# Patient Record
Sex: MALE | Employment: OTHER | URBAN - METROPOLITAN AREA
[De-identification: names, ages, dates, MRNs, and addresses within clinical notes are randomized per-mention and may not be internally consistent; named-entity substitution may affect disease eponyms.]

---

## 2021-04-06 ENCOUNTER — IMMUNIZATION (OUTPATIENT)
Dept: PRIMARY CARE CLINIC | Age: 61
End: 2021-04-06
Payer: COMMERCIAL

## 2021-04-06 PROCEDURE — 0011A COVID-19, MODERNA VACCINE 100MCG/0.5ML DOSE: CPT | Performed by: FAMILY MEDICINE

## 2021-04-06 PROCEDURE — 91301 COVID-19, MODERNA VACCINE 100MCG/0.5ML DOSE: CPT | Performed by: FAMILY MEDICINE

## 2021-05-04 ENCOUNTER — IMMUNIZATION (OUTPATIENT)
Dept: PRIMARY CARE CLINIC | Age: 61
End: 2021-05-04
Payer: COMMERCIAL

## 2021-05-04 PROCEDURE — 91301 COVID-19, MODERNA VACCINE 100MCG/0.5ML DOSE: CPT | Performed by: FAMILY MEDICINE

## 2021-05-04 PROCEDURE — 0012A COVID-19, MODERNA VACCINE 100MCG/0.5ML DOSE: CPT | Performed by: FAMILY MEDICINE

## 2022-08-05 ENCOUNTER — HOSPITAL ENCOUNTER (INPATIENT)
Age: 62
LOS: 33 days | Discharge: NURSING, REHAB OR LONG-TERM CARE | DRG: 853 | End: 2022-09-07
Attending: EMERGENCY MEDICINE | Admitting: INTERNAL MEDICINE
Payer: COMMERCIAL

## 2022-08-05 ENCOUNTER — APPOINTMENT (OUTPATIENT)
Dept: CT IMAGING | Age: 62
DRG: 853 | End: 2022-08-05
Payer: COMMERCIAL

## 2022-08-05 DIAGNOSIS — R79.89 ELEVATED TSH: ICD-10-CM

## 2022-08-05 DIAGNOSIS — K56.609 SMALL BOWEL OBSTRUCTION (HCC): Primary | ICD-10-CM

## 2022-08-05 DIAGNOSIS — R07.9 CHEST PAIN, UNSPECIFIED TYPE: ICD-10-CM

## 2022-08-05 DIAGNOSIS — K56.609 INTESTINAL OBSTRUCTION, UNSPECIFIED CAUSE, UNSPECIFIED WHETHER PARTIAL OR COMPLETE (HCC): ICD-10-CM

## 2022-08-05 DIAGNOSIS — K44.9 HIATAL HERNIA: ICD-10-CM

## 2022-08-05 PROBLEM — K56.600 PARTIAL BOWEL OBSTRUCTION (HCC): Status: ACTIVE | Noted: 2022-08-05

## 2022-08-05 LAB
A/G RATIO: 0.7 (ref 1.1–2.2)
ALBUMIN SERPL-MCNC: 3.3 G/DL (ref 3.4–5)
ALP BLD-CCNC: 98 U/L (ref 40–129)
ALT SERPL-CCNC: 27 U/L (ref 10–40)
ANION GAP SERPL CALCULATED.3IONS-SCNC: 13 MMOL/L (ref 3–16)
AST SERPL-CCNC: 27 U/L (ref 15–37)
BASE EXCESS ARTERIAL: -0.6 MMOL/L (ref -3–3)
BASOPHILS ABSOLUTE: 0 K/UL (ref 0–0.2)
BASOPHILS RELATIVE PERCENT: 0.1 %
BILIRUB SERPL-MCNC: 0.6 MG/DL (ref 0–1)
BUN BLDV-MCNC: 18 MG/DL (ref 7–20)
CALCIUM SERPL-MCNC: 8.7 MG/DL (ref 8.3–10.6)
CARBOXYHEMOGLOBIN ARTERIAL: 0.8 % (ref 0–1.5)
CHLORIDE BLD-SCNC: 97 MMOL/L (ref 99–110)
CO2: 24 MMOL/L (ref 21–32)
CREAT SERPL-MCNC: 1.1 MG/DL (ref 0.8–1.3)
EKG ATRIAL RATE: 112 BPM
EKG DIAGNOSIS: NORMAL
EKG P AXIS: 36 DEGREES
EKG P-R INTERVAL: 150 MS
EKG Q-T INTERVAL: 372 MS
EKG QRS DURATION: 112 MS
EKG QTC CALCULATION (BAZETT): 507 MS
EKG R AXIS: 46 DEGREES
EKG T AXIS: 2 DEGREES
EKG VENTRICULAR RATE: 112 BPM
EOSINOPHILS ABSOLUTE: 0.1 K/UL (ref 0–0.6)
EOSINOPHILS RELATIVE PERCENT: 0.5 %
GFR AFRICAN AMERICAN: >60
GFR NON-AFRICAN AMERICAN: >60
GLUCOSE BLD-MCNC: 112 MG/DL (ref 70–99)
HCO3 ARTERIAL: 22.8 MMOL/L (ref 21–29)
HCT VFR BLD CALC: 42.4 % (ref 40.5–52.5)
HEMOGLOBIN, ART, EXTENDED: 15.9 G/DL (ref 13.5–17.5)
HEMOGLOBIN: 14.2 G/DL (ref 13.5–17.5)
LACTIC ACID: 1.5 MMOL/L (ref 0.4–2)
LYMPHOCYTES ABSOLUTE: 0.9 K/UL (ref 1–5.1)
LYMPHOCYTES RELATIVE PERCENT: 8.1 %
MCH RBC QN AUTO: 32.2 PG (ref 26–34)
MCHC RBC AUTO-ENTMCNC: 33.5 G/DL (ref 31–36)
MCV RBC AUTO: 96.2 FL (ref 80–100)
METHEMOGLOBIN ARTERIAL: 0.3 %
MONOCYTES ABSOLUTE: 1 K/UL (ref 0–1.3)
MONOCYTES RELATIVE PERCENT: 9.5 %
NEUTROPHILS ABSOLUTE: 8.6 K/UL (ref 1.7–7.7)
NEUTROPHILS RELATIVE PERCENT: 81.8 %
O2 SAT, ARTERIAL: 94 %
O2 THERAPY: ABNORMAL
PCO2 ARTERIAL: 33.8 MMHG (ref 35–45)
PDW BLD-RTO: 15.9 % (ref 12.4–15.4)
PH ARTERIAL: 7.44 (ref 7.35–7.45)
PLATELET # BLD: 435 K/UL (ref 135–450)
PMV BLD AUTO: 7 FL (ref 5–10.5)
PO2 ARTERIAL: 68.3 MMHG (ref 75–108)
POTASSIUM REFLEX MAGNESIUM: 3.8 MMOL/L (ref 3.5–5.1)
RBC # BLD: 4.41 M/UL (ref 4.2–5.9)
SARS-COV-2, NAAT: NOT DETECTED
SODIUM BLD-SCNC: 134 MMOL/L (ref 136–145)
T4 FREE: 1.1 NG/DL (ref 0.9–1.8)
TCO2 ARTERIAL: 23.9 MMOL/L
TOTAL PROTEIN: 7.8 G/DL (ref 6.4–8.2)
TROPONIN: <0.01 NG/ML
TSH REFLEX: 12.18 UIU/ML (ref 0.27–4.2)
WBC # BLD: 10.5 K/UL (ref 4–11)

## 2022-08-05 PROCEDURE — 2580000003 HC RX 258: Performed by: NURSE PRACTITIONER

## 2022-08-05 PROCEDURE — 84484 ASSAY OF TROPONIN QUANT: CPT

## 2022-08-05 PROCEDURE — 74174 CTA ABD&PLVS W/CONTRAST: CPT

## 2022-08-05 PROCEDURE — 83605 ASSAY OF LACTIC ACID: CPT

## 2022-08-05 PROCEDURE — 6360000002 HC RX W HCPCS: Performed by: NURSE PRACTITIONER

## 2022-08-05 PROCEDURE — 84443 ASSAY THYROID STIM HORMONE: CPT

## 2022-08-05 PROCEDURE — 80053 COMPREHEN METABOLIC PANEL: CPT

## 2022-08-05 PROCEDURE — 96375 TX/PRO/DX INJ NEW DRUG ADDON: CPT

## 2022-08-05 PROCEDURE — 85025 COMPLETE CBC W/AUTO DIFF WBC: CPT

## 2022-08-05 PROCEDURE — 87635 SARS-COV-2 COVID-19 AMP PRB: CPT

## 2022-08-05 PROCEDURE — 36415 COLL VENOUS BLD VENIPUNCTURE: CPT

## 2022-08-05 PROCEDURE — 1200000000 HC SEMI PRIVATE

## 2022-08-05 PROCEDURE — 93010 ELECTROCARDIOGRAM REPORT: CPT | Performed by: INTERNAL MEDICINE

## 2022-08-05 PROCEDURE — 99285 EMERGENCY DEPT VISIT HI MDM: CPT

## 2022-08-05 PROCEDURE — 6370000000 HC RX 637 (ALT 250 FOR IP): Performed by: NURSE PRACTITIONER

## 2022-08-05 PROCEDURE — 84439 ASSAY OF FREE THYROXINE: CPT

## 2022-08-05 PROCEDURE — 36600 WITHDRAWAL OF ARTERIAL BLOOD: CPT

## 2022-08-05 PROCEDURE — 82803 BLOOD GASES ANY COMBINATION: CPT

## 2022-08-05 PROCEDURE — 93005 ELECTROCARDIOGRAM TRACING: CPT | Performed by: NURSE PRACTITIONER

## 2022-08-05 PROCEDURE — 6360000004 HC RX CONTRAST MEDICATION: Performed by: NURSE PRACTITIONER

## 2022-08-05 PROCEDURE — 96361 HYDRATE IV INFUSION ADD-ON: CPT

## 2022-08-05 PROCEDURE — 96374 THER/PROPH/DIAG INJ IV PUSH: CPT

## 2022-08-05 RX ORDER — FOLIC ACID 1 MG/1
1 TABLET ORAL DAILY
COMMUNITY
Start: 2022-03-21

## 2022-08-05 RX ORDER — GABAPENTIN 800 MG/1
800 TABLET ORAL 3 TIMES DAILY
Status: ON HOLD | COMMUNITY
End: 2022-09-07 | Stop reason: HOSPADM

## 2022-08-05 RX ORDER — 0.9 % SODIUM CHLORIDE 0.9 %
1000 INTRAVENOUS SOLUTION INTRAVENOUS ONCE
Status: COMPLETED | OUTPATIENT
Start: 2022-08-05 | End: 2022-08-05

## 2022-08-05 RX ORDER — OXYMETAZOLINE HYDROCHLORIDE 0.05 G/100ML
2 SPRAY NASAL ONCE
Status: COMPLETED | OUTPATIENT
Start: 2022-08-05 | End: 2022-08-05

## 2022-08-05 RX ORDER — ONDANSETRON 2 MG/ML
4 INJECTION INTRAMUSCULAR; INTRAVENOUS EVERY 6 HOURS PRN
Status: DISCONTINUED | OUTPATIENT
Start: 2022-08-05 | End: 2022-09-07 | Stop reason: HOSPADM

## 2022-08-05 RX ORDER — SPIRONOLACTONE 50 MG/1
50 TABLET, FILM COATED ORAL DAILY
Status: ON HOLD | COMMUNITY
Start: 2022-05-06 | End: 2022-09-07 | Stop reason: HOSPADM

## 2022-08-05 RX ORDER — ACETAMINOPHEN 500 MG
1000 TABLET ORAL EVERY 8 HOURS PRN
COMMUNITY
Start: 2022-08-03

## 2022-08-05 RX ORDER — LISINOPRIL 10 MG/1
10 TABLET ORAL DAILY
Status: ON HOLD | COMMUNITY
Start: 2022-06-13 | End: 2022-09-07 | Stop reason: HOSPADM

## 2022-08-05 RX ORDER — LEVOTHYROXINE SODIUM 0.2 MG/1
200 TABLET ORAL EVERY MORNING
COMMUNITY
Start: 2022-06-01

## 2022-08-05 RX ORDER — ATORVASTATIN CALCIUM 40 MG/1
1 TABLET, FILM COATED ORAL DAILY
Status: ON HOLD | COMMUNITY
Start: 2022-06-13 | End: 2022-08-07

## 2022-08-05 RX ORDER — LORATADINE 10 MG/1
10 TABLET ORAL DAILY
COMMUNITY

## 2022-08-05 RX ORDER — SODIUM CHLORIDE 9 MG/ML
INJECTION, SOLUTION INTRAVENOUS PRN
Status: DISCONTINUED | OUTPATIENT
Start: 2022-08-05 | End: 2022-08-07 | Stop reason: SDUPTHER

## 2022-08-05 RX ORDER — ASPIRIN 81 MG/1
324 TABLET, CHEWABLE ORAL ONCE
Status: DISCONTINUED | OUTPATIENT
Start: 2022-08-05 | End: 2022-08-05

## 2022-08-05 RX ORDER — RIVAROXABAN 20 MG/1
1 TABLET, FILM COATED ORAL DAILY
Status: ON HOLD | COMMUNITY
Start: 2022-06-03 | End: 2022-08-06

## 2022-08-05 RX ORDER — SPIRONOLACTONE 25 MG/1
50 TABLET ORAL DAILY
Status: DISCONTINUED | OUTPATIENT
Start: 2022-08-06 | End: 2022-08-28

## 2022-08-05 RX ORDER — ENOXAPARIN SODIUM 100 MG/ML
40 INJECTION SUBCUTANEOUS 2 TIMES DAILY
Status: DISCONTINUED | OUTPATIENT
Start: 2022-08-06 | End: 2022-08-23

## 2022-08-05 RX ORDER — CELECOXIB 200 MG/1
200 CAPSULE ORAL 2 TIMES DAILY
Status: ON HOLD | COMMUNITY
End: 2022-09-07 | Stop reason: HOSPADM

## 2022-08-05 RX ORDER — METOPROLOL SUCCINATE 25 MG/1
25 TABLET, EXTENDED RELEASE ORAL DAILY
Status: ON HOLD | COMMUNITY
End: 2022-08-05

## 2022-08-05 RX ORDER — SODIUM CHLORIDE, SODIUM LACTATE, POTASSIUM CHLORIDE, CALCIUM CHLORIDE 600; 310; 30; 20 MG/100ML; MG/100ML; MG/100ML; MG/100ML
INJECTION, SOLUTION INTRAVENOUS CONTINUOUS
Status: DISCONTINUED | OUTPATIENT
Start: 2022-08-05 | End: 2022-08-11

## 2022-08-05 RX ORDER — ONDANSETRON 4 MG/1
4 TABLET, ORALLY DISINTEGRATING ORAL EVERY 8 HOURS PRN
Status: DISCONTINUED | OUTPATIENT
Start: 2022-08-05 | End: 2022-09-07 | Stop reason: HOSPADM

## 2022-08-05 RX ORDER — LEVOTHYROXINE SODIUM 0.1 MG/1
200 TABLET ORAL DAILY
Status: DISCONTINUED | OUTPATIENT
Start: 2022-08-06 | End: 2022-09-07 | Stop reason: HOSPADM

## 2022-08-05 RX ORDER — FENTANYL CITRATE 50 UG/ML
50 INJECTION, SOLUTION INTRAMUSCULAR; INTRAVENOUS ONCE
Status: COMPLETED | OUTPATIENT
Start: 2022-08-05 | End: 2022-08-05

## 2022-08-05 RX ORDER — GABAPENTIN 400 MG/1
800 CAPSULE ORAL 3 TIMES DAILY
Status: DISCONTINUED | OUTPATIENT
Start: 2022-08-05 | End: 2022-08-18

## 2022-08-05 RX ORDER — ASPIRIN 81 MG/1
81 TABLET, COATED ORAL DAILY
COMMUNITY
Start: 2022-06-13

## 2022-08-05 RX ORDER — SODIUM CHLORIDE 0.9 % (FLUSH) 0.9 %
5-40 SYRINGE (ML) INJECTION EVERY 12 HOURS SCHEDULED
Status: DISCONTINUED | OUTPATIENT
Start: 2022-08-05 | End: 2022-08-07 | Stop reason: SDUPTHER

## 2022-08-05 RX ORDER — LIDOCAINE HYDROCHLORIDE 20 MG/ML
JELLY TOPICAL ONCE
Status: COMPLETED | OUTPATIENT
Start: 2022-08-05 | End: 2022-08-06

## 2022-08-05 RX ORDER — DIAZEPAM 5 MG/1
5 TABLET ORAL 4 TIMES DAILY PRN
Status: ON HOLD | COMMUNITY
Start: 2022-08-03 | End: 2022-09-07 | Stop reason: HOSPADM

## 2022-08-05 RX ORDER — LISINOPRIL AND HYDROCHLOROTHIAZIDE 12.5; 1 MG/1; MG/1
2 TABLET ORAL DAILY
Status: ON HOLD | COMMUNITY
End: 2022-08-05

## 2022-08-05 RX ORDER — ENOXAPARIN SODIUM 100 MG/ML
40 INJECTION SUBCUTANEOUS 2 TIMES DAILY
Status: DISCONTINUED | OUTPATIENT
Start: 2022-08-05 | End: 2022-08-05 | Stop reason: SDUPTHER

## 2022-08-05 RX ORDER — ENOXAPARIN SODIUM 100 MG/ML
40 INJECTION SUBCUTANEOUS 2 TIMES DAILY
Status: DISCONTINUED | OUTPATIENT
Start: 2022-08-06 | End: 2022-08-05

## 2022-08-05 RX ORDER — LISINOPRIL 10 MG/1
10 TABLET ORAL DAILY
Status: DISCONTINUED | OUTPATIENT
Start: 2022-08-06 | End: 2022-08-08

## 2022-08-05 RX ORDER — DIAZEPAM 5 MG/1
5 TABLET ORAL 4 TIMES DAILY PRN
Status: DISCONTINUED | OUTPATIENT
Start: 2022-08-05 | End: 2022-09-07 | Stop reason: HOSPADM

## 2022-08-05 RX ORDER — LIDOCAINE HYDROCHLORIDE 20 MG/ML
JELLY TOPICAL ONCE
Status: DISCONTINUED | OUTPATIENT
Start: 2022-08-05 | End: 2022-08-05 | Stop reason: RX

## 2022-08-05 RX ORDER — OXYCODONE HYDROCHLORIDE 5 MG/1
10 TABLET ORAL EVERY 6 HOURS PRN
Status: ON HOLD | COMMUNITY
Start: 2022-08-03 | End: 2022-09-07 | Stop reason: HOSPADM

## 2022-08-05 RX ORDER — PANTOPRAZOLE SODIUM 40 MG/1
40 TABLET, DELAYED RELEASE ORAL DAILY
Status: ON HOLD | COMMUNITY
Start: 2022-06-13 | End: 2022-09-07 | Stop reason: HOSPADM

## 2022-08-05 RX ORDER — SODIUM CHLORIDE 0.9 % (FLUSH) 0.9 %
5-40 SYRINGE (ML) INJECTION PRN
Status: DISCONTINUED | OUTPATIENT
Start: 2022-08-05 | End: 2022-08-07 | Stop reason: SDUPTHER

## 2022-08-05 RX ADMIN — FENTANYL CITRATE 50 MCG: 50 INJECTION, SOLUTION INTRAMUSCULAR; INTRAVENOUS at 16:24

## 2022-08-05 RX ADMIN — SODIUM CHLORIDE 1000 ML: 9 INJECTION, SOLUTION INTRAVENOUS at 15:03

## 2022-08-05 RX ADMIN — GABAPENTIN 800 MG: 400 CAPSULE ORAL at 23:53

## 2022-08-05 RX ADMIN — HYDROMORPHONE HYDROCHLORIDE 1 MG: 1 INJECTION, SOLUTION INTRAMUSCULAR; INTRAVENOUS; SUBCUTANEOUS at 19:35

## 2022-08-05 RX ADMIN — OXYMETAZOLINE HCL 2 SPRAY: 0.05 SPRAY NASAL at 23:54

## 2022-08-05 RX ADMIN — METOPROLOL TARTRATE 25 MG: 25 TABLET, FILM COATED ORAL at 23:54

## 2022-08-05 RX ADMIN — Medication 10 ML: at 23:00

## 2022-08-05 RX ADMIN — IOPAMIDOL 75 ML: 755 INJECTION, SOLUTION INTRAVENOUS at 15:47

## 2022-08-05 ASSESSMENT — PAIN SCALES - GENERAL
PAINLEVEL_OUTOF10: 6
PAINLEVEL_OUTOF10: 8
PAINLEVEL_OUTOF10: 5
PAINLEVEL_OUTOF10: 5

## 2022-08-05 ASSESSMENT — PAIN DESCRIPTION - LOCATION
LOCATION: CHEST;ABDOMEN
LOCATION: CHEST
LOCATION: CHEST

## 2022-08-05 ASSESSMENT — LIFESTYLE VARIABLES
HOW OFTEN DO YOU HAVE A DRINK CONTAINING ALCOHOL: MONTHLY OR LESS
HOW MANY STANDARD DRINKS CONTAINING ALCOHOL DO YOU HAVE ON A TYPICAL DAY: 1 OR 2

## 2022-08-05 ASSESSMENT — PAIN DESCRIPTION - FREQUENCY: FREQUENCY: CONTINUOUS

## 2022-08-05 ASSESSMENT — ENCOUNTER SYMPTOMS
COUGH: 0
ANAL BLEEDING: 0
EYE PAIN: 0
SORE THROAT: 0

## 2022-08-05 ASSESSMENT — PAIN DESCRIPTION - ORIENTATION: ORIENTATION: MID

## 2022-08-05 ASSESSMENT — PAIN DESCRIPTION - DESCRIPTORS: DESCRIPTORS: PRESSURE

## 2022-08-05 ASSESSMENT — HEART SCORE: ECG: 0

## 2022-08-05 ASSESSMENT — PAIN DESCRIPTION - PAIN TYPE: TYPE: ACUTE PAIN

## 2022-08-05 ASSESSMENT — PAIN - FUNCTIONAL ASSESSMENT: PAIN_FUNCTIONAL_ASSESSMENT: 0-10

## 2022-08-05 NOTE — H&P
Hospital Medicine History & Physical      PCP: Mamta Monge MD    Date of Admission: 8/5/2022    Date of Service: Pt seen/examined on 8/5/2022 and Admitted to Inpatient with expected LOS greater than two midnights due to medical therapy. Chief Complaint:  abdominal pain      History Of Present Illness:      63 y.o. male with PMHx of Arthritis, A-fib and HTN presented to [address] [place] [address] with 5-7 day hx of abdominal pain followed by acute chest pain with vomiting and diarrhea. Pt reports increasing \"bloating\" to abdomen followed by increasing pain. No fever, chills or body aches. Unable to hold liquids. No bloody stool or hematemesis. No urinary complaints  No active chest pain or shortness of breath. CT chest/abd/pelv: reveal small bowel obstruction with transition point mid small bowel. Distention noted to gastric, duodenal and small bowel. Large hiatal hernia containing much of the stomach which is distended. NG tube ordered for bowel decompression, IV fluids and npo    Past Medical History:          Diagnosis Date    Arthritis     Atrial fibrillation ([facility] [address]     Hypertension        Past Surgical History:      History reviewed. No pertinent surgical history. Medications Prior to Admission:      Prior to Admission medications    Medication Sig Start Date End Date Taking? Authorizing Provider   oxyCODONE (ROXICODONE) 5 MG immediate release tablet Take 10 mg by mouth every 6 hours as needed. 8/3/22  Yes Historical Provider, MD   pantoprazole (PROTONIX) 40 MG tablet Take 40 mg by mouth in the morning. 6/13/22  Yes Historical Provider, MD   folic acid (FOLVITE) 1 MG tablet Take 1 mg by mouth in the morning. 3/21/22  Yes Historical Provider, MD   acetaminophen (TYLENOL) 500 MG tablet Take 1,000 mg by mouth every 8 hours as needed 8/3/22  Yes Historical Provider, MD   diazePAM (VALIUM) 5 MG tablet Take 5 mg by mouth 4 times daily as needed.  8/3/22  Yes Historical Provider, MD levothyroxine (SYNTHROID) 200 MCG tablet Take 200 mcg by mouth every morning 6/1/22  Yes Historical Provider, MD   lisinopril (PRINIVIL;ZESTRIL) 10 MG tablet Take 10 mg by mouth in the morning. 6/13/22  Yes Historical Provider, MD   metoprolol tartrate (LOPRESSOR) 25 MG tablet Take 25 mg by mouth in the morning and 25 mg before bedtime. 7/1/22  Yes Historical Provider, MD   spironolactone (ALDACTONE) 50 MG tablet Take 50 mg by mouth in the morning. 5/6/22  Yes Historical Provider, MD   methotrexate (RHEUMATREX) 2.5 MG chemo tablet Take 15 mg by mouth once a week    Historical Provider, MD   celecoxib (CELEBREX) 200 MG capsule Take 200 mg by mouth in the morning and 200 mg before bedtime. Historical Provider, MD   ASPIRIN LOW DOSE 81 MG EC tablet Take 81 mg by mouth daily 6/13/22   Historical Provider, MD   atorvastatin (LIPITOR) 40 MG tablet Take 1 tablet by mouth in the morning. Patient not taking: Reported on 8/5/2022 6/13/22   Historical Provider, MD   gabapentin (NEURONTIN) 800 MG tablet Take 800 mg by mouth in the morning and 800 mg at noon and 800 mg before bedtime. Historical Provider, MD   loratadine (CLARITIN) 10 MG tablet Take 10 mg by mouth in the morning. Historical Provider, MD   XARELTO 20 MG TABS tablet Take 1 tablet by mouth daily 6/3/22   Historical Provider, MD       Allergies:  Codeine    Social History:      The patient currently lives with wife    TOBACCO:   reports that he has never smoked. He has never used smokeless tobacco.  ETOH:   reports that he does not currently use alcohol. Family History:      Reviewed in detail positive as follows:    History reviewed. No pertinent family history. REVIEW OF SYSTEMS:   Pertinent positives as noted in the HPI. All other systems reviewed and negative.     PHYSICAL EXAM PERFORMED:    BP (!) 155/109   Pulse (!) 120   Temp 98.3 °F (36.8 °C) (Oral)   Resp 27   Ht 5' 7\" (1.702 m)   Wt (!) 340 lb 2.7 oz (154.3 kg)   SpO2 91%   BMI 53.28 kg/m²     General appearance:  Well developed, well nourished,  male lying on hospital bed uncomfortable in appearance but in no apparent distress, appears stated age and cooperative. HEENT:  Normal cephalic, atraumatic without obvious deformity. Pupils equal, round, and reactive to light. Conjunctivae/corneas clear. Neck: Supple, with full range of motion. No jugular venous distention. Trachea midline. Respiratory:  Normal respiratory effort. Clear to auscultation, bilaterally without accessory muscle use. Cardiovascular:  tachycardic rate with regular rhythm without murmurs, no lower extremity edema. Abdomen: distended abdomen tympanic and diffusely tender, without rebound or guarding. Normal bowel sounds. Musculoskeletal:  Moves all extremities equally. Full range of motion without deformity. Skin: Skin warm, dry and intact. No rashes or lesions. Neurologic:  Neurovascularly intact without any focal sensory/motor deficits. Cranial nerves: II-XII intact, grossly non-focal.  Psychiatric:  Alert and oriented, thought content appropriate, normal insight  Capillary Refill: Brisk,< 3 seconds   Peripheral Pulses: +2 palpable, equal bilaterally       Labs:     Recent Labs     08/05/22  1427   WBC 10.5   HGB 14.2   HCT 42.4        Recent Labs     08/05/22  1427   *   K 3.8   CL 97*   CO2 24   BUN 18   CREATININE 1.1   CALCIUM 8.7     Recent Labs     08/05/22  1427   AST 27   ALT 27   BILITOT 0.6   ALKPHOS 98     No results for input(s): INR in the last 72 hours. Recent Labs     08/05/22  1427   TROPONINI <0.01       Urinalysis:    No results found for: Saralyn Grajeda, BACTERIA, RBCUA, BLOODU, SPECGRAV, GLUCOSEU    Radiology:     CTA CHEST ABDOMEN PELVIS W CONTRAST   Preliminary Result   1. No acute aortic pathology. No significant atherosclerotic disease,   aneurysm or dissection. No brachiocephalic or visceral stenosis.    2. Small bilateral pleural effusions with bilateral lower lobe atelectasis. No acute pulmonary infiltrate. 3. Gastric, duodenal and small bowel distension with gradual transition in   the mid small bowel most consistent with a low-grade or partial obstruction. Large hiatal hernia containing much of the stomach which is distended. 4. Colonic wall thickening in the mid descending colon, likely accentuated by   incomplete distension. The possibility of an underlying colitis is raised. Diverticulosis with no acute features. ASSESSMENT:    Active Hospital Problems    Diagnosis Date Noted    Partial bowel obstruction ([facility] [address] [K56.600] 08/05/2022     Priority: Medium    Large hiatal hernia [K44.9] 08/05/2022     Priority: Medium         PLAN:    Low grade or Partial Bowel obstruction  - CT: reveal small bowel obstruction with transition point mid small bowel. Distention noted to gastric, duodenal and small bowel. Large hiatal hernia containing much of the stomach which is distended. - npo  - surgery consulted in ED - will manage  - IV fluids  - NG tube for bowel decompression    Hiatal hernia  - CT chest/abd/pelv: Large hiatal hernia containing much of the stomach which is distended  - likely source of chest pain  - NG tube to decompress the stomach    Atrial Fibrillation   - currently rate controlled  - continue Metoprolol when able, will give lovenox instead of eliquis      HTN  - monitor blood pressure  - restart home meds when able    DVT Prophylaxis: Lovenox  Diet: Diet NPO  Code Status: Full Code    Dispo - Inpatient       [address], APRN - CNP    Thank you Dimas Mathews MD for the opportunity to be involved in this patient's care. If you have any questions or concerns please feel free to contact me at [phone].

## 2022-08-05 NOTE — ED PROVIDER NOTES
[facility] Emergency Department    CHIEF COMPLAINT  Abdominal Pain, Chest Pain (Sub-sternal, pressure, onset 1300 hours today), and Shortness of Breath (EMS reports pt 88% on RA)      SHARED SERVICE VISIT  I have seen and evaluated this patient with my supervising physician, Dr. Magdaleno Major. HISTORY OF PRESENT ILLNESS  Koki Stewart is a 63 y.o. nontoxic, well-appearing, but stressed male with a medical history including, but not limited to, atrial fibrillation hypertension who presents to the ED complaining of acute onset of substernal \"pressure\" chest pain with radiation to his back. Accompanying symptoms include \"aching\" 4/10 bifrontal headache, \"sharp\" 4/10 diffuse abdominal pain, nausea, vomiting x2-today, diarrhea x1 today, lightheadedness, and shortness of breath. Denies ripping or tearing sensation, dizziness, presyncope, diaphoresis, fever, chills, cough, change in ability to smell/taste, hemoptysis, leg/calf pain or swelling, body aches, or urinary symptoms/retention, other concerns. No other complaints, modifying factors or associated symptoms. Of note the patient was recently visiting [place] when he developed severe abdominal pain. He was seen and admitted to the hospital from Sunday to Wednesday. He was reportedly provided pain medication in order to drive home and was told to follow-up with GI.      HEART SCORE:    History: Highly Suspicious  ECG: Normal  Patient Age: > 39 and < 65 years  *Risk factors for Atherosclerotic disease: Hypertension; Obesity  Risk Factors: 1 or 2 risk factors  Troponin: < 1X normal limit  Heart Score Total: 4      Heart score: 4. This falls under the following category: Score of 4-6, which indicates low/moderate risk for major adverse cardiac event and supports observation with repeated troponins and/or non-invasive testing          Nursing notes reviewed.    Past Medical History:   Diagnosis Date    Arthritis     Atrial fibrillation ([facility] [address] Hypertension      History reviewed. No pertinent surgical history. History reviewed. No pertinent family history. Social History     Socioeconomic History    Marital status: Unknown     Spouse name: Not on file    Number of children: Not on file    Years of education: Not on file    Highest education level: Not on file   Occupational History    Not on file   Tobacco Use    Smoking status: Never    Smokeless tobacco: Never   Substance and Sexual Activity    Alcohol use: Not Currently    Drug use: Never    Sexual activity: Not Currently   Other Topics Concern    Not on file   Social History Narrative    Not on file     Social Determinants of Health     Financial Resource Strain: Not on file   Food Insecurity: Not on file   Transportation Needs: Not on file   Physical Activity: Not on file   Stress: Not on file   Social Connections: Not on file   Intimate Partner Violence: Not on file   Housing Stability: Not on file     Current Facility-Administered Medications   Medication Dose Route Frequency Provider Last Rate Last Admin    0.9 % sodium chloride bolus  1,000 mL IntraVENous Once JARON Thompson - CNP         Current Outpatient Medications   Medication Sig Dispense Refill    oxyCODONE (ROXICODONE) 5 MG immediate release tablet Take 10 mg by mouth every 6 hours as needed. pantoprazole (PROTONIX) 40 MG tablet Take 40 mg by mouth in the morning. folic acid (FOLVITE) 1 MG tablet Take 1 mg by mouth in the morning. methotrexate (RHEUMATREX) 2.5 MG chemo tablet Take 15 mg by mouth once a week      lisinopril-hydroCHLOROthiazide (PRINZIDE;ZESTORETIC) 10-12.5 MG per tablet Take 2 tablets by mouth in the morning. celecoxib (CELEBREX) 200 MG capsule Take 200 mg by mouth in the morning and 200 mg before bedtime. metoprolol succinate (TOPROL XL) 25 MG extended release tablet Take 25 mg by mouth in the morning.       ASPIRIN LOW DOSE 81 MG EC tablet TAKE 1 TABLET BY MOUTH DAILY       Allergies Allergen Reactions    Codeine Nausea Only and Nausea And Vomiting     Stomach upset          REVIEW OF SYSTEMS  Review of Systems   Constitutional:  Negative for chills, diaphoresis, fatigue and fever. HENT:  Negative for congestion and sore throat. Eyes:  Negative for pain and visual disturbance. Respiratory:  Positive for shortness of breath. Negative for cough. Cardiovascular:  Positive for chest pain. Negative for leg swelling. Gastrointestinal:  Positive for abdominal distention, abdominal pain, diarrhea, nausea and vomiting. Negative for anal bleeding. Genitourinary:  Negative for difficulty urinating, dysuria, frequency and urgency. Musculoskeletal:  Positive for back pain. Negative for neck pain. Skin:  Negative for rash and wound. Neurological:  Positive for light-headedness and headaches. Negative for dizziness. Hematological: Negative. Psychiatric/Behavioral: Negative. PHYSICAL EXAM  BP (!) 131/93   Pulse (!) 114   Temp 98.3 °F (36.8 °C) (Oral)   Resp 20   Ht 5' 7\" (1.702 m)   Wt (!) 340 lb 2.7 oz (154.3 kg)   SpO2 93%   BMI 53.28 kg/m²   GENERAL APPEARANCE: Awake and alert. Cooperative. +  distress. Non-toxic in appearance. HEAD: Normocephalic. Atraumatic. EYES: PERRL. EOM's grossly intact. ENT: Mucous membranes are moist.   NECK: Supple. HEART: Regular rhythm with tachycardic rate at 109 bpm. No murmurs, rubs, or gallops. LUNGS:  Respirations unlabored. CTAB. Moderate air exchange. Speaking comfortably in full sentences. ABDOMEN: Soft. Non-distended. Non-tender. No guarding or rebound. There is no midline pulsatile abdominal mass. + Bowel sounds x4 quadrants. No masses. No organomegaly. EXTREMITIES: Trace peripheral edema. Moves all extremities equally. All extremities neurovascularly intact. Radial pulse equal bilaterally. SKIN: Warm and dry. No acute rashes. NEUROLOGICAL: Alert and oriented. CN's 2-12 intact. No gross facial drooping. Strength 5/5, sensation intact. PSYCHIATRIC: Normal mood and affect. RADIOLOGY  No results found. ED COURSE  Patient was placed on cardiac monitoring,  p.o. was given. Nitro was given in route without relief. Patient received fentanyl and Dilaudid for pain, with good relief. Triage vitals stable but hypertensive at 131/93 mmHg and tachycardic at 114 bpm.  Cardiac workup was initiated to include CBC, chemistry panel, cardiac labs, COVID-19, TSH, lactic acid, CXR, and EKG. work-up pending. Patient was given a liter bolus of normal saline, initially fentanyl for pain, and eventually Dilaudid for pain. Patient did not require antiemetics as he states he is not experiencing any nausea presently. CRITICAL CARE TIME  0 Minutes of critical care time spent not including separately billable procedures. MDM  Patient presents to the emergency department with chest pain. Alternate diagnoses are less likely based on history and physical. Alternate diagnoses are less likely based on history and physical. Considered myocardial infarction, aortic dissection, pulmonary embolism, tension pneumothorax, endocarditis, GERD, and pneumonia but less likely based on history and physical. Considered MI but no ischemic changes on EKG and no elevation in troponin. Considered aortic dissection but less likely as no radiation of pain into back with ripping/tearing sensation, there are equal radial pulses bilaterally, and there is no midline pulsatile abdominal mass. Considered pulmonary embolism but less likely as no cough or hemoptysis, and no DVT symptoms. D-dimer was not obtained. Considered tension pneumothorax but less likely as no unilaterally diminished breath sounds or tracheal deviation. Considered endocarditis but less likely as no fever, murmur, or IV drug use. Considered GERD but less likely as no postprandial epigastric abdominal/throat burning.  Considered pneumonia but less likely as no fever, chills, sweats, leukocytosis, cough, and no radiographic evidence of consolidation or infiltrates on imaging-CXR.          Labs Ordered and reviewed:  I have reviewed and interpreted all of the currently available lab results from this visit:  Results for orders placed or performed during the hospital encounter of 08/05/22   COVID-19, Rapid    Specimen: Nasopharyngeal Swab   Result Value Ref Range    SARS-CoV-2, NAAT Not Detected Not Detected   CBC with Auto Differential   Result Value Ref Range    WBC 10.5 4.0 - 11.0 K/uL    RBC 4.41 4.20 - 5.90 M/uL    Hemoglobin 14.2 13.5 - 17.5 g/dL    Hematocrit 42.4 40.5 - 52.5 %    MCV 96.2 80.0 - 100.0 fL    MCH 32.2 26.0 - 34.0 pg    MCHC 33.5 31.0 - 36.0 g/dL    RDW 15.9 (H) 12.4 - 15.4 %    Platelets 251 134 - 511 K/uL    MPV 7.0 5.0 - 10.5 fL    Neutrophils % 81.8 %    Lymphocytes % 8.1 %    Monocytes % 9.5 %    Eosinophils % 0.5 %    Basophils % 0.1 %    Neutrophils Absolute 8.6 (H) 1.7 - 7.7 K/uL    Lymphocytes Absolute 0.9 (L) 1.0 - 5.1 K/uL    Monocytes Absolute 1.0 0.0 - 1.3 K/uL    Eosinophils Absolute 0.1 0.0 - 0.6 K/uL    Basophils Absolute 0.0 0.0 - 0.2 K/uL   Comprehensive Metabolic Panel w/ Reflex to MG   Result Value Ref Range    Sodium 134 (L) 136 - 145 mmol/L    Potassium reflex Magnesium 3.8 3.5 - 5.1 mmol/L    Chloride 97 (L) 99 - 110 mmol/L    CO2 24 21 - 32 mmol/L    Anion Gap 13 3 - 16    Glucose 112 (H) 70 - 99 mg/dL    BUN 18 7 - 20 mg/dL    Creatinine 1.1 0.8 - 1.3 mg/dL    GFR Non-African American >60 >60    GFR African American >60 >60    Calcium 8.7 8.3 - 10.6 mg/dL    Total Protein 7.8 6.4 - 8.2 g/dL    Albumin 3.3 (L) 3.4 - 5.0 g/dL    Albumin/Globulin Ratio 0.7 (L) 1.1 - 2.2    Total Bilirubin 0.6 0.0 - 1.0 mg/dL    Alkaline Phosphatase 98 40 - 129 U/L    ALT 27 10 - 40 U/L    AST 27 15 - 37 U/L   Troponin   Result Value Ref Range    Troponin <0.01 <0.01 ng/mL   Blood Gas, Arterial   Result Value Ref Range    pH, Arterial 7.438 7.350 - 7.450    pCO2, Arterial 33.8 (L) 35.0 - 45.0 mmHg    pO2, Arterial 68.3 (L) 75.0 - 108.0 mmHg    HCO3, Arterial 22.8 21.0 - 29.0 mmol/L    Base Excess, Arterial -0.6 -3.0 - 3.0 mmol/L    Hemoglobin, Art, Extended 15.9 13.5 - 17.5 g/dL    O2 Sat, Arterial 94.0 >92 %    Carboxyhgb, Arterial 0.8 0.0 - 1.5 %    Methemoglobin, Arterial 0.3 <1.5 %    TCO2, Arterial 23.9 Not Established mmol/L    O2 Therapy Unknown    TSH with Reflex   Result Value Ref Range    TSH 12.18 (H) 0.27 - 4.20 uIU/mL   Lactic Acid   Result Value Ref Range    Lactic Acid 1.5 0.4 - 2.0 mmol/L   T4, Free   Result Value Ref Range    T4 Free 1.1 0.9 - 1.8 ng/dL   Comprehensive Metabolic Panel w/ Reflex to MG   Result Value Ref Range    Sodium 134 (L) 136 - 145 mmol/L    Potassium reflex Magnesium 3.2 (L) 3.5 - 5.1 mmol/L    Chloride 98 (L) 99 - 110 mmol/L    CO2 20 (L) 21 - 32 mmol/L    Anion Gap 16 3 - 16    Glucose 110 (H) 70 - 99 mg/dL    BUN 21 (H) 7 - 20 mg/dL    Creatinine 0.8 0.8 - 1.3 mg/dL    GFR Non-African American >60 >60    GFR African American >60 >60    Calcium 8.1 (L) 8.3 - 10.6 mg/dL    Total Protein 6.3 (L) 6.4 - 8.2 g/dL    Albumin 2.8 (L) 3.4 - 5.0 g/dL    Albumin/Globulin Ratio 0.8 (L) 1.1 - 2.2    Total Bilirubin 0.7 0.0 - 1.0 mg/dL    Alkaline Phosphatase 72 40 - 129 U/L    ALT 23 10 - 40 U/L    AST 22 15 - 37 U/L   Lactic Acid   Result Value Ref Range    Lactic Acid 4.3 (HH) 0.4 - 2.0 mmol/L   CBC with Auto Differential   Result Value Ref Range    WBC 11.0 4.0 - 11.0 K/uL    RBC 3.64 (L) 4.20 - 5.90 M/uL    Hemoglobin 11.5 (L) 13.5 - 17.5 g/dL    Hematocrit 34.8 (L) 40.5 - 52.5 %    MCV 95.4 80.0 - 100.0 fL    MCH 31.6 26.0 - 34.0 pg    MCHC 33.1 31.0 - 36.0 g/dL    RDW 16.1 (H) 12.4 - 15.4 %    Platelets 706 974 - 437 K/uL    MPV 7.1 5.0 - 10.5 fL    Neutrophils % 80.0 %    Lymphocytes % 8.0 %    Monocytes % 8.0 %    Eosinophils % 0.0 %    Basophils % 1.0 %    Neutrophils Absolute 9.1 (H) 1.7 - 7.7 K/uL    Lymphocytes Absolute 0.9 (L) 1.0 - 5.1 K/uL    Monocytes Absolute 0.9 0.0 - 1.3 K/uL    Eosinophils Absolute 0.0 0.0 - 0.6 K/uL    Basophils Absolute 0.1 0.0 - 0.2 K/uL    Bands Relative 3 0 - 7 %    Anisocytosis Occasional (A)     Microcytes Occasional (A)    Protime-INR   Result Value Ref Range    Protime 16.4 (H) 11.7 - 14.5 sec    INR 1.33 (H) 0.87 - 1.14   Magnesium   Result Value Ref Range    Magnesium 1.60 (L) 1.80 - 2.40 mg/dL   Lactic Acid   Result Value Ref Range    Lactic Acid 1.1 0.4 - 2.0 mmol/L   EKG 12 Lead   Result Value Ref Range    Ventricular Rate 112 BPM    Atrial Rate 112 BPM    P-R Interval 150 ms    QRS Duration 112 ms    Q-T Interval 372 ms    QTc Calculation (Bazett) 507 ms    P Axis 36 degrees    R Axis 46 degrees    T Axis 2 degrees    Diagnosis       Sinus tachycardiaNon-specific intra-ventricular conduction delayAbnormal ECGNo previous ECGs availableConfirmed by Jesi MOREIRA MD ([id number]) on 8/5/2022 3:47:26 PM             Imaging ordered and reviewed:  XR CHEST PORTABLE    Result Date: 8/6/2022  Nonvisualization of the tip of the nasogastric tube. A KUB may be helpful for further evaluation. Cardiomegaly with pulmonary vascular congestion Bibasilar atelectasis or infiltrate. CTA CHEST ABDOMEN PELVIS W CONTRAST    Result Date: 8/6/2022  1. No acute aortic pathology. No significant atherosclerotic disease, aneurysm or dissection. No brachiocephalic or visceral stenosis. 2. Small bilateral pleural effusions with bilateral lower lobe atelectasis. No acute pulmonary infiltrate. 3. Gastric, duodenal and small bowel distension with gradual transition in the mid small bowel most consistent with a low-grade or partial obstruction. Large hiatal hernia containing much of the stomach which is distended. 4. Colonic wall thickening in the mid descending colon, likely accentuated by incomplete distension. The possibility of an underlying colitis is raised. Diverticulosis with no acute features.       Work-up reveals:  ABG: Normal pH is 7.4, CO2 reduced at 33.8, and PO2 reduced at 68.3, otherwise unremarkable  COVID-19 not detected  T4, free: 1.1  TSH with reflex: +12.2  Troponin: <0.01  Lactic acid [email or web address], metabolic panel shows hyponatremia 134, [email or web address], hyperglycemic at 112 mg/dL, albumin reduced at 2.3, albumin/globulin ratio reduced at 0.7, otherwise unremarkable  CBC: Negative for leukocytosis or anemia with neutrophils absolute elevated 8.6, and lymphocytes absolute reduced at 0.9 but otherwise unremarkable  EKG: As interpreted by EMD, see RN note for details    Given the patient is experiencing chest pain with radiation into his back as well as shortness of breath we did obtain CTA chest abdomen pelvis. CTA chest abdomen pelvis: As noted above, identifying \"1. No acute aortic pathology. No significant atherosclerotic disease, aneurysm or dissection. No brachiocephalic or visceral stenosis. 2. Small bilateral pleural effusions with bilateral lower lobe atelectasis. No acute pulmonary infiltrate. 3. Gastric, duodenal and small bowel distension with gradual transition in the mid small bowel most consistent with a low-grade or partial obstruction. Large hiatal hernia containing much of the stomach which is distended. 4. Colonic wall thickening in the mid descending colon, likely accentuated by incomplete distension. The possibility of an underlying colitis is raised. Diverticulosis with no acute features. \"          Heart score is elevated @  4 with an EKG showing no ischemic changes per EMD read. Therefore, given that the patient does have an imaging study concerning for small bowel obstruction he will be admitted to the hospital for further evaluation and treatment. CTA chest abdomen pelvis concerning for SBO. I consulted with general surgeon. Consulted Dr. [email or web address] and call back at 1756 hrs. Discussed patient's HPI, ED work-up, results, and treatment.  Dr. Orinda Cabot recommends admission to medicine and his service will follow. A discussion was had with Mr. Megan Schwartz regarding SBO, elevated TSH, and intention to admit . Risk management discussed and shared decision making had with patient and/or surrogate. All questions were answered. Patient will be admitted to the hospital for further evaluation treatment. Patient is agreeable with this plan. Clinical Impression:  Small bowel obstruction  Elevated TSH  Chest pain    Disposition:  Admitted      Patient will be admitted to hospital for further evaluation and treatment. Hospitalist: Dr. Karyna Santos      Discussed patients HPI, ED work-up, results, treatment, and response with my attending physician Dr. Paolo Sands and the Hospitalist -Dr. Karyna Santos who agrees to admit the patient to the hospital.      Tere [facility] [employer]    This chart was created using Dragon dictation. Every effort was made by myself to ensure accuracy, however due to limitations of this technology errors may be present.               JARON Rubin - [facility]  08/06/22 [address] [facility] HospitalJ[facility]ON - CNP  08/06/22 1544

## 2022-08-05 NOTE — ED NOTES
EMS reports pt complains of chest pain. 88% on room air, 12-Lead Sinus Tach. Hx of Afib.    Given 324mg ASA, 0.4 Nitro tab, place on 4L of O2 via NC     Flor Nolasco RN  08/05/22 [id number]

## 2022-08-05 NOTE — ED TRIAGE NOTES
Pt states abdominal pain for past 3 days. Today sudden onset of chest pain, substernal, 5/10, pressure around 1300 hours. Vomit and Diarrhea. Lightheadedness.

## 2022-08-06 ENCOUNTER — APPOINTMENT (OUTPATIENT)
Dept: GENERAL RADIOLOGY | Age: 62
DRG: 853 | End: 2022-08-06
Payer: COMMERCIAL

## 2022-08-06 LAB
A/G RATIO: 0.8 (ref 1.1–2.2)
ALBUMIN SERPL-MCNC: 2.8 G/DL (ref 3.4–5)
ALP BLD-CCNC: 72 U/L (ref 40–129)
ALT SERPL-CCNC: 23 U/L (ref 10–40)
ANION GAP SERPL CALCULATED.3IONS-SCNC: 16 MMOL/L (ref 3–16)
ANISOCYTOSIS: ABNORMAL
AST SERPL-CCNC: 22 U/L (ref 15–37)
BANDED NEUTROPHILS RELATIVE PERCENT: 3 % (ref 0–7)
BASOPHILS ABSOLUTE: 0.1 K/UL (ref 0–0.2)
BASOPHILS RELATIVE PERCENT: 1 %
BILIRUB SERPL-MCNC: 0.7 MG/DL (ref 0–1)
BUN BLDV-MCNC: 21 MG/DL (ref 7–20)
CALCIUM SERPL-MCNC: 8.1 MG/DL (ref 8.3–10.6)
CHLORIDE BLD-SCNC: 98 MMOL/L (ref 99–110)
CO2: 20 MMOL/L (ref 21–32)
CREAT SERPL-MCNC: 0.8 MG/DL (ref 0.8–1.3)
EOSINOPHILS ABSOLUTE: 0 K/UL (ref 0–0.6)
EOSINOPHILS RELATIVE PERCENT: 0 %
GFR AFRICAN AMERICAN: >60
GFR NON-AFRICAN AMERICAN: >60
GLUCOSE BLD-MCNC: 110 MG/DL (ref 70–99)
HCT VFR BLD CALC: 34.8 % (ref 40.5–52.5)
HEMOGLOBIN: 11.5 G/DL (ref 13.5–17.5)
INR BLD: 1.33 (ref 0.87–1.14)
LACTIC ACID: 1.1 MMOL/L (ref 0.4–2)
LACTIC ACID: 4.3 MMOL/L (ref 0.4–2)
LYMPHOCYTES ABSOLUTE: 0.9 K/UL (ref 1–5.1)
LYMPHOCYTES RELATIVE PERCENT: 8 %
MAGNESIUM: 1.6 MG/DL (ref 1.8–2.4)
MCH RBC QN AUTO: 31.6 PG (ref 26–34)
MCHC RBC AUTO-ENTMCNC: 33.1 G/DL (ref 31–36)
MCV RBC AUTO: 95.4 FL (ref 80–100)
MICROCYTES: ABNORMAL
MONOCYTES ABSOLUTE: 0.9 K/UL (ref 0–1.3)
MONOCYTES RELATIVE PERCENT: 8 %
NEUTROPHILS ABSOLUTE: 9.1 K/UL (ref 1.7–7.7)
NEUTROPHILS RELATIVE PERCENT: 80 %
PDW BLD-RTO: 16.1 % (ref 12.4–15.4)
PLATELET # BLD: 372 K/UL (ref 135–450)
PMV BLD AUTO: 7.1 FL (ref 5–10.5)
POTASSIUM REFLEX MAGNESIUM: 3.2 MMOL/L (ref 3.5–5.1)
PROTHROMBIN TIME: 16.4 SEC (ref 11.7–14.5)
RBC # BLD: 3.64 M/UL (ref 4.2–5.9)
SODIUM BLD-SCNC: 134 MMOL/L (ref 136–145)
TOTAL PROTEIN: 6.3 G/DL (ref 6.4–8.2)
WBC # BLD: 11 K/UL (ref 4–11)

## 2022-08-06 PROCEDURE — 94761 N-INVAS EAR/PLS OXIMETRY MLT: CPT

## 2022-08-06 PROCEDURE — 85025 COMPLETE CBC W/AUTO DIFF WBC: CPT

## 2022-08-06 PROCEDURE — 83605 ASSAY OF LACTIC ACID: CPT

## 2022-08-06 PROCEDURE — 6360000002 HC RX W HCPCS: Performed by: NURSE PRACTITIONER

## 2022-08-06 PROCEDURE — 2580000003 HC RX 258: Performed by: NURSE PRACTITIONER

## 2022-08-06 PROCEDURE — 83735 ASSAY OF MAGNESIUM: CPT

## 2022-08-06 PROCEDURE — 6370000000 HC RX 637 (ALT 250 FOR IP): Performed by: NURSE PRACTITIONER

## 2022-08-06 PROCEDURE — 1200000000 HC SEMI PRIVATE

## 2022-08-06 PROCEDURE — 80053 COMPREHEN METABOLIC PANEL: CPT

## 2022-08-06 PROCEDURE — 85610 PROTHROMBIN TIME: CPT

## 2022-08-06 PROCEDURE — 99222 1ST HOSP IP/OBS MODERATE 55: CPT | Performed by: SURGERY

## 2022-08-06 PROCEDURE — 71045 X-RAY EXAM CHEST 1 VIEW: CPT

## 2022-08-06 PROCEDURE — 94660 CPAP INITIATION&MGMT: CPT

## 2022-08-06 PROCEDURE — 36415 COLL VENOUS BLD VENIPUNCTURE: CPT

## 2022-08-06 PROCEDURE — 2700000000 HC OXYGEN THERAPY PER DAY

## 2022-08-06 RX ORDER — POTASSIUM CHLORIDE 7.45 MG/ML
10 INJECTION INTRAVENOUS
Status: COMPLETED | OUTPATIENT
Start: 2022-08-06 | End: 2022-08-06

## 2022-08-06 RX ADMIN — GABAPENTIN 800 MG: 400 CAPSULE ORAL at 21:00

## 2022-08-06 RX ADMIN — ENOXAPARIN SODIUM 40 MG: 100 INJECTION SUBCUTANEOUS at 21:00

## 2022-08-06 RX ADMIN — HYDROMORPHONE HYDROCHLORIDE 0.5 MG: 1 INJECTION, SOLUTION INTRAMUSCULAR; INTRAVENOUS; SUBCUTANEOUS at 00:46

## 2022-08-06 RX ADMIN — METOPROLOL TARTRATE 25 MG: 25 TABLET, FILM COATED ORAL at 09:52

## 2022-08-06 RX ADMIN — HYDROMORPHONE HYDROCHLORIDE 0.5 MG: 1 INJECTION, SOLUTION INTRAMUSCULAR; INTRAVENOUS; SUBCUTANEOUS at 21:01

## 2022-08-06 RX ADMIN — SODIUM CHLORIDE, POTASSIUM CHLORIDE, SODIUM LACTATE AND CALCIUM CHLORIDE: 600; 310; 30; 20 INJECTION, SOLUTION INTRAVENOUS at 17:17

## 2022-08-06 RX ADMIN — GABAPENTIN 800 MG: 400 CAPSULE ORAL at 09:52

## 2022-08-06 RX ADMIN — LEVOTHYROXINE SODIUM 200 MCG: 0.1 TABLET ORAL at 06:37

## 2022-08-06 RX ADMIN — Medication 10 MEQ: at 09:55

## 2022-08-06 RX ADMIN — ENOXAPARIN SODIUM 40 MG: 100 INJECTION SUBCUTANEOUS at 09:53

## 2022-08-06 RX ADMIN — HYDROMORPHONE HYDROCHLORIDE 0.5 MG: 1 INJECTION, SOLUTION INTRAMUSCULAR; INTRAVENOUS; SUBCUTANEOUS at 06:52

## 2022-08-06 RX ADMIN — GABAPENTIN 800 MG: 400 CAPSULE ORAL at 14:01

## 2022-08-06 RX ADMIN — Medication 10 MEQ: at 11:02

## 2022-08-06 RX ADMIN — LIDOCAINE HYDROCHLORIDE: 20 JELLY TOPICAL at 00:56

## 2022-08-06 RX ADMIN — METOPROLOL TARTRATE 25 MG: 25 TABLET, FILM COATED ORAL at 21:00

## 2022-08-06 RX ADMIN — LISINOPRIL 10 MG: 10 TABLET ORAL at 09:52

## 2022-08-06 RX ADMIN — SODIUM CHLORIDE, POTASSIUM CHLORIDE, SODIUM LACTATE AND CALCIUM CHLORIDE: 600; 310; 30; 20 INJECTION, SOLUTION INTRAVENOUS at 00:55

## 2022-08-06 RX ADMIN — HYDROMORPHONE HYDROCHLORIDE 0.5 MG: 1 INJECTION, SOLUTION INTRAMUSCULAR; INTRAVENOUS; SUBCUTANEOUS at 09:52

## 2022-08-06 RX ADMIN — SPIRONOLACTONE 50 MG: 25 TABLET ORAL at 09:52

## 2022-08-06 RX ADMIN — HYDROMORPHONE HYDROCHLORIDE 0.5 MG: 1 INJECTION, SOLUTION INTRAMUSCULAR; INTRAVENOUS; SUBCUTANEOUS at 14:14

## 2022-08-06 RX ADMIN — Medication 10 MEQ: at 12:00

## 2022-08-06 RX ADMIN — HYDROMORPHONE HYDROCHLORIDE 0.5 MG: 1 INJECTION, SOLUTION INTRAMUSCULAR; INTRAVENOUS; SUBCUTANEOUS at 17:14

## 2022-08-06 ASSESSMENT — PAIN DESCRIPTION - LOCATION
LOCATION: ABDOMEN

## 2022-08-06 ASSESSMENT — PAIN DESCRIPTION - FREQUENCY
FREQUENCY: CONTINUOUS

## 2022-08-06 ASSESSMENT — PAIN SCALES - GENERAL
PAINLEVEL_OUTOF10: 0
PAINLEVEL_OUTOF10: 7
PAINLEVEL_OUTOF10: 0
PAINLEVEL_OUTOF10: 5
PAINLEVEL_OUTOF10: 9
PAINLEVEL_OUTOF10: 8
PAINLEVEL_OUTOF10: 8
PAINLEVEL_OUTOF10: 0
PAINLEVEL_OUTOF10: 7

## 2022-08-06 ASSESSMENT — PAIN DESCRIPTION - DESCRIPTORS
DESCRIPTORS: ACHING

## 2022-08-06 ASSESSMENT — PAIN DESCRIPTION - ONSET
ONSET: ON-GOING
ONSET: GRADUAL

## 2022-08-06 ASSESSMENT — ENCOUNTER SYMPTOMS
DIARRHEA: 1
ABDOMINAL DISTENTION: 1
ABDOMINAL PAIN: 1
BACK PAIN: 1
NAUSEA: 1
SHORTNESS OF BREATH: 1
VOMITING: 1

## 2022-08-06 ASSESSMENT — PAIN DESCRIPTION - PAIN TYPE
TYPE: ACUTE PAIN

## 2022-08-06 ASSESSMENT — PAIN DESCRIPTION - ORIENTATION
ORIENTATION: MID

## 2022-08-06 ASSESSMENT — PAIN SCALES - WONG BAKER: WONGBAKER_NUMERICALRESPONSE: 0

## 2022-08-06 NOTE — PROGRESS NOTES
Called to bedside by nurse who was unable to pass NG tube. With patient in appropriate position using a 18 fr NG tube I was able to advance the tube approximately 15 cm to right nares before there was a significant amount of resistance, this was attempted several time with same results. I attempted to advance the tube into the left nares with almost immediate resistance and pain to patient. There were no 16 fr NG tubes available so a 14 fr tube was used with successful placement using the left nares. After advancing into stomach there was resistance and the tube coiled up in the patients mouth. I retracted the tube until there was no tube in mouth and advanced to 50 cm. Suction connected and will decompress as much as able with the 14 fr tube.     Xray ordered for placement    [address], APRN - CNP

## 2022-08-06 NOTE — PLAN OF CARE
Problem: Discharge Planning  Goal: Discharge to home or other facility with appropriate resources  Outcome: Progressing  Flowsheets  Taken 8/6/2022 0349  Discharge to home or other facility with appropriate resources: Identify barriers to discharge with patient and caregiver  Taken 8/5/2022 2206  Discharge to home or other facility with appropriate resources: Identify barriers to discharge with patient and caregiver     Problem: Pain  Goal: Verbalizes/displays adequate comfort level or baseline comfort level  Outcome: Progressing  Flowsheets (Taken 8/6/2022 0349)  Verbalizes/displays adequate comfort level or baseline comfort level:   Encourage patient to monitor pain and request assistance   Assess pain using appropriate pain scale   Implement non-pharmacological measures as appropriate and evaluate response   Administer analgesics based on type and severity of pain and evaluate response     Problem: ABCDS Injury Assessment  Goal: Absence of physical injury  Outcome: Progressing  Flowsheets (Taken 8/6/2022 0349)  Absence of Physical Injury: Implement safety measures based on patient assessment

## 2022-08-06 NOTE — PROGRESS NOTES
Medication Reconciliation    List of medications for Ghislaine Littlejohn is currently taking is in progress. Source of Information:   Epic records    Notes Regarding Home Medications:   Utilized  health discharge notes from 8/5 to complete medication list.  Xarelto added to list HOWEVER this was not on discharge note from . Pt filled for 90 days 6/3/22. Unable to determine when or if this was stopped. Lisinopril/HCTZ removed. Per fill history and note, pt has been receiving lisinopril 10mg QD  Metoprolol succinate removed-per history and note pt has been receiving metoprolol tartrate 25mg BID.     Denies other otc/herbal use    Cheryl Rivera, Pharmacy Intern  8/5/2022 9:20 PM

## 2022-08-06 NOTE — PROGRESS NOTES
Hospitalist Progress Note      PCP: Shaggy Vang MD    Date of Admission: 8/5/2022    Chief Complaint:   Chief Complaint   Patient presents with    Abdominal Pain    Chest Pain     Sub-sternal, pressure, onset 1300 hours today    Shortness of Breath     EMS reports pt 88% on RA     Hospital Course:     63 y.o. male with PMHx of Arthritis, A-fib and HTN presented to [facility] with 5-7 day hx of abdominal pain followed by acute chest pain with vomiting and diarrhea. Pt reports increasing \"bloating\" to abdomen followed by increasing pain. No fever, chills or body aches. Unable to hold liquids. No bloody stool or hematemesis. No urinary complaints  No active chest pain or shortness of breath. CT chest/abd/pelv: reveal small bowel obstruction with transition point mid small bowel. Distention noted to gastric, duodenal and small bowel. Large hiatal hernia containing much of the stomach which is distended. NG tube ordered for bowel decompression, IV fluids and npo    Subjective:     Patient endorses feeling better today after NG decompression. Repeat KUB in AM per surgery. He states last BM was about 8 days ago.       Medications:  Reviewed    Infusion Medications    sodium chloride      lactated ringers 125 mL/hr at 08/06/22 0641     Scheduled Medications    sodium chloride flush  5-40 mL IntraVENous 2 times per day    gabapentin  800 mg Oral TID    levothyroxine  200 mcg Oral Daily    lisinopril  10 mg Oral Daily    metoprolol tartrate  25 mg Oral BID    spironolactone  50 mg Oral Daily    enoxaparin  40 mg SubCUTAneous BID     PRN Meds: sodium chloride flush, sodium chloride, ondansetron **OR** ondansetron, diazePAM, HYDROmorphone **OR** HYDROmorphone      Intake/Output Summary (Last 24 hours) at 8/6/2022 0830  Last data filed at 8/6/2022 0641  Gross per 24 hour   Intake 601.13 ml   Output --   Net 601.13 ml       Physical Exam Performed:    /75   Pulse (!) 123   Temp 100.1 °F (37.8 °C) (Oral)   Resp 20   Ht 5' 7\" (1.702 m)   Wt (!) 340 lb 2.7 oz (154.3 kg)   SpO2 90%   BMI 53.28 kg/m²     General appearance: No apparent distress, appears stated age and cooperative. HEENT: Pupils equal, round, and reactive to light. Conjunctivae/corneas clear. Neck: Supple, with full range of motion. No jugular venous distention. Trachea midline. Respiratory:  Normal respiratory effort. Clear to auscultation, bilaterally without Rales/Wheezes/Rhonchi. Cardiovascular: Regular rate and rhythm with normal S1/S2 without murmurs, rubs or gallops. Abdomen: Soft, tender to palpation, distended with hypoactive bowel sounds. Musculoskeletal: No clubbing, cyanosis or edema bilaterally. Full range of motion without deformity. Skin: Skin color, texture, turgor normal.  No rashes or lesions. Neurologic:  Neurovascularly intact without any focal sensory/motor deficits. Cranial nerves: II-XII intact, grossly non-focal.  Psychiatric: Alert and oriented, thought content appropriate, normal insight  Capillary Refill: Brisk,< 3 seconds   Peripheral Pulses: +2 palpable, equal bilaterally       Labs:   Recent Labs     08/05/22 1427 08/06/22 0445   WBC 10.5 11.0   HGB 14.2 11.5*   HCT 42.4 34.8*    372     Recent Labs     08/05/22 1427 08/06/22 0445   * 134*   K 3.8 3.2*   CL 97* 98*   CO2 24 20*   BUN 18 21*   CREATININE 1.1 0.8   CALCIUM 8.7 8.1*     Recent Labs     08/05/22 1427 08/06/22  0445   AST 27 22   ALT 27 23   BILITOT 0.6 0.7   ALKPHOS 98 72     Recent Labs     08/06/22  0445   INR 1.33*     Recent Labs     08/05/22 1427   TROPONINI <0.01       Urinalysis:    No results found for: Layvonne Lease, BACTERIA, RBCUA, BLOODU, SPECGRAV, GLUCOSEU    Radiology:  XR CHEST PORTABLE   Final Result   Nonvisualization of the tip of the nasogastric tube. A KUB may be helpful   for further evaluation. Cardiomegaly with pulmonary vascular congestion      Bibasilar atelectasis or infiltrate. CTA CHEST ABDOMEN PELVIS W CONTRAST   Preliminary Result   1. No acute aortic pathology. No significant atherosclerotic disease,   aneurysm or dissection. No brachiocephalic or visceral stenosis. 2. Small bilateral pleural effusions with bilateral lower lobe atelectasis. No acute pulmonary infiltrate. 3. Gastric, duodenal and small bowel distension with gradual transition in   the mid small bowel most consistent with a low-grade or partial obstruction. Large hiatal hernia containing much of the stomach which is distended. 4. Colonic wall thickening in the mid descending colon, likely accentuated by   incomplete distension. The possibility of an underlying colitis is raised. Diverticulosis with no acute features. Assessment/Plan:    Active Hospital Problems    Diagnosis     Partial bowel obstruction ([facility] [address] [K56.600]      Priority: Medium    Large hiatal hernia [K44.9]      Priority: Medium     Low grade or Partial Bowel obstruction  - CT: reveal small bowel obstruction with transition point mid small bowel. Distention noted to gastric, duodenal and small bowel. Large hiatal hernia containing much of the stomach which is distended. - NPO for now  - surgery consulted in ED - will manage  - IV fluids  - NG tube for bowel decompression     Hiatal hernia  - CT chest/abd/pelv: Large hiatal hernia containing much of the stomach which is distended  - likely source of chest pain  - NG tube to decompress the stomach     Atrial Fibrillation  - currently rate controlled  - continue Metoprolol when able, will give lovenox instead of eliquis       HTN  - monitor blood pressure  - restart home meds when able    Hypothyroidism, clinically euthyroid.   - Continue home levothyroxine  - Follow up with PCP for med adjustments      DVT Prophylaxis: lovenox  Diet: Diet NPO  Code Status: Full Code    PT/OT Eval Status: not yet ordered    Dispo - pending clinical improvement, likely here several robert Malhotra.  Prem Lara NP

## 2022-08-06 NOTE — PLAN OF CARE
Problem: Discharge Planning  Goal: Discharge to home or other facility with appropriate resources  8/6/2022 0750 by Bryn Cm RN  Outcome: Progressing  Flowsheets (Taken 8/6/2022 0349 by Lorelei Khan RN)  Discharge to home or other facility with appropriate resources: Identify barriers to discharge with patient and caregiver     Problem: Pain  Goal: Verbalizes/displays adequate comfort level or baseline comfort level  8/6/2022 0750 by Bryn Cm RN  Outcome: Progressing  Flowsheets (Taken 8/6/2022 0349 by Lorelei Khan RN)  Verbalizes/displays adequate comfort level or baseline comfort level:   Encourage patient to monitor pain and request assistance   Assess pain using appropriate pain scale   Implement non-pharmacological measures as appropriate and evaluate response   Administer analgesics based on type and severity of pain and evaluate response     Problem: ABCDS Injury Assessment  Goal: Absence of physical injury  8/6/2022 0750 by Bryn Cm RN  Outcome: Progressing  Flowsheets (Taken 8/6/2022 0349 by Lorelei Khan RN)  Absence of Physical Injury: Implement safety measures based on patient assessment

## 2022-08-06 NOTE — PROGRESS NOTES
Pt with no output from NG on this shift. Pt in and out of sleep throughout this shift. PRN pain medication being given for abdominal pain. Wife is at bedside. Pt tolerating ice chips and small sips of water with no nausea. Call light within reach.    Electronically signed by Sagrario Yoo RN on 8/6/2022 at 5:40 PM

## 2022-08-06 NOTE — ED NOTES
Pt transferred to room 3107 by  tech in bed, on monitor, with belongings. Wife followed. Pt was alert and stable.       Sonal Samson RN  08/05/22 8302

## 2022-08-06 NOTE — CONSULTS
PATIENT NAME: Claudia Acevedo OF BIRTH: 1960    ADMISSION DATE: 8/5/2022  1:56 PM      TODAY'S DATE: 8/6/2022    CHIEF COMPLAINT:  abdomina pain      HISTORY OF PRESENT ILLNESS:  The patient is a 63 y.o. male  who presents with recurring pain, nausea and vomiting. Admitted last week in [place] where evaluation only revealed a paraesophageal hernia. UGI ruled out volvulus and he elected to return home with follow up arranged for new week to discuss hernia repair. Present last night with recurring pain. CT done with mild dilation of proximal small bowel and stomach (contrast from UGI earlier this week in now throughout the colon). NG placed without much output. Symptoms stable overnight. Continue NG. Repeat KUB in AM. No immediate surgical plans. Past Medical History:        Diagnosis Date    Arthritis     Atrial fibrillation ([facility] [address]     Hypertension        Past Surgical History:    History reviewed. No pertinent surgical history. Medications Prior to Admission:   Medications Prior to Admission: oxyCODONE (ROXICODONE) 5 MG immediate release tablet, Take 10 mg by mouth every 6 hours as needed. pantoprazole (PROTONIX) 40 MG tablet, Take 40 mg by mouth in the morning. folic acid (FOLVITE) 1 MG tablet, Take 1 mg by mouth in the morning. acetaminophen (TYLENOL) 500 MG tablet, Take 1,000 mg by mouth every 8 hours as needed  diazePAM (VALIUM) 5 MG tablet, Take 5 mg by mouth 4 times daily as needed. levothyroxine (SYNTHROID) 200 MCG tablet, Take 200 mcg by mouth every morning  lisinopril (PRINIVIL;ZESTRIL) 10 MG tablet, Take 10 mg by mouth in the morning. metoprolol tartrate (LOPRESSOR) 25 MG tablet, Take 25 mg by mouth in the morning and 25 mg before bedtime. spironolactone (ALDACTONE) 50 MG tablet, Take 50 mg by mouth in the morning.   methotrexate (RHEUMATREX) 2.5 MG chemo tablet, Take 15 mg by mouth once a week  celecoxib (CELEBREX) 200 MG capsule, Take 200 mg by mouth in the morning and 200 mg before bedtime. ASPIRIN LOW DOSE 81 MG EC tablet, Take 81 mg by mouth daily  atorvastatin (LIPITOR) 40 MG tablet, Take 1 tablet by mouth in the morning. (Patient not taking: Reported on 8/5/2022)  gabapentin (NEURONTIN) 800 MG tablet, Take 800 mg by mouth in the morning and 800 mg at noon and 800 mg before bedtime. loratadine (CLARITIN) 10 MG tablet, Take 10 mg by mouth in the morning. XARELTO 20 MG TABS tablet, Take 1 tablet by mouth daily  [DISCONTINUED] lisinopril-hydroCHLOROthiazide (PRINZIDE;ZESTORETIC) 10-12.5 MG per tablet, Take 2 tablets by mouth in the morning. [DISCONTINUED] metoprolol succinate (TOPROL XL) 25 MG extended release tablet, Take 25 mg by mouth in the morning. Allergies:  Codeine    Social History:   TOBACCO:   reports that he has never smoked. He has never used smokeless tobacco.  ETOH:   reports that he does not currently use alcohol. DRUGS:   reports no history of drug use. Family History:   History reviewed. No pertinent family history. REVIEW OF SYSTEMS:    CONSTITUTIONAL:  negative  HEENT:  negative  CARDIOVASCULAR:  negative  GASTROINTESTINAL:  positive for nausea, vomiting, diarrhea, and abdominal pain  GENITOURINARY:  negative  HEMATOLOGIC/LYMPHATIC:  negative  ENDOCRINE:  negative  All other systems negative    PHYSICAL EXAM:    VITALS:  /75   Pulse (!) 123   Temp 100.1 °F (37.8 °C) (Oral)   Resp 20   Ht 5' 7\" (1.702 m)   Wt (!) 340 lb 2.7 oz (154.3 kg)   SpO2 90%   BMI 53.28 kg/m²   INTAKE/OUTPUT:   I/O last 3 completed shifts: In: 601.1 [I.V.:601.1]  Out: -   No intake/output data recorded.   CONSTITUTIONAL:  awake, alert, no apparent distress and moderately obese  ENT:  normocepalic, without obvious abnormality  NECK:  supple, symmetrical, trachea midline   LUNGS:  clear to auscultation, no crackles or wheezing  CARDIOVASCULAR:  regular rate and rhythm and no murmur noted  ABDOMEN:  obese, distended, non tender, no guarding ,no masses or hernias  MUSCULOSKELETAL:  0+ pitting edema lower extremities  NEUROLOGIC:  Mental Status Exam:  Level of Alertness:   awake  Orientation:   person, place, time      ASSESSMENT AND PLAN:    Partial SBO   NG in place, monitor   Repeat x ray in AM   Serial exams    Paraesophageal hernia   No sign of volvulus   To be assessed at  later this week    Electronically signed by Sherwin Bhandari MD on 8/6/2022 at 10:21 AM      Sherwin Bhandari MD

## 2022-08-07 ENCOUNTER — APPOINTMENT (OUTPATIENT)
Dept: GENERAL RADIOLOGY | Age: 62
DRG: 853 | End: 2022-08-07
Payer: COMMERCIAL

## 2022-08-07 PROBLEM — T17.908A ASPIRATION INTO AIRWAY: Status: ACTIVE | Noted: 2022-08-07

## 2022-08-07 PROBLEM — K56.609 SMALL BOWEL OBSTRUCTION (HCC): Status: ACTIVE | Noted: 2022-08-07

## 2022-08-07 PROBLEM — J96.01 ACUTE RESPIRATORY FAILURE WITH HYPOXIA (HCC): Status: ACTIVE | Noted: 2022-08-07

## 2022-08-07 LAB
BASE EXCESS VENOUS: -3.7 MMOL/L
BASE EXCESS VENOUS: -4.2 MMOL/L
CARBOXYHEMOGLOBIN: 1.2 %
CARBOXYHEMOGLOBIN: 1.2 %
GLUCOSE BLD-MCNC: 99 MG/DL (ref 70–99)
HCO3 VENOUS: 23 MMOL/L (ref 23–29)
HCO3 VENOUS: 24 MMOL/L (ref 23–29)
HCT VFR BLD CALC: 38.2 % (ref 40.5–52.5)
HEMOGLOBIN: 12.4 G/DL (ref 13.5–17.5)
LACTIC ACID: 1.4 MMOL/L (ref 0.4–2)
MCH RBC QN AUTO: 31.3 PG (ref 26–34)
MCHC RBC AUTO-ENTMCNC: 32.5 G/DL (ref 31–36)
MCV RBC AUTO: 96.5 FL (ref 80–100)
METHEMOGLOBIN VENOUS: 0.3 %
METHEMOGLOBIN VENOUS: 0.5 %
O2 SAT, VEN: 68 %
O2 SAT, VEN: 73 %
O2 THERAPY: ABNORMAL
O2 THERAPY: ABNORMAL
PCO2, VEN: 50.4 MMHG (ref 40–50)
PCO2, VEN: 52.6 MMHG (ref 40–50)
PDW BLD-RTO: 16.7 % (ref 12.4–15.4)
PERFORMED ON: NORMAL
PH VENOUS: 7.26 (ref 7.35–7.45)
PH VENOUS: 7.27 (ref 7.35–7.45)
PLATELET # BLD: 448 K/UL (ref 135–450)
PMV BLD AUTO: 6.7 FL (ref 5–10.5)
PO2, VEN: 40 MMHG
PO2, VEN: 43 MMHG
PRO-BNP: 1799 PG/ML (ref 0–124)
PROCALCITONIN: 6 NG/ML (ref 0–0.15)
RBC # BLD: 3.95 M/UL (ref 4.2–5.9)
TCO2 CALC VENOUS: 25 MMOL/L
TCO2 CALC VENOUS: 25 MMOL/L
TROPONIN: 0.22 NG/ML
WBC # BLD: 17.9 K/UL (ref 4–11)

## 2022-08-07 PROCEDURE — 99291 CRITICAL CARE FIRST HOUR: CPT | Performed by: INTERNAL MEDICINE

## 2022-08-07 PROCEDURE — 6360000002 HC RX W HCPCS: Performed by: INTERNAL MEDICINE

## 2022-08-07 PROCEDURE — 6360000002 HC RX W HCPCS: Performed by: NURSE PRACTITIONER

## 2022-08-07 PROCEDURE — 2000000000 HC ICU R&B

## 2022-08-07 PROCEDURE — 2580000003 HC RX 258: Performed by: HOSPITALIST

## 2022-08-07 PROCEDURE — 71045 X-RAY EXAM CHEST 1 VIEW: CPT

## 2022-08-07 PROCEDURE — 6370000000 HC RX 637 (ALT 250 FOR IP): Performed by: INTERNAL MEDICINE

## 2022-08-07 PROCEDURE — 2700000000 HC OXYGEN THERAPY PER DAY

## 2022-08-07 PROCEDURE — 2580000003 HC RX 258: Performed by: INTERNAL MEDICINE

## 2022-08-07 PROCEDURE — 84484 ASSAY OF TROPONIN QUANT: CPT

## 2022-08-07 PROCEDURE — 36415 COLL VENOUS BLD VENIPUNCTURE: CPT

## 2022-08-07 PROCEDURE — 94761 N-INVAS EAR/PLS OXIMETRY MLT: CPT

## 2022-08-07 PROCEDURE — 36569 INSJ PICC 5 YR+ W/O IMAGING: CPT

## 2022-08-07 PROCEDURE — 6370000000 HC RX 637 (ALT 250 FOR IP): Performed by: NURSE PRACTITIONER

## 2022-08-07 PROCEDURE — 6360000002 HC RX W HCPCS: Performed by: HOSPITALIST

## 2022-08-07 PROCEDURE — 93005 ELECTROCARDIOGRAM TRACING: CPT | Performed by: FAMILY MEDICINE

## 2022-08-07 PROCEDURE — 82803 BLOOD GASES ANY COMBINATION: CPT

## 2022-08-07 PROCEDURE — 76937 US GUIDE VASCULAR ACCESS: CPT

## 2022-08-07 PROCEDURE — 87040 BLOOD CULTURE FOR BACTERIA: CPT

## 2022-08-07 PROCEDURE — 74018 RADEX ABDOMEN 1 VIEW: CPT

## 2022-08-07 PROCEDURE — 83880 ASSAY OF NATRIURETIC PEPTIDE: CPT

## 2022-08-07 PROCEDURE — 84145 PROCALCITONIN (PCT): CPT

## 2022-08-07 PROCEDURE — 85027 COMPLETE CBC AUTOMATED: CPT

## 2022-08-07 PROCEDURE — 93005 ELECTROCARDIOGRAM TRACING: CPT | Performed by: HOSPITALIST

## 2022-08-07 PROCEDURE — 99232 SBSQ HOSP IP/OBS MODERATE 35: CPT | Performed by: SURGERY

## 2022-08-07 PROCEDURE — 83605 ASSAY OF LACTIC ACID: CPT

## 2022-08-07 PROCEDURE — 36600 WITHDRAWAL OF ARTERIAL BLOOD: CPT

## 2022-08-07 PROCEDURE — 2580000003 HC RX 258: Performed by: NURSE PRACTITIONER

## 2022-08-07 PROCEDURE — 94640 AIRWAY INHALATION TREATMENT: CPT

## 2022-08-07 PROCEDURE — C9113 INJ PANTOPRAZOLE SODIUM, VIA: HCPCS | Performed by: HOSPITALIST

## 2022-08-07 PROCEDURE — 87077 CULTURE AEROBIC IDENTIFY: CPT

## 2022-08-07 RX ORDER — SODIUM CHLORIDE 0.9 % (FLUSH) 0.9 %
5-40 SYRINGE (ML) INJECTION PRN
Status: DISCONTINUED | OUTPATIENT
Start: 2022-08-07 | End: 2022-08-28 | Stop reason: SDUPTHER

## 2022-08-07 RX ORDER — LIDOCAINE HYDROCHLORIDE 10 MG/ML
5 INJECTION, SOLUTION EPIDURAL; INFILTRATION; INTRACAUDAL; PERINEURAL ONCE
Status: DISCONTINUED | OUTPATIENT
Start: 2022-08-07 | End: 2022-08-08

## 2022-08-07 RX ORDER — SODIUM CHLORIDE 0.9 % (FLUSH) 0.9 %
5-40 SYRINGE (ML) INJECTION EVERY 12 HOURS SCHEDULED
Status: DISCONTINUED | OUTPATIENT
Start: 2022-08-07 | End: 2022-08-07 | Stop reason: SDUPTHER

## 2022-08-07 RX ORDER — SODIUM CHLORIDE 9 MG/ML
INJECTION, SOLUTION INTRAVENOUS PRN
Status: DISCONTINUED | OUTPATIENT
Start: 2022-08-07 | End: 2022-08-07 | Stop reason: SDUPTHER

## 2022-08-07 RX ORDER — FUROSEMIDE 10 MG/ML
40 INJECTION INTRAMUSCULAR; INTRAVENOUS ONCE
Status: COMPLETED | OUTPATIENT
Start: 2022-08-07 | End: 2022-08-07

## 2022-08-07 RX ORDER — SODIUM CHLORIDE 0.9 % (FLUSH) 0.9 %
5-40 SYRINGE (ML) INJECTION PRN
Status: DISCONTINUED | OUTPATIENT
Start: 2022-08-07 | End: 2022-08-07 | Stop reason: SDUPTHER

## 2022-08-07 RX ORDER — IPRATROPIUM BROMIDE AND ALBUTEROL SULFATE 2.5; .5 MG/3ML; MG/3ML
1 SOLUTION RESPIRATORY (INHALATION)
Status: DISCONTINUED | OUTPATIENT
Start: 2022-08-07 | End: 2022-08-27

## 2022-08-07 RX ORDER — SODIUM CHLORIDE 9 MG/ML
25 INJECTION, SOLUTION INTRAVENOUS PRN
Status: DISCONTINUED | OUTPATIENT
Start: 2022-08-07 | End: 2022-08-28 | Stop reason: SDUPTHER

## 2022-08-07 RX ORDER — SODIUM CHLORIDE 0.9 % (FLUSH) 0.9 %
5-40 SYRINGE (ML) INJECTION EVERY 12 HOURS SCHEDULED
Status: DISCONTINUED | OUTPATIENT
Start: 2022-08-07 | End: 2022-08-28 | Stop reason: SDUPTHER

## 2022-08-07 RX ADMIN — SPIRONOLACTONE 50 MG: 25 TABLET ORAL at 09:00

## 2022-08-07 RX ADMIN — HYDROMORPHONE HYDROCHLORIDE 0.5 MG: 1 INJECTION, SOLUTION INTRAMUSCULAR; INTRAVENOUS; SUBCUTANEOUS at 20:20

## 2022-08-07 RX ADMIN — HYDROMORPHONE HYDROCHLORIDE 0.25 MG: 1 INJECTION, SOLUTION INTRAMUSCULAR; INTRAVENOUS; SUBCUTANEOUS at 17:16

## 2022-08-07 RX ADMIN — SODIUM CHLORIDE, PRESERVATIVE FREE 10 ML: 5 INJECTION INTRAVENOUS at 20:24

## 2022-08-07 RX ADMIN — HYDROMORPHONE HYDROCHLORIDE 0.5 MG: 1 INJECTION, SOLUTION INTRAMUSCULAR; INTRAVENOUS; SUBCUTANEOUS at 03:15

## 2022-08-07 RX ADMIN — PIPERACILLIN AND TAZOBACTAM 4500 MG: 4; .5 INJECTION, POWDER, LYOPHILIZED, FOR SOLUTION INTRAVENOUS at 16:47

## 2022-08-07 RX ADMIN — ENOXAPARIN SODIUM 40 MG: 100 INJECTION SUBCUTANEOUS at 20:23

## 2022-08-07 RX ADMIN — Medication 40 MG: at 15:24

## 2022-08-07 RX ADMIN — HYDROMORPHONE HYDROCHLORIDE 0.5 MG: 1 INJECTION, SOLUTION INTRAMUSCULAR; INTRAVENOUS; SUBCUTANEOUS at 06:17

## 2022-08-07 RX ADMIN — LISINOPRIL 10 MG: 10 TABLET ORAL at 18:30

## 2022-08-07 RX ADMIN — GABAPENTIN 800 MG: 400 CAPSULE ORAL at 20:23

## 2022-08-07 RX ADMIN — METOPROLOL TARTRATE 25 MG: 25 TABLET, FILM COATED ORAL at 14:32

## 2022-08-07 RX ADMIN — IPRATROPIUM BROMIDE AND ALBUTEROL SULFATE 1 AMPULE: 2.5; .5 SOLUTION RESPIRATORY (INHALATION) at 08:55

## 2022-08-07 RX ADMIN — IPRATROPIUM BROMIDE AND ALBUTEROL SULFATE 1 AMPULE: 2.5; .5 SOLUTION RESPIRATORY (INHALATION) at 11:55

## 2022-08-07 RX ADMIN — HYDROMORPHONE HYDROCHLORIDE 0.5 MG: 1 INJECTION, SOLUTION INTRAMUSCULAR; INTRAVENOUS; SUBCUTANEOUS at 00:12

## 2022-08-07 RX ADMIN — PIPERACILLIN AND TAZOBACTAM 4500 MG: 4; .5 INJECTION, POWDER, FOR SOLUTION INTRAVENOUS at 09:44

## 2022-08-07 RX ADMIN — GABAPENTIN 800 MG: 400 CAPSULE ORAL at 14:32

## 2022-08-07 RX ADMIN — FUROSEMIDE 40 MG: 10 INJECTION, SOLUTION INTRAMUSCULAR; INTRAVENOUS at 09:09

## 2022-08-07 RX ADMIN — IPRATROPIUM BROMIDE AND ALBUTEROL SULFATE 1 AMPULE: 2.5; .5 SOLUTION RESPIRATORY (INHALATION) at 16:18

## 2022-08-07 RX ADMIN — ENOXAPARIN SODIUM 40 MG: 100 INJECTION SUBCUTANEOUS at 11:05

## 2022-08-07 RX ADMIN — LEVOTHYROXINE SODIUM 200 MCG: 0.1 TABLET ORAL at 06:41

## 2022-08-07 RX ADMIN — SODIUM CHLORIDE, PRESERVATIVE FREE 10 ML: 5 INJECTION INTRAVENOUS at 10:00

## 2022-08-07 RX ADMIN — IPRATROPIUM BROMIDE AND ALBUTEROL SULFATE 1 AMPULE: 2.5; .5 SOLUTION RESPIRATORY (INHALATION) at 19:40

## 2022-08-07 ASSESSMENT — PAIN DESCRIPTION - DESCRIPTORS
DESCRIPTORS: SHARP
DESCRIPTORS: ACHING
DESCRIPTORS: SHARP
DESCRIPTORS: SHARP

## 2022-08-07 ASSESSMENT — PAIN - FUNCTIONAL ASSESSMENT
PAIN_FUNCTIONAL_ASSESSMENT: PREVENTS OR INTERFERES SOME ACTIVE ACTIVITIES AND ADLS

## 2022-08-07 ASSESSMENT — ENCOUNTER SYMPTOMS
NAUSEA: 0
VOMITING: 0

## 2022-08-07 ASSESSMENT — PAIN DESCRIPTION - ORIENTATION
ORIENTATION: RIGHT
ORIENTATION: MID
ORIENTATION: RIGHT
ORIENTATION: RIGHT

## 2022-08-07 ASSESSMENT — PAIN DESCRIPTION - LOCATION
LOCATION: ABDOMEN

## 2022-08-07 ASSESSMENT — PAIN DESCRIPTION - PAIN TYPE
TYPE: ACUTE PAIN

## 2022-08-07 ASSESSMENT — PAIN SCALES - GENERAL
PAINLEVEL_OUTOF10: 6
PAINLEVEL_OUTOF10: 7
PAINLEVEL_OUTOF10: 6
PAINLEVEL_OUTOF10: 9
PAINLEVEL_OUTOF10: 0
PAINLEVEL_OUTOF10: 5

## 2022-08-07 ASSESSMENT — PAIN DESCRIPTION - ONSET
ONSET: ON-GOING

## 2022-08-07 ASSESSMENT — PAIN DESCRIPTION - FREQUENCY
FREQUENCY: CONTINUOUS

## 2022-08-07 NOTE — PROGRESS NOTES
Arrived to place PICC line in patient with, deng KNIGHT at bedside, pre procedure and allergies reviewed, no issues accessing Basilic  vein, pt tolerated well, blood return and flushed well, tip verified with 3cg technology. Pt left in stable condition and bed braked and in lowest position. Pt call light within reach. Handoff to RN.

## 2022-08-07 NOTE — PROGRESS NOTES
Pharmacy Medication Reconciliation Note     List of medications patient is currently taking is complete. Source of information:   1. Conversation with patient and wife  2. EMR    Notes regarding home medications:   1. Confirmed patient is no longer taking Xarelto  2.  Wife reports patient is no longer on atorvastatin, reports his cholesterol has always been good      Katia Jeronimo PharmD  8/7/2022 12:02 PM

## 2022-08-07 NOTE — CONSULTS
PATIENT IS SEEN AT THE REQUEST OF DR. Denise Rangel for ICU transfer    CONSULTING PHYSICIAN: Brooke    HISTORY OF PRESENT ILLNESS:  This is a 63 y.o. male who presented was a RAPID RESPONSE THIS AM due to work of breathing, hypoxia, chest pain. Arrived to the ICU on NRB. Symptoms started rather acutely. NO history of lung disease. Admitted for bowel obstruction. NG was placed but does not appear to be in the proper location on imaging. Has had no NG output. Of note, has been wearing CPAP machine due to underlying EARNESTINE    Established Pulmonologist:  None    PAST MEDICAL HISTORY:  Past Medical History:   Diagnosis Date    Arthritis     Atrial fibrillation ([facility] [address]     Hypertension  Rheumatoid Arthritis   Afib   EARNESTINE         PAST SURGICAL HISTORY:  Knee surgery bilaterally, angiogram    FAMILY HISTORY:  Heart disease, DM    SOCIAL HISTORY:   reports that he has never smoked. He has never used smokeless tobacco.    Scheduled Meds:   sodium chloride flush  5-40 mL IntraVENous 2 times per day    piperacillin-tazobactam  4,500 mg IntraVENous Once    And    piperacillin-tazobactam  4,500 mg IntraVENous Q8H    gabapentin  800 mg Oral TID    levothyroxine  200 mcg Oral Daily    lisinopril  10 mg Oral Daily    metoprolol tartrate  25 mg Oral BID    spironolactone  50 mg Oral Daily    enoxaparin  40 mg SubCUTAneous BID       Continuous Infusions:   sodium chloride      lactated ringers 125 mL/hr at 08/06/22 1717       PRN Meds:  sodium chloride flush, sodium chloride, ondansetron **OR** ondansetron, diazePAM, HYDROmorphone **OR** HYDROmorphone    ALLERGIES:  Patient is allergic to codeine.     REVIEW OF SYSTEMS:  Constitutional: Negative for fever or chills  HENT: Negative for sore throat  Eyes: Negative for redness   Respiratory: acute onsets of chest pain, sob and difficulty in breathing   Cardiovascular: chest pain   Gastrointestinal: belly pain   Genitourinary: Negative for hematuria, negative for dysuria  Musculoskeletal: Negative for arthralgias   Skin: Negative for rash  Neurological: Negative for syncope  Hematological: Negative for adenopathy  Extremities:  Negative for swelling    PHYSICAL EXAM:  Blood pressure (!) 141/87, pulse (!) 116, temperature (!) 101.1 °F (38.4 °C), resp. rate (!) 36, height 5' 7\" (1.702 m), weight (!) 351 lb 3.1 oz (159.3 kg), SpO2 (!) 88 %.'  Gen: Moderately distressed, diaphoretic   Eyes: PERRL. No sclera icterus. No conjunctival injection. ENT: No discharge. Pharynx with NRB mask and NG  Neck: Trachea midline. No obvious mass. Resp: Slight upper airway wheezing and rhonchi   CV: Tachy, irregular. No murmur or rub. GI: Large abdomen, soft, tympanic to percussion  Skin: Warm and dry. No nodule on exposed extremities. Lymph: No cervical LAD. No supraclavicular LAD. M/S: No cyanosis. No joint deformity. Neuro: Awake. Alert. Moves all four extremities. EXT:   + edema, no clubbing    LABS:  CBC:   Recent Labs     08/05/22  1427 08/06/22 0445   WBC 10.5 11.0   HGB 14.2 11.5*   HCT 42.4 34.8*   MCV 96.2 95.4    372     BMP:   Recent Labs     08/05/22  1427 08/06/22 0445   * 134*   K 3.8 3.2*   CL 97* 98*   CO2 24 20*   BUN 18 21*   CREATININE 1.1 0.8     LIVER PROFILE:   Recent Labs     08/05/22  1427 08/06/22  0445   AST 27 22   ALT 27 23   BILITOT 0.6 0.7   ALKPHOS 98 72     PT/INR:   Recent Labs     08/06/22 0445   PROTIME 16.4*   INR 1.33*     APTT: No results for input(s): APTT in the last 72 hours. UA:No results for input(s): NITRITE, COLORU, PHUR, LABCAST, WBCUA, RBCUA, MUCUS, TRICHOMONAS, YEAST, BACTERIA, CLARITYU, SPECGRAV, LEUKOCYTESUR, UROBILINOGEN, BILIRUBINUR, BLOODU, GLUCOSEU, AMORPHOUS in the last 72 hours.     Invalid input(s): [facility]  Recent Labs     08/05/22  1502   PHART 7.438   LFI7QJA 33.8*   PO2ART 68.3*       Cultures:       PFTs:       ECHO:     ABG:    Chest X-ray:  Chest imaging was reviewed by me and showed cardiomegaly with bilateral infiltrates with vascular congestion. Unable to visual NG tube    Chest CT:  Chest imaging was reviewed by me and showed atelectasis with small effusions    I reviewed all the above labs and studies pertaining to this visit. ASSESSMENT/PLAN:   Acute Hypoxic Respiratory Failure with saturations less than 90% on room air, possible aspiration vs volume overload. NG tube not visualized on CXR  Titrate oxygen for saturations greater than or equal to 90%  Duonebs q 4 hours  Avoid home CPAP unit with underlying SBO. It is contraindicated in my opinion and will result in aerophagia and further distention of abdomen  Zosyn to continue  Lasix 40 mg IV once, IV fluids to 50 ml. Has not had a lot of output from NG therefore does  not need a tremendous amount of IV fluids. Check BNP   Try to avoid NIV mask due to SBO. If he declines he would need to be intubated  Check tropoinin   VBG ordered. Unable to obtain ABG  Partial SBO  NG is not visualized on imaging. Needs to be replaced. D/W RN. Imaging does not show it to be traversing the thoracic region. Likely coiled in throat  Large hiatal hernia will cause issues with proper NG placement   BMI 55  Caloric restriction     DVT prophylaxis  Lovenox bid    Discussed with wife   D/W Dr. Shekhar Mcnulty care time of 32 minutes. This does not include procedural time. Please see procedural notes for details.     DO MARTI Dotson [facility] Pulmonary

## 2022-08-07 NOTE — SIGNIFICANT EVENT
Noted events of this morning and spoke with patient's nurse, Laila Jason. He is currently being transferred to the ICU. I added on blood cultures x2 and IV zosyn for empiric coverage. Discussed case with my attending, Dr. Naima Almaguer, who will be assuming care of this patient. Seamus Solano.  Vanessa Tillman - NP

## 2022-08-07 NOTE — H&P
Hospitalist Progress Note  8/7/2022 12:00 PM    PCP: Myah Huddleston MD    [phone]     Date of Admission: 8/5/2022                                                                                                                     HOSPITAL COURSE    Patient demographics:  The patient  Carla Cunningham is a 63 y.o. male     Significant past medical history:   Patient Active Problem List   Diagnosis    Partial bowel obstruction (HCC)    Hiatal hernia    Acute respiratory failure with hypoxia (HCC)    Small bowel obstruction (HCC)    Aspiration into airway         Presenting symptoms:  abdominal pain    Diagnostic workup:      CONSULTS DURING ADMISSION :   IP CONSULT TO GENERAL SURGERY  IP CONSULT TO HOSPITALIST  IP CONSULT TO GENERAL SURGERY      Patient was diagnosed with:  Low grade or Partial Bowel obstruction  Hiatal hernia  Atrial Fibrillation      Treatment while inpatient:  Pt presented to [facility] with 5-7 day hx of abdominal pain followed by acute chest pain with vomiting and diarrhea.                                                                                         ----------------------------------------------------------      SUBJECTIVE COMPLAINTS- follow up for abdominal pain    Diet: Diet NPO      OBJECTIVE:   Patient Active Problem List   Diagnosis    Partial bowel obstruction (HCC)    Hiatal hernia    Acute respiratory failure with hypoxia (HCC)    Small bowel obstruction (HCC)    Aspiration into airway       Allergies  Codeine    Medications    Scheduled Meds:   piperacillin-tazobactam  4,500 mg IntraVENous Q8H    ipratropium-albuterol  1 ampule Inhalation Q4H WA    lidocaine 1 % injection  5 mL IntraDERmal Once    sodium chloride flush  5-40 mL IntraVENous 2 times per day    gabapentin  800 mg Oral TID    levothyroxine  200 mcg Oral Daily    lisinopril  10 mg Oral Daily    metoprolol tartrate  25 mg Oral BID    spironolactone  50 mg Oral Daily    enoxaparin  40 mg SubCUTAneous BID Continuous Infusions:   sodium chloride      lactated ringers 50 mL/hr at 08/07/22 0909     PRN Meds:  sodium chloride flush, sodium chloride, ondansetron **OR** ondansetron, diazePAM, HYDROmorphone **OR** HYDROmorphone    Vitals   Vitals /wt Patient Vitals for the past 8 hrs:   BP Temp Temp src Pulse Resp SpO2 Weight   08/07/22 1157 -- -- -- (!) 110 30 95 % --   08/07/22 1100 (!) 123/94 -- -- (!) 112 (!) 37 94 % --   08/07/22 1000 103/72 -- -- (!) 114 30 93 % --   08/07/22 0900 (!) 148/79 (!) 100.8 °F (38.2 °C) Axillary (!) 120 26 92 % --   08/07/22 0856 -- -- -- (!) 120 30 91 % --   08/07/22 0801 (!) 141/87 -- -- -- -- -- --   08/07/22 0759 -- -- -- -- -- (!) 88 % --   08/07/22 0755 99/69 -- -- (!) 116 -- -- --   08/07/22 0744 86/60 (!) 101.1 °F (38.4 °C) -- (!) 118 (!) 36 -- --   08/07/22 0738 (!) 78/44 (!) 101.1 °F (38.4 °C) Axillary 97 19 (!) 86 % --   08/07/22 0655 -- -- -- -- -- -- (!) 351 lb 3.1 oz (159.3 kg)        72HR INTAKE/OUTPUT:    Intake/Output Summary (Last 24 hours) at 8/7/2022 1200  Last data filed at 8/7/2022 1100  Gross per 24 hour   Intake 240 ml   Output 125 ml   Net 115 ml       Exam:    Gen:   Alert and oriented ×3    Eyes: PERRL. No sclera icterus. No conjunctival injection. ENT: No discharge. Pharynx clear. External appearance of ears and nose normal.  Neck: Trachea midline. No obvious mass. Resp: No accessory muscle use. No crackles. No wheezes. No rhonchi. CV: Regular rate. Regular rhythm. No murmur or rub. No edema. GI: Non-tender. Non-distended. No hernia. Skin: Warm, dry, normal texture and turgor. Lymph: No cervical LAD. No supraclavicular LAD. M/S: / Ext. No cyanosis. No clubbing. No joint deformity. Neuro: CN 2-12 are intact,  no neurologic deficits noted. PT/INR:   Recent Labs     08/06/22  0445   PROTIME 16.4*   INR 1.33*     APTT: No results for input(s): APTT in the last 72 hours.     CBC:   Recent Labs     08/05/22  1427 08/06/22  0445 08/07/22  0830 Prosper Virk MD    This note was transcribed using [id number] [employer]. Please disregard any translational errors.

## 2022-08-07 NOTE — SIGNIFICANT EVENT
Rapid response called around 8 AM for hypoxia, shortness of breath and tachycardia. He was requiring nonrebreather with hypoxia on 11 L. Uncertain how long this was going on as he did not have formal vital sign checks overnight. Patient complained of chest pressure but this has been ongoing for several days. NG tube attempted to be repositioned overnight. ECG without acute ST segment or T wave changes. Chest x-ray unable to visualize NG tube placement. ABG attempted but unable to be obtained. VBG and lactic acid and troponin ordered. Consider serial ECGs. Transferred to ICU and attempt to replace NG tube.

## 2022-08-07 NOTE — PROGRESS NOTES
Progress Note  Date:2022       Room:[place] [address]  Patient Name:Miles Vanegas     YOB: 1960     Age:62 y.o. Subjective    Subjective:  Diet:  NPO. No nausea or vomiting. Activity level: Impaired due to weakness. Pain:  He reports no pain. Review of Systems   Gastrointestinal:  Negative for nausea and vomiting. Objective         Vitals Last 24 Hours:  TEMPERATURE:  Temp  Av.7 °F (38.2 °C)  Min: 99.9 °F (37.7 °C)  Max: 101.1 °F (38.4 °C)  RESPIRATIONS RANGE: Resp  Av.3  Min: 16  Max: 37  PULSE OXIMETRY RANGE: SpO2  Av.3 %  Min: 86 %  Max: 95 %  PULSE RANGE: Pulse  Av.4  Min: 97  Max: 120  BLOOD PRESSURE RANGE: Systolic (14RHM), EHY:624 , Min:78 , XRZ:060   ; Diastolic (67VYH), SHM:19, Min:44, Max:94    I/O (24Hr): Intake/Output Summary (Last 24 hours) at 2022 1434  Last data filed at 2022 1100  Gross per 24 hour   Intake 240 ml   Output 125 ml   Net 115 ml     Objective  Labs/Imaging/Diagnostics    Labs:  CBC:  Recent Labs     22  0445 22  0830   WBC 10.5 11.0 17.9*   RBC 4.41 3.64* 3.95*   HGB 14.2 11.5* 12.4*   HCT 42.4 34.8* 38.2*   MCV 96.2 95.4 96.5   RDW 15.9* 16.1* 16.7*    372 448     CHEMISTRIES:  Recent Labs     22  14222  0445   * 134*   K 3.8 3.2*   CL 97* 98*   CO2 24 20*   BUN 18 21*   CREATININE 1.1 0.8   GLUCOSE 112* 110*   MG  --  1.60*     PT/INR:  Recent Labs     22  0445   PROTIME 16.4*   INR 1.33*     APTT:No results for input(s): APTT in the last 72 hours.   LIVER PROFILE:  Recent Labs     22  1427 22  0445   AST 27 22   ALT 27 23   BILITOT 0.6 0.7   ALKPHOS 98 72       Imaging Last 24 Hours:  XR CHEST PORTABLE    Result Date: 2022  EXAMINATION: ONE XRAY VIEW OF THE CHEST 2022 7:59 am COMPARISON: 2022 HISTORY: ORDERING SYSTEM PROVIDED HISTORY: shortness of breath TECHNOLOGIST PROVIDED HISTORY: Reason for exam:->shortness of breath Reason for Exam: SOB FINDINGS: Heterogeneous bibasilar pulmonary opacity is not markedly changed as compared to prior. Cardiomegaly, similar to prior. No pneumothorax. Elevation of the left hemidiaphragm again seen. No substantial interval change in bibasilar airspace disease as compared to prior. Cardiomegaly. XR CHEST PORTABLE    Result Date: 8/6/2022  EXAMINATION: ONE XRAY VIEW OF THE CHEST 8/6/2022 2:23 am COMPARISON: None. HISTORY: ORDERING SYSTEM PROVIDED HISTORY: Confirmation of course of NG/OG/NE tube and location of tip of tube TECHNOLOGIST PROVIDED HISTORY: Reason for exam:->Confirmation of course of NG/OG/NE tube and location of tip of tube Reason for Exam: NG placement FINDINGS: There is cardiomegaly with pulmonary vascular congestion. There is bibasilar increased density which may represent atelectasis or infiltrate. There is a nasogastric tube in the esophagus, however the tip is not visualized     Nonvisualization of the tip of the nasogastric tube. A KUB may be helpful for further evaluation. Cardiomegaly with pulmonary vascular congestion Bibasilar atelectasis or infiltrate. CTA CHEST ABDOMEN PELVIS W CONTRAST    Result Date: 8/6/2022  EXAMINATION: CTA OF THE CHEST, ABDOMEN AND PELVIS WITH CONTRAST 8/5/2022 3:38 pm TECHNIQUE: CTA of the chest, abdomen and pelvis was performed after the administration of intravenous contrast.  Multiplanar reformatted images are provided for review. MIP images are provided for review. Automated exposure control, iterative reconstruction, and/or weight based adjustment of the mA/kV was utilized to reduce the radiation dose to as low as reasonably achievable. COMPARISON: None HISTORY: ORDERING SYSTEM PROVIDED HISTORY:  R/O dissection TECHNOLOGIST PROVIDED HISTORY: Reason for Exam:  R/O dissection FINDINGS: CTA CHEST: Thoracic aorta: Thoracic aorta is normal in caliber. No significant atherosclerotic disease or evidence of dissection.   The proximal brachiocephalic vessels are unremarkable. Mediastinum: The main pulmonary artery is normal in caliber. The heart is not enlarged with no pericardial effusion. Large hiatal hernia with moderate distension of the stomach with fluid. Small mediastinal nodes are not pathologic by size criteria. Postoperative clips suggesting previous thyroidectomy. Lungs/Pleura: Small bilateral pleural effusions, left greater than right. Dependent bilateral lower lobe atelectasis. There are additional linear bands of atelectasis in the lingula and right upper lobe adjacent to the major fissure posteriorly. .  The remainder of the lungs are clear with no infiltrate or pneumothorax. The central airways are patent. Soft Tissues/Bones: No acute bone or soft tissue abnormality. CTA ABDOMEN: Abdominal aorta/Branches: The abdominal aorta is normal in caliber with no significant atherosclerotic disease or evidence of dissection. There is a common origin of the celiac artery and SMA with no proximal stenosis. The LUIS is patent. Single renal artery supplying each kidney with no proximal stenosis. Organs: Mild hepatomegaly with no focal hepatic abnormality. The spleen, pancreas and adrenal glands are unremarkable. No renal mass or significant hydronephrosis. No acute biliary findings. GI/Bowel: There is distention of the distal stomach, duodenal sweep and multiple proximal small bowel loops with fluid. There is gradual transition in the mid small bowel in the mid pelvis with nondilated distal small bowel loops. See series 4, image 204. No significant perienteric inflammatory changes. The appendix is unremarkable. There is mild colonic wall thickening within the descending colon which is likely accentuated by incomplete distention but concerning for underlying colitis. Scattered diverticula with no acute features. Peritoneum/Retroperitoneum: Multiple small retroperitoneal nodes are noted not pathologic by size criteria.   No upper abdominal ascites or free intraperitoneal air. Bones/Soft Tissues: Fat containing umbilical hernia. Degenerative disc disease at L4-5 and L5-S1. No acute osseous or soft tissue abnormality. CTA PELVIS: Aorta/Iliacs: Mild atherosclerotic plaque in the iliac vessels with no flow limiting stenosis, dissection or aneurysm. The proximal femoral circulation is unremarkable as visualized. Other: No pelvic mass or free pelvic fluid. The prostate is borderline in size. Mild distention of the urinary bladder. Bones/Soft Tissues: No acute osseous or soft tissue abnormality. 1. No acute aortic pathology. No significant atherosclerotic disease, aneurysm or dissection. No brachiocephalic or visceral stenosis. 2. Small bilateral pleural effusions with bilateral lower lobe atelectasis. No acute pulmonary infiltrate. 3. Gastric, duodenal and small bowel distension with gradual transition in the mid small bowel most consistent with a low-grade or partial obstruction. Large hiatal hernia containing much of the stomach which is distended. 4. Colonic wall thickening in the mid descending colon, likely accentuated by incomplete distension. The possibility of an underlying colitis is raised. Diverticulosis with no acute features.      Assessment//Plan           Hospital Problems             Last Modified POA    * (Principal) Partial bowel obstruction ([address] 8/5/2022 Yes    Hiatal hernia 8/7/2022 Yes    Acute respiratory failure with hypoxia ([address] 8/7/2022 Yes    Small bowel obstruction ([address] 8/7/2022 Yes    Aspiration into airway 8/7/2022 Yes     Assessment & Plan    Partial SBO   In ICU for increased work of breathing   NG was not draining, now removed   Post placement X ray appeared to show tube in stomach but no output noted   He reports his abdominal symptoms have resolved    Ideally we would do either a small bowel follow through or repeat CT with PO contrast to better assess his SBO   Will monitor pulmonary status   If he is intubated will can replace NG and give PO contrast via the tube   If he improves he should be able to start PO    Will monitor    Electronically signed by Gely Lobato MD on 8/7/2022 at 2:37 PM    Electronically signed by Gely Lobato MD on 8/7/22 at 2:34 PM EDT

## 2022-08-07 NOTE — PROGRESS NOTES
This RN called by JEREMÍAS Darnell [id number] w/ reports of abnormal vitals including BP of 78/44 and elevated temp of 101.1F. This RN entered patient room to manually recheck BP. Patient found in bed, notably uncomfortable and diaphoretic. Vitals obtained and are as follows:     Temp 101.1      RR 36   BP 86/60 (manual)   O2 83% on RA. MEWS 6    Patient placed on supplemental oxygen, ultimately requiring a NRB at 15L to maintain O2 > 90%. Patient states he is not typically home dependent on oxygen but uses a CPAP at night at home. Rapid response called at 0754 and code team to the bedside. MD Tanya Cruz provided orders for STAT EKG, chest xray, KUB, and labs. EKG and chest xray completed at the bedside. MD requested labs and KUB be completed after patient transfers to ICU. Patient transported to ICU by this RN and respiratory therapy. Patient remains on 15L via NRB. Patient connected to ICU monitoring system and transferred over to ICU bed. Lab at the bedside. Xray requesting patient go to xray suite to have KUB completed r/t body habitus. This RN agreeable. Transport placed by [facility]. Bedside report given by this RN to ICU ANTONIA Marin. All questions answered. NG tube remains in place to ANTONIA Morrow aware that NG is likely not in the correct place r/t difficult placement and was disconnected from [facility] while patient was under the care of this RN until placement can be verified. Update provided to patient's wife in ICU. All questions answered. Update also provided to NP Randall Cook, patient's attending provider. Patient denies further needs from this RN at this time. All care of patient assumed by ICU ANTONIA Marin.

## 2022-08-08 ENCOUNTER — APPOINTMENT (OUTPATIENT)
Dept: GENERAL RADIOLOGY | Age: 62
DRG: 853 | End: 2022-08-08
Payer: COMMERCIAL

## 2022-08-08 PROBLEM — J96.01 ACUTE RESPIRATORY FAILURE WITH HYPOXIA (HCC): Status: ACTIVE | Noted: 2022-08-08

## 2022-08-08 PROBLEM — J96.02 ACUTE RESPIRATORY FAILURE WITH HYPOXIA AND HYPERCAPNIA (HCC): Status: ACTIVE | Noted: 2022-08-07

## 2022-08-08 LAB
AMORPHOUS: ABNORMAL /HPF
ANION GAP SERPL CALCULATED.3IONS-SCNC: 13 MMOL/L (ref 3–16)
BACTERIA: ABNORMAL /HPF
BASE EXCESS ARTERIAL: -4.2 MMOL/L (ref -3–3)
BASE EXCESS ARTERIAL: -6.5 MMOL/L (ref -3–3)
BILIRUBIN URINE: ABNORMAL
BLOOD, URINE: ABNORMAL
BUN BLDV-MCNC: 48 MG/DL (ref 7–20)
CALCIUM SERPL-MCNC: 8.4 MG/DL (ref 8.3–10.6)
CARBOXYHEMOGLOBIN ARTERIAL: 0.6 % (ref 0–1.5)
CARBOXYHEMOGLOBIN ARTERIAL: 0.6 % (ref 0–1.5)
CHLORIDE BLD-SCNC: 101 MMOL/L (ref 99–110)
CLARITY: ABNORMAL
CO2: 24 MMOL/L (ref 21–32)
COARSE CASTS, UA: ABNORMAL /LPF (ref 0–2)
COLOR: ABNORMAL
COMMENT UA: ABNORMAL
CORTISOL TOTAL: 21.9 UG/DL
CORTISOL TOTAL: 44.5 UG/DL
CREAT SERPL-MCNC: 2.7 MG/DL (ref 0.8–1.3)
CRYSTALS, UA: ABNORMAL /HPF
EKG ATRIAL RATE: 114 BPM
EKG ATRIAL RATE: 115 BPM
EKG ATRIAL RATE: 121 BPM
EKG DIAGNOSIS: NORMAL
EKG P AXIS: 21 DEGREES
EKG P AXIS: 34 DEGREES
EKG P AXIS: 46 DEGREES
EKG P-R INTERVAL: 150 MS
EKG P-R INTERVAL: 152 MS
EKG P-R INTERVAL: 160 MS
EKG Q-T INTERVAL: 356 MS
EKG Q-T INTERVAL: 368 MS
EKG Q-T INTERVAL: 416 MS
EKG QRS DURATION: 110 MS
EKG QRS DURATION: 110 MS
EKG QRS DURATION: 98 MS
EKG QTC CALCULATION (BAZETT): 492 MS
EKG QTC CALCULATION (BAZETT): 507 MS
EKG QTC CALCULATION (BAZETT): 590 MS
EKG R AXIS: 24 DEGREES
EKG R AXIS: 25 DEGREES
EKG R AXIS: 30 DEGREES
EKG T AXIS: 10 DEGREES
EKG T AXIS: 14 DEGREES
EKG T AXIS: 20 DEGREES
EKG VENTRICULAR RATE: 114 BPM
EKG VENTRICULAR RATE: 115 BPM
EKG VENTRICULAR RATE: 121 BPM
FINE CASTS, UA: ABNORMAL /LPF (ref 0–2)
GFR AFRICAN AMERICAN: 29
GFR NON-AFRICAN AMERICAN: 24
GLUCOSE BLD-MCNC: 124 MG/DL (ref 70–99)
GLUCOSE BLD-MCNC: 131 MG/DL (ref 70–99)
GLUCOSE BLD-MCNC: 131 MG/DL (ref 70–99)
GLUCOSE BLD-MCNC: 138 MG/DL (ref 70–99)
GLUCOSE BLD-MCNC: 138 MG/DL (ref 70–99)
GLUCOSE URINE: NEGATIVE MG/DL
HCO3 ARTERIAL: 21.5 MMOL/L (ref 21–29)
HCO3 ARTERIAL: 24.5 MMOL/L (ref 21–29)
HCT VFR BLD CALC: 35.4 % (ref 40.5–52.5)
HEMOGLOBIN, ART, EXTENDED: 11.7 G/DL (ref 13.5–17.5)
HEMOGLOBIN, ART, EXTENDED: 18.5 G/DL (ref 13.5–17.5)
HEMOGLOBIN: 11.6 G/DL (ref 13.5–17.5)
HYALINE CASTS: ABNORMAL /LPF (ref 0–2)
KETONES, URINE: NEGATIVE MG/DL
LEUKOCYTE ESTERASE, URINE: ABNORMAL
MAGNESIUM: 2.3 MG/DL (ref 1.8–2.4)
MCH RBC QN AUTO: 31.6 PG (ref 26–34)
MCHC RBC AUTO-ENTMCNC: 32.7 G/DL (ref 31–36)
MCV RBC AUTO: 96.7 FL (ref 80–100)
METHEMOGLOBIN ARTERIAL: 0.4 %
METHEMOGLOBIN ARTERIAL: 0.4 %
MICROSCOPIC EXAMINATION: YES
NITRITE, URINE: NEGATIVE
O2 SAT, ARTERIAL: 93.5 %
O2 SAT, ARTERIAL: 96.5 %
O2 THERAPY: ABNORMAL
O2 THERAPY: ABNORMAL
PCO2 ARTERIAL: 50.2 MMHG (ref 35–45)
PCO2 ARTERIAL: 62 MMHG (ref 35–45)
PDW BLD-RTO: 16.9 % (ref 12.4–15.4)
PERFORMED ON: ABNORMAL
PH ARTERIAL: 7.21 (ref 7.35–7.45)
PH ARTERIAL: 7.24 (ref 7.35–7.45)
PH UA: 5.5 (ref 5–8)
PHOSPHORUS: 6.4 MG/DL (ref 2.5–4.9)
PLATELET # BLD: 418 K/UL (ref 135–450)
PMV BLD AUTO: 6.8 FL (ref 5–10.5)
PO2 ARTERIAL: 75 MMHG (ref 75–108)
PO2 ARTERIAL: 85.8 MMHG (ref 75–108)
POTASSIUM SERPL-SCNC: 4 MMOL/L (ref 3.5–5.1)
PRO-BNP: 2277 PG/ML (ref 0–124)
PROTEIN UA: 100 MG/DL
RBC # BLD: 3.66 M/UL (ref 4.2–5.9)
RBC UA: ABNORMAL /HPF (ref 0–4)
REASON FOR REJECTION: NORMAL
REJECTED TEST: NORMAL
SODIUM BLD-SCNC: 138 MMOL/L (ref 136–145)
SPECIFIC GRAVITY UA: >=1.03 (ref 1–1.03)
TCO2 ARTERIAL: 23.1 MMOL/L
TCO2 ARTERIAL: 26.4 MMOL/L
URINE TYPE: ABNORMAL
UROBILINOGEN, URINE: 1 E.U./DL
WBC # BLD: 15.5 K/UL (ref 4–11)
WBC UA: ABNORMAL /HPF (ref 0–5)

## 2022-08-08 PROCEDURE — 71045 X-RAY EXAM CHEST 1 VIEW: CPT

## 2022-08-08 PROCEDURE — 6370000000 HC RX 637 (ALT 250 FOR IP): Performed by: NURSE PRACTITIONER

## 2022-08-08 PROCEDURE — 6370000000 HC RX 637 (ALT 250 FOR IP): Performed by: INTERNAL MEDICINE

## 2022-08-08 PROCEDURE — 31500 INSERT EMERGENCY AIRWAY: CPT

## 2022-08-08 PROCEDURE — 99232 SBSQ HOSP IP/OBS MODERATE 35: CPT | Performed by: SURGERY

## 2022-08-08 PROCEDURE — 2580000003 HC RX 258: Performed by: INTERNAL MEDICINE

## 2022-08-08 PROCEDURE — 0BH18EZ INSERTION OF ENDOTRACHEAL AIRWAY INTO TRACHEA, VIA NATURAL OR ARTIFICIAL OPENING ENDOSCOPIC: ICD-10-PCS | Performed by: HOSPITALIST

## 2022-08-08 PROCEDURE — 6360000002 HC RX W HCPCS: Performed by: NURSE PRACTITIONER

## 2022-08-08 PROCEDURE — 2500000003 HC RX 250 WO HCPCS

## 2022-08-08 PROCEDURE — 2700000000 HC OXYGEN THERAPY PER DAY

## 2022-08-08 PROCEDURE — 82803 BLOOD GASES ANY COMBINATION: CPT

## 2022-08-08 PROCEDURE — 80048 BASIC METABOLIC PNL TOTAL CA: CPT

## 2022-08-08 PROCEDURE — 2580000003 HC RX 258: Performed by: NURSE PRACTITIONER

## 2022-08-08 PROCEDURE — 94002 VENT MGMT INPAT INIT DAY: CPT

## 2022-08-08 PROCEDURE — 94640 AIRWAY INHALATION TREATMENT: CPT

## 2022-08-08 PROCEDURE — 81001 URINALYSIS AUTO W/SCOPE: CPT

## 2022-08-08 PROCEDURE — APPNB15 APP NON BILLABLE TIME 0-15 MINS: Performed by: NURSE PRACTITIONER

## 2022-08-08 PROCEDURE — 94761 N-INVAS EAR/PLS OXIMETRY MLT: CPT

## 2022-08-08 PROCEDURE — 2000000000 HC ICU R&B

## 2022-08-08 PROCEDURE — 93010 ELECTROCARDIOGRAM REPORT: CPT | Performed by: INTERNAL MEDICINE

## 2022-08-08 PROCEDURE — 83880 ASSAY OF NATRIURETIC PEPTIDE: CPT

## 2022-08-08 PROCEDURE — 5A1955Z RESPIRATORY VENTILATION, GREATER THAN 96 CONSECUTIVE HOURS: ICD-10-PCS | Performed by: HOSPITALIST

## 2022-08-08 PROCEDURE — 83735 ASSAY OF MAGNESIUM: CPT

## 2022-08-08 PROCEDURE — 85027 COMPLETE CBC AUTOMATED: CPT

## 2022-08-08 PROCEDURE — 99291 CRITICAL CARE FIRST HOUR: CPT | Performed by: INTERNAL MEDICINE

## 2022-08-08 PROCEDURE — 84100 ASSAY OF PHOSPHORUS: CPT

## 2022-08-08 PROCEDURE — 36592 COLLECT BLOOD FROM PICC: CPT

## 2022-08-08 PROCEDURE — 36415 COLL VENOUS BLD VENIPUNCTURE: CPT

## 2022-08-08 PROCEDURE — 31500 INSERT EMERGENCY AIRWAY: CPT | Performed by: INTERNAL MEDICINE

## 2022-08-08 PROCEDURE — 82533 TOTAL CORTISOL: CPT

## 2022-08-08 PROCEDURE — 2500000003 HC RX 250 WO HCPCS: Performed by: NURSE PRACTITIONER

## 2022-08-08 PROCEDURE — 6360000002 HC RX W HCPCS: Performed by: INTERNAL MEDICINE

## 2022-08-08 PROCEDURE — 82024 ASSAY OF ACTH: CPT

## 2022-08-08 RX ORDER — FENTANYL CITRATE-0.9 % NACL/PF 10 MCG/ML
25-200 PLASTIC BAG, INJECTION (ML) INTRAVENOUS CONTINUOUS
Status: DISCONTINUED | OUTPATIENT
Start: 2022-08-08 | End: 2022-08-22

## 2022-08-08 RX ORDER — FENTANYL CITRATE-0.9 % NACL/PF 10 MCG/ML
50 PLASTIC BAG, INJECTION (ML) INTRAVENOUS EVERY 30 MIN PRN
Status: DISCONTINUED | OUTPATIENT
Start: 2022-08-08 | End: 2022-08-22

## 2022-08-08 RX ORDER — ACETAMINOPHEN 650 MG/1
650 SUPPOSITORY RECTAL EVERY 6 HOURS PRN
Status: DISCONTINUED | OUTPATIENT
Start: 2022-08-08 | End: 2022-09-07 | Stop reason: HOSPADM

## 2022-08-08 RX ORDER — PROPOFOL 10 MG/ML
INJECTION, EMULSION INTRAVENOUS
Status: DISPENSED
Start: 2022-08-08 | End: 2022-08-08

## 2022-08-08 RX ORDER — PROPOFOL 10 MG/ML
5-50 INJECTION, EMULSION INTRAVENOUS CONTINUOUS
Status: DISCONTINUED | OUTPATIENT
Start: 2022-08-08 | End: 2022-08-23

## 2022-08-08 RX ORDER — CHLORHEXIDINE GLUCONATE 0.12 MG/ML
15 RINSE ORAL 2 TIMES DAILY
Status: DISCONTINUED | OUTPATIENT
Start: 2022-08-08 | End: 2022-08-28

## 2022-08-08 RX ORDER — DEXAMETHASONE SODIUM PHOSPHATE 4 MG/ML
4 INJECTION, SOLUTION INTRA-ARTICULAR; INTRALESIONAL; INTRAMUSCULAR; INTRAVENOUS; SOFT TISSUE ONCE
Status: COMPLETED | OUTPATIENT
Start: 2022-08-08 | End: 2022-08-08

## 2022-08-08 RX ADMIN — Medication 5 MCG/MIN: at 09:28

## 2022-08-08 RX ADMIN — IPRATROPIUM BROMIDE AND ALBUTEROL SULFATE 1 AMPULE: 2.5; .5 SOLUTION RESPIRATORY (INHALATION) at 20:00

## 2022-08-08 RX ADMIN — SODIUM CHLORIDE, POTASSIUM CHLORIDE, SODIUM LACTATE AND CALCIUM CHLORIDE: 600; 310; 30; 20 INJECTION, SOLUTION INTRAVENOUS at 12:55

## 2022-08-08 RX ADMIN — PIPERACILLIN AND TAZOBACTAM 4500 MG: 4; .5 INJECTION, POWDER, LYOPHILIZED, FOR SOLUTION INTRAVENOUS at 00:15

## 2022-08-08 RX ADMIN — Medication 16 MG: at 10:04

## 2022-08-08 RX ADMIN — PROPOFOL 25 MCG/KG/MIN: 10 INJECTION, EMULSION INTRAVENOUS at 22:35

## 2022-08-08 RX ADMIN — CHLORHEXIDINE GLUCONATE 0.12% ORAL RINSE 15 ML: 1.2 LIQUID ORAL at 19:39

## 2022-08-08 RX ADMIN — SODIUM CHLORIDE, PRESERVATIVE FREE 10 ML: 5 INJECTION INTRAVENOUS at 19:39

## 2022-08-08 RX ADMIN — PROPOFOL 30 MCG/KG/MIN: 10 INJECTION, EMULSION INTRAVENOUS at 11:57

## 2022-08-08 RX ADMIN — IPRATROPIUM BROMIDE AND ALBUTEROL SULFATE 1 AMPULE: 2.5; .5 SOLUTION RESPIRATORY (INHALATION) at 07:52

## 2022-08-08 RX ADMIN — SODIUM CHLORIDE, POTASSIUM CHLORIDE, SODIUM LACTATE AND CALCIUM CHLORIDE: 600; 310; 30; 20 INJECTION, SOLUTION INTRAVENOUS at 04:34

## 2022-08-08 RX ADMIN — SODIUM CHLORIDE, PRESERVATIVE FREE 10 ML: 5 INJECTION INTRAVENOUS at 11:38

## 2022-08-08 RX ADMIN — IPRATROPIUM BROMIDE AND ALBUTEROL SULFATE 1 AMPULE: 2.5; .5 SOLUTION RESPIRATORY (INHALATION) at 11:50

## 2022-08-08 RX ADMIN — PROPOFOL 20 MCG/KG/MIN: 10 INJECTION, EMULSION INTRAVENOUS at 15:58

## 2022-08-08 RX ADMIN — CHLORHEXIDINE GLUCONATE 0.12% ORAL RINSE 15 ML: 1.2 LIQUID ORAL at 11:37

## 2022-08-08 RX ADMIN — Medication 40 MCG/MIN: at 15:56

## 2022-08-08 RX ADMIN — HYDROMORPHONE HYDROCHLORIDE 0.5 MG: 1 INJECTION, SOLUTION INTRAMUSCULAR; INTRAVENOUS; SUBCUTANEOUS at 01:57

## 2022-08-08 RX ADMIN — PIPERACILLIN AND TAZOBACTAM 4500 MG: 4; .5 INJECTION, POWDER, LYOPHILIZED, FOR SOLUTION INTRAVENOUS at 15:58

## 2022-08-08 RX ADMIN — ENOXAPARIN SODIUM 40 MG: 100 INJECTION SUBCUTANEOUS at 10:15

## 2022-08-08 RX ADMIN — Medication 25 MCG/HR: at 09:40

## 2022-08-08 RX ADMIN — PIPERACILLIN AND TAZOBACTAM 4500 MG: 4; .5 INJECTION, POWDER, LYOPHILIZED, FOR SOLUTION INTRAVENOUS at 08:29

## 2022-08-08 RX ADMIN — FAMOTIDINE 20 MG: 10 INJECTION, SOLUTION INTRAVENOUS at 10:56

## 2022-08-08 RX ADMIN — DEXAMETHASONE SODIUM PHOSPHATE 4 MG: 4 INJECTION, SOLUTION INTRAMUSCULAR; INTRAVENOUS at 18:04

## 2022-08-08 RX ADMIN — SODIUM CHLORIDE, POTASSIUM CHLORIDE, SODIUM LACTATE AND CALCIUM CHLORIDE: 600; 310; 30; 20 INJECTION, SOLUTION INTRAVENOUS at 19:28

## 2022-08-08 RX ADMIN — PROPOFOL 30 MCG/KG/MIN: 10 INJECTION, EMULSION INTRAVENOUS at 09:29

## 2022-08-08 RX ADMIN — ENOXAPARIN SODIUM 40 MG: 100 INJECTION SUBCUTANEOUS at 19:39

## 2022-08-08 RX ADMIN — IPRATROPIUM BROMIDE AND ALBUTEROL SULFATE 1 AMPULE: 2.5; .5 SOLUTION RESPIRATORY (INHALATION) at 15:53

## 2022-08-08 ASSESSMENT — PULMONARY FUNCTION TESTS
PIF_VALUE: 30
PIF_VALUE: 29
PIF_VALUE: 24
PIF_VALUE: 27
PIF_VALUE: 22
PIF_VALUE: 28
PIF_VALUE: 25
PIF_VALUE: 31
PIF_VALUE: 23
PIF_VALUE: 39
PIF_VALUE: 25
PIF_VALUE: 25
PIF_VALUE: 29
PIF_VALUE: 21
PIF_VALUE: 23
PIF_VALUE: 21
PIF_VALUE: 28
PIF_VALUE: 24
PIF_VALUE: 23
PIF_VALUE: 25
PIF_VALUE: 21
PIF_VALUE: 25
PIF_VALUE: 19
PIF_VALUE: 25
PIF_VALUE: 27
PIF_VALUE: 24
PIF_VALUE: 24
PIF_VALUE: 25
PIF_VALUE: 28
PIF_VALUE: 28
PIF_VALUE: 23
PIF_VALUE: 28
PIF_VALUE: 29
PIF_VALUE: 31
PIF_VALUE: 19
PIF_VALUE: 20
PIF_VALUE: 24
PIF_VALUE: 38
PIF_VALUE: 29
PIF_VALUE: 24
PIF_VALUE: 28
PIF_VALUE: 26
PIF_VALUE: 27
PIF_VALUE: 32
PIF_VALUE: 24
PIF_VALUE: 24
PIF_VALUE: 28
PIF_VALUE: 26
PIF_VALUE: 24
PIF_VALUE: 19
PIF_VALUE: 26
PIF_VALUE: 28
PIF_VALUE: 26
PIF_VALUE: 27
PIF_VALUE: 29
PIF_VALUE: 21

## 2022-08-08 ASSESSMENT — PAIN SCALES - GENERAL
PAINLEVEL_OUTOF10: 0
PAINLEVEL_OUTOF10: 5
PAINLEVEL_OUTOF10: 0
PAINLEVEL_OUTOF10: 0
PAINLEVEL_OUTOF10: 7
PAINLEVEL_OUTOF10: 0
PAINLEVEL_OUTOF10: 0

## 2022-08-08 NOTE — CONSULTS
[address]                                  CONSULTATION    PATIENT NAME: Temo Pena                     :        1960  MED REC NO:   [id number]                          ROOM:       2111  ACCOUNT NO:   [de-identified]                           ADMIT DATE: 2022  PROVIDER:     Chaparrita Gonzales MD    RENAL CONSULTATION    CONSULT DATE:  2022    REASON FOR ADMISSION:    Patient with abdominal pain, nausea, vomiting,  found to have some bowel obstruction. REASON FOR CONSULTATION:    The patient with acute kidney injury with  creatinine jumped from 0.8 to a current creatinine of 2.7, most likely    ATN due to episode of severe hypotension where his blood pressure dropped 78/44  Also was on Lisinopril 40 mg . HISTORY OF PRESENTING COMPLAINT:  A 30-year-old  gentleman seen  in ICU where he was intubated, sedated. His wife is present. Gentleman  who was a retired . No smoking or alcohol. Three children, in  good health. No family history of kidney disease with known history of  rheumatoid arthritis for the last many years. Many treatments with  methotrexate to Celebrex in the past and now on Inflectra IV infusion  under the care of Dr. Janae Robertson at [facility]. History of atrial fibrillation,  previous normal coronary angiogram in , came with the above  complaints, found some bowel obstruction on CT, transition point, mid  small bowel. He was known to have a large paraesophageal hernia with  herniation in the stomach and colon in it. He was transferred to ICU on 2022 because of respiratory distress  and hypercapnia and was eventually intubated. He was known to have  obstructive sleep apnea and is on CPAP. REVIEW OF SYSTEMS:  Not much available except above discussed with the  wife who was present.   Having abdominal pain, some nausea, vomiting,  poor intake for the last week or so. No fever. No chills. CARDIAC:   No chest pain. PULMONARY:  No shortness of breath. GENITOURINARY:  No  dysuria or hematuria. VASCULAR:  No claudication. Review of the rest  of the systems is negative. PAST MEDICAL HISTORY:  Longstanding rheumatoid arthritis under the care  of  Rheumatology, different treatments, history of atrial  fibrillation, history of obstructive sleep apnea, history of knee  surgery. PERSONAL AND SOCIAL HISTORY:  Retired . No smoking. No  alcohol. No other drug abuse. FAMILY HISTORY:  Three children in good health. No family history of  chronic kidney disease. MEDICATIONS:  Included spironolactone, enoxaparin, lisinopril,  gabapentin, Synthroid. Currently, he is now on Zosyn. ALLERGIES:  CODEINE. PHYSICAL EXAMINATION:  GENERAL:  On examination, obese gentleman with a BMI of 54, sedated,  intubated. VITAL SIGNS:  Blood pressure 100/70, he is on Levophed. He is also  getting IV fluids. HEENT:  Head:  Normocephalic, atraumatic. Conjunctivae:  No icterus. CARDIAC:  Normal muffled heart sounds. NECK:  Difficult to see, very short neck. CHEST:  Reduced breath sounds. ABDOMEN:  Distended but soft, nontender. Bowel sounds absent. EXTREMITIES:  Bilateral lower extremities, he has no edema. NEUROLOGICAL:  Hard to assess. LABORATORY DATA:  Sodium 138, potassium 4, chloride 101, CO2 24, BUN 48,  creatinine 2.7, glucose 138. White cell 15.5, hemoglobin 11.6, platelet  count 296. Differential, last check was normal.  Last LFT is normal  except low albumin. ASSESSMENT AND PLAN:    Acute kidney injury   creatinine on admission 1   next day on 08/06/2022 was 0.8  suddenly jumped up to 2.7. Due to episode of severe hypotension with blood pressure drop 78/44. also got CT with IV contrast on 08/05/2022 but most likely  this is ATN related due to hypotension and hypercapnic  respiratory failure. White cell count is up could be septic. On Levophed    IV fluids. He just got a bolus as ordered by Dr. Diomedes Smith, intensivist and currently on Ringer's lactate 150 mL an hour. CT angio s normal renal arteries and normal kidneys    check BNP  Urine sodium   repeat urinalysis   Contine with fluids Cautiously       Sepsis/ possible septic shock. emperature was 101.1. High white cell count. '  immunocompromised due to rheumatoid arthritis and treatment. Being treated with antibiotics. History of sleep apnea, on CPAP. Acute respiratory failure with high pCO2 in the range of 50s with pH  of 7.24    now intubated. History of abnormal stress test in 06/2022 with stress-induced  ischemia and some scarring. Ejection fraction  was  64%.     had a followup angiogram which was reported as normal as per wife. I do not have that result     History of rheumatoid arthritis longstanding,  was on methotrexate,  has taken Celebrex and now on Inflectra infusions,  so he is immunocompromised. Jose Carlos Bee is now helping him with his rheumatoid tremendously. he has been on steroids in the past.     Rule out adrenal insufficiency. Check cortisol level. Give  dose of dexamethasone as a stress dose because of hypotension and possible adrenal insufficiency. I have added cortisol level in the morning lab. History of thyroidectomy in the past. .    Small bowel obstruction.     Large hiatus hernia with colon and stomach in the chest.  Further management as per medical team.     Sim Bridges MD    D: 08/08/2022 16:30:18       T: 08/08/2022 18:00:03     [email or web address]  Job#: [id number]     Doc#: [id number]    CC:

## 2022-08-08 NOTE — CONSULTS
Thanks  Full consult to follow  Dr Adriana Sutton  asked me to see this pt due to Oliguric renal Failure

## 2022-08-08 NOTE — PROCEDURES
ICU PROCEDURE - ENDOTRACHEAL INTUBATION    Ge Lugo     MRN#: [id number]  22      Eve [email or web address]     : 1960      INDICATION: as above    TIME OUT: taken    Permission obtained, risks/benefits reviewed:    ANESTHESIA:   []Ketamine  []Ativan  [] Morphine  []Propofol  []Other medications:      ESTIMATED BLOOD LOSS:  None. COMPLICATIONS:  []N/A  [] Other:    LARYNGOSCOPIC AIRWAY GRADE (CORMACK-LEHANE):[]1  []2a  []2b []3  []4        INTUBATION EQUIPMENT USED:  [x] Direct laryngoscope only    OUTCOME: Successful placement of #  1  Taperguard Evac endotracheal via   [x]Oral route    INSERTION DEPTH:  25    cm from   [x]lip           CONFIRMATION OF TUBE POSITION:   [x]Capnography - Strong & repeatable exhaled CO2 detection   [x]Multiple point auscultation   [x]SpO2 response   [x]STAT X-ray   []Bronchoscopic assessment    UNUSUAL FINDINGS:    PROCEDURE:     Using direct laryngoscopy, the vocal cords were visualized and the endotracheal tube was placed through the cords under direct vision. Good breath sounds were auscultated bilaterally without sounds over abdomen. Appropriate strong & repeatable exhaled CO2 detection was confirmed.        Electronically signed by Aylin Salas MD on 2022 at 10:12 AM

## 2022-08-08 NOTE — PLAN OF CARE
Problem: Discharge Planning  Goal: Discharge to home or other facility with appropriate resources  Outcome: Progressing  Flowsheets  Taken 8/8/2022 0000  Discharge to home or other facility with appropriate resources: Identify barriers to discharge with patient and caregiver  Taken 8/7/2022 1938  Discharge to home or other facility with appropriate resources: Identify barriers to discharge with patient and caregiver     Problem: Pain  Goal: Verbalizes/displays adequate comfort level or baseline comfort level  Outcome: Progressing  Flowsheets  Taken 8/8/2022 0000  Verbalizes/displays adequate comfort level or baseline comfort level:   Encourage patient to monitor pain and request assistance   Assess pain using appropriate pain scale   Administer analgesics based on type and severity of pain and evaluate response  Taken 8/7/2022 1938  Verbalizes/displays adequate comfort level or baseline comfort level:   Encourage patient to monitor pain and request assistance   Assess pain using appropriate pain scale     Problem: ABCDS Injury Assessment  Goal: Absence of physical injury  Outcome: Progressing  Flowsheets (Taken 8/8/2022 0449)  Absence of Physical Injury: Implement safety measures based on patient assessment     Problem: Safety - Adult  Goal: Free from fall injury  Outcome: Progressing  Flowsheets (Taken 8/8/2022 0449)  Free From Fall Injury: Instruct family/caregiver on patient safety     Problem: Skin/Tissue Integrity  Goal: Absence of new skin breakdown  Description: 1. Monitor for areas of redness and/or skin breakdown  2. Assess vascular access sites hourly  3. Every 4-6 hours minimum:  Change oxygen saturation probe site  4. Every 4-6 hours:  If on nasal continuous positive airway pressure, respiratory therapy assess nares and determine need for appliance change or resting period.   Outcome: Progressing

## 2022-08-08 NOTE — CONSULTS
GASTROENTEROLOGY INPATIENT CONSULTATION        IDENTIFYING DATA/REASON FOR CONSULTATION   PATIENT:  Son Galeas  MRN:  [id number]  ADMIT DATE: 8/5/2022  TIME OF EVALUATION: 8/8/2022 2:29 PM  HOSPITAL STAY:   LOS: 3 days     REASON FOR CONSULTATION: EGD with NGT placement    HISTORY OF PRESENT ILLNESS   Son Galeas is a 63 y.o. male with a PMH of Rheumatoid Arthritis, HTN, Afib, hypothyroidism, obesity, and EARNESTINE on CPAP who presented on 8/5/2022 with abdominal pain, chest pain, nausea, vomiting and diarrhea. He was recently admitted at a hospital in [place] with similar symptoms found to have a large paraesophageal hernia without volvulus or outlet obstruction. He was discharged with plans to follow up with surgeon at [facility]. Repeat CT this admission showed distention of the stomach and duodenum with gradual transition in the mid small bowel c/w low-grade or partial obstruction. Also noted was large hiatal hernia containing much of the stomach and colonic wall thickening possibly due to incomplete distention vs underlying colitis. NGT was place which has not been producing much output. Yesterday a rapid response was called due to respiratory distress and hypoxia. He was transferred to ICU and is being treated for aspiration pneumonia. This morning he was intubated. Aslo placed on levophed. Attempts were made to replace NGT with OGT but appeared to be unsuccessful. Gen Surgery has been following and plans for repeat CT with oral contrast via OGT/NGT in near future. We are consulted for EGD with NGT placement. PAST MEDICAL, SURGICAL, FAMILY, and SOCIAL HISTORY     Past Medical History:   Diagnosis Date    Arthritis     Atrial fibrillation ([address]     Hypertension      History reviewed. No pertinent surgical history. History reviewed. No pertinent family history.   Social History     Socioeconomic History    Marital status: Unknown     Spouse name: None    Number of children: None    Years of education: None    Highest education level: None   Tobacco Use    Smoking status: Never    Smokeless tobacco: Never   Substance and Sexual Activity    Alcohol use: Not Currently    Drug use: Never    Sexual activity: Not Currently       MEDICATIONS   SCHEDULED:  propofol, ,   chlorhexidine, 15 mL, BID  famotidine (PEPCID) injection, 20 mg, Daily  piperacillin-tazobactam, 4,500 mg, Q8H  ipratropium-albuterol, 1 ampule, Q4H WA  sodium chloride flush, 5-40 mL, 2 times per day  [Held by provider] gabapentin, 800 mg, TID  [Held by provider] levothyroxine, 200 mcg, Daily  [Held by provider] lisinopril, 10 mg, Daily  [Held by provider] metoprolol tartrate, 25 mg, BID  [Held by provider] spironolactone, 50 mg, Daily  enoxaparin, 40 mg, BID      FLUIDS/DRIPS:     fentaNYL 75 mcg/hr (08/08/22 1259)    propofol 30 mcg/kg/min (08/08/22 1259)    norepinephrine 40 mcg/min (08/08/22 1259)    sodium chloride      lactated ringers 150 mL/hr at 08/08/22 1259     PRNs: fentaNYL, 50 mcg, Q30 Min PRN  sodium chloride flush, 5-40 mL, PRN  sodium chloride, 25 mL, PRN  ondansetron, 4 mg, Q8H PRN   Or  ondansetron, 4 mg, Q6H PRN  diazePAM, 5 mg, 4x Daily PRN  HYDROmorphone, 0.25 mg, Q3H PRN   Or  HYDROmorphone, 0.5 mg, Q3H PRN      ALLERGIES:  He   Allergies   Allergen Reactions    Codeine Nausea Only and Nausea And Vomiting     Stomach upset          REVIEW OF SYSTEMS   Pertinent ROS noted in HPI    PHYSICAL EXAM     Vitals:    08/08/22 1315 08/08/22 1330 08/08/22 1345 08/08/22 1400   BP: (!) 88/59 90/61 92/61 95/60   Pulse: 99 97 96 97   Resp: 21 19 18 18   Temp:       TempSrc:       SpO2: (!) 87% 92% 94% 97%   Weight:       Height:           I/O last 3 completed shifts: In: 7346 [P.O.:240;  I.V.:4171.5; IV Piggyback:297.6]  Out: 1002 [Urine:1002]      Physical Exam:  General appearance: intubated, sedated  Eyes: Anicteric  Head: Normocephalic, without obvious abnormality, OGT in place  Lungs: clear to auscultation bilaterally  Heart: containing much of the stomach which is distended. 4. Colonic wall thickening in the mid descending colon, likely accentuated by   incomplete distension. The possibility of an underlying colitis is raised. Diverticulosis with no acute features. ASSESSMENT AND RECOMMENDATIONS   63 y.o. male with a PMH of Rheumatoid Arthritis, HTN, Afib, hypothyroidism, obesity, and EARNESTINE on CPAP who presented on 8/5/2022 with abdominal pain, chest pain, nausea, vomiting and diarrhea. showed distention of the stomach and duodenum with gradual transition in the mid small bowel c/w low-grade or partial obstruction. Also noted was large hiatal hernia containing much of the stomach and colonic wall thickening possibly due to incomplete distention vs underlying colitis. IMPRESSION:  Large Hiatal hernia  Small bowel obstruction  Aspiration pneumonia  Hypoxic respiratory failure, intubated      RECOMMENDATIONS:    Discuss case with Dr. Dakota De La Torre. Consider EGD tomorrow. Ideally would want pt weaned off pressors prior. If you have any questions or need any further information, please feel free to contact our consult team.  Thank you for allowing us to participate in the care of [facility]. The note was completed using Dragon voice recognition transcription. Every effort was made to ensure accuracy; however, inadvertent transcription errors may be present despite my best efforts to edit errors.       Page Rivka MCCORD

## 2022-08-08 NOTE — PROGRESS NOTES
Patient's OGT was coiled in mouth again, no longer in stomach. RN removed and attempted to place NGT but was unable to successfully place. Critical care was updated, RN called surgical team to update. Patient's VSS.

## 2022-08-08 NOTE — PROGRESS NOTES
Patient arrived to room 2111 on NRB. Alert and oriented. Spo2 sats in the 90's. Patient tachypneic with increased work of breathing and use of accessory muscles. Wife at the bedside. Patient placed on ICU monitors. B/P stable at this time.

## 2022-08-08 NOTE — PROGRESS NOTES
Lab unable to process first ACTH and cortisol labs. RN redrew ACTH and cortisol at 18:20, decadron was given at 18:04.

## 2022-08-08 NOTE — PROGRESS NOTES
Pulmonary Critical Care Progress Note     Patient's name:  Aneta Hadley Record Number: [id number]  Patient's account/billing number: [de-identified]  Patient's YOB: 1960  Age: 63 y.o. Date of Admission: 8/5/2022  1:56 PM  Date of Consult: 8/8/2022      Primary Care Physician: Nayeli Camacho MD      Code Status: Full Code    Chief complaint: acute respiratory failure with hypoxia and hypercapnia     Assessment and Plan     Acute respiratory failure with hypoxia and hypercapnia worsening requiring intubation and mechanical ventilation. Aspiration pneumonia  Partial small bowel obstruction. Acute kidney injury, dehydration and contrast nephropathy  Large hiatal hernia  Obstructive sleep apnea obesity hypoventilation syndrome  Sedation related hypotension      Plan: Will intubated and placed on mechanical ventilation. NG tube was placed, pending x-ray confirmation. Continue intermittent wall suction. Further imaging for small bowel obstruction per general surgery. Pressors to keep MAP more than 65. Avoid IV contrast  IV hydration. Zosyn for aspiration pneumonia. GI and DVT prophylaxis. Overnight:  Worsening respiratory status with increased work of breathing unable to speak in full sentences. REVIEW OF SYSTEMS:  Review of Systems -   Acute stress        Physical Exam:    Vitals: /72   Pulse (!) 108   Temp 99.3 °F (37.4 °C) (Axillary)   Resp (!) 0   Ht 5' 7\" (1.702 m)   Wt (!) 348 lb 8.8 oz (158.1 kg)   SpO2 96%   BMI 54.59 kg/m²     Last Body weight:   Wt Readings from Last 3 Encounters:   08/08/22 (!) 348 lb 8.8 oz (158.1 kg)       Body Mass Index : Body mass index is 54.59 kg/m². Intake and Output summary:   Intake/Output Summary (Last 24 hours) at 8/8/2022 1002  Last data filed at 8/8/2022 0755  Gross per 24 hour   Intake 4469.03 ml   Output 1012 ml   Net 3457.03 ml       Physical Examination:     Gen: Moderate acute distress  Eyes: PERRL. Anicteric sclera. No conjunctival injection. ENT: No discharge. Posterior oropharynx clear. External appearance of ears and nose normal.  Neck: Trachea midline. No mass   Resp: Diminished bilaterally with upper airway wheezing  CV: Regular rate. Regular rhythm. No murmur or rub. No edema. GI: Soft distended no bowel sounds. Skin: Warm, dry, w/o erythema. Lymph: No cervical or supraclavicular LAD. M/S: No cyanosis. No clubbing. Neuro: Lethargic, no focal deficits, awake and oriented        Laboratory findings:-    CBC:   Recent Labs     08/08/22 0438   WBC 15.5*   HGB 11.6*        BMP:    Recent Labs     08/05/22  1427 08/06/22  0445 08/08/22  0438   * 134* 138   K 3.8 3.2* 4.0   CL 97* 98* 101   CO2 24 20* 24   BUN 18 21* 48*   CREATININE 1.1 0.8 2.7*   GLUCOSE 112* 110* 138*     S. Calcium:  Recent Labs     08/08/22  0438   CALCIUM 8.4     S. Magnesium:  Recent Labs     08/08/22  0438   MG 2.30     S. Phosphorus:  Recent Labs     08/08/22  0438   PHOS 6.4*     S. Glucose:  Recent Labs     08/07/22  0752   POCGLU 99           Radiology Review:  Pertinent images / reports were reviewed as a part of this visit.     Reviewed     Critical Care time 35 min                Raven Stanford MD, M.D.            8/8/2022, 10:02 AM

## 2022-08-08 NOTE — PROGRESS NOTES
Physician Progress Note      Ashley Duncan  CSN #:                  [id number]  :                       1960  ADMIT DATE:       2022 1:56 PM  DISCH DATE:  RESPONDING  PROVIDER #:        Lissett Davis MD          QUERY TEXT:    Patient admitted with PSBO, noted to have Acute respiratory failure. If   possible, please document in progress notes and discharge summary if you are   evaluating and/or treating any of the following: The medical record reflects the following:  Risk Factors: PSBO  EARNESTINE  NG  Clinical Indicators:  22 CXR \"There is cardiomegaly with pulmonary   vascular congestion. ? There is bibasilar  increased density which may represent atelectasis or infiltrate\"  per   Pulmonology Consult \"NG was placed but does not appear to be in the proper   location on imaging. Trula Blew Trula Blew Acute Hypoxic Respiratory Failure with saturations less   than 90% on room air, possible aspiration vs volume overload\" 22   procalcitonin 6.00  Treatment: Pulmonology consult , IV Zosyn  and ICU admit  Options provided:  -- Pneumonia  -- Other - I will add my own diagnosis  -- Disagree - Not applicable / Not valid  -- Disagree - Clinically unable to determine / Unknown  -- Refer to Clinical Documentation Reviewer    PROVIDER RESPONSE TEXT:    This patient likely has Aspiration Pneumonia .     Query created by: Hector Shaffer on 2022 7:54 AM      Electronically signed by:  Lissett Davis MD 2022 4:50 PM

## 2022-08-08 NOTE — PROGRESS NOTES
General and Vascular Surgery                                                           Daily Progress Note                                                             Yonas Hernandez PA-C     Pt Name: Aneta Hadley Record Number: [id number]  Date of Birth 1960   Today's Date: 8/8/2022      ASSESSMENT/PLAN   Partial SBO              In ICU for increased work of breathing. Intubated this AM              OB placed after patient was intubated              Post placement X ray appeared to show tube in stomach               He reports his abdominal symptoms have resolved   We will obtain a CT scan with oral contrast, place down OG tube. Leukocytosis improved: WBC count: 17.9 --> 15.5    EDUCATION  Patient educated about their illness/diagnosis, stated above, and all questions answered. We discussed the importance of nutrition, medications they are taking, and healthy lifestyle. SUBJECTIVE  Miles is unchanged from yesterday. Pain is well controlled. Intubated. OG tube in place. OBJECTIVE  VITALS:  height is 5' 7\" (1.702 m) and weight is 348 lb 8.8 oz (158.1 kg) (abnormal). His axillary temperature is 99.7 °F (37.6 °C). His blood pressure is 100/65 and his pulse is 98. His respiration is 18 and oxygen saturation is 94%. VITALS:  /65   Pulse 98   Temp 99.7 °F (37.6 °C) (Axillary)   Resp 18   Ht 5' 7\" (1.702 m)   Wt (!) 348 lb 8.8 oz (158.1 kg)   SpO2 94%   BMI 54.59 kg/m²   GENERAL: Intubated  ABDOMEN: tenderness present- minimal,  without rebound and guarding and distention present-mild  I/O last 3 completed shifts: In: 9168 [P.O.:240; I.V.:4171.5; IV Piggyback:297.6]  Out: 1002 [PYRRB:9991]  I/O this shift:  In: 1066.2 [I.V.:1009.3;  IV Piggyback:56.8]  Out: 68 [Urine:68]    LABS  Recent Labs     08/06/22  0445 08/07/22  0830 08/08/22  0438   WBC 11.0   < > 15.5*   HGB 11.5*   < > 11.6*   HCT 34.8*   < > 35.4*      < > 418 *  --  138   K 3.2*  --  4.0   CL 98*  --  101   CO2 20*  --  24   BUN 21*  --  48*   CREATININE 0.8  --  2.7*   MG 1.60*  --  2.30   PHOS  --   --  6.4*   CALCIUM 8.1*  --  8.4   INR 1.33*  --   --    AST 22  --   --    ALT 23  --   --    BILITOT 0.7  --   --     < > = values in this interval not displayed.    CBC:   Lab Results   Component Value Date/Time    WBC 15.5 08/08/2022 04:38 AM    RBC 3.66 08/08/2022 04:38 AM    HGB 11.6 08/08/2022 04:38 AM    HCT 35.4 08/08/2022 04:38 AM    MCV 96.7 08/08/2022 04:38 AM    MCH 31.6 08/08/2022 04:38 AM    MCHC 32.7 08/08/2022 04:38 AM    RDW 16.9 08/08/2022 04:38 AM     08/08/2022 04:38 AM    MPV 6.8 08/08/2022 04:38 AM     CMP:    Lab Results   Component Value Date/Time     08/08/2022 04:38 AM    K 4.0 08/08/2022 04:38 AM    K 3.2 08/06/2022 04:45 AM     08/08/2022 04:38 AM    CO2 24 08/08/2022 04:38 AM    BUN 48 08/08/2022 04:38 AM    CREATININE 2.7 08/08/2022 04:38 AM    GFRAA 29 08/08/2022 04:38 AM    AGRATIO 0.8 08/06/2022 04:45 AM    LABGLOM 24 08/08/2022 04:38 AM    GLUCOSE 138 08/08/2022 04:38 AM    PROT 6.3 08/06/2022 04:45 AM    LABALBU 2.8 08/06/2022 04:45 AM    CALCIUM 8.4 08/08/2022 04:38 AM    BILITOT 0.7 08/06/2022 04:45 AM    ALKPHOS 72 08/06/2022 04:45 AM    AST 22 08/06/2022 04:45 AM    ALT 23 08/06/2022 04:45 AM         LUCY Donato PA-C  Electronically signed 8/8/2022 at 12:14 PM    Attending      As per note above by Johana Burgos  Patient was personally seen and examined by me today  Chart, labs and imaging reviewed    A/P  Partial SBO   No abdominal complaints prior to intubation this AM   NG/OG unable to be placed into the distal stomach   Will ask GI to assist in order to decompress the GI tract but also to administer PO contrast at some point   Will monitor in AM     Electronically signed by Meir Gonsalves MD on 8/8/2022 at 4:12 PM

## 2022-08-08 NOTE — PROGRESS NOTES
Late entry due to patient care. MD Anjum Worthy at bedside to intubate patient. 9:27- 5 mL propofol IV push  9:29- 50 rocuronium IV push. Pt intubated, positive color change, bilateral breath sounds auscultated. 9:30- 2 mg versed IV push. Iv propofol drip started. 9:33 levophed started for hypotension. 9:36- propofol paused. 9:40- fentanyl drip started. Stat chest xray verified ET tube placement.

## 2022-08-08 NOTE — PROGRESS NOTES
OGT was coiled in patient's mouth. RN unwound tube until straight. RN verified placement with air bolus as well as gastric contents. New tube external marking 45.

## 2022-08-08 NOTE — PROGRESS NOTES
Hospitalist Progress Note  8/8/2022 8:35 AM    PCP: Alicia Villarreal MD    [phone]     Date of Admission: 8/5/2022                                                                                                                     HOSPITAL COURSE    Patient demographics:  The patient  Koki Stewart is a 63 y.o. male     Significant past medical history:   Patient Active Problem List   Diagnosis    Partial bowel obstruction (HCC)    Hiatal hernia    Acute respiratory failure with hypoxia (HCC)    Small bowel obstruction (HCC)    Aspiration into airway         Presenting symptoms:  abdominal pain    Diagnostic workup:      CONSULTS DURING ADMISSION :   IP CONSULT TO GENERAL SURGERY  IP CONSULT TO HOSPITALIST  IP CONSULT TO GENERAL SURGERY      Patient was diagnosed with:  Low grade or Partial Bowel obstruction  Hiatal hernia  Atrial Fibrillation      Treatment while inpatient:  Pt presented to [facility] with 5-7 day hx of abdominal pain followed by acute chest pain with vomiting and diarrhea.                                                                                         ----------------------------------------------------------      SUBJECTIVE COMPLAINTS- follow up for abdominal pain    Diet: Diet NPO      OBJECTIVE:   Patient Active Problem List   Diagnosis    Partial bowel obstruction (HCC)    Hiatal hernia    Acute respiratory failure with hypoxia (HCC)    Small bowel obstruction (HCC)    Aspiration into airway       Allergies  Codeine    Medications    Scheduled Meds:   piperacillin-tazobactam  4,500 mg IntraVENous Q8H    ipratropium-albuterol  1 ampule Inhalation Q4H WA    sodium chloride flush  5-40 mL IntraVENous 2 times per day    pantoprazole (PROTONIX) 40 mg injection  40 mg IntraVENous Daily    gabapentin  800 mg Oral TID    levothyroxine  200 mcg Oral Daily    lisinopril  10 mg Oral Daily    metoprolol tartrate  25 mg Oral BID    spironolactone  50 mg Oral Daily    enoxaparin  40 mg SubCUTAneous BID     Continuous Infusions:   sodium chloride      lactated ringers 50 mL/hr at 08/08/22 0617     PRN Meds:  sodium chloride flush, sodium chloride, ondansetron **OR** ondansetron, diazePAM, HYDROmorphone **OR** HYDROmorphone    Vitals   Vitals /wt Patient Vitals for the past 8 hrs:   BP Temp Temp src Pulse Resp SpO2 Weight   08/08/22 0755 125/72 99.3 °F (37.4 °C) Axillary (!) 107 28 91 % --   08/08/22 0752 -- -- -- (!) 112 28 92 % --   08/08/22 0746 -- -- -- -- 27 -- --   08/08/22 0700 105/84 -- -- (!) 110 (!) 34 96 % --   08/08/22 0600 114/70 -- -- (!) 111 30 (!) 88 % --   08/08/22 0558 -- -- -- (!) 109 29 (!) 89 % --   08/08/22 0500 109/68 -- -- (!) 108 26 (!) 87 % --   08/08/22 0433 -- -- -- -- -- -- (!) 348 lb 8.8 oz (158.1 kg)   08/08/22 0400 108/65 100.3 °F (37.9 °C) Axillary (!) 107 27 (!) 87 % --   08/08/22 0300 119/66 -- -- (!) 108 27 (!) 89 % --   08/08/22 0227 -- -- -- -- 27 -- --   08/08/22 0200 (!) 140/82 -- -- (!) 108 29 96 % --   08/08/22 0100 122/73 -- -- (!) 110 27 96 % --   08/08/22 0059 -- -- -- (!) 111 (!) 34 92 % --        72HR INTAKE/OUTPUT:    Intake/Output Summary (Last 24 hours) at 8/8/2022 0835  Last data filed at 8/8/2022 0755  Gross per 24 hour   Intake 4469.03 ml   Output 1012 ml   Net 3457.03 ml       Exam:    Gen:  intubated and sedated  Eyes: PERRL. No sclera icterus. No conjunctival injection. ENT: No discharge. Pharynx clear. External appearance of ears and nose normal.  Neck: Trachea midline. No obvious mass. Resp: No accessory muscle use. No crackles. No wheezes. No rhonchi. CV: Regular rate. Regular rhythm. No murmur or rub. No edema. GI: Non-tender. Non-distended. No hernia. Skin: Warm, dry, normal texture and turgor. Lymph: No cervical LAD. No supraclavicular LAD. M/S: / Ext. No cyanosis. No clubbing. No joint deformity. Neuro: CN 2-12 are intact,  no neurologic deficits noted.     PT/INR:   Recent Labs     08/06/22  0445   PROTIME 16.4*   INR 1.33*     APTT: No results for input(s): APTT in the last 72 hours. CBC:   Recent Labs     08/05/22  1427 08/06/22  0445 08/07/22  0830 08/08/22  0438   WBC 10.5 11.0 17.9* 15.5*   HGB 14.2 11.5* 12.4* 11.6*   HCT 42.4 34.8* 38.2* 35.4*   MCV 96.2 95.4 96.5 96.7    372 448 418       BMP:   Recent Labs     08/05/22  1427 08/06/22  0445 08/08/22  0438   * 134* 138   K 3.8 3.2* 4.0   CL 97* 98* 101   CO2 24 20* 24   BUN 18 21* 48*   CREATININE 1.1 0.8 2.7*       LIVER PROFILE:   Recent Labs     08/05/22 1427 08/06/22  0445   ALKPHOS 98 72   AST 27 22   ALT 27 23   BILITOT 0.6 0.7     No results for input(s): AMYLASE in the last 72 hours. No results for input(s): LIPASE in the last 72 hours. UA:No results for input(s): NITRITE, LABCAST, WBCUA, RBCUA, MUCUS in the last 72 hours. TROPONIN:   Recent Labs     08/05/22 1427 08/07/22 0830   TROPONINI <0.01 0.22*       No results found for: URRFLXCULT    Recent Labs     08/05/22  1427   TSHREFLEX 12.18*       No components found for: [id number]  POC GLUCOSE:    Recent Labs     08/07/22  0752   POCGLU 99     No results for input(s): LABA1C in the last 72 hours. No results found for: LABA1C      ASSESSMENT AND PLAN      Acute respiratory failure  Intubated 8/8  Full vent support  Aspiration pna    Partial Bowel obstruction  - CT: reveal small bowel obstruction   Repeat ct abdomen with contrast.  Large hiatal hernia containing much of the stomach which is distended.   -OG in place  General surgery is following    Hypotention  On pressors  Hold metoprolol and lisinopril    Hiatal hernia  - CT chest/abd/pelv: Large hiatal hernia containing much of the stomach which is distended       Atrial Fibrillation  - currently rate controlled  - hold metoprolol  will give lovenox instead of eliquis       HTN  - monitor blood pressure  - restart home meds when able        Code Status: Full Code        Dispo - cc        The patient and / or the family were informed of the results of any tests, a time was given to answer questions, a plan was proposed and they agreed with plan. Rafiq Rucker MD    This note was transcribed using [id number] [employer]. Please disregard any translational errors.

## 2022-08-08 NOTE — CONSULTS
Comprehensive Nutrition Assessment    Type and Reason for Visit:  Initial, Consult (New vent)    Nutrition Recommendations/Plan:   Continue NPO  Monitor propofol rates (currently at 28.4ml/hr providing 750 kcals daily)  Monitor for NG to suction for SBO. Once resolved, RD to recommend nutrition support while intubated     Malnutrition Assessment:  Malnutrition Status: At risk for malnutrition (Comment) (08/08/22 1010)    Context:  Acute Illness       Nutrition Assessment:    RD consult for mechanical vent. Pt admitted with chest pains. Found to have partial small bowel obstruction. Unable to place NG tube previously. Rapid response called yesterday for hypoxia and chest pain. Pt was intubated this morning. Sedated on fentanyl and propofol. Propofol at 28.4ml/hr provides 750 kcals daily. Possible NG insertion for suction. Will monitor for nutrition when appropriate. Nutrition Related Findings:    Glucose 138. Phos 6.4. Distended, round abdomen. +BM 8/6. No edema. Wound Type: None       Current Nutrition Intake & Therapies:    Average Meal Intake: NPO  Average Supplements Intake: NPO  Diet NPO  Additional Calorie Sources:  Propofol at 28.4 ml/hr provides 750 kcals daily. Anthropometric Measures:  Height: 5' 7\" (170.2 cm)  Ideal Body Weight (IBW): 148 lbs (67 kg)    Admission Body Weight: 340 lb (154.2 kg)  Current Body Weight: 348 lb (157.9 kg), 235.1 % IBW.  Weight Source: Bed Scale  Current BMI (kg/m2): 54.5  Weight Adjustment For: No Adjustment  BMI Categories: Obese Class 3 (BMI 40.0 or greater)    Estimated Daily Nutrient Needs:  Energy Requirements Based On: Kcal/kg  Weight Used for Energy Requirements: Ideal  Energy (kcal/day): 8112-5571 (22-25kcal/67kg)  Weight Used for Protein Requirements: Ideal  Protein (g/day): 134-168 (2-2.5kcal/67kg)  Method Used for Fluid Requirements: Other (Comment)  Fluid (ml/day): per provider    Nutrition Diagnosis:   Inadequate oral intake related to impaired respiratory

## 2022-08-09 PROBLEM — K56.609 SMALL BOWEL OBSTRUCTION (HCC): Status: ACTIVE | Noted: 2022-08-09

## 2022-08-09 LAB
ANION GAP SERPL CALCULATED.3IONS-SCNC: 16 MMOL/L (ref 3–16)
BASE EXCESS ARTERIAL: -2.8 MMOL/L (ref -3–3)
BASOPHILS ABSOLUTE: 0 K/UL (ref 0–0.2)
BASOPHILS RELATIVE PERCENT: 0.1 %
BUN BLDV-MCNC: 47 MG/DL (ref 7–20)
CALCIUM SERPL-MCNC: 7.5 MG/DL (ref 8.3–10.6)
CARBOXYHEMOGLOBIN ARTERIAL: 0.8 % (ref 0–1.5)
CHLORIDE BLD-SCNC: 101 MMOL/L (ref 99–110)
CO2: 18 MMOL/L (ref 21–32)
CREAT SERPL-MCNC: 2.6 MG/DL (ref 0.8–1.3)
EOSINOPHILS ABSOLUTE: 0 K/UL (ref 0–0.6)
EOSINOPHILS RELATIVE PERCENT: 0 %
GFR AFRICAN AMERICAN: 31
GFR NON-AFRICAN AMERICAN: 25
GLUCOSE BLD-MCNC: 100 MG/DL (ref 70–99)
GLUCOSE BLD-MCNC: 105 MG/DL (ref 70–99)
GLUCOSE BLD-MCNC: 112 MG/DL (ref 70–99)
GLUCOSE BLD-MCNC: 128 MG/DL (ref 70–99)
GLUCOSE BLD-MCNC: 131 MG/DL (ref 70–99)
GLUCOSE BLD-MCNC: 133 MG/DL (ref 70–99)
GLUCOSE BLD-MCNC: 138 MG/DL (ref 70–99)
HCO3 ARTERIAL: 22.7 MMOL/L (ref 21–29)
HCT VFR BLD CALC: 26.8 % (ref 40.5–52.5)
HEMOGLOBIN, ART, EXTENDED: 10.5 G/DL (ref 13.5–17.5)
HEMOGLOBIN: 8.9 G/DL (ref 13.5–17.5)
LYMPHOCYTES ABSOLUTE: 0.4 K/UL (ref 1–5.1)
LYMPHOCYTES RELATIVE PERCENT: 4 %
MAGNESIUM: 2 MG/DL (ref 1.8–2.4)
MCH RBC QN AUTO: 32 PG (ref 26–34)
MCHC RBC AUTO-ENTMCNC: 33.2 G/DL (ref 31–36)
MCV RBC AUTO: 96.6 FL (ref 80–100)
METHEMOGLOBIN ARTERIAL: 0.6 %
MONOCYTES ABSOLUTE: 1 K/UL (ref 0–1.3)
MONOCYTES RELATIVE PERCENT: 9.6 %
NEUTROPHILS ABSOLUTE: 8.7 K/UL (ref 1.7–7.7)
NEUTROPHILS RELATIVE PERCENT: 86.3 %
O2 SAT, ARTERIAL: 97.2 %
O2 THERAPY: ABNORMAL
PCO2 ARTERIAL: 41.7 MMHG (ref 35–45)
PDW BLD-RTO: 16.9 % (ref 12.4–15.4)
PERFORMED ON: ABNORMAL
PH ARTERIAL: 7.34 (ref 7.35–7.45)
PHOSPHORUS: 3.1 MG/DL (ref 2.5–4.9)
PLATELET # BLD: 338 K/UL (ref 135–450)
PMV BLD AUTO: 7 FL (ref 5–10.5)
PO2 ARTERIAL: 85.8 MMHG (ref 75–108)
POTASSIUM SERPL-SCNC: 3.4 MMOL/L (ref 3.5–5.1)
PRO-BNP: 797 PG/ML (ref 0–124)
RBC # BLD: 2.78 M/UL (ref 4.2–5.9)
SODIUM BLD-SCNC: 135 MMOL/L (ref 136–145)
SODIUM URINE: <20 MMOL/L
TCO2 ARTERIAL: 24 MMOL/L
WBC # BLD: 10.1 K/UL (ref 4–11)

## 2022-08-09 PROCEDURE — 85025 COMPLETE CBC W/AUTO DIFF WBC: CPT

## 2022-08-09 PROCEDURE — 80048 BASIC METABOLIC PNL TOTAL CA: CPT

## 2022-08-09 PROCEDURE — 2580000003 HC RX 258: Performed by: INTERNAL MEDICINE

## 2022-08-09 PROCEDURE — 6360000002 HC RX W HCPCS: Performed by: NURSE PRACTITIONER

## 2022-08-09 PROCEDURE — 2580000003 HC RX 258: Performed by: NURSE PRACTITIONER

## 2022-08-09 PROCEDURE — 88305 TISSUE EXAM BY PATHOLOGIST: CPT

## 2022-08-09 PROCEDURE — 6370000000 HC RX 637 (ALT 250 FOR IP): Performed by: NURSE PRACTITIONER

## 2022-08-09 PROCEDURE — 82803 BLOOD GASES ANY COMBINATION: CPT

## 2022-08-09 PROCEDURE — 94761 N-INVAS EAR/PLS OXIMETRY MLT: CPT

## 2022-08-09 PROCEDURE — 36600 WITHDRAWAL OF ARTERIAL BLOOD: CPT

## 2022-08-09 PROCEDURE — APPNB15 APP NON BILLABLE TIME 0-15 MINS: Performed by: NURSE PRACTITIONER

## 2022-08-09 PROCEDURE — 6370000000 HC RX 637 (ALT 250 FOR IP): Performed by: INTERNAL MEDICINE

## 2022-08-09 PROCEDURE — 0DB98ZX EXCISION OF DUODENUM, VIA NATURAL OR ARTIFICIAL OPENING ENDOSCOPIC, DIAGNOSTIC: ICD-10-PCS | Performed by: INTERNAL MEDICINE

## 2022-08-09 PROCEDURE — 83735 ASSAY OF MAGNESIUM: CPT

## 2022-08-09 PROCEDURE — 3609017100 HC EGD: Performed by: INTERNAL MEDICINE

## 2022-08-09 PROCEDURE — 94003 VENT MGMT INPAT SUBQ DAY: CPT

## 2022-08-09 PROCEDURE — 2700000000 HC OXYGEN THERAPY PER DAY

## 2022-08-09 PROCEDURE — 83880 ASSAY OF NATRIURETIC PEPTIDE: CPT

## 2022-08-09 PROCEDURE — 84300 ASSAY OF URINE SODIUM: CPT

## 2022-08-09 PROCEDURE — APPNB45 APP NON BILLABLE 31-45 MINUTES: Performed by: PHYSICIAN ASSISTANT

## 2022-08-09 PROCEDURE — 2500000003 HC RX 250 WO HCPCS: Performed by: INTERNAL MEDICINE

## 2022-08-09 PROCEDURE — 94640 AIRWAY INHALATION TREATMENT: CPT

## 2022-08-09 PROCEDURE — 84100 ASSAY OF PHOSPHORUS: CPT

## 2022-08-09 PROCEDURE — APPSS45 APP SPLIT SHARED TIME 31-45 MINUTES: Performed by: PHYSICIAN ASSISTANT

## 2022-08-09 PROCEDURE — 2709999900 HC NON-CHARGEABLE SUPPLY: Performed by: INTERNAL MEDICINE

## 2022-08-09 PROCEDURE — 3609012400 HC EGD TRANSORAL BIOPSY SINGLE/MULTIPLE: Performed by: INTERNAL MEDICINE

## 2022-08-09 PROCEDURE — 3609013300 HC EGD TUBE PLACEMENT: Performed by: INTERNAL MEDICINE

## 2022-08-09 PROCEDURE — 36592 COLLECT BLOOD FROM PICC: CPT

## 2022-08-09 PROCEDURE — 2000000000 HC ICU R&B

## 2022-08-09 PROCEDURE — 2500000003 HC RX 250 WO HCPCS: Performed by: NURSE PRACTITIONER

## 2022-08-09 PROCEDURE — 99291 CRITICAL CARE FIRST HOUR: CPT | Performed by: INTERNAL MEDICINE

## 2022-08-09 RX ORDER — CALCIUM GLUCONATE 20 MG/ML
1000 INJECTION, SOLUTION INTRAVENOUS ONCE
Status: COMPLETED | OUTPATIENT
Start: 2022-08-09 | End: 2022-08-09

## 2022-08-09 RX ORDER — POTASSIUM CHLORIDE 29.8 MG/ML
20 INJECTION INTRAVENOUS
Status: COMPLETED | OUTPATIENT
Start: 2022-08-09 | End: 2022-08-09

## 2022-08-09 RX ADMIN — CHLORHEXIDINE GLUCONATE 0.12% ORAL RINSE 15 ML: 1.2 LIQUID ORAL at 20:35

## 2022-08-09 RX ADMIN — Medication 100 MCG/HR: at 16:00

## 2022-08-09 RX ADMIN — SODIUM CHLORIDE, PRESERVATIVE FREE 10 ML: 5 INJECTION INTRAVENOUS at 20:35

## 2022-08-09 RX ADMIN — IPRATROPIUM BROMIDE AND ALBUTEROL SULFATE 1 AMPULE: 2.5; .5 SOLUTION RESPIRATORY (INHALATION) at 16:31

## 2022-08-09 RX ADMIN — PIPERACILLIN AND TAZOBACTAM 4500 MG: 4; .5 INJECTION, POWDER, LYOPHILIZED, FOR SOLUTION INTRAVENOUS at 15:59

## 2022-08-09 RX ADMIN — SODIUM CHLORIDE, POTASSIUM CHLORIDE, SODIUM LACTATE AND CALCIUM CHLORIDE: 600; 310; 30; 20 INJECTION, SOLUTION INTRAVENOUS at 02:01

## 2022-08-09 RX ADMIN — IPRATROPIUM BROMIDE AND ALBUTEROL SULFATE 1 AMPULE: 2.5; .5 SOLUTION RESPIRATORY (INHALATION) at 12:26

## 2022-08-09 RX ADMIN — PROPOFOL 20 MCG/KG/MIN: 10 INJECTION, EMULSION INTRAVENOUS at 12:14

## 2022-08-09 RX ADMIN — Medication 2 MCG/MIN: at 16:41

## 2022-08-09 RX ADMIN — CHLORHEXIDINE GLUCONATE 0.12% ORAL RINSE 15 ML: 1.2 LIQUID ORAL at 09:38

## 2022-08-09 RX ADMIN — SODIUM CHLORIDE, POTASSIUM CHLORIDE, SODIUM LACTATE AND CALCIUM CHLORIDE: 600; 310; 30; 20 INJECTION, SOLUTION INTRAVENOUS at 18:58

## 2022-08-09 RX ADMIN — SODIUM CHLORIDE, PRESERVATIVE FREE 10 ML: 5 INJECTION INTRAVENOUS at 09:38

## 2022-08-09 RX ADMIN — Medication 15 MCG/MIN: at 11:55

## 2022-08-09 RX ADMIN — CALCIUM GLUCONATE 1000 MG: 20 INJECTION, SOLUTION INTRAVENOUS at 12:05

## 2022-08-09 RX ADMIN — IPRATROPIUM BROMIDE AND ALBUTEROL SULFATE 1 AMPULE: 2.5; .5 SOLUTION RESPIRATORY (INHALATION) at 09:00

## 2022-08-09 RX ADMIN — PROPOFOL 25 MCG/KG/MIN: 10 INJECTION, EMULSION INTRAVENOUS at 18:05

## 2022-08-09 RX ADMIN — PROPOFOL 20 MCG/KG/MIN: 10 INJECTION, EMULSION INTRAVENOUS at 06:07

## 2022-08-09 RX ADMIN — PROPOFOL 40 MCG/KG/MIN: 10 INJECTION, EMULSION INTRAVENOUS at 21:58

## 2022-08-09 RX ADMIN — PIPERACILLIN AND TAZOBACTAM 4500 MG: 4; .5 INJECTION, POWDER, LYOPHILIZED, FOR SOLUTION INTRAVENOUS at 00:08

## 2022-08-09 RX ADMIN — POTASSIUM CHLORIDE 20 MEQ: 29.8 INJECTION, SOLUTION INTRAVENOUS at 09:36

## 2022-08-09 RX ADMIN — POTASSIUM CHLORIDE 20 MEQ: 29.8 INJECTION, SOLUTION INTRAVENOUS at 10:39

## 2022-08-09 RX ADMIN — Medication 50 MCG/HR: at 01:24

## 2022-08-09 RX ADMIN — ENOXAPARIN SODIUM 40 MG: 100 INJECTION SUBCUTANEOUS at 20:34

## 2022-08-09 RX ADMIN — ENOXAPARIN SODIUM 40 MG: 100 INJECTION SUBCUTANEOUS at 08:15

## 2022-08-09 RX ADMIN — PIPERACILLIN AND TAZOBACTAM 4500 MG: 4; .5 INJECTION, POWDER, LYOPHILIZED, FOR SOLUTION INTRAVENOUS at 08:11

## 2022-08-09 RX ADMIN — PROPOFOL 30 MCG/KG/MIN: 10 INJECTION, EMULSION INTRAVENOUS at 01:21

## 2022-08-09 RX ADMIN — IPRATROPIUM BROMIDE AND ALBUTEROL SULFATE 1 AMPULE: 2.5; .5 SOLUTION RESPIRATORY (INHALATION) at 20:42

## 2022-08-09 RX ADMIN — FAMOTIDINE 20 MG: 10 INJECTION, SOLUTION INTRAVENOUS at 09:38

## 2022-08-09 RX ADMIN — Medication 200 MCG/HR: at 23:58

## 2022-08-09 ASSESSMENT — PULMONARY FUNCTION TESTS
PIF_VALUE: 28
PIF_VALUE: 25
PIF_VALUE: 22
PIF_VALUE: 27
PIF_VALUE: 27
PIF_VALUE: 23
PIF_VALUE: 26
PIF_VALUE: 26
PIF_VALUE: 28
PIF_VALUE: 25
PIF_VALUE: 26
PIF_VALUE: 31
PIF_VALUE: 29
PIF_VALUE: 27
PIF_VALUE: 21
PIF_VALUE: 26
PIF_VALUE: 26
PIF_VALUE: 30
PIF_VALUE: 21
PIF_VALUE: 28
PIF_VALUE: 27
PIF_VALUE: 26
PIF_VALUE: 27
PIF_VALUE: 30
PIF_VALUE: 24
PIF_VALUE: 26
PIF_VALUE: 28
PIF_VALUE: 27
PIF_VALUE: 30
PIF_VALUE: 24
PIF_VALUE: 26
PIF_VALUE: 27
PIF_VALUE: 21
PIF_VALUE: 32
PIF_VALUE: 29
PIF_VALUE: 29
PIF_VALUE: 22
PIF_VALUE: 29
PIF_VALUE: 27
PIF_VALUE: 23
PIF_VALUE: 26
PIF_VALUE: 24
PIF_VALUE: 24
PIF_VALUE: 28
PIF_VALUE: 26
PIF_VALUE: 27
PIF_VALUE: 26
PIF_VALUE: 27
PIF_VALUE: 22
PIF_VALUE: 28
PIF_VALUE: 31
PIF_VALUE: 28
PIF_VALUE: 23
PIF_VALUE: 29
PIF_VALUE: 21
PIF_VALUE: 28
PIF_VALUE: 29
PIF_VALUE: 26
PIF_VALUE: 30
PIF_VALUE: 23
PIF_VALUE: 31
PIF_VALUE: 28
PIF_VALUE: 29
PIF_VALUE: 23
PIF_VALUE: 28
PIF_VALUE: 26
PIF_VALUE: 24
PIF_VALUE: 28
PIF_VALUE: 24
PIF_VALUE: 31
PIF_VALUE: 28
PIF_VALUE: 28
PIF_VALUE: 26
PIF_VALUE: 25
PIF_VALUE: 29
PIF_VALUE: 26
PIF_VALUE: 21
PIF_VALUE: 29
PIF_VALUE: 26
PIF_VALUE: 29
PIF_VALUE: 26
PIF_VALUE: 28
PIF_VALUE: 29

## 2022-08-09 ASSESSMENT — PAIN SCALES - GENERAL
PAINLEVEL_OUTOF10: 0

## 2022-08-09 NOTE — PROGRESS NOTES
Admit Date: 8/5/2022      REASON FOR ADMISSION:   Patient with abdominal pain, nausea, vomiting,  found to have some bowel obstruction. REASON for  follow up  Acute kidney injury with creatinine jumped from 0.8 to a current creatinine of 2.7,  most likely ATN due to episode of severe hypotension where his blood pressure dropped 78/44. was also on lisinopril. Lisinopril is being stopped now. INTERVAL HISTORY  Intubated/sedated  BP 93/64  On levo  Urine 950   Creat 2.7--.2.6  BNP 2277-->797  Urine SG  >1.030. Brother here   Cortisol normal        PLAN   OFF levo but Urine out put dropping  Resume Levo for   Increase Ringers lactate 200 ml  Still on 70 % FIO2  PRO BNP lower   Urine Na pending   Discussed with nurse       Acute kidney injury   creatinine on admission 1   next day on 08/06/2022 was 0.8  suddenly jumped up to 2.7. Due to episode of severe hypotension with blood pressure drop 78/44. also got CT with IV contrast on 08/05/2022 but most likely  this is ATN related due to hypotension and hypercapnic  respiratory failure. White cell count is up could be septic. On Levophed    IV fluids. He just got a bolus as ordered by Dr. Trinh Salmon, intensivist and currently on Ringer's lactate 150 mL an hour. CT angio s normal renal arteries and normal kidneys    check BNP  Urine sodium   repeat urinalysis   Contine with fluids Cautiously       Sepsis/ possible septic shock. emperature was 101.1. High white cell count. '  immunocompromised due to rheumatoid arthritis and treatment. Being treated with antibiotics. History of sleep apnea, on CPAP. Acute respiratory failure with high pCO2 in the range of 50s with pH  of 7.24    now intubated. History of abnormal stress test in 06/2022 with stress-induced  ischemia and some scarring. Ejection fraction  was  64%.     had a followup angiogram which was reported as normal as per wife.   I do not have that result     History of rheumatoid arthritis longstanding,  was on methotrexate,  has taken Celebrex and now on Inflectra infusions,  so he is immunocompromised. Roberto Carlos Burgos is now helping him with his rheumatoid tremendously. he has been on steroids in the past.     Rule out adrenal insufficiency. Check cortisol level. Give  dose of dexamethasone as a stress dose because of hypotension and possible adrenal insufficiency. I have added cortisol level in the morning lab. History of thyroidectomy in the past. .    Small bowel obstruction. Large hiatus hernia with colon and stomach in the chest.  Further management as per medical team.     HISTORY OF PRESENTING COMPLAINT:  A 45-year-old  gentleman seen  in ICU where he was intubated, sedated. His wife is present. Gentleman  who was a retired . No smoking or alcohol. Three children, in  good health. No family history of kidney disease with known history of  rheumatoid arthritis for the last many years. Many treatments with  methotrexate to Celebrex in the past and now on Inflectra IV infusion  under the care of Dr. Vianney Hodge at [address] [place] [address]. History of atrial fibrillation,  previous normal coronary angiogram in 2022, came with the above  complaints, found some bowel obstruction on CT, transition point, mid  small bowel. He was known to have a large paraesophageal hernia with  herniation in the stomach and colon in it. He was transferred to ICU on 08/07/2022 because of respiratory distress  and hypercapnia and was eventually intubated. He was known to have  obstructive sleep apnea and is on CPAP. REVIEW OF SYSTEMS:    Not much available except above discussed with the  wife who was present. Having abdominal pain, some nausea, vomiting,  poor intake for the last week or so. No fever. No chills. CARDIAC:  No chest pain. PULMONARY:  No shortness of breath. GENITOURINARY:  No  dysuria or hematuria. VASCULAR:  No claudication.   Review of the rest  of the systems is negative. Objective:     Patient Vitals for the past 8 hrs:   BP Temp Temp src Pulse Resp SpO2 Weight   08/09/22 1015 98/61 -- -- 79 18 96 % --   08/09/22 1000 94/61 -- -- 79 18 95 % --   08/09/22 0945 96/63 -- -- 80 12 96 % --   08/09/22 0930 97/61 -- -- 81 19 95 % --   08/09/22 0915 98/62 -- -- 81 15 96 % --   08/09/22 0900 (!) 95/59 -- -- 79 17 97 % --   08/09/22 0845 103/63 -- -- 80 16 97 % --   08/09/22 0830 (!) 121/90 -- -- 79 15 95 % --   08/09/22 0815 104/66 -- -- 85 14 96 % --   08/09/22 0800 109/67 -- -- 79 15 97 % --   08/09/22 0745 104/69 97.5 °F (36.4 °C) Axillary 79 14 98 % --   08/09/22 0730 101/65 -- -- 76 15 97 % --   08/09/22 0715 97/63 -- -- 76 16 97 % --   08/09/22 0700 97/64 -- -- 76 18 97 % --   08/09/22 0630 97/63 -- -- 76 15 97 % --   08/09/22 0615 98/60 -- -- 77 17 97 % --   08/09/22 0600 97/60 -- -- 78 16 97 % --   08/09/22 0545 (!) 94/58 -- -- 78 17 97 % --   08/09/22 0530 (!) 94/56 -- -- 78 15 97 % --   08/09/22 0515 (!) 91/56 -- -- 80 16 98 % --   08/09/22 0500 (!) 97/55 -- -- 81 15 97 % --   08/09/22 0445 98/63 -- -- 78 15 97 % --   08/09/22 0430 103/62 -- -- 81 15 97 % --   08/09/22 0415 104/62 98.1 °F (36.7 °C) Axillary 85 15 97 % (!) 354 lb 6.4 oz (160.8 kg)   08/09/22 0400 105/72 -- -- 83 15 96 % --   08/09/22 0348 -- -- -- 82 15 97 % --   08/09/22 0345 106/74 -- -- 77 15 97 % --   08/09/22 0340 -- -- -- 79 18 97 % --   08/09/22 0330 100/68 -- -- 80 17 97 % --       I/O last 3 completed shifts: In: 6583.1 [I.V.:6115.7; IV Piggyback:467.4]  Out: 1406 [QBTCW:0832]        General appearance:  GENERAL:  On examination, obese gentleman with a BMI of 54, sedated,  intubated. HEENT:  Head:  Normocephalic, atraumatic. Conjunctivae:  No icterus. CARDIAC:  Normal muffled heart sounds. NECK:  Difficult to see, very short neck. CHEST:  Reduced breath sounds. ABDOMEN:  Distended but soft, nontender. Bowel sounds absent.   EXTREMITIES:  Bilateral lower extremities, he has no edema.  NEUROLOGICAL:  Hard to assess. Sahra Volodymyr bird  Lab Results   Component Value Date    CREATININE 2.6 (H) 08/09/2022    BUN 47 (H) 08/09/2022     (L) 08/09/2022    K 3.4 (L) 08/09/2022     08/09/2022    CO2 18 (L) 08/09/2022     Lab Results   Component Value Date    WBC 10.1 08/09/2022    HGB 8.9 (L) 08/09/2022    HCT 26.8 (L) 08/09/2022    MCV 96.6 08/09/2022     08/09/2022            AGLUCOSE)Magnesium:    Lab Results   Component Value Date/Time    MG 2.00 08/09/2022 04:16 AM     Phosphorus:    Lab Results   Component Value Date/Time    PHOS 3.1 08/09/2022 04:16 AM       Uric Acid:  No components found for: URIC    Principal Problem:    Partial bowel obstruction (HCC)  Active Problems:    Hiatal hernia    Acute respiratory failure with hypoxia and hypercapnia (HCC)    SBO (small bowel obstruction) (HCC)    Aspiration into airway    Acute respiratory failure with hypoxia (HCC)    Small bowel obstruction (HCC)  Resolved Problems:    * No resolved hospital problems.  *

## 2022-08-09 NOTE — PLAN OF CARE
Problem: Discharge Planning  Goal: Discharge to home or other facility with appropriate resources  Outcome: Progressing  Flowsheets (Taken 8/8/2022 1930)  Discharge to home or other facility with appropriate resources: Identify barriers to discharge with patient and caregiver     Problem: Pain  Goal: Verbalizes/displays adequate comfort level or baseline comfort level  Outcome: Progressing  Flowsheets (Taken 8/8/2022 1930)  Verbalizes/displays adequate comfort level or baseline comfort level:   Assess pain using appropriate pain scale   Administer analgesics based on type and severity of pain and evaluate response     Problem: ABCDS Injury Assessment  Goal: Absence of physical injury  Outcome: Progressing  Flowsheets (Taken 8/9/2022 0453)  Absence of Physical Injury: Implement safety measures based on patient assessment     Problem: Safety - Adult  Goal: Free from fall injury  Outcome: Progressing  Flowsheets (Taken 8/9/2022 0453)  Free From Fall Injury: Instruct family/caregiver on patient safety     Problem: Skin/Tissue Integrity  Goal: Absence of new skin breakdown  Description: 1. Monitor for areas of redness and/or skin breakdown  2. Assess vascular access sites hourly  3. Every 4-6 hours minimum:  Change oxygen saturation probe site  4. Every 4-6 hours:  If on nasal continuous positive airway pressure, respiratory therapy assess nares and determine need for appliance change or resting period. Outcome: Progressing     Problem: Safety - Medical Restraint  Goal: Remains free of injury from restraints (Restraint for Interference with Medical Device)  Description: INTERVENTIONS:  1. Determine that other, less restrictive measures have been tried or would not be effective before applying the restraint  2. Evaluate the patient's condition at the time of restraint application  3. Inform patient/family regarding the reason for restraint  4.  Q2H: Monitor safety, psychosocial status, comfort, nutrition and hydration  Outcome: Progressing  Flowsheets (Taken 8/8/2022 2000)  Remains free of injury from restraints (restraint for interference with medical device): Determine that other, less restrictive measures have been tried or would not be effective before applying the restraint     Problem: Nutrition Deficit:  Goal: Optimize nutritional status  Outcome: Progressing

## 2022-08-09 NOTE — OP NOTE
Endoscopy Note    Patient: Evy Graves  : 1960  Acct#:     Procedure: Esophagogastroduodenoscopy with placement of NG tube  EGD with biopsy    Date:  2022     Surgeon:  Stuart Lemus MD, MD    Referring Physician:  Dr. Trinh Dasilva    Indications: This is a 63y.o. year old male who presents today with paraesophageal hernia with inability to place NG tube for small bowel obstruction. Asked for EGD for NG placement. Anesthesia:  Patient sedated in ICU already on a propofol drip    Description of Procedure:  Informed consent was obtained from the patient after explanation of indications, benefits and possible risks and complications of the procedure. The patient was then taken to the endoscopy suite, placed in the left lateral decubitus position and the above IV sedation was administrered. The Olympus videoendoscope was placed in the patient's mouth and under direct visualization passed into the esophagus and advanced without difficulty to the 2nd portion of the duodenum. Views were good, patient toleration was good. Retroflexion was performed in the stomach. Findings:  1. The esophagus appeared normal without evidence of Oviedo's esophagus or reflux esophagitis. 2.  There was a large hiatal hernia. No Christiano's erosions. 3.  Normal stomach. 4.  1cm soft subepithelial nodule with smooth overlying surface. Pinhole biopsies. Subepithelial fat was identified so this is likely a lipoma. 5. Otherwise normal EGD. I could not get a [place] sump through either nare. As a result, I placed it into the oropharynx and advanced it into the esophagus and used the EGD scope to pull it down into the stomach and pulled the scope out leaving the tube in place. Air was flushed through the tube while listening over the stomach confirming placement after EGD scope removal.    The scope was then withdrawn back into the stomach, it was decompressed, and the scope was completely withdrawn.     The patient tolerated the procedure well and was taken to the post anesthesia care unit in good condition. Estimated blood loss: minimal  Specimens taken: none    Impression:    Large hiatal hernia  1cm probable lipoma 2nd portion of the duodenum. Otherwise normal EGD. Could not get [place] sump through either nare so OG tube placed endoscopically. Recommendations:   1.  OG to intermittent wall suction  Will sign off. Please call with questions.     Prema Ftizgerald MD,   [address]

## 2022-08-09 NOTE — PROGRESS NOTES
General and Vascular Surgery                                                           Daily Progress Note                                                             Christi Stevenson PA-C     Pt Name: Aneta Hadley Record Number: [id number]  Date of Birth 1960   Today's Date: 8/9/2022      ASSESSMENT/PLAN   Partial SBO              In ICU for increased work of breathing. Intubated this AM              GI to place NGT once patient is off pressors              CHARLENE: nephrology following   Leukocytosis improved: WBC count: 17.9 --> 15.5-->10.1  SUBJECTIVE  Miles is unchanged from yesterday. Pain is well controlled. Intubated. OBJECTIVE  VITALS:  height is 5' 7\" (1.702 m) and weight is 354 lb 6.4 oz (160.8 kg) (abnormal). His axillary temperature is 97.5 °F (36.4 °C). His blood pressure is 98/62 and his pulse is 81. His respiration is 15 and oxygen saturation is 96%. VITALS:  BP 98/62   Pulse 81   Temp 97.5 °F (36.4 °C) (Axillary)   Resp 15   Ht 5' 7\" (1.702 m)   Wt (!) 354 lb 6.4 oz (160.8 kg)   SpO2 96%   BMI 55.51 kg/m²   GENERAL: Intubated  ABDOMEN: soft and nontender. Distention improved  I/O last 3 completed shifts:   In: 6583.1 [I.V.:6115.7; IV Piggyback:467.4]  Out: 1406 [ZHPCQ:5143]  I/O this shift:  In: 347.1 [I.V.:346.4; IV Piggyback:0.7]  Out: 100 [Urine:100]    LABS  Recent Labs     08/08/22  1621 08/09/22  0416   WBC  --  10.1   HGB  --  8.9*   HCT  --  26.8*   PLT  --  338   NA  --  135*   K  --  3.4*   CL  --  101   CO2  --  18*   BUN  --  47*   CREATININE  --  2.6*   MG  --  2.00   PHOS  --  3.1   CALCIUM  --  7.5*   NITRU Negative  --    COLORU SIERRA*  --    BACTERIA 3+*  --      CBC:   Lab Results   Component Value Date/Time    WBC 10.1 08/09/2022 04:16 AM    RBC 2.78 08/09/2022 04:16 AM    HGB 8.9 08/09/2022 04:16 AM    HCT 26.8 08/09/2022 04:16 AM    MCV 96.6 08/09/2022 04:16 AM    MCH 32.0 08/09/2022 04:16 AM MCHC 33.2 08/09/2022 04:16 AM    RDW 16.9 08/09/2022 04:16 AM     08/09/2022 04:16 AM    MPV 7.0 08/09/2022 04:16 AM     CMP:    Lab Results   Component Value Date/Time     08/09/2022 04:16 AM    K 3.4 08/09/2022 04:16 AM    K 3.2 08/06/2022 04:45 AM     08/09/2022 04:16 AM    CO2 18 08/09/2022 04:16 AM    BUN 47 08/09/2022 04:16 AM    CREATININE 2.6 08/09/2022 04:16 AM    GFRAA 31 08/09/2022 04:16 AM    AGRATIO 0.8 08/06/2022 04:45 AM    LABGLOM 25 08/09/2022 04:16 AM    GLUCOSE 133 08/09/2022 04:16 AM    PROT 6.3 08/06/2022 04:45 AM    LABALBU 2.8 08/06/2022 04:45 AM    CALCIUM 7.5 08/09/2022 04:16 AM    BILITOT 0.7 08/06/2022 04:45 AM    ALKPHOS 72 08/06/2022 04:45 AM    AST 22 08/06/2022 04:45 AM    ALT 23 08/06/2022 04:45 AM         Pdero Payan PA-C  Electronically signed 8/9/2022 at 9:44 AM    As above  Stable overall  For EGD to assist with NG placement  Will decompress overnight with plan for CT in Am    Electronically signed by Manuela Berry MD on 8/9/2022 at 12:23 PM

## 2022-08-09 NOTE — PROGRESS NOTES
Pulmonary Critical Care Progress Note     Patient's name:  Aneta Hadley Record Number: [id number]  Patient's account/billing number: [de-identified]  Patient's YOB: 1960  Age: 63 y.o. Date of Admission: 8/5/2022  1:56 PM  Date of Consult: 8/9/2022      Primary Care Physician: Farida Barrios MD      Code Status: Full Code    Chief complaint: acute respiratory failure with hypoxia and hypercapnia     Assessment and Plan     Acute respiratory failure with hypoxia and hypercapnia worsening requiring intubation and mechanical ventilation. Aspiration pneumonia  Partial small bowel obstruction. Acute kidney injury, dehydration and contrast nephropathy  Large hiatal hernia  Obstructive sleep apnea obesity hypoventilation syndrome  Sedation related hypotension      Plan:  Vent support, wean Fio2 as tolerated, keep sat > 90%  Wean off Levophed, keep MAP > 65  EGD planned per GI  Avoid IV contrast  IV hydration. Zosyn for aspiration pneumonia. GI and DVT prophylaxis. Overnight:  On the vent   No acute events overnight    REVIEW OF SYSTEMS:  Review of Systems -   Unable to obtain intubated on MV        Physical Exam:    Vitals: BP 98/61   Pulse 79   Temp 97.5 °F (36.4 °C) (Axillary)   Resp 18   Ht 5' 7\" (1.702 m)   Wt (!) 354 lb 6.4 oz (160.8 kg)   SpO2 96%   BMI 55.51 kg/m²     Last Body weight:   Wt Readings from Last 3 Encounters:   08/09/22 (!) 354 lb 6.4 oz (160.8 kg)       Body Mass Index : Body mass index is 55.51 kg/m². Intake and Output summary:   Intake/Output Summary (Last 24 hours) at 8/9/2022 1029  Last data filed at 8/9/2022 0954  Gross per 24 hour   Intake 5195.5 ml   Output 1089 ml   Net 4106.5 ml         Physical Examination:     Gen: intubated on MV  Eyes: PERRL. Anicteric sclera. No conjunctival injection. ENT: No discharge. Posterior oropharynx clear. External appearance of ears and nose normal.  Neck: Trachea midline.  No mass   Resp: Diminished bilaterally with upper airway wheezing  CV: Regular rate. Regular rhythm. No murmur or rub. No edema. GI: Soft distended no bowel sounds. Skin: Warm, dry, w/o erythema. Lymph: No cervical or supraclavicular LAD. M/S: No cyanosis. No clubbing. Neuro: sedated on MV        Laboratory findings:-    CBC:   Recent Labs     08/09/22  0416   WBC 10.1   HGB 8.9*          BMP:    Recent Labs     08/08/22  0438 08/09/22  0416    135*   K 4.0 3.4*    101   CO2 24 18*   BUN 48* 47*   CREATININE 2.7* 2.6*   GLUCOSE 138* 133*       S. Calcium:  Recent Labs     08/09/22  0416   CALCIUM 7.5*       S. Magnesium:  Recent Labs     08/09/22  0416   MG 2.00       S. Phosphorus:  Recent Labs     08/09/22  0416   PHOS 3.1       S. Glucose:  Recent Labs     08/09/22  0007 08/09/22  0415 08/09/22  0906   POCGLU 131* 138* 112*             Radiology Review:  Pertinent images / reports were reviewed as a part of this visit.     Reviewed     Critical Care time 35 min                Venu Treadwell MD, [facility].            8/9/2022, 10:29 AM

## 2022-08-09 NOTE — PROGRESS NOTES
Hospitalist Progress Note  8/9/2022 8:31 AM    PCP: Lauar Monroy MD    [phone]     Date of Admission: 8/5/2022                                                                                                                     HOSPITAL COURSE    Patient demographics:  The patient  Dori Johnston is a 63 y.o. male     Significant past medical history:   Patient Active Problem List   Diagnosis    Partial bowel obstruction ([address]    Hiatal hernia    Acute respiratory failure with hypoxia and hypercapnia (HCC)    Small bowel obstruction (HCC)    Aspiration into airway    Acute respiratory failure with hypoxia ([address]         Presenting symptoms:  abdominal pain    Diagnostic workup:      CONSULTS DURING ADMISSION :   IP CONSULT TO GENERAL SURGERY  IP CONSULT TO HOSPITALIST  IP CONSULT TO GENERAL SURGERY  IP CONSULT TO DIETITIAN      Patient was diagnosed with:  Low grade or Partial Bowel obstruction  Hiatal hernia  Atrial Fibrillation      Treatment while inpatient:  Pt presented to [facility] with 5-7 day hx of abdominal pain followed by acute chest pain with vomiting and diarrhea.                                                                                         ----------------------------------------------------------      SUBJECTIVE COMPLAINTS- follow up for abdominal pain    Diet: Diet NPO      OBJECTIVE:   Patient Active Problem List   Diagnosis    Partial bowel obstruction ([address]    Hiatal hernia    Acute respiratory failure with hypoxia and hypercapnia (HCC)    Small bowel obstruction (HCC)    Aspiration into airway    Acute respiratory failure with hypoxia (HCC)       Allergies  Codeine    Medications    Scheduled Meds:   calcium gluconate-NaCl  1,000 mg IntraVENous Once    potassium chloride  20 mEq IntraVENous Q1H    chlorhexidine  15 mL Mouth/Throat BID    famotidine (PEPCID) injection  20 mg IntraVENous Daily    piperacillin-tazobactam  4,500 mg IntraVENous Q8H    ipratropium-albuterol  1 ampule Inhalation Q4H WA    sodium chloride flush  5-40 mL IntraVENous 2 times per day    [Held by provider] gabapentin  800 mg Oral TID    [Held by provider] levothyroxine  200 mcg Oral Daily    [Held by provider] metoprolol tartrate  25 mg Oral BID    [Held by provider] spironolactone  50 mg Oral Daily    enoxaparin  40 mg SubCUTAneous BID     Continuous Infusions:   fentaNYL 75 mcg/hr (08/09/22 0813)    propofol 20 mcg/kg/min (08/09/22 0813)    norepinephrine Stopped (08/09/22 0804)    sodium chloride      lactated ringers 150 mL/hr at 08/09/22 0813     PRN Meds:  fentaNYL **AND** fentaNYL, acetaminophen, sodium chloride flush, sodium chloride, ondansetron **OR** ondansetron, diazePAM, HYDROmorphone **OR** HYDROmorphone    Vitals   Vitals /wt Patient Vitals for the past 8 hrs:   BP Temp Temp src Pulse Resp SpO2 Weight   08/09/22 0815 104/66 -- -- 85 14 96 % --   08/09/22 0800 109/67 -- -- 79 15 97 % --   08/09/22 0745 104/69 97.5 °F (36.4 °C) Axillary 79 14 98 % --   08/09/22 0730 101/65 -- -- 76 15 97 % --   08/09/22 0715 97/63 -- -- 76 16 97 % --   08/09/22 0700 97/64 -- -- 76 18 97 % --   08/09/22 0630 97/63 -- -- 76 15 97 % --   08/09/22 0615 98/60 -- -- 77 17 97 % --   08/09/22 0600 97/60 -- -- 78 16 97 % --   08/09/22 0545 (!) 94/58 -- -- 78 17 97 % --   08/09/22 0530 (!) 94/56 -- -- 78 15 97 % --   08/09/22 0515 (!) 91/56 -- -- 80 16 98 % --   08/09/22 0500 (!) 97/55 -- -- 81 15 97 % --   08/09/22 0445 98/63 -- -- 78 15 97 % --   08/09/22 0430 103/62 -- -- 81 15 97 % --   08/09/22 0415 104/62 98.1 °F (36.7 °C) Axillary 85 15 97 % (!) 354 lb 6.4 oz (160.8 kg)   08/09/22 0400 105/72 -- -- 83 15 96 % --   08/09/22 0348 -- -- -- 82 15 97 % --   08/09/22 0345 106/74 -- -- 77 15 97 % --   08/09/22 0340 -- -- -- 79 18 97 % --   08/09/22 0330 100/68 -- -- 80 17 97 % --   08/09/22 0315 106/70 -- -- 79 17 97 % --   08/09/22 0300 106/71 -- -- 81 19 97 % --   08/09/22 0245 110/73 -- -- 81 15 97 % --   08/09/22 0230 108/72 -- -- 82 17 97 % --   08/09/22 0215 106/68 -- -- 79 19 97 % --   08/09/22 0200 107/68 -- -- 81 16 97 % --   08/09/22 0145 107/68 -- -- 80 18 97 % --   08/09/22 0130 102/67 -- -- 84 16 96 % --   08/09/22 0115 106/68 -- -- 85 16 96 % --   08/09/22 0100 100/67 -- -- 81 17 96 % --   08/09/22 0045 101/68 -- -- 84 17 96 % --        72HR INTAKE/OUTPUT:    Intake/Output Summary (Last 24 hours) at 8/9/2022 0831  Last data filed at 8/9/2022 0813  Gross per 24 hour   Intake 5998.09 ml   Output 1044 ml   Net 4954.09 ml       Exam:    Gen:  intubated and sedated  Eyes: PERRL. No sclera icterus. No conjunctival injection. ENT: No discharge. Pharynx clear. External appearance of ears and nose normal.  Neck: Trachea midline. No obvious mass. Resp: No accessory muscle use. No crackles. No wheezes. No rhonchi. CV: Regular rate. Regular rhythm. No murmur or rub. No edema. GI: Non-tender. Non-distended. No hernia. Skin: Warm, dry, normal texture and turgor. Lymph: No cervical LAD. No supraclavicular LAD. M/S: / Ext. No cyanosis. No clubbing. No joint deformity. Neuro: CN 2-12 are intact,  no neurologic deficits noted. PT/INR:   No results for input(s): PROTIME, INR in the last 72 hours. APTT: No results for input(s): APTT in the last 72 hours. CBC:   Recent Labs     08/07/22  0830 08/08/22  0438 08/09/22  0416   WBC 17.9* 15.5* 10.1   HGB 12.4* 11.6* 8.9*   HCT 38.2* 35.4* 26.8*   MCV 96.5 96.7 96.6    418 338       BMP:   Recent Labs     08/08/22  0438 08/09/22  0416    135*   K 4.0 3.4*    101   CO2 24 18*   PHOS 6.4* 3.1   BUN 48* 47*   CREATININE 2.7* 2.6*       LIVER PROFILE:   No results for input(s): ALKPHOS, AST, ALT, ALB, BILIDIR, BILITOT, ALKPHOS in the last 72 hours. No results for input(s): AMYLASE in the last 72 hours. No results for input(s): LIPASE in the last 72 hours.     UA:  Recent Labs     08/08/22  1621   WBCUA 10-20*   RBCUA *       TROPONIN:   Recent Labs     08/07/22  0830   TROPONINI 0.22*       No results found for: URRFLXCULT    No results for input(s): TSHREFLEX in the last 72 hours. No components found for: [id number]  POC GLUCOSE:    Recent Labs     08/08/22  1154 08/08/22  1604 08/08/22  1930 08/09/22  0007 08/09/22  0415   POCGLU 138* 131* 131* 131* 138*     No results for input(s): LABA1C in the last 72 hours. No results found for: LABA1C      ASSESSMENT AND PLAN      Acute respiratory failure  Intubated 8/8  Full vent support  Aspiration pna  Ct abx    Partial Bowel obstruction  Repeat ct abdomen with contrast.in am  OG tube in place  Large hiatal hernia containing much of the stomach which is distended. -OG in place  General surgery is following    Hypotention  On pressors  Hold metoprolol and lisinopril  Keep sbp more than 100    Hiatal hernia  - CT chest/abd/pelv: Large hiatal hernia containing much of the stomach which is distended       Atrial Fibrillation  - currently rate controlled  - hold metoprolol  will give lovenox instead of eliquis       HTN  - monitor blood pressure  - restart home meds when able        Code Status: Full Code        Dispo - cc        The patient and / or the family were informed of the results of any tests, a time was given to answer questions, a plan was proposed and they agreed with plan. Merline Portugal, MD    This note was transcribed using [id number]. Please disregard any translational errors.

## 2022-08-09 NOTE — ED PROVIDER NOTES
I independently examined and evaluated eWsley Freire. In brief, marin is a 65yoM who presents to the ED for evaluation of chest pain radiating towards the back. Focused exam revealed non diaphoretic male, soft, non distended abdomen. No focal tenderness. CT shows SBO. Surgery consulted. Hospitalist consulted for admission for further evaluation and treatment. All diagnostic, treatment, and disposition decisions were made by myself in conjunction with the advanced practice provider. I personally saw the patient and performed a substantive portion of the visit including aspects of the medical decision making. Comment: Please note this report has been produced using speech recognition software and may contain errors related to that system including errors in grammar, punctuation, and spelling, as well as words and phrases that may be inappropriate. If there are any questions or concerns please feel free to contact the dictating provider for clarification. For all further details of the patient's emergency department visit, please see the advanced practice provider's documentation.         Job Winter MD  08/09/22 8818

## 2022-08-09 NOTE — H&P
Pre-operative History and Physical    Patient: Rukhsana Nixon  : 1960  Acct#:     HISTORY OF PRESENT ILLNESS:    The patient is a 63 y.o. male who presents with paraesophageal hernia with inability to place NG tube for small bowel obstruction. Asked for EGD for NG placement. Past Medical History:        Diagnosis Date    Arthritis     Atrial fibrillation ([facility]     Hypertension       Past Surgical History:    History reviewed. No pertinent surgical history. Medications Prior to Admission:   No current facility-administered medications on file prior to encounter. Current Outpatient Medications on File Prior to Encounter   Medication Sig Dispense Refill    oxyCODONE (ROXICODONE) 5 MG immediate release tablet Take 10 mg by mouth every 6 hours as needed. pantoprazole (PROTONIX) 40 MG tablet Take 40 mg by mouth in the morning. folic acid (FOLVITE) 1 MG tablet Take 1 mg by mouth in the morning. acetaminophen (TYLENOL) 500 MG tablet Take 1,000 mg by mouth every 8 hours as needed      diazePAM (VALIUM) 5 MG tablet Take 5 mg by mouth 4 times daily as needed. levothyroxine (SYNTHROID) 200 MCG tablet Take 200 mcg by mouth every morning      lisinopril (PRINIVIL;ZESTRIL) 10 MG tablet Take 10 mg by mouth in the morning. metoprolol tartrate (LOPRESSOR) 25 MG tablet Take 25 mg by mouth in the morning and 25 mg before bedtime. spironolactone (ALDACTONE) 50 MG tablet Take 50 mg by mouth in the morning. methotrexate (RHEUMATREX) 2.5 MG chemo tablet Take 15 mg by mouth once a week      celecoxib (CELEBREX) 200 MG capsule Take 200 mg by mouth in the morning and 200 mg before bedtime. ASPIRIN LOW DOSE 81 MG EC tablet Take 81 mg by mouth daily      gabapentin (NEURONTIN) 800 MG tablet Take 800 mg by mouth in the morning and 800 mg at noon and 800 mg before bedtime. loratadine (CLARITIN) 10 MG tablet Take 10 mg by mouth in the morning.           Allergies:  Codeine    Social History:   Social History     Socioeconomic History    Marital status: Unknown     Spouse name: Not on file    Number of children: Not on file    Years of education: Not on file    Highest education level: Not on file   Occupational History    Not on file   Tobacco Use    Smoking status: Never    Smokeless tobacco: Never   Substance and Sexual Activity    Alcohol use: Not Currently    Drug use: Never    Sexual activity: Not Currently   Other Topics Concern    Not on file   Social History Narrative    Not on file     Social Determinants of Health     Financial Resource Strain: Not on file   Food Insecurity: Not on file   Transportation Needs: Not on file   Physical Activity: Not on file   Stress: Not on file   Social Connections: Not on file   Intimate Partner Violence: Not on file   Housing Stability: Not on file      Family History:   History reviewed. No pertinent family history. PHYSICAL EXAM:      /66   Pulse 82   Temp 97.8 °F (36.6 °C) (Axillary)   Resp 12   Ht 5' 7\" (1.702 m)   Wt (!) 354 lb 6.4 oz (160.8 kg)   SpO2 97%   BMI 55.51 kg/m²  I        Heart:  RRR    Lungs:  CTA b    Abdomen:  S/NT/ND/+BS      ASSESSMENT AND PLAN:  ASA: per anesthesia  Mallampati: per anesthesia  1. Patient is a 63 y.o. male here for EGD with NG placement. 2.  Procedure options, risks and benefits reviewed with the patient. The patient expresses understanding.     Rojelio Ibarra

## 2022-08-10 LAB
ADRENOCORTICOTROPIC HORMONE: 23 PG/ML (ref 7–69)
ANION GAP SERPL CALCULATED.3IONS-SCNC: 11 MMOL/L (ref 3–16)
BASE EXCESS ARTERIAL: -4.3 MMOL/L (ref -3–3)
BASOPHILS ABSOLUTE: 0.1 K/UL (ref 0–0.2)
BASOPHILS RELATIVE PERCENT: 0.7 %
BLOOD CULTURE, ROUTINE: ABNORMAL
BUN BLDV-MCNC: 57 MG/DL (ref 7–20)
CALCIUM SERPL-MCNC: 7.9 MG/DL (ref 8.3–10.6)
CARBOXYHEMOGLOBIN ARTERIAL: 0.8 % (ref 0–1.5)
CHLORIDE BLD-SCNC: 102 MMOL/L (ref 99–110)
CO2: 23 MMOL/L (ref 21–32)
CREAT SERPL-MCNC: 2.9 MG/DL (ref 0.8–1.3)
EOSINOPHILS ABSOLUTE: 0.1 K/UL (ref 0–0.6)
EOSINOPHILS RELATIVE PERCENT: 0.6 %
GFR AFRICAN AMERICAN: 27
GFR NON-AFRICAN AMERICAN: 22
GLUCOSE BLD-MCNC: 116 MG/DL (ref 70–99)
GLUCOSE BLD-MCNC: 117 MG/DL (ref 70–99)
GLUCOSE BLD-MCNC: 127 MG/DL (ref 70–99)
GLUCOSE BLD-MCNC: 131 MG/DL (ref 70–99)
GLUCOSE BLD-MCNC: 132 MG/DL (ref 70–99)
GLUCOSE BLD-MCNC: 133 MG/DL (ref 70–99)
HCO3 ARTERIAL: 23.9 MMOL/L (ref 21–29)
HCT VFR BLD CALC: 30.7 % (ref 40.5–52.5)
HEMOGLOBIN, ART, EXTENDED: 10.5 G/DL (ref 13.5–17.5)
HEMOGLOBIN: 10 G/DL (ref 13.5–17.5)
LYMPHOCYTES ABSOLUTE: 0.8 K/UL (ref 1–5.1)
LYMPHOCYTES RELATIVE PERCENT: 6.8 %
MAGNESIUM: 2.5 MG/DL (ref 1.8–2.4)
MCH RBC QN AUTO: 31.5 PG (ref 26–34)
MCHC RBC AUTO-ENTMCNC: 32.6 G/DL (ref 31–36)
MCV RBC AUTO: 96.5 FL (ref 80–100)
METHEMOGLOBIN ARTERIAL: 0.7 %
MONOCYTES ABSOLUTE: 1.3 K/UL (ref 0–1.3)
MONOCYTES RELATIVE PERCENT: 11.4 %
NEUTROPHILS ABSOLUTE: 9.5 K/UL (ref 1.7–7.7)
NEUTROPHILS RELATIVE PERCENT: 80.5 %
O2 SAT, ARTERIAL: 97.5 %
O2 THERAPY: ABNORMAL
ORGANISM: ABNORMAL
PCO2 ARTERIAL: 58.7 MMHG (ref 35–45)
PDW BLD-RTO: 17.1 % (ref 12.4–15.4)
PERFORMED ON: ABNORMAL
PH ARTERIAL: 7.22 (ref 7.35–7.45)
PHOSPHORUS: 4.9 MG/DL (ref 2.5–4.9)
PLATELET # BLD: 431 K/UL (ref 135–450)
PMV BLD AUTO: 6.9 FL (ref 5–10.5)
PO2 ARTERIAL: 101 MMHG (ref 75–108)
POTASSIUM SERPL-SCNC: 4.3 MMOL/L (ref 3.5–5.1)
RBC # BLD: 3.18 M/UL (ref 4.2–5.9)
SODIUM BLD-SCNC: 136 MMOL/L (ref 136–145)
TCO2 ARTERIAL: 25.7 MMOL/L
TRIGL SERPL-MCNC: 217 MG/DL (ref 0–150)
WBC # BLD: 11.8 K/UL (ref 4–11)

## 2022-08-10 PROCEDURE — 84100 ASSAY OF PHOSPHORUS: CPT

## 2022-08-10 PROCEDURE — 6370000000 HC RX 637 (ALT 250 FOR IP): Performed by: INTERNAL MEDICINE

## 2022-08-10 PROCEDURE — 2500000003 HC RX 250 WO HCPCS: Performed by: NURSE PRACTITIONER

## 2022-08-10 PROCEDURE — 36592 COLLECT BLOOD FROM PICC: CPT

## 2022-08-10 PROCEDURE — 2580000003 HC RX 258: Performed by: INTERNAL MEDICINE

## 2022-08-10 PROCEDURE — 2580000003 HC RX 258: Performed by: NURSE PRACTITIONER

## 2022-08-10 PROCEDURE — APPNB45 APP NON BILLABLE 31-45 MINUTES: Performed by: PHYSICIAN ASSISTANT

## 2022-08-10 PROCEDURE — 6360000002 HC RX W HCPCS: Performed by: STUDENT IN AN ORGANIZED HEALTH CARE EDUCATION/TRAINING PROGRAM

## 2022-08-10 PROCEDURE — 6360000002 HC RX W HCPCS: Performed by: NURSE PRACTITIONER

## 2022-08-10 PROCEDURE — 84478 ASSAY OF TRIGLYCERIDES: CPT

## 2022-08-10 PROCEDURE — 2700000000 HC OXYGEN THERAPY PER DAY

## 2022-08-10 PROCEDURE — 6370000000 HC RX 637 (ALT 250 FOR IP): Performed by: NURSE PRACTITIONER

## 2022-08-10 PROCEDURE — APPNB15 APP NON BILLABLE TIME 0-15 MINS: Performed by: NURSE PRACTITIONER

## 2022-08-10 PROCEDURE — 2500000003 HC RX 250 WO HCPCS: Performed by: INTERNAL MEDICINE

## 2022-08-10 PROCEDURE — 83735 ASSAY OF MAGNESIUM: CPT

## 2022-08-10 PROCEDURE — 2000000000 HC ICU R&B

## 2022-08-10 PROCEDURE — 94640 AIRWAY INHALATION TREATMENT: CPT

## 2022-08-10 PROCEDURE — 36600 WITHDRAWAL OF ARTERIAL BLOOD: CPT

## 2022-08-10 PROCEDURE — 99291 CRITICAL CARE FIRST HOUR: CPT | Performed by: INTERNAL MEDICINE

## 2022-08-10 PROCEDURE — 94003 VENT MGMT INPAT SUBQ DAY: CPT

## 2022-08-10 PROCEDURE — 85025 COMPLETE CBC W/AUTO DIFF WBC: CPT

## 2022-08-10 PROCEDURE — 80048 BASIC METABOLIC PNL TOTAL CA: CPT

## 2022-08-10 PROCEDURE — APPSS15 APP SPLIT SHARED TIME 0-15 MINUTES: Performed by: NURSE PRACTITIONER

## 2022-08-10 PROCEDURE — 94761 N-INVAS EAR/PLS OXIMETRY MLT: CPT

## 2022-08-10 PROCEDURE — 82803 BLOOD GASES ANY COMBINATION: CPT

## 2022-08-10 RX ORDER — MIDAZOLAM HYDROCHLORIDE 1 MG/ML
4 INJECTION INTRAMUSCULAR; INTRAVENOUS ONCE
Status: COMPLETED | OUTPATIENT
Start: 2022-08-10 | End: 2022-08-10

## 2022-08-10 RX ORDER — CALCIUM GLUCONATE 20 MG/ML
1000 INJECTION, SOLUTION INTRAVENOUS ONCE
Status: COMPLETED | OUTPATIENT
Start: 2022-08-10 | End: 2022-08-10

## 2022-08-10 RX ADMIN — SODIUM CHLORIDE, POTASSIUM CHLORIDE, SODIUM LACTATE AND CALCIUM CHLORIDE: 600; 310; 30; 20 INJECTION, SOLUTION INTRAVENOUS at 00:02

## 2022-08-10 RX ADMIN — IPRATROPIUM BROMIDE AND ALBUTEROL SULFATE 1 AMPULE: 2.5; .5 SOLUTION RESPIRATORY (INHALATION) at 19:50

## 2022-08-10 RX ADMIN — Medication 150 MCG/HR: at 21:42

## 2022-08-10 RX ADMIN — CHLORHEXIDINE GLUCONATE 0.12% ORAL RINSE 15 ML: 1.2 LIQUID ORAL at 08:24

## 2022-08-10 RX ADMIN — PROPOFOL 45 MCG/KG/MIN: 10 INJECTION, EMULSION INTRAVENOUS at 08:50

## 2022-08-10 RX ADMIN — SODIUM CHLORIDE, PRESERVATIVE FREE 10 ML: 5 INJECTION INTRAVENOUS at 20:14

## 2022-08-10 RX ADMIN — PROPOFOL 30 MCG/KG/MIN: 10 INJECTION, EMULSION INTRAVENOUS at 19:26

## 2022-08-10 RX ADMIN — CHLORHEXIDINE GLUCONATE 0.12% ORAL RINSE 15 ML: 1.2 LIQUID ORAL at 20:14

## 2022-08-10 RX ADMIN — PROPOFOL 40 MCG/KG/MIN: 10 INJECTION, EMULSION INTRAVENOUS at 10:52

## 2022-08-10 RX ADMIN — IPRATROPIUM BROMIDE AND ALBUTEROL SULFATE 1 AMPULE: 2.5; .5 SOLUTION RESPIRATORY (INHALATION) at 15:38

## 2022-08-10 RX ADMIN — PROPOFOL 50 MCG/KG/MIN: 10 INJECTION, EMULSION INTRAVENOUS at 06:32

## 2022-08-10 RX ADMIN — PROPOFOL 50 MCG/KG/MIN: 10 INJECTION, EMULSION INTRAVENOUS at 00:00

## 2022-08-10 RX ADMIN — SODIUM CHLORIDE, POTASSIUM CHLORIDE, SODIUM LACTATE AND CALCIUM CHLORIDE: 600; 310; 30; 20 INJECTION, SOLUTION INTRAVENOUS at 21:42

## 2022-08-10 RX ADMIN — IPRATROPIUM BROMIDE AND ALBUTEROL SULFATE 1 AMPULE: 2.5; .5 SOLUTION RESPIRATORY (INHALATION) at 11:29

## 2022-08-10 RX ADMIN — SODIUM CHLORIDE, POTASSIUM CHLORIDE, SODIUM LACTATE AND CALCIUM CHLORIDE: 600; 310; 30; 20 INJECTION, SOLUTION INTRAVENOUS at 15:22

## 2022-08-10 RX ADMIN — Medication 150 MCG/HR: at 15:19

## 2022-08-10 RX ADMIN — SODIUM CHLORIDE, PRESERVATIVE FREE 10 ML: 5 INJECTION INTRAVENOUS at 08:25

## 2022-08-10 RX ADMIN — PIPERACILLIN AND TAZOBACTAM 4500 MG: 4; .5 INJECTION, POWDER, LYOPHILIZED, FOR SOLUTION INTRAVENOUS at 00:13

## 2022-08-10 RX ADMIN — FAMOTIDINE 20 MG: 10 INJECTION, SOLUTION INTRAVENOUS at 08:24

## 2022-08-10 RX ADMIN — Medication 8 MCG/MIN: at 09:19

## 2022-08-10 RX ADMIN — IPRATROPIUM BROMIDE AND ALBUTEROL SULFATE 1 AMPULE: 2.5; .5 SOLUTION RESPIRATORY (INHALATION) at 07:34

## 2022-08-10 RX ADMIN — SODIUM CHLORIDE, POTASSIUM CHLORIDE, SODIUM LACTATE AND CALCIUM CHLORIDE: 600; 310; 30; 20 INJECTION, SOLUTION INTRAVENOUS at 04:50

## 2022-08-10 RX ADMIN — Medication 200 MCG/HR: at 08:57

## 2022-08-10 RX ADMIN — PROPOFOL 25 MCG/KG/MIN: 10 INJECTION, EMULSION INTRAVENOUS at 14:06

## 2022-08-10 RX ADMIN — CALCIUM GLUCONATE 1000 MG: 20 INJECTION, SOLUTION INTRAVENOUS at 10:24

## 2022-08-10 RX ADMIN — Medication 6 MCG/MIN: at 01:34

## 2022-08-10 RX ADMIN — PROPOFOL 50 MCG/KG/MIN: 10 INJECTION, EMULSION INTRAVENOUS at 04:36

## 2022-08-10 RX ADMIN — PIPERACILLIN AND TAZOBACTAM 4500 MG: 4; .5 INJECTION, POWDER, LYOPHILIZED, FOR SOLUTION INTRAVENOUS at 08:23

## 2022-08-10 RX ADMIN — PIPERACILLIN AND TAZOBACTAM 4500 MG: 4; .5 INJECTION, POWDER, LYOPHILIZED, FOR SOLUTION INTRAVENOUS at 16:45

## 2022-08-10 RX ADMIN — ENOXAPARIN SODIUM 40 MG: 100 INJECTION SUBCUTANEOUS at 21:12

## 2022-08-10 RX ADMIN — MIDAZOLAM 4 MG: 1 INJECTION INTRAMUSCULAR; INTRAVENOUS at 00:37

## 2022-08-10 RX ADMIN — PROPOFOL 50 MCG/KG/MIN: 10 INJECTION, EMULSION INTRAVENOUS at 02:14

## 2022-08-10 RX ADMIN — ENOXAPARIN SODIUM 40 MG: 100 INJECTION SUBCUTANEOUS at 08:24

## 2022-08-10 RX ADMIN — SODIUM CHLORIDE, POTASSIUM CHLORIDE, SODIUM LACTATE AND CALCIUM CHLORIDE: 600; 310; 30; 20 INJECTION, SOLUTION INTRAVENOUS at 10:23

## 2022-08-10 RX ADMIN — Medication 200 MCG/HR: at 04:21

## 2022-08-10 ASSESSMENT — PULMONARY FUNCTION TESTS
PIF_VALUE: 27
PIF_VALUE: 26
PIF_VALUE: 27
PIF_VALUE: 28
PIF_VALUE: 27
PIF_VALUE: 30
PIF_VALUE: 27
PIF_VALUE: 29
PIF_VALUE: 27
PIF_VALUE: 28
PIF_VALUE: 26
PIF_VALUE: 27
PIF_VALUE: 27
PIF_VALUE: 26
PIF_VALUE: 26
PIF_VALUE: 27
PIF_VALUE: 29
PIF_VALUE: 27
PIF_VALUE: 26
PIF_VALUE: 26
PIF_VALUE: 29
PIF_VALUE: 29
PIF_VALUE: 27
PIF_VALUE: 26
PIF_VALUE: 27
PIF_VALUE: 28
PIF_VALUE: 28
PIF_VALUE: 26
PIF_VALUE: 27
PIF_VALUE: 26
PIF_VALUE: 27
PIF_VALUE: 26
PIF_VALUE: 27
PIF_VALUE: 28
PIF_VALUE: 26
PIF_VALUE: 30
PIF_VALUE: 27
PIF_VALUE: 29
PIF_VALUE: 27
PIF_VALUE: 27
PIF_VALUE: 29
PIF_VALUE: 27
PIF_VALUE: 26
PIF_VALUE: 27
PIF_VALUE: 29
PIF_VALUE: 27
PIF_VALUE: 26
PIF_VALUE: 27
PIF_VALUE: 26
PIF_VALUE: 26
PIF_VALUE: 27
PIF_VALUE: 26
PIF_VALUE: 27
PIF_VALUE: 27
PIF_VALUE: 28
PIF_VALUE: 26
PIF_VALUE: 27
PIF_VALUE: 29
PIF_VALUE: 27
PIF_VALUE: 27
PIF_VALUE: 26
PIF_VALUE: 27

## 2022-08-10 ASSESSMENT — PAIN SCALES - GENERAL
PAINLEVEL_OUTOF10: 0
PAINLEVEL_OUTOF10: 2
PAINLEVEL_OUTOF10: 2

## 2022-08-10 NOTE — PROGRESS NOTES
Pulmonary Critical Care Progress Note     Patient's name:  Aneta Hadley Record Number: [id number]  Patient's account/billing number: [de-identified]  Patient's YOB: 1960  Age: 63 y.o. Date of Admission: 8/5/2022  1:56 PM  Date of Consult: 8/10/2022      Primary Care Physician: Serina Taylor MD      Code Status: Full Code    Chief complaint: acute respiratory failure with hypoxia and hypercapnia     Assessment and Plan     Acute respiratory failure with hypoxia and hypercapnia worsening requiring intubation and mechanical ventilation. Aspiration pneumonia  Partial small bowel obstruction. S/p EGD with OG placement  Acute kidney injury, dehydration and contrast nephropathy  Large hiatal hernia  Obstructive sleep apnea obesity hypoventilation syndrome  Sedation related hypotension      Plan:  Vent support, setting adjusted. wean Fio2 as tolerated, keep sat > 90%  Levophed, keep MAP > 65  IV hydration. Zosyn for aspiration pneumonia. GI and DVT prophylaxis. Overnight:  On the vent   No acute events overnight  Loose BM overnight    REVIEW OF SYSTEMS:  Review of Systems -   Unable to obtain intubated on MV        Physical Exam:    Vitals: /70   Pulse 73   Temp 99.3 °F (37.4 °C) (Axillary)   Resp 20   Ht 5' 7\" (1.702 m)   Wt (!) 369 lb 1.6 oz (167.4 kg)   SpO2 97%   BMI 57.81 kg/m²     Last Body weight:   Wt Readings from Last 3 Encounters:   08/10/22 (!) 369 lb 1.6 oz (167.4 kg)       Body Mass Index : Body mass index is 57.81 kg/m². Intake and Output summary:   Intake/Output Summary (Last 24 hours) at 8/10/2022 1202  Last data filed at 8/10/2022 1021  Gross per 24 hour   Intake 5707.55 ml   Output 1240 ml   Net 4467.55 ml       Physical Examination:     Gen: intubated on MV  Eyes: PERRL. Anicteric sclera. No conjunctival injection. ENT: No discharge. Posterior oropharynx clear. External appearance of ears and nose normal.  Neck: Trachea midline.  No mass Resp: Diminished bilaterally with upper airway wheezing  CV: Regular rate. Regular rhythm. No murmur or rub. No edema. GI: Soft distended sluggish bowel sounds. Skin: Warm, dry, w/o erythema. Lymph: No cervical or supraclavicular LAD. M/S: No cyanosis. No clubbing. Neuro: sedated on MV        Laboratory findings:-    CBC:   Recent Labs     08/10/22  0440   WBC 11.8*   HGB 10.0*        BMP:    Recent Labs     08/08/22  0438 08/09/22  0416 08/10/22  0440    135* 136   K 4.0 3.4* 4.3    101 102   CO2 24 18* 23   BUN 48* 47* 57*   CREATININE 2.7* 2.6* 2.9*   GLUCOSE 138* 133* 131*     S. Calcium:  Recent Labs     08/10/22  0440   CALCIUM 7.9*     S. Magnesium:  Recent Labs     08/10/22  0440   MG 2.50*     S. Phosphorus:  Recent Labs     08/10/22  0440   PHOS 4.9     S. Glucose:  Recent Labs     08/10/22  0439 08/10/22  0745 08/10/22  1146   POCGLU 127* 116* 132*           Radiology Review:  Pertinent images / reports were reviewed as a part of this visit.     Reviewed     Critical Care time 35 min                Aylin Salas MD, M.JACOBY.            8/10/2022, 12:02 PM

## 2022-08-10 NOTE — PROGRESS NOTES
General and Vascular Surgery                                                           Daily Progress Note                                                              Pt Name: Aneta Hadley Record Number: [id number]  Date of Birth 1960   Today's Date: 8/10/2022      ASSESSMENT/PLAN   Partial SBO  -loose BM overnight. Unchanged abdominal exam-distended. -remains hypotensive on pressors  -oral gastric tube replaced via EGD 8/10. Unable to pass through either nares. -Repeat CT abd pelvis with IV, and oral contrast via OGT  when stable enough to transport        SUBJECTIVE/EVENTS last 24H  Miles remains in ICU, intubated, sedated, on pressors. OBJECTIVE  VITALS:  height is 5' 7\" (1.702 m) and weight is 369 lb 1.6 oz (167.4 kg) (abnormal). His axillary temperature is 99.3 °F (37.4 °C). His blood pressure is 85/56 (abnormal) and his pulse is 74. His respiration is 16 and oxygen saturation is 96%. VITALS:  BP (!) 85/56   Pulse 74   Temp 99.3 °F (37.4 °C) (Axillary)   Resp 16   Ht 5' 7\" (1.702 m)   Wt (!) 369 lb 1.6 oz (167.4 kg)   SpO2 96%   BMI 57.81 kg/m²   GENERAL: Intubated  ABDOMEN: stable distention  I/O last 3 completed shifts: In: 8491 [I.V.:7877.6; IV Piggyback:613.4]  Out: 1866 [Urine:1766; Emesis/NG output:100]  I/O this shift:  In: 10 [I.V.:10]  Out: -     LABS  Recent Labs     08/08/22  1621 08/09/22  0416 08/10/22  0440   WBC  --    < > 11.8*   HGB  --    < > 10.0*   HCT  --    < > 30.7*   PLT  --    < > 431   NA  --    < > 136   K  --    < > 4.3   CL  --    < > 102   CO2  --    < > 23   BUN  --    < > 57*   CREATININE  --    < > 2.9*   MG  --    < > 2.50*   PHOS  --    < > 4.9   CALCIUM  --    < > 7.9*   NITRU Negative  --   --    COLORU SIERRA*  --   --    BACTERIA 3+*  --   --     < > = values in this interval not displayed.      CBC:   Lab Results   Component Value Date/Time    WBC 11.8 08/10/2022 04:40 AM    RBC 3.18 08/10/2022 04:40 AM    HGB 10.0 08/10/2022 04:40 AM    HCT 30.7 08/10/2022 04:40 AM    MCV 96.5 08/10/2022 04:40 AM    MCH 31.5 08/10/2022 04:40 AM    MCHC 32.6 08/10/2022 04:40 AM    RDW 17.1 08/10/2022 04:40 AM     08/10/2022 04:40 AM    MPV 6.9 08/10/2022 04:40 AM     CMP:    Lab Results   Component Value Date/Time     08/10/2022 04:40 AM    K 4.3 08/10/2022 04:40 AM    K 3.2 08/06/2022 04:45 AM     08/10/2022 04:40 AM    CO2 23 08/10/2022 04:40 AM    BUN 57 08/10/2022 04:40 AM    CREATININE 2.9 08/10/2022 04:40 AM    GFRAA 27 08/10/2022 04:40 AM    AGRATIO 0.8 08/06/2022 04:45 AM    LABGLOM 22 08/10/2022 04:40 AM    GLUCOSE 131 08/10/2022 04:40 AM    PROT 6.3 08/06/2022 04:45 AM    LABALBU 2.8 08/06/2022 04:45 AM    CALCIUM 7.9 08/10/2022 04:40 AM    BILITOT 0.7 08/06/2022 04:45 AM    ALKPHOS 72 08/06/2022 04:45 AM    AST 22 08/06/2022 04:45 AM    ALT 23 08/06/2022 04:45 AM         JARON Cerda - CNP  Electronically signed 8/10/2022 at 8:40 AM    As above  Will check CT with PO contrast in AM if stable    Electronically signed by Clarence Orozco MD on 8/10/2022 at 4:26 PM

## 2022-08-10 NOTE — PROGRESS NOTES
Pulmonary Critical Care Progress Note     Patient's name:  Aneta Hadley Record Number: [id number]  Patient's account/billing number: [de-identified]  Patient's YOB: 1960  Age: 63 y.o. Date of Admission: 8/5/2022  1:56 PM  Date of Consult: 8/10/2022      Primary Care Physician: Katt Schofield MD      Code Status: Full Code    Chief complaint: acute respiratory failure with hypoxia and hypercapnia     Assessment and Plan     Acute respiratory failure with hypoxia and hypercapnia worsening requiring intubation and mechanical ventilation. Aspiration pneumonia  Partial small bowel obstruction. Acute kidney injury worsening   Obstructive sleep apnea obesity hypoventilation syndrome  Sedation related hypotension      Plan:  Vent support, wean Fio2 as tolerated, keep sat > 90%  Wean off Levophed, keep MAP > 65  IV hydration. Zosyn for aspiration pneumonia. GI and DVT prophylaxis. Overnight:  On the vent   No acute events overnight    REVIEW OF SYSTEMS:  Review of Systems -   Unable to obtain intubated on MV        Physical Exam:    Vitals: BP (!) 85/56   Pulse 74   Temp 99.3 °F (37.4 °C) (Axillary)   Resp 16   Ht 5' 7\" (1.702 m)   Wt (!) 369 lb 1.6 oz (167.4 kg)   SpO2 96%   BMI 57.81 kg/m²     Last Body weight:   Wt Readings from Last 3 Encounters:   08/10/22 (!) 369 lb 1.6 oz (167.4 kg)       Body Mass Index : Body mass index is 57.81 kg/m². Intake and Output summary:   Intake/Output Summary (Last 24 hours) at 8/10/2022 0903  Last data filed at 8/10/2022 0843  Gross per 24 hour   Intake 5707.55 ml   Output 1135 ml   Net 4572.55 ml         Physical Examination:     Gen: intubated on MV  Eyes: PERRL. Anicteric sclera. No conjunctival injection. ENT: No discharge. Posterior oropharynx clear. External appearance of ears and nose normal.  Neck: Trachea midline. No mass   Resp: Diminished bilaterally with upper airway wheezing  CV: Regular rate. Regular rhythm.  No murmur or rub. No edema. GI: Soft distended no bowel sounds. Skin: Warm, dry, w/o erythema. Lymph: No cervical or supraclavicular LAD. M/S: No cyanosis. No clubbing. Neuro: sedated on MV        Laboratory findings:-    CBC:   Recent Labs     08/10/22  0440   WBC 11.8*   HGB 10.0*          BMP:    Recent Labs     08/08/22  0438 08/09/22  0416 08/10/22  0440    135* 136   K 4.0 3.4* 4.3    101 102   CO2 24 18* 23   BUN 48* 47* 57*   CREATININE 2.7* 2.6* 2.9*   GLUCOSE 138* 133* 131*       S. Calcium:  Recent Labs     08/10/22  0440   CALCIUM 7.9*       S. Magnesium:  Recent Labs     08/10/22  0440   MG 2.50*       S. Phosphorus:  Recent Labs     08/10/22  0440   PHOS 4.9       S. Glucose:  Recent Labs     08/10/22  0013 08/10/22  0439 08/10/22  0745   POCGLU 133* 127* 116*             Radiology Review:  Pertinent images / reports were reviewed as a part of this visit.     Reviewed     Critical Care time 35 min                Kin Cosby MD, M.D.            8/10/2022, 9:03 AM

## 2022-08-10 NOTE — PROGRESS NOTES
Physician Progress Note      PATIENTVenora Aase  CSN #:                  [id number]  :                       1960  ADMIT DATE:       2022 1:56 PM  [address] [place] DATE:  RESPONDING  PROVIDER #:        Mariella Mckeon MD          QUERY TEXT:    Pt admitted with Aspiration Pneumonia and acute respiratory failure . Noted   documentation of Sepsis and septic shock on 22  by ordered Nephrology   consultant. If possible, please document in progress notes and discharge   summary:    The medical record reflects the following:  Risk Factors: Aspiration Pneumonia  immunocompromised due to rheumatoid   arthritis and treatment  Clinical Indicators: on admit Temperature 101.1, HR > 90, RR > 22 , lactic   acid on  =  4.3, procalcitonin 6.00 22, WBC 11----17. 9(on 22) per   Nephrology consult 22 \"ATN  Sepsis/?possible septic shock. emperature was 101.1.? High white cell count. ?'immunocompromised due to   rheumatoid arthritis and treatment. ? Being treated with antibiotics\"  Treatment: In ED 1L NS Bolus , IV Zosyn, ICU admit and IV Levophed  Options provided:  -- Sepsis and septic shock  confirmed present on admission  -- Sepsis and septic shock  confirmed not present on admission  -- Sepsis and septic shock  ruled out  -- Other - I will add my own diagnosis  -- Disagree - Not applicable / Not valid  -- Disagree - Clinically unable to determine / Unknown  -- Refer to Clinical Documentation Reviewer    PROVIDER RESPONSE TEXT:    The diagnosis of Sepsis and septic shock was confirmed as present on   admission.     Query created by: Terence Godoy on 8/10/2022 9:13 AM      Electronically signed by:  Mariella Mckeon MD 8/10/2022 10:12 AM

## 2022-08-10 NOTE — CARE COORDINATION
08/10/22 1440   Service Assessment   Patient Orientation Sedated   History Provided By Spouse   Primary Caregiver Self   Patient's Healthcare Decision Maker is: Legal Next of Kin   PCP Verified by CM Yes   Prior Functional Level Independent in ADLs/IADLs   Can patient return to prior living arrangement Unknown at present   Ability to make needs known: Unable   Family able to assist with home care needs: Yes   Social/Functional History   Lives With Spouse   Type of [address]   Home Access Ramped entrance   [facility]   Transfer Assistance Independent   Active  Yes   Occupation Retired   Services At/After Discharge   [address] Provided? No   Mode of Transport at Discharge Other (see comment)  (family to transport)   Confirm Follow Up Transport Family   Condition of Participation: Discharge Planning   The Plan for Transition of Care is related to the following treatment goals: TBD; patient currently sedated/intubated. Will continue to follow for therapy needs/recommendations. Patient currently sedated/intubated. Spoke with patient's wife, Lyndsey Haque, via phone. She confirmed that patient's goal would be to return home, but she understands that rehabilitation may be recommended. I will continue to follow for updates on therapy/DME recs for patient once he would be weaned off of the ventilator.     Electronically signed by Gifty Whittaker RN on 8/10/2022 at 2:45 PM

## 2022-08-10 NOTE — PLAN OF CARE
Problem: Discharge Planning  Goal: Discharge to home or other facility with appropriate resources  Outcome: Progressing  Flowsheets (Taken 8/9/2022 1924)  Discharge to home or other facility with appropriate resources: Identify barriers to discharge with patient and caregiver     Problem: Pain  Goal: Verbalizes/displays adequate comfort level or baseline comfort level  Outcome: Progressing  Flowsheets (Taken 8/9/2022 1924)  Verbalizes/displays adequate comfort level or baseline comfort level:   Assess pain using appropriate pain scale   Administer analgesics based on type and severity of pain and evaluate response     Problem: ABCDS Injury Assessment  Goal: Absence of physical injury  Outcome: Progressing  Flowsheets (Taken 8/10/2022 0622)  Absence of Physical Injury: Implement safety measures based on patient assessment     Problem: Safety - Adult  Goal: Free from fall injury  Outcome: Progressing  Flowsheets (Taken 8/10/2022 0622)  Free From Fall Injury: Instruct family/caregiver on patient safety     Problem: Skin/Tissue Integrity  Goal: Absence of new skin breakdown  Description: 1. Monitor for areas of redness and/or skin breakdown  2. Assess vascular access sites hourly  3. Every 4-6 hours minimum:  Change oxygen saturation probe site  4. Every 4-6 hours:  If on nasal continuous positive airway pressure, respiratory therapy assess nares and determine need for appliance change or resting period. Outcome: Progressing     Problem: Safety - Medical Restraint  Goal: Remains free of injury from restraints (Restraint for Interference with Medical Device)  Description: INTERVENTIONS:  1. Determine that other, less restrictive measures have been tried or would not be effective before applying the restraint  2. Evaluate the patient's condition at the time of restraint application  3. Inform patient/family regarding the reason for restraint  4.  Q2H: Monitor safety, psychosocial status, comfort, nutrition and hydration  Outcome: Progressing     Problem: Nutrition Deficit:  Goal: Optimize nutritional status  Outcome: Progressing     Problem: Neurosensory - Adult  Goal: Achieves stable or improved neurological status  Outcome: Progressing  Goal: Absence of seizures  Outcome: Progressing  Goal: Remains free of injury related to seizures activity  Outcome: Progressing  Goal: Achieves maximal functionality and self care  Outcome: Progressing     Problem: Respiratory - Adult  Goal: Achieves optimal ventilation and oxygenation  Outcome: Progressing  Flowsheets (Taken 8/9/2022 1924)  Achieves optimal ventilation and oxygenation:   Assess for changes in respiratory status   Position to facilitate oxygenation and minimize respiratory effort   Oxygen supplementation based on oxygen saturation or arterial blood gases     Problem: Cardiovascular - Adult  Goal: Maintains optimal cardiac output and hemodynamic stability  Outcome: Progressing  Flowsheets (Taken 8/9/2022 1924)  Maintains optimal cardiac output and hemodynamic stability:   Monitor blood pressure and heart rate   Monitor urine output and notify Licensed Independent Practitioner for values outside of normal range   Assess for signs of decreased cardiac output  Goal: Absence of cardiac dysrhythmias or at baseline  Outcome: Progressing  Flowsheets (Taken 8/9/2022 1924)  Absence of cardiac dysrhythmias or at baseline:   Monitor cardiac rate and rhythm   Assess for signs of decreased cardiac output     Problem: Skin/Tissue Integrity - Adult  Goal: Skin integrity remains intact  Outcome: Progressing  Flowsheets  Taken 8/10/2022 0622  Skin Integrity Remains Intact: Monitor for areas of redness and/or skin breakdown  Taken 8/9/2022 1924  Skin Integrity Remains Intact: Monitor for areas of redness and/or skin breakdown  Goal: Incisions, wounds, or drain sites healing without S/S of infection  Outcome: Progressing  Goal: Oral mucous membranes remain intact  Outcome: Progressing Problem: Musculoskeletal - Adult  Goal: Return mobility to safest level of function  Outcome: Progressing  Goal: Maintain proper alignment of affected body part  Outcome: Progressing  Goal: Return ADL status to a safe level of function  Outcome: Progressing     Problem: Gastrointestinal - Adult  Goal: Minimal or absence of nausea and vomiting  Outcome: Progressing  Goal: Maintains or returns to baseline bowel function  Outcome: Progressing  Goal: Maintains adequate nutritional intake  Outcome: Progressing  Goal: Establish and maintain optimal ostomy function  Outcome: Progressing     Problem: Genitourinary - Adult  Goal: Absence of urinary retention  Outcome: Progressing  Flowsheets (Taken 8/9/2022 1924)  Absence of urinary retention: Monitor intake/output and perform bladder scan as needed  Goal: Urinary catheter remains patent  Outcome: Progressing  Flowsheets (Taken 8/9/2022 1924)  Urinary catheter remains patent: Assess patency of urinary catheter     Problem: Anxiety  Goal: Will report anxiety at manageable levels  Description: INTERVENTIONS:  1. Administer medication as ordered  2. Teach and rehearse alternative coping skills  3. Provide emotional support with 1:1 interaction with staff  Outcome: Progressing     Problem: Coping  Goal: Pt/Family able to verbalize concerns and demonstrate effective coping strategies  Description: INTERVENTIONS:  1. Assist patient/family to identify coping skills, available support systems and cultural and spiritual values  2. Provide emotional support, including active listening and acknowledgement of concerns of patient and caregivers  3. Reduce environmental stimuli, as able  4. Instruct patient/family in relaxation techniques, as appropriate  5.  Assess for spiritual pain/suffering and initiate Spiritual Care, Psychosocial Clinical Specialist consults as needed  Outcome: Progressing     Problem: Change in Body Image  Goal: Pt/Family communicate acceptance of loss or change in body image and feel psychological comfort and peace  Description: INTERVENTIONS:  1. Assess patient/family anxiety and grief process related to change in body image, loss of functional status, loss of sense of self, and forgiveness  2. Provide emotional and spiritual support  3. Provide information about the patient's health status with consideration of family and cultural values  4. Communicate willingness to discuss loss and facilitate grief process with patient/family as appropriate  5. Emphasize sustaining relationships within family system and community, or aurora/spiritual traditions  6. Initiate Spiritual Care, Psychosocial Clinical Specialist consult as needed  Outcome: Progressing     Problem: Decision Making  Goal: Pt/Family able to effectively weigh alternatives and participate in decision making related to treatment and care  Description: INTERVENTIONS:  1. Determine when there are differences between patient's view, family's view, and healthcare provider's view of condition  2. Facilitate patient and family articulation of goals for care  3. Help patient and family identify pros/cons of alternative solutions  4. Provide information as requested by patient/family  5. Respect patient/family right to receive or not to receive information  6. Serve as a liaison between patient and family and health care team  7. Initiate Consults from Ethics, Palliative Care or initiate [address] [place] [address] as is appropriate  Outcome: Progressing     Problem: Behavior  Goal: Pt/Family maintain appropriate behavior and adhere to behavioral management agreement, if implemented  Description: INTERVENTIONS:  1. Assess patient/family's coping skills and  non-compliant behavior (including use of illegal substances)  2. Notify security of behavior or suspected illegal substances which indicate the need for search of the family and/or belongings  3.  Encourage verbalization of thoughts and concerns in a socially appropriate manner  4. Utilize positive, consistent limit setting strategies supporting safety of patient, staff and others  5. Encourage participation in the decision making process about the behavioral management agreement  6. If a visitor's behavior poses a threat to safety call refer to organization policy.   7. Initiate consult with , Psychosocial CNS, Spiritual Care as appropriate  Outcome: Progressing

## 2022-08-10 NOTE — PROGRESS NOTES
Hospitalist Progress Note  8/10/2022 9:00 AM    PCP: Shaggy Vang MD    [phone]     Date of Admission: 8/5/2022                                                                                                                     HOSPITAL COURSE    Patient demographics:  The patient  Zander Bergeron is a 63 y.o. male     Significant past medical history:   Patient Active Problem List   Diagnosis    Partial bowel obstruction (HCC)    Hiatal hernia    Acute respiratory failure with hypoxia and hypercapnia (HCC)    SBO (small bowel obstruction) (HCC)    Aspiration into airway    Acute respiratory failure with hypoxia ([address]    Small bowel obstruction ([address]         Presenting symptoms:  abdominal pain    Diagnostic workup:      CONSULTS DURING ADMISSION :   IP CONSULT TO GENERAL SURGERY  IP CONSULT TO HOSPITALIST  IP CONSULT TO GENERAL SURGERY  IP CONSULT TO DIETITIAN      Patient was diagnosed with:  Low grade or Partial Bowel obstruction  Pneumonia and septic shock. Hiatal hernia  Atrial Fibrillation      Treatment while inpatient:  63years old male with medical history significant for morbid obesity, paraesophageal hernia. Patient presented to the emergency room with abdominal pain nausea vomiting and diarrhea. Patient was diagnosed with small bowel obstruction. Patient was also diagnosed with pneumonia and septic shock. Patient developed acute respiratory failure and acute renal failure possibly General patient was started on the ventilator and supported with pressors.                                                                                            ----------------------------------------------------------      SUBJECTIVE COMPLAINTS- follow up for abdominal pain    Diet: Diet NPO      OBJECTIVE:   Patient Active Problem List   Diagnosis    Partial bowel obstruction (HCC)    Hiatal hernia    Acute respiratory failure with hypoxia and hypercapnia (HCC)    SBO (small bowel obstruction) ([address] Aspiration into airway    Acute respiratory failure with hypoxia (HCC)    Small bowel obstruction (HCC)       Allergies  Codeine    Medications    Scheduled Meds:   chlorhexidine  15 mL Mouth/Throat BID    famotidine (PEPCID) injection  20 mg IntraVENous Daily    piperacillin-tazobactam  4,500 mg IntraVENous Q8H    ipratropium-albuterol  1 ampule Inhalation Q4H WA    sodium chloride flush  5-40 mL IntraVENous 2 times per day    [Held by provider] gabapentin  800 mg Oral TID    [Held by provider] levothyroxine  200 mcg Oral Daily    [Held by provider] metoprolol tartrate  25 mg Oral BID    [Held by provider] spironolactone  50 mg Oral Daily    enoxaparin  40 mg SubCUTAneous BID     Continuous Infusions:   norepinephrine 6 mcg/min (08/10/22 0635)    fentaNYL 200 mcg/hr (08/10/22 0857)    propofol 50 mcg/kg/min (08/10/22 0635)    sodium chloride      lactated ringers 200 mL/hr at 08/10/22 0635     PRN Meds:  fentaNYL **AND** fentaNYL, acetaminophen, sodium chloride flush, sodium chloride, ondansetron **OR** ondansetron, diazePAM, HYDROmorphone **OR** HYDROmorphone    Vitals   Vitals /wt Patient Vitals for the past 8 hrs:   BP Temp Temp src Pulse Resp SpO2 Weight   08/10/22 0830 (!) 85/56 99.3 °F (37.4 °C) Axillary 74 16 96 % --   08/10/22 0815 (!) 89/54 -- -- 76 16 96 % --   08/10/22 0800 (!) 90/58 -- -- 77 15 95 % --   08/10/22 0745 (!) 94/59 -- -- 78 16 94 % --   08/10/22 0734 -- -- -- 79 17 94 % --   08/10/22 0730 (!) 94/59 -- -- 78 16 96 % --   08/10/22 0715 97/61 -- -- 81 16 96 % --   08/10/22 0700 98/60 -- -- 82 (!) 7 96 % --   08/10/22 0645 97/60 -- -- 80 13 96 % --   08/10/22 0630 97/62 -- -- 80 18 96 % --   08/10/22 0615 103/60 -- -- 81 (!) 8 94 % --   08/10/22 0600 (!) 100/57 -- -- 82 15 96 % --   08/10/22 0545 (!) 101/53 -- -- 82 (!) 9 97 % --   08/10/22 0530 (!) 100/56 -- -- 82 12 96 % --   08/10/22 0515 (!) 101/58 -- -- 82 (!) 8 95 % --   08/10/22 0500 (!) 101/58 -- -- 81 13 96 % --   08/10/22 0445 101/61 -- -- 81 16 95 % --   08/10/22 0436 -- -- -- -- -- -- (!) 369 lb 1.6 oz (167.4 kg)   08/10/22 0432 -- -- -- 80 19 94 % --   08/10/22 0430 103/65 -- -- 79 16 95 % --   08/10/22 0416 -- -- -- 78 16 96 % --   08/10/22 0415 102/63 98.9 °F (37.2 °C) Axillary 78 11 96 % --   08/10/22 0400 (!) 96/58 -- -- 79 15 96 % --   08/10/22 0345 (!) 101/59 -- -- 79 14 96 % --   08/10/22 0330 99/63 -- -- 80 16 96 % --   08/10/22 0315 100/65 -- -- 79 17 97 % --   08/10/22 0300 104/63 -- -- 83 15 95 % --   08/10/22 0245 96/61 -- -- 82 14 96 % --   08/10/22 0215 112/74 -- -- 84 12 96 % --   08/10/22 0200 107/67 -- -- 84 13 95 % --   08/10/22 0145 98/65 -- -- 84 10 95 % --   08/10/22 0130 107/62 -- -- 85 12 95 % --   08/10/22 0115 (!) 110/57 -- -- 88 16 95 % --        72HR INTAKE/OUTPUT:    Intake/Output Summary (Last 24 hours) at 8/10/2022 0900  Last data filed at 8/10/2022 7648  Gross per 24 hour   Intake 5707.55 ml   Output 1135 ml   Net 4572.55 ml       Exam:    Gen:  intubated and sedated  Eyes: PERRL. No sclera icterus. No conjunctival injection. ENT: No discharge. Pharynx clear. External appearance of ears and nose normal.  Neck: Trachea midline. No obvious mass. Resp: No accessory muscle use. No crackles. No wheezes. No rhonchi. CV: Regular rate. Regular rhythm. No murmur or rub. No edema. GI: Non-tender. Non-distended. No hernia. Skin: Warm, dry, normal texture and turgor. Lymph: No cervical LAD. No supraclavicular LAD. M/S: / Ext. No cyanosis. No clubbing. No joint deformity. Neuro: CN 2-12 are intact,  no neurologic deficits noted. PT/INR:   No results for input(s): PROTIME, INR in the last 72 hours. APTT: No results for input(s): APTT in the last 72 hours.     CBC:   Recent Labs     08/08/22  0438 08/09/22 0416 08/10/22  0440   WBC 15.5* 10.1 11.8*   HGB 11.6* 8.9* 10.0*   HCT 35.4* 26.8* 30.7*   MCV 96.7 96.6 96.5    338 431       BMP:   Recent Labs     08/08/22 0438 08/09/22 0416 08/10/22  0440    135* 136   K 4.0 3.4* 4.3    101 102   CO2 24 18* 23   PHOS 6.4* 3.1 4.9   BUN 48* 47* 57*   CREATININE 2.7* 2.6* 2.9*       LIVER PROFILE:   No results for input(s): ALKPHOS, AST, ALT, ALB, BILIDIR, BILITOT, ALKPHOS in the last 72 hours. No results for input(s): AMYLASE in the last 72 hours. No results for input(s): LIPASE in the last 72 hours. UA:  Recent Labs     08/08/22  1621   WBCUA 10-20*   RBCUA *       TROPONIN:   No results for input(s): CKTOTAL, TROPONINI in the last 72 hours. No results found for: URRFLXCULT    No results for input(s): TSHREFLEX in the last 72 hours. No components found for: [id number]  POC GLUCOSE:    Recent Labs     08/09/22  1548 08/09/22  1927 08/10/22  0013 08/10/22  0439 08/10/22  0745   POCGLU 105* 128* 133* 127* 116*     No results for input(s): LABA1C in the last 72 hours. No results found for: LABA1C      ASSESSMENT AND PLAN      Acute respiratory failure  Intubated 8/8  Full vent support  Aspiration pna  Ct abx    Partial small Bowel obstruction  Repeat ct abdomen with contrast.in am  OG tube in place  Og placed per GI 8/9  General surgery is following    Hypotention  Patient remains on pressors  Hold metoprolol and lisinopril  Keep sbp more than 100    Hiatal hernia  - CT chest/abd/pelv: Large hiatal hernia containing much of the stomach which is distended       Atrial Fibrillation  - currently rate controlled  - hold metoprolol  will give lovenox instead of eliquis       HTN  - monitor blood pressure  - restart home meds when able        Code Status: Full Code        Dispo - cc        The patient and / or the family were informed of the results of any tests, a time was given to answer questions, a plan was proposed and they agreed with plan. Darryle Pickle, MD    This note was transcribed using [id number]. Please disregard any translational errors.

## 2022-08-10 NOTE — PROGRESS NOTES
Pt currently maxed on propofol and fentanyl for sedation while on ventilator. Pt asynchronous with ventilator and having tidal volumes varying from in the 50s to over 1000. Dr. Ila Ortiz made aware of pt vent asynchrony and new order received for a one time push of versed for sedation.

## 2022-08-10 NOTE — PROGRESS NOTES
Admit Date: 8/5/2022      REASON FOR ADMISSION:   Patient with abdominal pain, nausea, vomiting,  found to have some bowel obstruction. REASON for  follow up  Acute kidney injury with creatinine jumped from 0.8 to a current creatinine of 2.7,  most likely ATN due to episode of severe hypotension where his blood pressure dropped 78/44. was also on lisinopril. Lisinopril is being stopped now. INTERVAL HISTORY  Intubated/sedated  BP 77/62  On levo  Urine 1020   Creat 2.7--.>2.5  BNP 2277-->797-->  Urine Na < 20   Cortisol normal  Pts brother here   On 60% FIO2      PLAN   Levo for    Reduce Ringers lactate 150 ml/hour and will slowly titrate down    on 70 % FIO2  PRO BNP lower   Discussed with nurse     Acute kidney injury   creatinine on admission 1   next day on 08/06/2022 was 0.8  suddenly jumped up to 2.7. Due to episode of severe hypotension with blood pressure drop 78/44. also got CT with IV contrast on 08/05/2022 but most likely  this is ATN related due to hypotension and hypercapnic  respiratory failure. White cell count is up could be septic. On Levophed    IV fluids. He just got a bolus as ordered by Dr. Trinh Salmon, intensivist and currently on Ringer's lactate 150 mL an hour. CT angio s normal renal arteries and normal kidneys      Sepsis/ possible septic shock. emperature was 101.1   High white cell count. '  immunocompromised due to rheumatoid arthritis and treatment. History of sleep apnea   on CPAP. Acute respiratory failure with high pCO2 in the range of 50s with pH  of 7.24    now intubated. History of abnormal stress test in 06/2022 with stress-induced  ischemia and some scarring. Ejection fraction  was  64%.     had a followup angiogram which was reported as normal as per wife. I do not have that result     History of rheumatoid arthritis   longstanding,was on methotrexate,  has taken Celebrex and now on Inflectra infusions,  so he is immunocompromised. Basia Razo is now helping him with his rheumatoid tremendously. he has been on steroids in the past.     Rule out adrenal insufficiency. cortisol level. normal      History of thyroidectomy in the past. .    Small bowel obstruction. Large hiatus hernia with colon and stomach in the chest.  Further management as per medical team.     HISTORY OF PRESENTING COMPLAINT:  A 59-year-old  gentleman seen  in ICU where he was intubated, sedated. His wife is present. Gentleman  who was a retired . No smoking or alcohol. Three children, in  good health. No family history of kidney disease with known history of  rheumatoid arthritis for the last many years. Many treatments with  methotrexate to Celebrex in the past and now on Inflectra IV infusion  under the care of Dr. Peng Francis at [facility]. History of atrial fibrillation,  previous normal coronary angiogram in 2022, came with the above  complaints, found some bowel obstruction on CT, transition point, mid  small bowel. He was known to have a large paraesophageal hernia with  herniation in the stomach and colon in it. He was transferred to ICU on 08/07/2022 because of respiratory distress  and hypercapnia and was eventually intubated. He was known to have  obstructive sleep apnea and is on CPAP. REVIEW OF SYSTEMS:    Not much available except above discussed with the  wife who was present. Having abdominal pain, some nausea, vomiting,  poor intake for the last week or so. No fever. No chills. CARDIAC:  No chest pain. PULMONARY:  No shortness of breath. GENITOURINARY:  No  dysuria or hematuria. VASCULAR:  No claudication. Review of the rest  of the systems is negative.              Objective:     Patient Vitals for the past 8 hrs:   BP Temp Temp src Pulse Resp SpO2 Weight   08/10/22 1015 (!) 77/62 -- -- 68 20 98 % --   08/10/22 1000 105/69 -- -- 67 20 98 % --   08/10/22 0830 (!) 85/56 99.3 °F (37.4 °C) Axillary 74 16 96 % --   08/10/22 0815 (!) 89/54 -- -- 76 16 96 % --   08/10/22 0800 (!) 90/58 -- -- 77 15 95 % --   08/10/22 0745 (!) 94/59 -- -- 78 16 94 % --   08/10/22 0734 -- -- -- 79 17 94 % --   08/10/22 0730 (!) 94/59 -- -- 78 16 96 % --   08/10/22 0715 97/61 -- -- 81 16 96 % --   08/10/22 0700 98/60 -- -- 82 (!) 7 96 % --   08/10/22 0645 97/60 -- -- 80 13 96 % --   08/10/22 0630 97/62 -- -- 80 18 96 % --   08/10/22 0615 103/60 -- -- 81 (!) 8 94 % --   08/10/22 0600 (!) 100/57 -- -- 82 15 96 % --   08/10/22 0545 (!) 101/53 -- -- 82 (!) 9 97 % --   08/10/22 0530 (!) 100/56 -- -- 82 12 96 % --   08/10/22 0515 (!) 101/58 -- -- 82 (!) 8 95 % --   08/10/22 0500 (!) 101/58 -- -- 81 13 96 % --   08/10/22 0445 101/61 -- -- 81 16 95 % --   08/10/22 0436 -- -- -- -- -- -- (!) 369 lb 1.6 oz (167.4 kg)   08/10/22 0432 -- -- -- 80 19 94 % --   08/10/22 0430 103/65 -- -- 79 16 95 % --   08/10/22 0416 -- -- -- 78 16 96 % --   08/10/22 0415 102/63 98.9 °F (37.2 °C) Axillary 78 11 96 % --   08/10/22 0400 (!) 96/58 -- -- 79 15 96 % --   08/10/22 0345 (!) 101/59 -- -- 79 14 96 % --         I/O last 3 completed shifts: In: 7651 [I.V.:7877.6; IV Piggyback:613.4]  Out: 1866 [Urine:1766; Emesis/NG output:100]        General appearance:  GENERAL:  On examination, obese gentleman with a BMI of 54, sedated,intubated. HEENT:  Head:  Normocephalic, atraumatic. Conjunctivae:  No icterus. CARDIAC:  Normal muffled heart sounds. NECK:  Difficult to see, very short neck. CHEST:  Reduced breath sounds. ABDOMEN:  Distended but soft, nontender. Bowel sounds absent. EXTREMITIES:  Bilateral lower extremities, 1+ edema. NEUROLOGICAL:  Hard to assess. Lawernce Roughen l  Lab Results   Component Value Date    CREATININE 2.9 (H) 08/10/2022    BUN 57 (H) 08/10/2022     08/10/2022    K 4.3 08/10/2022     08/10/2022    CO2 23 08/10/2022     Lab Results   Component Value Date    WBC 11.8 (H) 08/10/2022    HGB 10.0 (L) 08/10/2022    HCT 30.7 (L) 08/10/2022    MCV 96.5 08/10/2022  08/10/2022            AGLUCOSE)Magnesium:    Lab Results   Component Value Date/Time    MG 2.50 08/10/2022 04:40 AM     Phosphorus:    Lab Results   Component Value Date/Time    PHOS 4.9 08/10/2022 04:40 AM       Uric Acid:  No components found for: URIC    Principal Problem:    Partial bowel obstruction (HCC)  Active Problems:    Hiatal hernia    Acute respiratory failure with hypoxia and hypercapnia (HCC)    SBO (small bowel obstruction) (HCC)    Aspiration into airway    Acute respiratory failure with hypoxia (HCC)    Small bowel obstruction (HCC)  Resolved Problems:    * No resolved hospital problems.  *

## 2022-08-11 ENCOUNTER — APPOINTMENT (OUTPATIENT)
Dept: GENERAL RADIOLOGY | Age: 62
DRG: 853 | End: 2022-08-11
Payer: COMMERCIAL

## 2022-08-11 ENCOUNTER — APPOINTMENT (OUTPATIENT)
Dept: CT IMAGING | Age: 62
DRG: 853 | End: 2022-08-11
Payer: COMMERCIAL

## 2022-08-11 LAB
ANION GAP SERPL CALCULATED.3IONS-SCNC: 10 MMOL/L (ref 3–16)
BASE EXCESS ARTERIAL: 2 MMOL/L (ref -3–3)
BASOPHILS ABSOLUTE: 0 K/UL (ref 0–0.2)
BASOPHILS RELATIVE PERCENT: 0.7 %
BUN BLDV-MCNC: 51 MG/DL (ref 7–20)
CALCIUM SERPL-MCNC: 7.7 MG/DL (ref 8.3–10.6)
CARBOXYHEMOGLOBIN ARTERIAL: 1 % (ref 0–1.5)
CHLORIDE BLD-SCNC: 106 MMOL/L (ref 99–110)
CO2: 24 MMOL/L (ref 21–32)
CREAT SERPL-MCNC: 2 MG/DL (ref 0.8–1.3)
CULTURE, BLOOD 2: NORMAL
EOSINOPHILS ABSOLUTE: 0.2 K/UL (ref 0–0.6)
EOSINOPHILS RELATIVE PERCENT: 3.7 %
GFR AFRICAN AMERICAN: 41
GFR NON-AFRICAN AMERICAN: 34
GLUCOSE BLD-MCNC: 103 MG/DL (ref 70–99)
GLUCOSE BLD-MCNC: 85 MG/DL (ref 70–99)
GLUCOSE BLD-MCNC: 88 MG/DL (ref 70–99)
GLUCOSE BLD-MCNC: 91 MG/DL (ref 70–99)
GLUCOSE BLD-MCNC: 93 MG/DL (ref 70–99)
GLUCOSE BLD-MCNC: 94 MG/DL (ref 70–99)
GLUCOSE BLD-MCNC: 99 MG/DL (ref 70–99)
GLUCOSE BLD-MCNC: 99 MG/DL (ref 70–99)
HCO3 ARTERIAL: 27 MMOL/L (ref 21–29)
HCT VFR BLD CALC: 26.9 % (ref 40.5–52.5)
HEMOGLOBIN, ART, EXTENDED: 11 G/DL (ref 13.5–17.5)
HEMOGLOBIN: 9.1 G/DL (ref 13.5–17.5)
LV EF: 63 %
LVEF MODALITY: NORMAL
LYMPHOCYTES ABSOLUTE: 0.6 K/UL (ref 1–5.1)
LYMPHOCYTES RELATIVE PERCENT: 9.7 %
MAGNESIUM: 2.2 MG/DL (ref 1.8–2.4)
MCH RBC QN AUTO: 31.8 PG (ref 26–34)
MCHC RBC AUTO-ENTMCNC: 33.7 G/DL (ref 31–36)
MCV RBC AUTO: 94.5 FL (ref 80–100)
METHEMOGLOBIN ARTERIAL: 0.9 %
MONOCYTES ABSOLUTE: 0.6 K/UL (ref 0–1.3)
MONOCYTES RELATIVE PERCENT: 9.3 %
NEUTROPHILS ABSOLUTE: 5.1 K/UL (ref 1.7–7.7)
NEUTROPHILS RELATIVE PERCENT: 76.6 %
O2 SAT, ARTERIAL: 95.2 %
O2 THERAPY: ABNORMAL
PCO2 ARTERIAL: 43.2 MMHG (ref 35–45)
PDW BLD-RTO: 16.7 % (ref 12.4–15.4)
PERFORMED ON: ABNORMAL
PERFORMED ON: NORMAL
PH ARTERIAL: 7.4 (ref 7.35–7.45)
PHOSPHORUS: 2 MG/DL (ref 2.5–4.9)
PLATELET # BLD: 382 K/UL (ref 135–450)
PMV BLD AUTO: 7 FL (ref 5–10.5)
PO2 ARTERIAL: 72.5 MMHG (ref 75–108)
POTASSIUM SERPL-SCNC: 3.8 MMOL/L (ref 3.5–5.1)
PRO-BNP: 1207 PG/ML (ref 0–124)
PROCALCITONIN: 2.58 NG/ML (ref 0–0.15)
RBC # BLD: 2.85 M/UL (ref 4.2–5.9)
SODIUM BLD-SCNC: 140 MMOL/L (ref 136–145)
TCO2 ARTERIAL: 28.3 MMOL/L
WBC # BLD: 6.6 K/UL (ref 4–11)

## 2022-08-11 PROCEDURE — 6370000000 HC RX 637 (ALT 250 FOR IP): Performed by: NURSE PRACTITIONER

## 2022-08-11 PROCEDURE — 36600 WITHDRAWAL OF ARTERIAL BLOOD: CPT

## 2022-08-11 PROCEDURE — APPSS15 APP SPLIT SHARED TIME 0-15 MINUTES: Performed by: NURSE PRACTITIONER

## 2022-08-11 PROCEDURE — 94003 VENT MGMT INPAT SUBQ DAY: CPT

## 2022-08-11 PROCEDURE — 6370000000 HC RX 637 (ALT 250 FOR IP): Performed by: INTERNAL MEDICINE

## 2022-08-11 PROCEDURE — 80048 BASIC METABOLIC PNL TOTAL CA: CPT

## 2022-08-11 PROCEDURE — 94640 AIRWAY INHALATION TREATMENT: CPT

## 2022-08-11 PROCEDURE — 87205 SMEAR GRAM STAIN: CPT

## 2022-08-11 PROCEDURE — 82803 BLOOD GASES ANY COMBINATION: CPT

## 2022-08-11 PROCEDURE — 87070 CULTURE OTHR SPECIMN AEROBIC: CPT

## 2022-08-11 PROCEDURE — 74176 CT ABD & PELVIS W/O CONTRAST: CPT

## 2022-08-11 PROCEDURE — 94761 N-INVAS EAR/PLS OXIMETRY MLT: CPT

## 2022-08-11 PROCEDURE — 2580000003 HC RX 258: Performed by: NURSE PRACTITIONER

## 2022-08-11 PROCEDURE — 85025 COMPLETE CBC W/AUTO DIFF WBC: CPT

## 2022-08-11 PROCEDURE — 2580000003 HC RX 258: Performed by: INTERNAL MEDICINE

## 2022-08-11 PROCEDURE — 6360000004 HC RX CONTRAST MEDICATION: Performed by: HOSPITALIST

## 2022-08-11 PROCEDURE — 6360000004 HC RX CONTRAST MEDICATION: Performed by: SURGERY

## 2022-08-11 PROCEDURE — 83880 ASSAY OF NATRIURETIC PEPTIDE: CPT

## 2022-08-11 PROCEDURE — 6360000002 HC RX W HCPCS: Performed by: NURSE PRACTITIONER

## 2022-08-11 PROCEDURE — 2000000000 HC ICU R&B

## 2022-08-11 PROCEDURE — 84100 ASSAY OF PHOSPHORUS: CPT

## 2022-08-11 PROCEDURE — APPNB15 APP NON BILLABLE TIME 0-15 MINS: Performed by: NURSE PRACTITIONER

## 2022-08-11 PROCEDURE — 2500000003 HC RX 250 WO HCPCS: Performed by: NURSE PRACTITIONER

## 2022-08-11 PROCEDURE — 71045 X-RAY EXAM CHEST 1 VIEW: CPT

## 2022-08-11 PROCEDURE — 36415 COLL VENOUS BLD VENIPUNCTURE: CPT

## 2022-08-11 PROCEDURE — 99291 CRITICAL CARE FIRST HOUR: CPT | Performed by: INTERNAL MEDICINE

## 2022-08-11 PROCEDURE — C8929 TTE W OR WO FOL WCON,DOPPLER: HCPCS

## 2022-08-11 PROCEDURE — 83735 ASSAY OF MAGNESIUM: CPT

## 2022-08-11 PROCEDURE — 84145 PROCALCITONIN (PCT): CPT

## 2022-08-11 PROCEDURE — 2700000000 HC OXYGEN THERAPY PER DAY

## 2022-08-11 RX ORDER — CALCIUM GLUCONATE 20 MG/ML
1000 INJECTION, SOLUTION INTRAVENOUS ONCE
Status: COMPLETED | OUTPATIENT
Start: 2022-08-11 | End: 2022-08-11

## 2022-08-11 RX ADMIN — SODIUM CHLORIDE, PRESERVATIVE FREE 10 ML: 5 INJECTION INTRAVENOUS at 20:41

## 2022-08-11 RX ADMIN — POTASSIUM PHOSPHATE, MONOBASIC AND POTASSIUM PHOSPHATE, DIBASIC 20 MMOL: 224; 236 INJECTION, SOLUTION, CONCENTRATE INTRAVENOUS at 09:47

## 2022-08-11 RX ADMIN — CHLORHEXIDINE GLUCONATE 0.12% ORAL RINSE 15 ML: 1.2 LIQUID ORAL at 20:40

## 2022-08-11 RX ADMIN — FAMOTIDINE 20 MG: 10 INJECTION, SOLUTION INTRAVENOUS at 07:59

## 2022-08-11 RX ADMIN — PIPERACILLIN AND TAZOBACTAM 4500 MG: 4; .5 INJECTION, POWDER, LYOPHILIZED, FOR SOLUTION INTRAVENOUS at 23:30

## 2022-08-11 RX ADMIN — IPRATROPIUM BROMIDE AND ALBUTEROL SULFATE 1 AMPULE: 2.5; .5 SOLUTION RESPIRATORY (INHALATION) at 20:19

## 2022-08-11 RX ADMIN — SODIUM CHLORIDE, POTASSIUM CHLORIDE, SODIUM LACTATE AND CALCIUM CHLORIDE: 600; 310; 30; 20 INJECTION, SOLUTION INTRAVENOUS at 10:50

## 2022-08-11 RX ADMIN — PROPOFOL 20 MCG/KG/MIN: 10 INJECTION, EMULSION INTRAVENOUS at 13:46

## 2022-08-11 RX ADMIN — PROPOFOL 25 MCG/KG/MIN: 10 INJECTION, EMULSION INTRAVENOUS at 04:07

## 2022-08-11 RX ADMIN — SODIUM CHLORIDE, POTASSIUM CHLORIDE, SODIUM LACTATE AND CALCIUM CHLORIDE: 600; 310; 30; 20 INJECTION, SOLUTION INTRAVENOUS at 04:08

## 2022-08-11 RX ADMIN — PIPERACILLIN AND TAZOBACTAM 4500 MG: 4; .5 INJECTION, POWDER, LYOPHILIZED, FOR SOLUTION INTRAVENOUS at 00:18

## 2022-08-11 RX ADMIN — SODIUM CHLORIDE, PRESERVATIVE FREE 10 ML: 5 INJECTION INTRAVENOUS at 08:06

## 2022-08-11 RX ADMIN — ENOXAPARIN SODIUM 40 MG: 100 INJECTION SUBCUTANEOUS at 07:58

## 2022-08-11 RX ADMIN — PIPERACILLIN AND TAZOBACTAM 4500 MG: 4; .5 INJECTION, POWDER, LYOPHILIZED, FOR SOLUTION INTRAVENOUS at 08:06

## 2022-08-11 RX ADMIN — IPRATROPIUM BROMIDE AND ALBUTEROL SULFATE 1 AMPULE: 2.5; .5 SOLUTION RESPIRATORY (INHALATION) at 07:45

## 2022-08-11 RX ADMIN — Medication 150 MCG/HR: at 17:26

## 2022-08-11 RX ADMIN — Medication 150 MCG/HR: at 04:38

## 2022-08-11 RX ADMIN — PROPOFOL 20 MCG/KG/MIN: 10 INJECTION, EMULSION INTRAVENOUS at 00:18

## 2022-08-11 RX ADMIN — IPRATROPIUM BROMIDE AND ALBUTEROL SULFATE 1 AMPULE: 2.5; .5 SOLUTION RESPIRATORY (INHALATION) at 15:45

## 2022-08-11 RX ADMIN — Medication 175 MCG/HR: at 23:32

## 2022-08-11 RX ADMIN — PERFLUTREN 1.65 MG: 6.52 INJECTION, SUSPENSION INTRAVENOUS at 16:07

## 2022-08-11 RX ADMIN — PROPOFOL 25 MCG/KG/MIN: 10 INJECTION, EMULSION INTRAVENOUS at 22:07

## 2022-08-11 RX ADMIN — CALCIUM GLUCONATE 1000 MG: 20 INJECTION, SOLUTION INTRAVENOUS at 08:45

## 2022-08-11 RX ADMIN — PROPOFOL 25 MCG/KG/MIN: 10 INJECTION, EMULSION INTRAVENOUS at 17:23

## 2022-08-11 RX ADMIN — CHLORHEXIDINE GLUCONATE 0.12% ORAL RINSE 15 ML: 1.2 LIQUID ORAL at 07:58

## 2022-08-11 RX ADMIN — Medication 150 MCG/HR: at 11:46

## 2022-08-11 RX ADMIN — PROPOFOL 30 MCG/KG/MIN: 10 INJECTION, EMULSION INTRAVENOUS at 09:00

## 2022-08-11 RX ADMIN — PIPERACILLIN AND TAZOBACTAM 4500 MG: 4; .5 INJECTION, POWDER, LYOPHILIZED, FOR SOLUTION INTRAVENOUS at 16:16

## 2022-08-11 RX ADMIN — ENOXAPARIN SODIUM 40 MG: 100 INJECTION SUBCUTANEOUS at 20:41

## 2022-08-11 RX ADMIN — IPRATROPIUM BROMIDE AND ALBUTEROL SULFATE 1 AMPULE: 2.5; .5 SOLUTION RESPIRATORY (INHALATION) at 11:38

## 2022-08-11 RX ADMIN — IOPAMIDOL 50 ML: 612 INJECTION, SOLUTION INTRAVENOUS at 08:50

## 2022-08-11 ASSESSMENT — PULMONARY FUNCTION TESTS
PIF_VALUE: 31
PIF_VALUE: 26
PIF_VALUE: 27
PIF_VALUE: 26
PIF_VALUE: 26
PIF_VALUE: 27
PIF_VALUE: 26
PIF_VALUE: 27
PIF_VALUE: 22
PIF_VALUE: 28
PIF_VALUE: 26
PIF_VALUE: 26
PIF_VALUE: 27
PIF_VALUE: 28
PIF_VALUE: 27
PIF_VALUE: 26
PIF_VALUE: 27
PIF_VALUE: 30
PIF_VALUE: 26
PIF_VALUE: 21
PIF_VALUE: 27
PIF_VALUE: 30
PIF_VALUE: 26
PIF_VALUE: 27
PIF_VALUE: 26
PIF_VALUE: 27
PIF_VALUE: 28
PIF_VALUE: 30
PIF_VALUE: 27
PIF_VALUE: 27
PIF_VALUE: 26
PIF_VALUE: 27
PIF_VALUE: 26
PIF_VALUE: 21
PIF_VALUE: 29
PIF_VALUE: 27
PIF_VALUE: 22
PIF_VALUE: 27
PIF_VALUE: 26
PIF_VALUE: 26
PIF_VALUE: 27
PIF_VALUE: 26
PIF_VALUE: 27
PIF_VALUE: 27
PIF_VALUE: 22
PIF_VALUE: 26

## 2022-08-11 ASSESSMENT — PAIN SCALES - GENERAL
PAINLEVEL_OUTOF10: 0

## 2022-08-11 ASSESSMENT — PAIN SCALES - WONG BAKER: WONGBAKER_NUMERICALRESPONSE: 0

## 2022-08-11 NOTE — PROGRESS NOTES
Admit Date: 8/5/2022      REASON FOR ADMISSION:   Patient with abdominal pain, nausea, vomiting,  found to have some bowel obstruction. REASON for  follow up  Acute kidney injury with creatinine jumped from 0.8 to a current creatinine of 2.7,  most likely ATN due to episode of severe hypotension where his blood pressure dropped 78/44. was also on lisinopril. Lisinopril is being stopped now. INTERVAL HISTORY  Intubated/sedated  On 60% FIO2   BP 77/62-->107/65  Off  levo  Urine 3725   Creat 2.7-->2.5-->2  ProBNP 2277-->797-->1207  Urine Na < 20   Cortisol normal  Pts wife here discussed       PLAN    Reduce Ringers lactate 100 ml/hour   If BP stays stable will stop and then diuretics    on 60 % FIO2  Discussed with nurse     Acute kidney injury   creatinine on admission 1   next day on 08/06/2022 was 0.8  suddenly jumped up to 2.7. Due to episode of severe hypotension with blood pressure drop 78/44. also got CT with IV contrast on 08/05/2022 but most likely  this is ATN related due to hypotension and hypercapnic  respiratory failure. White cell count is up could be septic. On Levophed    IV fluids. He just got a bolus as ordered by Dr. Beto Crabtree, intensivist and currently on Ringer's lactate 150 mL an hour. CT angio s normal renal arteries and normal kidneys      Sepsis/ possible septic shock. emperature was 101.1   High white cell count. '  immunocompromised due to rheumatoid arthritis and treatment. History of sleep apnea   on CPAP. Acute respiratory failure with high pCO2 in the range of 50s with pH  of 7.24    now intubated. History of abnormal stress test in 06/2022 with stress-induced  ischemia and some scarring. Ejection fraction  was  64%.     had a followup angiogram which was reported as normal as per wife.   I do not have that result     History of rheumatoid arthritis   longstanding,was on methotrexate,  has taken Celebrex and now on Inflectra infusions,  so he is immunocompromised. Mitzi Díaz is now helping him with his rheumatoid tremendously. he has been on steroids in the past.     Rule out adrenal insufficiency. cortisol level. normal      History of thyroidectomy in the past. .    Small bowel obstruction. Large hiatus hernia with colon and stomach in the chest.  Further management as per medical team.     HISTORY OF PRESENTING COMPLAINT:  A 74-year-old  gentleman seen  in ICU where he was intubated, sedated. His wife is present. Gentleman  who was a retired . No smoking or alcohol. Three children, in  good health. No family history of kidney disease with known history of  rheumatoid arthritis for the last many years. Many treatments with  methotrexate to Celebrex in the past and now on Inflectra IV infusion  under the care of Dr. Florin Berg at [facility]. History of atrial fibrillation,  previous normal coronary angiogram in 2022, came with the above  complaints, found some bowel obstruction on CT, transition point, mid  small bowel. He was known to have a large paraesophageal hernia with  herniation in the stomach and colon in it. He was transferred to ICU on 08/07/2022 because of respiratory distress  and hypercapnia and was eventually intubated. He was known to have  obstructive sleep apnea and is on CPAP. REVIEW OF SYSTEMS:    Not much available except above discussed with the  wife who was present. Having abdominal pain, some nausea, vomiting,  poor intake for the last week or so. No fever. No chills. CARDIAC:  No chest pain. PULMONARY:  No shortness of breath. GENITOURINARY:  No  dysuria or hematuria. VASCULAR:  No claudication. Review of the rest  of the systems is negative.              Objective:     Patient Vitals for the past 8 hrs:   BP Temp Temp src Pulse Resp SpO2 Weight   08/11/22 0746 -- -- -- -- -- 95 % --   08/11/22 0700 94/62 -- -- 69 20 95 % --   08/11/22 0630 99/62 -- -- 69 20 96 % --   08/11/22 0600 96/60 -- -- 71 20 96 % --   08/11/22 0530 (!) 87/59 -- -- 70 20 95 % --   08/11/22 0500 (!) 90/57 -- -- 69 20 96 % --   08/11/22 0430 (!) 88/54 98 °F (36.7 °C) Axillary 71 20 95 % (!) 368 lb 6.2 oz (167.1 kg)   08/11/22 0420 -- -- -- 71 20 95 % --   08/11/22 0345 89/60 -- -- 73 20 94 % --   08/11/22 0330 (!) 93/59 -- -- 75 20 95 % --   08/11/22 0315 94/60 -- -- 76 20 94 % --   08/11/22 0300 99/61 -- -- 76 19 96 % --   08/11/22 0245 103/62 -- -- 76 16 96 % --   08/11/22 0238 101/64 -- -- 76 22 96 % --   08/11/22 0215 137/79 -- -- 80 18 95 % --   08/11/22 0200 107/64 -- -- 77 20 98 % --   08/11/22 0145 115/69 -- -- 76 19 98 % --   08/11/22 0130 108/64 -- -- 74 19 98 % --   08/11/22 0115 117/71 -- -- 76 18 97 % --   08/11/22 0100 124/69 -- -- 82 19 96 % --   08/11/22 0045 108/84 -- -- 78 19 94 % --   08/11/22 0030 121/89 -- -- 79 19 94 % --         I/O last 3 completed shifts: In: 8601.4 [I.V.:8114.9; IV Piggyback:486.5]  Out: 3524 [Urine:4300; Emesis/NG output:150]        General appearance:  GENERAL:  On examination, obese gentleman with a BMI of 54, sedated,intubated. HEENT:  Head:  Normocephalic, atraumatic. Conjunctivae:  No icterus. CARDIAC:  Normal muffled heart sounds. NECK:  Difficult to see, very short neck. CHEST:  Reduced breath sounds. ABDOMEN:  Distended but soft, nontender. Bowel sounds absent. EXTREMITIES:  Bilateral lower extremities, 1+ edema. NEUROLOGICAL:  Hard to assess. Sandeep Edin l  Lab Results   Component Value Date    CREATININE 2.0 (H) 08/11/2022    BUN 51 (H) 08/11/2022     08/11/2022    K 3.8 08/11/2022     08/11/2022    CO2 24 08/11/2022     Lab Results   Component Value Date    WBC 6.6 08/11/2022    HGB 9.1 (L) 08/11/2022    HCT 26.9 (L) 08/11/2022    MCV 94.5 08/11/2022     08/11/2022            AGLUCOSE)Magnesium:    Lab Results   Component Value Date/Time    MG 2.20 08/11/2022 04:14 AM     Phosphorus:    Lab Results   Component Value Date/Time    PHOS 2.0 08/11/2022 04:14 AM Uric Acid:  No components found for: URIC    Principal Problem:    Partial bowel obstruction (HCC)  Active Problems:    Hiatal hernia    Acute respiratory failure with hypoxia and hypercapnia (HCC)    SBO (small bowel obstruction) (HCC)    Aspiration into airway    Acute respiratory failure with hypoxia (HCC)    Small bowel obstruction (HCC)  Resolved Problems:    * No resolved hospital problems.  *

## 2022-08-11 NOTE — PLAN OF CARE
Problem: Discharge Planning  Goal: Discharge to home or other facility with appropriate resources  Outcome: Progressing  Flowsheets (Taken 8/10/2022 2000)  Discharge to home or other facility with appropriate resources: Identify barriers to discharge with patient and caregiver     Problem: Pain  Goal: Verbalizes/displays adequate comfort level or baseline comfort level  Outcome: Progressing  Flowsheets  Taken 8/11/2022 0000  Verbalizes/displays adequate comfort level or baseline comfort level:   Assess pain using appropriate pain scale   Administer analgesics based on type and severity of pain and evaluate response  Taken 8/10/2022 2000  Verbalizes/displays adequate comfort level or baseline comfort level:   Assess pain using appropriate pain scale   Administer analgesics based on type and severity of pain and evaluate response     Problem: ABCDS Injury Assessment  Goal: Absence of physical injury  Outcome: Progressing  Flowsheets (Taken 8/11/2022 0449)  Absence of Physical Injury: Implement safety measures based on patient assessment     Problem: Safety - Adult  Goal: Free from fall injury  Outcome: Progressing  Flowsheets (Taken 8/11/2022 0449)  Free From Fall Injury: Instruct family/caregiver on patient safety     Problem: Skin/Tissue Integrity  Goal: Absence of new skin breakdown  Description: 1. Monitor for areas of redness and/or skin breakdown  2. Assess vascular access sites hourly  3. Every 4-6 hours minimum:  Change oxygen saturation probe site  4. Every 4-6 hours:  If on nasal continuous positive airway pressure, respiratory therapy assess nares and determine need for appliance change or resting period. Outcome: Progressing     Problem: Safety - Medical Restraint  Goal: Remains free of injury from restraints (Restraint for Interference with Medical Device)  Description: INTERVENTIONS:  1.  Determine that other, less restrictive measures have been tried or would not be effective before applying the restraint  2. Evaluate the patient's condition at the time of restraint application  3. Inform patient/family regarding the reason for restraint  4.  Q2H: Monitor safety, psychosocial status, comfort, nutrition and hydration  Outcome: Progressing  Flowsheets  Taken 8/11/2022 0000  Remains free of injury from restraints (restraint for interference with medical device): Determine that other, less restrictive measures have been tried or would not be effective before applying the restraint  Taken 8/10/2022 2000  Remains free of injury from restraints (restraint for interference with medical device): Determine that other, less restrictive measures have been tried or would not be effective before applying the restraint     Problem: Nutrition Deficit:  Goal: Optimize nutritional status  Outcome: Progressing     Problem: Neurosensory - Adult  Goal: Achieves stable or improved neurological status  Outcome: Progressing  Goal: Absence of seizures  Outcome: Progressing  Goal: Remains free of injury related to seizures activity  Outcome: Progressing  Goal: Achieves maximal functionality and self care  Outcome: Progressing     Problem: Respiratory - Adult  Goal: Achieves optimal ventilation and oxygenation  Outcome: Progressing  Flowsheets (Taken 8/10/2022 2000)  Achieves optimal ventilation and oxygenation: Assess for changes in respiratory status     Problem: Cardiovascular - Adult  Goal: Maintains optimal cardiac output and hemodynamic stability  Outcome: Progressing  Flowsheets (Taken 8/10/2022 2000)  Maintains optimal cardiac output and hemodynamic stability:   Monitor blood pressure and heart rate   Monitor urine output and notify Licensed Independent Practitioner for values outside of normal range  Goal: Absence of cardiac dysrhythmias or at baseline  Outcome: Progressing  Flowsheets (Taken 8/10/2022 2000)  Absence of cardiac dysrhythmias or at baseline: Monitor cardiac rate and rhythm     Problem: Skin/Tissue Integrity - Adult  Goal: Skin integrity remains intact  Outcome: Progressing  Flowsheets  Taken 8/11/2022 0449  Skin Integrity Remains Intact: Monitor for areas of redness and/or skin breakdown  Taken 8/10/2022 2000  Skin Integrity Remains Intact: Monitor for areas of redness and/or skin breakdown  Goal: Incisions, wounds, or drain sites healing without S/S of infection  Outcome: Progressing  Goal: Oral mucous membranes remain intact  Outcome: Progressing     Problem: Musculoskeletal - Adult  Goal: Return mobility to safest level of function  Outcome: Progressing  Flowsheets (Taken 8/10/2022 2000)  Return Mobility to Safest Level of Function: Assess patient stability and activity tolerance for standing, transferring and ambulating with or without assistive devices  Goal: Maintain proper alignment of affected body part  Outcome: Progressing  Flowsheets (Taken 8/10/2022 2000)  Maintain proper alignment of affected body part: Support and protect limb and body alignment per provider's orders  Goal: Return ADL status to a safe level of function  Outcome: Progressing  Flowsheets (Taken 8/10/2022 2000)  Return ADL Status to a Safe Level of Function: Administer medication as ordered     Problem: Gastrointestinal - Adult  Goal: Minimal or absence of nausea and vomiting  Outcome: Progressing  Flowsheets (Taken 8/10/2022 2000)  Minimal or absence of nausea and vomiting:   Administer IV fluids as ordered to ensure adequate hydration   Maintain NPO status until nausea and vomiting are resolved   Nasogastric tube to low intermittent suction as ordered  Goal: Maintains or returns to baseline bowel function  Outcome: Progressing  Goal: Maintains adequate nutritional intake  Outcome: Progressing  Goal: Establish and maintain optimal ostomy function  Outcome: Progressing     Problem: Genitourinary - Adult  Goal: Absence of urinary retention  Outcome: Progressing  Goal: Urinary catheter remains patent  Outcome: Progressing     Problem: Anxiety  Goal: Will report anxiety at manageable levels  Description: INTERVENTIONS:  1. Administer medication as ordered  2. Teach and rehearse alternative coping skills  3. Provide emotional support with 1:1 interaction with staff  Outcome: Progressing     Problem: Coping  Goal: Pt/Family able to verbalize concerns and demonstrate effective coping strategies  Description: INTERVENTIONS:  1. Assist patient/family to identify coping skills, available support systems and cultural and spiritual values  2. Provide emotional support, including active listening and acknowledgement of concerns of patient and caregivers  3. Reduce environmental stimuli, as able  4. Instruct patient/family in relaxation techniques, as appropriate  5. Assess for spiritual pain/suffering and initiate Spiritual Care, Psychosocial Clinical Specialist consults as needed  Outcome: Progressing     Problem: Change in Body Image  Goal: Pt/Family communicate acceptance of loss or change in body image and feel psychological comfort and peace  Description: INTERVENTIONS:  1. Assess patient/family anxiety and grief process related to change in body image, loss of functional status, loss of sense of self, and forgiveness  2. Provide emotional and spiritual support  3. Provide information about the patient's health status with consideration of family and cultural values  4. Communicate willingness to discuss loss and facilitate grief process with patient/family as appropriate  5. Emphasize sustaining relationships within family system and community, or aurora/spiritual traditions  6. Initiate Spiritual Care, Psychosocial Clinical Specialist consult as needed  Outcome: Progressing     Problem: Decision Making  Goal: Pt/Family able to effectively weigh alternatives and participate in decision making related to treatment and care  Description: INTERVENTIONS:  1.  Determine when there are differences between patient's view, family's view, and healthcare provider's view of condition  2. Facilitate patient and family articulation of goals for care  3. Help patient and family identify pros/cons of alternative solutions  4. Provide information as requested by patient/family  5. Respect patient/family right to receive or not to receive information  6. Serve as a liaison between patient and family and health care team  7. Initiate Consults from Ethics, Palliative Care or initiate [address] as is appropriate  Outcome: Progressing     Problem: Behavior  Goal: Pt/Family maintain appropriate behavior and adhere to behavioral management agreement, if implemented  Description: INTERVENTIONS:  1. Assess patient/family's coping skills and  non-compliant behavior (including use of illegal substances)  2. Notify security of behavior or suspected illegal substances which indicate the need for search of the family and/or belongings  3. Encourage verbalization of thoughts and concerns in a socially appropriate manner  4. Utilize positive, consistent limit setting strategies supporting safety of patient, staff and others  5. Encourage participation in the decision making process about the behavioral management agreement  6. If a visitor's behavior poses a threat to safety call refer to organization policy.   7. Initiate consult with , Psychosocial CNS, Spiritual Care as appropriate  Outcome: Progressing

## 2022-08-11 NOTE — PLAN OF CARE
Problem: Safety - Adult  Goal: Free from fall injury  8/11/2022 1333 by Liya Newby RN  Outcome: Progressing  8/11/2022 0449 by Keyla Mascorro RN  Outcome: Progressing  Flowsheets (Taken 8/11/2022 0449)  Free From Fall Injury: Instruct family/caregiver on patient safety    Problem: Safety - Medical Restraint  Goal: Remains free of injury from restraints (Restraint for Interference with Medical Device)  Description: INTERVENTIONS:  1. Determine that other, less restrictive measures have been tried or would not be effective before applying the restraint  2. Evaluate the patient's condition at the time of restraint application  3. Inform patient/family regarding the reason for restraint  4.  Q2H: Monitor safety, psychosocial status, comfort, nutrition and hydration  8/11/2022 1333 by Liya Newby RN  Outcome: Progressing  8/11/2022 0449 by Keyla Mascorro RN  Outcome: Progressing  Flowsheets  Taken 8/11/2022 0000  Remains free of injury from restraints (restraint for interference with medical device): Determine that other, less restrictive measures have been tried or would not be effective before applying the restraint  Taken 8/10/2022 2000  Remains free of injury from restraints (restraint for interference with medical device): Determine that other, less restrictive measures have been tried or would not be effective before applying the restraint

## 2022-08-11 NOTE — PROGRESS NOTES
Comprehensive Nutrition Assessment    Type and Reason for Visit:  Reassess    Nutrition Recommendations/Plan:   Monitor for start of nutrition. If TF initiated, consult RD for \"Tube Feedings order and management\". Start trophic TF, Vital HP @ 10 mL/hr. GOAL TF BELOW. Malnutrition Assessment:  Malnutrition Status: At risk for malnutrition (Comment) (08/11/22 0998)    Context:  Acute Illness     Findings of the 6 clinical characteristics of malnutrition:  Energy Intake:  50% or less of estimated energy requirements for 5 or more days  Weight Loss:  Unable to assess     Body Fat Loss:  Unable to assess     Muscle Mass Loss:  Unable to assess    Fluid Accumulation:  No significant fluid accumulation     Strength:  Not Performed    Nutrition Assessment:    Follow-up. Pt remains intubated, OG in place, currently clamped. Pt with partial SBO, had minimal output from OG. Abdomen remains distended. Pt continues on fent and propofool @ 28.5 mL/hr providing 752 kcal daily. Currently off pressors. Recommend starting trophic feeds with slow advancement given no nutrition >4 days. Will provide TF recommendations. Nutrition Related Findings:    non pitting BLE and BUE edema; BM 8/9; Phos 2.0; +15 L fluid Wound Type: None       Current Nutrition Intake & Therapies:    Average Meal Intake: NPO  Average Supplements Intake: NPO  Diet NPO  Current Tube Feeding (TF) Recommendations:  Goal TF & Flush Orders Provides: Vital HP with goal rate of 20 ml/hr +4 proteinex daily (adjusted for propofol dose). Flush per provider. TF regimen provides: 400 mL TV, 816 kcal, 139 gm pro, 334  mL free water (includes 4 proteinex daily). Additional Calorie Sources:  Propofool @ 28.5 mL/hr providing 752 kcal daily    Anthropometric Measures:  Height: 5' 7\" (170.2 cm)  Ideal Body Weight (IBW): 148 lbs (67 kg)    Admission Body Weight: 340 lb (154.2 kg)  Current Body Weight: 368 lb (166.9 kg), 248.6 % IBW.  Weight Source: Bed Scale  Current BMI (kg/m2): 57.6        Weight Adjustment For: No Adjustment                 BMI Categories: Obese Class 3 (BMI 40.0 or greater)    Estimated Daily Nutrient Needs:  Energy Requirements Based On: Kcal/kg  Weight Used for Energy Requirements: Ideal  Energy (kcal/day): 8492-1258 (22-25kcal/67kg)  Weight Used for Protein Requirements: Ideal  Protein (g/day): 134-168 (2-2.5kcal/67kg)  Method Used for Fluid Requirements: Other (Comment)  Fluid (ml/day): per provider    Nutrition Diagnosis:   Inadequate oral intake related to impaired respiratory function as evidenced by NPO or clear liquid status due to medical condition, intubation    Nutrition Interventions:   Food and/or Nutrient Delivery: Continue NPO  Nutrition Education/Counseling: Education not indicated  Coordination of Nutrition Care: Continue to monitor while inpatient       Goals:     Goals: Initiate nutrition support, by next RD assessment       Nutrition Monitoring and Evaluation:   Behavioral-Environmental Outcomes: None Identified  Food/Nutrient Intake Outcomes: Diet Advancement/Tolerance, Enteral Nutrition Intake/Tolerance, IVF Intake  Physical Signs/Symptoms Outcomes: Biochemical Data, Chewing or Swallowing, GI Status, Nausea or Vomiting, Fluid Status or Edema, Hemodynamic Status, Weight    Discharge Planning:     Too soon to determine     Tomas Root RD, LD  Contact: [phone]

## 2022-08-11 NOTE — PROGRESS NOTES
Patient to and from CT scan on transport ventilator. No complications. RN x 2 at bedside.     Electronically signed by Xochitl Petty on 8/11/2022 at 10:24 AM

## 2022-08-11 NOTE — PROGRESS NOTES
Dr. Umu Samaniego at the bedside adjusting ventilator settings. Verbal order to decrease propofol from 35mcg/kg/min to 15 mcg/kg/min.

## 2022-08-11 NOTE — PROGRESS NOTES
Pulmonary Critical Care Progress Note     Patient's name:  Aneta Hadley Record Number: [id number]  Patient's account/billing number: [de-identified]  Patient's YOB: 1960  Age: 63 y.o. Date of Admission: 8/5/2022  1:56 PM  Date of Consult: 8/11/2022      Primary Care Physician: Toby Gonsalez MD      Code Status: Full Code    Chief complaint: acute respiratory failure with hypoxia and hypercapnia     Assessment and Plan     Acute respiratory failure with hypoxia and hypercapnia worsening requiring intubation and mechanical ventilation. Aspiration pneumonia bibasilar multifocal   Partial small bowel obstruction. S/p EGD with OG placement  Acute kidney injury, dehydration and contrast nephropathy  Large hiatal hernia  Obstructive sleep apnea obesity hypoventilation syndrome  Sedation related hypotension      Plan:  Vent support, setting adjusted. wean Fio2 as tolerated, keep sat > 90%  Levophed, keep MAP > 65  D/C iv fluid, positive fluid balance, improving kidney function, likely will need some diuresis before extubation  Zosyn for aspiration pneumonia. Check sputum culture and pct  GI and DVT prophylaxis. Check 2 D echo    Overnight:  On the vent   No acute events overnight  CT abdomen reviewed. REVIEW OF SYSTEMS:  Review of Systems -   Unable to obtain intubated on MV        Physical Exam:    Vitals: /88   Pulse 85   Temp 98 °F (36.7 °C) (Axillary)   Resp 16   Ht 5' 7\" (1.702 m)   Wt (!) 368 lb 6.2 oz (167.1 kg)   SpO2 93%   BMI 57.70 kg/m²     Last Body weight:   Wt Readings from Last 3 Encounters:   08/11/22 (!) 368 lb 6.2 oz (167.1 kg)       Body Mass Index : Body mass index is 57.7 kg/m². Intake and Output summary:   Intake/Output Summary (Last 24 hours) at 8/11/2022 1242  Last data filed at 8/11/2022 1200  Gross per 24 hour   Intake 7492.98 ml   Output 4040 ml   Net 3452.98 ml       Physical Examination:     Gen: intubated on MV  Eyes: PERRL.  Anicteric sclera. No conjunctival injection. ENT: No discharge. Posterior oropharynx clear. External appearance of ears and nose normal.  Neck: Trachea midline. No mass   Resp: Diminished bilaterally with upper airway wheezing  CV: Regular rate. Regular rhythm. No murmur or rub. No edema. GI: Soft distended sluggish bowel sounds. Skin: Warm, dry, w/o erythema. Lymph: No cervical or supraclavicular LAD. M/S: No cyanosis. No clubbing. Neuro: sedated on MV        Laboratory findings:-    CBC:   Recent Labs     08/11/22  0414   WBC 6.6   HGB 9.1*        BMP:    Recent Labs     08/09/22  0416 08/10/22  0440 08/11/22  0414   * 136 140   K 3.4* 4.3 3.8    102 106   CO2 18* 23 24   BUN 47* 57* 51*   CREATININE 2.6* 2.9* 2.0*   GLUCOSE 133* 131* 99     S. Calcium:  Recent Labs     08/11/22  0414   CALCIUM 7.7*     S. Magnesium:  Recent Labs     08/11/22  0414   MG 2.20     S. Phosphorus:  Recent Labs     08/11/22  0414   PHOS 2.0*     S. Glucose:  Recent Labs     08/11/22  0414 08/11/22  0750 08/11/22  1155   POCGLU 93 94 88           Radiology Review:  Pertinent images / reports were reviewed as a part of this visit.     Reviewed     Critical Care time 35 min                Anshul Norwood MD, M.JACOBY.            8/11/2022, 12:42 PM

## 2022-08-11 NOTE — PROGRESS NOTES
Hospitalist Progress Note  8/11/2022 9:25 AM    PCP: Zenobia Amos MD    [id number]     Date of Admission: 8/5/2022                                                                                                                     HOSPITAL COURSE    Patient demographics:  The patient  Álvaro Argueta is a 63 y.o. male     Significant past medical history:   Patient Active Problem List   Diagnosis    Partial bowel obstruction (HCC)    Hiatal hernia    Acute respiratory failure with hypoxia and hypercapnia (HCC)    SBO (small bowel obstruction) (HCC)    Aspiration into airway    Acute respiratory failure with hypoxia ([address]    Small bowel obstruction ([address]         Presenting symptoms:  abdominal pain    Diagnostic workup:      CONSULTS DURING ADMISSION :   IP CONSULT TO GENERAL SURGERY  IP CONSULT TO HOSPITALIST  IP CONSULT TO GENERAL SURGERY  IP CONSULT TO DIETITIAN      Patient was diagnosed with:  Low grade or Partial Bowel obstruction  Pneumonia and septic shock. Hiatal hernia  Atrial Fibrillation      Treatment while inpatient:  63years old male with medical history significant for morbid obesity, paraesophageal hernia. Patient presented to the emergency room with abdominal pain nausea vomiting and diarrhea. Patient was diagnosed with small bowel obstruction. Patient was also diagnosed with pneumonia and septic shock. Patient developed acute respiratory failure and acute renal failure possibly General patient was started on the ventilator and supported with pressors.                                                                                            ----------------------------------------------------------      SUBJECTIVE COMPLAINTS- follow up for abdominal pain    Diet: Diet NPO      OBJECTIVE:   Patient Active Problem List   Diagnosis    Partial bowel obstruction (HCC)    Hiatal hernia    Acute respiratory failure with hypoxia and hypercapnia (HCC)    SBO (small bowel obstruction) ([address] Aspiration into airway    Acute respiratory failure with hypoxia (HCC)    Small bowel obstruction (HCC)       Allergies  Codeine    Medications    Scheduled Meds:   calcium gluconate-NaCl  1,000 mg IntraVENous Once    potassium phosphate IVPB  20 mmol IntraVENous Once    chlorhexidine  15 mL Mouth/Throat BID    famotidine (PEPCID) injection  20 mg IntraVENous Daily    piperacillin-tazobactam  4,500 mg IntraVENous Q8H    ipratropium-albuterol  1 ampule Inhalation Q4H WA    sodium chloride flush  5-40 mL IntraVENous 2 times per day    [Held by provider] gabapentin  800 mg Oral TID    [Held by provider] levothyroxine  200 mcg Oral Daily    [Held by provider] metoprolol tartrate  25 mg Oral BID    [Held by provider] spironolactone  50 mg Oral Daily    enoxaparin  40 mg SubCUTAneous BID     Continuous Infusions:   norepinephrine Stopped (08/11/22 0220)    fentaNYL 150 mcg/hr (08/11/22 0605)    propofol 30 mcg/kg/min (08/11/22 0900)    sodium chloride      lactated ringers 150 mL/hr at 08/11/22 0605     PRN Meds:  fentaNYL **AND** fentaNYL, acetaminophen, sodium chloride flush, sodium chloride, ondansetron **OR** ondansetron, diazePAM, HYDROmorphone **OR** HYDROmorphone    Vitals   Vitals /wt Patient Vitals for the past 8 hrs:   BP Temp Temp src Pulse Resp SpO2 Weight   08/11/22 0900 109/64 -- -- 75 24 96 % --   08/11/22 0800 113/68 97.8 °F (36.6 °C) Axillary 73 18 95 % --   08/11/22 0746 -- -- -- -- -- 95 % --   08/11/22 0700 94/62 -- -- 69 20 95 % --   08/11/22 0630 99/62 -- -- 69 20 96 % --   08/11/22 0600 96/60 -- -- 71 20 96 % --   08/11/22 0530 (!) 87/59 -- -- 70 20 95 % --   08/11/22 0500 (!) 90/57 -- -- 69 20 96 % --   08/11/22 0430 (!) 88/54 98 °F (36.7 °C) Axillary 71 20 95 % (!) 368 lb 6.2 oz (167.1 kg)   08/11/22 0420 -- -- -- 71 20 95 % --   08/11/22 0345 89/60 -- -- 73 20 94 % --   08/11/22 0330 (!) 93/59 -- -- 75 20 95 % --   08/11/22 0315 94/60 -- -- 76 20 94 % --   08/11/22 0300 99/61 -- -- 76 19 96 % -- 08/11/22 0245 103/62 -- -- 76 16 96 % --   08/11/22 0238 101/64 -- -- 76 22 96 % --   08/11/22 0215 137/79 -- -- 80 18 95 % --   08/11/22 0200 107/64 -- -- 77 20 98 % --   08/11/22 0145 115/69 -- -- 76 19 98 % --   08/11/22 0130 108/64 -- -- 74 19 98 % --        72HR INTAKE/OUTPUT:    Intake/Output Summary (Last 24 hours) at 8/11/2022 0925  Last data filed at 8/11/2022 0900  Gross per 24 hour   Intake 5428.13 ml   Output 4075 ml   Net 1353.13 ml       Exam:    Gen:  intubated and sedated  Eyes: PERRL. No sclera icterus. No conjunctival injection. ENT: No discharge. Pharynx clear. External appearance of ears and nose normal.  Neck: Trachea midline. No obvious mass. Resp: No accessory muscle use. No crackles. No wheezes. No rhonchi. CV: Regular rate. Regular rhythm. No murmur or rub. No edema. GI: Non-tender. Non-distended. No hernia. Skin: Warm, dry, normal texture and turgor. Lymph: No cervical LAD. No supraclavicular LAD. M/S: / Ext. No cyanosis. No clubbing. No joint deformity. Neuro: CN 2-12 are intact,  no neurologic deficits noted. PT/INR:   No results for input(s): PROTIME, INR in the last 72 hours. APTT: No results for input(s): APTT in the last 72 hours. CBC:   Recent Labs     08/09/22  0416 08/10/22  0440 08/11/22  0414   WBC 10.1 11.8* 6.6   HGB 8.9* 10.0* 9.1*   HCT 26.8* 30.7* 26.9*   MCV 96.6 96.5 94.5    431 382       BMP:   Recent Labs     08/09/22 0416 08/10/22  0440 08/11/22  0414   * 136 140   K 3.4* 4.3 3.8    102 106   CO2 18* 23 24   PHOS 3.1 4.9 2.0*   BUN 47* 57* 51*   CREATININE 2.6* 2.9* 2.0*       LIVER PROFILE:   No results for input(s): ALKPHOS, AST, ALT, ALB, BILIDIR, BILITOT, ALKPHOS in the last 72 hours. No results for input(s): AMYLASE in the last 72 hours. No results for input(s): LIPASE in the last 72 hours.     UA:  Recent Labs     08/08/22  1621   WBCUA 10-20*   RBCUA *       TROPONIN:   No results for input(s): CKTOTAL, TROPONINI in the last 72 hours. No results found for: URRFLXCULT    No results for input(s): TSHREFLEX in the last 72 hours. No components found for: [id number]  POC GLUCOSE:    Recent Labs     08/10/22  1146 08/10/22  2001 08/10/22  2355 08/11/22  0414 08/11/22  0750   POCGLU 132* 117* 103* 93 94     No results for input(s): LABA1C in the last 72 hours. No results found for: LABA1C    Echocardiogram shows normal ejection fraction  Grade 1 diastolic dysfunction      ASSESSMENT AND PLAN      Acute respiratory failure  Intubated 8/8  Full vent support  Aspiration pna  Ct abx  Discontinue IV fluids  Patient has grade 1 diastolic dysfunction    Partial small Bowel obstruction  CT scan of the abdomen with contrast shows no obstruction  Og placed per GI 8/9  General surgery is following      CHARLENE  Renal function started improving  Likely due to hypotension  Blood pressure is improving    Hypotention  Patient remains on pressors  Hold metoprolol and lisinopril  Keep sbp more than 100    Hiatal hernia  - CT chest/abd/pelv: Large hiatal hernia containing much of the stomach which is distended       Atrial Fibrillation  - currently rate controlled  - hold metoprolol  will give lovenox instead of eliquis       HTN  - monitor blood pressure  - restart home meds when able        Code Status: Full Code        Dispo - cc        The patient and / or the family were informed of the results of any tests, a time was given to answer questions, a plan was proposed and they agreed with plan. Shelli Salgado MD    This note was transcribed using [id number] [email or web address]. Please disregard any translational errors.

## 2022-08-11 NOTE — PROGRESS NOTES
General and Vascular Surgery                                                           Daily Progress Note                                                              Pt Name: Aneta Hadley Record Number: [id number]  Date of Birth 1960   Today's Date: 8/11/2022      ASSESSMENT/PLAN   Partial SBO  -Last BM loose 8/9 early AM. Unchanged abdominal exam-distended. -off pressors  -oral gastric tube replaced via EGD 8/10. Unable to pass through either nares. Continue to wall suction.  -Repeat CT abd pelvis with oral contrast via OGT today        SUBJECTIVE/EVENTS last 24H  Miles remains in ICU, intubated, sedated. No further Bms. Minimal OG tube output. OBJECTIVE  VITALS:  height is 5' 7\" (1.702 m) and weight is 368 lb 6.2 oz (167.1 kg) (abnormal). His axillary temperature is 98 °F (36.7 °C). His blood pressure is 94/62 and his pulse is 69. His respiration is 20 and oxygen saturation is 95%. VITALS:  BP 94/62   Pulse 69   Temp 98 °F (36.7 °C) (Axillary)   Resp 20   Ht 5' 7\" (1.702 m)   Wt (!) 368 lb 6.2 oz (167.1 kg)   SpO2 95%   BMI 57.70 kg/m²   GENERAL: Intubated  ABDOMEN: stable distention  I/O last 3 completed shifts: In: 8601.4 [I.V.:8114.9; IV Piggyback:486.5]  Out: 6640 [Urine:4300; Emesis/NG output:150]  No intake/output data recorded. LABS  Recent Labs     08/08/22  1621 08/09/22  0416 08/11/22  0414   WBC  --    < > 6.6   HGB  --    < > 9.1*   HCT  --    < > 26.9*   PLT  --    < > 382   NA  --    < > 140   K  --    < > 3.8   CL  --    < > 106   CO2  --    < > 24   BUN  --    < > 51*   CREATININE  --    < > 2.0*   MG  --    < > 2.20   PHOS  --    < > 2.0*   CALCIUM  --    < > 7.7*   NITRU Negative  --   --    COLORU SIERRA*  --   --    BACTERIA 3+*  --   --     < > = values in this interval not displayed.      CBC:   Lab Results   Component Value Date/Time    WBC 6.6 08/11/2022 04:14 AM    RBC 2.85 08/11/2022 04:14 AM HGB 9.1 08/11/2022 04:14 AM    HCT 26.9 08/11/2022 04:14 AM    MCV 94.5 08/11/2022 04:14 AM    MCH 31.8 08/11/2022 04:14 AM    MCHC 33.7 08/11/2022 04:14 AM    RDW 16.7 08/11/2022 04:14 AM     08/11/2022 04:14 AM    MPV 7.0 08/11/2022 04:14 AM     CMP:    Lab Results   Component Value Date/Time     08/11/2022 04:14 AM    K 3.8 08/11/2022 04:14 AM    K 3.2 08/06/2022 04:45 AM     08/11/2022 04:14 AM    CO2 24 08/11/2022 04:14 AM    BUN 51 08/11/2022 04:14 AM    CREATININE 2.0 08/11/2022 04:14 AM    GFRAA 41 08/11/2022 04:14 AM    AGRATIO 0.8 08/06/2022 04:45 AM    LABGLOM 34 08/11/2022 04:14 AM    GLUCOSE 99 08/11/2022 04:14 AM    PROT 6.3 08/06/2022 04:45 AM    LABALBU 2.8 08/06/2022 04:45 AM    CALCIUM 7.7 08/11/2022 04:14 AM    BILITOT 0.7 08/06/2022 04:45 AM    ALKPHOS 72 08/06/2022 04:45 AM    AST 22 08/06/2022 04:45 AM    ALT 23 08/06/2022 04:45 AM         JARON Morgan - CNP  Electronically signed 8/11/2022 at 7:54 AM    As above  CT today without evidence of obstruction  Continue NG for now  Consider enteral feedings via tube if prolonged intubation is anticipated    Electronically signed by Gely Lobato MD on 8/11/2022 at 6:06 PM

## 2022-08-12 LAB
ANION GAP SERPL CALCULATED.3IONS-SCNC: 9 MMOL/L (ref 3–16)
ANISOCYTOSIS: ABNORMAL
BANDED NEUTROPHILS RELATIVE PERCENT: 4 % (ref 0–7)
BASOPHILS ABSOLUTE: 0 K/UL (ref 0–0.2)
BASOPHILS RELATIVE PERCENT: 0 %
BUN BLDV-MCNC: 34 MG/DL (ref 7–20)
CALCIUM SERPL-MCNC: 7.3 MG/DL (ref 8.3–10.6)
CHLORIDE BLD-SCNC: 109 MMOL/L (ref 99–110)
CO2: 25 MMOL/L (ref 21–32)
CREAT SERPL-MCNC: 1.4 MG/DL (ref 0.8–1.3)
EOSINOPHILS ABSOLUTE: 0.3 K/UL (ref 0–0.6)
EOSINOPHILS RELATIVE PERCENT: 5 %
GFR AFRICAN AMERICAN: >60
GFR NON-AFRICAN AMERICAN: 52
GLUCOSE BLD-MCNC: 101 MG/DL (ref 70–99)
GLUCOSE BLD-MCNC: 105 MG/DL (ref 70–99)
GLUCOSE BLD-MCNC: 84 MG/DL (ref 70–99)
GLUCOSE BLD-MCNC: 92 MG/DL (ref 70–99)
GLUCOSE BLD-MCNC: 96 MG/DL (ref 70–99)
GLUCOSE BLD-MCNC: 96 MG/DL (ref 70–99)
HCT VFR BLD CALC: 27 % (ref 40.5–52.5)
HEMATOLOGY PATH CONSULT: NO
HEMOGLOBIN: 9.4 G/DL (ref 13.5–17.5)
LYMPHOCYTES ABSOLUTE: 0.6 K/UL (ref 1–5.1)
LYMPHOCYTES RELATIVE PERCENT: 9 %
MAGNESIUM: 2.2 MG/DL (ref 1.8–2.4)
MCH RBC QN AUTO: 33.5 PG (ref 26–34)
MCHC RBC AUTO-ENTMCNC: 35 G/DL (ref 31–36)
MCV RBC AUTO: 95.5 FL (ref 80–100)
MONOCYTES ABSOLUTE: 0.3 K/UL (ref 0–1.3)
MONOCYTES RELATIVE PERCENT: 4 %
MYELOCYTE PERCENT: 2 %
NEUTROPHILS ABSOLUTE: 5.7 K/UL (ref 1.7–7.7)
NEUTROPHILS RELATIVE PERCENT: 75 %
PDW BLD-RTO: 17.1 % (ref 12.4–15.4)
PERFORMED ON: ABNORMAL
PERFORMED ON: ABNORMAL
PERFORMED ON: NORMAL
PHOSPHORUS: 2.5 MG/DL (ref 2.5–4.9)
PLATELET # BLD: 381 K/UL (ref 135–450)
PLATELET SLIDE REVIEW: ADEQUATE
PMV BLD AUTO: 7.3 FL (ref 5–10.5)
POLYCHROMASIA: ABNORMAL
POTASSIUM SERPL-SCNC: 3.5 MMOL/L (ref 3.5–5.1)
PRO-BNP: 1438 PG/ML (ref 0–124)
PROMYELOCYTES PERCENT: 1 %
RBC # BLD: 2.82 M/UL (ref 4.2–5.9)
SCHISTOCYTES: ABNORMAL
SLIDE REVIEW: ABNORMAL
SMUDGE CELLS: PRESENT
SODIUM BLD-SCNC: 143 MMOL/L (ref 136–145)
WBC # BLD: 6.9 K/UL (ref 4–11)

## 2022-08-12 PROCEDURE — 84100 ASSAY OF PHOSPHORUS: CPT

## 2022-08-12 PROCEDURE — 94761 N-INVAS EAR/PLS OXIMETRY MLT: CPT

## 2022-08-12 PROCEDURE — 2000000000 HC ICU R&B

## 2022-08-12 PROCEDURE — 2500000003 HC RX 250 WO HCPCS: Performed by: NURSE PRACTITIONER

## 2022-08-12 PROCEDURE — APPSS15 APP SPLIT SHARED TIME 0-15 MINUTES: Performed by: NURSE PRACTITIONER

## 2022-08-12 PROCEDURE — 6360000002 HC RX W HCPCS: Performed by: NURSE PRACTITIONER

## 2022-08-12 PROCEDURE — 83735 ASSAY OF MAGNESIUM: CPT

## 2022-08-12 PROCEDURE — 85025 COMPLETE CBC W/AUTO DIFF WBC: CPT

## 2022-08-12 PROCEDURE — 83880 ASSAY OF NATRIURETIC PEPTIDE: CPT

## 2022-08-12 PROCEDURE — 6360000002 HC RX W HCPCS: Performed by: INTERNAL MEDICINE

## 2022-08-12 PROCEDURE — 94003 VENT MGMT INPAT SUBQ DAY: CPT

## 2022-08-12 PROCEDURE — APPNB15 APP NON BILLABLE TIME 0-15 MINS: Performed by: NURSE PRACTITIONER

## 2022-08-12 PROCEDURE — 94640 AIRWAY INHALATION TREATMENT: CPT

## 2022-08-12 PROCEDURE — 2580000003 HC RX 258: Performed by: NURSE PRACTITIONER

## 2022-08-12 PROCEDURE — 2500000003 HC RX 250 WO HCPCS: Performed by: HOSPITALIST

## 2022-08-12 PROCEDURE — 80048 BASIC METABOLIC PNL TOTAL CA: CPT

## 2022-08-12 PROCEDURE — 6370000000 HC RX 637 (ALT 250 FOR IP): Performed by: NURSE PRACTITIONER

## 2022-08-12 PROCEDURE — 2700000000 HC OXYGEN THERAPY PER DAY

## 2022-08-12 PROCEDURE — 2580000003 HC RX 258: Performed by: HOSPITALIST

## 2022-08-12 PROCEDURE — 6370000000 HC RX 637 (ALT 250 FOR IP): Performed by: INTERNAL MEDICINE

## 2022-08-12 PROCEDURE — 99291 CRITICAL CARE FIRST HOUR: CPT | Performed by: INTERNAL MEDICINE

## 2022-08-12 PROCEDURE — 2580000003 HC RX 258: Performed by: INTERNAL MEDICINE

## 2022-08-12 RX ORDER — MIDAZOLAM HYDROCHLORIDE 1 MG/ML
2 INJECTION INTRAMUSCULAR; INTRAVENOUS EVERY 4 HOURS PRN
Status: DISCONTINUED | OUTPATIENT
Start: 2022-08-12 | End: 2022-08-19 | Stop reason: DRUGHIGH

## 2022-08-12 RX ORDER — FUROSEMIDE 10 MG/ML
40 INJECTION INTRAMUSCULAR; INTRAVENOUS ONCE
Status: COMPLETED | OUTPATIENT
Start: 2022-08-12 | End: 2022-08-12

## 2022-08-12 RX ADMIN — ENOXAPARIN SODIUM 40 MG: 100 INJECTION SUBCUTANEOUS at 20:08

## 2022-08-12 RX ADMIN — PROPOFOL 25 MCG/KG/MIN: 10 INJECTION, EMULSION INTRAVENOUS at 04:22

## 2022-08-12 RX ADMIN — SODIUM CHLORIDE, PRESERVATIVE FREE 10 ML: 5 INJECTION INTRAVENOUS at 20:09

## 2022-08-12 RX ADMIN — FAMOTIDINE 20 MG: 10 INJECTION, SOLUTION INTRAVENOUS at 20:09

## 2022-08-12 RX ADMIN — IPRATROPIUM BROMIDE AND ALBUTEROL SULFATE 1 AMPULE: 2.5; .5 SOLUTION RESPIRATORY (INHALATION) at 16:10

## 2022-08-12 RX ADMIN — PIPERACILLIN AND TAZOBACTAM 4500 MG: 4; .5 INJECTION, POWDER, LYOPHILIZED, FOR SOLUTION INTRAVENOUS at 08:50

## 2022-08-12 RX ADMIN — SODIUM CHLORIDE, PRESERVATIVE FREE 10 ML: 5 INJECTION INTRAVENOUS at 09:05

## 2022-08-12 RX ADMIN — PROPOFOL 25 MCG/KG/MIN: 10 INJECTION, EMULSION INTRAVENOUS at 01:20

## 2022-08-12 RX ADMIN — IPRATROPIUM BROMIDE AND ALBUTEROL SULFATE 1 AMPULE: 2.5; .5 SOLUTION RESPIRATORY (INHALATION) at 07:53

## 2022-08-12 RX ADMIN — CHLORHEXIDINE GLUCONATE 0.12% ORAL RINSE 15 ML: 1.2 LIQUID ORAL at 09:05

## 2022-08-12 RX ADMIN — CHLORHEXIDINE GLUCONATE 0.12% ORAL RINSE 15 ML: 1.2 LIQUID ORAL at 20:32

## 2022-08-12 RX ADMIN — PROPOFOL 30 MCG/KG/MIN: 10 INJECTION, EMULSION INTRAVENOUS at 23:25

## 2022-08-12 RX ADMIN — IPRATROPIUM BROMIDE AND ALBUTEROL SULFATE 1 AMPULE: 2.5; .5 SOLUTION RESPIRATORY (INHALATION) at 20:19

## 2022-08-12 RX ADMIN — PIPERACILLIN AND TAZOBACTAM 4500 MG: 4; .5 INJECTION, POWDER, LYOPHILIZED, FOR SOLUTION INTRAVENOUS at 23:59

## 2022-08-12 RX ADMIN — MIDAZOLAM 2 MG: 1 INJECTION INTRAMUSCULAR; INTRAVENOUS at 08:36

## 2022-08-12 RX ADMIN — FUROSEMIDE 40 MG: 10 INJECTION, SOLUTION INTRAMUSCULAR; INTRAVENOUS at 08:46

## 2022-08-12 RX ADMIN — Medication 200 MCG/HR: at 15:51

## 2022-08-12 RX ADMIN — FAMOTIDINE 20 MG: 10 INJECTION, SOLUTION INTRAVENOUS at 08:46

## 2022-08-12 RX ADMIN — Medication 175 MCG/HR: at 05:08

## 2022-08-12 RX ADMIN — Medication 200 MCG/HR: at 10:27

## 2022-08-12 RX ADMIN — PROPOFOL 25 MCG/KG/MIN: 10 INJECTION, EMULSION INTRAVENOUS at 06:02

## 2022-08-12 RX ADMIN — PROPOFOL 30 MCG/KG/MIN: 10 INJECTION, EMULSION INTRAVENOUS at 16:07

## 2022-08-12 RX ADMIN — IPRATROPIUM BROMIDE AND ALBUTEROL SULFATE 1 AMPULE: 2.5; .5 SOLUTION RESPIRATORY (INHALATION) at 11:54

## 2022-08-12 RX ADMIN — Medication 200 MCG/HR: at 21:41

## 2022-08-12 RX ADMIN — PROPOFOL 35 MCG/KG/MIN: 10 INJECTION, EMULSION INTRAVENOUS at 10:08

## 2022-08-12 RX ADMIN — PROPOFOL 30 MCG/KG/MIN: 10 INJECTION, EMULSION INTRAVENOUS at 20:26

## 2022-08-12 RX ADMIN — PROPOFOL 35 MCG/KG/MIN: 10 INJECTION, EMULSION INTRAVENOUS at 12:54

## 2022-08-12 RX ADMIN — ENOXAPARIN SODIUM 40 MG: 100 INJECTION SUBCUTANEOUS at 08:46

## 2022-08-12 RX ADMIN — PIPERACILLIN AND TAZOBACTAM 4500 MG: 4; .5 INJECTION, POWDER, LYOPHILIZED, FOR SOLUTION INTRAVENOUS at 15:49

## 2022-08-12 ASSESSMENT — PULMONARY FUNCTION TESTS
PIF_VALUE: 27
PIF_VALUE: 26
PIF_VALUE: 29
PIF_VALUE: 26
PIF_VALUE: 27
PIF_VALUE: 27
PIF_VALUE: 26
PIF_VALUE: 27
PIF_VALUE: 26
PIF_VALUE: 26
PIF_VALUE: 27
PIF_VALUE: 26
PIF_VALUE: 26
PIF_VALUE: 25
PIF_VALUE: 26
PIF_VALUE: 27
PIF_VALUE: 26
PIF_VALUE: 26
PIF_VALUE: 27
PIF_VALUE: 27
PIF_VALUE: 28
PIF_VALUE: 27
PIF_VALUE: 26
PIF_VALUE: 27
PIF_VALUE: 26
PIF_VALUE: 27
PIF_VALUE: 26
PIF_VALUE: 27
PIF_VALUE: 26
PIF_VALUE: 27

## 2022-08-12 ASSESSMENT — PAIN SCALES - GENERAL
PAINLEVEL_OUTOF10: 0
PAINLEVEL_OUTOF10: 0

## 2022-08-12 NOTE — PROGRESS NOTES
Pulmonary Critical Care Progress Note     Patient's name:  Aneta Hadley Record Number: [id number]  Patient's account/billing number: [de-identified]  Patient's YOB: 1960  Age: 63 y.o. Date of Admission: 8/5/2022  1:56 PM  Date of Consult: 8/12/2022      Primary Care Physician: Lamar Burns MD      Code Status: Full Code    Chief complaint: acute respiratory failure with hypoxia and hypercapnia     Assessment and Plan     Acute respiratory failure with hypoxia and hypercapnia worsening requiring intubation and mechanical ventilation. Aspiration pneumonia bibasilar multifocal   Partial small bowel obstruction. S/p EGD with OG placement  Acute kidney injury, dehydration and contrast nephropathy  Large hiatal hernia  Obstructive sleep apnea obesity hypoventilation syndrome  Sedation related hypotension      Plan:  Vent support, setting adjusted. wean Fio2 as tolerated, keep sat > 90%  Levophed, keep MAP > 65  Diuresis as needed   Start tube feeding   Zosyn for aspiration pneumonia. GI and DVT prophylaxis. Discussed with family     Overnight:  On the vent   No acute events overnight  Afebrile       REVIEW OF SYSTEMS:  Review of Systems -   Unable to obtain intubated on MV        Physical Exam:    Vitals: BP (!) 158/96   Pulse 83   Temp 98.2 °F (36.8 °C) (Axillary)   Resp 17   Ht 5' 7\" (1.702 m)   Wt (!) 371 lb 14.4 oz (168.7 kg)   SpO2 96%   BMI 58.25 kg/m²     Last Body weight:   Wt Readings from Last 3 Encounters:   08/12/22 (!) 371 lb 14.4 oz (168.7 kg)       Body Mass Index : Body mass index is 58.25 kg/m². Intake and Output summary:   Intake/Output Summary (Last 24 hours) at 8/12/2022 0829  Last data filed at 8/12/2022 0739  Gross per 24 hour   Intake 3554 ml   Output 4725 ml   Net -1171 ml         Physical Examination:     Gen: intubated on MV  Eyes: PERRL. Anicteric sclera. No conjunctival injection. ENT: No discharge. Posterior oropharynx clear.  External

## 2022-08-12 NOTE — PLAN OF CARE
Problem: Discharge Planning  Goal: Discharge to home or other facility with appropriate resources  Outcome: Progressing     Problem: Pain  Goal: Verbalizes/displays adequate comfort level or baseline comfort level  Outcome: Progressing     Problem: ABCDS Injury Assessment  Goal: Absence of physical injury  Outcome: Progressing     Problem: Safety - Adult  Goal: Free from fall injury  Outcome: Progressing     Problem: Skin/Tissue Integrity  Goal: Absence of new skin breakdown  Description: 1. Monitor for areas of redness and/or skin breakdown  2. Assess vascular access sites hourly  3. Every 4-6 hours minimum:  Change oxygen saturation probe site  4. Every 4-6 hours:  If on nasal continuous positive airway pressure, respiratory therapy assess nares and determine need for appliance change or resting period. Outcome: Progressing     Problem: Safety - Medical Restraint  Goal: Remains free of injury from restraints (Restraint for Interference with Medical Device)  Description: INTERVENTIONS:  1. Determine that other, less restrictive measures have been tried or would not be effective before applying the restraint  2. Evaluate the patient's condition at the time of restraint application  3. Inform patient/family regarding the reason for restraint  4.  Q2H: Monitor safety, psychosocial status, comfort, nutrition and hydration  Outcome: Progressing  Flowsheets (Taken 8/12/2022 1171 by Jodi Cano RN)  Remains free of injury from restraints (restraint for interference with medical device): Determine that other, less restrictive measures have been tried or would not be effective before applying the restraint     Problem: Nutrition Deficit:  Goal: Optimize nutritional status  Outcome: Progressing     Problem: Neurosensory - Adult  Goal: Achieves stable or improved neurological status  Outcome: Progressing  Goal: Absence of seizures  Outcome: Progressing  Goal: Remains free of injury related to seizures activity  Outcome: Progressing  Goal: Achieves maximal functionality and self care  Outcome: Progressing     Problem: Respiratory - Adult  Goal: Achieves optimal ventilation and oxygenation  Outcome: Progressing  Flowsheets (Taken 8/12/2022 0431 by Doyle Harris RN)  Achieves optimal ventilation and oxygenation:   Assess for changes in respiratory status   Assess for changes in mentation and behavior   Position to facilitate oxygenation and minimize respiratory effort   Oxygen supplementation based on oxygen saturation or arterial blood gases     Problem: Cardiovascular - Adult  Goal: Maintains optimal cardiac output and hemodynamic stability  Outcome: Progressing  Flowsheets (Taken 8/12/2022 0900 by Tamia Ramirez RN)  Maintains optimal cardiac output and hemodynamic stability: Monitor blood pressure and heart rate  Goal: Absence of cardiac dysrhythmias or at baseline  Outcome: Progressing  Flowsheets (Taken 8/12/2022 0900 by Tamia Ramirez RN)  Absence of cardiac dysrhythmias or at baseline: Monitor cardiac rate and rhythm     Problem: Skin/Tissue Integrity - Adult  Goal: Skin integrity remains intact  Outcome: Progressing  Goal: Incisions, wounds, or drain sites healing without S/S of infection  Outcome: Progressing  Goal: Oral mucous membranes remain intact  Outcome: Progressing     Problem: Musculoskeletal - Adult  Goal: Return mobility to safest level of function  Outcome: Progressing  Goal: Maintain proper alignment of affected body part  Outcome: Progressing  Goal: Return ADL status to a safe level of function  Outcome: Progressing     Problem: Gastrointestinal - Adult  Goal: Minimal or absence of nausea and vomiting  Outcome: Progressing  Goal: Maintains or returns to baseline bowel function  Outcome: Progressing  Goal: Maintains adequate nutritional intake  Outcome: Progressing  Goal: Establish and maintain optimal ostomy function  Outcome: Progressing     Problem: Genitourinary - Adult  Goal: Absence of urinary retention  Outcome: Progressing  Goal: Urinary catheter remains patent  Outcome: Progressing     Problem: Anxiety  Goal: Will report anxiety at manageable levels  Description: INTERVENTIONS:  1. Administer medication as ordered  2. Teach and rehearse alternative coping skills  3. Provide emotional support with 1:1 interaction with staff  Outcome: Progressing     Problem: Coping  Goal: Pt/Family able to verbalize concerns and demonstrate effective coping strategies  Description: INTERVENTIONS:  1. Assist patient/family to identify coping skills, available support systems and cultural and spiritual values  2. Provide emotional support, including active listening and acknowledgement of concerns of patient and caregivers  3. Reduce environmental stimuli, as able  4. Instruct patient/family in relaxation techniques, as appropriate  5. Assess for spiritual pain/suffering and initiate Spiritual Care, Psychosocial Clinical Specialist consults as needed  Outcome: Progressing     Problem: Change in Body Image  Goal: Pt/Family communicate acceptance of loss or change in body image and feel psychological comfort and peace  Description: INTERVENTIONS:  1. Assess patient/family anxiety and grief process related to change in body image, loss of functional status, loss of sense of self, and forgiveness  2. Provide emotional and spiritual support  3. Provide information about the patient's health status with consideration of family and cultural values  4. Communicate willingness to discuss loss and facilitate grief process with patient/family as appropriate  5. Emphasize sustaining relationships within family system and community, or aurora/spiritual traditions  6.  Initiate Spiritual Care, Psychosocial Clinical Specialist consult as needed  Outcome: Progressing     Problem: Decision Making  Goal: Pt/Family able to effectively weigh alternatives and participate in decision making related to treatment and care  Description: INTERVENTIONS:  1. Determine when there are differences between patient's view, family's view, and healthcare provider's view of condition  2. Facilitate patient and family articulation of goals for care  3. Help patient and family identify pros/cons of alternative solutions  4. Provide information as requested by patient/family  5. Respect patient/family right to receive or not to receive information  6. Serve as a liaison between patient and family and health care team  7. Initiate Consults from Ethics, Palliative Care or initiate [facility] as is appropriate  Outcome: Progressing     Problem: Behavior  Goal: Pt/Family maintain appropriate behavior and adhere to behavioral management agreement, if implemented  Description: INTERVENTIONS:  1. Assess patient/family's coping skills and  non-compliant behavior (including use of illegal substances)  2. Notify security of behavior or suspected illegal substances which indicate the need for search of the family and/or belongings  3. Encourage verbalization of thoughts and concerns in a socially appropriate manner  4. Utilize positive, consistent limit setting strategies supporting safety of patient, staff and others  5. Encourage participation in the decision making process about the behavioral management agreement  6. If a visitor's behavior poses a threat to safety call refer to organization policy.   7. Initiate consult with , Psychosocial CNS, Spiritual Care as appropriate  Outcome: Progressing

## 2022-08-12 NOTE — PROGRESS NOTES
Raulister changed due to pressing on the side of his nose.     Electronically signed by Hailey Garrison on 8/12/2022 at 11:56 AM

## 2022-08-12 NOTE — PROGRESS NOTES
[id number]          Age                 63 year(s)   Accession Number   [id number]         Room Number         2111   Corporate ID       [id number]            Sonographer         Chesetr Salazar,                                                            [address] [facility]   Ordering Physician Morena Valentin, [address] [place] [address].                     MD                 Physician           Terry Barnes  Procedure Type of Study   TTE procedure:ECHOCARDIOGRAM COMPLETE 2D W DOPPLER W COLOR, ECHO 2D  W/DOPPLER/COLOR/CONTRAST. Procedure Date Date: 08/11/2022 Start: 02:51 PM Study Location: [facility] Technical Quality: Poor visualization due to poor acoustical window. Indications:Dyspnea/SOB. Additional Indications:Hx of Afib HTN . Patient Status: Routine Contrast Medium: Definity. Amount - 4 ml Height: 67 inches Weight: 368.01 pounds BSA: 2.62 m2 BMI: 57.64 kg/m2 Rhythm: Within normal limits HR: 85 bpm BP: 143/81 mmHg  Conclusions   Summary  Technically difficult examination. Ejection fraction is visually estimated to be 60-65%. Grade II diastolic dysfunction  Right ventricle is poorly visualized, normal systolic function, appears  dilated   Signature   ------------------------------------------------------------------  Electronically signed by Terry Barnes (Interpreting  physician) on 08/11/2022 at 05:08 PM  ------------------------------------------------------------------   Findings   Left Ventricle  Unable to make proper measurements due to poor acoustical window even with  Definity administered. Ejection fraction is visually estimated to be 60-65%. Grade II diastolic dysfunction with elevated LV filling pressures. No evidence of LV thrombus   Mitral Valve  The mitral valve was not well visualized. Trivial mitral regurgitation. No evidence of mitral stenosis. Left Atrium  Left atrium is mildly dilated   Aortic Valve  The aortic valve leaflets are not well visualized.   Trivial aortic regurgitation. No evidence of aortic valve stenosis. Aorta  The aortic root is not well visualized  The ascending aorta is normal in size. LVOT not well visualized. Right Ventricle  Poorly visualized, does appear dilated  Right ventricular systolic function is normal by tissue doppler   Tricuspid Valve  The tricuspid valve was not well visualized. Trivial tricuspid regurgitation. No evidence of tricuspid stenosis. Right Atrium  The right atrium is upper limits of normal size   Pulmonic Valve  The pulmonic valve is not well visualized. No evidence of pulmonic valve regurgitation. No evidence of pulmonic valve stenosis. Pericardial Effusion  There is a trivial pericardial effusion noted. Anterior Fat pad noted. Pleural Effusion  No pleural effusion. Miscellaneous  IVC not well visualized. Unable to estimate pulmonary artery pressure secondary to incomplete TR jet  envelope.   M-Mode/2D Measurements (cm)    LA Area: 22.9 cm2   LA volume/Index: 76.6 ml /29 ml/m2  Doppler Measurements   AV Peak Velocity: 142 cm/s    MV Peak E-Wave: 105 cm/s  AV Peak Gradient: 8.07 mmHg   MV Peak A-Wave: 66 cm/s  AV Mean Gradient: 5 mmHg      MV E/A Ratio: 1.59  LVOT Peak Velocity: 121 cm/s  MV P1/2t: 63 msec                                MV Mean Gradient: 3 mmHg                                MV Max P mmHg                                MV Vmax:125 cm/s  E' Septal Velocity: 6.64 cm/s MV VTI:35.1 cm/s  E' Lateral Velocity: 8.7 cm/s  PV Peak Velocity: 88.9 cm/s   MV Deceleration Time: 215 msec  PV Peak Gradient: 3.16 mmHg   MV Area (PHT): 3.49 cm2   Aortic Valve   Peak Velocity: 142 cm/s  Mean Velocity: 106 cm/s  Peak Gradient: 8.07 mmHg Mean Gradient: 5 mmHg  AV VTI: 24.1 cm      CT ABDOMEN PELVIS WO CONTRAST Additional Contrast? Oral    Result Date: 2022  EXAMINATION: CT OF THE ABDOMEN AND PELVIS WITHOUT CONTRAST 2022 9:59 am TECHNIQUE: CT of the abdomen and pelvis was performed without the administration of intravenous contrast. Multiplanar reformatted images are provided for review. Automated exposure control, iterative reconstruction, and/or weight based adjustment of the mA/kV was utilized to reduce the radiation dose to as low as reasonably achievable. COMPARISON: 08/05/2022 CT HISTORY: ORDERING SYSTEM PROVIDED HISTORY: SBO TECHNOLOGIST PROVIDED HISTORY: Reason for exam:->SBO Additional Contrast?->Oral FINDINGS: Lower Chest:  Increasing volume of small pleural effusions in the lower chest since the prior CT. There is significant atelectasis of the right greater than left lower lobe. Partial visualization of cardiomegaly Organs: The liver, gallbladder, biliary ducts, pancreas and spleen are normal. Kidneys and adrenal glands are normal. GI/Bowel: Large hiatal hernia is persistent with an enteric tube extending through the herniated portion of the stomach into the body of the stomach in the abdomen. No mucosal abnormalities. The duodenum and small bowel are normal with no pattern of bowel obstruction. The colon is normal. Pelvis: A Fermin catheter is present within the bladder. Normal prostate. Peritoneum/Retroperitoneum: The aorta tapers normally. No lymph node enlargement. Bones/Soft Tissues: No significant skeletal abnormalities. 1. No evidence of small-bowel obstruction. 2. Large hiatal hernia. Enteric tube is appropriately positioned beyond the herniated portion of the stomach in the abdomen. 3. Increasing pleural effusions in the lower chest with diffuse airspace changes in the visualized lungs. XR CHEST PORTABLE    Result Date: 8/11/2022  EXAMINATION: ONE XRAY VIEW OF THE CHEST 8/11/2022 10:53 am COMPARISON: Chest x-ray dated 8 August 2022 HISTORY: ORDERING SYSTEM PROVIDED HISTORY: intubated TECHNOLOGIST PROVIDED HISTORY: Reason for exam:->intubated Reason for Exam: intubated FINDINGS: There is an endotracheal tube with the tip approximately 2 cm above the mady.   There is an

## 2022-08-12 NOTE — CARE COORDINATION
Discharge Planning:    Per chart review, patient remains intubated/sedated. Will continue to follow for updates to plan of care as they relate to discharge planning. Electronically signed by Terra Motley RN on 8/12/2022 at 12:13 PM    Spoke with patient's wife, Maged Cochran, at patient's bedside. Provided her with a list of in-network SNFs and the CMS SNF list to cross-reference. Will follow up with Maged Cochran on Monday to discuss referral preferences.     Electronically signed by Terra Motley RN on 8/12/2022 at 1:30 PM

## 2022-08-12 NOTE — PLAN OF CARE
Problem: Discharge Planning  Goal: Discharge to home or other facility with appropriate resources  8/11/2022 2200 by Brent Evans RN  Outcome: Progressing  Flowsheets (Taken 8/11/2022 2030)  Discharge to home or other facility with appropriate resources:   Identify barriers to discharge with patient and caregiver   Arrange for needed discharge resources and transportation as appropriate   Identify discharge learning needs (meds, wound care, etc)  8/11/2022 1333 by Rosetta Araiza RN  Outcome: Progressing  Flowsheets (Taken 8/11/2022 0800)  Discharge to home or other facility with appropriate resources: Identify barriers to discharge with patient and caregiver     Problem: Pain  Goal: Verbalizes/displays adequate comfort level or baseline comfort level  8/11/2022 2200 by Brent Evans RN  Outcome: Progressing  Flowsheets (Taken 8/11/2022 1930)  Verbalizes/displays adequate comfort level or baseline comfort level:   Encourage patient to monitor pain and request assistance   Assess pain using appropriate pain scale   Administer analgesics based on type and severity of pain and evaluate response   Implement non-pharmacological measures as appropriate and evaluate response  8/11/2022 1333 by Rosetta Araiza RN  Outcome: Progressing  Flowsheets (Taken 8/11/2022 0800)  Verbalizes/displays adequate comfort level or baseline comfort level:   Assess pain using appropriate pain scale   Administer analgesics based on type and severity of pain and evaluate response   Implement non-pharmacological measures as appropriate and evaluate response     Problem: ABCDS Injury Assessment  Goal: Absence of physical injury  8/11/2022 2200 by Brent Evans RN  Outcome: Progressing  Flowsheets (Taken 8/11/2022 2155)  Absence of Physical Injury: Implement safety measures based on patient assessment  8/11/2022 1333 by Rosetta Araiza RN  Outcome: Progressing     Problem: Safety - Adult  Goal: Free from fall injury  8/11/2022 2200 by Brent Evans RN  Outcome: Progressing  Flowsheets (Taken 8/11/2022 2155)  Free From Fall Injury: Instruct family/caregiver on patient safety  8/11/2022 1333 by Yamile Bermudez RN  Outcome: Progressing     Problem: Skin/Tissue Integrity  Goal: Absence of new skin breakdown  Description: 1. Monitor for areas of redness and/or skin breakdown  2. Assess vascular access sites hourly  3. Every 4-6 hours minimum:  Change oxygen saturation probe site  4. Every 4-6 hours:  If on nasal continuous positive airway pressure, respiratory therapy assess nares and determine need for appliance change or resting period. 8/11/2022 2200 by Rosales Flores RN  Outcome: Progressing  8/11/2022 1333 by Yamile Bermudez RN  Outcome: Progressing     Problem: Safety - Medical Restraint  Goal: Remains free of injury from restraints (Restraint for Interference with Medical Device)  Description: INTERVENTIONS:  1. Determine that other, less restrictive measures have been tried or would not be effective before applying the restraint  2. Evaluate the patient's condition at the time of restraint application  3. Inform patient/family regarding the reason for restraint  4.  Q2H: Monitor safety, psychosocial status, comfort, nutrition and hydration  8/11/2022 2200 by Rosales Flores RN  Outcome: Progressing  8/11/2022 1333 by Yamile Bermudez RN  Outcome: Progressing     Problem: Nutrition Deficit:  Goal: Optimize nutritional status  8/11/2022 2200 by Rosales Flores RN  Outcome: Progressing  8/11/2022 1333 by Yamile Bermudez RN  Outcome: Progressing  8/11/2022 0954 by Cecelia Mehta RD, LD  Outcome: Not Progressing     Problem: Neurosensory - Adult  Goal: Achieves stable or improved neurological status  8/11/2022 2200 by Rosales Flores RN  Outcome: Progressing  Flowsheets (Taken 8/11/2022 2030)  Achieves stable or improved neurological status:   Assess for and report changes in neurological status   Initiate measures to prevent increased intracranial pressure   Maintain blood pressure and fluid volume within ordered parameters to optimize cerebral perfusion and minimize risk of hemorrhage   Monitor temperature, glucose, and sodium. Initiate appropriate interventions as ordered  8/11/2022 1333 by Hebert Green RN  Outcome: Progressing  Goal: Absence of seizures  8/11/2022 2200 by Radha Mac RN  Outcome: Progressing  Flowsheets (Taken 8/11/2022 2030)  Absence of seizures:   Monitor for seizure activity.   If seizure occurs, document type and location of movements and any associated apnea   If seizure occurs, turn head to side and suction secretions as needed   Support airway/breathing, administer oxygen as needed  8/11/2022 1333 by Hebert Green RN  Outcome: Progressing  Goal: Remains free of injury related to seizures activity  8/11/2022 2200 by Radha Mac RN  Outcome: Progressing  Flowsheets (Taken 8/11/2022 2030)  Remains free of injury related to seizure activity:   Maintain airway, patient safety  and administer oxygen as ordered   Monitor patient for seizure activity, document and report duration and description of seizure to Licensed Independent Practitioner   If seizure occurs, turn patient to side and suction secretions as needed  8/11/2022 1333 by Hebert Green RN  Outcome: Progressing  Goal: Achieves maximal functionality and self care  8/11/2022 2200 by Radha Mac RN  Outcome: Progressing  Flowsheets (Taken 8/11/2022 2030)  Achieves maximal functionality and self care:   Monitor swallowing and airway patency with patient fatigue and changes in neurological status   Encourage and assist patient to increase activity and self care with guidance from physical therapy/occupational therapy   Encourage visually impaired, hearing impaired and aphasic patients to use assistive/communication devices  8/11/2022 1333 by Hebert Green RN  Outcome: Progressing     Problem: Respiratory - Adult  Goal: Achieves optimal ventilation and oxygenation  8/11/2022 2200 by Radha Mac 8/11/2022 0800)  Absence of cardiac dysrhythmias or at baseline: Monitor cardiac rate and rhythm     Problem: Skin/Tissue Integrity - Adult  Goal: Skin integrity remains intact  8/11/2022 2200 by Sincere Taylor RN  Outcome: Progressing  Flowsheets  Taken 8/11/2022 2155  Skin Integrity Remains Intact: Monitor for areas of redness and/or skin breakdown  Taken 8/11/2022 2030  Skin Integrity Remains Intact: Monitor for areas of redness and/or skin breakdown  8/11/2022 1333 by Florentnio Chamberlain RN  Outcome: Progressing  Goal: Incisions, wounds, or drain sites healing without S/S of infection  8/11/2022 2200 by Sincere Taylor RN  Outcome: Progressing  Flowsheets  Taken 8/11/2022 2155  Incisions, Wounds, or Drain Sites Healing Without Sign and Symptoms of Infection: ADMISSION and DAILY: Assess and document risk factors for pressure ulcer development  Taken 8/11/2022 2030  Incisions, Wounds, or Drain Sites Healing Without Sign and Symptoms of Infection: ADMISSION and DAILY: Assess and document risk factors for pressure ulcer development  8/11/2022 1333 by Florentino Chamberlain RN  Outcome: Progressing  Goal: Oral mucous membranes remain intact  8/11/2022 2200 by Sincere Taylor RN  Outcome: Progressing  Flowsheets  Taken 8/11/2022 2155  Oral Mucous Membranes Remain Intact:   Assess oral mucosa and hygiene practices   Implement preventative oral hygiene regimen   Implement oral medicated treatments as ordered  Taken 8/11/2022 2030  Oral Mucous Membranes Remain Intact:   Assess oral mucosa and hygiene practices   Implement preventative oral hygiene regimen   Implement oral medicated treatments as ordered  8/11/2022 1333 by Florentino Chamberlain RN  Outcome: Progressing     Problem: Musculoskeletal - Adult  Goal: Return mobility to safest level of function  8/11/2022 2200 by Sincere Taylor RN  Outcome: Progressing  Flowsheets (Taken 8/11/2022 2030)  Return Mobility to Safest Level of Function:   Assess patient stability and activity tolerance for standing, transferring and ambulating with or without assistive devices   Assist with transfers and ambulation using safe patient handling equipment as needed   Apply continuous passive motion per provider or physical therapy orders to increase flexion toward goal  8/11/2022 1333 by Aida Zheng RN  Outcome: Progressing  Flowsheets (Taken 8/11/2022 0800)  Return Mobility to Safest Level of Function: Assess patient stability and activity tolerance for standing, transferring and ambulating with or without assistive devices  Goal: Maintain proper alignment of affected body part  8/11/2022 2200 by Leigh Ann Buckner RN  Outcome: Progressing  Flowsheets (Taken 8/11/2022 2030)  Maintain proper alignment of affected body part:   Support and protect limb and body alignment per provider's orders   Instruct and reinforce with patient and family use of appropriate assistive device and precautions (e.g. spinal or hip dislocation precautions)  8/11/2022 1333 by Aida Zheng RN  Outcome: Progressing  Flowsheets (Taken 8/11/2022 0800)  Maintain proper alignment of affected body part: Support and protect limb and body alignment per provider's orders  Goal: Return ADL status to a safe level of function  8/11/2022 2200 by Leigh Ann Buckner RN  Outcome: Progressing  Flowsheets (Taken 8/11/2022 2030)  Return ADL Status to a Safe Level of Function:   Administer medication as ordered   Assess activities of daily living deficits and provide assistive devices as needed  8/11/2022 1333 by Aida Zheng RN  Outcome: Progressing  Flowsheets (Taken 8/11/2022 0800)  Return ADL Status to a Safe Level of Function: Administer medication as ordered     Problem: Gastrointestinal - Adult  Goal: Minimal or absence of nausea and vomiting  8/11/2022 2200 by Leigh Ann Buckner RN  Outcome: Progressing  Flowsheets (Taken 8/11/2022 2030)  Minimal or absence of nausea and vomiting:   Administer IV fluids as ordered to ensure adequate hydration   Maintain NPO status until nausea and vomiting are resolved   Nasogastric tube to low intermittent suction as ordered   Administer ordered antiemetic medications as needed   Provide nonpharmacologic comfort measures as appropriate  8/11/2022 1333 by Ashwini Gomez RN  Outcome: Progressing  Flowsheets (Taken 8/11/2022 0800)  Minimal or absence of nausea and vomiting:   Administer IV fluids as ordered to ensure adequate hydration   Maintain NPO status until nausea and vomiting are resolved   Nasogastric tube to low intermittent suction as ordered   Provide nonpharmacologic comfort measures as appropriate  Goal: Maintains or returns to baseline bowel function  8/11/2022 2200 by Migel Smtih RN  Outcome: Progressing  Flowsheets (Taken 8/11/2022 2030)  Maintains or returns to baseline bowel function:   Assess bowel function   Administer IV fluids as ordered to ensure adequate hydration   Administer ordered medications as needed  8/11/2022 1333 by Ashwini Gomez RN  Outcome: Progressing  Flowsheets (Taken 8/11/2022 0800)  Maintains or returns to baseline bowel function:   Assess bowel function   Administer IV fluids as ordered to ensure adequate hydration   Administer ordered medications as needed  Goal: Maintains adequate nutritional intake  8/11/2022 2200 by Migel Smith RN  Outcome: Progressing  Flowsheets (Taken 8/11/2022 2030)  Maintains adequate nutritional intake:   Identify factors contributing to decreased intake, treat as appropriate   Monitor intake and output, weight and lab values   Obtain nutritional consult as needed  8/11/2022 1333 by Ashwini Gomez RN  Outcome: Progressing  Flowsheets (Taken 8/11/2022 0800)  Maintains adequate nutritional intake:   Identify factors contributing to decreased intake, treat as appropriate   Obtain nutritional consult as needed  Goal: Establish and maintain optimal ostomy function  8/11/2022 2200 by Migel Smith RN  Outcome: Progressing  Flowsheets (Taken 8/11/2022 2030)  Establish and maintain optimal ostomy function:   Administer IV fluids and TPN as ordered   Nutrition consult   Gastric suctioning as ordered   Infuse IV Fluids/TPN as ordered  8/11/2022 1333 by Yamile Bermudez RN  Outcome: Progressing     Problem: Genitourinary - Adult  Goal: Absence of urinary retention  8/11/2022 2200 by Rosales Flores RN  Outcome: Progressing  Flowsheets (Taken 8/11/2022 2030)  Absence of urinary retention:   Assess patients ability to void and empty bladder   Monitor intake/output and perform bladder scan as needed   Discuss with Licensed Independent Practitioner  medications to alleviate retention as needed   Discuss catheterization for long term situations as appropriate  8/11/2022 1333 by Yamile Bermudez RN  Outcome: Progressing  Flowsheets (Taken 8/11/2022 0800)  Absence of urinary retention: Monitor intake/output and perform bladder scan as needed  Goal: Urinary catheter remains patent  8/11/2022 2200 by Rosales Flores RN  Outcome: Progressing  Flowsheets (Taken 8/11/2022 2030)  Urinary catheter remains patent: Assess patency of urinary catheter  8/11/2022 1333 by Yamile Bermudez RN  Outcome: Progressing  Flowsheets (Taken 8/11/2022 0800)  Urinary catheter remains patent: Assess patency of urinary catheter     Problem: Anxiety  Goal: Will report anxiety at manageable levels  Description: INTERVENTIONS:  1. Administer medication as ordered  2. Teach and rehearse alternative coping skills  3. Provide emotional support with 1:1 interaction with staff  8/11/2022 2200 by Rosales Flores RN  Outcome: Progressing  Flowsheets (Taken 8/11/2022 2030)  Will report anxiety at manageable levels:   Administer medication as ordered   Provide emotional support with 1:1 interaction with staff  8/11/2022 1333 by Yamile Bermudez RN  Outcome: Progressing     Problem: Coping  Goal: Pt/Family able to verbalize concerns and demonstrate effective coping strategies  Description: INTERVENTIONS:  1.  Assist patient/family to identify coping skills, available support systems and cultural and spiritual values  2. Provide emotional support, including active listening and acknowledgement of concerns of patient and caregivers  3. Reduce environmental stimuli, as able  4. Instruct patient/family in relaxation techniques, as appropriate  5. Assess for spiritual pain/suffering and initiate Spiritual Care, Psychosocial Clinical Specialist consults as needed  8/11/2022 2200 by Chasidy Ibarra RN  Outcome: Progressing  Flowsheets (Taken 8/11/2022 2030)  Patient/family able to verbalize anxieties, fears, and concerns, and demonstrate effective coping:   Assist patient/family to identify coping skills, available support systems and cultural and spiritual values   Provide emotional support, including active listening and acknowledgement of concerns of patient and caregivers   Reduce environmental stimuli, as able  8/11/2022 1333 by Jag Jung RN  Outcome: Progressing     Problem: Change in Body Image  Goal: Pt/Family communicate acceptance of loss or change in body image and feel psychological comfort and peace  Description: INTERVENTIONS:  1. Assess patient/family anxiety and grief process related to change in body image, loss of functional status, loss of sense of self, and forgiveness  2. Provide emotional and spiritual support  3. Provide information about the patient's health status with consideration of family and cultural values  4. Communicate willingness to discuss loss and facilitate grief process with patient/family as appropriate  5. Emphasize sustaining relationships within family system and community, or [place]/spiritual traditions  6.  Initiate Spiritual Care, Psychosocial Clinical Specialist consult as needed  8/11/2022 2200 by Chasidy Ibarra RN  Outcome: Progressing  Flowsheets (Taken 8/11/2022 2030)  Patient/family communicate acceptance of loss or change in body image and feel psychological comfort and peace:   Assess patient/family anxiety and grief process related to change in body image, loss of functional status, loss of sense of self, and forgiveness   Provide emotional and spiritual support   Provide information about the patients health status with consideration of family and cultural values   Communicate willingness to discuss loss and facilitate grief process with patient/family as appropriate  8/11/2022 1333 by Randall Hurd RN  Outcome: Progressing     Problem: Decision Making  Goal: Pt/Family able to effectively weigh alternatives and participate in decision making related to treatment and care  Description: INTERVENTIONS:  1. Determine when there are differences between patient's view, family's view, and healthcare provider's view of condition  2. Facilitate patient and family articulation of goals for care  3. Help patient and family identify pros/cons of alternative solutions  4. Provide information as requested by patient/family  5. Respect patient/family right to receive or not to receive information  6. Serve as a liaison between patient and family and health care team  7.  Initiate Consults from Ethics, Palliative Care or initiate [facility] as is appropriate  8/11/2022 2200 by Sakshi Hand RN  Outcome: Progressing  Flowsheets (Taken 8/11/2022 2030)  Patient/family able to effectively weigh alternatives and participate in decision making related to treatment and care:   Determine when there are differences between patient's view, family's view, and healthcare provider's view of condition   Facilitate patient and family articulation of goals for care   Help patient and family identify pros/cons of alternative solutions   Provide information as requested by patient/family   Respect patient/family right to receive or not to receive information  8/11/2022 1333 by Randall Hurd RN  Outcome: Progressing     Problem: Behavior  Goal: Pt/Family maintain appropriate behavior and adhere to behavioral management agreement, if implemented  Description: INTERVENTIONS:  1. Assess patient/family's coping skills and  non-compliant behavior (including use of illegal substances)  2. Notify security of behavior or suspected illegal substances which indicate the need for search of the family and/or belongings  3. Encourage verbalization of thoughts and concerns in a socially appropriate manner  4. Utilize positive, consistent limit setting strategies supporting safety of patient, staff and others  5. Encourage participation in the decision making process about the behavioral management agreement  6. If a visitor's behavior poses a threat to safety call refer to organization policy.   7. Initiate consult with , Psychosocial CNS, Spiritual Care as appropriate  8/11/2022 2200 by Praful Markham RN  Outcome: Progressing  Flowsheets (Taken 8/11/2022 2030)  Patient/family maintains appropriate behavior and adheres to behavioral management agreement, if implemented:   Assess patient/familys coping skills and  non-compliant behavior (including use of illegal substances)   Notify security of behavior or suspected illegal substances which indicate the need for search of the patient and/or belongings   Encourage verbalization of thoughts and concerns in a socially appropriate manner   Utilize positive, consistent limit setting strategies supporting safety of patient, staff and others   Encourage participation in the decision making process about the behavioral management agreement  8/11/2022 1333 by Kennedy Cheema RN  Outcome: Progressing     Problem: Nutrition Deficit:  Goal: Optimize nutritional status  8/11/2022 2200 by Praful Markham RN  Outcome: Progressing  8/11/2022 1333 by Kennedy Cheema RN  Outcome: Progressing  8/11/2022 0954 by Amanda Medina, MARIELA, LD  Outcome: Not Progressing

## 2022-08-12 NOTE — PROGRESS NOTES
Hospitalist Progress Note  8/12/2022 7:45 AM    PCP: Cam Tijerina MD    [phone]     Date of Admission: 8/5/2022                                                                                                                     HOSPITAL COURSE    Patient demographics:  The patient  Bridgett Walker is a 63 y.o. male     Significant past medical history:   Patient Active Problem List   Diagnosis    Partial bowel obstruction (HCC)    Hiatal hernia    Acute respiratory failure with hypoxia and hypercapnia (HCC)    SBO (small bowel obstruction) (HCC)    Aspiration into airway    Acute respiratory failure with hypoxia ([address]    Small bowel obstruction ([address]         Presenting symptoms:  abdominal pain    Diagnostic workup:      CONSULTS DURING ADMISSION :   IP CONSULT TO GENERAL SURGERY  IP CONSULT TO HOSPITALIST  IP CONSULT TO GENERAL SURGERY  IP CONSULT TO DIETITIAN      Patient was diagnosed with:  Low grade or Partial Bowel obstruction  Pneumonia and septic shock. Hiatal hernia  Atrial Fibrillation      Treatment while inpatient:  63years old male with medical history significant for morbid obesity, paraesophageal hernia. Patient presented to the emergency room with abdominal pain nausea vomiting and diarrhea. Patient was diagnosed with small bowel obstruction. Patient was also diagnosed with pneumonia and septic shock. Patient developed acute respiratory failure and acute renal failure possibly General patient was started on the ventilator and supported with pressors.                                                                                            ----------------------------------------------------------      SUBJECTIVE COMPLAINTS- follow up for abdominal pain    Diet: Diet NPO      OBJECTIVE:   Patient Active Problem List   Diagnosis    Partial bowel obstruction (HCC)    Hiatal hernia    Acute respiratory failure with hypoxia and hypercapnia (HCC)    SBO (small bowel obstruction) ([address] Aspiration into airway    Acute respiratory failure with hypoxia (HCC)    Small bowel obstruction (HCC)       Allergies  Codeine    Medications    Scheduled Meds:   famotidine (PEPCID) injection  20 mg IntraVENous BID    chlorhexidine  15 mL Mouth/Throat BID    piperacillin-tazobactam  4,500 mg IntraVENous Q8H    ipratropium-albuterol  1 ampule Inhalation Q4H WA    sodium chloride flush  5-40 mL IntraVENous 2 times per day    [Held by provider] gabapentin  800 mg Oral TID    [Held by provider] levothyroxine  200 mcg Oral Daily    [Held by provider] metoprolol tartrate  25 mg Oral BID    [Held by provider] spironolactone  50 mg Oral Daily    enoxaparin  40 mg SubCUTAneous BID     Continuous Infusions:   norepinephrine Stopped (08/11/22 0220)    fentaNYL 200 mcg/hr (08/12/22 0624)    propofol 25 mcg/kg/min (08/12/22 0624)    sodium chloride       PRN Meds:  fentaNYL **AND** fentaNYL, acetaminophen, sodium chloride flush, sodium chloride, ondansetron **OR** ondansetron, diazePAM, HYDROmorphone **OR** HYDROmorphone    Vitals   Vitals /wt Patient Vitals for the past 8 hrs:   BP Temp Temp src Pulse Resp SpO2 Height Weight   08/12/22 0700 (!) 158/96 -- -- 83 19 95 % -- --   08/12/22 0630 (!) 159/83 -- -- 84 16 93 % -- --   08/12/22 0600 (!) 149/88 -- -- 83 16 95 % -- --   08/12/22 0530 132/81 -- -- 77 14 96 % -- --   08/12/22 0500 (!) 140/73 -- -- 79 17 95 % -- --   08/12/22 0431 -- -- -- 79 16 97 % -- --   08/12/22 0430 133/71 -- -- 81 14 96 % -- --   08/12/22 0400 106/64 98.2 °F (36.8 °C) Axillary 75 14 94 % 5' 7\" (1.702 m) (!) 371 lb 14.4 oz (168.7 kg)   08/12/22 0330 104/66 -- -- 73 11 95 % -- --   08/12/22 0300 109/67 -- -- 73 11 95 % -- --   08/12/22 0230 122/77 -- -- 77 16 91 % -- --   08/12/22 0200 114/75 -- -- 74 13 95 % -- --   08/12/22 0130 107/70 -- -- 74 14 95 % -- --   08/12/22 0100 105/69 -- -- 74 13 96 % -- --   08/12/22 0030 105/65 -- -- 74 13 93 % -- --   08/12/22 0000 105/65 -- -- 74 14 93 % -- -- 08/11/22 2358 -- -- -- 74 10 96 % -- --        72HR INTAKE/OUTPUT:    Intake/Output Summary (Last 24 hours) at 8/12/2022 0745  Last data filed at 8/12/2022 0739  Gross per 24 hour   Intake 3554 ml   Output 4940 ml   Net -1386 ml       Exam:    Gen:  intubated and sedated  Eyes: PERRL. No sclera icterus. No conjunctival injection. ENT: No discharge. Pharynx clear. External appearance of ears and nose normal.  Neck: Trachea midline. No obvious mass. Resp: No accessory muscle use. No crackles. No wheezes. No rhonchi. CV: Regular rate. Regular rhythm. No murmur or rub. No edema. GI: Non-tender. Non-distended. No hernia. Skin: Warm, dry, normal texture and turgor. Lymph: No cervical LAD. No supraclavicular LAD. M/S: / Ext. No cyanosis. No clubbing. No joint deformity. Neuro: CN 2-12 are intact,  no neurologic deficits noted. PT/INR:   No results for input(s): PROTIME, INR in the last 72 hours. APTT: No results for input(s): APTT in the last 72 hours. CBC:   Recent Labs     08/10/22  0440 08/11/22  0414 08/12/22  0403   WBC 11.8* 6.6 6.9   HGB 10.0* 9.1* 9.4*   HCT 30.7* 26.9* 27.0*   MCV 96.5 94.5 95.5    382 381       BMP:   Recent Labs     08/10/22  0440 08/11/22  0414 08/12/22  0403    140 143   K 4.3 3.8 3.5    106 109   CO2 23 24 25   PHOS 4.9 2.0* 2.5   BUN 57* 51* 34*   CREATININE 2.9* 2.0* 1.4*       LIVER PROFILE:   No results for input(s): ALKPHOS, AST, ALT, ALB, BILIDIR, BILITOT, ALKPHOS in the last 72 hours. No results for input(s): AMYLASE in the last 72 hours. No results for input(s): LIPASE in the last 72 hours. UA:  No results for input(s): NITRITE, LABCAST, WBCUA, RBCUA, MUCUS in the last 72 hours. TROPONIN:   No results for input(s): Evymerced [place] in the last 72 hours. No results found for: URRFLXCULT    No results for input(s): TSHREFLEX in the last 72 hours.       No components found for: OHL2228  POC GLUCOSE:    Recent Labs 08/11/22  1155 08/11/22  1558 08/11/22  1939 08/11/22  2330 08/12/22  0402   POCGLU 88 85 99 91 105*     No results for input(s): LABA1C in the last 72 hours. No results found for: LABA1C    Echocardiogram shows normal ejection fraction  Grade 1 diastolic dysfunction      ASSESSMENT AND PLAN      Acute respiratory failure  Intubated 8/8  Full vent support  Aspiration pna  Ct abx  Discontinue IV fluids  Patient has grade 1 diastolic dysfunction  FiO2 at 60%    Partial small Bowel obstruction  CT scan of the abdomen with contrast shows no obstruction  Og placed per GI 8/9  General surgery is following      CHARLENE  Creatinine is improving  Nephrology is following      Hypotention  Patient remains on pressors  Hold metoprolol and lisinopril  Keep MAP more than 65    Hiatal hernia  - CT chest/abd/pelv: Large hiatal hernia containing much of the stomach which is distended       Atrial Fibrillation  - currently rate controlled  - hold metoprolol  will give lovenox instead of eliquis       HTN  - monitor blood pressure  - restart home meds when able        Code Status: Full Code        Dispo - cc        The patient and / or the family were informed of the results of any tests, a time was given to answer questions, a plan was proposed and they agreed with plan. Oz Neil MD    This note was transcribed using [id number] Please disregard any translational errors.

## 2022-08-12 NOTE — PROGRESS NOTES
General and Vascular Surgery                                                           Daily Progress Note                                                              Pt Name: Aneta Hadley Record Number: [id number]  Date of Birth 1960   Today's Date: 8/12/2022      ASSESSMENT/PLAN   Partial SBO  -Last BM loose 8/9 early AM. Unchanged abdominal exam-stable distention. -off pressors  -oral gastric tube replaced via EGD 8/10. Unable to pass through either nare. OK to start enteral feedings via tube if prolonged intubation is anticipated  -Repeat CT abd pelvis 8/11 no evidence of obstruction. SUBJECTIVE/EVENTS last 24H  Miles remains in ICU, intubated, sedated. No further Bms. Minimal OG tube output. OBJECTIVE  VITALS:  height is 5' 7\" (1.702 m) and weight is 371 lb 14.4 oz (168.7 kg) (abnormal). His axillary temperature is 98.2 °F (36.8 °C). His blood pressure is 158/96 (abnormal) and his pulse is 83. His respiration is 17 and oxygen saturation is 96%. VITALS:  BP (!) 158/96   Pulse 83   Temp 98.2 °F (36.8 °C) (Axillary)   Resp 17   Ht 5' 7\" (1.702 m)   Wt (!) 371 lb 14.4 oz (168.7 kg)   SpO2 96%   BMI 58.25 kg/m²   GENERAL: Intubated  ABDOMEN: stable distention  I/O last 3 completed shifts: In: 5952.2 [P.O.:950; I.V.:4240.9;  IV Piggyback:761.3]  Out: 6650 [Urine:6300; Emesis/NG output:350]  I/O this shift:  In: -   Out: 315 [Urine:315]    LABS  Recent Labs     08/12/22  0403   WBC 6.9   HGB 9.4*   HCT 27.0*         K 3.5      CO2 25   BUN 34*   CREATININE 1.4*   MG 2.20   PHOS 2.5   CALCIUM 7.3*     CBC:   Lab Results   Component Value Date/Time    WBC 6.9 08/12/2022 04:03 AM    RBC 2.82 08/12/2022 04:03 AM    HGB 9.4 08/12/2022 04:03 AM    HCT 27.0 08/12/2022 04:03 AM    MCV 95.5 08/12/2022 04:03 AM    MCH 33.5 08/12/2022 04:03 AM    MCHC 35.0 08/12/2022 04:03 AM    RDW 17.1 08/12/2022 04:03 AM  08/12/2022 04:03 AM    MPV 7.3 08/12/2022 04:03 AM     CMP:    Lab Results   Component Value Date/Time     08/12/2022 04:03 AM    K 3.5 08/12/2022 04:03 AM    K 3.2 08/06/2022 04:45 AM     08/12/2022 04:03 AM    CO2 25 08/12/2022 04:03 AM    BUN 34 08/12/2022 04:03 AM    CREATININE 1.4 08/12/2022 04:03 AM    GFRAA >60 08/12/2022 04:03 AM    AGRATIO 0.8 08/06/2022 04:45 AM    LABGLOM 52 08/12/2022 04:03 AM    GLUCOSE 96 08/12/2022 04:03 AM    PROT 6.3 08/06/2022 04:45 AM    LABALBU 2.8 08/06/2022 04:45 AM    CALCIUM 7.3 08/12/2022 04:03 AM    BILITOT 0.7 08/06/2022 04:45 AM    ALKPHOS 72 08/06/2022 04:45 AM    AST 22 08/06/2022 04:45 AM    ALT 23 08/06/2022 04:45 AM         JARON Lainez CNP  Electronically signed 8/12/2022 at 8:10 AM

## 2022-08-13 ENCOUNTER — APPOINTMENT (OUTPATIENT)
Dept: GENERAL RADIOLOGY | Age: 62
DRG: 853 | End: 2022-08-13
Payer: COMMERCIAL

## 2022-08-13 LAB
ANION GAP SERPL CALCULATED.3IONS-SCNC: 11 MMOL/L (ref 3–16)
ANION GAP SERPL CALCULATED.3IONS-SCNC: 7 MMOL/L (ref 3–16)
ANION GAP SERPL CALCULATED.3IONS-SCNC: 9 MMOL/L (ref 3–16)
ANISOCYTOSIS: ABNORMAL
BANDED NEUTROPHILS RELATIVE PERCENT: 4 % (ref 0–7)
BASOPHILS ABSOLUTE: 0 K/UL (ref 0–0.2)
BASOPHILS RELATIVE PERCENT: 0 %
BUN BLDV-MCNC: 30 MG/DL (ref 7–20)
BUN BLDV-MCNC: 31 MG/DL (ref 7–20)
BUN BLDV-MCNC: 37 MG/DL (ref 7–20)
CALCIUM SERPL-MCNC: 7.6 MG/DL (ref 8.3–10.6)
CALCIUM SERPL-MCNC: 7.6 MG/DL (ref 8.3–10.6)
CALCIUM SERPL-MCNC: 8.2 MG/DL (ref 8.3–10.6)
CHLORIDE BLD-SCNC: 108 MMOL/L (ref 99–110)
CHLORIDE BLD-SCNC: 110 MMOL/L (ref 99–110)
CHLORIDE BLD-SCNC: 112 MMOL/L (ref 99–110)
CO2: 28 MMOL/L (ref 21–32)
CO2: 29 MMOL/L (ref 21–32)
CO2: 30 MMOL/L (ref 21–32)
CREAT SERPL-MCNC: 1.2 MG/DL (ref 0.8–1.3)
CREAT SERPL-MCNC: 1.3 MG/DL (ref 0.8–1.3)
CREAT SERPL-MCNC: 1.4 MG/DL (ref 0.8–1.3)
CULTURE, RESPIRATORY: NORMAL
EOSINOPHILS ABSOLUTE: 0.5 K/UL (ref 0–0.6)
EOSINOPHILS RELATIVE PERCENT: 7 %
GFR AFRICAN AMERICAN: >60
GFR NON-AFRICAN AMERICAN: 52
GFR NON-AFRICAN AMERICAN: 56
GFR NON-AFRICAN AMERICAN: >60
GLUCOSE BLD-MCNC: 100 MG/DL (ref 70–99)
GLUCOSE BLD-MCNC: 100 MG/DL (ref 70–99)
GLUCOSE BLD-MCNC: 101 MG/DL (ref 70–99)
GLUCOSE BLD-MCNC: 104 MG/DL (ref 70–99)
GLUCOSE BLD-MCNC: 107 MG/DL (ref 70–99)
GLUCOSE BLD-MCNC: 109 MG/DL (ref 70–99)
GLUCOSE BLD-MCNC: 111 MG/DL (ref 70–99)
GLUCOSE BLD-MCNC: 93 MG/DL (ref 70–99)
GLUCOSE BLD-MCNC: 96 MG/DL (ref 70–99)
GRAM STAIN RESULT: NORMAL
HCT VFR BLD CALC: 29.5 % (ref 40.5–52.5)
HEMOGLOBIN: 9.7 G/DL (ref 13.5–17.5)
LYMPHOCYTES ABSOLUTE: 0.3 K/UL (ref 1–5.1)
LYMPHOCYTES RELATIVE PERCENT: 4 %
MAGNESIUM: 2.2 MG/DL (ref 1.8–2.4)
MCH RBC QN AUTO: 31.7 PG (ref 26–34)
MCHC RBC AUTO-ENTMCNC: 32.9 G/DL (ref 31–36)
MCV RBC AUTO: 96.5 FL (ref 80–100)
METAMYELOCYTES RELATIVE PERCENT: 2 %
MONOCYTES ABSOLUTE: 0.8 K/UL (ref 0–1.3)
MONOCYTES RELATIVE PERCENT: 12 %
NEUTROPHILS ABSOLUTE: 5.2 K/UL (ref 1.7–7.7)
NEUTROPHILS RELATIVE PERCENT: 71 %
PDW BLD-RTO: 17.1 % (ref 12.4–15.4)
PERFORMED ON: ABNORMAL
PERFORMED ON: NORMAL
PERFORMED ON: NORMAL
PHOSPHORUS: 3.4 MG/DL (ref 2.5–4.9)
PLATELET # BLD: 456 K/UL (ref 135–450)
PLATELET SLIDE REVIEW: ABNORMAL
PMV BLD AUTO: 6.9 FL (ref 5–10.5)
POTASSIUM SERPL-SCNC: 3.3 MMOL/L (ref 3.5–5.1)
POTASSIUM SERPL-SCNC: 3.5 MMOL/L (ref 3.5–5.1)
POTASSIUM SERPL-SCNC: 4.1 MMOL/L (ref 3.5–5.1)
PRO-BNP: 994 PG/ML (ref 0–124)
RBC # BLD: 3.06 M/UL (ref 4.2–5.9)
SLIDE REVIEW: ABNORMAL
SODIUM BLD-SCNC: 147 MMOL/L (ref 136–145)
SODIUM BLD-SCNC: 148 MMOL/L (ref 136–145)
SODIUM BLD-SCNC: 149 MMOL/L (ref 136–145)
WBC # BLD: 6.8 K/UL (ref 4–11)

## 2022-08-13 PROCEDURE — 94761 N-INVAS EAR/PLS OXIMETRY MLT: CPT

## 2022-08-13 PROCEDURE — 6370000000 HC RX 637 (ALT 250 FOR IP): Performed by: NURSE PRACTITIONER

## 2022-08-13 PROCEDURE — 2580000003 HC RX 258: Performed by: INTERNAL MEDICINE

## 2022-08-13 PROCEDURE — 2580000003 HC RX 258: Performed by: NURSE PRACTITIONER

## 2022-08-13 PROCEDURE — 94003 VENT MGMT INPAT SUBQ DAY: CPT

## 2022-08-13 PROCEDURE — 6360000002 HC RX W HCPCS: Performed by: NURSE PRACTITIONER

## 2022-08-13 PROCEDURE — 36592 COLLECT BLOOD FROM PICC: CPT

## 2022-08-13 PROCEDURE — APPSS45 APP SPLIT SHARED TIME 31-45 MINUTES: Performed by: PHYSICIAN ASSISTANT

## 2022-08-13 PROCEDURE — APPNB15 APP NON BILLABLE TIME 0-15 MINS: Performed by: NURSE PRACTITIONER

## 2022-08-13 PROCEDURE — 99291 CRITICAL CARE FIRST HOUR: CPT | Performed by: INTERNAL MEDICINE

## 2022-08-13 PROCEDURE — 2500000003 HC RX 250 WO HCPCS: Performed by: INTERNAL MEDICINE

## 2022-08-13 PROCEDURE — 2580000003 HC RX 258: Performed by: HOSPITALIST

## 2022-08-13 PROCEDURE — 99232 SBSQ HOSP IP/OBS MODERATE 35: CPT | Performed by: SURGERY

## 2022-08-13 PROCEDURE — 6360000002 HC RX W HCPCS: Performed by: HOSPITALIST

## 2022-08-13 PROCEDURE — 6360000002 HC RX W HCPCS: Performed by: INTERNAL MEDICINE

## 2022-08-13 PROCEDURE — 2500000003 HC RX 250 WO HCPCS: Performed by: NURSE PRACTITIONER

## 2022-08-13 PROCEDURE — 85025 COMPLETE CBC W/AUTO DIFF WBC: CPT

## 2022-08-13 PROCEDURE — 2500000003 HC RX 250 WO HCPCS: Performed by: HOSPITALIST

## 2022-08-13 PROCEDURE — 71045 X-RAY EXAM CHEST 1 VIEW: CPT

## 2022-08-13 PROCEDURE — 2700000000 HC OXYGEN THERAPY PER DAY

## 2022-08-13 PROCEDURE — 93005 ELECTROCARDIOGRAM TRACING: CPT | Performed by: HOSPITALIST

## 2022-08-13 PROCEDURE — 2000000000 HC ICU R&B

## 2022-08-13 PROCEDURE — 84100 ASSAY OF PHOSPHORUS: CPT

## 2022-08-13 PROCEDURE — 83735 ASSAY OF MAGNESIUM: CPT

## 2022-08-13 PROCEDURE — 6370000000 HC RX 637 (ALT 250 FOR IP): Performed by: HOSPITALIST

## 2022-08-13 PROCEDURE — 6370000000 HC RX 637 (ALT 250 FOR IP): Performed by: INTERNAL MEDICINE

## 2022-08-13 PROCEDURE — 83880 ASSAY OF NATRIURETIC PEPTIDE: CPT

## 2022-08-13 PROCEDURE — 94640 AIRWAY INHALATION TREATMENT: CPT

## 2022-08-13 PROCEDURE — 80048 BASIC METABOLIC PNL TOTAL CA: CPT

## 2022-08-13 PROCEDURE — APPNB45 APP NON BILLABLE 31-45 MINUTES: Performed by: PHYSICIAN ASSISTANT

## 2022-08-13 RX ORDER — POTASSIUM CHLORIDE 29.8 MG/ML
20 INJECTION INTRAVENOUS ONCE
Status: DISCONTINUED | OUTPATIENT
Start: 2022-08-13 | End: 2022-08-14

## 2022-08-13 RX ORDER — METOPROLOL TARTRATE 5 MG/5ML
5 INJECTION INTRAVENOUS ONCE
Status: COMPLETED | OUTPATIENT
Start: 2022-08-13 | End: 2022-08-13

## 2022-08-13 RX ORDER — FUROSEMIDE 10 MG/ML
40 INJECTION INTRAMUSCULAR; INTRAVENOUS ONCE
Status: COMPLETED | OUTPATIENT
Start: 2022-08-13 | End: 2022-08-13

## 2022-08-13 RX ORDER — ACETAMINOPHEN 325 MG/1
650 TABLET ORAL EVERY 4 HOURS PRN
Status: DISCONTINUED | OUTPATIENT
Start: 2022-08-13 | End: 2022-09-07 | Stop reason: HOSPADM

## 2022-08-13 RX ORDER — POTASSIUM CHLORIDE 29.8 MG/ML
20 INJECTION INTRAVENOUS PRN
Status: DISCONTINUED | OUTPATIENT
Start: 2022-08-13 | End: 2022-09-07 | Stop reason: HOSPADM

## 2022-08-13 RX ORDER — CALCIUM GLUCONATE 20 MG/ML
1000 INJECTION, SOLUTION INTRAVENOUS ONCE
Status: COMPLETED | OUTPATIENT
Start: 2022-08-13 | End: 2022-08-13

## 2022-08-13 RX ADMIN — ENOXAPARIN SODIUM 40 MG: 100 INJECTION SUBCUTANEOUS at 08:09

## 2022-08-13 RX ADMIN — POTASSIUM CHLORIDE 20 MEQ: 29.8 INJECTION, SOLUTION INTRAVENOUS at 23:47

## 2022-08-13 RX ADMIN — ACETAMINOPHEN 650 MG: 325 TABLET, FILM COATED ORAL at 15:38

## 2022-08-13 RX ADMIN — IPRATROPIUM BROMIDE AND ALBUTEROL SULFATE 1 AMPULE: 2.5; .5 SOLUTION RESPIRATORY (INHALATION) at 16:09

## 2022-08-13 RX ADMIN — FAMOTIDINE 20 MG: 10 INJECTION, SOLUTION INTRAVENOUS at 21:05

## 2022-08-13 RX ADMIN — SODIUM CHLORIDE, PRESERVATIVE FREE 10 ML: 5 INJECTION INTRAVENOUS at 21:08

## 2022-08-13 RX ADMIN — PROPOFOL 20 MCG/KG/MIN: 10 INJECTION, EMULSION INTRAVENOUS at 23:58

## 2022-08-13 RX ADMIN — PROPOFOL 30 MCG/KG/MIN: 10 INJECTION, EMULSION INTRAVENOUS at 06:31

## 2022-08-13 RX ADMIN — FUROSEMIDE 40 MG: 10 INJECTION, SOLUTION INTRAMUSCULAR; INTRAVENOUS at 08:09

## 2022-08-13 RX ADMIN — METOPROLOL TARTRATE 25 MG: 25 TABLET, FILM COATED ORAL at 21:05

## 2022-08-13 RX ADMIN — PROPOFOL 30 MCG/KG/MIN: 10 INJECTION, EMULSION INTRAVENOUS at 02:59

## 2022-08-13 RX ADMIN — PIPERACILLIN AND TAZOBACTAM 4500 MG: 4; .5 INJECTION, POWDER, LYOPHILIZED, FOR SOLUTION INTRAVENOUS at 16:00

## 2022-08-13 RX ADMIN — IPRATROPIUM BROMIDE AND ALBUTEROL SULFATE 1 AMPULE: 2.5; .5 SOLUTION RESPIRATORY (INHALATION) at 19:32

## 2022-08-13 RX ADMIN — Medication 200 MCG/HR: at 08:17

## 2022-08-13 RX ADMIN — MIDAZOLAM 2 MG: 1 INJECTION INTRAMUSCULAR; INTRAVENOUS at 11:39

## 2022-08-13 RX ADMIN — IPRATROPIUM BROMIDE AND ALBUTEROL SULFATE 1 AMPULE: 2.5; .5 SOLUTION RESPIRATORY (INHALATION) at 07:43

## 2022-08-13 RX ADMIN — POTASSIUM CHLORIDE 20 MEQ: 29.8 INJECTION, SOLUTION INTRAVENOUS at 22:43

## 2022-08-13 RX ADMIN — Medication 200 MCG/HR: at 13:00

## 2022-08-13 RX ADMIN — PROPOFOL 30 MCG/KG/MIN: 10 INJECTION, EMULSION INTRAVENOUS at 14:15

## 2022-08-13 RX ADMIN — POTASSIUM CHLORIDE 20 MEQ: 29.8 INJECTION, SOLUTION INTRAVENOUS at 15:37

## 2022-08-13 RX ADMIN — METOPROLOL TARTRATE 25 MG: 25 TABLET, FILM COATED ORAL at 11:22

## 2022-08-13 RX ADMIN — Medication 200 MCG/HR: at 02:58

## 2022-08-13 RX ADMIN — ENOXAPARIN SODIUM 40 MG: 100 INJECTION SUBCUTANEOUS at 21:06

## 2022-08-13 RX ADMIN — PHENYLEPHRINE HYDROCHLORIDE 75 MCG/MIN: 10 INJECTION INTRAVENOUS at 12:05

## 2022-08-13 RX ADMIN — METOPROLOL TARTRATE 5 MG: 5 INJECTION INTRAVENOUS at 12:34

## 2022-08-13 RX ADMIN — IPRATROPIUM BROMIDE AND ALBUTEROL SULFATE 1 AMPULE: 2.5; .5 SOLUTION RESPIRATORY (INHALATION) at 11:44

## 2022-08-13 RX ADMIN — CALCIUM GLUCONATE 1000 MG: 20 INJECTION, SOLUTION INTRAVENOUS at 09:17

## 2022-08-13 RX ADMIN — CHLORHEXIDINE GLUCONATE 0.12% ORAL RINSE 15 ML: 1.2 LIQUID ORAL at 19:33

## 2022-08-13 RX ADMIN — PIPERACILLIN AND TAZOBACTAM 4500 MG: 4; .5 INJECTION, POWDER, LYOPHILIZED, FOR SOLUTION INTRAVENOUS at 09:18

## 2022-08-13 RX ADMIN — PROPOFOL 30 MCG/KG/MIN: 10 INJECTION, EMULSION INTRAVENOUS at 11:20

## 2022-08-13 RX ADMIN — FAMOTIDINE 20 MG: 10 INJECTION, SOLUTION INTRAVENOUS at 08:08

## 2022-08-13 RX ADMIN — CHLORHEXIDINE GLUCONATE 0.12% ORAL RINSE 15 ML: 1.2 LIQUID ORAL at 08:07

## 2022-08-13 RX ADMIN — PROPOFOL 25 MCG/KG/MIN: 10 INJECTION, EMULSION INTRAVENOUS at 18:02

## 2022-08-13 RX ADMIN — Medication 200 MCG/HR: at 23:53

## 2022-08-13 RX ADMIN — Medication 200 MCG/HR: at 17:58

## 2022-08-13 RX ADMIN — PIPERACILLIN AND TAZOBACTAM 4500 MG: 4; .5 INJECTION, POWDER, LYOPHILIZED, FOR SOLUTION INTRAVENOUS at 23:59

## 2022-08-13 ASSESSMENT — PULMONARY FUNCTION TESTS
PIF_VALUE: 33
PIF_VALUE: 27
PIF_VALUE: 33
PIF_VALUE: 26
PIF_VALUE: 33
PIF_VALUE: 25
PIF_VALUE: 33
PIF_VALUE: 25
PIF_VALUE: 26
PIF_VALUE: 33
PIF_VALUE: 27
PIF_VALUE: 27
PIF_VALUE: 33
PIF_VALUE: 27
PIF_VALUE: 33
PIF_VALUE: 26
PIF_VALUE: 33
PIF_VALUE: 26
PIF_VALUE: 33
PIF_VALUE: 27
PIF_VALUE: 26
PIF_VALUE: 33
PIF_VALUE: 27
PIF_VALUE: 33
PIF_VALUE: 26
PIF_VALUE: 33
PIF_VALUE: 24
PIF_VALUE: 33
PIF_VALUE: 25
PIF_VALUE: 33
PIF_VALUE: 24
PIF_VALUE: 33
PIF_VALUE: 29
PIF_VALUE: 33
PIF_VALUE: 33

## 2022-08-13 ASSESSMENT — PAIN SCALES - GENERAL
PAINLEVEL_OUTOF10: 0

## 2022-08-13 NOTE — PROGRESS NOTES
[facility]                                               Progress note    Summary:   Boni Edmonds is being seen by nephrology for CHARLENE and hypernatremia. Admitted with paraesophageal hernia and SBO. Had EGD 8/9 with biopsy. Still NPO     Interval History  Seen and examined at bedside. On the vent  Off pressors. Got lasix yesterday     /73  94% sat on 60% fio2 and P 10   + 12 L for admit   UO 4 L   Negative 1.8 L past day     CXR did not look like pulmonary edema. Na 149 K 4.1  BUN 31  Cr 1.4  Ca 7.6  Hgb 9.7    Plan:   - has a 5.4 L free water deficit to be replaced over the next 48-72 hrs. - Increase TF water to 250 cc q 4 hrs today   - check BMP q 8 hrs  - positive fluid balance for admission but not much edema on exam. Will redose IV lasix 40 mg       Zainab Nix MD  [facility]  Office: [phone]    Assessment:   Acute kidney injury  Urine Na < 20    creatinine on admission 1   next day on 08/06/2022 was 0.8  suddenly jumped up to 2.7. Due to episode of severe hypotension with blood pressure drop 78/44. also got CT with IV contrast on 08/05/2022 but most likely  this is ATN related due to hypotension and hypercapnic  respiratory failure. CT angio s normal renal arteries and normal kidneys      Sepsis/ possible septic shock. emperature was 101.1   High white cell count. '  immunocompromised due to rheumatoid arthritis and treatment. History of sleep apnea   on CPAP. Acute respiratory failure with high pCO2 in the range of 50s with pH  of 7.24    now intubated. History of abnormal stress test in 06/2022 with stress-induced  ischemia and some scarring. Ejection fraction  was  64%.     had a followup angiogram which was reported as normal as per wife. I do not have that result     History of rheumatoid arthritis   longstanding,was on methotrexate,  has taken Celebrex and now on Inflectra infusions,  so he is immunocompromised.   Jose Day is now helping him with his rheumatoid tremendously. he has been on steroids in the past.     Rule out adrenal insufficiency. cortisol level. normal      History of thyroidectomy in the past. .    Small bowel obstruction. Large hiatus hernia with colon and stomach in the chest.  Further management as per medical team.       ROS:   Unable to assess      PMH:   Past medical history, surgical history, social history, family history are reviewed and updated as appropriate. Reviewed current medication list.   Allergies reviewed and updated as needed. PE:   Vitals:    08/13/22 0600   BP: 121/73   Pulse: 78   Resp: 12   Temp:    SpO2: 94%       General appearance:  in NAD, intubated and sedated  HEENT: EOM intact, no icterus. Trachea is midline. Neck : No masses, appears symmetrical  Respiratory: Respiratory effort appears normal, bilateral equal chest rise, no wheeze, no crackles ETT to vent   Cardiovascular: Ausculation shows RRR trace edema  Abdomen: mildly distended but soft   Musculoskeletal:  Joints with no swelling or deformity. Skin:no rashes, ulcers, induration, no jaundice.    Neuro: fsedated      Lab Results   Component Value Date    CREATININE 1.4 (H) 08/13/2022    BUN 31 (H) 08/13/2022     (H) 08/13/2022    K 4.1 08/13/2022     (H) 08/13/2022    CO2 30 08/13/2022      Lab Results   Component Value Date    WBC 6.8 08/13/2022    HGB 9.7 (L) 08/13/2022    HCT 29.5 (L) 08/13/2022    MCV 96.5 08/13/2022     (H) 08/13/2022     Lab Results   Component Value Date    CALCIUM 7.6 (L) 08/13/2022    PHOS 3.4 08/13/2022

## 2022-08-13 NOTE — PROGRESS NOTES
Pulmonary Critical Care Progress Note     Patient's name:  Aneta Hadley Record Number: [id number]  Patient's account/billing number: [de-identified]  Patient's YOB: 1960  Age: 63 y.o. Date of Admission: 8/5/2022  1:56 PM  Date of Consult: 8/13/2022      Primary Care Physician: Narendra Ruff MD      Code Status: Full Code    Chief complaint: acute respiratory failure with hypoxia and hypercapnia     Assessment and Plan     Acute respiratory failure with hypoxia and hypercapnia worsening requiring intubation and mechanical ventilation. Aspiration pneumonia bibasilar multifocal   Partial small bowel obstruction. S/p EGD with OG placement  Acute on chronic diastolic CHF  SVT with h/o Afib in the past s/p Cardioversion   Acute kidney injury, dehydration and contrast nephropathy  Large hiatal hernia  Obstructive sleep apnea obesity hypoventilation syndrome  Sedation related hypotension      Plan:  Vent support, setting adjusted. wean Fio2 as tolerated, keep sat > 90%  Neosyn, keep MAP > 65  Check CXR  Bp Po and as needed IV  Diuresis as needed   Advance tube feeding   Zosyn for aspiration pneumonia. GI and DVT prophylaxis. Discussed with family     Overnight:  Developed SVT likely respiratory triggered  Small BM overnight  Tolerating TF      REVIEW OF SYSTEMS:  Review of Systems -   Unable to obtain intubated on MV        Physical Exam:    Vitals: BP (!) 75/52   Pulse (!) 169   Temp 99.3 °F (37.4 °C) (Axillary)   Resp 18   Ht 5' 7\" (1.702 m)   Wt (!) 368 lb 6.2 oz (167.1 kg)   SpO2 93%   BMI 57.70 kg/m²     Last Body weight:   Wt Readings from Last 3 Encounters:   08/13/22 (!) 368 lb 6.2 oz (167.1 kg)       Body Mass Index : Body mass index is 57.7 kg/m².       Intake and Output summary:   Intake/Output Summary (Last 24 hours) at 8/13/2022 1227  Last data filed at 8/13/2022 1100  Gross per 24 hour   Intake 2389.91 ml   Output 4200 ml   Net -1810.09 ml         Physical Examination:     Gen: intubated on MV  Eyes: PERRL. Anicteric sclera. No conjunctival injection. ENT: No discharge. Posterior oropharynx clear. External appearance of ears and nose normal.  Neck: Trachea midline. No mass   Resp: Diminished bilaterally with upper airway wheezing  CV: Regular rate. Regular rhythm. No murmur or rub. No edema. GI: Soft distended sluggish bowel sounds. Skin: Warm, dry, w/o erythema. Lymph: No cervical or supraclavicular LAD. M/S: No cyanosis. No clubbing. Neuro: sedated on MV        Laboratory findings:-    CBC:   Recent Labs     08/13/22  0510   WBC 6.8   HGB 9.7*   *       BMP:    Recent Labs     08/11/22  0414 08/12/22  0403 08/13/22  0510    143 149*   K 3.8 3.5 4.1    109 112*   CO2 24 25 30   BUN 51* 34* 31*   CREATININE 2.0* 1.4* 1.4*   GLUCOSE 99 96 107*       S. Calcium:  Recent Labs     08/13/22  0510   CALCIUM 7.6*       S. Magnesium:  Recent Labs     08/13/22  0510   MG 2.20       S. Phosphorus:  Recent Labs     08/13/22  0510   PHOS 3.4       S. Glucose:  Recent Labs     08/13/22  0428 08/13/22  0808 08/13/22  1200   POCGLU 96 93 109*             Radiology Review:  Pertinent images / reports were reviewed as a part of this visit.     Reviewed     Critical Care time 35 min                Aylin Salas MD, M.JACOBY.            8/13/2022, 12:27 PM

## 2022-08-13 NOTE — PROGRESS NOTES
Late entry due to patient care. 11:30- Patient's HR increased to 160, BP decreased from 170/100 to 90/40. 25 mg PO lopressor given, propofol increased. RN updated critical care and hospitalist.   BP continues to drop, verbal orders for lona gtt received. 11:55- Cardiology consulted per MD Elaine Peguero. 12:15- 12 lead EKG indicates SVT rhythm. BP improved with lona to 93/61. 5 mg metoprolol IV ordered. 12:34- 5 mg IV metoprolol given, patient converted to NSR rate of 72. /78.   12:46- chest xray performed. RN to wean lona as tolerated.

## 2022-08-13 NOTE — PLAN OF CARE
Problem: Discharge Planning  Goal: Discharge to home or other facility with appropriate resources  8/13/2022 0541 by Bernard Kovacs RN  Outcome: Progressing  Flowsheets  Taken 8/13/2022 0000 by Bernard Kovacs RN  Discharge to home or other facility with appropriate resources: Identify barriers to discharge with patient and caregiver    Problem: Pain  Goal: Verbalizes/displays adequate comfort level or baseline comfort level  8/13/2022 0541 by Bernard Kovacs RN  Outcome: Progressing    Problem: ABCDS Injury Assessment  Goal: Absence of physical injury  8/13/2022 0541 by Bernard Kovacs RN  Outcome: Progressing     Problem: Safety - Adult  Goal: Free from fall injury  8/13/2022 0541 by Bernard Kovacs RN  Outcome: Progressing  Note: Falling star program remains in place. Call light and personal belongings within reach. Frequent visual monitoring continues. Toileting program inplace. Patient assisted in turning/repositioning at least once every 2 hours, and on a prn basis. Problem: Skin/Tissue Integrity  Goal: Absence of new skin breakdown  Description: 1. Monitor for areas of redness and/or skin breakdown  2. Assess vascular access sites hourly  3. Every 4-6 hours minimum:  Change oxygen saturation probe site  4. Every 4-6 hours:  If on nasal continuous positive airway pressure, respiratory therapy assess nares and determine need for appliance change or resting period. 8/13/2022 0541 by Bernard Kovacs RN  Outcome: Progressing  Note: Monitoring patient skin integrity for skin breakdown, turning and repositioning q2h per protocol. Patient demonstrates turning and repositioning self. Problem: Safety - Medical Restraint  Goal: Remains free of injury from restraints (Restraint for Interference with Medical Device)  Description: INTERVENTIONS:  1.  Determine that other, less restrictive measures have been tried or would not be effective before applying the restraint  2. Evaluate the patient's condition at the time of restraint application  3. Inform patient/family regarding the reason for restraint  4. Q2H: Monitor safety, psychosocial status, comfort, nutrition and hydration  8/13/2022 0541 by Pankaj Mccain RN  Outcome: Progressing  Note: Upper extremity soft wrist restraints intact. No S/S of restraint related injury . ROM performed Q2HR.         Problem: Neurosensory - Adult  Goal: Achieves stable or improved neurological status  8/13/2022 0541 by Pankaj Mccain RN  Outcome: Progressing  Flowsheets  Taken 8/13/2022 0000 by Pankaj Mccain RN  Achieves stable or improved neurological status: Assess for and report changes in neurological status    Problem: Neurosensory - Adult  Goal: Achieves maximal functionality and self care  8/13/2022 0541 by Pankaj Mccain RN  Outcome: Progressing    Problem: Respiratory - Adult  Goal: Achieves optimal ventilation and oxygenation  8/13/2022 0541 by Pankaj Mccain RN  Outcome: Progressing    Problem: Skin/Tissue Integrity - Adult  Goal: Skin integrity remains intact  Recent Flowsheet Documentation  Taken 8/13/2022 0536 by Pankaj Mccain RN  Skin Integrity Remains Intact: Every 4-6 hours minimum: Change oxygen saturation probe site  Skin Integrity Remains Intact: Monitor for areas of redness and/or skin breakdown

## 2022-08-13 NOTE — PROGRESS NOTES
General and Vascular Surgery                                                           Daily Progress Note                                                              Pt Name: Aneta Hadley Record Number: [id number]  Date of Birth 1960   Today's Date: 8/13/2022      ASSESSMENT/PLAN   Partial SBO  -Last BM loose 8/9 early AM. Unchanged abdominal exam-stable distention. -off pressors  -oral gastric tube replaced via EGD 8/10. Unable to pass through either nare. TF running at 10 cc  -Repeat CT abd pelvis 8/11 no evidence of obstruction. If he is to be extubated soon would just remove OG at that time and start PO    SUBJECTIVE/EVENTS last 24H  Miles remains in ICU, intubated, sedated. No further Bms. Minimal OG tube output. OBJECTIVE  VITALS:  height is 5' 7\" (1.702 m) and weight is 368 lb 6.2 oz (167.1 kg) (abnormal). His axillary temperature is 99.3 °F (37.4 °C). His blood pressure is 160/92 (abnormal) and his pulse is 88. His respiration is 13 and oxygen saturation is 93%. VITALS:  BP (!) 160/92   Pulse 88   Temp 99.3 °F (37.4 °C) (Axillary)   Resp 13   Ht 5' 7\" (1.702 m)   Wt (!) 368 lb 6.2 oz (167.1 kg)   SpO2 93%   BMI 57.70 kg/m²   GENERAL: Intubated  ABDOMEN: stable distention  I/O last 3 completed shifts:   In: 2870.1 [I.V.:1775.8; NG/GT:642; IV Piggyback:452.3]  Out: 6783 [Urine:5710]  I/O this shift:  In: 244 [NG/GT:244]  Out: 2055 [Urine:2055]    LABS  Recent Labs     08/13/22  0510   WBC 6.8   HGB 9.7*   HCT 29.5*   *   *   K 4.1   *   CO2 30   BUN 31*   CREATININE 1.4*   MG 2.20   PHOS 3.4   CALCIUM 7.6*     CBC:   Lab Results   Component Value Date/Time    WBC 6.8 08/13/2022 05:10 AM    RBC 3.06 08/13/2022 05:10 AM    HGB 9.7 08/13/2022 05:10 AM    HCT 29.5 08/13/2022 05:10 AM    MCV 96.5 08/13/2022 05:10 AM    MCH 31.7 08/13/2022 05:10 AM    MCHC 32.9 08/13/2022 05:10 AM    RDW 17.1 08/13/2022 05:10 AM     08/13/2022 05:10 AM    MPV 6.9 08/13/2022 05:10 AM     CMP:    Lab Results   Component Value Date/Time     08/13/2022 05:10 AM    K 4.1 08/13/2022 05:10 AM    K 3.2 08/06/2022 04:45 AM     08/13/2022 05:10 AM    CO2 30 08/13/2022 05:10 AM    BUN 31 08/13/2022 05:10 AM    CREATININE 1.4 08/13/2022 05:10 AM    GFRAA >60 08/13/2022 05:10 AM    AGRATIO 0.8 08/06/2022 04:45 AM    LABGLOM 52 08/13/2022 05:10 AM    GLUCOSE 107 08/13/2022 05:10 AM    PROT 6.3 08/06/2022 04:45 AM    LABALBU 2.8 08/06/2022 04:45 AM    CALCIUM 7.6 08/13/2022 05:10 AM    BILITOT 0.7 08/06/2022 04:45 AM    ALKPHOS 72 08/06/2022 04:45 AM    AST 22 08/06/2022 04:45 AM    ALT 23 08/06/2022 04:45 AM         Mela Guillen PA-C  Electronically signed 8/13/2022 at 10:48 AM    Agree with above note. The patient was personally seen and examined. Maria Esther Carmichael is doing about the same. Still intubated and sedated. FiO2 up to 100%. Tolerating trophic TF. No BM recorded. Ventilated and sedated  Equal chest rise  RRR  Abd soft, NT, mildly distended  No cyanosis or clubbing    WBC 6.8  Cr 1.4    A/P: 64 yo male with pSBO vs ileus, acute respiratory failure    Continue trophic tube feeds.   Will ramp up once GI function returns  When he is ready for extubation, OG will be removed    Vent management per critical care    Garret Wilkinson MD

## 2022-08-13 NOTE — PROGRESS NOTES
Hospitalist Progress Note  8/13/2022 9:31 AM    PCP: Luis Laura MD    [phone]     Date of Admission: 8/5/2022                                                                                                                     HOSPITAL COURSE    Patient demographics:  The patient  Rukhsana Nixon is a 63 y.o. male     Significant past medical history:   Patient Active Problem List   Diagnosis    Partial bowel obstruction (HCC)    Hiatal hernia    Acute respiratory failure with hypoxia and hypercapnia (HCC)    SBO (small bowel obstruction) (HCC)    Aspiration into airway    Acute respiratory failure with hypoxia ([address]    Small bowel obstruction ([address]         Presenting symptoms:  abdominal pain    Diagnostic workup:      CONSULTS DURING ADMISSION :   IP CONSULT TO GENERAL SURGERY  IP CONSULT TO HOSPITALIST  IP CONSULT TO GENERAL SURGERY  IP CONSULT TO DIETITIAN      Patient was diagnosed with:  Low grade or Partial Bowel obstruction  Pneumonia and septic shock. Hiatal hernia  Atrial Fibrillation      Treatment while inpatient:  63years old male with medical history significant for morbid obesity, paraesophageal hernia. Patient presented to the emergency room with abdominal pain nausea vomiting and diarrhea. Patient was diagnosed with small bowel obstruction. Patient was also diagnosed with pneumonia and septic shock. Patient developed acute respiratory failure and acute renal failure possibly General patient was started on the ventilator and supported with pressors.                                                                                            ----------------------------------------------------------      SUBJECTIVE COMPLAINTS- follow up for abdominal pain    Diet: Diet NPO  ADULT TUBE FEEDING; Orogastric; Peptide Based High Protein; Continuous; 20; Yes; 10; Q 4 hours; 35; 250; Q 4 hours      OBJECTIVE:   Patient Active Problem List   Diagnosis    Partial bowel obstruction (HCC) Hiatal hernia    Acute respiratory failure with hypoxia and hypercapnia (HCC)    SBO (small bowel obstruction) (HCC)    Aspiration into airway    Acute respiratory failure with hypoxia (HCC)    Small bowel obstruction (HCC)       Allergies  Codeine    Medications    Scheduled Meds:   calcium gluconate-NaCl  1,000 mg IntraVENous Once    famotidine (PEPCID) injection  20 mg IntraVENous BID    chlorhexidine  15 mL Mouth/Throat BID    piperacillin-tazobactam  4,500 mg IntraVENous Q8H    ipratropium-albuterol  1 ampule Inhalation Q4H WA    sodium chloride flush  5-40 mL IntraVENous 2 times per day    [Held by provider] gabapentin  800 mg Oral TID    [Held by provider] levothyroxine  200 mcg Oral Daily    [Held by provider] metoprolol tartrate  25 mg Oral BID    [Held by provider] spironolactone  50 mg Oral Daily    enoxaparin  40 mg SubCUTAneous BID     Continuous Infusions:   norepinephrine Stopped (08/11/22 0220)    fentaNYL 200 mcg/hr (08/13/22 0817)    propofol 20 mcg/kg/min (08/13/22 6564)    sodium chloride       PRN Meds:  midazolam, fentaNYL **AND** fentaNYL, acetaminophen, sodium chloride flush, sodium chloride, ondansetron **OR** ondansetron, diazePAM    Vitals   Vitals /wt Patient Vitals for the past 8 hrs:   BP Temp Temp src Pulse Resp SpO2 Weight   08/13/22 0900 (!) 153/82 -- -- 88 12 93 % --   08/13/22 0830 (!) 146/86 -- -- 88 10 96 % --   08/13/22 0800 (!) 141/74 -- -- 82 14 95 % --   08/13/22 0745 -- -- -- 83 20 96 % --   08/13/22 0744 -- -- -- 81 12 96 % --   08/13/22 0730 128/88 99.3 °F (37.4 °C) Axillary 82 15 95 % --   08/13/22 0700 131/77 -- -- 82 15 96 % --   08/13/22 0600 121/73 -- -- 78 12 94 % --   08/13/22 0545 -- -- -- 77 11 95 % (!) 368 lb 6.2 oz (167.1 kg)   08/13/22 0500 114/68 -- -- 77 12 95 % --   08/13/22 0400 119/71 99.4 °F (37.4 °C) Axillary 82 15 94 % --   08/13/22 0300 133/79 -- -- 80 18 94 % --   08/13/22 0200 112/67 -- -- 81 14 94 % --        72HR INTAKE/OUTPUT:    Intake/Output Summary (Last 24 hours) at 8/13/2022 0931  Last data filed at 8/13/2022 0730  Gross per 24 hour   Intake 2220.91 ml   Output 3370 ml   Net -1149.09 ml       Exam:    Gen:  intubated and sedated  Eyes: PERRL. No sclera icterus. No conjunctival injection. ENT: No discharge. Pharynx clear. External appearance of ears and nose normal.  Neck: Trachea midline. No obvious mass. Resp: No accessory muscle use. No crackles. No wheezes. No rhonchi. CV: Regular rate. Regular rhythm. No murmur or rub. No edema. GI: Non-tender. Non-distended. No hernia. Skin: Warm, dry, normal texture and turgor. Lymph: No cervical LAD. No supraclavicular LAD. M/S: / Ext. No cyanosis. No clubbing. No joint deformity. Neuro: CN 2-12 are intact,  no neurologic deficits noted. PT/INR:   No results for input(s): PROTIME, INR in the last 72 hours. APTT: No results for input(s): APTT in the last 72 hours. CBC:   Recent Labs     08/11/22 0414 08/12/22  0403 08/13/22  0510   WBC 6.6 6.9 6.8   HGB 9.1* 9.4* 9.7*   HCT 26.9* 27.0* 29.5*   MCV 94.5 95.5 96.5    381 456*       BMP:   Recent Labs     08/11/22 0414 08/12/22  0403 08/13/22  0510    143 149*   K 3.8 3.5 4.1    109 112*   CO2 24 25 30   PHOS 2.0* 2.5 3.4   BUN 51* 34* 31*   CREATININE 2.0* 1.4* 1.4*       LIVER PROFILE:   No results for input(s): ALKPHOS, AST, ALT, ALB, BILIDIR, BILITOT, ALKPHOS in the last 72 hours. No results for input(s): AMYLASE in the last 72 hours. No results for input(s): LIPASE in the last 72 hours. UA:  No results for input(s): NITRITE, LABCAST, WBCUA, RBCUA, MUCUS in the last 72 hours. TROPONIN:   No results for input(s): Mariely Kaska in the last 72 hours. No results found for: URRFLXCULT    No results for input(s): TSHREFLEX in the last 72 hours.       No components found for: JDK1119  POC GLUCOSE:    Recent Labs     08/12/22  1612 08/12/22  1955 08/13/22  0026 08/13/22  0428 08/13/22  8359   POCGLU 96 92 100* 96 93     No results for input(s): LABA1C in the last 72 hours. No results found for: LABA1C    Echocardiogram shows normal ejection fraction  Grade 1 diastolic dysfunction      ASSESSMENT AND PLAN      Acute respiratory failure  Intubated 8/8  Full vent support  Aspiration pna  Ct abx  Discontinue IV fluids  Patient has grade 1 diastolic dysfunction  FiO2 at 60%    Partial small Bowel obstruction  CT scan of the abdomen with contrast shows no obstruction  Og placed per GI 8/9  General surgery is following      CHARLENE  Creatinine is improving  Nephrology is following      Hypotention  Wean off pressors    Hiatal hernia  - CT chest/abd/pelv: Large hiatal hernia containing much of the stomach which is distended       Atrial Fibrillation  Patient developed the rapid ventricular rate  IV Lopressor  Consult to cardiology     HTN  - monitor blood pressure  - restart home meds when able        Code Status: Full Code        Dispo - cc        The patient and / or the family were informed of the results of any tests, a time was given to answer questions, a plan was proposed and they agreed with plan. Oz Neil MD    This note was transcribed using [id number] [employer]. Please disregard any translational errors.

## 2022-08-14 ENCOUNTER — APPOINTMENT (OUTPATIENT)
Dept: GENERAL RADIOLOGY | Age: 62
DRG: 853 | End: 2022-08-14
Payer: COMMERCIAL

## 2022-08-14 PROBLEM — Z86.79 HISTORY OF ATRIAL FIBRILLATION: Status: ACTIVE | Noted: 2022-08-14

## 2022-08-14 PROBLEM — I50.30 DIASTOLIC HEART FAILURE (HCC): Status: ACTIVE | Noted: 2022-08-14

## 2022-08-14 PROBLEM — I47.1 SUPRAVENTRICULAR TACHYCARDIA (HCC): Status: ACTIVE | Noted: 2022-08-14

## 2022-08-14 PROBLEM — I47.10 SUPRAVENTRICULAR TACHYCARDIA: Status: ACTIVE | Noted: 2022-08-14

## 2022-08-14 LAB
ANION GAP SERPL CALCULATED.3IONS-SCNC: 8 MMOL/L (ref 3–16)
ANION GAP SERPL CALCULATED.3IONS-SCNC: 8 MMOL/L (ref 3–16)
ANION GAP SERPL CALCULATED.3IONS-SCNC: 9 MMOL/L (ref 3–16)
ANISOCYTOSIS: ABNORMAL
ATYPICAL LYMPHOCYTE RELATIVE PERCENT: 1 % (ref 0–6)
BANDED NEUTROPHILS RELATIVE PERCENT: 11 % (ref 0–7)
BASE EXCESS ARTERIAL: 7.4 MMOL/L (ref -3–3)
BASOPHILS ABSOLUTE: 0 K/UL (ref 0–0.2)
BASOPHILS RELATIVE PERCENT: 0 %
BUN BLDV-MCNC: 32 MG/DL (ref 7–20)
BUN BLDV-MCNC: 33 MG/DL (ref 7–20)
BUN BLDV-MCNC: 35 MG/DL (ref 7–20)
CALCIUM SERPL-MCNC: 7.5 MG/DL (ref 8.3–10.6)
CALCIUM SERPL-MCNC: 7.6 MG/DL (ref 8.3–10.6)
CALCIUM SERPL-MCNC: 7.8 MG/DL (ref 8.3–10.6)
CARBOXYHEMOGLOBIN ARTERIAL: 0.5 % (ref 0–1.5)
CHLORIDE BLD-SCNC: 108 MMOL/L (ref 99–110)
CHLORIDE BLD-SCNC: 109 MMOL/L (ref 99–110)
CHLORIDE BLD-SCNC: 111 MMOL/L (ref 99–110)
CO2: 29 MMOL/L (ref 21–32)
CO2: 30 MMOL/L (ref 21–32)
CO2: 31 MMOL/L (ref 21–32)
CREAT SERPL-MCNC: 1.2 MG/DL (ref 0.8–1.3)
CREAT SERPL-MCNC: 1.2 MG/DL (ref 0.8–1.3)
CREAT SERPL-MCNC: 1.3 MG/DL (ref 0.8–1.3)
EKG ATRIAL RATE: 166 BPM
EKG DIAGNOSIS: NORMAL
EKG Q-T INTERVAL: 306 MS
EKG QRS DURATION: 98 MS
EKG QTC CALCULATION (BAZETT): 497 MS
EKG R AXIS: 68 DEGREES
EKG T AXIS: -39 DEGREES
EKG VENTRICULAR RATE: 159 BPM
EOSINOPHILS ABSOLUTE: 0.1 K/UL (ref 0–0.6)
EOSINOPHILS RELATIVE PERCENT: 2 %
GFR AFRICAN AMERICAN: >60
GFR NON-AFRICAN AMERICAN: 56
GFR NON-AFRICAN AMERICAN: >60
GFR NON-AFRICAN AMERICAN: >60
GLUCOSE BLD-MCNC: 101 MG/DL (ref 70–99)
GLUCOSE BLD-MCNC: 108 MG/DL (ref 70–99)
GLUCOSE BLD-MCNC: 109 MG/DL (ref 70–99)
GLUCOSE BLD-MCNC: 94 MG/DL (ref 70–99)
GLUCOSE BLD-MCNC: 95 MG/DL (ref 70–99)
GLUCOSE BLD-MCNC: 97 MG/DL (ref 70–99)
GLUCOSE BLD-MCNC: 97 MG/DL (ref 70–99)
GLUCOSE BLD-MCNC: 99 MG/DL (ref 70–99)
GLUCOSE BLD-MCNC: 99 MG/DL (ref 70–99)
HCO3 ARTERIAL: 30.8 MMOL/L (ref 21–29)
HCT VFR BLD CALC: 29.7 % (ref 40.5–52.5)
HEMOGLOBIN, ART, EXTENDED: 9.6 G/DL (ref 13.5–17.5)
HEMOGLOBIN: 9.8 G/DL (ref 13.5–17.5)
LYMPHOCYTES ABSOLUTE: 0.9 K/UL (ref 1–5.1)
LYMPHOCYTES RELATIVE PERCENT: 13 %
MAGNESIUM: 2 MG/DL (ref 1.8–2.4)
MCH RBC QN AUTO: 31.4 PG (ref 26–34)
MCHC RBC AUTO-ENTMCNC: 33.2 G/DL (ref 31–36)
MCV RBC AUTO: 94.8 FL (ref 80–100)
METHEMOGLOBIN ARTERIAL: 0.5 %
MONOCYTES ABSOLUTE: 0.3 K/UL (ref 0–1.3)
MONOCYTES RELATIVE PERCENT: 4 %
NEUTROPHILS ABSOLUTE: 5.3 K/UL (ref 1.7–7.7)
NEUTROPHILS RELATIVE PERCENT: 69 %
NUCLEATED RED BLOOD CELLS: 1 /100 WBC
O2 SAT, ARTERIAL: 94.7 %
O2 THERAPY: ABNORMAL
PCO2 ARTERIAL: 37.8 MMHG (ref 35–45)
PDW BLD-RTO: 16.9 % (ref 12.4–15.4)
PERFORMED ON: ABNORMAL
PERFORMED ON: NORMAL
PH ARTERIAL: 7.52 (ref 7.35–7.45)
PHOSPHORUS: 2.4 MG/DL (ref 2.5–4.9)
PLATELET # BLD: 467 K/UL (ref 135–450)
PLATELET SLIDE REVIEW: ABNORMAL
PMV BLD AUTO: 7 FL (ref 5–10.5)
PO2 ARTERIAL: 68 MMHG (ref 75–108)
POTASSIUM SERPL-SCNC: 3.4 MMOL/L (ref 3.5–5.1)
POTASSIUM SERPL-SCNC: 3.8 MMOL/L (ref 3.5–5.1)
POTASSIUM SERPL-SCNC: 4.9 MMOL/L (ref 3.5–5.1)
PROCALCITONIN: 0.84 NG/ML (ref 0–0.15)
RBC # BLD: 3.13 M/UL (ref 4.2–5.9)
SLIDE REVIEW: ABNORMAL
SODIUM BLD-SCNC: 147 MMOL/L (ref 136–145)
SODIUM BLD-SCNC: 148 MMOL/L (ref 136–145)
SODIUM BLD-SCNC: 148 MMOL/L (ref 136–145)
TCO2 ARTERIAL: 31.9 MMOL/L
TRIGL SERPL-MCNC: 175 MG/DL (ref 0–150)
VACUOLATED NEUTROPHILS: PRESENT
WBC # BLD: 6.6 K/UL (ref 4–11)

## 2022-08-14 PROCEDURE — 94640 AIRWAY INHALATION TREATMENT: CPT

## 2022-08-14 PROCEDURE — 84145 PROCALCITONIN (PCT): CPT

## 2022-08-14 PROCEDURE — 36592 COLLECT BLOOD FROM PICC: CPT

## 2022-08-14 PROCEDURE — 6370000000 HC RX 637 (ALT 250 FOR IP): Performed by: NURSE PRACTITIONER

## 2022-08-14 PROCEDURE — 94003 VENT MGMT INPAT SUBQ DAY: CPT

## 2022-08-14 PROCEDURE — 2500000003 HC RX 250 WO HCPCS: Performed by: NURSE PRACTITIONER

## 2022-08-14 PROCEDURE — 84478 ASSAY OF TRIGLYCERIDES: CPT

## 2022-08-14 PROCEDURE — 99232 SBSQ HOSP IP/OBS MODERATE 35: CPT | Performed by: SURGERY

## 2022-08-14 PROCEDURE — 71045 X-RAY EXAM CHEST 1 VIEW: CPT

## 2022-08-14 PROCEDURE — 94761 N-INVAS EAR/PLS OXIMETRY MLT: CPT

## 2022-08-14 PROCEDURE — 2580000003 HC RX 258: Performed by: HOSPITALIST

## 2022-08-14 PROCEDURE — 6360000002 HC RX W HCPCS: Performed by: HOSPITALIST

## 2022-08-14 PROCEDURE — 83735 ASSAY OF MAGNESIUM: CPT

## 2022-08-14 PROCEDURE — 2580000003 HC RX 258: Performed by: NURSE PRACTITIONER

## 2022-08-14 PROCEDURE — 36415 COLL VENOUS BLD VENIPUNCTURE: CPT

## 2022-08-14 PROCEDURE — 6360000002 HC RX W HCPCS: Performed by: NURSE PRACTITIONER

## 2022-08-14 PROCEDURE — 2500000003 HC RX 250 WO HCPCS: Performed by: HOSPITALIST

## 2022-08-14 PROCEDURE — 85025 COMPLETE CBC W/AUTO DIFF WBC: CPT

## 2022-08-14 PROCEDURE — 93010 ELECTROCARDIOGRAM REPORT: CPT | Performed by: INTERNAL MEDICINE

## 2022-08-14 PROCEDURE — 36600 WITHDRAWAL OF ARTERIAL BLOOD: CPT

## 2022-08-14 PROCEDURE — 2000000000 HC ICU R&B

## 2022-08-14 PROCEDURE — 84100 ASSAY OF PHOSPHORUS: CPT

## 2022-08-14 PROCEDURE — 2580000003 HC RX 258: Performed by: INTERNAL MEDICINE

## 2022-08-14 PROCEDURE — 2700000000 HC OXYGEN THERAPY PER DAY

## 2022-08-14 PROCEDURE — 99223 1ST HOSP IP/OBS HIGH 75: CPT | Performed by: INTERNAL MEDICINE

## 2022-08-14 PROCEDURE — 99291 CRITICAL CARE FIRST HOUR: CPT | Performed by: INTERNAL MEDICINE

## 2022-08-14 PROCEDURE — 6370000000 HC RX 637 (ALT 250 FOR IP): Performed by: INTERNAL MEDICINE

## 2022-08-14 PROCEDURE — 6370000000 HC RX 637 (ALT 250 FOR IP): Performed by: HOSPITALIST

## 2022-08-14 PROCEDURE — 82803 BLOOD GASES ANY COMBINATION: CPT

## 2022-08-14 PROCEDURE — APPNB15 APP NON BILLABLE TIME 0-15 MINS: Performed by: NURSE PRACTITIONER

## 2022-08-14 PROCEDURE — 80048 BASIC METABOLIC PNL TOTAL CA: CPT

## 2022-08-14 RX ORDER — AMIODARONE HYDROCHLORIDE 200 MG/1
200 TABLET ORAL 2 TIMES DAILY
Status: CANCELLED | OUTPATIENT
Start: 2022-08-14

## 2022-08-14 RX ORDER — FUROSEMIDE 10 MG/ML
40 INJECTION INTRAMUSCULAR; INTRAVENOUS 2 TIMES DAILY
Status: DISCONTINUED | OUTPATIENT
Start: 2022-08-14 | End: 2022-08-16

## 2022-08-14 RX ORDER — CALCIUM GLUCONATE 20 MG/ML
1000 INJECTION, SOLUTION INTRAVENOUS ONCE
Status: COMPLETED | OUTPATIENT
Start: 2022-08-14 | End: 2022-08-14

## 2022-08-14 RX ORDER — FUROSEMIDE 10 MG/ML
40 INJECTION INTRAMUSCULAR; INTRAVENOUS ONCE
Status: DISCONTINUED | OUTPATIENT
Start: 2022-08-14 | End: 2022-08-14

## 2022-08-14 RX ADMIN — Medication 200 MCG/HR: at 12:03

## 2022-08-14 RX ADMIN — METOPROLOL TARTRATE 25 MG: 25 TABLET, FILM COATED ORAL at 20:25

## 2022-08-14 RX ADMIN — POTASSIUM PHOSPHATE, MONOBASIC AND POTASSIUM PHOSPHATE, DIBASIC 20 MMOL: 224; 236 INJECTION, SOLUTION, CONCENTRATE INTRAVENOUS at 11:03

## 2022-08-14 RX ADMIN — POTASSIUM CHLORIDE 20 MEQ: 29.8 INJECTION, SOLUTION INTRAVENOUS at 20:24

## 2022-08-14 RX ADMIN — PROPOFOL 20 MCG/KG/MIN: 10 INJECTION, EMULSION INTRAVENOUS at 09:41

## 2022-08-14 RX ADMIN — CHLORHEXIDINE GLUCONATE 0.12% ORAL RINSE 15 ML: 1.2 LIQUID ORAL at 07:52

## 2022-08-14 RX ADMIN — CALCIUM GLUCONATE 1000 MG: 20 INJECTION, SOLUTION INTRAVENOUS at 09:12

## 2022-08-14 RX ADMIN — ENOXAPARIN SODIUM 40 MG: 100 INJECTION SUBCUTANEOUS at 09:13

## 2022-08-14 RX ADMIN — POTASSIUM CHLORIDE 20 MEQ: 29.8 INJECTION, SOLUTION INTRAVENOUS at 19:24

## 2022-08-14 RX ADMIN — PROPOFOL 20 MCG/KG/MIN: 10 INJECTION, EMULSION INTRAVENOUS at 04:16

## 2022-08-14 RX ADMIN — Medication 200 MCG/HR: at 17:05

## 2022-08-14 RX ADMIN — SODIUM CHLORIDE, PRESERVATIVE FREE 10 ML: 5 INJECTION INTRAVENOUS at 09:14

## 2022-08-14 RX ADMIN — IPRATROPIUM BROMIDE AND ALBUTEROL SULFATE 1 AMPULE: 2.5; .5 SOLUTION RESPIRATORY (INHALATION) at 19:24

## 2022-08-14 RX ADMIN — Medication 200 MCG/HR: at 22:04

## 2022-08-14 RX ADMIN — IPRATROPIUM BROMIDE AND ALBUTEROL SULFATE 1 AMPULE: 2.5; .5 SOLUTION RESPIRATORY (INHALATION) at 08:00

## 2022-08-14 RX ADMIN — CHLORHEXIDINE GLUCONATE 0.12% ORAL RINSE 15 ML: 1.2 LIQUID ORAL at 20:27

## 2022-08-14 RX ADMIN — FUROSEMIDE 40 MG: 10 INJECTION, SOLUTION INTRAMUSCULAR; INTRAVENOUS at 18:47

## 2022-08-14 RX ADMIN — SODIUM CHLORIDE, PRESERVATIVE FREE 10 ML: 5 INJECTION INTRAVENOUS at 20:26

## 2022-08-14 RX ADMIN — PROPOFOL 15 MCG/KG/MIN: 10 INJECTION, EMULSION INTRAVENOUS at 23:33

## 2022-08-14 RX ADMIN — FAMOTIDINE 20 MG: 10 INJECTION, SOLUTION INTRAVENOUS at 20:25

## 2022-08-14 RX ADMIN — FUROSEMIDE 40 MG: 10 INJECTION, SOLUTION INTRAMUSCULAR; INTRAVENOUS at 09:54

## 2022-08-14 RX ADMIN — Medication 200 MCG/HR: at 07:08

## 2022-08-14 RX ADMIN — Medication 200 MCG/HR: at 02:30

## 2022-08-14 RX ADMIN — ENOXAPARIN SODIUM 40 MG: 100 INJECTION SUBCUTANEOUS at 20:26

## 2022-08-14 RX ADMIN — ACETAMINOPHEN 650 MG: 325 TABLET, FILM COATED ORAL at 07:25

## 2022-08-14 RX ADMIN — PIPERACILLIN AND TAZOBACTAM 4500 MG: 4; .5 INJECTION, POWDER, LYOPHILIZED, FOR SOLUTION INTRAVENOUS at 16:31

## 2022-08-14 RX ADMIN — IPRATROPIUM BROMIDE AND ALBUTEROL SULFATE 1 AMPULE: 2.5; .5 SOLUTION RESPIRATORY (INHALATION) at 16:36

## 2022-08-14 RX ADMIN — PIPERACILLIN AND TAZOBACTAM 4500 MG: 4; .5 INJECTION, POWDER, LYOPHILIZED, FOR SOLUTION INTRAVENOUS at 07:34

## 2022-08-14 RX ADMIN — IPRATROPIUM BROMIDE AND ALBUTEROL SULFATE 1 AMPULE: 2.5; .5 SOLUTION RESPIRATORY (INHALATION) at 11:50

## 2022-08-14 RX ADMIN — PROPOFOL 20 MCG/KG/MIN: 10 INJECTION, EMULSION INTRAVENOUS at 15:53

## 2022-08-14 RX ADMIN — FAMOTIDINE 20 MG: 10 INJECTION, SOLUTION INTRAVENOUS at 08:46

## 2022-08-14 ASSESSMENT — PULMONARY FUNCTION TESTS
PIF_VALUE: 33

## 2022-08-14 ASSESSMENT — PAIN SCALES - GENERAL
PAINLEVEL_OUTOF10: 0

## 2022-08-14 NOTE — PROGRESS NOTES
[facility]                                               Progress note    Summary:   Luisa Pollard is being seen by nephrology for CHARLENE and hypernatremia. Admitted with paraesophageal hernia and SBO. Had EGD 8/9 with biopsy. Still NPO     Interval History  Seen and examined at bedside. Family member at bedside when seen   He is intubated and sedated     /73  95% on 60% fio2   UO 4.2 L   Negative 583 cc past day   + 11 L for admissoin.       Na 149 > 147   K 3.4  BUN 33  Cr 1.3    Plan:   - still has free water deficit so increase TF water to 300 cc q 4 hrs.   - lasix was increased to 40 mg IV BID. He is positive 11 L for admission, BNP up and has some edema. Zaire Thomas MD  [facility]  Office: [phone]    Assessment:   Acute kidney injury  Urine Na < 20    creatinine on admission 1   next day on 08/06/2022 was 0.8  suddenly jumped up to 2.7. Due to episode of severe hypotension with blood pressure drop 78/44. also got CT with IV contrast on 08/05/2022 but most likely  this is ATN related due to hypotension and hypercapnic  respiratory failure. CT angio s normal renal arteries and normal kidneys      Sepsis/ possible septic shock. emperature was 101.1   High white cell count. '  immunocompromised due to rheumatoid arthritis and treatment. History of sleep apnea   on CPAP. Acute respiratory failure with high pCO2 in the range of 50s with pH  of 7.24    now intubated. History of abnormal stress test in 06/2022 with stress-induced  ischemia and some scarring. Ejection fraction  was  64%.     had a followup angiogram which was reported as normal as per wife. I do not have that result     History of rheumatoid arthritis   longstanding,was on methotrexate,  has taken Celebrex and now on Inflectra infusions,  so he is immunocompromised. Mariah Naylor is now helping him with his rheumatoid tremendously.    he has been on steroids in the past. Rule out adrenal insufficiency. cortisol level. normal      History of thyroidectomy in the past. .    Small bowel obstruction. Large hiatus hernia with colon and stomach in the chest.  Further management as per medical team.       ROS:   Unable to assess      PMH:   Past medical history, surgical history, social history, family history are reviewed and updated as appropriate. Reviewed current medication list.   Allergies reviewed and updated as needed. PE:   Vitals:    08/14/22 1651   BP:    Pulse: 80   Resp: 19   Temp:    SpO2: 95%       General appearance:  in NAD, intubated and sedated  HEENT: EOM intact, no icterus. Trachea is midline. Neck : No masses, appears symmetrical  Respiratory: Respiratory effort appears normal, bilateral equal chest rise, no wheeze, no crackles ETT to vent   Cardiovascular: Ausculation shows RRR trace edema  Abdomen: mildly distended but soft   Musculoskeletal:  Joints with no swelling or deformity. Skin:no rashes, ulcers, induration, no jaundice.    Neuro: sedated      Lab Results   Component Value Date    CREATININE 1.3 08/14/2022    BUN 33 (H) 08/14/2022     (H) 08/14/2022    K 3.4 (L) 08/14/2022     08/14/2022    CO2 30 08/14/2022      Lab Results   Component Value Date    WBC 6.6 08/14/2022    HGB 9.8 (L) 08/14/2022    HCT 29.7 (L) 08/14/2022    MCV 94.8 08/14/2022     (H) 08/14/2022     Lab Results   Component Value Date    CALCIUM 7.8 (L) 08/14/2022    PHOS 2.4 (L) 08/14/2022

## 2022-08-14 NOTE — PROGRESS NOTES
General and Vascular Surgery                                                           Daily Progress Note                                                              Pt Name: Aneta Hadley Record Number: [id number]  Date of Birth 1960   Today's Date: 8/14/2022      ASSESSMENT/PLAN   Partial SBO  -Last BM loose 8/9 early AM. Minimally distended abdomen, unchanged. -off pressors  -oral gastric tube replaced via EGD 8/10. Unable to pass through either nare. TF running at 10 cc. No BM recorded  -Repeat CT abd pelvis 8/11 no evidence of obstruction. If he is to be extubated soon would just remove OG at that time and start PO    Vent management per critical care. PEEP of 10 and FiO2 at 100%    SUBJECTIVE/EVENTS last 24H  Miles remains in ICU, intubated, sedated. No further BMs. OBJECTIVE  VITALS:  height is 5' 7\" (1.702 m) and weight is 362 lb 9.6 oz (164.5 kg) (abnormal). His oral temperature is 101.4 °F (38.6 °C) (abnormal). His blood pressure is 124/65 and his pulse is 67. His respiration is 18 and oxygen saturation is 95%. VITALS:  /65   Pulse 67   Temp (!) 101.4 °F (38.6 °C) (Oral)   Resp 18   Ht 5' 7\" (1.702 m)   Wt (!) 362 lb 9.6 oz (164.5 kg)   SpO2 95%   BMI 56.79 kg/m²   GENERAL: Intubated and sedated  CARDIAC: RRR  PULMONARY: ventilated, equal chest rise  ABDOMEN: soft, minimally distended, NT  EXT: no cyanosis or clubbing  I/O last 3 completed shifts:   In: 5128.2 [I.V.:2050.6; NG/GT:2382; IV Piggyback:695.6]  Out: 6283 [Urine:5380]  I/O this shift:  In: 195.5 [I.V.:57.1; NG/GT:131; IV Piggyback:7.4]  Out: 250 [Urine:250]    LABS  Recent Labs     08/14/22  0420   WBC 6.6   HGB 9.8*   HCT 29.7*   *   *   K 3.8   *   CO2 29   BUN 35*   CREATININE 1.2   MG 2.00   PHOS 2.4*   CALCIUM 7.6*     CBC:   Lab Results   Component Value Date/Time    WBC 6.6 08/14/2022 04:20 AM    RBC 3.13 08/14/2022 04:20 AM    HGB 9.8 08/14/2022 04:20 AM    HCT 29.7 08/14/2022 04:20 AM    MCV 94.8 08/14/2022 04:20 AM    MCH 31.4 08/14/2022 04:20 AM    MCHC 33.2 08/14/2022 04:20 AM    RDW 16.9 08/14/2022 04:20 AM     08/14/2022 04:20 AM    MPV 7.0 08/14/2022 04:20 AM     CMP:    Lab Results   Component Value Date/Time     08/14/2022 04:20 AM    K 3.8 08/14/2022 04:20 AM    K 3.2 08/06/2022 04:45 AM     08/14/2022 04:20 AM    CO2 29 08/14/2022 04:20 AM    BUN 35 08/14/2022 04:20 AM    CREATININE 1.2 08/14/2022 04:20 AM    GFRAA >60 08/14/2022 04:20 AM    AGRATIO 0.8 08/06/2022 04:45 AM    LABGLOM >60 08/14/2022 04:20 AM    GLUCOSE 99 08/14/2022 04:20 AM    PROT 6.3 08/06/2022 04:45 AM    LABALBU 2.8 08/06/2022 04:45 AM    CALCIUM 7.6 08/14/2022 04:20 AM    BILITOT 0.7 08/06/2022 04:45 AM    ALKPHOS 72 08/06/2022 04:45 AM    AST 22 08/06/2022 04:45 AM    ALT 23 08/06/2022 04:45 AM         Althea Stubbs MD  Electronically signed 8/14/2022 at 9:53 AM

## 2022-08-14 NOTE — CONSULTS
[Held by provider] spironolactone  50 mg Oral Daily    enoxaparin  40 mg SubCUTAneous BID       Review of Systems -   Constitutional: Negative for weight gain/loss; malaise, fever  Respiratory: Negative for Asthma;  cough and hemoptysis  Cardiovascular: Negative for palpitations,dizziness   Gastrointestinal: Negative for abd.pain; constipation/diarrhea;    Genitourinary: Negative for stones; hematuria; frequency hesitancy  Integumentt: Negative for rash or pruritis  Hematologic/lymphatic: Negative for blood dyscrasia; leukemia/lymphoma  Musculoskeletal: Negative for Connective tissue disease  Neurological:  Negative for Seizure   Behavioral/Psych:Negative for Bipolar disorder, Schizophrenia; Dementia  Endocrine: negative for thyroid, parathyroid disease      Intake/Output Summary (Last 24 hours) at 8/14/2022 1228  Last data filed at 8/14/2022 1214  Gross per 24 hour   Intake 4485.9 ml   Output 3400 ml   Net 1085.9 ml       Physical Examination:    /67   Pulse 69   Temp 99.8 °F (37.7 °C) (Oral)   Resp 18   Ht 5' 7\" (1.702 m)   Wt (!) 362 lb 9.6 oz (164.5 kg)   SpO2 93%   BMI 56.79 kg/m²    HEENT:  Face: Atraumatic, Conjunctiva: Pink; non icteric,  Mucous Memb:  Moist, No thyromegaly or Lymphadenopathy  Respiratory:  Resp Assessment: normal, Resp Auscultation: clear   Cardiovascular: Auscultation: nl S1 & S2, Palpation:  Nl PMI;  No heaves or thrills, JVP:  normal  Abdomen: Soft, non-tender, Normal bowel sounds,  No organomegaly  Extremities: No Cyanosis or Clubbing; Edema none  Neurological: Oriented to time, place, and person, Non-anxious  Psychiatric: Normal mood and affect  Skin: Warm and dry,  No rash seen      Labs:   Recent Labs     08/13/22  0510 08/14/22  0420   WBC 6.8 6.6   HGB 9.7* 9.8*   HCT 29.5* 29.7*   * 467*     Recent Labs     08/13/22  2205 08/14/22  0420   * 148*   K 3.5 3.8   CO2 29 29   BUN 37* 35*   CREATININE 1.3 1.2   GLUCOSE 104* 99     No results for input(s): BNP in the last 72 hours. Lab Results   Component Value Date/Time    TRIG 175 08/14/2022 04:20 AM     No results for input(s): AST, ALT, LABALBU in the last 72 hours. EKG:   Supraventricular tachycardia    Chest X-Ray:  Relatively stable perihilar and central airspace opacities in the lungs    ECHO:  Technically difficult examination. Ejection fraction is visually estimated to be 60-65%.   Grade II diastolic dysfunction  Right ventricle is poorly visualized, normal systolic function, appears dilated        ASSESSMENT AND PLAN:      SVT  Yesterday had a brief run of either SVT or flutter which responded to IV metoprolol  Patient has a history of atrial fibrillation  Will place him on oral amiodarone to keep him in sinus rhythm    Diastolic heart failure  High possibility  Chest x-ray is difficult to discern  proBNP was elevated at 1 1400  Also has severe fluid overload status  Agree with IV Lasix    Atrial fibrillation  Likely due to obesity and sleep apnea  He is in sinus rhythm  I will start him on amiodarone orally  He will need anticoagulation for stroke prevention  [id number] score 2 (CHF, hypertension)  However given small bowel obstruction we will hold off on it        Praful Leon M.D  8/14/2022

## 2022-08-14 NOTE — PROGRESS NOTES
Hospitalist Progress Note  8/14/2022 10:16 AM    PCP: Sylvia Martin MD    [id number]     Date of Admission: 8/5/2022                                                                                                                     HOSPITAL COURSE    Patient demographics:  The patient  Yas Burt is a 63 y.o. male     Significant past medical history:   Patient Active Problem List   Diagnosis    Partial bowel obstruction (HCC)    Hiatal hernia    Acute respiratory failure with hypoxia and hypercapnia (HCC)    SBO (small bowel obstruction) (HCC)    Aspiration into airway    Acute respiratory failure with hypoxia ([address]    Small bowel obstruction ([address]         Presenting symptoms:  abdominal pain    Diagnostic workup:      CONSULTS DURING ADMISSION :   IP CONSULT TO GENERAL SURGERY  IP CONSULT TO HOSPITALIST  IP CONSULT TO GENERAL SURGERY  IP CONSULT TO DIETITIAN  IP CONSULT TO CARDIOLOGY      Patient was diagnosed with:  Low grade or Partial Bowel obstruction  Pneumonia and septic shock. Hiatal hernia  Atrial Fibrillation      Treatment while inpatient:  63years old male with medical history significant for morbid obesity, paraesophageal hernia. Patient presented to the emergency room with abdominal pain nausea vomiting and diarrhea. Patient was diagnosed with small bowel obstruction. Patient was also diagnosed with pneumonia and septic shock. Patient developed acute respiratory failure and acute renal failure possibly General patient was started on the ventilator and supported with pressors.                                                                                            ----------------------------------------------------------      SUBJECTIVE COMPLAINTS- follow up for abdominal pain    Diet: Diet NPO  ADULT TUBE FEEDING; Orogastric; Peptide Based High Protein; Continuous; 20; Yes; 10; Q 4 hours; 35; 250; Q 4 hours      OBJECTIVE:   Patient Active Problem List   Diagnosis    Partial bowel Axillary 71 18 96 % --   08/14/22 0330 134/73 -- -- 71 18 95 % --   08/14/22 0300 122/67 -- -- 66 18 94 % --   08/14/22 0230 117/70 -- -- 66 18 94 % --        72HR INTAKE/OUTPUT:    Intake/Output Summary (Last 24 hours) at 8/14/2022 1016  Last data filed at 8/14/2022 0955  Gross per 24 hour   Intake 3573.5 ml   Output 2850 ml   Net 723.5 ml       Exam:    Gen:  intubated and sedated  Eyes: PERRL. No sclera icterus. No conjunctival injection. ENT: No discharge. Pharynx clear. External appearance of ears and nose normal.  Neck: Trachea midline. No obvious mass. Resp: No accessory muscle use. No crackles. No wheezes. No rhonchi. CV: Regular rate. Regular rhythm. No murmur or rub. No edema. GI: Non-tender. Non-distended. No hernia. Skin: Warm, dry, normal texture and turgor. Lymph: No cervical LAD. No supraclavicular LAD. M/S: / Ext. No cyanosis. No clubbing. No joint deformity. Neuro: CN 2-12 are intact,  no neurologic deficits noted. PT/INR:   No results for input(s): PROTIME, INR in the last 72 hours. APTT: No results for input(s): APTT in the last 72 hours. CBC:   Recent Labs     08/12/22  0403 08/13/22  0510 08/14/22  0420   WBC 6.9 6.8 6.6   HGB 9.4* 9.7* 9.8*   HCT 27.0* 29.5* 29.7*   MCV 95.5 96.5 94.8    456* 467*       BMP:   Recent Labs     08/12/22  0403 08/13/22  0510 08/13/22  1420 08/13/22  2205 08/14/22  0420    149* 147* 148* 148*   K 3.5 4.1 3.3* 3.5 3.8    112* 108 110 111*   CO2 25 30 28 29 29   PHOS 2.5 3.4  --   --  2.4*   BUN 34* 31* 30* 37* 35*   CREATININE 1.4* 1.4* 1.2 1.3 1.2       LIVER PROFILE:   No results for input(s): ALKPHOS, AST, ALT, ALB, BILIDIR, BILITOT, ALKPHOS in the last 72 hours. No results for input(s): AMYLASE in the last 72 hours. No results for input(s): LIPASE in the last 72 hours. UA:  No results for input(s): NITRITE, LABCAST, WBCUA, RBCUA, MUCUS in the last 72 hours.       TROPONIN:   No results for input(s): CKTOTAL,

## 2022-08-14 NOTE — PROGRESS NOTES
Pulmonary Critical Care Progress Note     Patient's name:  Aneta Hadley Record Number: [id number]  Patient's account/billing number: [de-identified]  Patient's YOB: 1960  Age: 63 y.o. Date of Admission: 8/5/2022  1:56 PM  Date of Consult: 8/14/2022      Primary Care Physician: Marcial Cortes MD      Code Status: Full Code    Chief complaint: acute respiratory failure with hypoxia and hypercapnia     Assessment and Plan     Acute respiratory failure with hypoxia and hypercapnia worsening requiring intubation and mechanical ventilation. Aspiration pneumonia bibasilar multifocal   Pulmonary edema/Acute on chronic diastolic CHF  Partial small bowel obstruction. S/p EGD with OG placement  SVT with h/o Afib in the past s/p Cardioversion in the past  Acute kidney injury,   Large hiatal hernia  Obstructive sleep apnea obesity hypoventilation syndrome on home cpap compliant   Sedation related hypotension      Plan:  Vent support, setting adjusted. wean Fio2 as tolerated, keep sat > 90%  Neosyn, keep MAP > 65  Diuresis bid  tube feeding, added free water   Zosyn for aspiration pneumonia. GI and DVT prophylaxis. Overnight:  Developed SVT likely respiratory triggered  Tolerating TF with no residual  High Fio2 requirement  CXR with airspace disease and pulmonary edema       REVIEW OF SYSTEMS:  Review of Systems -   Unable to obtain intubated on MV        Physical Exam:    Vitals: /71   Pulse 62   Temp (!) 101.4 °F (38.6 °C) (Oral)   Resp 18   Ht 5' 7\" (1.702 m)   Wt (!) 362 lb 9.6 oz (164.5 kg)   SpO2 95%   BMI 56.79 kg/m²     Last Body weight:   Wt Readings from Last 3 Encounters:   08/14/22 (!) 362 lb 9.6 oz (164.5 kg)       Body Mass Index : Body mass index is 56.79 kg/m².       Intake and Output summary:   Intake/Output Summary (Last 24 hours) at 8/14/2022 1013  Last data filed at 8/14/2022 0955  Gross per 24 hour   Intake 3573.5 ml   Output 3030 ml   Net 543.5 ml Physical Examination:     Gen: intubated on MV  Eyes: PERRL. Anicteric sclera. No conjunctival injection. ENT: No discharge. Posterior oropharynx clear. External appearance of ears and nose normal.  Neck: Trachea midline. No mass   Resp: Diminished bilaterally with upper airway wheezing  CV: Regular rate. Regular rhythm. No murmur or rub. No edema. GI: Soft distended sluggish bowel sounds. Skin: Warm, dry, w/o erythema. Lymph: No cervical or supraclavicular LAD. M/S: No cyanosis. No clubbing. Neuro: sedated on MV        Laboratory findings:-    CBC:   Recent Labs     08/14/22  0420   WBC 6.6   HGB 9.8*   *       BMP:    Recent Labs     08/13/22  1420 08/13/22  2205 08/14/22  0420   * 148* 148*   K 3.3* 3.5 3.8    110 111*   CO2 28 29 29   BUN 30* 37* 35*   CREATININE 1.2 1.3 1.2   GLUCOSE 101* 104* 99       S. Calcium:  Recent Labs     08/14/22  0420   CALCIUM 7.6*       S. Magnesium:  Recent Labs     08/14/22  0420   MG 2.00       S. Phosphorus:  Recent Labs     08/14/22  0420   PHOS 2.4*       S. Glucose:  Recent Labs     08/13/22  2356 08/14/22  0351 08/14/22  0752   POCGLU 99 97 97             Radiology Review:  Pertinent images / reports were reviewed as a part of this visit.     Reviewed     Critical Care time 35 min                Eugenio Bowen MD, M.JACOBY.            8/14/2022, 10:13 AM

## 2022-08-14 NOTE — PLAN OF CARE
Problem: Pain  Goal: Verbalizes/displays adequate comfort level or baseline comfort level  Outcome: Progressing  Flowsheets (Taken 8/13/2022 1938)  Verbalizes/displays adequate comfort level or baseline comfort level: Assess pain using appropriate pain scale     Problem: ABCDS Injury Assessment  Goal: Absence of physical injury  Outcome: Progressing  Flowsheets (Taken 8/13/2022 0940 by Ana Fofana RN)  Absence of Physical Injury: Implement safety measures based on patient assessment     Problem: Safety - Adult  Goal: Free from fall injury  Outcome: Progressing  Flowsheets (Taken 8/13/2022 0940 by Ana Fofana RN)  Free From Fall Injury: Instruct family/caregiver on patient safety     Problem: Safety - Medical Restraint  Goal: Remains free of injury from restraints (Restraint for Interference with Medical Device)  Description: INTERVENTIONS:  1. Determine that other, less restrictive measures have been tried or would not be effective before applying the restraint  2. Evaluate the patient's condition at the time of restraint application  3. Inform patient/family regarding the reason for restraint  4.  Q2H: Monitor safety, psychosocial status, comfort, nutrition and hydration  Outcome: Progressing  Flowsheets (Taken 8/13/2022 2000)  Remains free of injury from restraints (restraint for interference with medical device):   Determine that other, less restrictive measures have been tried or would not be effective before applying the restraint   Every 2 hours: Monitor safety, psychosocial status, comfort, nutrition and hydration   Evaluate the patient's condition at the time of restraint application   Inform patient/family regarding the reason for restraint     Problem: Respiratory - Adult  Goal: Achieves optimal ventilation and oxygenation  Outcome: Progressing  Flowsheets (Taken 8/13/2022 1938)  Achieves optimal ventilation and oxygenation:   Assess for changes in respiratory status   Assess for changes in mentation and behavior   Position to facilitate oxygenation and minimize respiratory effort   Oxygen supplementation based on oxygen saturation or arterial blood gases   Assess the need for suctioning and aspirate as needed     Problem: Cardiovascular - Adult  Goal: Maintains optimal cardiac output and hemodynamic stability  Outcome: Progressing  Flowsheets (Taken 8/13/2022 1938)  Maintains optimal cardiac output and hemodynamic stability:   Monitor blood pressure and heart rate   Monitor urine output and notify Licensed Independent Practitioner for values outside of normal range   Assess for signs of decreased cardiac output   Administer fluid and/or volume expanders as ordered     Problem: Cardiovascular - Adult  Goal: Absence of cardiac dysrhythmias or at baseline  Outcome: Progressing  Flowsheets (Taken 8/13/2022 1938)  Absence of cardiac dysrhythmias or at baseline:   Monitor cardiac rate and rhythm   Assess for signs of decreased cardiac output   Administer antiarrhythmia medication and electrolyte replacement as ordered     Problem: Skin/Tissue Integrity - Adult  Goal: Skin integrity remains intact  Outcome: Progressing  Flowsheets (Taken 8/13/2022 1938)  Skin Integrity Remains Intact:   Monitor for areas of redness and/or skin breakdown   Assess vascular access sites hourly     Problem: Musculoskeletal - Adult  Goal: Maintain proper alignment of affected body part  Outcome: Progressing  Flowsheets (Taken 8/13/2022 1938)  Maintain proper alignment of affected body part: Support and protect limb and body alignment per provider's orders     Problem: Gastrointestinal - Adult  Goal: Maintains or returns to baseline bowel function  Outcome: Progressing  Flowsheets (Taken 8/13/2022 1938)  Maintains or returns to baseline bowel function:   Assess bowel function   Administer IV fluids as ordered to ensure adequate hydration     Problem: Gastrointestinal - Adult  Goal: Maintains adequate nutritional intake  Outcome: Progressing  Flowsheets (Taken 8/13/2022 1938)  Maintains adequate nutritional intake:   Identify factors contributing to decreased intake, treat as appropriate   Monitor intake and output, weight and lab values     Problem: Genitourinary - Adult  Goal: Urinary catheter remains patent  Outcome: Progressing  Flowsheets (Taken 8/13/2022 1938)  Urinary catheter remains patent: Assess patency of urinary catheter

## 2022-08-15 ENCOUNTER — APPOINTMENT (OUTPATIENT)
Dept: GENERAL RADIOLOGY | Age: 62
DRG: 853 | End: 2022-08-15
Payer: COMMERCIAL

## 2022-08-15 LAB
ANION GAP SERPL CALCULATED.3IONS-SCNC: 10 MMOL/L (ref 3–16)
ANION GAP SERPL CALCULATED.3IONS-SCNC: 8 MMOL/L (ref 3–16)
BACTERIA: ABNORMAL /HPF
BASE EXCESS ARTERIAL: 6 (ref -3–3)
BILIRUBIN URINE: NEGATIVE
BLOOD, URINE: ABNORMAL
BUN BLDV-MCNC: 39 MG/DL (ref 7–20)
BUN BLDV-MCNC: 43 MG/DL (ref 7–20)
CALCIUM IONIZED: 1.06 MMOL/L (ref 1.12–1.32)
CALCIUM SERPL-MCNC: 7.7 MG/DL (ref 8.3–10.6)
CALCIUM SERPL-MCNC: 7.8 MG/DL (ref 8.3–10.6)
CHLORIDE BLD-SCNC: 105 MMOL/L (ref 99–110)
CHLORIDE BLD-SCNC: 108 MMOL/L (ref 99–110)
CLARITY: ABNORMAL
CO2: 30 MMOL/L (ref 21–32)
CO2: 30 MMOL/L (ref 21–32)
COLOR: ABNORMAL
COMMENT UA: ABNORMAL
CREAT SERPL-MCNC: 1.2 MG/DL (ref 0.8–1.3)
CREAT SERPL-MCNC: 1.4 MG/DL (ref 0.8–1.3)
EKG ATRIAL RATE: 162 BPM
EKG DIAGNOSIS: NORMAL
EKG P AXIS: 21 DEGREES
EKG P-R INTERVAL: 142 MS
EKG Q-T INTERVAL: 302 MS
EKG QRS DURATION: 94 MS
EKG QTC CALCULATION (BAZETT): 495 MS
EKG R AXIS: 73 DEGREES
EKG T AXIS: -83 DEGREES
EKG VENTRICULAR RATE: 162 BPM
EPITHELIAL CELLS, UA: ABNORMAL /HPF (ref 0–5)
GFR AFRICAN AMERICAN: >60
GFR AFRICAN AMERICAN: >60
GFR NON-AFRICAN AMERICAN: 52
GFR NON-AFRICAN AMERICAN: >60
GLUCOSE BLD-MCNC: 102 MG/DL (ref 70–99)
GLUCOSE BLD-MCNC: 112 MG/DL (ref 70–99)
GLUCOSE BLD-MCNC: 114 MG/DL (ref 70–99)
GLUCOSE BLD-MCNC: 115 MG/DL (ref 70–99)
GLUCOSE BLD-MCNC: 116 MG/DL (ref 70–99)
GLUCOSE BLD-MCNC: 121 MG/DL (ref 70–99)
GLUCOSE BLD-MCNC: 97 MG/DL (ref 70–99)
GLUCOSE BLD-MCNC: 98 MG/DL (ref 70–99)
GLUCOSE URINE: NEGATIVE MG/DL
HCO3 ARTERIAL: 28.6 MMOL/L (ref 21–29)
HCT VFR BLD CALC: 29.5 % (ref 40.5–52.5)
HEMOGLOBIN: 10 G/DL (ref 13.5–17.5)
HEMOGLOBIN: 11.2 GM/DL (ref 13.5–17.5)
KETONES, URINE: NEGATIVE MG/DL
LACTATE: 0.99 MMOL/L (ref 0.4–2)
LEUKOCYTE ESTERASE, URINE: ABNORMAL
MAGNESIUM: 1.8 MG/DL (ref 1.8–2.4)
MCH RBC QN AUTO: 32.4 PG (ref 26–34)
MCHC RBC AUTO-ENTMCNC: 33.8 G/DL (ref 31–36)
MCV RBC AUTO: 95.9 FL (ref 80–100)
MICROSCOPIC EXAMINATION: YES
MUCUS: ABNORMAL /LPF
NITRITE, URINE: NEGATIVE
O2 SAT, ARTERIAL: 94 % (ref 93–100)
PCO2 ARTERIAL: 33.8 MM HG (ref 35–45)
PDW BLD-RTO: 16.9 % (ref 12.4–15.4)
PERFORMED ON: ABNORMAL
PERFORMED ON: NORMAL
PH ARTERIAL: 7.54 (ref 7.35–7.45)
PH UA: 5.5 (ref 5–8)
PHOSPHORUS: 3.6 MG/DL (ref 2.5–4.9)
PLATELET # BLD: 521 K/UL (ref 135–450)
PMV BLD AUTO: 7 FL (ref 5–10.5)
PO2 ARTERIAL: 63 MM HG (ref 75–108)
POC HEMATOCRIT: 33 % (ref 40.5–52.5)
POC POTASSIUM: 3.5 MMOL/L (ref 3.5–5.1)
POC SAMPLE TYPE: ABNORMAL
POC SODIUM: 148 MMOL/L (ref 136–145)
POTASSIUM SERPL-SCNC: 3.2 MMOL/L (ref 3.5–5.1)
POTASSIUM SERPL-SCNC: 4 MMOL/L (ref 3.5–5.1)
PROCALCITONIN: 0.67 NG/ML (ref 0–0.15)
PROTEIN UA: NEGATIVE MG/DL
RBC # BLD: 3.07 M/UL (ref 4.2–5.9)
RBC UA: ABNORMAL /HPF (ref 0–4)
RENAL EPITHELIAL, UA: ABNORMAL /HPF (ref 0–1)
REPORT: NORMAL
RESPIRATORY PANEL PCR: NORMAL
SARS-COV-2, NAAT: NOT DETECTED
SODIUM BLD-SCNC: 143 MMOL/L (ref 136–145)
SODIUM BLD-SCNC: 148 MMOL/L (ref 136–145)
SPECIFIC GRAVITY UA: 1.01 (ref 1–1.03)
TCO2 ARTERIAL: 30 MMOL/L
URINE REFLEX TO CULTURE: ABNORMAL
URINE TYPE: ABNORMAL
UROBILINOGEN, URINE: 0.2 E.U./DL
WBC # BLD: 8 K/UL (ref 4–11)
WBC UA: ABNORMAL /HPF (ref 0–5)

## 2022-08-15 PROCEDURE — 87641 MR-STAPH DNA AMP PROBE: CPT

## 2022-08-15 PROCEDURE — 87040 BLOOD CULTURE FOR BACTERIA: CPT

## 2022-08-15 PROCEDURE — 6360000002 HC RX W HCPCS: Performed by: STUDENT IN AN ORGANIZED HEALTH CARE EDUCATION/TRAINING PROGRAM

## 2022-08-15 PROCEDURE — 2700000000 HC OXYGEN THERAPY PER DAY

## 2022-08-15 PROCEDURE — 2500000003 HC RX 250 WO HCPCS: Performed by: HOSPITALIST

## 2022-08-15 PROCEDURE — 99291 CRITICAL CARE FIRST HOUR: CPT | Performed by: INTERNAL MEDICINE

## 2022-08-15 PROCEDURE — 84145 PROCALCITONIN (PCT): CPT

## 2022-08-15 PROCEDURE — 99222 1ST HOSP IP/OBS MODERATE 55: CPT | Performed by: NURSE PRACTITIONER

## 2022-08-15 PROCEDURE — 6360000002 HC RX W HCPCS: Performed by: NURSE PRACTITIONER

## 2022-08-15 PROCEDURE — 84100 ASSAY OF PHOSPHORUS: CPT

## 2022-08-15 PROCEDURE — 2580000003 HC RX 258: Performed by: NURSE PRACTITIONER

## 2022-08-15 PROCEDURE — 2580000003 HC RX 258: Performed by: HOSPITALIST

## 2022-08-15 PROCEDURE — 82803 BLOOD GASES ANY COMBINATION: CPT

## 2022-08-15 PROCEDURE — 89220 SPUTUM SPECIMEN COLLECTION: CPT

## 2022-08-15 PROCEDURE — 2500000003 HC RX 250 WO HCPCS: Performed by: NURSE PRACTITIONER

## 2022-08-15 PROCEDURE — 6370000000 HC RX 637 (ALT 250 FOR IP): Performed by: NURSE PRACTITIONER

## 2022-08-15 PROCEDURE — 87635 SARS-COV-2 COVID-19 AMP PRB: CPT

## 2022-08-15 PROCEDURE — APPNB15 APP NON BILLABLE TIME 0-15 MINS: Performed by: NURSE PRACTITIONER

## 2022-08-15 PROCEDURE — 0202U NFCT DS 22 TRGT SARS-COV-2: CPT

## 2022-08-15 PROCEDURE — 85014 HEMATOCRIT: CPT

## 2022-08-15 PROCEDURE — 36415 COLL VENOUS BLD VENIPUNCTURE: CPT

## 2022-08-15 PROCEDURE — 2580000003 HC RX 258: Performed by: INTERNAL MEDICINE

## 2022-08-15 PROCEDURE — 36592 COLLECT BLOOD FROM PICC: CPT

## 2022-08-15 PROCEDURE — 85027 COMPLETE CBC AUTOMATED: CPT

## 2022-08-15 PROCEDURE — 87070 CULTURE OTHR SPECIMN AEROBIC: CPT

## 2022-08-15 PROCEDURE — 83735 ASSAY OF MAGNESIUM: CPT

## 2022-08-15 PROCEDURE — 84132 ASSAY OF SERUM POTASSIUM: CPT

## 2022-08-15 PROCEDURE — 81001 URINALYSIS AUTO W/SCOPE: CPT

## 2022-08-15 PROCEDURE — 2500000003 HC RX 250 WO HCPCS: Performed by: STUDENT IN AN ORGANIZED HEALTH CARE EDUCATION/TRAINING PROGRAM

## 2022-08-15 PROCEDURE — 94640 AIRWAY INHALATION TREATMENT: CPT

## 2022-08-15 PROCEDURE — 2000000000 HC ICU R&B

## 2022-08-15 PROCEDURE — 71045 X-RAY EXAM CHEST 1 VIEW: CPT

## 2022-08-15 PROCEDURE — 82947 ASSAY GLUCOSE BLOOD QUANT: CPT

## 2022-08-15 PROCEDURE — 6370000000 HC RX 637 (ALT 250 FOR IP): Performed by: INTERNAL MEDICINE

## 2022-08-15 PROCEDURE — 80048 BASIC METABOLIC PNL TOTAL CA: CPT

## 2022-08-15 PROCEDURE — 94003 VENT MGMT INPAT SUBQ DAY: CPT

## 2022-08-15 PROCEDURE — 93005 ELECTROCARDIOGRAM TRACING: CPT | Performed by: STUDENT IN AN ORGANIZED HEALTH CARE EDUCATION/TRAINING PROGRAM

## 2022-08-15 PROCEDURE — 94760 N-INVAS EAR/PLS OXIMETRY 1: CPT

## 2022-08-15 PROCEDURE — 83605 ASSAY OF LACTIC ACID: CPT

## 2022-08-15 PROCEDURE — 82330 ASSAY OF CALCIUM: CPT

## 2022-08-15 PROCEDURE — 93010 ELECTROCARDIOGRAM REPORT: CPT | Performed by: INTERNAL MEDICINE

## 2022-08-15 PROCEDURE — 2580000003 HC RX 258: Performed by: STUDENT IN AN ORGANIZED HEALTH CARE EDUCATION/TRAINING PROGRAM

## 2022-08-15 PROCEDURE — 6370000000 HC RX 637 (ALT 250 FOR IP): Performed by: HOSPITALIST

## 2022-08-15 PROCEDURE — 84295 ASSAY OF SERUM SODIUM: CPT

## 2022-08-15 PROCEDURE — 87205 SMEAR GRAM STAIN: CPT

## 2022-08-15 RX ORDER — METOPROLOL TARTRATE 5 MG/5ML
5 INJECTION INTRAVENOUS ONCE
Status: COMPLETED | OUTPATIENT
Start: 2022-08-15 | End: 2022-08-15

## 2022-08-15 RX ORDER — CALCIUM GLUCONATE 20 MG/ML
1000 INJECTION, SOLUTION INTRAVENOUS ONCE
Status: COMPLETED | OUTPATIENT
Start: 2022-08-15 | End: 2022-08-15

## 2022-08-15 RX ORDER — 0.9 % SODIUM CHLORIDE 0.9 %
1000 INTRAVENOUS SOLUTION INTRAVENOUS ONCE
Status: COMPLETED | OUTPATIENT
Start: 2022-08-15 | End: 2022-08-15

## 2022-08-15 RX ORDER — MINERAL OIL AND WHITE PETROLATUM 150; 830 MG/G; MG/G
OINTMENT OPHTHALMIC PRN
Status: DISCONTINUED | OUTPATIENT
Start: 2022-08-15 | End: 2022-09-07 | Stop reason: HOSPADM

## 2022-08-15 RX ORDER — MAGNESIUM SULFATE IN WATER 40 MG/ML
2000 INJECTION, SOLUTION INTRAVENOUS ONCE
Status: COMPLETED | OUTPATIENT
Start: 2022-08-15 | End: 2022-08-15

## 2022-08-15 RX ADMIN — VANCOMYCIN HYDROCHLORIDE 1750 MG: 1 INJECTION, POWDER, LYOPHILIZED, FOR SOLUTION INTRAVENOUS at 14:31

## 2022-08-15 RX ADMIN — PROPOFOL 20 MCG/KG/MIN: 10 INJECTION, EMULSION INTRAVENOUS at 18:08

## 2022-08-15 RX ADMIN — ACETAMINOPHEN 650 MG: 325 TABLET, FILM COATED ORAL at 04:02

## 2022-08-15 RX ADMIN — CALCIUM GLUCONATE 1000 MG: 20 INJECTION, SOLUTION INTRAVENOUS at 05:54

## 2022-08-15 RX ADMIN — CHLORHEXIDINE GLUCONATE 0.12% ORAL RINSE 15 ML: 1.2 LIQUID ORAL at 07:48

## 2022-08-15 RX ADMIN — Medication 200 MCG/HR: at 18:31

## 2022-08-15 RX ADMIN — Medication 200 MCG/HR: at 02:44

## 2022-08-15 RX ADMIN — FAMOTIDINE 20 MG: 10 INJECTION, SOLUTION INTRAVENOUS at 20:31

## 2022-08-15 RX ADMIN — METOPROLOL TARTRATE 25 MG: 25 TABLET, FILM COATED ORAL at 13:56

## 2022-08-15 RX ADMIN — Medication 200 MCG/HR: at 08:36

## 2022-08-15 RX ADMIN — ENOXAPARIN SODIUM 40 MG: 100 INJECTION SUBCUTANEOUS at 07:57

## 2022-08-15 RX ADMIN — FAMOTIDINE 20 MG: 10 INJECTION, SOLUTION INTRAVENOUS at 07:56

## 2022-08-15 RX ADMIN — CHLORHEXIDINE GLUCONATE 0.12% ORAL RINSE 15 ML: 1.2 LIQUID ORAL at 20:24

## 2022-08-15 RX ADMIN — PROPOFOL 15 MCG/KG/MIN: 10 INJECTION, EMULSION INTRAVENOUS at 11:46

## 2022-08-15 RX ADMIN — IPRATROPIUM BROMIDE AND ALBUTEROL SULFATE 1 AMPULE: 2.5; .5 SOLUTION RESPIRATORY (INHALATION) at 16:09

## 2022-08-15 RX ADMIN — FUROSEMIDE 40 MG: 10 INJECTION, SOLUTION INTRAMUSCULAR; INTRAVENOUS at 18:03

## 2022-08-15 RX ADMIN — FUROSEMIDE 40 MG: 10 INJECTION, SOLUTION INTRAMUSCULAR; INTRAVENOUS at 07:57

## 2022-08-15 RX ADMIN — PROPOFOL 20 MCG/KG/MIN: 10 INJECTION, EMULSION INTRAVENOUS at 04:43

## 2022-08-15 RX ADMIN — AMIODARONE HYDROCHLORIDE 1 MG/MIN: 50 INJECTION, SOLUTION INTRAVENOUS at 06:42

## 2022-08-15 RX ADMIN — ACETAMINOPHEN 650 MG: 325 TABLET, FILM COATED ORAL at 08:16

## 2022-08-15 RX ADMIN — PIPERACILLIN AND TAZOBACTAM 4500 MG: 4; .5 INJECTION, POWDER, LYOPHILIZED, FOR SOLUTION INTRAVENOUS at 00:10

## 2022-08-15 RX ADMIN — IPRATROPIUM BROMIDE AND ALBUTEROL SULFATE 1 AMPULE: 2.5; .5 SOLUTION RESPIRATORY (INHALATION) at 11:56

## 2022-08-15 RX ADMIN — Medication 200 MCG/HR: at 13:58

## 2022-08-15 RX ADMIN — AMIODARONE HYDROCHLORIDE 150 MG: 1.5 INJECTION, SOLUTION INTRAVENOUS at 06:29

## 2022-08-15 RX ADMIN — MEROPENEM 1000 MG: 1 INJECTION, POWDER, FOR SOLUTION INTRAVENOUS at 13:49

## 2022-08-15 RX ADMIN — SODIUM CHLORIDE 1000 ML: 9 INJECTION, SOLUTION INTRAVENOUS at 05:39

## 2022-08-15 RX ADMIN — Medication 200 MCG/HR: at 23:48

## 2022-08-15 RX ADMIN — PIPERACILLIN AND TAZOBACTAM 4500 MG: 4; .5 INJECTION, POWDER, LYOPHILIZED, FOR SOLUTION INTRAVENOUS at 07:55

## 2022-08-15 RX ADMIN — SODIUM CHLORIDE, PRESERVATIVE FREE 10 ML: 5 INJECTION INTRAVENOUS at 20:43

## 2022-08-15 RX ADMIN — IPRATROPIUM BROMIDE AND ALBUTEROL SULFATE 1 AMPULE: 2.5; .5 SOLUTION RESPIRATORY (INHALATION) at 08:14

## 2022-08-15 RX ADMIN — IPRATROPIUM BROMIDE AND ALBUTEROL SULFATE 1 AMPULE: 2.5; .5 SOLUTION RESPIRATORY (INHALATION) at 20:12

## 2022-08-15 RX ADMIN — METOPROLOL TARTRATE 5 MG: 5 INJECTION INTRAVENOUS at 04:53

## 2022-08-15 RX ADMIN — ENOXAPARIN SODIUM 40 MG: 100 INJECTION SUBCUTANEOUS at 20:31

## 2022-08-15 RX ADMIN — SODIUM CHLORIDE, PRESERVATIVE FREE 10 ML: 5 INJECTION INTRAVENOUS at 07:57

## 2022-08-15 RX ADMIN — ACETAMINOPHEN 650 MG: 325 TABLET, FILM COATED ORAL at 13:56

## 2022-08-15 RX ADMIN — MEROPENEM 1000 MG: 1 INJECTION, POWDER, FOR SOLUTION INTRAVENOUS at 20:41

## 2022-08-15 RX ADMIN — MAGNESIUM SULFATE HEPTAHYDRATE 2000 MG: 40 INJECTION, SOLUTION INTRAVENOUS at 06:45

## 2022-08-15 ASSESSMENT — PULMONARY FUNCTION TESTS
PIF_VALUE: 33
PIF_VALUE: 32
PIF_VALUE: 34
PIF_VALUE: 33
PIF_VALUE: 32
PIF_VALUE: 33
PIF_VALUE: 32
PIF_VALUE: 33

## 2022-08-15 ASSESSMENT — PAIN SCALES - GENERAL
PAINLEVEL_OUTOF10: 0

## 2022-08-15 NOTE — CONSULTS
Cardiac Electrophysiology Consultation     Date: 8/15/2022  Admit Date:  8/5/2022  Admission Diagnosis: Small bowel obstruction ([facility] [K56.609]  Elevated TSH [R79.89]  Partial bowel obstruction ([facility] [K56.600]     Reason for Consultation: SVT  Consult Requesting Physician: Alexandre Bergeron MD       History of Present Illness  Wayne Hill is a 63y.o. year old male with past medical history significant for PAF diagnosed in Mat 2022 s/p successful DCCV 5/9/22, EARNESTINE on CPAP, HTN and hypothyroidism who initially presented to the hospital on 8/5 with SOB, abdominal pain and chest pain. He was emergently intubated shortly after admission. Had a SBO, had OG tube placed via EGD. Noted to go into SVT on 8/13 around 1130 and was given 5mg of IV metoprolol and did convert back to sinus rhythm. Again had recurrence on 8/15 around 0430, was again given 5mg of IV metoprolol and placed on IV amiodarone, did convert to SR around 0700. Doses of PO metoprolol have been held. Some issues with hypotension. Was febrile this morning and had issues with increased O2 requirements while in SVT per RN.       Past Medical History:   Diagnosis Date    Arthritis     Atrial fibrillation ([facility]     Hypertension         Past Surgical History:   Procedure Laterality Date    UPPER GASTROINTESTINAL ENDOSCOPY  8/9/2022    EGD BIOPSY performed by Stefan Live MD at [facility]  8/9/2022    EGD ESOPHAGOGASTRODUODENOSCOPY TUBE INSERTION performed by Stefan Live MD at [address] [place] [address]       Current Outpatient Medications   Medication Instructions    acetaminophen (TYLENOL) 1,000 mg, Oral, EVERY 8 HOURS PRN    Aspirin Low Dose 81 mg, Oral, DAILY    celecoxib (CELEBREX) 200 mg, Oral, 2 TIMES DAILY    diazePAM (VALIUM) 5 mg, Oral, 4 TIMES DAILY PRN    folic acid (FOLVITE) 1 mg, Oral, DAILY    gabapentin (NEURONTIN) 800 mg, Oral, 3 TIMES DAILY    levothyroxine (SYNTHROID) 200 mcg, Oral, EVERY MORNING    lisinopril (PRINIVIL;ZESTRIL) 10 mg, Oral, DAILY    loratadine (CLARITIN) 10 mg, Oral, DAILY    methotrexate (RHEUMATREX) 15 mg, Oral, WEEKLY    metoprolol tartrate (LOPRESSOR) 25 mg, Oral, 2 TIMES DAILY    oxyCODONE (ROXICODONE) 10 mg, Oral, EVERY 6 HOURS PRN    pantoprazole (PROTONIX) 40 mg, Oral, DAILY    spironolactone (ALDACTONE) 50 mg, Oral, DAILY        Allergies   Allergen Reactions    Codeine Nausea Only and Nausea And Vomiting     Stomach upset          Social History:   reports that he has never smoked. He has never used smokeless tobacco. He reports that he does not currently use alcohol. He reports that he does not use drugs. Family History:  family history is not on file. Review of Systems:  Unable to be obtained as pt it intubated and sedated.     Medications:  Scheduled Meds:   magnesium sulfate  2,000 mg IntraVENous Once    furosemide  40 mg IntraVENous BID    famotidine (PEPCID) injection  20 mg IntraVENous BID    chlorhexidine  15 mL Mouth/Throat BID    piperacillin-tazobactam  4,500 mg IntraVENous Q8H    ipratropium-albuterol  1 ampule Inhalation Q4H WA    sodium chloride flush  5-40 mL IntraVENous 2 times per day    [Held by provider] gabapentin  800 mg Oral TID    [Held by provider] levothyroxine  200 mcg Oral Daily    [Held by provider] metoprolol tartrate  25 mg Oral BID    [Held by provider] spironolactone  50 mg Oral Daily    enoxaparin  40 mg SubCUTAneous BID      Continuous Infusions:   amiodarone 1 mg/min (08/15/22 4983)    Followed by    amiodarone      fentaNYL 200 mcg/hr (08/15/22 0615)    propofol 20 mcg/kg/min (08/15/22 0615)    sodium chloride       PRN Meds:.acetaminophen, potassium chloride, midazolam, fentaNYL **AND** fentaNYL, acetaminophen, sodium chloride flush, sodium chloride, ondansetron **OR** ondansetron, diazePAM     Physical Examination:  Vitals:    08/15/22 0814   BP:    Pulse: 81   Resp: 18   Temp:    SpO2: 95%        Intake/Output Summary (Last 24 hours) at 8/15/2022 [address] filed at 8/15/2022 3583  Gross per 24 hour   Intake 4778.91 ml   Output 5390 ml   Net -611.09 ml     In: 4974.4 [I.V.:925; NG/GT:2728]  Out: 5465    Wt Readings from Last 3 Encounters:   08/15/22 (!) 361 lb 9.6 oz (164 kg)     Temp  Av.5 °F (38.1 °C)  Min: 99.1 °F (37.3 °C)  Max: 103.2 °F (39.6 °C)  Pulse  Av.4  Min: 59  Max: 172  BP  Min: 89/59  Max: 156/86  SpO2  Av %  Min: 90 %  Max: 99 %  FiO2   Av.4 %  Min: 60 %  Max: 100 %    Telemetry: Sinus rhythm in the 80s. Constitutional: Intubated and sedated. Appears stated age. Head: Normocephalic and atraumatic. Eyes: Conjunctivae normal. EOM are normal.   Neck: Neck supple. No lymphadenopathy. No rigidity. No JVD present. Cardiovascular: Normal rate, regular rhythm. No murmurs, rubs or gallops. No S3 or S4.  Pulmonary/Chest: Clear breath sounds bilaterally. No crackles, wheezes or rhonchi. No respiratory accessory muscle use. Abdominal: Soft. Normal bowel sounds present. No distension, No tenderness. Musculoskeletal: No tenderness. No edema    Lymphadenopathy: Has no cervical adenopathy. Neurological: Intubated and sedated. Skin: Skin is warm and dry. No rash, lesions, ulcerations noted. Psychiatric: No anxiety nor agitation. Labs:  Reviewed. Recent Labs     22  0510 22  1420 22  0420 22  1426 22  2255 08/15/22  0510   *   < > 148* 147* 148* 148*   K 4.1   < > 3.8 3.4* 4.9 4.0   *   < > 111* 109 108 108   CO2 30   < > 29 30 31 30   PHOS 3.4  --  2.4*  --   --  3.6   BUN 31*   < > 35* 33* 32* 39*   CREATININE 1.4*   < > 1.2 1.3 1.2 1.4*    < > = values in this interval not displayed.      Recent Labs     22  0510 22  0420 08/15/22  0510 08/15/22  0545   WBC 6.8 6.6 8.0  --    HGB 9.7* 9.8* 10.0* 11.2*   HCT 29.5* 29.7* 29.5*  --    MCV 96.5 94.8 95.9  --    * 467* 521*  --      Lab Results   Component Value Date/Time    TROPONINI 0.22 2022 08:30 AM     No results found for: BNP  Lab Results   Component Value Date/Time    PROTIME 16.4 2022 04:45 AM    INR 1.33 2022 04:45 AM     Lab Results   Component Value Date/Time    TRIG 175 2022 04:20 AM       Diagnostic and imaging results reviewed. EC/15/22  SVT at 162 BPM. Lateral ST depression, inferior T wave inversions. Echo: 22   Technically difficult examination. Ejection fraction is visually estimated to be 60-65%. Grade II diastolic dysfunction   Right ventricle is poorly visualized, normal systolic function, appears   dilated    Cardiac PET: 22  Abnormal study with evidence of mixed ischemia and scar. There is no left ventricular enlargement with normal global left ventricular systolic function. Overall study quality is good. Scan significance indicates moderate cardiacrisk. Test sensitivity is reduced on anti-anginal drugs. Cath: 22  No significant CAD   Normal LV function   Normal LVEDP   Normal systemic pressures     Assessment & Plan:    PSVT   - first seen on EKG on    - cannot rule out of atrial flutter but mostly like AVNRT   - converted with IV metoprolol on , recurrence on 8/15, was given IV metoprolol and now on IV amiodarone drip   - would use PO metoprolol for suppression, if hypotensive could use digoxin instead   - stop IV amiodarone   - if has recurrence, consider AAD    Paroxysmal atrial fibrillation   - first diagnosed  s/p DCCV at [facility] on 22 per records in [address] (HTN) on aspirin typically    Hypervolemia   - secondary to IV fluid resuscitation   - fluid +12L, agree with diuresis     Aspiration pneumonia   - care per critical care    Acute hypoxic respiratory failure   - secondary to hypervolemia, pneumonia   - remains on vent   - care per critical care    Discussed with Dr. Wesley Chen.     JARON Mclaughlin  [facility], [facility] [address], [place] [address]  Phone: [phone]  Fax: [phone]    Electronically signed by JARON Stafford CNP on 8/15/2022 at 8:17 AM

## 2022-08-15 NOTE — PROGRESS NOTES
Comprehensive Nutrition Assessment    Type and Reason for Visit:  Reassess    Nutrition Recommendations/Plan:   Continue NPO  Vital HP @ 35ml/hr  Administer four proteinex daily  Water flush per provider     Malnutrition Assessment:  Malnutrition Status: At risk for malnutrition (Comment) (08/11/22 3872)    Context:  Acute Illness       Nutrition Assessment:    Follow-up. Pt remains intubated and sedated. On fentanyl and propofol. Propofol at 14.2ml/hr provides 375 kcals daily. Pt was tolerating Vital HP at goal rate 35ml/hr. EN currently off. Will continue Vital HP at 35ml/hr with four proteinex daily to meet needs. Will monitor for tolerance to EN at goal rate and monitor for possible extubation. Nutrition Related Findings:    +12 liters. Na 148. Wound Type: None       Current Nutrition Intake & Therapies:    Average Meal Intake: NPO  Average Supplements Intake: NPO  Diet NPO  ADULT TUBE FEEDING; Orogastric; Peptide Based High Protein; Continuous; 20; Yes; 10; Q 4 hours; 35; 350; Q 4 hours  Recommended Tube Feeding (TF) Orders:  Goal TF & Flush Orders Provides: Vital HP @ 35ml/hr + 4 proteinex daily. This EN regimen provides 1000 ml TV, 1116 kcals, 165g protein, and 585ml free water. (Calculated x 20 hours to account for routine nursing care)    Anthropometric Measures:  Height: 5' 7\" (170.2 cm)  Ideal Body Weight (IBW): 148 lbs (67 kg)    Admission Body Weight: 340 lb (154.2 kg)  Current Body Weight: 361 lb (163.7 kg), 243.9 % IBW.  Weight Source: Bed Scale  Current BMI (kg/m2): 56.5  Weight Adjustment For: No Adjustment  BMI Categories: Obese Class 3 (BMI 40.0 or greater)    Estimated Daily Nutrient Needs:  Energy Requirements Based On: Kcal/kg  Weight Used for Energy Requirements: Ideal  Energy (kcal/day): 9250-1643 (22-25kcal/67kg)  Weight Used for Protein Requirements: Ideal  Protein (g/day): 134-168 (2-2.5kcal/67kg)  Method Used for Fluid Requirements: Other (Comment)  Fluid (ml/day): per provider    Nutrition Diagnosis:   Inadequate oral intake related to impaired respiratory function as evidenced by NPO or clear liquid status due to medical condition, intubation    Nutrition Interventions:   Food and/or Nutrient Delivery: Continue NPO, Continue Current Tube Feeding  Nutrition Education/Counseling: Education not indicated  Coordination of Nutrition Care: Continue to monitor while inpatient     Goals:  Goals: Tolerate nutrition support at goal rate    Nutrition Monitoring and Evaluation:   Behavioral-Environmental Outcomes: None Identified  Food/Nutrient Intake Outcomes: Enteral Nutrition Intake/Tolerance, IVF Intake  Physical Signs/Symptoms Outcomes: Biochemical Data, Chewing or Swallowing, GI Status, Nausea or Vomiting, Fluid Status or Edema, Hemodynamic Status, Weight    Discharge Planning:     Too soon to determine     Melba You, [address] [place] [address],   Contact: [phone]

## 2022-08-15 NOTE — PROGRESS NOTES
Pulmonary Progress Note    Date of Admission: 8/5/2022   LOS: 10 days     CC:  Chief Complaint   Patient presents with    Abdominal Pain    Chest Pain     Sub-sternal, pressure, onset 1300 hours today    Shortness of Breath     EMS reports pt 88% on RA           Assessment/Plan       Acute Hypoxemic Respiratory Failure with SAO2 <90% on Room Air  -Full vent support, Wean supplemental oxygen to goal saturation of >90%    Aspiration Pneumonia  -Completed Zosyn, spiking fevers overnight this morning start Merrem/vancomycin  -Check MRSA nares  -Respiratory culture pending  -Respiratory viral profile    Acute on Chronic Diastolic Heart failure  -Lasix    Recurrent SVT  Started on beta-blocker p.o. Partial SBO  -Tolerating tube feeds, multiple bowel movements    CHARLENE  -Stable creatinine, continue to monitor with urine output. EARNESTINE/OHS  -Currently on ventilator    Morbid obesity  -BMI 56  Due to the immediate potential for life-threatening deterioration due to respiratory failure, I spent 32 minutes providing critical care. This time is excluding time spent performing separately billable procedures. HPI/Subjective  Febrile overnight, repeat cultures this morning    ROS: UnAble to obtain due to mechanical ventilation      Intake/Output Summary (Last 24 hours) at 8/15/2022 0915  Last data filed at 8/15/2022 0825  Gross per 24 hour   Intake 5520.45 ml   Output 5280 ml   Net 240.45 ml         PHYSICAL EXAM:   Blood pressure (!) 89/59, pulse 81, temperature (!) 103.2 °F (39.6 °C), temperature source Oral, resp. rate 18, height 5' 7\" (1.702 m), weight (!) 361 lb 9.6 oz (164 kg), SpO2 94 %.'  Gen:  No acute distress. Eyes: PERRL. Anicteric sclera. No conjunctival injection. ENT: No discharge. OP with ETT external appearance of ears and nose normal.  Neck: Trachea midline. No mass   Resp:  No crackles. No wheezes. No rhonchi. No dullness on percussion. CV: Regular rate. Regular rhythm. No murmur or rub. No edema. GI: Soft, Non-tender. Non-distended. +BS  Skin: Warm, dry, w/o erythema. Lymph: No cervical or supraclavicular LAD. M/S: No cyanosis. No clubbing. Neuro:  no focal neurologic deficit. Moves all extremities  Psych: Awake and alert, Oriented x 3. Judgement and insight appropriate. Mood stable. Medications:    Scheduled Meds:   metoprolol tartrate  25 mg Oral BID    furosemide  40 mg IntraVENous BID    famotidine (PEPCID) injection  20 mg IntraVENous BID    chlorhexidine  15 mL Mouth/Throat BID    piperacillin-tazobactam  4,500 mg IntraVENous Q8H    ipratropium-albuterol  1 ampule Inhalation Q4H WA    sodium chloride flush  5-40 mL IntraVENous 2 times per day    [Held by provider] gabapentin  800 mg Oral TID    [Held by provider] levothyroxine  200 mcg Oral Daily    [Held by provider] spironolactone  50 mg Oral Daily    enoxaparin  40 mg SubCUTAneous BID       Continuous Infusions:   fentaNYL 200 mcg/hr (08/15/22 0836)    propofol 15 mcg/kg/min (08/15/22 0803)    sodium chloride         PRN Meds:  acetaminophen, potassium chloride, midazolam, fentaNYL **AND** fentaNYL, acetaminophen, sodium chloride flush, sodium chloride, ondansetron **OR** ondansetron, diazePAM    Labs reviewed:  CBC:   Recent Labs     08/13/22  0510 08/14/22  0420 08/15/22  0510 08/15/22  0545   WBC 6.8 6.6 8.0  --    HGB 9.7* 9.8* 10.0* 11.2*   HCT 29.5* 29.7* 29.5*  --    MCV 96.5 94.8 95.9  --    * 467* 521*  --      BMP:   Recent Labs     08/13/22  0510 08/13/22  1420 08/14/22  0420 08/14/22  1426 08/14/22  2255 08/15/22  0510   *   < > 148* 147* 148* 148*   K 4.1   < > 3.8 3.4* 4.9 4.0   *   < > 111* 109 108 108   CO2 30   < > 29 30 31 30   PHOS 3.4  --  2.4*  --   --  3.6   BUN 31*   < > 35* 33* 32* 39*   CREATININE 1.4*   < > 1.2 1.3 1.2 1.4*    < > = values in this interval not displayed.      LIVER PROFILE: No results for input(s): AST, ALT, LIPASE, BILIDIR, BILITOT, ALKPHOS in the last 72 hours.    Invalid input(s): AMYLASE,  ALB  PT/INR: No results for input(s): PROTIME, INR in the last 72 hours. APTT: No results for input(s): APTT in the last 72 hours. UA:No results for input(s): NITRITE, COLORU, PHUR, LABCAST, WBCUA, RBCUA, MUCUS, TRICHOMONAS, YEAST, BACTERIA, CLARITYU, SPECGRAV, LEUKOCYTESUR, UROBILINOGEN, BILIRUBINUR, BLOODU, GLUCOSEU, AMORPHOUS in the last 72 hours. Invalid input(s): Kavon Push  No results for input(s): PH, PCO2, PO2 in the last 72 hours. Films:  Radiology Review:  Pertinent images / reports were reviewed as a part of this visit. XR CHEST PORTABLE  Narrative: EXAMINATION:  ONE XRAY VIEW OF THE CHEST    8/14/2022 11:05 am    COMPARISON:  08/13/2022 radiograph    HISTORY:  ORDERING SYSTEM PROVIDED HISTORY: hypoxia  TECHNOLOGIST PROVIDED HISTORY:  Reason for exam:->hypoxia    FINDINGS:  Endotracheal tube is positioned above the mady, stable. Enteric tube  extends below field of view into the abdomen. The heart is enlarged. There  are moderate perihilar and central ground-glass opacities bilaterally. Relatively stable appearance from prior imaging. Small left pleural effusion  noted. No significant skeletal finding. Impression: Relatively stable perihilar and central airspace opacities in the lungs. Access  CVC   Arterial          PICC        PICC Triple Lumen 29/82/21 Right Basilic (Active)   Central Line Being Utilized Yes 08/15/22 0600   Criteria for Appropriate Use Hemodynamically unstable, requiring monitoring lines, vasopressors, or volume resuscitation 08/15/22 0600   Site Assessment Clean, dry & intact 08/15/22 0600   Phlebitis Assessment No symptoms 08/15/22 0600   Infiltration Assessment 0 08/14/22 1800   Extremity Circumference (cm) 38 cm 08/08/22 0607   External Catheter Length (cm) 0 cm 08/08/22 0607   Proximal Lumen Color/Status Red;Capped;Normal saline locked 08/15/22 0600   Medial Lumen Status White;Capped; Infusing 08/15/22 0600 Distal Lumen Color/Status Gray;Capped; Infusing 08/15/22 0600   Line Care Connections checked and tightened 08/15/22 0600   Alcohol Cap Used Yes 08/15/22 0600   Date of Last Dressing Change 08/10/22 08/15/22 0600   Dressing Type Transparent; Antimicrobial 08/15/22 0600   Dressing Status Clean, dry & intact 08/15/22 0600   Dressing Intervention Dressing changed 08/10/22 0200   Number of days: 7       CVC                  Fermin  Urinary Catheter (Active)   Catheter Indications Need for fluid volume management of the critically ill patient in a critical care setting 08/15/22 0400   Site Assessment No urethral drainage 08/15/22 0400   Urine Color Yellow 08/15/22 0400   Urine Appearance Sediment 08/15/22 0400   Urine Odor Other (Comment) 08/15/22 0400   Collection Container Standard 08/15/22 0400   Securement Method Securing device (Describe) 08/15/22 0400   Catheter Care Completed Yes 08/14/22 1200   Catheter Best Practices  Drainage tube clipped to bed;Catheter secured to thigh; Bag below bladder;Bag not on floor; Lack of dependent loop in tubing;Drainage bag less than half full 08/15/22 0400   Status Draining;Patent 08/15/22 0400   Output (mL) 65 mL 08/15/22 0800   Number of days: 7         Thank you for this consult,    Robert Martinez MD  [facility] Pulmonary, Critical Care, and Sleep Medicine

## 2022-08-15 NOTE — PROGRESS NOTES
Hospitalist Progress Note  8/15/2022 9:49 AM    PCP: Erwin Vieyra MD    [id number]     Date of Admission: 8/5/2022                                                                                                                     HOSPITAL COURSE    Patient demographics:  The patient  Arden Fisher is a 63 y.o. male     Significant past medical history:   Patient Active Problem List   Diagnosis    Partial bowel obstruction (HCC)    Hiatal hernia    Acute respiratory failure with hypoxia and hypercapnia (HCC)    SBO (small bowel obstruction) (HCC)    Aspiration into airway    Acute respiratory failure with hypoxia (HCC)    Small bowel obstruction (HCC)    Diastolic heart failure (HCC)    History of atrial fibrillation    Supraventricular tachycardia ([address]         Presenting symptoms:  abdominal pain    Diagnostic workup:      CONSULTS DURING ADMISSION :   IP CONSULT TO GENERAL SURGERY  IP CONSULT TO HOSPITALIST  IP CONSULT TO GENERAL SURGERY  IP CONSULT TO DIETITIAN  IP CONSULT TO CARDIOLOGY      Patient was diagnosed with:  Low grade or Partial Bowel obstruction  Pneumonia and septic shock. Hiatal hernia  Atrial Fibrillation      Treatment while inpatient:  63years old male with medical history significant for morbid obesity, paraesophageal hernia. Patient presented to the emergency room with abdominal pain nausea vomiting and diarrhea. Patient was diagnosed with small bowel obstruction. Patient was also diagnosed with pneumonia and septic shock. Patient developed acute respiratory failure and acute renal failure possibly General patient was started on the ventilator and supported with pressors.                                                                                            ----------------------------------------------------------      SUBJECTIVE COMPLAINTS- follow up for abdominal pain    Diet: Diet NPO  ADULT TUBE FEEDING; Orogastric; Peptide Based High Protein; Continuous; 20; Yes; 10; 0515 -- -- -- (!) 152 18 -- --   08/15/22 0514 -- -- -- (!) 151 18 -- --   08/15/22 0513 -- -- -- (!) 151 18 -- --   08/15/22 0512 -- -- -- (!) 151 18 -- --   08/15/22 0511 -- -- -- (!) 150 18 -- --   08/15/22 0510 -- -- -- (!) 150 18 -- --   08/15/22 0509 -- -- -- (!) 150 18 -- --   08/15/22 0508 -- -- -- (!) 150 18 -- --   08/15/22 0507 -- -- -- (!) 149 18 -- --   08/15/22 0506 -- -- -- (!) 149 18 -- --   08/15/22 0505 -- -- -- (!) 148 18 -- --   08/15/22 0504 -- -- -- (!) 148 18 -- --   08/15/22 0503 -- -- -- (!) 148 18 -- --   08/15/22 0502 -- -- -- (!) 148 18 -- --   08/15/22 0501 -- -- -- (!) 147 18 -- --   08/15/22 0500 95/61 -- -- (!) 148 18 -- --   08/15/22 0459 -- -- -- (!) 148 18 -- --   08/15/22 0458 -- -- -- (!) 148 18 -- --   08/15/22 0457 -- -- -- (!) 150 22 -- --   08/15/22 0456 -- -- -- (!) 151 18 -- --   08/15/22 0455 -- -- -- (!) 167 18 -- --   08/15/22 0454 -- -- -- (!) 166 19 -- --   08/15/22 0453 -- -- -- (!) 167 18 -- --   08/15/22 0452 -- -- -- (!) 170 18 -- --   08/15/22 0451 -- -- -- (!) 166 19 -- --   08/15/22 0450 -- -- -- (!) 170 19 -- --   08/15/22 0449 -- -- -- (!) 167 18 -- --   08/15/22 0448 -- -- -- (!) 172 19 -- --   08/15/22 0447 -- -- -- (!) 168 19 -- --   08/15/22 0446 -- -- -- (!) 170 19 -- --   08/15/22 0445 -- -- -- (!) 168 19 -- --   08/15/22 0444 -- -- -- (!) 167 18 -- --   08/15/22 0443 -- -- -- (!) 168 20 -- --   08/15/22 0442 -- -- -- (!) 165 19 -- --   08/15/22 0441 -- -- -- (!) 165 20 -- --   08/15/22 0440 -- -- -- (!) 163 20 -- --   08/15/22 0439 -- -- -- (!) 163 20 -- --   08/15/22 0438 -- -- -- (!) 162 19 -- --   08/15/22 0437 -- -- -- (!) 162 19 -- --   08/15/22 0436 -- -- -- (!) 162 18 -- --   08/15/22 0435 -- -- -- (!) 162 18 -- --   08/15/22 0434 -- -- -- (!) 164 19 -- --   08/15/22 0433 -- -- -- (!) 164 21 -- --   08/15/22 0432 -- -- -- (!) 163 21 -- --   08/15/22 0431 -- -- -- (!) 164 20 -- --   08/15/22 0430 (!) 136/97 -- -- (!) 165 22 -- --   08/15/22 0428 -- -- -- (!) 168 (!) 35 -- --   08/15/22 0427 -- -- -- (!) 166 23 -- --   08/15/22 0426 -- -- -- (!) 168 27 -- --   08/15/22 0400 121/79 (!) 101.4 °F (38.6 °C) Oral 82 18 94 % --   08/15/22 0331 -- -- -- 82 18 97 % --   08/15/22 0300 116/78 -- -- 83 18 94 % --   08/15/22 0200 136/82 -- -- 89 18 95 % --        72HR INTAKE/OUTPUT:    Intake/Output Summary (Last 24 hours) at 8/15/2022 0949  Last data filed at 8/15/2022 0916  Gross per 24 hour   Intake 5520.45 ml   Output 5430 ml   Net 90.45 ml       Exam:    Gen:  intubated and sedated  Eyes: PERRL. No sclera icterus. No conjunctival injection. ENT: No discharge. Pharynx clear. External appearance of ears and nose normal.  Neck: Trachea midline. No obvious mass. Resp: No accessory muscle use. No crackles. No wheezes. No rhonchi. CV: Regular rate. Regular rhythm. No murmur or rub. No edema. GI: Non-tender. Non-distended. No hernia. Skin: Warm, dry, normal texture and turgor. Lymph: No cervical LAD. No supraclavicular LAD. M/S: / Ext. No cyanosis. No clubbing. No joint deformity. Neuro: CN 2-12 are intact,  no neurologic deficits noted. PT/INR:   No results for input(s): PROTIME, INR in the last 72 hours. APTT: No results for input(s): APTT in the last 72 hours. CBC:   Recent Labs     08/13/22  0510 08/14/22  0420 08/15/22  0510 08/15/22  0545   WBC 6.8 6.6 8.0  --    HGB 9.7* 9.8* 10.0* 11.2*   HCT 29.5* 29.7* 29.5*  --    MCV 96.5 94.8 95.9  --    * 467* 521*  --        BMP:   Recent Labs     08/13/22  0510 08/13/22  1420 08/14/22  0420 08/14/22  1426 08/14/22  2255 08/15/22  0510   *   < > 148* 147* 148* 148*   K 4.1   < > 3.8 3.4* 4.9 4.0   *   < > 111* 109 108 108   CO2 30   < > 29 30 31 30   PHOS 3.4  --  2.4*  --   --  3.6   BUN 31*   < > 35* 33* 32* 39*   CREATININE 1.4*   < > 1.2 1.3 1.2 1.4*    < > = values in this interval not displayed.        LIVER PROFILE:   No results for input(s): ALKPHOS, AST, ALT, ALB, BILIDIR, BILITOT, ALKPHOS in the last 72 hours. No results for input(s): AMYLASE in the last 72 hours. No results for input(s): LIPASE in the last 72 hours. UA:  No results for input(s): NITRITE, LABCAST, WBCUA, RBCUA, MUCUS in the last 72 hours. TROPONIN:   No results for input(s): Patrisha Meigs in the last 72 hours. No results found for: URRFLXCULT    No results for input(s): TSHREFLEX in the last 72 hours. No components found for: [id number]  POC GLUCOSE:    Recent Labs     08/14/22 2023 08/14/22  2356 08/15/22  0440 08/15/22  0545 08/15/22  0742   POCGLU 108* 112* 102* 121* 115*     No results for input(s): LABA1C in the last 72 hours. No results found for: LABA1C    Echocardiogram shows normal ejection fraction  Grade 1 diastolic dysfunction      ASSESSMENT AND PLAN    SVT  Responded to iv metoprolol-now in sinus  H/o atrial fibrillation  Amiodarone discontinued  Cardiology is following    Acute respiratory failure  Intubated 8/8  Oxygen FiO2 60%  Aspiration pna  Ct abx      Diastolic CHF  Patient has grade 1 diastolic dysfunction  Fluid overload status  Lasix 40 mg twice daily          Partial small Bowel obstruction  CT scan of the abdomen with contrast shows no obstruction  Og placed per GI 8/10  BM 8/9  Tolerating tube feed  General surgery is following      CHARLENE  Cr 1.4  Nephrology is following      Hypotention  Wean off pressors    Hiatal hernia  - CT chest/abd/pelv: Large hiatal hernia containing much of the stomach which is distended       Atrial Fibrillation  Patient developed the rapid ventricular rate  IV Lopressor  Consult to cardiology     HTN  - monitor blood pressure  - restart home meds when able        Code Status: Full Code        Dispo - cc        The patient and / or the family were informed of the results of any tests, a time was given to answer questions, a plan was proposed and they agreed with plan.     Debra Perera MD    This note was transcribed using Dragon Dictation software. Please disregard any translational errors.

## 2022-08-15 NOTE — PROGRESS NOTES
General and Vascular Surgery                                                           Daily Progress Note                                                              Pt Name: Aneta Hadley Record Number: [id number]  Date of Birth 1960   Today's Date: 8/15/2022      ASSESSMENT/PLAN   Partial SBO  -+BM's per RN. Abdomen soft, still with distention  -off pressors  -oral gastric tube replaced via EGD 8/10. Unable to pass through either nare. -Repeat CT abd pelvis 8/11 no evidence of obstruction. If he is to be extubated, would just remove OG at that time and start PO    Vent management per critical care. PEEP of 10 and FiO2 at 100%    SUBJECTIVE/EVENTS last 24H  Miles remains in ICU, intubated, sedated. +BMs    OBJECTIVE  VITALS:  height is 5' 7\" (1.702 m) and weight is 361 lb 9.6 oz (164 kg) (abnormal). His oral temperature is 103.2 °F (39.6 °C) (abnormal). His blood pressure is 101/61 and his pulse is 78. His respiration is 18 and oxygen saturation is 90%. VITALS:  /61   Pulse 78   Temp (!) 103.2 °F (39.6 °C) (Oral)   Resp 18   Ht 5' 7\" (1.702 m)   Wt (!) 361 lb 9.6 oz (164 kg)   SpO2 90%   BMI 56.63 kg/m²   GENERAL: Intubated and sedated  CARDIAC: RRR  PULMONARY: ventilated, equal chest rise  ABDOMEN: soft, minimally distended, NT  EXT: no cyanosis or clubbing  I/O last 3 completed shifts: In: 6858.7 [I.V.:1469.8; NG/GT:3830; IV Piggyback:1558.9]  Out: 6191 [Urine:6680]  I/O this shift:   In: 997.1 [I.V.:321.9; NG/GT:131; IV Piggyback:544.2]  Out: 340 [Urine:340]    LABS  Recent Labs     08/15/22  0510 08/15/22  0545 08/15/22  0916   WBC 8.0  --   --    HGB 10.0* 11.2*  --    HCT 29.5*  --   --    *  --   --    *  --   --    K 4.0  --   --      --   --    CO2 30  --   --    BUN 39*  --   --    CREATININE 1.4*  --   --    MG 1.80  --   --    PHOS 3.6  --   --    CALCIUM 7.7*  --   --    NITRU  --   -- Negative   COLORU  --   --  Straw   BACTERIA  --   --  4+*     CBC:   Lab Results   Component Value Date/Time    WBC 8.0 08/15/2022 05:10 AM    RBC 3.07 08/15/2022 05:10 AM    HGB 11.2 08/15/2022 05:45 AM    HCT 29.5 08/15/2022 05:10 AM    MCV 95.9 08/15/2022 05:10 AM    MCH 32.4 08/15/2022 05:10 AM    MCHC 33.8 08/15/2022 05:10 AM    RDW 16.9 08/15/2022 05:10 AM     08/15/2022 05:10 AM    MPV 7.0 08/15/2022 05:10 AM     CMP:    Lab Results   Component Value Date/Time     08/15/2022 05:10 AM    K 4.0 08/15/2022 05:10 AM    K 3.2 08/06/2022 04:45 AM     08/15/2022 05:10 AM    CO2 30 08/15/2022 05:10 AM    BUN 39 08/15/2022 05:10 AM    CREATININE 1.4 08/15/2022 05:10 AM    GFRAA >60 08/15/2022 05:10 AM    AGRATIO 0.8 08/06/2022 04:45 AM    LABGLOM 52 08/15/2022 05:10 AM    GLUCOSE 116 08/15/2022 05:10 AM    PROT 6.3 08/06/2022 04:45 AM    LABALBU 2.8 08/06/2022 04:45 AM    CALCIUM 7.7 08/15/2022 05:10 AM    BILITOT 0.7 08/06/2022 04:45 AM    ALKPHOS 72 08/06/2022 04:45 AM    AST 22 08/06/2022 04:45 AM    ALT 23 08/06/2022 04:45 AM         Jannet Gonsalez PA-C  Electronically signed 8/15/2022 at 11:15 AM      As above  Remove OG with extubation  Should be able to start PO soon after extubation    Electronically signed by Gely Lobato MD on 8/15/2022 at 1:31 PM

## 2022-08-15 NOTE — PROGRESS NOTES
DR. Lenny Alvares at bedside and updated. See new orders. Per MD, shan gluconate and bolus to be completed first before amio bolus started.

## 2022-08-15 NOTE — PROGRESS NOTES
5mg metoprolol given per order,and HR is down from 170's to 152. Mckinley Castro perfectserved and updated.  See new orders    Electronically signed by Sydnee Cardenas RN on 8/15/2022 at 5:24 AM

## 2022-08-15 NOTE — CARE COORDINATION
Discharge Planning:    Patient remains sedated/intubated as of this morning. Spoke with patient's wife via phone. She confirmed that patient's wishes are to return home when medically stable, if possible, but she understands the possible need for rehabilitation. Patient's wife would like referrals placed to the following SNFs, if needed:    [address] [facility]    Will place referrals once therapy able to provide recommendations. Will continue to follow for updates.     Electronically signed by Myra Meyer RN on 8/15/2022 at 11:40 AM

## 2022-08-15 NOTE — PLAN OF CARE
seizures  Outcome: Progressing  Flowsheets (Taken 8/14/2022 2000)  Absence of seizures: Monitor for seizure activity.   If seizure occurs, document type and location of movements and any associated apnea  Goal: Remains free of injury related to seizures activity  Outcome: Progressing  Flowsheets (Taken 8/14/2022 2000)  Remains free of injury related to seizure activity: Maintain airway, patient safety  and administer oxygen as ordered  Goal: Achieves maximal functionality and self care  Outcome: Progressing  Flowsheets (Taken 8/14/2022 2000)  Achieves maximal functionality and self care: Monitor swallowing and airway patency with patient fatigue and changes in neurological status     Problem: Respiratory - Adult  Goal: Achieves optimal ventilation and oxygenation  Outcome: Progressing     Problem: Cardiovascular - Adult  Goal: Maintains optimal cardiac output and hemodynamic stability  Outcome: Progressing  Flowsheets (Taken 8/14/2022 2000)  Maintains optimal cardiac output and hemodynamic stability: Monitor blood pressure and heart rate  Goal: Absence of cardiac dysrhythmias or at baseline  Outcome: Progressing  Flowsheets (Taken 8/14/2022 2000)  Absence of cardiac dysrhythmias or at baseline: Monitor cardiac rate and rhythm     Problem: Skin/Tissue Integrity - Adult  Goal: Skin integrity remains intact  Recent Flowsheet Documentation  Taken 8/15/2022 0357 by Genesis Garcia RN  Skin Integrity Remains Intact: Every 4-6 hours minimum: Change oxygen saturation probe site  Taken 8/14/2022 2000 by Genesis Garcia RN  Skin Integrity Remains Intact: Monitor for areas of redness and/or skin breakdown  Goal: Incisions, wounds, or drain sites healing without S/S of infection  Outcome: Progressing  Flowsheets (Taken 8/15/2022 0357)  Incisions, Wounds, or Drain Sites Healing Without Sign and Symptoms of Infection:   Implement wound care per orders   TWICE DAILY: Assess and document dressing/incision, wound bed, drain sites and surrounding tissue  Goal: Oral mucous membranes remain intact  Outcome: Progressing  Flowsheets (Taken 8/14/2022 2000)  Oral Mucous Membranes Remain Intact: Assess oral mucosa and hygiene practices     Problem: Musculoskeletal - Adult  Goal: Return mobility to safest level of function  Outcome: Progressing  Goal: Maintain proper alignment of affected body part  Outcome: Progressing  Goal: Return ADL status to a safe level of function  Outcome: Progressing     Problem: Gastrointestinal - Adult  Goal: Minimal or absence of nausea and vomiting  Outcome: Progressing  Flowsheets (Taken 8/14/2022 2000)  Minimal or absence of nausea and vomiting: Administer IV fluids as ordered to ensure adequate hydration  Goal: Maintains or returns to baseline bowel function  Outcome: Progressing  Flowsheets (Taken 8/14/2022 2000)  Maintains or returns to baseline bowel function: Assess bowel function  Goal: Maintains adequate nutritional intake  Outcome: Progressing  Flowsheets (Taken 8/14/2022 2000)  Maintains adequate nutritional intake: Identify factors contributing to decreased intake, treat as appropriate  Goal: Establish and maintain optimal ostomy function  Outcome: Progressing  Flowsheets (Taken 8/14/2022 2000)  Establish and maintain optimal ostomy function: Administer IV fluids and TPN as ordered     Problem: Genitourinary - Adult  Goal: Absence of urinary retention  Outcome: Progressing  Goal: Urinary catheter remains patent  Outcome: Progressing     Problem: Coping  Goal: Pt/Family able to verbalize concerns and demonstrate effective coping strategies  Description: INTERVENTIONS:  1. Assist patient/family to identify coping skills, available support systems and cultural and spiritual values  2. Provide emotional support, including active listening and acknowledgement of concerns of patient and caregivers  3. Reduce environmental stimuli, as able  4.  Instruct patient/family in relaxation techniques, as appropriate  5. Assess for spiritual pain/suffering and initiate Spiritual Care, Psychosocial Clinical Specialist consults as needed  Outcome: Progressing     Problem: Decision Making  Goal: Pt/Family able to effectively weigh alternatives and participate in decision making related to treatment and care  Description: INTERVENTIONS:  1. Determine when there are differences between patient's view, family's view, and healthcare provider's view of condition  2. Facilitate patient and family articulation of goals for care  3. Help patient and family identify pros/cons of alternative solutions  4. Provide information as requested by patient/family  5. Respect patient/family right to receive or not to receive information  6. Serve as a liaison between patient and family and health care team  7. Initiate Consults from Ethics, Palliative Care or initiate [address] [place] [address] as is appropriate  Outcome: Progressing     Problem: Change in Body Image  Goal: Pt/Family communicate acceptance of loss or change in body image and feel psychological comfort and peace  Description: INTERVENTIONS:  1. Assess patient/family anxiety and grief process related to change in body image, loss of functional status, loss of sense of self, and forgiveness  2. Provide emotional and spiritual support  3. Provide information about the patient's health status with consideration of family and cultural values  4. Communicate willingness to discuss loss and facilitate grief process with patient/family as appropriate  5. Emphasize sustaining relationships within family system and community, or aurora/spiritual traditions  6. Initiate Spiritual Care, Psychosocial Clinical Specialist consult as needed  Outcome: Progressing     Problem: Behavior  Goal: Pt/Family maintain appropriate behavior and adhere to behavioral management agreement, if implemented  Description: INTERVENTIONS:  1.  Assess patient/family's coping skills and  non-compliant behavior (including use of illegal substances)  2. Notify security of behavior or suspected illegal substances which indicate the need for search of the family and/or belongings  3. Encourage verbalization of thoughts and concerns in a socially appropriate manner  4. Utilize positive, consistent limit setting strategies supporting safety of patient, staff and others  5. Encourage participation in the decision making process about the behavioral management agreement  6. If a visitor's behavior poses a threat to safety call refer to organization policy.   7. Initiate consult with , Psychosocial CNS, Spiritual Care as appropriate  Outcome: Progressing

## 2022-08-15 NOTE — PROGRESS NOTES
[facility]                                               Progress note    Summary:   Koki Stewart is being seen by nephrology for CHARLENE and hypernatremia. Admitted with paraesophageal hernia and SBO. Had EGD 8/9 with biopsy. Still NPO     Interval History  Seen and examined at bedside. Patient is intubated and sedated. Tube feeds were briefly off when seen this morning  Was having some fever so antibiotics broadened  AVNRT, recurrent episodes of arrhythmia    Blood pressure 89/59  Satting 91% on 60% FiO2  UO 5.4 L yesterday   Somehow only negative only 490 cc  PEEP of 14, increased from 10  CXR shows small left pleural effusion     Labs reviewed. Sodium 148 potassium 4 bicarb 30 BUN 39 creatinine rising 1.4  Magnesium 1.8  Hgb 11.2    Plan:   - received 1 L of IVFs this AM when he when into SVT  His weight is up about 21 pounds since admission and he does have edema on exam  His patient responding quite robustly to Lasix 40 mg twice daily, would continue   I suspect his creatinine rising is related to hemodynamic insults in the setting of arrhythmia and hypotension. Goal is to keep negative fluid balance. Worsening fluid overload  He did need some free water with his tube feeds so we will give him 350 every 4 hours    Check BNP tomorrow. Rajesh Gonzalez MD  [facility]  Office: [phone]    Assessment:   Acute kidney injury  Urine Na < 20    creatinine on admission 1   next day on 08/06/2022 was 0.8  suddenly jumped up to 2.7. Due to episode of severe hypotension with blood pressure drop 78/44. also got CT with IV contrast on 08/05/2022 but most likely  this is ATN related due to hypotension and hypercapnic  respiratory failure. CT angio s normal renal arteries and normal kidneys      Sepsis/ possible septic shock. emperature was 101.1   High white cell count. '  immunocompromised due to rheumatoid arthritis and treatment. History of sleep apnea   on CPAP.     Acute respiratory failure with high pCO2 in the range of 50s with pH  of 7.24    now intubated. History of abnormal stress test in 06/2022 with stress-induced  ischemia and some scarring. Ejection fraction  was  64%.     had a followup angiogram which was reported as normal as per wife. I do not have that result     History of rheumatoid arthritis   longstanding,was on methotrexate,  has taken Celebrex and now on Inflectra infusions,  so he is immunocompromised. Roberto Carlos Burgos is now helping him with his rheumatoid tremendously. he has been on steroids in the past.     Rule out adrenal insufficiency. cortisol level. normal      History of thyroidectomy in the past. .    Small bowel obstruction. Large hiatus hernia with colon and stomach in the chest.  Further management as per medical team.       ROS:   Unable to assess      PMH:   Past medical history, surgical history, social history, family history are reviewed and updated as appropriate. Reviewed current medication list.   Allergies reviewed and updated as needed. PE:   Vitals:    08/15/22 1300   BP: 96/62   Pulse: 70   Resp: 18   Temp:    SpO2: 91%       General appearance:  in NAD, intubated and sedated  HEENT: EOM intact, no icterus. Trachea is midline. Neck : No masses, appears symmetrical  Respiratory: Respiratory effort appears normal, bilateral equal chest rise, no wheeze, no crackles ETT to vent   Cardiovascular: Ausculation shows RRR trace edema  Abdomen: mildly distended but soft   Musculoskeletal:  Joints with no swelling or deformity. Skin:no rashes, ulcers, induration, no jaundice.    Neuro: sedated      Lab Results   Component Value Date    CREATININE 1.4 (H) 08/15/2022    BUN 39 (H) 08/15/2022     (H) 08/15/2022    K 4.0 08/15/2022     08/15/2022    CO2 30 08/15/2022      Lab Results   Component Value Date    WBC 8.0 08/15/2022    HGB 11.2 (L) 08/15/2022    HCT 29.5 (L) 08/15/2022    MCV 95.9 08/15/2022     (H) 08/15/2022     Lab Results   Component Value Date    CALCIUM 7.7 (L) 08/15/2022    CAION 1.06 (L) 08/15/2022    PHOS 3.6 08/15/2022

## 2022-08-15 NOTE — PROGRESS NOTES
1945: Report received from [facility]. Bedside handoff and skin assessment complete. In SR per monitor. Pt resting in bed; respirations easy and unlabored on vent. Arouses with care and makes eye contact; not following commands at this time. OG in place with TF infusing at goal. IVF/meds infusing as ordered via PICC. F/C in place and draining. Cooling blanket remains in place; normothermic at this time. Pt's wife at bedside, all questions answered. Wrist restraints in place to protect airway/lines  2045: Shift assessment complete--see flow sheets. 0023: Bath complete; tolerated well. Now following some commands. VSS  0610: Remains easily arousable; following some commands but not responding to yes/no questions. Temp trending up, 99.8 at this time. BP trending up. Will watch  1339: AM metoprolol given for continued SBP > 170  0740: Report given to Denis Mcdaniels RN;bedside handoff  complete.

## 2022-08-15 NOTE — PLAN OF CARE
Problem: Pain  Goal: Verbalizes/displays adequate comfort level or baseline comfort level  8/15/2022 1305 by Wesley Granados RN  Outcome: Progressing     Problem: ABCDS Injury Assessment  Goal: Absence of physical injury  8/15/2022 1305 by Wesley Granados RN  Outcome: Progressing  Flowsheets (Taken 8/15/2022 0800)  Absence of Physical Injury: Implement safety measures based on patient assessment     Problem: Safety - Adult  Goal: Free from fall injury  8/15/2022 1305 by Wesley Granados RN  Outcome: Progressing     Problem: Skin/Tissue Integrity  Goal: Absence of new skin breakdown  Description: 1. Monitor for areas of redness and/or skin breakdown  2. Assess vascular access sites hourly  8/15/2022 1305 by Wesley Granados RN  Outcome: Progressing     Problem: Safety - Medical Restraint  Goal: Remains free of injury from restraints (Restraint for Interference with Medical Device)  Description: INTERVENTIONS:  1. Determine that other, less restrictive measures have been tried or would not be effective before applying the restraint  2. Evaluate the patient's condition at the time of restraint application  3. Inform patient/family regarding the reason for restraint  4.  Q2H: Monitor safety, psychosocial status, comfort, nutrition and hydration  8/15/2022 1305 by Wesley Granados RN  Outcome: Progressing     Problem: Nutrition Deficit:  Goal: Optimize nutritional status  8/15/2022 1305 by Wesley Granados RN  Outcome: Progressing     Problem: Neurosensory - Adult  Goal: Absence of seizures  8/15/2022 1305 by Wesley Granados RN  Outcome: Progressing     Problem: Respiratory - Adult  Goal: Achieves optimal ventilation and oxygenation  8/15/2022 1305 by Wesley Granados RN  Outcome: Progressing     Problem: Cardiovascular - Adult  Goal: Maintains optimal cardiac output and hemodynamic stability  8/15/2022 1305 by Wesley Granados RN  Outcome: Progressing     Problem: Cardiovascular - Adult  Goal: Absence of cardiac dysrhythmias or at baseline  8/15/2022 1305 by Mindi Johnson RN  Outcome: Progressing     Problem: Genitourinary - Adult  Goal: Absence of urinary retention  8/15/2022 1305 by Mindi Johnson RN  Outcome: Progressing  Flowsheets (Taken 8/15/2022 0800)  Absence of urinary retention: Monitor intake/output and perform bladder scan as needed     Problem: Genitourinary - Adult  Goal: Urinary catheter remains patent  8/15/2022 1305 by Mindi Johnson RN  Outcome: Progressing     Problem: Skin/Tissue Integrity - Adult  Goal: Skin integrity remains intact  Recent Flowsheet Documentation  Taken 8/15/2022 0800 by Mindi Johnson RN  Skin Integrity Remains Intact: Monitor for areas of redness and/or skin breakdown  Taken 8/15/2022 0357 by Tae Kraus RN  Skin Integrity Remains Intact: Every 4-6 hours minimum: Change oxygen saturation probe site

## 2022-08-15 NOTE — PROGRESS NOTES
Pt's  HR suddenly increased from 80's to 160's . EKG showed sinus tach. Dr. Janee Fang updated. See new orders.   Electronically signed by Bryn Crane RN on 8/15/2022 at 4:50 AM

## 2022-08-15 NOTE — PROGRESS NOTES
Went into SVT, no response to IV metoprolol, electrolytes getting repleted, started amiodarone bolus and possibly drip later.  1L NS bolus given

## 2022-08-16 LAB
ANION GAP SERPL CALCULATED.3IONS-SCNC: 6 MMOL/L (ref 3–16)
ANION GAP SERPL CALCULATED.3IONS-SCNC: 7 MMOL/L (ref 3–16)
ANION GAP SERPL CALCULATED.3IONS-SCNC: 8 MMOL/L (ref 3–16)
BASOPHILS ABSOLUTE: 0.1 K/UL (ref 0–0.2)
BASOPHILS RELATIVE PERCENT: 0.9 %
BUN BLDV-MCNC: 42 MG/DL (ref 7–20)
BUN BLDV-MCNC: 43 MG/DL (ref 7–20)
BUN BLDV-MCNC: 43 MG/DL (ref 7–20)
CALCIUM SERPL-MCNC: 7.5 MG/DL (ref 8.3–10.6)
CALCIUM SERPL-MCNC: 7.8 MG/DL (ref 8.3–10.6)
CALCIUM SERPL-MCNC: 7.9 MG/DL (ref 8.3–10.6)
CHLORIDE BLD-SCNC: 104 MMOL/L (ref 99–110)
CHLORIDE BLD-SCNC: 104 MMOL/L (ref 99–110)
CHLORIDE BLD-SCNC: 107 MMOL/L (ref 99–110)
CO2: 29 MMOL/L (ref 21–32)
CO2: 30 MMOL/L (ref 21–32)
CO2: 31 MMOL/L (ref 21–32)
CREAT SERPL-MCNC: 1 MG/DL (ref 0.8–1.3)
CREAT SERPL-MCNC: 1.1 MG/DL (ref 0.8–1.3)
CREAT SERPL-MCNC: 1.2 MG/DL (ref 0.8–1.3)
EOSINOPHILS ABSOLUTE: 0.2 K/UL (ref 0–0.6)
EOSINOPHILS RELATIVE PERCENT: 2.5 %
GFR AFRICAN AMERICAN: >60
GFR NON-AFRICAN AMERICAN: >60
GLUCOSE BLD-MCNC: 103 MG/DL (ref 70–99)
GLUCOSE BLD-MCNC: 107 MG/DL (ref 70–99)
GLUCOSE BLD-MCNC: 117 MG/DL (ref 70–99)
GLUCOSE BLD-MCNC: 77 MG/DL (ref 70–99)
GLUCOSE BLD-MCNC: 88 MG/DL (ref 70–99)
GLUCOSE BLD-MCNC: 88 MG/DL (ref 70–99)
GLUCOSE BLD-MCNC: 95 MG/DL (ref 70–99)
GLUCOSE BLD-MCNC: 95 MG/DL (ref 70–99)
GLUCOSE BLD-MCNC: 98 MG/DL (ref 70–99)
HCT VFR BLD CALC: 29.1 % (ref 40.5–52.5)
HEMOGLOBIN: 9.7 G/DL (ref 13.5–17.5)
LYMPHOCYTES ABSOLUTE: 1.1 K/UL (ref 1–5.1)
LYMPHOCYTES RELATIVE PERCENT: 12 %
MAGNESIUM: 1.8 MG/DL (ref 1.8–2.4)
MCH RBC QN AUTO: 31.9 PG (ref 26–34)
MCHC RBC AUTO-ENTMCNC: 33.4 G/DL (ref 31–36)
MCV RBC AUTO: 95.6 FL (ref 80–100)
MONOCYTES ABSOLUTE: 0.8 K/UL (ref 0–1.3)
MONOCYTES RELATIVE PERCENT: 9.1 %
MRSA SCREEN RT-PCR: NORMAL
NEUTROPHILS ABSOLUTE: 7 K/UL (ref 1.7–7.7)
NEUTROPHILS RELATIVE PERCENT: 75.5 %
PDW BLD-RTO: 17.4 % (ref 12.4–15.4)
PERFORMED ON: ABNORMAL
PERFORMED ON: ABNORMAL
PERFORMED ON: NORMAL
PHOSPHORUS: 2.5 MG/DL (ref 2.5–4.9)
PLATELET # BLD: 500 K/UL (ref 135–450)
PMV BLD AUTO: 7.3 FL (ref 5–10.5)
POTASSIUM SERPL-SCNC: 3.2 MMOL/L (ref 3.5–5.1)
POTASSIUM SERPL-SCNC: 3.4 MMOL/L (ref 3.5–5.1)
POTASSIUM SERPL-SCNC: 3.6 MMOL/L (ref 3.5–5.1)
POTASSIUM SERPL-SCNC: 3.8 MMOL/L (ref 3.5–5.1)
RBC # BLD: 3.04 M/UL (ref 4.2–5.9)
SODIUM BLD-SCNC: 140 MMOL/L (ref 136–145)
SODIUM BLD-SCNC: 143 MMOL/L (ref 136–145)
SODIUM BLD-SCNC: 143 MMOL/L (ref 136–145)
VANCOMYCIN RANDOM: 7.6 UG/ML
WBC # BLD: 9.3 K/UL (ref 4–11)

## 2022-08-16 PROCEDURE — 2000000000 HC ICU R&B

## 2022-08-16 PROCEDURE — 2580000003 HC RX 258: Performed by: NURSE PRACTITIONER

## 2022-08-16 PROCEDURE — 6370000000 HC RX 637 (ALT 250 FOR IP): Performed by: INTERNAL MEDICINE

## 2022-08-16 PROCEDURE — 6360000002 HC RX W HCPCS: Performed by: HOSPITALIST

## 2022-08-16 PROCEDURE — 99291 CRITICAL CARE FIRST HOUR: CPT | Performed by: INTERNAL MEDICINE

## 2022-08-16 PROCEDURE — 2700000000 HC OXYGEN THERAPY PER DAY

## 2022-08-16 PROCEDURE — 6360000002 HC RX W HCPCS: Performed by: NURSE PRACTITIONER

## 2022-08-16 PROCEDURE — 6370000000 HC RX 637 (ALT 250 FOR IP): Performed by: NURSE PRACTITIONER

## 2022-08-16 PROCEDURE — 99233 SBSQ HOSP IP/OBS HIGH 50: CPT | Performed by: NURSE PRACTITIONER

## 2022-08-16 PROCEDURE — 36592 COLLECT BLOOD FROM PICC: CPT

## 2022-08-16 PROCEDURE — APPNB15 APP NON BILLABLE TIME 0-15 MINS: Performed by: NURSE PRACTITIONER

## 2022-08-16 PROCEDURE — 2500000003 HC RX 250 WO HCPCS: Performed by: HOSPITALIST

## 2022-08-16 PROCEDURE — 83735 ASSAY OF MAGNESIUM: CPT

## 2022-08-16 PROCEDURE — 84132 ASSAY OF SERUM POTASSIUM: CPT

## 2022-08-16 PROCEDURE — 85025 COMPLETE CBC W/AUTO DIFF WBC: CPT

## 2022-08-16 PROCEDURE — 94761 N-INVAS EAR/PLS OXIMETRY MLT: CPT

## 2022-08-16 PROCEDURE — 6360000002 HC RX W HCPCS: Performed by: INTERNAL MEDICINE

## 2022-08-16 PROCEDURE — 80202 ASSAY OF VANCOMYCIN: CPT

## 2022-08-16 PROCEDURE — 80048 BASIC METABOLIC PNL TOTAL CA: CPT

## 2022-08-16 PROCEDURE — 94640 AIRWAY INHALATION TREATMENT: CPT

## 2022-08-16 PROCEDURE — 94003 VENT MGMT INPAT SUBQ DAY: CPT

## 2022-08-16 PROCEDURE — 84100 ASSAY OF PHOSPHORUS: CPT

## 2022-08-16 PROCEDURE — 2580000003 HC RX 258: Performed by: HOSPITALIST

## 2022-08-16 PROCEDURE — 6370000000 HC RX 637 (ALT 250 FOR IP): Performed by: HOSPITALIST

## 2022-08-16 PROCEDURE — 2500000003 HC RX 250 WO HCPCS: Performed by: NURSE PRACTITIONER

## 2022-08-16 PROCEDURE — 2580000003 HC RX 258: Performed by: INTERNAL MEDICINE

## 2022-08-16 RX ORDER — MAGNESIUM SULFATE IN WATER 40 MG/ML
2000 INJECTION, SOLUTION INTRAVENOUS ONCE
Status: COMPLETED | OUTPATIENT
Start: 2022-08-16 | End: 2022-08-16

## 2022-08-16 RX ORDER — POTASSIUM CHLORIDE 29.8 MG/ML
20 INJECTION INTRAVENOUS ONCE
Status: COMPLETED | OUTPATIENT
Start: 2022-08-16 | End: 2022-08-16

## 2022-08-16 RX ORDER — FUROSEMIDE 10 MG/ML
40 INJECTION INTRAMUSCULAR; INTRAVENOUS 3 TIMES DAILY
Status: DISCONTINUED | OUTPATIENT
Start: 2022-08-16 | End: 2022-08-16

## 2022-08-16 RX ORDER — FUROSEMIDE 10 MG/ML
40 INJECTION INTRAMUSCULAR; INTRAVENOUS EVERY 8 HOURS SCHEDULED
Status: DISCONTINUED | OUTPATIENT
Start: 2022-08-16 | End: 2022-08-19

## 2022-08-16 RX ORDER — CALCIUM GLUCONATE 20 MG/ML
1000 INJECTION, SOLUTION INTRAVENOUS ONCE
Status: COMPLETED | OUTPATIENT
Start: 2022-08-16 | End: 2022-08-16

## 2022-08-16 RX ADMIN — MAGNESIUM SULFATE HEPTAHYDRATE 2000 MG: 40 INJECTION, SOLUTION INTRAVENOUS at 10:58

## 2022-08-16 RX ADMIN — FUROSEMIDE 40 MG: 10 INJECTION, SOLUTION INTRAMUSCULAR; INTRAVENOUS at 20:29

## 2022-08-16 RX ADMIN — VANCOMYCIN HYDROCHLORIDE 1750 MG: 1 INJECTION, POWDER, LYOPHILIZED, FOR SOLUTION INTRAVENOUS at 08:55

## 2022-08-16 RX ADMIN — PROPOFOL 15 MCG/KG/MIN: 10 INJECTION, EMULSION INTRAVENOUS at 18:46

## 2022-08-16 RX ADMIN — ALTEPLASE 1 MG: 2.2 INJECTION, POWDER, LYOPHILIZED, FOR SOLUTION INTRAVENOUS at 12:12

## 2022-08-16 RX ADMIN — POTASSIUM CHLORIDE 20 MEQ: 29.8 INJECTION, SOLUTION INTRAVENOUS at 16:28

## 2022-08-16 RX ADMIN — POTASSIUM CHLORIDE 20 MEQ: 29.8 INJECTION, SOLUTION INTRAVENOUS at 14:59

## 2022-08-16 RX ADMIN — CHLORHEXIDINE GLUCONATE 0.12% ORAL RINSE 15 ML: 1.2 LIQUID ORAL at 08:37

## 2022-08-16 RX ADMIN — ENOXAPARIN SODIUM 40 MG: 100 INJECTION SUBCUTANEOUS at 20:38

## 2022-08-16 RX ADMIN — ENOXAPARIN SODIUM 40 MG: 100 INJECTION SUBCUTANEOUS at 08:36

## 2022-08-16 RX ADMIN — Medication 200 MCG/HR: at 05:34

## 2022-08-16 RX ADMIN — MEROPENEM 1000 MG: 1 INJECTION, POWDER, FOR SOLUTION INTRAVENOUS at 12:38

## 2022-08-16 RX ADMIN — IPRATROPIUM BROMIDE AND ALBUTEROL SULFATE 1 AMPULE: 2.5; .5 SOLUTION RESPIRATORY (INHALATION) at 16:26

## 2022-08-16 RX ADMIN — POTASSIUM CHLORIDE 20 MEQ: 29.8 INJECTION, SOLUTION INTRAVENOUS at 01:32

## 2022-08-16 RX ADMIN — FUROSEMIDE 40 MG: 10 INJECTION, SOLUTION INTRAMUSCULAR; INTRAVENOUS at 14:59

## 2022-08-16 RX ADMIN — SODIUM CHLORIDE, PRESERVATIVE FREE 10 ML: 5 INJECTION INTRAVENOUS at 20:42

## 2022-08-16 RX ADMIN — PROPOFOL 20 MCG/KG/MIN: 10 INJECTION, EMULSION INTRAVENOUS at 00:52

## 2022-08-16 RX ADMIN — SODIUM CHLORIDE, PRESERVATIVE FREE 10 ML: 5 INJECTION INTRAVENOUS at 08:37

## 2022-08-16 RX ADMIN — CALCIUM GLUCONATE 1000 MG: 20 INJECTION, SOLUTION INTRAVENOUS at 09:52

## 2022-08-16 RX ADMIN — IPRATROPIUM BROMIDE AND ALBUTEROL SULFATE 1 AMPULE: 2.5; .5 SOLUTION RESPIRATORY (INHALATION) at 08:34

## 2022-08-16 RX ADMIN — IPRATROPIUM BROMIDE AND ALBUTEROL SULFATE 1 AMPULE: 2.5; .5 SOLUTION RESPIRATORY (INHALATION) at 19:53

## 2022-08-16 RX ADMIN — PROPOFOL 15 MCG/KG/MIN: 10 INJECTION, EMULSION INTRAVENOUS at 13:02

## 2022-08-16 RX ADMIN — FUROSEMIDE 40 MG: 10 INJECTION, SOLUTION INTRAMUSCULAR; INTRAVENOUS at 08:36

## 2022-08-16 RX ADMIN — IPRATROPIUM BROMIDE AND ALBUTEROL SULFATE 1 AMPULE: 2.5; .5 SOLUTION RESPIRATORY (INHALATION) at 12:20

## 2022-08-16 RX ADMIN — Medication 200 MCG/HR: at 10:19

## 2022-08-16 RX ADMIN — Medication 200 MCG/HR: at 16:02

## 2022-08-16 RX ADMIN — ACETAMINOPHEN 650 MG: 325 TABLET, FILM COATED ORAL at 17:38

## 2022-08-16 RX ADMIN — CHLORHEXIDINE GLUCONATE 0.12% ORAL RINSE 15 ML: 1.2 LIQUID ORAL at 20:18

## 2022-08-16 RX ADMIN — METOPROLOL TARTRATE 25 MG: 25 TABLET, FILM COATED ORAL at 20:30

## 2022-08-16 RX ADMIN — POTASSIUM CHLORIDE 20 MEQ: 29.8 INJECTION, SOLUTION INTRAVENOUS at 20:28

## 2022-08-16 RX ADMIN — ACETAMINOPHEN 650 MG: 325 TABLET, FILM COATED ORAL at 09:40

## 2022-08-16 RX ADMIN — POTASSIUM CHLORIDE 20 MEQ: 29.8 INJECTION, SOLUTION INTRAVENOUS at 18:58

## 2022-08-16 RX ADMIN — Medication 200 MCG/HR: at 20:52

## 2022-08-16 RX ADMIN — PROPOFOL 15 MCG/KG/MIN: 10 INJECTION, EMULSION INTRAVENOUS at 05:46

## 2022-08-16 RX ADMIN — MEROPENEM 1000 MG: 1 INJECTION, POWDER, FOR SOLUTION INTRAVENOUS at 20:42

## 2022-08-16 RX ADMIN — POTASSIUM CHLORIDE 20 MEQ: 29.8 INJECTION, SOLUTION INTRAVENOUS at 00:15

## 2022-08-16 RX ADMIN — POTASSIUM CHLORIDE 20 MEQ: 29.8 INJECTION, SOLUTION INTRAVENOUS at 08:35

## 2022-08-16 RX ADMIN — MEROPENEM 1000 MG: 1 INJECTION, POWDER, FOR SOLUTION INTRAVENOUS at 05:07

## 2022-08-16 RX ADMIN — FAMOTIDINE 20 MG: 10 INJECTION, SOLUTION INTRAVENOUS at 08:35

## 2022-08-16 RX ADMIN — FAMOTIDINE 20 MG: 10 INJECTION, SOLUTION INTRAVENOUS at 20:28

## 2022-08-16 ASSESSMENT — PULMONARY FUNCTION TESTS
PIF_VALUE: 33
PIF_VALUE: 35
PIF_VALUE: 33
PIF_VALUE: 34
PIF_VALUE: 34
PIF_VALUE: 36
PIF_VALUE: 33
PIF_VALUE: 34
PIF_VALUE: 33

## 2022-08-16 ASSESSMENT — ENCOUNTER SYMPTOMS
NAUSEA: 0
VOMITING: 0

## 2022-08-16 ASSESSMENT — PAIN SCALES - GENERAL
PAINLEVEL_OUTOF10: 0

## 2022-08-16 NOTE — PROGRESS NOTES
Pulmonary Progress Note    Date of Admission: 8/5/2022   LOS: 11 days     CC:  Chief Complaint   Patient presents with    Abdominal Pain    Chest Pain     Sub-sternal, pressure, onset 1300 hours today    Shortness of Breath     EMS reports pt 88% on RA           Assessment/Plan       Acute Hypoxemic Respiratory Failure with SAO2 <90% on Room Air  -Full vent support, Wean supplemental oxygen to goal saturation of >90%    Aspiration Pneumonia  -Status post Zosyn, fever curve improving on Merrem/vancomycin  -MRSA nares negative will DC Vanco  -Respiratory culture pending  -Respiratory viral profile    Acute on Chronic Diastolic Heart failure  -Lasix, good response improving oxygenation    Recurrent SVT  Started on beta-blocker p.o. Partial SBO  -Tolerating tube feeds, multiple bowel movements    CHARLENE  -Stable creatinine, continue to monitor with urine output. EARNESTINE/OHS  -Currently on ventilator    Morbid obesity  -BMI 56    Peridex  Lovenox  Pepcid    Due to the immediate potential for life-threatening deterioration due to respiratory failure, I spent 31 minutes providing critical care. This time is excluding time spent performing separately billable procedures. HPI/Subjective  Fever curve improving. More awake and interactive on the vent. Weaning oxygen requirement. ROS: UnAble to obtain due to mechanical ventilation      Intake/Output Summary (Last 24 hours) at 8/16/2022 0907  Last data filed at 8/16/2022 0549  Gross per 24 hour   Intake 4037.98 ml   Output 2350 ml   Net 1687.98 ml         PHYSICAL EXAM:   Blood pressure (!) 140/87, pulse 77, temperature 100.1 °F (37.8 °C), temperature source Rectal, resp. rate 18, height 5' 7\" (1.702 m), weight (!) 366 lb 9.6 oz (166.3 kg), SpO2 94 %.'  Gen:  No acute distress. Eyes: PERRL. Anicteric sclera. No conjunctival injection. ENT: No discharge. OP with ETT external appearance of ears and nose normal.  Neck: Trachea midline. No mass   Resp:  No crackles. No wheezes. No rhonchi. No dullness on percussion. CV: Regular rate. Regular rhythm. No murmur or rub. No edema. GI: Soft, Non-tender. Obese abdomen. +BS  Skin: Warm, dry, w/o erythema. Lymph: No cervical or supraclavicular LAD. M/S: No cyanosis. No clubbing. Neuro:  no focal neurologic deficit. Moves all extremities  Psych: Awake and alert, Oriented x 3. Judgement and insight appropriate. Mood stable.       Medications:    Scheduled Meds:   vancomycin  1,750 mg IntraVENous Once    magnesium sulfate  2,000 mg IntraVENous Once    calcium gluconate-NaCl  1,000 mg IntraVENous Once    metoprolol tartrate  25 mg Oral BID    meropenem  1,000 mg IntraVENous Q8H    vancomycin (VANCOCIN) intermittent dosing (placeholder)   Other RX Placeholder    furosemide  40 mg IntraVENous BID    famotidine (PEPCID) injection  20 mg IntraVENous BID    chlorhexidine  15 mL Mouth/Throat BID    ipratropium-albuterol  1 ampule Inhalation Q4H WA    sodium chloride flush  5-40 mL IntraVENous 2 times per day    [Held by provider] gabapentin  800 mg Oral TID    [Held by provider] levothyroxine  200 mcg Oral Daily    [Held by provider] spironolactone  50 mg Oral Daily    enoxaparin  40 mg SubCUTAneous BID       Continuous Infusions:   fentaNYL 200 mcg/hr (08/16/22 0549)    propofol 15 mcg/kg/min (08/16/22 0549)    sodium chloride         PRN Meds:  artificial tears, acetaminophen, potassium chloride, midazolam, fentaNYL **AND** fentaNYL, acetaminophen, sodium chloride flush, sodium chloride, ondansetron **OR** ondansetron, diazePAM    Labs reviewed:  CBC:   Recent Labs     08/14/22  0420 08/15/22  0510 08/15/22  0545 08/16/22 0422   WBC 6.6 8.0  --  9.3   HGB 9.8* 10.0* 11.2* 9.7*   HCT 29.7* 29.5*  --  29.1*   MCV 94.8 95.9  --  95.6   * 521*  --  500*     BMP:   Recent Labs     08/14/22  0420 08/14/22  1426 08/15/22  0510 08/15/22  2223 08/16/22 0422   *   < > 148* 143 143   K 3.8   < > 4.0 3.2* 3.6   *   < > 108 105 107   CO2 29   < > 30 30 29   PHOS 2.4*  --  3.6  --  2.5   BUN 35*   < > 39* 43* 43*   CREATININE 1.2   < > 1.4* 1.2 1.2    < > = values in this interval not displayed. LIVER PROFILE: No results for input(s): AST, ALT, LIPASE, BILIDIR, BILITOT, ALKPHOS in the last 72 hours. Invalid input(s): AMYLASE,  ALB  PT/INR: No results for input(s): PROTIME, INR in the last 72 hours. APTT: No results for input(s): APTT in the last 72 hours. UA:  Recent Labs     08/15/22  0916   COLORU Straw   PHUR 5.5   WBCUA 6-9*   RBCUA *   MUCUS Rare*   BACTERIA 4+*   CLARITYU SL CLOUDY*   SPECGRAV 1.010   LEUKOCYTESUR MODERATE*   UROBILINOGEN 0.2   BILIRUBINUR Negative   BLOODU LARGE*   GLUCOSEU Negative     No results for input(s): PH, PCO2, PO2 in the last 72 hours. Films:  Radiology Review:  Pertinent images / reports were reviewed as a part of this visit. Access  CVC   Arterial          PICC        PICC Triple Lumen 30/11/70 Right Basilic (Active)   Central Line Being Utilized Yes 08/16/22 0600   Criteria for Appropriate Use Hemodynamically unstable, requiring monitoring lines, vasopressors, or volume resuscitation 08/16/22 0600   Site Assessment Clean, dry & intact 08/16/22 0600   Phlebitis Assessment No symptoms 08/16/22 0600   Infiltration Assessment 0 08/16/22 0600   Extremity Circumference (cm) 38 cm 08/08/22 0607   External Catheter Length (cm) 0 cm 08/08/22 6048   Proximal Lumen Color/Status Red;Capped; Infusing 08/16/22 0600   Medial Lumen Status White;Capped;Normal saline locked 08/16/22 0600   Distal Lumen Color/Status Gray;Capped; Infusing 08/16/22 0600   Line Care Connections checked and tightened 08/16/22 0600   Alcohol Cap Used Yes 08/16/22 0600   Date of Last Dressing Change 08/10/22 08/16/22 0600   Dressing Type Transparent; Antimicrobial 08/16/22 0600   Dressing Status Clean, dry & intact 08/16/22 0600   Dressing Intervention Dressing changed 08/10/22 0200   Number of days: 8       CVC Fermin  Urinary Catheter Fermin (Active)   Catheter Indications Need for fluid volume management of the critically ill patient in a critical care setting 08/16/22 0340   Site Assessment No urethral drainage 08/16/22 0340   Urine Color Yellow 08/16/22 0340   Urine Appearance Clear 08/16/22 0340   Collection Container Standard 08/16/22 0340   Securement Method Securing device (Describe) 08/16/22 0340   Catheter Care Completed Yes 08/15/22 7424   Catheter Best Practices  Drainage tube clipped to bed;Catheter secured to thigh; Tamper seal intact; Bag below bladder;Bag not on floor; Lack of dependent loop in tubing;Drainage bag less than half full 08/16/22 0340   Status Patent;Draining 08/16/22 0340   Output (mL) 250 mL 08/16/22 0543   Number of days: 0         Thank you for this consult,    Marti Rutherford MD  [facility] Pulmonary, Critical Care, and Sleep Medicine

## 2022-08-16 NOTE — PLAN OF CARE
Problem: ABCDS Injury Assessment  Goal: Absence of physical injury  Outcome: Progressing  Flowsheets (Taken 8/15/2022 0800 by Radha Quinones RN)  Absence of Physical Injury: Implement safety measures based on patient assessment     Problem: Skin/Tissue Integrity  Goal: Absence of new skin breakdown  Description: 1. Monitor for areas of redness and/or skin breakdown  2. Assess vascular access sites hourly  Outcome: Progressing     Problem: Safety - Medical Restraint  Goal: Remains free of injury from restraints (Restraint for Interference with Medical Device)  Description: INTERVENTIONS:  1. Determine that other, less restrictive measures have been tried or would not be effective before applying the restraint  2. Evaluate the patient's condition at the time of restraint application  3. Inform patient/family regarding the reason for restraint  4.  Q2H: Monitor safety, psychosocial status, comfort, nutrition and hydration  Outcome: Progressing  Flowsheets (Taken 8/15/2022 2000)  Remains free of injury from restraints (restraint for interference with medical device): Every 2 hours: Monitor safety, psychosocial status, comfort, nutrition and hydration     Problem: Respiratory - Adult  Goal: Achieves optimal ventilation and oxygenation  Outcome: Progressing  Flowsheets (Taken 8/15/2022 2010)  Achieves optimal ventilation and oxygenation:   Assess for changes in respiratory status   Assess for changes in mentation and behavior   Position to facilitate oxygenation and minimize respiratory effort   Oxygen supplementation based on oxygen saturation or arterial blood gases   Encourage broncho-pulmonary hygiene including cough, deep breathe, incentive spirometry   Assess the need for suctioning and aspirate as needed     Problem: Cardiovascular - Adult  Goal: Maintains optimal cardiac output and hemodynamic stability  Outcome: Progressing  Flowsheets (Taken 8/15/2022 2010)  Maintains optimal cardiac output and hemodynamic stability:   Monitor blood pressure and heart rate   Monitor urine output and notify Licensed Independent Practitioner for values outside of normal range   Assess for signs of decreased cardiac output     Problem: Cardiovascular - Adult  Goal: Absence of cardiac dysrhythmias or at baseline  Outcome: Progressing  Flowsheets (Taken 8/15/2022 2010)  Absence of cardiac dysrhythmias or at baseline:   Monitor cardiac rate and rhythm   Assess for signs of decreased cardiac output   Administer antiarrhythmia medication and electrolyte replacement as ordered     Problem: Skin/Tissue Integrity - Adult  Goal: Skin integrity remains intact  Outcome: Progressing  Flowsheets (Taken 8/15/2022 2010)  Skin Integrity Remains Intact:   Monitor for areas of redness and/or skin breakdown   Assess vascular access sites hourly     Problem: Genitourinary - Adult  Goal: Urinary catheter remains patent  Outcome: Progressing  Flowsheets (Taken 8/15/2022 2010)  Urinary catheter remains patent: Assess patency of urinary catheter

## 2022-08-16 NOTE — PLAN OF CARE
Problem: Discharge Planning  Goal: Discharge to home or other facility with appropriate resources  8/16/2022 1803 by Nilo Gamboa RN  Outcome: Progressing  Flowsheets (Taken 8/16/2022 1803)  Discharge to home or other facility with appropriate resources:   Identify barriers to discharge with patient and caregiver   Identify discharge learning needs (meds, wound care, etc)  8/16/2022 1802 by Nilo Gamboa RN  Outcome: Progressing     Problem: Pain  Goal: Verbalizes/displays adequate comfort level or baseline comfort level  8/16/2022 1803 by Nilo Gamboa RN  Outcome: Progressing  Flowsheets (Taken 8/15/2022 2010 by Greg Guillen RN)  Verbalizes/displays adequate comfort level or baseline comfort level:   Assess pain using appropriate pain scale   Implement non-pharmacological measures as appropriate and evaluate response  8/16/2022 1802 by Nilo Gamboa RN  Outcome: Progressing     Problem: ABCDS Injury Assessment  Goal: Absence of physical injury  8/16/2022 1803 by Nilo Gamboa RN  Outcome: Progressing  Flowsheets (Taken 8/15/2022 0800 by Khushboo Pandya RN)  Absence of Physical Injury: Implement safety measures based on patient assessment  8/16/2022 1802 by Nilo Gamboa RN  Outcome: Progressing  8/16/2022 0627 by Greg Guillen RN  Outcome: Progressing  Flowsheets (Taken 8/15/2022 0800 by Khushboo Pandya RN)  Absence of Physical Injury: Implement safety measures based on patient assessment     Problem: Safety - Adult  Goal: Free from fall injury  8/16/2022 1803 by Nilo Gamboa RN  Outcome: Progressing  Flowsheets (Taken 8/16/2022 1803)  Free From Fall Injury: Instruct family/caregiver on patient safety  8/16/2022 1802 by Nilo Gamboa RN  Outcome: Progressing     Problem: Skin/Tissue Integrity  Goal: Absence of new skin breakdown  Description: 1. Monitor for areas of redness and/or skin breakdown  2.   Assess vascular access sites hourly  8/16/2022 1803 by supplements  8/16/2022 1802 by Home Velásquez RN  Outcome: Progressing     Problem: Respiratory - Adult  Goal: Achieves optimal ventilation and oxygenation  8/16/2022 1803 by Home Velásquez RN  Outcome: Progressing  Flowsheets (Taken 8/16/2022 1803)  Achieves optimal ventilation and oxygenation:   Assess for changes in respiratory status   Position to facilitate oxygenation and minimize respiratory effort   Oxygen supplementation based on oxygen saturation or arterial blood gases   Assess the need for suctioning and aspirate as needed   Respiratory therapy support as indicated  8/16/2022 1802 by Home Velásquez RN  Outcome: Progressing  8/16/2022 0627 by Grazyna Stevenson RN  Outcome: Progressing  Flowsheets (Taken 8/15/2022 2010)  Achieves optimal ventilation and oxygenation:   Assess for changes in respiratory status   Assess for changes in mentation and behavior   Position to facilitate oxygenation and minimize respiratory effort   Oxygen supplementation based on oxygen saturation or arterial blood gases   Encourage broncho-pulmonary hygiene including cough, deep breathe, incentive spirometry   Assess the need for suctioning and aspirate as needed     Problem: Cardiovascular - Adult  Goal: Maintains optimal cardiac output and hemodynamic stability  8/16/2022 1803 by Home Velásquez RN  Outcome: Progressing  Flowsheets (Taken 8/16/2022 1803)  Maintains optimal cardiac output and hemodynamic stability:   Monitor blood pressure and heart rate   Monitor urine output and notify Licensed Independent Practitioner for values outside of normal range   Administer fluid and/or volume expanders as ordered  8/16/2022 1802 by Home Velásquez RN  Outcome: Progressing  8/16/2022 0627 by Grazyna Stevenson RN  Outcome: Progressing  Flowsheets (Taken 8/15/2022 2010)  Maintains optimal cardiac output and hemodynamic stability:   Monitor blood pressure and heart rate   Monitor urine output and notify Licensed Independent Practitioner for values outside of normal range   Assess for signs of decreased cardiac output  Goal: Absence of cardiac dysrhythmias or at baseline  8/16/2022 1803 by Wilber Burroughs RN  Outcome: Progressing  Flowsheets (Taken 8/16/2022 1803)  Absence of cardiac dysrhythmias or at baseline:   Monitor cardiac rate and rhythm   Assess for signs of decreased cardiac output   Administer antiarrhythmia medication and electrolyte replacement as ordered  8/16/2022 1802 by Wilber Burroughs RN  Outcome: Progressing  8/16/2022 0627 by Crispin Chambers RN  Outcome: Progressing  Flowsheets (Taken 8/15/2022 2010)  Absence of cardiac dysrhythmias or at baseline:   Monitor cardiac rate and rhythm   Assess for signs of decreased cardiac output   Administer antiarrhythmia medication and electrolyte replacement as ordered     Problem: Skin/Tissue Integrity - Adult  Goal: Skin integrity remains intact  8/16/2022 0627 by Crispin Chambers RN  Outcome: Progressing  Flowsheets (Taken 8/15/2022 2010)  Skin Integrity Remains Intact:   Monitor for areas of redness and/or skin breakdown   Assess vascular access sites hourly  Goal: Oral mucous membranes remain intact  Outcome: Progressing  Flowsheets (Taken 8/16/2022 1803)  Oral Mucous Membranes Remain Intact:   Assess oral mucosa and hygiene practices   Implement preventative oral hygiene regimen     Problem: Gastrointestinal - Adult  Goal: Minimal or absence of nausea and vomiting  8/16/2022 1803 by Wilber Burroughs RN  Outcome: Progressing  Flowsheets (Taken 8/16/2022 1803)  Minimal or absence of nausea and vomiting:   Administer IV fluids as ordered to ensure adequate hydration   Nutrition consult to assist patient with adequate nutrition and appropriate food choices   Maintain NPO status until nausea and vomiting are resolved   Advance diet as tolerated, if ordered  8/16/2022 1802 by Wilber Burroughs RN  Outcome: Progressing  Goal: Maintains or returns to baseline bowel function  8/16/2022 1803 by Sandra Giang RN  Outcome: Progressing  Flowsheets (Taken 8/15/2022 2010 by Lupe Stephen RN)  Maintains or returns to baseline bowel function:   Assess bowel function   Administer IV fluids as ordered to ensure adequate hydration   Administer ordered medications as needed  8/16/2022 1802 by Sandra Giang RN  Outcome: Progressing  Goal: Maintains adequate nutritional intake  8/16/2022 1803 by Sandra Giang RN  Outcome: Progressing  8/16/2022 1802 by Sandra Giang RN  Outcome: Progressing     Problem: Genitourinary - Adult  Goal: Urinary catheter remains patent  8/16/2022 1803 by Sandra Gaing RN  Outcome: Progressing  Flowsheets (Taken 8/15/2022 2010 by Lupe Stephen RN)  Urinary catheter remains patent: Assess patency of urinary catheter  8/16/2022 1802 by Sandra Gaing RN  Outcome: Progressing  8/16/2022 0627 by Lupe Stephen RN  Outcome: Progressing  Flowsheets (Taken 8/15/2022 2010)  Urinary catheter remains patent: Assess patency of urinary catheter

## 2022-08-16 NOTE — PROGRESS NOTES
Patient sedated on ventilator. FiO2 60% with SpO2 saturations stable. Suctioning small amounts of thick yellow sputum via ET tube. Awakens to speech and follows commands. RASS -1. Diuresing well on scheduled lasix TID. Tolerating tube feeds with highest residual of 25 ml. Max temp 100.6. Tylenol admin x 2 with good effect. Wife at the bedside, updated on plan of care and questions addressed.

## 2022-08-16 NOTE — PROGRESS NOTES
Clinical Pharmacy Note  Vancomycin Consult    Arden Fisher is a 63 y.o. male ordered Vancomycin for sepsis; consult received from Pipe Gregg NP to manage therapy. Also receiving meropenem. Allergies:  Codeine     Temp max:  Temp (24hrs), Av °F (37.8 °C), Min:98.6 °F (37 °C), Max:103.2 °F (39.6 °C)      Recent Labs     22  0420 08/15/22  0510 22  0422   WBC 6.6 8.0 9.3         Recent Labs     08/15/22  0510 08/15/22  2223 22  0422   BUN 39* 43* 43*   CREATININE 1.4* 1.2 1.2           Intake/Output Summary (Last 24 hours) at 2022 0739  Last data filed at 2022 0549  Gross per 24 hour   Intake 4779.52 ml   Output 2415 ml   Net 2364.52 ml         Culture Results:  pending    Ht Readings from Last 1 Encounters:   08/15/22 5' 7\" (1.702 m)        Wt Readings from Last 1 Encounters:   22 (!) 366 lb 9.6 oz (166.3 kg)         Estimated Creatinine Clearance: 98 mL/min (based on SCr of 1.2 mg/dL). Assessment/Plan:  Day # 2 of vancomycin. Random vanco level = 7.6 mg/L this am    Vancomycin 1750 mg x 1 today   Dosing intermittently by levels  Random level tomorrow am    Thank you for the consult. Amee Vázquez PharmD.   2022  7:39 AM

## 2022-08-16 NOTE — PROGRESS NOTES
Cardiac Electrophysiology Progress Note     Admit Date: 2022     Reason for follow up: PSVT    HPI and Interval History:   Son Galeas is a 63y.o. year old male with past medical history significant for PAF diagnosed in 2022 s/p successful DCCV 22, EARNESTINE on CPAP, HTN and hypothyroidism who initially presented to the hospital on  with SOB, abdominal pain and chest pain. He was emergently intubated shortly after admission. Had a SBO, had OG tube placed via EGD. Noted to go into SVT on  around 1130 and was given 5mg of IV metoprolol and did convert back to sinus rhythm. Again had recurrence on 8/15 around 0430, was again given 5mg of IV metoprolol and placed on IV amiodarone, did convert to SR around 0700. Doses of PO metoprolol have been held. Some issues with hypotension. Less sedated today, family at bedside and updated on cardiac status. No further SVT over the last 24 hours. PM dose of metoprolol held last night due to SBP in the 90s. Physical Examination:  Vitals:    22 0900   BP: 139/86   Pulse: 77   Resp: 18   Temp:    SpO2: 95%        Intake/Output Summary (Last 24 hours) at 2022 0942  Last data filed at 2022 0900  Gross per 24 hour   Intake 4037.98 ml   Output 3000 ml   Net 1037.98 ml     In: 4779.5 [I.V.:1266.5; NG/GT:2121]  Out: 3215    Wt Readings from Last 3 Encounters:   22 (!) 366 lb 9.6 oz (166.3 kg)     Temp  Av.5 °F (37.5 °C)  Min: 98.6 °F (37 °C)  Max: 101.1 °F (38.4 °C)  Pulse  Av.3  Min: 59  Max: 84  BP  Min: 87/57  Max: 174/100  SpO2  Av.6 %  Min: 89 %  Max: 98 %  FiO2   Av %  Min: 60 %  Max: 60 %    Telemetry: Sinus rhythm in the 70s. Constitutional: Intubated and sedated. Ill-appearing. Head: Normocephalic and atraumatic. Eyes: Conjunctivae normal. EOM are normal.   Neck: Neck supple. No lymphadenopathy. No rigidity. No JVD present. Cardiovascular: Normal rate, regular rhythm. No murmurs, rubs or gallops.  No S3 or S4.  Pulmonary/Chest: Clear breath sounds bilaterally. No crackles, wheezes or rhonchi. No respiratory accessory muscle use. Abdominal: Soft. Normal bowel sounds present. No distension, No tenderness. Musculoskeletal: No tenderness. 1+ BLE edema    Lymphadenopathy: Has no cervical adenopathy. Neurological: Intubated and sedated. Skin: Skin is warm and dry. No rash, lesions, ulcerations noted. Psychiatric: No anxiety or agitation. Labs, diagnostic and imaging results reviewed. Reviewed. Recent Labs     08/14/22  0420 08/14/22  1426 08/15/22  0510 08/15/22  2223 08/16/22  0422   *   < > 148* 143 143   K 3.8   < > 4.0 3.2* 3.6   *   < > 108 105 107   CO2 29   < > 30 30 29   PHOS 2.4*  --  3.6  --  2.5   BUN 35*   < > 39* 43* 43*   CREATININE 1.2   < > 1.4* 1.2 1.2    < > = values in this interval not displayed. Recent Labs     08/14/22  0420 08/15/22  0510 08/15/22  0545 08/16/22 0422   WBC 6.6 8.0  --  9.3   HGB 9.8* 10.0* 11.2* 9.7*   HCT 29.7* 29.5*  --  29.1*   MCV 94.8 95.9  --  95.6   * 521*  --  500*     Lab Results   Component Value Date/Time    TROPONINI 0.22 08/07/2022 08:30 AM     Estimated Creatinine Clearance: 98 mL/min (based on SCr of 1.2 mg/dL).    No results found for: BNP  Lab Results   Component Value Date/Time    PROTIME 16.4 08/06/2022 04:45 AM    INR 1.33 08/06/2022 04:45 AM     Lab Results   Component Value Date/Time    TRIG 175 08/14/2022 04:20 AM       Scheduled Meds:   vancomycin  1,750 mg IntraVENous Once    magnesium sulfate  2,000 mg IntraVENous Once    calcium gluconate-NaCl  1,000 mg IntraVENous Once    metoprolol tartrate  25 mg Oral BID    meropenem  1,000 mg IntraVENous Q8H    vancomycin (VANCOCIN) intermittent dosing (placeholder)   Other RX Placeholder    furosemide  40 mg IntraVENous BID    famotidine (PEPCID) injection  20 mg IntraVENous BID    chlorhexidine  15 mL Mouth/Throat BID    ipratropium-albuterol  1 ampule Inhalation Q4H WA sodium chloride flush  5-40 mL IntraVENous 2 times per day    [Held by provider] gabapentin  800 mg Oral TID    [Held by provider] levothyroxine  200 mcg Oral Daily    [Held by provider] spironolactone  50 mg Oral Daily    enoxaparin  40 mg SubCUTAneous BID     Continuous Infusions:   fentaNYL 200 mcg/hr (22 0549)    propofol 15 mcg/kg/min (22 0549)    sodium chloride       PRN Meds:artificial tears, acetaminophen, potassium chloride, midazolam, fentaNYL **AND** fentaNYL, acetaminophen, sodium chloride flush, sodium chloride, ondansetron **OR** ondansetron, diazePAM     EC/15/22  SVT at 162 BPM. Lateral ST depression, inferior T wave inversions. Echo: 22   Technically difficult examination. Ejection fraction is visually estimated to be 60-65%. Grade II diastolic dysfunction   Right ventricle is poorly visualized, normal systolic function, appears   dilated    Cardiac PET: 22  Abnormal study with evidence of mixed ischemia and scar. There is no left ventricular enlargement with normal global left ventricular systolic function. Overall study quality is good. Scan significance indicates moderate cardiacrisk. Test sensitivity is reduced on anti-anginal drugs.       Cath: 22  No significant CAD   Normal LV function   Normal LVEDP   Normal systemic pressures    Assessment and Plan:     PSVT              - first seen on EKG on               - cannot rule out of atrial flutter but mostly like AVNRT              - converted with IV metoprolol on , recurrence on 8/15, was given IV metoprolol and now on IV amiodarone drip              - continue PO metoprolol for suppression              - if has recurrence, would try adenosine to terminate and help differentiate between AVNRT versus atrial arrhythmias    - can also consider AAD if continues to have SVT     Paroxysmal atrial fibrillation              - first diagnosed  s/p DCCV at [facility] [place] [facility] on 22 per records in [facility] - CHADS2-VASc 1 (HTN) on aspirin typically     Hypervolemia              - secondary to IV fluid resuscitation              - fluid +14L, agree with diuresis, being managed by nephrology     Aspiration pneumonia              - care per critical care     Acute hypoxic respiratory failure              - secondary to hypervolemia, pneumonia              - remains on vent              - care per critical care    EP issues are stable, will sign off. Follows with Dr. Mohan Oliver with cardiology at [facility]. Please call if concerns.     JARON Gutiérrez  [facility] [address], [address] [place] [address]  Phone: [phone]  Fax: [phone]    Electronically signed by JARON Linton - CNP on 8/16/2022 at 9:42 AM

## 2022-08-16 NOTE — PROGRESS NOTES
[facility]                                               Progress note    Summary:   Carla Cunningham is being seen by nephrology for CHARLENE and hypernatremia. Admitted with paraesophageal hernia and SBO. Had EGD 8/9 with biopsy. Still NPO     Interval History  Patient was seen and examined at bedside  He remains intubated and sedated  Awakens. Opens eyes    Blood pressure 139/81, no pressors  Heart rate 79  88% on 60% FiO2 which has been weaned from 100% a few days ago  PEEP of 14  Urine output 2.4 L yesterday was +2.3 L for the day  Is positive nearly 14 L for admission    Labs reviewed  Sodium 143 potassium 3.6 BUN 43 creatinine 1.2  Magnesium 1.8  Calcium 7.5 phosphorus 2.5  Hemoglobin 9.7      Plan:   -We will increase his Lasix to 40 mg 3 times daily IV. -Goal is to achieve negative fluid balance, he is positive nearly 14 L for admission  -Hypernatremia is better, continue current free water          Tatum Bowens MD  [facility]  Office: [phone]    Assessment:   Acute kidney injury  Urine Na < 20    creatinine on admission 1   next day on 08/06/2022 was 0.8  suddenly jumped up to 2.7. Due to episode of severe hypotension with blood pressure drop 78/44. also got CT with IV contrast on 08/05/2022 but most likely  this is ATN related due to hypotension and hypercapnic  respiratory failure. CT angio s normal renal arteries and normal kidneys      Sepsis/ possible septic shock. emperature was 101.1   High white cell count. '  immunocompromised due to rheumatoid arthritis and treatment. History of sleep apnea   on CPAP. Acute respiratory failure with high pCO2 in the range of 50s with pH  of 7.24    now intubated. History of abnormal stress test in 06/2022 with stress-induced  ischemia and some scarring. Ejection fraction  was  64%.     had a followup angiogram which was reported as normal as per wife.   I do not have that result     History of rheumatoid arthritis longstanding,was on methotrexate,  has taken Celebrex and now on Inflectra infusions,  so he is immunocompromised. Amrito Angelucci is now helping him with his rheumatoid tremendously. he has been on steroids in the past.     Rule out adrenal insufficiency. cortisol level. normal      History of thyroidectomy in the past. .    Small bowel obstruction. Large hiatus hernia with colon and stomach in the chest.  Further management as per medical team.       ROS:   Unable to assess      PMH:   Past medical history, surgical history, social history, family history are reviewed and updated as appropriate. Reviewed current medication list.   Allergies reviewed and updated as needed. PE:   Vitals:    08/16/22 1400   BP: 139/81   Pulse: 79   Resp: 18   Temp:    SpO2: (!) 88%       General appearance:  in NAD, intubated and sedated  HEENT: EOM intact, no icterus. Trachea is midline. Neck : No masses, appears symmetrical  Respiratory: Respiratory effort appears normal, bilateral equal chest rise, no wheeze, no crackles ETT to vent   Cardiovascular: Ausculation shows RRR trace edema  Abdomen: mildly distended but soft   Musculoskeletal:  Joints with no swelling or deformity. Skin:no rashes, ulcers, induration, no jaundice.    Neuro: sedated      Lab Results   Component Value Date    CREATININE 1.2 08/16/2022    BUN 43 (H) 08/16/2022     08/16/2022    K 3.6 08/16/2022     08/16/2022    CO2 29 08/16/2022      Lab Results   Component Value Date    WBC 9.3 08/16/2022    HGB 9.7 (L) 08/16/2022    HCT 29.1 (L) 08/16/2022    MCV 95.6 08/16/2022     (H) 08/16/2022     Lab Results   Component Value Date    CALCIUM 7.5 (L) 08/16/2022    CAION 1.06 (L) 08/15/2022    PHOS 2.5 08/16/2022

## 2022-08-16 NOTE — PROGRESS NOTES
Hospitalist Progress Note  8/16/2022 9:49 AM    PCP: Mikael Martin MD    [phone]     Date of Admission: 8/5/2022                                                                                                                     HOSPITAL COURSE    Patient demographics:  The patient  Ghislaine Littlejohn is a 63 y.o. male     Significant past medical history:   Patient Active Problem List   Diagnosis    Partial bowel obstruction (HCC)    Hiatal hernia    Acute respiratory failure with hypoxia and hypercapnia (HCC)    SBO (small bowel obstruction) (HCC)    Aspiration into airway    Acute respiratory failure with hypoxia (HCC)    Small bowel obstruction (HCC)    Diastolic heart failure (HCC)    History of atrial fibrillation    Supraventricular tachycardia ([address]         Presenting symptoms:  abdominal pain    Diagnostic workup:      CONSULTS DURING ADMISSION :   IP CONSULT TO GENERAL SURGERY  IP CONSULT TO HOSPITALIST  IP CONSULT TO GENERAL SURGERY  IP CONSULT TO DIETITIAN  IP CONSULT TO CARDIOLOGY      Patient was diagnosed with:  Low grade or Partial Bowel obstruction  Pneumonia and septic shock. Hiatal hernia  Atrial Fibrillation      Treatment while inpatient:  63years old male with medical history significant for morbid obesity, paraesophageal hernia. Patient presented to the emergency room with abdominal pain nausea vomiting and diarrhea. Patient was diagnosed with small bowel obstruction. Patient was also diagnosed with pneumonia and septic shock. Patient developed acute respiratory failure and acute renal failure possibly General patient was started on the ventilator and supported with pressors.                                                                                            ----------------------------------------------------------      SUBJECTIVE COMPLAINTS- follow up for abdominal pain    Diet: Diet NPO  ADULT TUBE FEEDING; Orogastric; Peptide Based High Protein; Continuous; 20; Yes; 10; Q 4 hours; 35; 350; Q 4 hours; Protein;  Four proteinex daily      OBJECTIVE:   Patient Active Problem List   Diagnosis    Partial bowel obstruction (HCC)    Hiatal hernia    Acute respiratory failure with hypoxia and hypercapnia (HCC)    SBO (small bowel obstruction) (HCC)    Aspiration into airway    Acute respiratory failure with hypoxia (HCC)    Small bowel obstruction (HCC)    Diastolic heart failure (HCC)    History of atrial fibrillation    Supraventricular tachycardia (HCC)       Allergies  Codeine    Medications    Scheduled Meds:   vancomycin  1,750 mg IntraVENous Once    magnesium sulfate  2,000 mg IntraVENous Once    calcium gluconate-NaCl  1,000 mg IntraVENous Once    metoprolol tartrate  25 mg Oral BID    meropenem  1,000 mg IntraVENous Q8H    furosemide  40 mg IntraVENous BID    famotidine (PEPCID) injection  20 mg IntraVENous BID    chlorhexidine  15 mL Mouth/Throat BID    ipratropium-albuterol  1 ampule Inhalation Q4H WA    sodium chloride flush  5-40 mL IntraVENous 2 times per day    [Held by provider] gabapentin  800 mg Oral TID    [Held by provider] levothyroxine  200 mcg Oral Daily    [Held by provider] spironolactone  50 mg Oral Daily    enoxaparin  40 mg SubCUTAneous BID     Continuous Infusions:   fentaNYL 200 mcg/hr (08/16/22 0549)    propofol 15 mcg/kg/min (08/16/22 0549)    sodium chloride       PRN Meds:  artificial tears, acetaminophen, potassium chloride, midazolam, fentaNYL **AND** fentaNYL, acetaminophen, sodium chloride flush, sodium chloride, ondansetron **OR** ondansetron, diazePAM    Vitals   Vitals /wt Patient Vitals for the past 8 hrs:   BP Temp Temp src Pulse Resp SpO2 Weight   08/16/22 0900 139/86 -- -- 77 18 95 % --   08/16/22 0800 (!) 140/87 100.1 °F (37.8 °C) Rectal 77 18 94 % --   08/16/22 0700 (!) 166/99 -- -- 81 21 93 % --   08/16/22 0640 (!) 174/100 -- -- 82 -- -- --   08/16/22 0630 (!) 173/101 -- -- 82 19 94 % --   08/16/22 0600 (!) 171/100 -- -- 84 20 95 % (!) 366 lb 9.6 oz (166.3 kg)   08/16/22 0530 (!) 149/96 -- -- 77 18 97 % --   08/16/22 0500 131/81 -- -- 72 18 94 % --   08/16/22 0430 (!) 143/91 -- -- 71 18 96 % --   08/16/22 0400 106/73 -- -- 66 18 94 % --   08/16/22 0340 98/66 99.1 °F (37.3 °C) Rectal 61 18 93 % --   08/16/22 0330 104/66 -- -- 61 18 93 % --   08/16/22 0300 105/67 -- -- 64 18 92 % --   08/16/22 0230 111/66 -- -- 67 18 93 % --   08/16/22 0200 122/67 -- -- 71 18 95 % --        72HR INTAKE/OUTPUT:    Intake/Output Summary (Last 24 hours) at 8/16/2022 0949  Last data filed at 8/16/2022 0900  Gross per 24 hour   Intake 4037.98 ml   Output 3000 ml   Net 1037.98 ml       Exam:    Gen:  intubated and sedated  Eyes: PERRL. No sclera icterus. No conjunctival injection. ENT: No discharge. Pharynx clear. External appearance of ears and nose normal.  Neck: Trachea midline. No obvious mass. Resp: No accessory muscle use. No crackles. No wheezes. No rhonchi. CV: Regular rate. Regular rhythm. No murmur or rub. No edema. GI: Non-tender. Non-distended. No hernia. Skin: Warm, dry, normal texture and turgor. Lymph: No cervical LAD. No supraclavicular LAD. M/S: / Ext. No cyanosis. No clubbing. No joint deformity. Neuro: CN 2-12 are intact,  no neurologic deficits noted. PT/INR:   No results for input(s): PROTIME, INR in the last 72 hours. APTT: No results for input(s): APTT in the last 72 hours.     CBC:   Recent Labs     08/14/22  0420 08/15/22  0510 08/15/22  0545 08/16/22 0422   WBC 6.6 8.0  --  9.3   HGB 9.8* 10.0* 11.2* 9.7*   HCT 29.7* 29.5*  --  29.1*   MCV 94.8 95.9  --  95.6   * 521*  --  500*       BMP:   Recent Labs     08/14/22  0420 08/14/22  1426 08/14/22  2255 08/15/22  0510 08/15/22  2223 08/16/22  0422   *   < > 148* 148* 143 143   K 3.8   < > 4.9 4.0 3.2* 3.6   *   < > 108 108 105 107   CO2 29   < > 31 30 30 29   PHOS 2.4*  --   --  3.6  --  2.5   BUN 35*   < > 32* 39* 43* 43*   CREATININE 1.2   < > 1.2 1.4* 1.2 1.2 < > = values in this interval not displayed. LIVER PROFILE:   No results for input(s): ALKPHOS, AST, ALT, ALB, BILIDIR, BILITOT, ALKPHOS in the last 72 hours. No results for input(s): AMYLASE in the last 72 hours. No results for input(s): LIPASE in the last 72 hours. UA:  Recent Labs     08/15/22  0916   WBCUA 6-9*   RBCUA *   MUCUS Rare*         TROPONIN:   No results for input(s): Magdalene Prater in the last 72 hours. Lab Results   Component Value Date/Time    URRFLXCULT Not Indicated 08/15/2022 09:16 AM       No results for input(s): TSHREFLEX in the last 72 hours. No components found for: [id number]  POC GLUCOSE:    Recent Labs     08/15/22  1159 08/15/22  1547 08/16/22  0012 08/16/22  0421 08/16/22  0804   POCGLU 98 114* 77 88 95     No results for input(s): LABA1C in the last 72 hours.  No results found for: LABA1C    Echocardiogram shows normal ejection fraction  Grade 1 diastolic dysfunction      ASSESSMENT AND PLAN    SVT  better  H/o atrial fibrillation  Amiodarone discontinued  Cardiology is following    Acute respiratory failure  Intubated 8/8  Patient remains on the ventilator  Oxygen FiO2 60%  Aspiration pna  Status post Zosyn  Now on vancomycin and meropenem    Diastolic CHF  Patient has grade 1 diastolic dysfunction  Fluid overload status  Lasix 40 mg twice daily  Continue to monitor creatinine        Partial small Bowel obstruction  CT scan of the abdomen with contrast shows no obstruction  Og placed per GI 8/10  BM 8/9  Tolerating tube feed  General surgery is following      CHARLENE  Cr 1.4  Nephrology is following      Hypotention  Wean off pressors    Hiatal hernia  - CT chest/abd/pelv: Large hiatal hernia containing much of the stomach which is distended       Atrial Fibrillation  Patient developed the rapid ventricular rate  IV Lopressor  Consult to cardiology     HTN  - monitor blood pressure  - restart home meds when able        Code Status: Full Code        Dispo - cc        The patient and / or the family were informed of the results of any tests, a time was given to answer questions, a plan was proposed and they agreed with plan. Nory Burkett MD    This note was transcribed using [employer]. Please disregard any translational errors.

## 2022-08-16 NOTE — PROGRESS NOTES
Progress Note  Date:2022       Room:[place] [address]  Patient Name:Miles Vanegas     YOB: 1960     Age:62 y.o. Subjective    Subjective:  Symptoms:  Stable. Diet:  No nausea or vomiting. Activity level: Impaired due to weakness. Review of Systems   Gastrointestinal:  Negative for nausea and vomiting. Objective         Vitals Last 24 Hours:  TEMPERATURE:  Temp  Av.5 °F (37.5 °C)  Min: 98.6 °F (37 °C)  Max: 101.1 °F (38.4 °C)  RESPIRATIONS RANGE: Resp  Av.4  Min: 15  Max: 33  PULSE OXIMETRY RANGE: SpO2  Av.6 %  Min: 89 %  Max: 98 %  PULSE RANGE: Pulse  Av.5  Min: 59  Max: 86  BLOOD PRESSURE RANGE: Systolic (57LWM), YHV:235 , Min:87 , GSH:746   ; Diastolic (65DQB), LIZZETTE:00, Min:57, Max:101    I/O (24Hr): Intake/Output Summary (Last 24 hours) at 2022 0835  Last data filed at 2022 0549  Gross per 24 hour   Intake 4037.98 ml   Output 2350 ml   Net 1687.98 ml     Objective  Labs/Imaging/Diagnostics    Labs:  CBC:  Recent Labs     22  0420 08/15/22  0510 08/15/22  0545 22  0422   WBC 6.6 8.0  --  9.3   RBC 3.13* 3.07*  --  3.04*   HGB 9.8* 10.0* 11.2* 9.7*   HCT 29.7* 29.5*  --  29.1*   MCV 94.8 95.9  --  95.6   RDW 16.9* 16.9*  --  17.4*   * 521*  --  500*     CHEMISTRIES:  Recent Labs     22  0420 22  1426 08/15/22  0510 08/15/22  2223 22  0422   *   < > 148* 143 143   K 3.8   < > 4.0 3.2* 3.6   *   < > 108 105 107   CO2 29   < > 30 30 29   BUN 35*   < > 39* 43* 43*   CREATININE 1.2   < > 1.4* 1.2 1.2   GLUCOSE 99   < > 116* 97 88   PHOS 2.4*  --  3.6  --  2.5   MG 2.00  --  1.80  --  1.80    < > = values in this interval not displayed. PT/INR:No results for input(s): PROTIME, INR in the last 72 hours. APTT:No results for input(s): APTT in the last 72 hours. LIVER PROFILE:No results for input(s): AST, ALT, BILIDIR, BILITOT, ALKPHOS in the last 72 hours.     Imaging Last 24 Hours:  XR CHEST PORTABLE    Result Date: 8/15/2022  EXAMINATION: ONE XRAY VIEW OF THE CHEST 8/15/2022 11:13 am COMPARISON: Chest x-ray dated 14 August 2022 HISTORY: ORDERING SYSTEM PROVIDED HISTORY: OG placement TECHNOLOGIST PROVIDED HISTORY: Reason for exam:->OG placement Reason for Exam: og place FINDINGS: Orogastric tube with tip and side-port in the stomach. Endotracheal tube with the tip above the mady. Mild cardiomegaly. Stable appearance of perihilar opacities. Small left pleural effusion     1. Enteric tube tip and side-port in the stomach. 2.  Stable appearance of perihilar opacities. Small left pleural effusion     XR CHEST PORTABLE    Result Date: 8/14/2022  EXAMINATION: ONE XRAY VIEW OF THE CHEST 8/14/2022 11:05 am COMPARISON: 08/13/2022 radiograph HISTORY: ORDERING SYSTEM PROVIDED HISTORY: hypoxia TECHNOLOGIST PROVIDED HISTORY: Reason for exam:->hypoxia FINDINGS: Endotracheal tube is positioned above the mady, stable. Enteric tube extends below field of view into the abdomen. The heart is enlarged. There are moderate perihilar and central ground-glass opacities bilaterally. Relatively stable appearance from prior imaging. Small left pleural effusion noted. No significant skeletal finding. Relatively stable perihilar and central airspace opacities in the lungs.      Assessment//Plan           Hospital Problems             Last Modified POA    * (Principal) Partial bowel obstruction ([address] 8/5/2022 Yes    Hiatal hernia 8/7/2022 Yes    Acute respiratory failure with hypoxia and hypercapnia ([address] 8/8/2022 Yes    SBO (small bowel obstruction) ([address] 8/9/2022 Yes    Aspiration into airway 8/7/2022 Yes    Acute respiratory failure with hypoxia ([address] 8/8/2022 Yes    Small bowel obstruction ([address] 1/8/8543 Yes    Diastolic heart failure ([address] 8/14/2022 Yes    History of atrial fibrillation 8/14/2022 Yes    Supraventricular tachycardia ([address] 8/14/2022 Yes     Assessment & Plan    Remains on ventilator   Febrile yesterday    Tolerating tube feeds via OG   No sign of obstruction   Continue for now   When extubation is approaching will hold tube feeds   Remove OG when extubated    Electronically signed by Iram Morrison MD on 8/16/2022 at 8:37 AM    Electronically signed by Iram Morrison MD on 8/16/22 at 8:35 AM EDT

## 2022-08-17 LAB
ANION GAP SERPL CALCULATED.3IONS-SCNC: 8 MMOL/L (ref 3–16)
BASOPHILS ABSOLUTE: 0.1 K/UL (ref 0–0.2)
BASOPHILS RELATIVE PERCENT: 0.7 %
BUN BLDV-MCNC: 38 MG/DL (ref 7–20)
BUN BLDV-MCNC: 40 MG/DL (ref 7–20)
BUN BLDV-MCNC: 43 MG/DL (ref 7–20)
CALCIUM SERPL-MCNC: 7.7 MG/DL (ref 8.3–10.6)
CALCIUM SERPL-MCNC: 7.9 MG/DL (ref 8.3–10.6)
CALCIUM SERPL-MCNC: 8.2 MG/DL (ref 8.3–10.6)
CHLORIDE BLD-SCNC: 103 MMOL/L (ref 99–110)
CHLORIDE BLD-SCNC: 105 MMOL/L (ref 99–110)
CHLORIDE BLD-SCNC: 105 MMOL/L (ref 99–110)
CO2: 28 MMOL/L (ref 21–32)
CO2: 28 MMOL/L (ref 21–32)
CO2: 29 MMOL/L (ref 21–32)
CREAT SERPL-MCNC: 1 MG/DL (ref 0.8–1.3)
CREAT SERPL-MCNC: 1.1 MG/DL (ref 0.8–1.3)
CREAT SERPL-MCNC: 1.2 MG/DL (ref 0.8–1.3)
CULTURE, RESPIRATORY: NORMAL
EOSINOPHILS ABSOLUTE: 0.1 K/UL (ref 0–0.6)
EOSINOPHILS RELATIVE PERCENT: 1.7 %
GFR AFRICAN AMERICAN: >60
GFR NON-AFRICAN AMERICAN: >60
GLUCOSE BLD-MCNC: 88 MG/DL (ref 70–99)
GLUCOSE BLD-MCNC: 89 MG/DL (ref 70–99)
GLUCOSE BLD-MCNC: 90 MG/DL (ref 70–99)
GLUCOSE BLD-MCNC: 91 MG/DL (ref 70–99)
GLUCOSE BLD-MCNC: 93 MG/DL (ref 70–99)
GLUCOSE BLD-MCNC: 95 MG/DL (ref 70–99)
GLUCOSE BLD-MCNC: 96 MG/DL (ref 70–99)
GLUCOSE BLD-MCNC: 97 MG/DL (ref 70–99)
GRAM STAIN RESULT: NORMAL
HCT VFR BLD CALC: 28.2 % (ref 40.5–52.5)
HEMOGLOBIN: 9.4 G/DL (ref 13.5–17.5)
LYMPHOCYTES ABSOLUTE: 0.9 K/UL (ref 1–5.1)
LYMPHOCYTES RELATIVE PERCENT: 12.8 %
MAGNESIUM: 2 MG/DL (ref 1.8–2.4)
MCH RBC QN AUTO: 31.5 PG (ref 26–34)
MCHC RBC AUTO-ENTMCNC: 33.4 G/DL (ref 31–36)
MCV RBC AUTO: 94.2 FL (ref 80–100)
MONOCYTES ABSOLUTE: 0.5 K/UL (ref 0–1.3)
MONOCYTES RELATIVE PERCENT: 6.3 %
NEUTROPHILS ABSOLUTE: 5.8 K/UL (ref 1.7–7.7)
NEUTROPHILS RELATIVE PERCENT: 78.5 %
PDW BLD-RTO: 17.4 % (ref 12.4–15.4)
PERFORMED ON: NORMAL
PHOSPHORUS: 2.3 MG/DL (ref 2.5–4.9)
PLATELET # BLD: 539 K/UL (ref 135–450)
PMV BLD AUTO: 7.6 FL (ref 5–10.5)
POTASSIUM SERPL-SCNC: 3.4 MMOL/L (ref 3.5–5.1)
POTASSIUM SERPL-SCNC: 3.5 MMOL/L (ref 3.5–5.1)
POTASSIUM SERPL-SCNC: 3.5 MMOL/L (ref 3.5–5.1)
PRO-BNP: 369 PG/ML (ref 0–124)
RBC # BLD: 2.99 M/UL (ref 4.2–5.9)
SODIUM BLD-SCNC: 139 MMOL/L (ref 136–145)
SODIUM BLD-SCNC: 141 MMOL/L (ref 136–145)
SODIUM BLD-SCNC: 142 MMOL/L (ref 136–145)
WBC # BLD: 7.4 K/UL (ref 4–11)

## 2022-08-17 PROCEDURE — 83735 ASSAY OF MAGNESIUM: CPT

## 2022-08-17 PROCEDURE — 2500000003 HC RX 250 WO HCPCS: Performed by: NURSE PRACTITIONER

## 2022-08-17 PROCEDURE — 94003 VENT MGMT INPAT SUBQ DAY: CPT

## 2022-08-17 PROCEDURE — 6370000000 HC RX 637 (ALT 250 FOR IP): Performed by: NURSE PRACTITIONER

## 2022-08-17 PROCEDURE — APPNB15 APP NON BILLABLE TIME 0-15 MINS: Performed by: NURSE PRACTITIONER

## 2022-08-17 PROCEDURE — 2580000003 HC RX 258: Performed by: HOSPITALIST

## 2022-08-17 PROCEDURE — 2580000003 HC RX 258: Performed by: NURSE PRACTITIONER

## 2022-08-17 PROCEDURE — 2000000000 HC ICU R&B

## 2022-08-17 PROCEDURE — 84100 ASSAY OF PHOSPHORUS: CPT

## 2022-08-17 PROCEDURE — 83880 ASSAY OF NATRIURETIC PEPTIDE: CPT

## 2022-08-17 PROCEDURE — 94760 N-INVAS EAR/PLS OXIMETRY 1: CPT

## 2022-08-17 PROCEDURE — 6360000002 HC RX W HCPCS: Performed by: HOSPITALIST

## 2022-08-17 PROCEDURE — 6370000000 HC RX 637 (ALT 250 FOR IP): Performed by: INTERNAL MEDICINE

## 2022-08-17 PROCEDURE — 6360000002 HC RX W HCPCS: Performed by: NURSE PRACTITIONER

## 2022-08-17 PROCEDURE — 94640 AIRWAY INHALATION TREATMENT: CPT

## 2022-08-17 PROCEDURE — 2580000003 HC RX 258: Performed by: INTERNAL MEDICINE

## 2022-08-17 PROCEDURE — 85025 COMPLETE CBC W/AUTO DIFF WBC: CPT

## 2022-08-17 PROCEDURE — 99291 CRITICAL CARE FIRST HOUR: CPT | Performed by: INTERNAL MEDICINE

## 2022-08-17 PROCEDURE — APPSS15 APP SPLIT SHARED TIME 0-15 MINUTES: Performed by: NURSE PRACTITIONER

## 2022-08-17 PROCEDURE — 6360000002 HC RX W HCPCS: Performed by: INTERNAL MEDICINE

## 2022-08-17 PROCEDURE — 2500000003 HC RX 250 WO HCPCS: Performed by: HOSPITALIST

## 2022-08-17 PROCEDURE — APPNB45 APP NON BILLABLE 31-45 MINUTES: Performed by: PHYSICIAN ASSISTANT

## 2022-08-17 PROCEDURE — 2700000000 HC OXYGEN THERAPY PER DAY

## 2022-08-17 PROCEDURE — 80048 BASIC METABOLIC PNL TOTAL CA: CPT

## 2022-08-17 RX ORDER — CALCIUM GLUCONATE 20 MG/ML
1000 INJECTION, SOLUTION INTRAVENOUS ONCE
Status: COMPLETED | OUTPATIENT
Start: 2022-08-17 | End: 2022-08-17

## 2022-08-17 RX ADMIN — ENOXAPARIN SODIUM 40 MG: 100 INJECTION SUBCUTANEOUS at 07:58

## 2022-08-17 RX ADMIN — CHLORHEXIDINE GLUCONATE 0.12% ORAL RINSE 15 ML: 1.2 LIQUID ORAL at 20:02

## 2022-08-17 RX ADMIN — METOPROLOL TARTRATE 25 MG: 25 TABLET, FILM COATED ORAL at 20:01

## 2022-08-17 RX ADMIN — FAMOTIDINE 20 MG: 10 INJECTION, SOLUTION INTRAVENOUS at 20:01

## 2022-08-17 RX ADMIN — FUROSEMIDE 40 MG: 10 INJECTION, SOLUTION INTRAMUSCULAR; INTRAVENOUS at 05:36

## 2022-08-17 RX ADMIN — CHLORHEXIDINE GLUCONATE 0.12% ORAL RINSE 15 ML: 1.2 LIQUID ORAL at 07:59

## 2022-08-17 RX ADMIN — Medication 200 MCG/HR: at 01:50

## 2022-08-17 RX ADMIN — Medication 200 MCG/HR: at 19:55

## 2022-08-17 RX ADMIN — POTASSIUM CHLORIDE 20 MEQ: 29.8 INJECTION, SOLUTION INTRAVENOUS at 07:57

## 2022-08-17 RX ADMIN — METOPROLOL TARTRATE 25 MG: 25 TABLET, FILM COATED ORAL at 07:59

## 2022-08-17 RX ADMIN — IPRATROPIUM BROMIDE AND ALBUTEROL SULFATE 1 AMPULE: 2.5; .5 SOLUTION RESPIRATORY (INHALATION) at 20:18

## 2022-08-17 RX ADMIN — POTASSIUM CHLORIDE 20 MEQ: 29.8 INJECTION, SOLUTION INTRAVENOUS at 06:25

## 2022-08-17 RX ADMIN — WHITE PETROLATUM 57.7 %-MINERAL OIL 31.9 % EYE OINTMENT: at 15:06

## 2022-08-17 RX ADMIN — POTASSIUM PHOSPHATE, MONOBASIC POTASSIUM PHOSPHATE, DIBASIC 20 MMOL: 224; 236 INJECTION, SOLUTION, CONCENTRATE INTRAVENOUS at 08:44

## 2022-08-17 RX ADMIN — MEROPENEM 1000 MG: 1 INJECTION, POWDER, FOR SOLUTION INTRAVENOUS at 20:46

## 2022-08-17 RX ADMIN — WHITE PETROLATUM 57.7 %-MINERAL OIL 31.9 % EYE OINTMENT: at 20:02

## 2022-08-17 RX ADMIN — FUROSEMIDE 40 MG: 10 INJECTION, SOLUTION INTRAMUSCULAR; INTRAVENOUS at 13:40

## 2022-08-17 RX ADMIN — ENOXAPARIN SODIUM 40 MG: 100 INJECTION SUBCUTANEOUS at 20:01

## 2022-08-17 RX ADMIN — FUROSEMIDE 40 MG: 10 INJECTION, SOLUTION INTRAMUSCULAR; INTRAVENOUS at 20:01

## 2022-08-17 RX ADMIN — CALCIUM GLUCONATE 1000 MG: 20 INJECTION, SOLUTION INTRAVENOUS at 10:17

## 2022-08-17 RX ADMIN — FAMOTIDINE 20 MG: 10 INJECTION, SOLUTION INTRAVENOUS at 07:58

## 2022-08-17 RX ADMIN — PROPOFOL 15 MCG/KG/MIN: 10 INJECTION, EMULSION INTRAVENOUS at 16:33

## 2022-08-17 RX ADMIN — IPRATROPIUM BROMIDE AND ALBUTEROL SULFATE 1 AMPULE: 2.5; .5 SOLUTION RESPIRATORY (INHALATION) at 15:52

## 2022-08-17 RX ADMIN — SODIUM CHLORIDE, PRESERVATIVE FREE 10 ML: 5 INJECTION INTRAVENOUS at 20:03

## 2022-08-17 RX ADMIN — MEROPENEM 1000 MG: 1 INJECTION, POWDER, FOR SOLUTION INTRAVENOUS at 05:24

## 2022-08-17 RX ADMIN — IPRATROPIUM BROMIDE AND ALBUTEROL SULFATE 1 AMPULE: 2.5; .5 SOLUTION RESPIRATORY (INHALATION) at 12:21

## 2022-08-17 RX ADMIN — PROPOFOL 15 MCG/KG/MIN: 10 INJECTION, EMULSION INTRAVENOUS at 01:58

## 2022-08-17 RX ADMIN — MEROPENEM 1000 MG: 1 INJECTION, POWDER, FOR SOLUTION INTRAVENOUS at 13:32

## 2022-08-17 RX ADMIN — ALTEPLASE 2 MG: 2.2 INJECTION, POWDER, LYOPHILIZED, FOR SOLUTION INTRAVENOUS at 05:35

## 2022-08-17 RX ADMIN — IPRATROPIUM BROMIDE AND ALBUTEROL SULFATE 1 AMPULE: 2.5; .5 SOLUTION RESPIRATORY (INHALATION) at 08:09

## 2022-08-17 ASSESSMENT — PULMONARY FUNCTION TESTS
PIF_VALUE: 33
PIF_VALUE: 37
PIF_VALUE: 33
PIF_VALUE: 33
PIF_VALUE: 37
PIF_VALUE: 35
PIF_VALUE: 34
PIF_VALUE: 35
PIF_VALUE: 33
PIF_VALUE: 35
PIF_VALUE: 33
PIF_VALUE: 35
PIF_VALUE: 33
PIF_VALUE: 36
PIF_VALUE: 33
PIF_VALUE: 34
PIF_VALUE: 33
PIF_VALUE: 35
PIF_VALUE: 33
PIF_VALUE: 35
PIF_VALUE: 33
PIF_VALUE: 34
PIF_VALUE: 33

## 2022-08-17 ASSESSMENT — ENCOUNTER SYMPTOMS
NAUSEA: 0
VOMITING: 0

## 2022-08-17 ASSESSMENT — PAIN SCALES - GENERAL
PAINLEVEL_OUTOF10: 0
PAINLEVEL_OUTOF10: 0

## 2022-08-17 NOTE — PROGRESS NOTES
Pulmonary Progress Note    Date of Admission: 8/5/2022   LOS: 12 days     CC:  Chief Complaint   Patient presents with    Abdominal Pain    Chest Pain     Sub-sternal, pressure, onset 1300 hours today    Shortness of Breath     EMS reports pt 88% on RA           Assessment/Plan     Acute Hypoxemic Respiratory Failure with SAO2 <90% on Room Air  -Full vent support, Wean supplemental oxygen to goal saturation of >90%    Aspiration Pneumonia  -Merrem  -Respiratory culture pending    Acute on Chronic Diastolic Heart failure  -Continue TID Lasix  -decrease  every 4    Recurrent SVT  Started on beta-blocker p.o. Partial SBO  -resolved    CHARLENE  -Stable creatinine    EARNESTINE/OHS  -Currently on ventilator    Morbid obesity  -BMI 56    Peridex  Lovenox  Pepcid    Due to the immediate potential for life-threatening deterioration due to respiratory failure, I spent 32 minutes providing critical care. This time is excluding time spent performing separately billable procedures. HPI/Subjective  Fever curve improving. More awake and interactive on the vent. Good urine output but very high intakes. ROS: UnAble to obtain due to mechanical ventilation      Intake/Output Summary (Last 24 hours) at 8/17/2022 0835  Last data filed at 8/17/2022 0800  Gross per 24 hour   Intake 5129.01 ml   Output 5755 ml   Net -625.99 ml         PHYSICAL EXAM:   Blood pressure 119/81, pulse 76, temperature 100.2 °F (37.9 °C), temperature source Rectal, resp. rate 18, height 5' 7\" (1.702 m), weight (!) 365 lb 6.4 oz (165.7 kg), SpO2 94 %.'  Gen:  No acute distress. Eyes: PERRL. Anicteric sclera. No conjunctival injection. ENT: No discharge. OP with ETT external appearance of ears and nose normal.  Neck: Trachea midline. No mass   Resp:  No crackles. No wheezes. No rhonchi. No dullness on percussion. CV: Regular rate. Regular rhythm. No murmur or rub.  + Edema. GI: Soft, Non-tender. Obese abdomen.   +BS  Skin: Warm, dry, w/o erythema. Lymph: No cervical or supraclavicular LAD. M/S: No cyanosis. No clubbing. Neuro:  no focal neurologic deficit. Moves all extremities  Psych: Awake and alert, Oriented x 3. Judgement and insight appropriate. Mood stable. Medications:    Scheduled Meds:   potassium phosphate IVPB  20 mmol IntraVENous Once    calcium gluconate-NaCl  1,000 mg IntraVENous Once    furosemide  40 mg IntraVENous 3 times per day    metoprolol tartrate  25 mg Oral BID    meropenem  1,000 mg IntraVENous Q8H    famotidine (PEPCID) injection  20 mg IntraVENous BID    chlorhexidine  15 mL Mouth/Throat BID    ipratropium-albuterol  1 ampule Inhalation Q4H WA    sodium chloride flush  5-40 mL IntraVENous 2 times per day    [Held by provider] gabapentin  800 mg Oral TID    [Held by provider] levothyroxine  200 mcg Oral Daily    [Held by provider] spironolactone  50 mg Oral Daily    enoxaparin  40 mg SubCUTAneous BID       Continuous Infusions:   fentaNYL 200 mcg/hr (08/17/22 0549)    propofol 20 mcg/kg/min (08/17/22 0549)    sodium chloride         PRN Meds:  artificial tears, acetaminophen, potassium chloride, midazolam, fentaNYL **AND** fentaNYL, acetaminophen, sodium chloride flush, sodium chloride, ondansetron **OR** ondansetron, diazePAM    Labs reviewed:  CBC:   Recent Labs     08/15/22  0510 08/15/22  0545 08/16/22 0422 08/17/22 0452   WBC 8.0  --  9.3 7.4   HGB 10.0* 11.2* 9.7* 9.4*   HCT 29.5*  --  29.1* 28.2*   MCV 95.9  --  95.6 94.2   *  --  500* 539*     BMP:   Recent Labs     08/15/22  0510 08/15/22  2223 08/16/22  0422 08/16/22  1420 08/16/22  1817 08/16/22  2209 08/17/22  0452   *   < > 143 143  --  140 141   K 4.0   < > 3.6 3.2* 3.4* 3.8 3.4*      < > 107 104  --  104 105   CO2 30   < > 29 31  --  30 28   PHOS 3.6  --  2.5  --   --   --  2.3*   BUN 39*   < > 43* 42*  --  43* 40*   CREATININE 1.4*   < > 1.2 1.0  --  1.1 1.1    < > = values in this interval not displayed.      LIVER PROFILE: No results for input(s): AST, ALT, LIPASE, BILIDIR, BILITOT, ALKPHOS in the last 72 hours. Invalid input(s): AMYLASE,  ALB  PT/INR: No results for input(s): PROTIME, INR in the last 72 hours. APTT: No results for input(s): APTT in the last 72 hours. UA:  Recent Labs     08/15/22  0916   COLORU Straw   PHUR 5.5   WBCUA 6-9*   RBCUA *   MUCUS Rare*   BACTERIA 4+*   CLARITYU SL CLOUDY*   SPECGRAV 1.010   LEUKOCYTESUR MODERATE*   UROBILINOGEN 0.2   BILIRUBINUR Negative   BLOODU LARGE*   GLUCOSEU Negative     No results for input(s): PH, PCO2, PO2 in the last 72 hours. Films:  Radiology Review:  Pertinent images / reports were reviewed as a part of this visit. Access  CVC   Arterial          PICC        PICC Triple Lumen 03/66/99 Right Basilic (Active)   Central Line Being Utilized Yes 08/17/22 0600   Criteria for Appropriate Use Hemodynamically unstable, requiring monitoring lines, vasopressors, or volume resuscitation 08/17/22 0600   Site Assessment Clean, dry & intact 08/17/22 0600   Phlebitis Assessment No symptoms 08/17/22 0600   Infiltration Assessment 0 08/17/22 0600   Extremity Circumference (cm) 38 cm 08/08/22 0607   External Catheter Length (cm) 0 cm 08/08/22 0607   Proximal Lumen Color/Status Red;Capped;Normal saline locked 08/17/22 0600   Medial Lumen Status White;Capped; Infusing 08/17/22 0600   Distal Lumen Color/Status Gray;Capped; Infusing 08/17/22 0600   Line Care Connections checked and tightened 08/17/22 0600   Alcohol Cap Used Yes 08/17/22 0600   Date of Last Dressing Change 08/17/22 08/17/22 0600   Dressing Type Transparent; Antimicrobial 08/17/22 0600   Dressing Status Clean, dry & intact 08/17/22 0600   Dressing Intervention New;Dressing changed 08/17/22 0400   Number of days: 9       CVC                  Fermin  Urinary Catheter Fermin (Active)   Catheter Indications Need for fluid volume management of the critically ill patient in a critical care setting 08/17/22 5732 Site Assessment No urethral drainage 08/17/22 0335   Urine Color Yellow 08/17/22 0335   Urine Appearance Clear 08/17/22 0335   Collection Container Standard 08/17/22 0335   Securement Method Securing device (Describe) 08/17/22 0335   Catheter Care Completed Yes 08/16/22 2349   Catheter Best Practices  Drainage tube clipped to bed;Catheter secured to thigh; Tamper seal intact; Bag below bladder;Bag not on floor; Lack of dependent loop in tubing;Drainage bag less than half full 08/17/22 0335   Status Patent;Draining 08/17/22 0335   Output (mL) 450 mL 08/17/22 0800   Number of days: 1         Thank you for this consult,    Katina Eduardo MD  [facility] Pulmonary, Critical Care, and Sleep Medicine

## 2022-08-17 NOTE — PROGRESS NOTES
2000: Report received from CHILDRENS \[facility]\"" OF [facility]. Bedside handoff and skin assessment complete. Pt resting on vent. Respirations easy and unlabored. Arouses easily, makes eye contact and follows commands. All lines/tubes in place with meds/TF infusing as ordered. B wrist restraints in place to protect airway/lines. 2010: Shift assessment complete--see flow sheets  2350: Persistent low grade temp; cool bath given. Propofol titrated during care for comfort. Tolerated well.  0500: Propofol titrated for increased restlessness/agitation. Unable to draw blood from PICC but able to withdraw cath nu from white lumen; now infusing. Message sent to Dr. Yeni Da Silva requesting cath nu. 6647: New order received from Dr. Yeni Da Silva. Pt now resting in bed with eyes closed. VSS  6757:  Unable to withdraw activase from red lumen after 30 minute dwell; clamped and to dwell for additional 90 minutes  0720: Report given to [facility]; handoff complete

## 2022-08-17 NOTE — PLAN OF CARE
Problem: ABCDS Injury Assessment  Goal: Absence of physical injury  8/17/2022 0620 by Nori Dolan RN  Outcome: Progressing  Flowsheets (Taken 8/15/2022 0800 by Jeremias Myers RN)  Absence of Physical Injury: Implement safety measures based on patient assessment     Problem: Skin/Tissue Integrity  Goal: Absence of new skin breakdown  Description: 1. Monitor for areas of redness and/or skin breakdown  2. Assess vascular access sites hourly  8/17/2022 0620 by Nori Dolan RN  Outcome: Progressing     Problem: Safety - Medical Restraint  Goal: Remains free of injury from restraints (Restraint for Interference with Medical Device)  Description: INTERVENTIONS:  1. Determine that other, less restrictive measures have been tried or would not be effective before applying the restraint  2. Evaluate the patient's condition at the time of restraint application  3. Inform patient/family regarding the reason for restraint  4.  Q2H: Monitor safety, psychosocial status, comfort, nutrition and hydration  8/17/2022 0620 by Nori Dolan RN  Outcome: Progressing  Flowsheets (Taken 8/16/2022 2000)  Remains free of injury from restraints (restraint for interference with medical device): Every 2 hours: Monitor safety, psychosocial status, comfort, nutrition and hydration     Problem: Respiratory - Adult  Goal: Achieves optimal ventilation and oxygenation  8/17/2022 0620 by Nori Dolan RN  Outcome: Progressing  Flowsheets (Taken 8/16/2022 2010)  Achieves optimal ventilation and oxygenation:   Assess for changes in respiratory status   Assess for changes in mentation and behavior   Position to facilitate oxygenation and minimize respiratory effort   Oxygen supplementation based on oxygen saturation or arterial blood gases   Assess the need for suctioning and aspirate as needed   Respiratory therapy support as indicated     Problem: Cardiovascular - Adult  Goal: Maintains optimal cardiac output and hemodynamic stability  8/17/2022 0620 by Crispin Chambers RN  Outcome: Progressing  Flowsheets (Taken 8/16/2022 2010)  Maintains optimal cardiac output and hemodynamic stability:   Monitor blood pressure and heart rate   Monitor urine output and notify Licensed Independent Practitioner for values outside of normal range   Assess for signs of decreased cardiac output     Problem: Cardiovascular - Adult  Goal: Absence of cardiac dysrhythmias or at baseline  8/17/2022 0620 by Crispin Chambers RN  Outcome: Progressing  Flowsheets (Taken 8/16/2022 2010)  Absence of cardiac dysrhythmias or at baseline:   Monitor cardiac rate and rhythm   Assess for signs of decreased cardiac output   Administer antiarrhythmia medication and electrolyte replacement as ordered     Problem: Skin/Tissue Integrity - Adult  Goal: Skin integrity remains intact  Outcome: Progressing  Flowsheets (Taken 8/16/2022 2010)  Skin Integrity Remains Intact:   Monitor for areas of redness and/or skin breakdown   Assess vascular access sites hourly     Problem: Genitourinary - Adult  Goal: Urinary catheter remains patent  8/17/2022 0620 by Crispin Chambers RN  Outcome: Progressing  Flowsheets (Taken 8/16/2022 2010)  Urinary catheter remains patent: Assess patency of urinary catheter

## 2022-08-17 NOTE — PROGRESS NOTES
Pt remains intubated and sedated. On propofol and fentanyl for sedation. Pt able to open eyes and nod appropriately and follow commands. RASS -1 to 0. Pt's wife at bedside. Updated on plan of care and pt's condition.

## 2022-08-17 NOTE — PROGRESS NOTES
[facility]                                               Progress note    Summary:   Arden Fisher is being seen by nephrology for CHARLENE and hypernatremia. Admitted with paraesophageal hernia and SBO. Had EGD 8/9 with biopsy. Still NPO     Interval History  Patient was seen and examined at bedside  He is intubated and sedated  His eyes are open but he is not following commands for me. Blood pressure 111/81  96% on 60% FiO2 and PEEP of 14  Low-grade fevers 101 100 Fahrenheit  Urine output 5.3 L  -136 cc for the day because his intakes of 5.1 L    Labs reviewed  Sodium 141 potassium 3.4 bicarb 28 BUN 40 creatinine 1.1 phosphorus 2.3 hemoglobin 9.4    Plan:   -his intakes are very high, over 5 L. Try to concentrate intakes if possible. - decrease free water to 200 cc q 4  - continue lasix 40 mg q 8 hrs  - replacing potassium         Yony Prado MD  [facility]  Office: [phone]    Assessment:   Acute kidney injury  Urine Na < 20    creatinine on admission 1   next day on 08/06/2022 was 0.8  suddenly jumped up to 2.7. Due to episode of severe hypotension with blood pressure drop 78/44. also got CT with IV contrast on 08/05/2022 but most likely  this is ATN related due to hypotension and hypercapnic  respiratory failure. CT angio s normal renal arteries and normal kidneys      Sepsis/ possible septic shock. emperature was 101.1   High white cell count. '  immunocompromised due to rheumatoid arthritis and treatment. History of sleep apnea   on CPAP. Acute respiratory failure with high pCO2 in the range of 50s with pH  of 7.24    now intubated. History of abnormal stress test in 06/2022 with stress-induced  ischemia and some scarring. Ejection fraction  was  64%.     had a followup angiogram which was reported as normal as per wife.   I do not have that result     History of rheumatoid arthritis   longstanding,was on methotrexate,  has taken Celebrex and now on Inflectra infusions,  so he is immunocompromised. Jose Carlos Bee is now helping him with his rheumatoid tremendously. he has been on steroids in the past.     Rule out adrenal insufficiency. cortisol level. normal      History of thyroidectomy in the past. .    Small bowel obstruction. Large hiatus hernia with colon and stomach in the chest.  Further management as per medical team.       ROS:   Unable to assess      PMH:   Past medical history, surgical history, social history, family history are reviewed and updated as appropriate. Reviewed current medication list.   Allergies reviewed and updated as needed. PE:   Vitals:    08/17/22 0630   BP: 97/65   Pulse: 76   Resp: 18   Temp:    SpO2: 91%       General appearance:  in NAD, intubated and sedated  HEENT: EOM intact, no icterus. Trachea is midline. Neck : No masses, appears symmetrical  Respiratory: Respiratory effort appears normal, bilateral equal chest rise, no wheeze, no crackles ETT to vent   Cardiovascular: Ausculation shows RRR trace edema  Abdomen: mildly distended but soft   Musculoskeletal:  Joints with no swelling or deformity. Skin:no rashes, ulcers, induration, no jaundice.    Neuro: sedated      Lab Results   Component Value Date    CREATININE 1.1 08/17/2022    BUN 40 (H) 08/17/2022     08/17/2022    K 3.4 (L) 08/17/2022     08/17/2022    CO2 28 08/17/2022      Lab Results   Component Value Date    WBC 7.4 08/17/2022    HGB 9.4 (L) 08/17/2022    HCT 28.2 (L) 08/17/2022    MCV 94.2 08/17/2022     (H) 08/17/2022     Lab Results   Component Value Date    CALCIUM 7.7 (L) 08/17/2022    CAION 1.06 (L) 08/15/2022    PHOS 2.3 (L) 08/17/2022

## 2022-08-17 NOTE — PROGRESS NOTES
Hospitalist Progress Note  8/17/2022 9:49 AM    PCP: Rosy Mendoza MD    [phone]     Date of Admission: 8/5/2022                                                                                                                     HOSPITAL COURSE    Patient demographics:  The patient  Jose Manuel Chambers is a 63 y.o. male     Significant past medical history:   Patient Active Problem List   Diagnosis    Partial bowel obstruction (HCC)    Hiatal hernia    Acute respiratory failure with hypoxia and hypercapnia (HCC)    SBO (small bowel obstruction) (HCC)    Aspiration into airway    Acute respiratory failure with hypoxia (HCC)    Small bowel obstruction (HCC)    Diastolic heart failure (HCC)    History of atrial fibrillation    Supraventricular tachycardia ([facility] [address]         Presenting symptoms:  abdominal pain    Diagnostic workup:      CONSULTS DURING ADMISSION :   IP CONSULT TO GENERAL SURGERY  IP CONSULT TO HOSPITALIST  IP CONSULT TO GENERAL SURGERY  IP CONSULT TO DIETITIAN  IP CONSULT TO CARDIOLOGY      Patient was diagnosed with:  Low grade or Partial Bowel obstruction  Pneumonia and septic shock. Hiatal hernia  Atrial Fibrillation      Treatment while inpatient:  63years old male with medical history significant for morbid obesity, paraesophageal hernia. Patient presented to the emergency room with abdominal pain nausea vomiting and diarrhea. Patient was diagnosed with small bowel obstruction. Patient was also diagnosed with pneumonia and septic shock. Patient developed acute respiratory failure and acute renal failure possibly General patient was started on the ventilator and supported with pressors.                                                                                            ----------------------------------------------------------      SUBJECTIVE COMPLAINTS- follow up for abdominal pain    Diet: Diet NPO  ADULT TUBE FEEDING; Orogastric; Peptide Based High Protein; Continuous; 20; Yes; 10; Q 4 hours; 35; 100; Q 4 hours; Protein;  Four proteinex daily      OBJECTIVE:   Patient Active Problem List   Diagnosis    Partial bowel obstruction (HCC)    Hiatal hernia    Acute respiratory failure with hypoxia and hypercapnia (HCC)    SBO (small bowel obstruction) (HCC)    Aspiration into airway    Acute respiratory failure with hypoxia (HCC)    Small bowel obstruction (HCC)    Diastolic heart failure (HCC)    History of atrial fibrillation    Supraventricular tachycardia (HCC)       Allergies  Codeine    Medications    Scheduled Meds:   potassium phosphate IVPB  20 mmol IntraVENous Once    calcium gluconate-NaCl  1,000 mg IntraVENous Once    furosemide  40 mg IntraVENous 3 times per day    metoprolol tartrate  25 mg Oral BID    meropenem  1,000 mg IntraVENous Q8H    famotidine (PEPCID) injection  20 mg IntraVENous BID    chlorhexidine  15 mL Mouth/Throat BID    ipratropium-albuterol  1 ampule Inhalation Q4H WA    sodium chloride flush  5-40 mL IntraVENous 2 times per day    [Held by provider] gabapentin  800 mg Oral TID    [Held by provider] levothyroxine  200 mcg Oral Daily    [Held by provider] spironolactone  50 mg Oral Daily    enoxaparin  40 mg SubCUTAneous BID     Continuous Infusions:   fentaNYL 200 mcg/hr (08/17/22 0549)    propofol 20 mcg/kg/min (08/17/22 0549)    sodium chloride       PRN Meds:  artificial tears, acetaminophen, potassium chloride, midazolam, fentaNYL **AND** fentaNYL, acetaminophen, sodium chloride flush, sodium chloride, ondansetron **OR** ondansetron, diazePAM    Vitals   Vitals /wt Patient Vitals for the past 8 hrs:   BP Temp Temp src Pulse Resp SpO2 Weight   08/17/22 0810 -- -- -- 76 18 94 % --   08/17/22 0800 119/81 100.2 °F (37.9 °C) Rectal 75 18 94 % --   08/17/22 0630 97/65 -- -- 76 18 91 % --   08/17/22 0600 95/62 -- -- 73 18 91 % (!) 365 lb 6.4 oz (165.7 kg)   08/17/22 0530 97/64 -- -- 74 18 94 % --   08/17/22 0500 124/82 -- -- 78 18 95 % --   08/17/22 0430 (!) 144/87 -- -- 86 18 97 % --   08/17/22 0415 -- -- -- 88 22 95 % --   08/17/22 0400 119/89 -- -- 83 19 95 % --   08/17/22 0335 (!) 140/86 (!) 100.6 °F (38.1 °C) Rectal 82 20 96 % --   08/17/22 0330 (!) 134/105 -- -- 82 21 96 % --   08/17/22 0300 119/77 -- -- 78 19 95 % --   08/17/22 0230 102/63 -- -- 76 18 94 % --   08/17/22 0200 117/74 -- -- 78 18 94 % --        72HR INTAKE/OUTPUT:    Intake/Output Summary (Last 24 hours) at 8/17/2022 0949  Last data filed at 8/17/2022 0800  Gross per 24 hour   Intake 5129.01 ml   Output 4955 ml   Net 174.01 ml       Exam:    Gen:  intubated and sedated  Eyes: PERRL. No sclera icterus. No conjunctival injection. ENT: No discharge. Pharynx clear. External appearance of ears and nose normal.  Neck: Trachea midline. No obvious mass. Resp: No accessory muscle use. No crackles. No wheezes. No rhonchi. CV: Regular rate. Regular rhythm. No murmur or rub. No edema. GI: Non-tender. Non-distended. No hernia. Skin: Warm, dry, normal texture and turgor. Lymph: No cervical LAD. No supraclavicular LAD. M/S: / Ext. No cyanosis. No clubbing. No joint deformity. Neuro: CN 2-12 are intact,  no neurologic deficits noted. PT/INR:   No results for input(s): PROTIME, INR in the last 72 hours. APTT: No results for input(s): APTT in the last 72 hours.     CBC:   Recent Labs     08/15/22  0510 08/15/22  0545 08/16/22  0422 08/17/22  0452   WBC 8.0  --  9.3 7.4   HGB 10.0* 11.2* 9.7* 9.4*   HCT 29.5*  --  29.1* 28.2*   MCV 95.9  --  95.6 94.2   *  --  500* 539*       BMP:   Recent Labs     08/15/22  0510 08/15/22  2223 08/16/22  0422 08/16/22  1420 08/16/22  1817 08/16/22 2209 08/17/22  0452   *   < > 143 143  --  140 141   K 4.0   < > 3.6 3.2* 3.4* 3.8 3.4*      < > 107 104  --  104 105   CO2 30   < > 29 31  --  30 28   PHOS 3.6  --  2.5  --   --   --  2.3*   BUN 39*   < > 43* 42*  --  43* 40*   CREATININE 1.4*   < > 1.2 1.0  --  1.1 1.1    < > = values in this interval not displayed. LIVER PROFILE:   No results for input(s): ALKPHOS, AST, ALT, ALB, BILIDIR, BILITOT, ALKPHOS in the last 72 hours. No results for input(s): AMYLASE in the last 72 hours. No results for input(s): LIPASE in the last 72 hours. UA:  Recent Labs     08/15/22  0916   WBCUA 6-9*   RBCUA *   MUCUS Rare*         TROPONIN:   No results for input(s): Citlaly Horn in the last 72 hours. Lab Results   Component Value Date/Time    URRFLXCULT Not Indicated 08/15/2022 09:16 AM       No results for input(s): TSHREFLEX in the last 72 hours. No components found for: [id number]  POC GLUCOSE:    Recent Labs     08/16/22  1540 08/16/22  2047 08/17/22  0010 08/17/22  0445 08/17/22  0741   POCGLU 107* 95 91 95 88     No results for input(s): LABA1C in the last 72 hours.  No results found for: LABA1C    Echocardiogram shows normal ejection fraction  Grade 1 diastolic dysfunction      ASSESSMENT AND PLAN    SVT  better  H/o atrial fibrillation  HR controled  Cardiology is following    Acute respiratory failure  Intubated 8/8  Patient remains on the ventilator  Remains on Oxygen FiO2 60%  Aspiration pna  Status post Zosyn  Now on meropenem only    Diastolic CHF  Patient has grade 1 diastolic dysfunction  Fluid overload status  Lasix 40 mg twice daily  Continue to monitor creatinine        Partial small Bowel obstruction  CT scan of the abdomen with contrast shows no obstruction  Og placed per GI 8/10  BM 8/9  Tolerating tube feed  General surgery is following      CHARLENE  Cr 1.4  Nephrology is following      Hypotention  Wean off pressors    Hiatal hernia  - CT chest/abd/pelv: Large hiatal hernia containing much of the stomach which is distended       Atrial Fibrillation  Patient developed the rapid ventricular rate  IV Lopressor  Consult to cardiology     HTN  - monitor blood pressure  - restart home meds when able        Code Status: Full Code        Dispo - cc        The patient and / or the family were informed of the results of any tests, a time was given to answer questions, a plan was proposed and they agreed with plan. Vj Dong MD    This note was transcribed using [id number]. Please disregard any translational errors.

## 2022-08-17 NOTE — PROGRESS NOTES
Progress Note  Date:2022       Room:[place] [address]  Patient Name:Miles Vanegas     YOB: 1960     Age:62 y.o. Subjective    Subjective:  Symptoms:  Stable. Diet:  No nausea or vomiting. Activity level: Impaired due to weakness. Review of Systems   Gastrointestinal:  Negative for nausea and vomiting. Objective         Vitals Last 24 Hours:  TEMPERATURE:  Temp  Av.6 °F (38.1 °C)  Min: 100.2 °F (37.9 °C)  Max: 101 °F (38.3 °C)  RESPIRATIONS RANGE: Resp  Av.4  Min: 17  Max: 22  PULSE OXIMETRY RANGE: SpO2  Av %  Min: 90 %  Max: 97 %  PULSE RANGE: Pulse  Av.5  Min: 72  Max: 88  BLOOD PRESSURE RANGE: Systolic (04WBS), CZM:468 , Min:91 , QWO:875   ; Diastolic (44CXA), HNM:68, Min:62, Max:105    I/O (24Hr): Intake/Output Summary (Last 24 hours) at 2022 1036  Last data filed at 2022 0800  Gross per 24 hour   Intake 5129.01 ml   Output 4505 ml   Net 624.01 ml       Objective:  Vital signs: (most recent): Blood pressure 119/81, pulse 76, temperature 100.2 °F (37.9 °C), temperature source Rectal, resp. rate 18, height 5' 7\" (1.702 m), weight (!) 365 lb 6.4 oz (165.7 kg), SpO2 94 %.     Labs/Imaging/Diagnostics    Labs:  CBC:  Recent Labs     08/15/22  0510 08/15/22  0545 22  0422 22  0452   WBC 8.0  --  9.3 7.4   RBC 3.07*  --  3.04* 2.99*   HGB 10.0* 11.2* 9.7* 9.4*   HCT 29.5*  --  29.1* 28.2*   MCV 95.9  --  95.6 94.2   RDW 16.9*  --  17.4* 17.4*   *  --  500* 539*       CHEMISTRIES:  Recent Labs     08/15/22  0510 08/15/22  2223 22  0422 22  1420 22  1817 22  2209 22  0452   *   < > 143 143  --  140 141   K 4.0   < > 3.6 3.2* 3.4* 3.8 3.4*      < > 107 104  --  104 105   CO2 30   < > 29 31  --  30 28   BUN 39*   < > 43* 42*  --  43* 40*   CREATININE 1.4*   < > 1.2 1.0  --  1.1 1.1   GLUCOSE 116*   < > 88 103*  --  98 89   PHOS 3.6  --  2.5  --   --   --  2.3*   MG 1.80  --  1.80  --   --   -- 2.00    < > = values in this interval not displayed. PT/INR:No results for input(s): PROTIME, INR in the last 72 hours. APTT:No results for input(s): APTT in the last 72 hours. LIVER PROFILE:No results for input(s): AST, ALT, BILIDIR, BILITOT, ALKPHOS in the last 72 hours. Imaging Last 24 Hours:  XR CHEST PORTABLE    Result Date: 8/15/2022  EXAMINATION: ONE XRAY VIEW OF THE CHEST 8/15/2022 11:13 am COMPARISON: Chest x-ray dated 14 August 2022 HISTORY: ORDERING SYSTEM PROVIDED HISTORY: OG placement TECHNOLOGIST PROVIDED HISTORY: Reason for exam:->OG placement Reason for Exam: og place FINDINGS: Orogastric tube with tip and side-port in the stomach. Endotracheal tube with the tip above the mady. Mild cardiomegaly. Stable appearance of perihilar opacities. Small left pleural effusion     1. Enteric tube tip and side-port in the stomach. 2.  Stable appearance of perihilar opacities. Small left pleural effusion     XR CHEST PORTABLE    Result Date: 8/14/2022  EXAMINATION: ONE XRAY VIEW OF THE CHEST 8/14/2022 11:05 am COMPARISON: 08/13/2022 radiograph HISTORY: ORDERING SYSTEM PROVIDED HISTORY: hypoxia TECHNOLOGIST PROVIDED HISTORY: Reason for exam:->hypoxia FINDINGS: Endotracheal tube is positioned above the mady, stable. Enteric tube extends below field of view into the abdomen. The heart is enlarged. There are moderate perihilar and central ground-glass opacities bilaterally. Relatively stable appearance from prior imaging. Small left pleural effusion noted. No significant skeletal finding. Relatively stable perihilar and central airspace opacities in the lungs.      Assessment//Plan           Hospital Problems             Last Modified POA    * (Principal) Partial bowel obstruction ([address] 8/5/2022 Yes    Hiatal hernia 8/7/2022 Yes    Acute respiratory failure with hypoxia and hypercapnia ([address] 8/8/2022 Yes    SBO (small bowel obstruction) ([address] 8/9/2022 Yes    Aspiration into airway 8/7/2022 Yes    Acute respiratory failure with hypoxia ([address] 8/8/2022 Yes    Small bowel obstruction ([address] 5/7/6668 Yes    Diastolic heart failure ([address] 8/14/2022 Yes    History of atrial fibrillation 8/14/2022 Yes    Supraventricular tachycardia ([address] 8/14/2022 Yes   Assessment & Plan  Remains on ventilator   Febrile yesterday    Tolerating tube feeds via OG   No sign of obstruction   Continue for now   When extubation is approaching will hold tube feeds   Remove OG when extubated    Electronically signed by JARON Joy CNP on 8/17/2022 at 10:36 AM

## 2022-08-18 PROBLEM — I95.9 ARTERIAL HYPOTENSION: Status: ACTIVE | Noted: 2022-08-18

## 2022-08-18 LAB
ANION GAP SERPL CALCULATED.3IONS-SCNC: 7 MMOL/L (ref 3–16)
ANION GAP SERPL CALCULATED.3IONS-SCNC: 9 MMOL/L (ref 3–16)
ANION GAP SERPL CALCULATED.3IONS-SCNC: 9 MMOL/L (ref 3–16)
BASOPHILS ABSOLUTE: 0 K/UL (ref 0–0.2)
BASOPHILS RELATIVE PERCENT: 0.4 %
BUN BLDV-MCNC: 46 MG/DL (ref 7–20)
BUN BLDV-MCNC: 48 MG/DL (ref 7–20)
BUN BLDV-MCNC: 51 MG/DL (ref 7–20)
CALCIUM SERPL-MCNC: 7.7 MG/DL (ref 8.3–10.6)
CALCIUM SERPL-MCNC: 8 MG/DL (ref 8.3–10.6)
CALCIUM SERPL-MCNC: 8.1 MG/DL (ref 8.3–10.6)
CHLORIDE BLD-SCNC: 103 MMOL/L (ref 99–110)
CHLORIDE BLD-SCNC: 106 MMOL/L (ref 99–110)
CHLORIDE BLD-SCNC: 108 MMOL/L (ref 99–110)
CO2: 27 MMOL/L (ref 21–32)
CO2: 28 MMOL/L (ref 21–32)
CO2: 29 MMOL/L (ref 21–32)
CREAT SERPL-MCNC: 1.1 MG/DL (ref 0.8–1.3)
CREAT SERPL-MCNC: 1.2 MG/DL (ref 0.8–1.3)
CREAT SERPL-MCNC: 1.3 MG/DL (ref 0.8–1.3)
EOSINOPHILS ABSOLUTE: 0.1 K/UL (ref 0–0.6)
EOSINOPHILS RELATIVE PERCENT: 1.8 %
GFR AFRICAN AMERICAN: >60
GFR NON-AFRICAN AMERICAN: 56
GFR NON-AFRICAN AMERICAN: >60
GFR NON-AFRICAN AMERICAN: >60
GLUCOSE BLD-MCNC: 102 MG/DL (ref 70–99)
GLUCOSE BLD-MCNC: 127 MG/DL (ref 70–99)
GLUCOSE BLD-MCNC: 92 MG/DL (ref 70–99)
GLUCOSE BLD-MCNC: 96 MG/DL (ref 70–99)
GLUCOSE BLD-MCNC: 96 MG/DL (ref 70–99)
GLUCOSE BLD-MCNC: 99 MG/DL (ref 70–99)
HCT VFR BLD CALC: 27 % (ref 40.5–52.5)
HEMOGLOBIN: 9 G/DL (ref 13.5–17.5)
LYMPHOCYTES ABSOLUTE: 0.8 K/UL (ref 1–5.1)
LYMPHOCYTES RELATIVE PERCENT: 11.1 %
MAGNESIUM: 2.1 MG/DL (ref 1.8–2.4)
MCH RBC QN AUTO: 31.9 PG (ref 26–34)
MCHC RBC AUTO-ENTMCNC: 33.5 G/DL (ref 31–36)
MCV RBC AUTO: 95.2 FL (ref 80–100)
MONOCYTES ABSOLUTE: 0.6 K/UL (ref 0–1.3)
MONOCYTES RELATIVE PERCENT: 7.8 %
NEUTROPHILS ABSOLUTE: 6 K/UL (ref 1.7–7.7)
NEUTROPHILS RELATIVE PERCENT: 78.9 %
PDW BLD-RTO: 17.1 % (ref 12.4–15.4)
PERFORMED ON: ABNORMAL
PERFORMED ON: NORMAL
PERFORMED ON: NORMAL
PHOSPHORUS: 2.9 MG/DL (ref 2.5–4.9)
PLATELET # BLD: 497 K/UL (ref 135–450)
PMV BLD AUTO: 7 FL (ref 5–10.5)
POTASSIUM SERPL-SCNC: 3.3 MMOL/L (ref 3.5–5.1)
POTASSIUM SERPL-SCNC: 3.4 MMOL/L (ref 3.5–5.1)
POTASSIUM SERPL-SCNC: 3.9 MMOL/L (ref 3.5–5.1)
PROCALCITONIN: 0.31 NG/ML (ref 0–0.15)
RBC # BLD: 2.83 M/UL (ref 4.2–5.9)
SODIUM BLD-SCNC: 139 MMOL/L (ref 136–145)
SODIUM BLD-SCNC: 142 MMOL/L (ref 136–145)
SODIUM BLD-SCNC: 145 MMOL/L (ref 136–145)
WBC # BLD: 7.6 K/UL (ref 4–11)

## 2022-08-18 PROCEDURE — 6370000000 HC RX 637 (ALT 250 FOR IP): Performed by: INTERNAL MEDICINE

## 2022-08-18 PROCEDURE — 2700000000 HC OXYGEN THERAPY PER DAY

## 2022-08-18 PROCEDURE — APPSS15 APP SPLIT SHARED TIME 0-15 MINUTES: Performed by: NURSE PRACTITIONER

## 2022-08-18 PROCEDURE — 2500000003 HC RX 250 WO HCPCS: Performed by: HOSPITALIST

## 2022-08-18 PROCEDURE — 94761 N-INVAS EAR/PLS OXIMETRY MLT: CPT

## 2022-08-18 PROCEDURE — 99291 CRITICAL CARE FIRST HOUR: CPT | Performed by: INTERNAL MEDICINE

## 2022-08-18 PROCEDURE — 6360000002 HC RX W HCPCS: Performed by: HOSPITALIST

## 2022-08-18 PROCEDURE — 84100 ASSAY OF PHOSPHORUS: CPT

## 2022-08-18 PROCEDURE — 36415 COLL VENOUS BLD VENIPUNCTURE: CPT

## 2022-08-18 PROCEDURE — 2580000003 HC RX 258: Performed by: NURSE PRACTITIONER

## 2022-08-18 PROCEDURE — 2580000003 HC RX 258: Performed by: INTERNAL MEDICINE

## 2022-08-18 PROCEDURE — 2580000003 HC RX 258: Performed by: HOSPITALIST

## 2022-08-18 PROCEDURE — 83735 ASSAY OF MAGNESIUM: CPT

## 2022-08-18 PROCEDURE — 6360000002 HC RX W HCPCS: Performed by: NURSE PRACTITIONER

## 2022-08-18 PROCEDURE — 94003 VENT MGMT INPAT SUBQ DAY: CPT

## 2022-08-18 PROCEDURE — 85025 COMPLETE CBC W/AUTO DIFF WBC: CPT

## 2022-08-18 PROCEDURE — 36620 INSERTION CATHETER ARTERY: CPT

## 2022-08-18 PROCEDURE — 80048 BASIC METABOLIC PNL TOTAL CA: CPT

## 2022-08-18 PROCEDURE — 2000000000 HC ICU R&B

## 2022-08-18 PROCEDURE — 6360000002 HC RX W HCPCS: Performed by: INTERNAL MEDICINE

## 2022-08-18 PROCEDURE — 6370000000 HC RX 637 (ALT 250 FOR IP): Performed by: NURSE PRACTITIONER

## 2022-08-18 PROCEDURE — 37799 UNLISTED PX VASCULAR SURGERY: CPT

## 2022-08-18 PROCEDURE — 2500000003 HC RX 250 WO HCPCS: Performed by: NURSE PRACTITIONER

## 2022-08-18 PROCEDURE — 94640 AIRWAY INHALATION TREATMENT: CPT

## 2022-08-18 PROCEDURE — 84145 PROCALCITONIN (PCT): CPT

## 2022-08-18 PROCEDURE — APPNB15 APP NON BILLABLE TIME 0-15 MINS: Performed by: NURSE PRACTITIONER

## 2022-08-18 PROCEDURE — 36620 INSERTION CATHETER ARTERY: CPT | Performed by: NURSE PRACTITIONER

## 2022-08-18 PROCEDURE — 36592 COLLECT BLOOD FROM PICC: CPT

## 2022-08-18 RX ORDER — POLYETHYLENE GLYCOL 3350 17 G/17G
17 POWDER, FOR SOLUTION ORAL DAILY
Status: DISCONTINUED | OUTPATIENT
Start: 2022-08-18 | End: 2022-08-25

## 2022-08-18 RX ORDER — GABAPENTIN 400 MG/1
400 CAPSULE ORAL 3 TIMES DAILY
Status: DISCONTINUED | OUTPATIENT
Start: 2022-08-18 | End: 2022-09-07

## 2022-08-18 RX ORDER — CALCIUM GLUCONATE 20 MG/ML
1000 INJECTION, SOLUTION INTRAVENOUS ONCE
Status: COMPLETED | OUTPATIENT
Start: 2022-08-18 | End: 2022-08-18

## 2022-08-18 RX ORDER — SENNA LEAF EXTRACT 176MG/5ML
10 SYRUP ORAL NIGHTLY
Status: DISCONTINUED | OUTPATIENT
Start: 2022-08-18 | End: 2022-08-25

## 2022-08-18 RX ADMIN — FAMOTIDINE 20 MG: 10 INJECTION, SOLUTION INTRAVENOUS at 20:59

## 2022-08-18 RX ADMIN — IPRATROPIUM BROMIDE AND ALBUTEROL SULFATE 1 AMPULE: 2.5; .5 SOLUTION RESPIRATORY (INHALATION) at 19:47

## 2022-08-18 RX ADMIN — POTASSIUM CHLORIDE 20 MEQ: 29.8 INJECTION, SOLUTION INTRAVENOUS at 16:12

## 2022-08-18 RX ADMIN — SODIUM CHLORIDE, PRESERVATIVE FREE 10 ML: 5 INJECTION INTRAVENOUS at 21:01

## 2022-08-18 RX ADMIN — IPRATROPIUM BROMIDE AND ALBUTEROL SULFATE 1 AMPULE: 2.5; .5 SOLUTION RESPIRATORY (INHALATION) at 08:32

## 2022-08-18 RX ADMIN — CHLORHEXIDINE GLUCONATE 0.12% ORAL RINSE 15 ML: 1.2 LIQUID ORAL at 20:58

## 2022-08-18 RX ADMIN — FAMOTIDINE 20 MG: 10 INJECTION, SOLUTION INTRAVENOUS at 09:31

## 2022-08-18 RX ADMIN — Medication 200 MCG/HR: at 01:42

## 2022-08-18 RX ADMIN — MIDAZOLAM 2 MG: 1 INJECTION INTRAMUSCULAR; INTRAVENOUS at 00:48

## 2022-08-18 RX ADMIN — MEROPENEM 1000 MG: 1 INJECTION, POWDER, FOR SOLUTION INTRAVENOUS at 21:01

## 2022-08-18 RX ADMIN — HYDROCODONE BITARTRATE AND ACETAMINOPHEN 10 ML: 176/5 SOLUTION ORAL at 20:58

## 2022-08-18 RX ADMIN — SODIUM CHLORIDE, PRESERVATIVE FREE 10 ML: 5 INJECTION INTRAVENOUS at 09:35

## 2022-08-18 RX ADMIN — POTASSIUM CHLORIDE 20 MEQ: 29.8 INJECTION, SOLUTION INTRAVENOUS at 00:00

## 2022-08-18 RX ADMIN — MEROPENEM 1000 MG: 1 INJECTION, POWDER, FOR SOLUTION INTRAVENOUS at 04:50

## 2022-08-18 RX ADMIN — DOCUSATE SODIUM 100 MG: 50 LIQUID ORAL at 09:38

## 2022-08-18 RX ADMIN — POLYETHYLENE GLYCOL 3350 17 G: 17 POWDER, FOR SOLUTION ORAL at 09:38

## 2022-08-18 RX ADMIN — Medication 200 MCG/HR: at 06:58

## 2022-08-18 RX ADMIN — PROPOFOL 15 MCG/KG/MIN: 10 INJECTION, EMULSION INTRAVENOUS at 07:50

## 2022-08-18 RX ADMIN — CHLORHEXIDINE GLUCONATE 0.12% ORAL RINSE 15 ML: 1.2 LIQUID ORAL at 09:25

## 2022-08-18 RX ADMIN — WHITE PETROLATUM 57.7 %-MINERAL OIL 31.9 % EYE OINTMENT: at 21:01

## 2022-08-18 RX ADMIN — IPRATROPIUM BROMIDE AND ALBUTEROL SULFATE 1 AMPULE: 2.5; .5 SOLUTION RESPIRATORY (INHALATION) at 12:25

## 2022-08-18 RX ADMIN — CALCIUM GLUCONATE 1000 MG: 20 INJECTION, SOLUTION INTRAVENOUS at 09:30

## 2022-08-18 RX ADMIN — ENOXAPARIN SODIUM 40 MG: 100 INJECTION SUBCUTANEOUS at 20:59

## 2022-08-18 RX ADMIN — POTASSIUM CHLORIDE 20 MEQ: 29.8 INJECTION, SOLUTION INTRAVENOUS at 22:32

## 2022-08-18 RX ADMIN — ENOXAPARIN SODIUM 40 MG: 100 INJECTION SUBCUTANEOUS at 09:35

## 2022-08-18 RX ADMIN — METOPROLOL TARTRATE 25 MG: 25 TABLET, FILM COATED ORAL at 20:58

## 2022-08-18 RX ADMIN — PROPOFOL 25 MCG/KG/MIN: 10 INJECTION, EMULSION INTRAVENOUS at 16:02

## 2022-08-18 RX ADMIN — POTASSIUM CHLORIDE 20 MEQ: 29.8 INJECTION, SOLUTION INTRAVENOUS at 18:13

## 2022-08-18 RX ADMIN — GABAPENTIN 400 MG: 400 CAPSULE ORAL at 20:59

## 2022-08-18 RX ADMIN — FUROSEMIDE 40 MG: 10 INJECTION, SOLUTION INTRAMUSCULAR; INTRAVENOUS at 04:50

## 2022-08-18 RX ADMIN — FUROSEMIDE 40 MG: 10 INJECTION, SOLUTION INTRAMUSCULAR; INTRAVENOUS at 14:07

## 2022-08-18 RX ADMIN — Medication 100 MCG/HR: at 23:55

## 2022-08-18 RX ADMIN — FUROSEMIDE 40 MG: 10 INJECTION, SOLUTION INTRAMUSCULAR; INTRAVENOUS at 22:31

## 2022-08-18 RX ADMIN — IPRATROPIUM BROMIDE AND ALBUTEROL SULFATE 1 AMPULE: 2.5; .5 SOLUTION RESPIRATORY (INHALATION) at 16:07

## 2022-08-18 RX ADMIN — GABAPENTIN 400 MG: 400 CAPSULE ORAL at 14:07

## 2022-08-18 RX ADMIN — Medication 150 MCG/HR: at 13:22

## 2022-08-18 RX ADMIN — PROPOFOL 15 MCG/KG/MIN: 10 INJECTION, EMULSION INTRAVENOUS at 21:08

## 2022-08-18 RX ADMIN — MEROPENEM 1000 MG: 1 INJECTION, POWDER, FOR SOLUTION INTRAVENOUS at 13:12

## 2022-08-18 RX ADMIN — POTASSIUM CHLORIDE 20 MEQ: 29.8 INJECTION, SOLUTION INTRAVENOUS at 01:22

## 2022-08-18 RX ADMIN — PROPOFOL 15 MCG/KG/MIN: 10 INJECTION, EMULSION INTRAVENOUS at 01:24

## 2022-08-18 ASSESSMENT — PULMONARY FUNCTION TESTS
PIF_VALUE: 34
PIF_VALUE: 33
PIF_VALUE: 35
PIF_VALUE: 33
PIF_VALUE: 36
PIF_VALUE: 33
PIF_VALUE: 35
PIF_VALUE: 33
PIF_VALUE: 35
PIF_VALUE: 33
PIF_VALUE: 36
PIF_VALUE: 35
PIF_VALUE: 35
PIF_VALUE: 33

## 2022-08-18 ASSESSMENT — PAIN SCALES - GENERAL
PAINLEVEL_OUTOF10: 0

## 2022-08-18 ASSESSMENT — ENCOUNTER SYMPTOMS: ABDOMINAL DISTENTION: 1

## 2022-08-18 NOTE — PROGRESS NOTES
Pulmonary Progress Note    Date of Admission: 8/5/2022   LOS: 13 days     CC:  Chief Complaint   Patient presents with    Abdominal Pain    Chest Pain     Sub-sternal, pressure, onset 1300 hours today    Shortness of Breath     EMS reports pt 88% on RA           Assessment/Plan     Acute Hypoxemic Respiratory Failure with SAO2 <90% on Room Air  -Full vent support, Wean supplemental oxygen to goal saturation of >90%    Aspiration Pneumonia  -Merrem  -Respiratory culture pending, check procal    Acute on Chronic Diastolic Heart failure  -Continue TID Lasix    Recurrent SVT  Started on beta-blocker p.o. Partial SBO  -resolved    CHARLENE  -Stable creatinine    EARNESTINE/OHS  -Currently on ventilator    Morbid obesity  -BMI 56    Peridex  Lovenox  Pepcid    Due to the immediate potential for life-threatening deterioration due to respiratory failure, I spent 31 minutes providing critical care. This time is excluding time spent performing separately billable procedures. HPI/Subjective  Fever curve continues to improve. Good urine output but minimal effect on fluid balance due to high intake. ROS: UnAble to obtain due to mechanical ventilation      Intake/Output Summary (Last 24 hours) at 8/18/2022 0817  Last data filed at 8/18/2022 0428  Gross per 24 hour   Intake 3260.67 ml   Output 3340 ml   Net -79.33 ml         PHYSICAL EXAM:   Blood pressure (!) 83/57, pulse 77, temperature 99.5 °F (37.5 °C), temperature source Axillary, resp. rate 18, height 5' 7\" (1.702 m), weight (!) 361 lb 11.2 oz (164.1 kg), SpO2 93 %.'  Gen:  No acute distress. Eyes: PERRL. Anicteric sclera. No conjunctival injection. ENT: No discharge. OP with ETT external appearance of ears and nose normal.  Neck: Trachea midline. No mass   Resp:  No crackles. No wheezes. No rhonchi. No dullness on percussion. CV: Regular rate. Regular rhythm. No murmur or rub.  + Edema. GI: Soft, Non-tender. Obese abdomen. +BS  Skin: Warm, dry, w/o erythema. Lymph: No cervical or supraclavicular LAD. M/S: No cyanosis. No clubbing. Neuro:  no focal neurologic deficit. Moves all extremities  Psych: Awake and alert, Oriented x 3. Judgement and insight appropriate. Mood stable. Medications:    Scheduled Meds:   calcium gluconate-NaCl  1,000 mg IntraVENous Once    furosemide  40 mg IntraVENous 3 times per day    metoprolol tartrate  25 mg Oral BID    meropenem  1,000 mg IntraVENous Q8H    famotidine (PEPCID) injection  20 mg IntraVENous BID    chlorhexidine  15 mL Mouth/Throat BID    ipratropium-albuterol  1 ampule Inhalation Q4H WA    sodium chloride flush  5-40 mL IntraVENous 2 times per day    [Held by provider] gabapentin  800 mg Oral TID    [Held by provider] levothyroxine  200 mcg Oral Daily    [Held by provider] spironolactone  50 mg Oral Daily    enoxaparin  40 mg SubCUTAneous BID       Continuous Infusions:   fentaNYL 200 mcg/hr (08/18/22 0658)    propofol 15 mcg/kg/min (08/18/22 0750)    sodium chloride         PRN Meds:  artificial tears, acetaminophen, potassium chloride, midazolam, fentaNYL **AND** fentaNYL, acetaminophen, sodium chloride flush, sodium chloride, ondansetron **OR** ondansetron, diazePAM    Labs reviewed:  CBC:   Recent Labs     08/16/22  0422 08/17/22  0452 08/18/22  0400   WBC 9.3 7.4 7.6   HGB 9.7* 9.4* 9.0*   HCT 29.1* 28.2* 27.0*   MCV 95.6 94.2 95.2   * 539* 497*     BMP:   Recent Labs     08/16/22  0422 08/16/22  1420 08/17/22  0452 08/17/22  1345 08/17/22  2216 08/18/22  0400      < > 141 139 142 142   K 3.6   < > 3.4* 3.5 3.5 3.9      < > 105 103 105 106   CO2 29   < > 28 28 29 29   PHOS 2.5  --  2.3*  --   --  2.9   BUN 43*   < > 40* 43* 38* 46*   CREATININE 1.2   < > 1.1 1.0 1.2 1.3    < > = values in this interval not displayed. LIVER PROFILE: No results for input(s): AST, ALT, LIPASE, BILIDIR, BILITOT, ALKPHOS in the last 72 hours. Invalid input(s):   AMYLASE,  ALB  PT/INR: No results for

## 2022-08-18 NOTE — PROCEDURES
Arterial Catheter Insertion Procedure Note    Consent: The spouse was counseled regarding the procedure, its indications, risks, potential complications and alternatives, and any questions were answered. Consent was obtained to proceed. Procedure: Insertion of Arterial Catheter    Indications:  Hypotension, Shock    Estimated Blood Loss: Minimal    Procedure Details   Informed consent was obtained for the procedure, including sedation. Risks of hemorrhage, arrhythmia, infection and adverse drug reaction were discussed. Ultrasound used and Right Radial artery was noted to be patent. Under sterile conditions the skin above the Right Radial artery was prepped with Chloraprep and covered with a sterile drape after donning mask, sterile gown, annd sterile gloves. Local anesthesia using 1% Lidocaine was injected into the skin and subcutaneous tissues. A 22-gauge needle was inserted into the Right Radial artery under active ultrasound guidance. A guide wire was then passed easily through the needle which was advanced with no resistance. Good pulsatile blood returned. The catheter was advanced over the existing wire without resistance or complication and subsequently sutured into place after pressure tubing connected and waveform verified on monitor. Findings: There were no complications nor changes to vital signs. Patient tolerated procedure well.     Total time of procedure: 15 minutes    [facility]

## 2022-08-18 NOTE — PROGRESS NOTES
Progress Note  Date:2022       Room:[place] [address]  Patient Name:Miles Vanegas     YOB: 1960     Age:62 y.o. Subjective    Subjective:  Symptoms:  Stable. Activity level: Impaired due to weakness. Review of Systems   Unable to perform ROS: Intubated   Gastrointestinal:  Positive for abdominal distention. Objective         Vitals Last 24 Hours:  TEMPERATURE:  Temp  Av.7 °F (37.1 °C)  Min: 97.8 °F (36.6 °C)  Max: 99.7 °F (37.6 °C)  RESPIRATIONS RANGE: Resp  Av.9  Min: 11  Max: 20  PULSE OXIMETRY RANGE: SpO2  Av.2 %  Min: 92 %  Max: 97 %  PULSE RANGE: Pulse  Av.8  Min: 74  Max: 92  BLOOD PRESSURE RANGE: Systolic (15TLW), GOL:586 , Min:83 , EYZ:294   ; Diastolic (54KPF), DNF:78, Min:57, Max:90    I/O (24Hr): Intake/Output Summary (Last 24 hours) at 2022 0745  Last data filed at 2022 0428  Gross per 24 hour   Intake 3335.67 ml   Output 3790 ml   Net -454.33 ml       Objective:  General Appearance:  Comfortable. Vital signs: (most recent): Blood pressure (!) 83/57, pulse 77, temperature 99.5 °F (37.5 °C), temperature source Axillary, resp. rate 18, height 5' 7\" (1.702 m), weight (!) 361 lb 11.2 oz (164.1 kg), SpO2 93 %. Output: Producing urine and producing stool. Lungs:  (ventilator)  Skin:  Warm and dry.     Labs/Imaging/Diagnostics    Labs:  CBC:  Recent Labs     22  0422 22  0452 22  0400   WBC 9.3 7.4 7.6   RBC 3.04* 2.99* 2.83*   HGB 9.7* 9.4* 9.0*   HCT 29.1* 28.2* 27.0*   MCV 95.6 94.2 95.2   RDW 17.4* 17.4* 17.1*   * 539* 497*       CHEMISTRIES:  Recent Labs     22  0422 22  1420 22  0452 22  1345 22  2216 22  0400      < > 141 139 142 142   K 3.6   < > 3.4* 3.5 3.5 3.9      < > 105 103 105 106   CO2 29   < > 28 28 29 29   BUN 43*   < > 40* 43* 38* 46*   CREATININE 1.2   < > 1.1 1.0 1.2 1.3   GLUCOSE 88   < > 89 90 96 96   PHOS 2.5  --  2.3*  --   --  2.9   MG 1.80  --  2.00  --   --  2.10    < > = values in this interval not displayed. PT/INR:No results for input(s): PROTIME, INR in the last 72 hours. APTT:No results for input(s): APTT in the last 72 hours. LIVER PROFILE:No results for input(s): AST, ALT, BILIDIR, BILITOT, ALKPHOS in the last 72 hours. Imaging Last 24 Hours:  XR CHEST PORTABLE    Result Date: 8/15/2022  EXAMINATION: ONE XRAY VIEW OF THE CHEST 8/15/2022 11:13 am COMPARISON: Chest x-ray dated 14 August 2022 HISTORY: ORDERING SYSTEM PROVIDED HISTORY: OG placement TECHNOLOGIST PROVIDED HISTORY: Reason for exam:->OG placement Reason for Exam: og place FINDINGS: Orogastric tube with tip and side-port in the stomach. Endotracheal tube with the tip above the mady. Mild cardiomegaly. Stable appearance of perihilar opacities. Small left pleural effusion     1. Enteric tube tip and side-port in the stomach. 2.  Stable appearance of perihilar opacities. Small left pleural effusion     XR CHEST PORTABLE    Result Date: 8/14/2022  EXAMINATION: ONE XRAY VIEW OF THE CHEST 8/14/2022 11:05 am COMPARISON: 08/13/2022 radiograph HISTORY: ORDERING SYSTEM PROVIDED HISTORY: hypoxia TECHNOLOGIST PROVIDED HISTORY: Reason for exam:->hypoxia FINDINGS: Endotracheal tube is positioned above the mady, stable. Enteric tube extends below field of view into the abdomen. The heart is enlarged. There are moderate perihilar and central ground-glass opacities bilaterally. Relatively stable appearance from prior imaging. Small left pleural effusion noted. No significant skeletal finding. Relatively stable perihilar and central airspace opacities in the lungs.      Assessment//Plan           Hospital Problems             Last Modified POA    * (Principal) Partial bowel obstruction ([address] 8/5/2022 Yes    Hiatal hernia 8/7/2022 Yes    Acute respiratory failure with hypoxia and hypercapnia ([address] 8/8/2022 Yes    SBO (small bowel obstruction) ([address] 8/9/2022 Yes Aspiration into airway 8/7/2022 Yes    Acute respiratory failure with hypoxia ([address] 8/8/2022 Yes    Small bowel obstruction ([address] 8/9/6769 Yes    Diastolic heart failure ([address] 8/14/2022 Yes    History of atrial fibrillation 8/14/2022 Yes    Supraventricular tachycardia ([address] 8/14/2022 Yes   Assessment:    Condition: In stable condition. Unchanged.      Remains on ventilator    Tolerating tube feeds via OG   No sign of obstruction   Continue for now   When extubation is approaching will hold tube feeds   Remove OG when extubated     Electronically signed by JARON Nicole CNP on 8/18/2022 at 7:45 AM    As above  Continue supportive care  Tube feeds at goal    Will follow along    Electronically signed by Ellen Locke MD on 8/18/2022 at 12:31 PM

## 2022-08-18 NOTE — CARE COORDINATION
Discharge Planning:    Per attending physician, Dr. Brown Moreno, it is appropriate to seek referral for [facility] placement. Spoke with patient's wife at bedside. She verbalizes understanding and agreement with seeking care at an [facility] if appropriate as hospitalization progresses.  Referral placed to [facility] per patient's wife's request.    Electronically signed by Genevieve August RN on 8/18/2022 at 3:00 PM

## 2022-08-18 NOTE — PROGRESS NOTES
Comprehensive Nutrition Assessment    Type and Reason for Visit:  Reassess    Nutrition Recommendations/Plan:   NPO  Vital HP @ 35ml/hr  Administer Proteinex four times daily  Water flush 200ml every 4 hours per nephrology      Malnutrition Assessment:  Malnutrition Status: At risk for malnutrition (Comment) (08/11/22 1467)    Context:  Acute Illness       Nutrition Assessment:    Follow-up. Pt remains intubated and sedated. On fentanyl and propofol. Propofol rate remains the same since last assessment at 14.2ml/hr providing 375 kcals daily from lipids. Vital HP at goal rate 35ml/hr + four proteinex daily to meet nutrition needs. Will continue current nutrition regimen and monitor for possible extubation. Nutrition Related Findings:    +13.6 liters. Labs reviewed. +BM 8/18 Wound Type: None       Current Nutrition Intake & Therapies:    Average Meal Intake: NPO  Average Supplements Intake: NPO  Diet NPO  ADULT TUBE FEEDING; Orogastric; Peptide Based High Protein; Continuous; 20; Yes; 10; Q 4 hours; 35; 100; Q 4 hours; Protein; Four proteinex daily  Current Tube Feeding (TF) Orders:  Goal TF & Flush Orders Provides: Vital HP @ 35ml/hr + 4 proteinex daily. This EN regimen provides 1000 ml TV, 1116 kcals, 165g protein, and 585ml free water. (Calculated x 20 hours to account for routine nursing care)    Anthropometric Measures:  Height: 5' 7\" (170.2 cm)  Ideal Body Weight (IBW): 148 lbs (67 kg)    Admission Body Weight: 340 lb (154.2 kg)  Current Body Weight: 361 lb (163.7 kg), 243.9 % IBW.  Weight Source: Bed Scale  Current BMI (kg/m2): 56.5  Weight Adjustment For: No Adjustment  BMI Categories: Obese Class 3 (BMI 40.0 or greater)    Estimated Daily Nutrient Needs:  Energy Requirements Based On: Kcal/kg  Weight Used for Energy Requirements: Ideal  Energy (kcal/day): 9663-6257 (22-25kcal/67kg)  Weight Used for Protein Requirements: Ideal  Protein (g/day): 134-168 (2-2.5kcal/67kg)  Method Used for Fluid Requirements: Other (Comment)  Fluid (ml/day): per provider    Nutrition Diagnosis:   Inadequate oral intake related to impaired respiratory function as evidenced by NPO or clear liquid status due to medical condition, intubation    Nutrition Interventions:   Food and/or Nutrient Delivery: Continue NPO, Continue Current Tube Feeding  Nutrition Education/Counseling: Education not indicated  Coordination of Nutrition Care: Continue to monitor while inpatient     Goals:  Previous Goal Met: Progressing toward Goal(s)  Goals: Tolerate nutrition support at goal rate     Nutrition Monitoring and Evaluation:   Behavioral-Environmental Outcomes: None Identified  Food/Nutrient Intake Outcomes: Enteral Nutrition Intake/Tolerance, IVF Intake  Physical Signs/Symptoms Outcomes: Biochemical Data, Chewing or Swallowing, GI Status, Nausea or Vomiting, Fluid Status or Edema, Hemodynamic Status, Weight    Discharge Planning:     Too soon to determine     Ravi Flynn, [address], [place]  Contact: [phone]

## 2022-08-18 NOTE — PROGRESS NOTES
[facility]                                               Progress note    Summary:   Álvaro Argueta is being seen by nephrology for CHARLENE and hypernatremia. Admitted with paraesophageal hernia and SBO. Had EGD 8/9 with biopsy. Still NPO     Interval History  Seen and examined at bedside  He is awake  Does have some edema    Blood pressure 101/70  Not on pressors  60% FiO2 PEEP 14, stable over the last few days  Urine output 3.7 L yesterday negative 454 cc for the day  His free water is been reduced 200 cc every 4    Labs reviewed  Sodium 142 potassium 3.9 bicarb 29 BUN 46 creatinine 1.3 calcium 7.7 phosphorus 2.9  Hemoglobin 9    Plan:   Negative fluid balance past day but still 13 L positive for admission  We will continue with Lasix 40 mg IV every 8 hours  Free water was decreased 200 cc every 4, agree  There are no acute electrolyte abnormalities  The creatinine is rising slightly, will monitor closely. Jevon Hunt MD  [facility]  Office: [phone]    Assessment:   Acute kidney injury  Urine Na < 20    creatinine on admission 1   next day on 08/06/2022 was 0.8  suddenly jumped up to 2.7. Due to episode of severe hypotension with blood pressure drop 78/44. also got CT with IV contrast on 08/05/2022 but most likely  this is ATN related due to hypotension and hypercapnic  respiratory failure. CT angio s normal renal arteries and normal kidneys      Sepsis/ possible septic shock. emperature was 101.1   High white cell count. '  immunocompromised due to rheumatoid arthritis and treatment. History of sleep apnea   on CPAP. Acute respiratory failure with high pCO2 in the range of 50s with pH  of 7.24    now intubated. History of abnormal stress test in 06/2022 with stress-induced  ischemia and some scarring. Ejection fraction  was  64%.     had a followup angiogram which was reported as normal as per wife.   I do not have that result     History of rheumatoid arthritis   longstanding,was on methotrexate,  has taken Celebrex and now on Inflectra infusions,  so he is immunocompromised. Irish Munozbach is now helping him with his rheumatoid tremendously. he has been on steroids in the past.     Rule out adrenal insufficiency. cortisol level. normal      History of thyroidectomy in the past. .    Small bowel obstruction. Large hiatus hernia with colon and stomach in the chest.  Further management as per medical team.       ROS:   Unable to assess      PMH:   Past medical history, surgical history, social history, family history are reviewed and updated as appropriate. Reviewed current medication list.   Allergies reviewed and updated as needed. PE:   Vitals:    08/18/22 1000   BP: 101/70   Pulse: 81   Resp: 18   Temp:    SpO2: 90%       General appearance:  in NAD, intubated and sedated  HEENT: EOM intact, no icterus. Trachea is midline. Neck : No masses, appears symmetrical  Respiratory: Respiratory effort appears normal, bilateral equal chest rise, no wheeze, no crackles ETT to vent   Cardiovascular: Ausculation shows RRR trace edema  Abdomen: mildly distended but soft   Musculoskeletal:  Joints with no swelling or deformity. Skin:no rashes, ulcers, induration, no jaundice.    Neuro: sedated      Lab Results   Component Value Date    CREATININE 1.3 08/18/2022    BUN 46 (H) 08/18/2022     08/18/2022    K 3.9 08/18/2022     08/18/2022    CO2 29 08/18/2022      Lab Results   Component Value Date    WBC 7.6 08/18/2022    HGB 9.0 (L) 08/18/2022    HCT 27.0 (L) 08/18/2022    MCV 95.2 08/18/2022     (H) 08/18/2022     Lab Results   Component Value Date    CALCIUM 7.7 (L) 08/18/2022    CAION 1.06 (L) 08/15/2022    PHOS 2.9 08/18/2022

## 2022-08-18 NOTE — PROGRESS NOTES
Hospitalist Progress Note  8/18/2022 9:14 AM    PCP: Rosy Mendoza MD    [phone]     Date of Admission: 8/5/2022                                                                                                                     HOSPITAL COURSE    Patient demographics:  The patient  Jose Manuel Chambers is a 63 y.o. male     Significant past medical history:   Patient Active Problem List   Diagnosis    Partial bowel obstruction (HCC)    Hiatal hernia    Acute respiratory failure with hypoxia and hypercapnia (HCC)    SBO (small bowel obstruction) (HCC)    Aspiration into airway    Acute respiratory failure with hypoxia (HCC)    Small bowel obstruction (HCC)    Diastolic heart failure (HCC)    History of atrial fibrillation    Supraventricular tachycardia ([facility] [address]         Presenting symptoms:  abdominal pain    Diagnostic workup:      CONSULTS DURING ADMISSION :   IP CONSULT TO GENERAL SURGERY  IP CONSULT TO HOSPITALIST  IP CONSULT TO GENERAL SURGERY  IP CONSULT TO DIETITIAN  IP CONSULT TO CARDIOLOGY      Patient was diagnosed with:  Low grade or Partial Bowel obstruction  Pneumonia and septic shock. Hiatal hernia  Atrial Fibrillation      Treatment while inpatient:  63years old male with medical history significant for morbid obesity, paraesophageal hernia. Patient presented to the emergency room with abdominal pain nausea vomiting and diarrhea. Patient was diagnosed with small bowel obstruction. Patient was also diagnosed with pneumonia and septic shock. Patient developed acute respiratory failure and acute renal failure possibly General patient was started on the ventilator and supported with pressors.                                                                                            ----------------------------------------------------------      SUBJECTIVE COMPLAINTS- follow up for abdominal pain    Diet: Diet NPO  ADULT TUBE FEEDING; Orogastric; Peptide Based High Protein; Continuous; 20; Yes; 10; Q 4 hours; 35; 100; Q 4 hours; Protein;  Four proteinex daily      OBJECTIVE:   Patient Active Problem List   Diagnosis    Partial bowel obstruction (HCC)    Hiatal hernia    Acute respiratory failure with hypoxia and hypercapnia (HCC)    SBO (small bowel obstruction) (HCC)    Aspiration into airway    Acute respiratory failure with hypoxia (HCC)    Small bowel obstruction (HCC)    Diastolic heart failure (HCC)    History of atrial fibrillation    Supraventricular tachycardia (HCC)       Allergies  Codeine    Medications    Scheduled Meds:   calcium gluconate-NaCl  1,000 mg IntraVENous Once    gabapentin  400 mg Oral TID    docusate  100 mg Oral Daily    senna  10 mL Oral Nightly    polyethylene glycol  17 g Oral Daily    furosemide  40 mg IntraVENous 3 times per day    metoprolol tartrate  25 mg Oral BID    meropenem  1,000 mg IntraVENous Q8H    famotidine (PEPCID) injection  20 mg IntraVENous BID    chlorhexidine  15 mL Mouth/Throat BID    ipratropium-albuterol  1 ampule Inhalation Q4H WA    sodium chloride flush  5-40 mL IntraVENous 2 times per day    [Held by provider] levothyroxine  200 mcg Oral Daily    [Held by provider] spironolactone  50 mg Oral Daily    enoxaparin  40 mg SubCUTAneous BID     Continuous Infusions:   fentaNYL 200 mcg/hr (08/18/22 0658)    propofol 15 mcg/kg/min (08/18/22 0750)    sodium chloride       PRN Meds:  artificial tears, acetaminophen, potassium chloride, midazolam, fentaNYL **AND** fentaNYL, acetaminophen, sodium chloride flush, sodium chloride, ondansetron **OR** ondansetron, diazePAM    Vitals   Vitals /wt Patient Vitals for the past 8 hrs:   BP Temp Temp src Pulse Resp SpO2 Weight   08/18/22 0834 -- -- -- 77 18 93 % --   08/18/22 0600 (!) 83/57 -- -- 77 18 93 % --   08/18/22 0500 90/60 -- -- 77 18 94 % --   08/18/22 0400 (!) 84/58 99.5 °F (37.5 °C) Axillary 77 18 93 % (!) 361 lb 11.2 oz (164.1 kg)   08/18/22 0300 109/75 -- -- 84 18 94 % --   08/18/22 0200 107/74 -- -- 87 18 93 % --        72HR INTAKE/OUTPUT:    Intake/Output Summary (Last 24 hours) at 8/18/2022 0914  Last data filed at 8/18/2022 0428  Gross per 24 hour   Intake 3260.67 ml   Output 3340 ml   Net -79.33 ml       Exam:    Gen:  intubated and sedated  Eyes: PERRL. No sclera icterus. No conjunctival injection. ENT: No discharge. Pharynx clear. External appearance of ears and nose normal.  Neck: Trachea midline. No obvious mass. Resp: No accessory muscle use. No crackles. No wheezes. No rhonchi. CV: Regular rate. Regular rhythm. No murmur or rub. No edema. GI: Non-tender. Non-distended. No hernia. Skin: Warm, dry, normal texture and turgor. Lymph: No cervical LAD. No supraclavicular LAD. M/S: / Ext. No cyanosis. No clubbing. No joint deformity. Neuro: CN 2-12 are intact,  no neurologic deficits noted. PT/INR:   No results for input(s): PROTIME, INR in the last 72 hours. APTT: No results for input(s): APTT in the last 72 hours. CBC:   Recent Labs     08/16/22  0422 08/17/22  0452 08/18/22  0400   WBC 9.3 7.4 7.6   HGB 9.7* 9.4* 9.0*   HCT 29.1* 28.2* 27.0*   MCV 95.6 94.2 95.2   * 539* 497*       BMP:   Recent Labs     08/16/22  0422 08/16/22  1420 08/17/22  0452 08/17/22  1345 08/17/22  2216 08/18/22  0400      < > 141 139 142 142   K 3.6   < > 3.4* 3.5 3.5 3.9      < > 105 103 105 106   CO2 29   < > 28 28 29 29   PHOS 2.5  --  2.3*  --   --  2.9   BUN 43*   < > 40* 43* 38* 46*   CREATININE 1.2   < > 1.1 1.0 1.2 1.3    < > = values in this interval not displayed. LIVER PROFILE:   No results for input(s): ALKPHOS, AST, ALT, ALB, BILIDIR, BILITOT, ALKPHOS in the last 72 hours. No results for input(s): AMYLASE in the last 72 hours. No results for input(s): LIPASE in the last 72 hours. UA:  Recent Labs     08/15/22  0916   WBCUA 6-9*   RBCUA *   MUCUS Rare*         TROPONIN:   No results for input(s): Christel Mooney in the last 72 hours.       Lab Results   Component Value Date/Time    URRFLXCULT Not Indicated 08/15/2022 09:16 AM       No results for input(s): TSHREFLEX in the last 72 hours. No components found for: [id number]  POC GLUCOSE:    Recent Labs     08/17/22  0445 08/17/22  0741 08/17/22  1136 08/17/22  1526 08/18/22  0734   POCGLU 95 88 93 97 92     No results for input(s): LABA1C in the last 72 hours. No results found for: LABA1C    Echocardiogram shows normal ejection fraction  Grade 1 diastolic dysfunction      ASSESSMENT AND PLAN      Acute respiratory failure  Intubated 8/8  Patient remains on the ventilator  Remains on Oxygen FiO2 60%  Aspiration pna  Status post Zosyn  Now on meropenem only    Acute on chronic diastolic CHF  Patient has grade 1 diastolic dysfunction  Fluid overload status  Continues to be on 3 times daily Lasix  Continue to monitor creatinine        Partial small Bowel obstruction  CT scan of the abdomen with contrast shows no obstruction  Og placed per GI 8/10  BM 8/9  Tolerating tube feed  General surgery is following      SVT  better  H/o atrial fibrillation  HR controled  Cardiology is following          CHARLENE  Cr 1.2  Nephrology is following      Hypotention  Wean off pressors    Hiatal hernia  - CT chest/abd/pelv: Large hiatal hernia containing much of the stomach which is distended       Atrial Fibrillation  Patient developed the rapid ventricular rate  IV Lopressor  Consult to cardiology     HTN  - monitor blood pressure  - restart home meds when able        Code Status: Full Code        Dispo - cc        The patient and / or the family were informed of the results of any tests, a time was given to answer questions, a plan was proposed and they agreed with plan. Shelli Salgado MD    This note was transcribed using [id number]. Please disregard any translational errors.

## 2022-08-19 ENCOUNTER — APPOINTMENT (OUTPATIENT)
Dept: GENERAL RADIOLOGY | Age: 62
DRG: 853 | End: 2022-08-19
Payer: COMMERCIAL

## 2022-08-19 LAB
ANION GAP SERPL CALCULATED.3IONS-SCNC: 10 MMOL/L (ref 3–16)
ANION GAP SERPL CALCULATED.3IONS-SCNC: 11 MMOL/L (ref 3–16)
ANION GAP SERPL CALCULATED.3IONS-SCNC: 9 MMOL/L (ref 3–16)
BASE EXCESS ARTERIAL: 6.3 MMOL/L (ref -3–3)
BASOPHILS ABSOLUTE: 0 K/UL (ref 0–0.2)
BASOPHILS RELATIVE PERCENT: 0.2 %
BLOOD CULTURE, ROUTINE: NORMAL
BUN BLDV-MCNC: 50 MG/DL (ref 7–20)
BUN BLDV-MCNC: 52 MG/DL (ref 7–20)
BUN BLDV-MCNC: 53 MG/DL (ref 7–20)
CALCIUM SERPL-MCNC: 7.9 MG/DL (ref 8.3–10.6)
CALCIUM SERPL-MCNC: 8.4 MG/DL (ref 8.3–10.6)
CALCIUM SERPL-MCNC: 8.6 MG/DL (ref 8.3–10.6)
CARBOXYHEMOGLOBIN ARTERIAL: 0.9 % (ref 0–1.5)
CHLORIDE BLD-SCNC: 106 MMOL/L (ref 99–110)
CHLORIDE BLD-SCNC: 106 MMOL/L (ref 99–110)
CHLORIDE BLD-SCNC: 107 MMOL/L (ref 99–110)
CO2: 26 MMOL/L (ref 21–32)
CO2: 28 MMOL/L (ref 21–32)
CO2: 30 MMOL/L (ref 21–32)
CREAT SERPL-MCNC: 1.1 MG/DL (ref 0.8–1.3)
CREAT SERPL-MCNC: 1.2 MG/DL (ref 0.8–1.3)
CREAT SERPL-MCNC: 1.3 MG/DL (ref 0.8–1.3)
CULTURE, BLOOD 2: NORMAL
EOSINOPHILS ABSOLUTE: 0.2 K/UL (ref 0–0.6)
EOSINOPHILS RELATIVE PERCENT: 1.9 %
GFR AFRICAN AMERICAN: >60
GFR NON-AFRICAN AMERICAN: 56
GFR NON-AFRICAN AMERICAN: >60
GFR NON-AFRICAN AMERICAN: >60
GLUCOSE BLD-MCNC: 104 MG/DL (ref 70–99)
GLUCOSE BLD-MCNC: 110 MG/DL (ref 70–99)
GLUCOSE BLD-MCNC: 111 MG/DL (ref 70–99)
GLUCOSE BLD-MCNC: 113 MG/DL (ref 70–99)
GLUCOSE BLD-MCNC: 114 MG/DL (ref 70–99)
GLUCOSE BLD-MCNC: 95 MG/DL (ref 70–99)
GLUCOSE BLD-MCNC: 96 MG/DL (ref 70–99)
GLUCOSE BLD-MCNC: 99 MG/DL (ref 70–99)
HCO3 ARTERIAL: 29.5 MMOL/L (ref 21–29)
HCT VFR BLD CALC: 27.2 % (ref 40.5–52.5)
HEMOGLOBIN, ART, EXTENDED: 9.5 G/DL (ref 13.5–17.5)
HEMOGLOBIN: 9.1 G/DL (ref 13.5–17.5)
LYMPHOCYTES ABSOLUTE: 0.7 K/UL (ref 1–5.1)
LYMPHOCYTES RELATIVE PERCENT: 8.7 %
MAGNESIUM: 2.1 MG/DL (ref 1.8–2.4)
MCH RBC QN AUTO: 31.6 PG (ref 26–34)
MCHC RBC AUTO-ENTMCNC: 33.3 G/DL (ref 31–36)
MCV RBC AUTO: 95 FL (ref 80–100)
METHEMOGLOBIN ARTERIAL: 0.8 %
MONOCYTES ABSOLUTE: 0.6 K/UL (ref 0–1.3)
MONOCYTES RELATIVE PERCENT: 7.6 %
NEUTROPHILS ABSOLUTE: 6.5 K/UL (ref 1.7–7.7)
NEUTROPHILS RELATIVE PERCENT: 81.6 %
O2 SAT, ARTERIAL: 99.5 %
O2 THERAPY: ABNORMAL
PCO2 ARTERIAL: 35.9 MMHG (ref 35–45)
PDW BLD-RTO: 17.2 % (ref 12.4–15.4)
PERFORMED ON: ABNORMAL
PERFORMED ON: ABNORMAL
PERFORMED ON: NORMAL
PH ARTERIAL: 7.52 (ref 7.35–7.45)
PHOSPHORUS: 2.7 MG/DL (ref 2.5–4.9)
PLATELET # BLD: 482 K/UL (ref 135–450)
PMV BLD AUTO: 7.2 FL (ref 5–10.5)
PO2 ARTERIAL: 120 MMHG (ref 75–108)
POTASSIUM SERPL-SCNC: 3.3 MMOL/L (ref 3.5–5.1)
POTASSIUM SERPL-SCNC: 3.6 MMOL/L (ref 3.5–5.1)
POTASSIUM SERPL-SCNC: 4.2 MMOL/L (ref 3.5–5.1)
PRO-BNP: 215 PG/ML (ref 0–124)
RBC # BLD: 2.86 M/UL (ref 4.2–5.9)
SODIUM BLD-SCNC: 144 MMOL/L (ref 136–145)
SODIUM BLD-SCNC: 144 MMOL/L (ref 136–145)
SODIUM BLD-SCNC: 145 MMOL/L (ref 136–145)
TCO2 ARTERIAL: 30.6 MMOL/L
WBC # BLD: 8 K/UL (ref 4–11)

## 2022-08-19 PROCEDURE — 94640 AIRWAY INHALATION TREATMENT: CPT

## 2022-08-19 PROCEDURE — 6370000000 HC RX 637 (ALT 250 FOR IP): Performed by: INTERNAL MEDICINE

## 2022-08-19 PROCEDURE — 84100 ASSAY OF PHOSPHORUS: CPT

## 2022-08-19 PROCEDURE — 99291 CRITICAL CARE FIRST HOUR: CPT | Performed by: INTERNAL MEDICINE

## 2022-08-19 PROCEDURE — 36415 COLL VENOUS BLD VENIPUNCTURE: CPT

## 2022-08-19 PROCEDURE — 37799 UNLISTED PX VASCULAR SURGERY: CPT

## 2022-08-19 PROCEDURE — 2580000003 HC RX 258: Performed by: NURSE PRACTITIONER

## 2022-08-19 PROCEDURE — 6370000000 HC RX 637 (ALT 250 FOR IP): Performed by: NURSE PRACTITIONER

## 2022-08-19 PROCEDURE — 6360000002 HC RX W HCPCS: Performed by: HOSPITALIST

## 2022-08-19 PROCEDURE — 6360000002 HC RX W HCPCS: Performed by: NURSE PRACTITIONER

## 2022-08-19 PROCEDURE — 71045 X-RAY EXAM CHEST 1 VIEW: CPT

## 2022-08-19 PROCEDURE — 2500000003 HC RX 250 WO HCPCS: Performed by: HOSPITALIST

## 2022-08-19 PROCEDURE — 97163 PT EVAL HIGH COMPLEX 45 MIN: CPT

## 2022-08-19 PROCEDURE — 80048 BASIC METABOLIC PNL TOTAL CA: CPT

## 2022-08-19 PROCEDURE — 94761 N-INVAS EAR/PLS OXIMETRY MLT: CPT

## 2022-08-19 PROCEDURE — 97530 THERAPEUTIC ACTIVITIES: CPT

## 2022-08-19 PROCEDURE — 83735 ASSAY OF MAGNESIUM: CPT

## 2022-08-19 PROCEDURE — 97167 OT EVAL HIGH COMPLEX 60 MIN: CPT

## 2022-08-19 PROCEDURE — 2580000003 HC RX 258: Performed by: HOSPITALIST

## 2022-08-19 PROCEDURE — 2000000000 HC ICU R&B

## 2022-08-19 PROCEDURE — 97110 THERAPEUTIC EXERCISES: CPT

## 2022-08-19 PROCEDURE — 6360000002 HC RX W HCPCS: Performed by: INTERNAL MEDICINE

## 2022-08-19 PROCEDURE — 85025 COMPLETE CBC W/AUTO DIFF WBC: CPT

## 2022-08-19 PROCEDURE — 2580000003 HC RX 258: Performed by: INTERNAL MEDICINE

## 2022-08-19 PROCEDURE — 83880 ASSAY OF NATRIURETIC PEPTIDE: CPT

## 2022-08-19 PROCEDURE — 2700000000 HC OXYGEN THERAPY PER DAY

## 2022-08-19 PROCEDURE — 82803 BLOOD GASES ANY COMBINATION: CPT

## 2022-08-19 PROCEDURE — 94003 VENT MGMT INPAT SUBQ DAY: CPT

## 2022-08-19 RX ORDER — MIDAZOLAM HYDROCHLORIDE 1 MG/ML
2 INJECTION INTRAMUSCULAR; INTRAVENOUS
Status: DISCONTINUED | OUTPATIENT
Start: 2022-08-19 | End: 2022-08-28

## 2022-08-19 RX ORDER — FENTANYL CITRATE 50 UG/ML
50 INJECTION, SOLUTION INTRAMUSCULAR; INTRAVENOUS
Status: DISCONTINUED | OUTPATIENT
Start: 2022-08-19 | End: 2022-08-23 | Stop reason: SDUPTHER

## 2022-08-19 RX ORDER — FUROSEMIDE 10 MG/ML
80 INJECTION INTRAMUSCULAR; INTRAVENOUS 3 TIMES DAILY
Status: DISCONTINUED | OUTPATIENT
Start: 2022-08-19 | End: 2022-08-21

## 2022-08-19 RX ADMIN — IPRATROPIUM BROMIDE AND ALBUTEROL SULFATE 1 AMPULE: 2.5; .5 SOLUTION RESPIRATORY (INHALATION) at 16:05

## 2022-08-19 RX ADMIN — PROPOFOL 15 MCG/KG/MIN: 10 INJECTION, EMULSION INTRAVENOUS at 20:14

## 2022-08-19 RX ADMIN — POTASSIUM CHLORIDE 20 MEQ: 29.8 INJECTION, SOLUTION INTRAVENOUS at 19:26

## 2022-08-19 RX ADMIN — GABAPENTIN 400 MG: 400 CAPSULE ORAL at 14:38

## 2022-08-19 RX ADMIN — IPRATROPIUM BROMIDE AND ALBUTEROL SULFATE 1 AMPULE: 2.5; .5 SOLUTION RESPIRATORY (INHALATION) at 07:56

## 2022-08-19 RX ADMIN — SODIUM CHLORIDE, PRESERVATIVE FREE 10 ML: 5 INJECTION INTRAVENOUS at 09:01

## 2022-08-19 RX ADMIN — POTASSIUM CHLORIDE 20 MEQ: 29.8 INJECTION, SOLUTION INTRAVENOUS at 00:03

## 2022-08-19 RX ADMIN — CHLORHEXIDINE GLUCONATE 0.12% ORAL RINSE 15 ML: 1.2 LIQUID ORAL at 08:42

## 2022-08-19 RX ADMIN — DOCUSATE SODIUM 100 MG: 50 LIQUID ORAL at 08:44

## 2022-08-19 RX ADMIN — CHLORHEXIDINE GLUCONATE 0.12% ORAL RINSE 15 ML: 1.2 LIQUID ORAL at 20:25

## 2022-08-19 RX ADMIN — FUROSEMIDE 40 MG: 10 INJECTION, SOLUTION INTRAMUSCULAR; INTRAVENOUS at 05:21

## 2022-08-19 RX ADMIN — MEROPENEM 1000 MG: 1 INJECTION, POWDER, FOR SOLUTION INTRAVENOUS at 18:49

## 2022-08-19 RX ADMIN — ENOXAPARIN SODIUM 40 MG: 100 INJECTION SUBCUTANEOUS at 20:24

## 2022-08-19 RX ADMIN — IPRATROPIUM BROMIDE AND ALBUTEROL SULFATE 1 AMPULE: 2.5; .5 SOLUTION RESPIRATORY (INHALATION) at 19:35

## 2022-08-19 RX ADMIN — FUROSEMIDE 80 MG: 10 INJECTION, SOLUTION INTRAMUSCULAR; INTRAVENOUS at 14:38

## 2022-08-19 RX ADMIN — FAMOTIDINE 20 MG: 10 INJECTION, SOLUTION INTRAVENOUS at 20:24

## 2022-08-19 RX ADMIN — ENOXAPARIN SODIUM 40 MG: 100 INJECTION SUBCUTANEOUS at 09:01

## 2022-08-19 RX ADMIN — CHLOROTHIAZIDE SODIUM 500 MG: 500 INJECTION, POWDER, LYOPHILIZED, FOR SOLUTION INTRAVENOUS at 10:27

## 2022-08-19 RX ADMIN — MEROPENEM 1000 MG: 1 INJECTION, POWDER, FOR SOLUTION INTRAVENOUS at 05:20

## 2022-08-19 RX ADMIN — IPRATROPIUM BROMIDE AND ALBUTEROL SULFATE 1 AMPULE: 2.5; .5 SOLUTION RESPIRATORY (INHALATION) at 11:26

## 2022-08-19 RX ADMIN — HYDROCODONE BITARTRATE AND ACETAMINOPHEN 10 ML: 176/5 SOLUTION ORAL at 20:23

## 2022-08-19 RX ADMIN — FAMOTIDINE 20 MG: 10 INJECTION, SOLUTION INTRAVENOUS at 09:00

## 2022-08-19 RX ADMIN — LEVOTHYROXINE SODIUM 200 MCG: 0.1 TABLET ORAL at 08:44

## 2022-08-19 RX ADMIN — GABAPENTIN 400 MG: 400 CAPSULE ORAL at 08:44

## 2022-08-19 RX ADMIN — SODIUM CHLORIDE, PRESERVATIVE FREE 10 ML: 5 INJECTION INTRAVENOUS at 20:42

## 2022-08-19 RX ADMIN — GABAPENTIN 400 MG: 400 CAPSULE ORAL at 20:24

## 2022-08-19 RX ADMIN — FUROSEMIDE 80 MG: 10 INJECTION, SOLUTION INTRAMUSCULAR; INTRAVENOUS at 20:24

## 2022-08-19 RX ADMIN — PROPOFOL 15 MCG/KG/MIN: 10 INJECTION, EMULSION INTRAVENOUS at 11:20

## 2022-08-19 RX ADMIN — POLYETHYLENE GLYCOL 3350 17 G: 17 POWDER, FOR SOLUTION ORAL at 08:44

## 2022-08-19 RX ADMIN — POTASSIUM CHLORIDE 20 MEQ: 29.8 INJECTION, SOLUTION INTRAVENOUS at 20:45

## 2022-08-19 RX ADMIN — PROPOFOL 15 MCG/KG/MIN: 10 INJECTION, EMULSION INTRAVENOUS at 05:14

## 2022-08-19 ASSESSMENT — PULMONARY FUNCTION TESTS
PIF_VALUE: 33
PIF_VALUE: 34
PIF_VALUE: 33
PIF_VALUE: 34
PIF_VALUE: 33

## 2022-08-19 ASSESSMENT — PAIN SCALES - GENERAL
PAINLEVEL_OUTOF10: 0

## 2022-08-19 NOTE — PROGRESS NOTES
Narcotic Waste Documentation    20mL of fentanyl wasted per [employer]     Electronically signed by Angela Arce RN on 8/19/22 at 7:26 PM EDT   Electronically signed by Liya Newby RN on 8/19/22 at 7:29 PM EDT

## 2022-08-19 NOTE — PROGRESS NOTES
Occupational Therapy  Facility/Department: [facility] 8U ICU  Occupational Therapy Initial Assessment    Name: Arden Fisher  : 1960  MRN: [id number]  Date of Service: 2022    Discharge Recommendations:  Patient would benefit from continued therapy after discharge, 3-5 sessions per week  OT Equipment Recommendations  Other: defer to [place] facility     Arden Fisher scored a 6/24 on the AM-PAC ADL Inpatient form. Current research shows that an AM-PAC score of 17 or less is typically not associated with a discharge to the patient's home setting. Based on the patient's AM-PAC score and their current ADL deficits, it is recommended that the patient have 3-5 sessions per week of Occupational Therapy at d/c to increase the patient's independence. Please see assessment section for further patient specific details. If patient discharges prior to next session this note will serve as a discharge summary. Please see below for the latest assessment towards goals. Patient Diagnosis(es): The primary encounter diagnosis was Small bowel obstruction ([address] Diagnoses of Elevated TSH, Chest pain, unspecified type, and Hiatal hernia were also pertinent to this visit. Past Medical History:  has a past medical history of Arthritis, Atrial fibrillation ([address] and Hypertension. Past Surgical History:  has a past surgical history that includes Upper gastrointestinal endoscopy (2022) and Upper gastrointestinal endoscopy (2022). Assessment   Performance deficits / Impairments: Decreased functional mobility ; Decreased safe awareness;Decreased balance;Decreased ADL status; Decreased cognition;Decreased ROM; Decreased endurance;Decreased high-level IADLs;Decreased strength;Decreased fine motor control  Assessment: 62 y/o male admitted 2022 with nausea/vomitting. Pt diagnosed with partial SBO, aspiration PNA, afib, and septic shock. Patient developed acute respiratory failure and acute renal failure.  Pt intubated 8/8 and ongoing. CT scan showed Large hiatal hernia. PTA pt lived at home with spouse and independent with ADLs and functional mobility. Today, pt intubated and able to follow simple commands. Pt able to initiate UE movements but requires assist to achieve full ROM. Pt with global weakness from prolonged hospital stay. Anticipate will require max A for ADLs. Pt is functioning well below baseline and benefit from skilled therapy. Prognosis: Good  Decision Making: High Complexity  REQUIRES OT FOLLOW-UP: Yes  Activity Tolerance  Activity Tolerance: Treatment limited secondary to medical complications (free text)        Plan   Plan  Times per Week: 3-5  Current Treatment Recommendations: Strengthening, Balance training, Functional mobility training, Endurance training, ROM, Gait training, Neuromuscular re-education, Cognitive reorientation, Self-Care / ADL, Safety education & training     Restrictions  Restrictions/Precautions  Restrictions/Precautions: Fall Risk  Position Activity Restriction  Other position/activity restrictions: Arterial Line, feeding tube, quiroga, Intubated    Subjective   General  Chart Reviewed: Yes  Patient assessed for rehabilitation services?: Yes  Additional Pertinent Hx: 64 y/o male admitted 8/7/2022 with nausea/vomitting. Pt diagnosed with partial SBO, aspiration PNA, afib, and septic shock. Patient developed acute respiratory failure and acute renal failure. Pt intubated 8/8 and ongoing. CT scan showed Large hiatal hernia. Family / Caregiver Present: Yes (spouse)  Referring Practitioner: Tracie Meza CNP  Subjective  Subjective: Pt seen bedside, intubated, but able to follow commands. Spouse at bedside to provide social information/history. General Comment  Comments: Per RN ok for therapy.      Social/Functional History  Social/Functional History  Lives With: Spouse  Type of Home: House  Home Layout: One level, Laundry in basement  Home Access: Ramped entrance  [address] [facility] Shower/Tub: Tub/Shower unit, Walk-in shower  Has the patient had two or more falls in the past year or any fall with injury in the past year?: No  Receives Help From: Family  ADL Assistance: Independent  Homemaking Assistance: Independent  Ambulation Assistance: Independent (no AD)  Transfer Assistance: Independent  Active : Yes  Occupation: Retired  Additional Comments: Spouse at bedside provided social information. Objective     Safety Devices  Type of Devices: Call light within reach;Nurse notified; Left in bed  Restraints  Restraints Initially in Place: Yes  Restraints: narayan soft wrist retraints        AROM: Grossly decreased, non-functional  Strength: Grossly decreased, non-functional  ADL  Additional Comments: Anticipate pt will be max A for all ADLs based on ROM and cognition observed        Bed mobility  Rolling to Left: Maximum assistance;2 Person assistance  Rolling to Right: 2 Person assistance;Maximum assistance  Bed Mobility Comments: Dependent x 2 for rolling/repositioning     Vision  Vision: Impaired  Vision Exceptions: Wears glasses for reading  Hearing  Hearing: Within functional limits    Cognition  Cognition Comment: difficult to formally assess 2/2 intubated. Pt able to follow simple commands. Orientation  Orientation Level: Oriented to person (difficult to assess 2/2 intubated)               A/AROM Exercises: hand squeezes x5, wrist flex/ext x 5, elbow flex/ext 5x, shoulder flex 5x (pt initiates movement, therapist assisted with achieving full range)    Education Given To: Patient; Family  Education Provided: Role of Therapy;Plan of Care;Transfer Training  Education Method: Demonstration;Verbal  Barriers to Learning: Cognition  Education Outcome: Continued education needed    LUE AROM (degrees)  LUE General AROM: able to initiate ROM, but weak all over from prolonged hospital stay  Left Hand AROM (degrees)  Left Hand General AROM: weak grasp  RUE AROM (degrees)  RUE General AROM: able to initiate ROM, but weak all over from prolonged hospital stay  Right Hand AROM (degrees)  Right Hand General AROM: weak grasp          AM-PAC Score    AM-PAC Inpatient Daily Activity Raw Score: 6 (08/19/22 1040)  AM-PAC Inpatient ADL T-Scale Score : 17.07 (08/19/22 1040)  ADL Inpatient CMS 0-100% Score: 100 (08/19/22 1040)  ADL Inpatient CMS G-Code Modifier : CN (08/19/22 1040)    Goals  Short Term Goals  Time Frame for Short term goals: Prior to DC: Short Term Goal 1: Pt will complete bed mobility with max A  Short Term Goal 2: Pt will tolerate sitting EOB ~ 3 min in prep for transfers with min A  Short Term Goal 3: Pt will complete rolling R/L for supine ADLs with mod A  Short Term Goal 4: Pt will complete UE exercises in all planes to inc strength/endurance. Patient Goals   Patient goals : spouse: to regain PLOF and eventually return home       Therapy Time   Individual Concurrent Group Co-treatment   Time In 0945         Time Out 1010         Minutes 25         Timed Code Treatment Minutes: 15 Minutes     This note to serve as OT d/c summary if pt is d/c-ed prior to next therapy session.     Anthony Izquierdo, OTR/L

## 2022-08-19 NOTE — PROGRESS NOTES
Hospitalist Progress Note  8/19/2022 9:15 AM    PCP: Dom Zheng MD    [phone]     Date of Admission: 8/5/2022                                                                                                                     HOSPITAL COURSE    Patient demographics:  The patient  Seth Cordero is a 63 y.o. male     Significant past medical history:   Patient Active Problem List   Diagnosis    Partial bowel obstruction (HCC)    Hiatal hernia    Acute respiratory failure with hypoxia and hypercapnia (HCC)    SBO (small bowel obstruction) (HCC)    Aspiration into airway    Acute respiratory failure with hypoxia (HCC)    Small bowel obstruction (HCC)    Diastolic heart failure (HCC)    History of atrial fibrillation    Supraventricular tachycardia (HCC)    Arterial hypotension         Presenting symptoms:  abdominal pain    Diagnostic workup:      CONSULTS DURING ADMISSION :   IP CONSULT TO GENERAL SURGERY  IP CONSULT TO HOSPITALIST  IP CONSULT TO GENERAL SURGERY  IP CONSULT TO DIETITIAN  IP CONSULT TO CARDIOLOGY      Patient was diagnosed with:  Low grade or Partial Bowel obstruction  Pneumonia and septic shock. Hiatal hernia  Atrial Fibrillation      Treatment while inpatient:  63years old male with medical history significant for morbid obesity, paraesophageal hernia. Patient presented to the emergency room with abdominal pain nausea vomiting and diarrhea. Patient was diagnosed with small bowel obstruction. Patient was also diagnosed with pneumonia and septic shock. Patient developed acute respiratory failure and acute renal failure possibly General patient was started on the ventilator and supported with pressors.                                                                                            ----------------------------------------------------------      SUBJECTIVE COMPLAINTS- follow up for abdominal pain    Diet: Diet NPO  ADULT TUBE FEEDING; Orogastric; Peptide Based High Protein; Continuous; 20; Yes; 10; Q 4 hours; 35; 100; Q 4 hours; Protein;  Four proteinex daily      OBJECTIVE:   Patient Active Problem List   Diagnosis    Partial bowel obstruction (HCC)    Hiatal hernia    Acute respiratory failure with hypoxia and hypercapnia (HCC)    SBO (small bowel obstruction) (HCC)    Aspiration into airway    Acute respiratory failure with hypoxia (HCC)    Small bowel obstruction (HCC)    Diastolic heart failure (HCC)    History of atrial fibrillation    Supraventricular tachycardia (HCC)    Arterial hypotension       Allergies  Codeine    Medications    Scheduled Meds:   chlorothiazide (DIURIL) IVPB  500 mg IntraVENous NOW    gabapentin  400 mg Oral TID    docusate  100 mg Oral Daily    senna  10 mL Oral Nightly    polyethylene glycol  17 g Oral Daily    furosemide  40 mg IntraVENous 3 times per day    metoprolol tartrate  25 mg Oral BID    meropenem  1,000 mg IntraVENous Q8H    famotidine (PEPCID) injection  20 mg IntraVENous BID    chlorhexidine  15 mL Mouth/Throat BID    ipratropium-albuterol  1 ampule Inhalation Q4H WA    sodium chloride flush  5-40 mL IntraVENous 2 times per day    levothyroxine  200 mcg Oral Daily    [Held by provider] spironolactone  50 mg Oral Daily    enoxaparin  40 mg SubCUTAneous BID     Continuous Infusions:   fentaNYL 75 mcg/hr (08/19/22 0514)    propofol 15 mcg/kg/min (08/19/22 0514)    sodium chloride       PRN Meds:  fentanNYL, midazolam, artificial tears, acetaminophen, potassium chloride, fentaNYL **AND** fentaNYL, acetaminophen, sodium chloride flush, sodium chloride, ondansetron **OR** ondansetron, diazePAM    Vitals   Vitals /wt Patient Vitals for the past 8 hrs:   BP Temp Temp src Pulse Resp SpO2 Weight   08/19/22 0757 -- -- -- 81 18 94 % --   08/19/22 0600 99/65 -- -- 85 18 94 % (!) 360 lb 1.6 oz (163.3 kg)   08/19/22 0500 108/72 -- -- 86 18 93 % --   08/19/22 0400 96/67 100.2 °F (37.9 °C) Axillary 86 18 91 % --   08/19/22 0300 108/73 -- -- 86 18 90 % -- 08/19/22 0200 91/68 -- -- 83 18 90 % --        72HR INTAKE/OUTPUT:    Intake/Output Summary (Last 24 hours) at 8/19/2022 0915  Last data filed at 8/19/2022 0500  Gross per 24 hour   Intake 3206.3 ml   Output 2260 ml   Net 946.3 ml       Exam:    Gen:  intubated and sedated  Eyes: PERRL. No sclera icterus. No conjunctival injection. ENT: No discharge. Pharynx clear. External appearance of ears and nose normal.  Neck: Trachea midline. No obvious mass. Resp: No accessory muscle use. No crackles. No wheezes. No rhonchi. CV: Regular rate. Regular rhythm. No murmur or rub. No edema. GI: Non-tender. Non-distended. No hernia. Skin: Warm, dry, normal texture and turgor. Lymph: No cervical LAD. No supraclavicular LAD. M/S: / Ext. No cyanosis. No clubbing. No joint deformity. Neuro: CN 2-12 are intact,  no neurologic deficits noted. PT/INR:   No results for input(s): PROTIME, INR in the last 72 hours. APTT: No results for input(s): APTT in the last 72 hours. CBC:   Recent Labs     08/17/22  0452 08/18/22  0400 08/19/22  0421   WBC 7.4 7.6 8.0   HGB 9.4* 9.0* 9.1*   HCT 28.2* 27.0* 27.2*   MCV 94.2 95.2 95.0   * 497* 482*       BMP:   Recent Labs     08/17/22  0452 08/17/22  1345 08/18/22  0400 08/18/22  1414 08/18/22  2151 08/19/22  0421      < > 142 145 139 144   K 3.4*   < > 3.9 3.3* 3.4* 3.6      < > 106 108 103 106   CO2 28   < > 29 28 27 28   PHOS 2.3*  --  2.9  --   --  2.7   BUN 40*   < > 46* 48* 51* 52*   CREATININE 1.1   < > 1.3 1.2 1.1 1.2    < > = values in this interval not displayed. LIVER PROFILE:   No results for input(s): ALKPHOS, AST, ALT, ALB, BILIDIR, BILITOT, ALKPHOS in the last 72 hours. No results for input(s): AMYLASE in the last 72 hours. No results for input(s): LIPASE in the last 72 hours. UA:  No results for input(s): NITRITE, LABCAST, WBCUA, RBCUA, MUCUS in the last 72 hours.         TROPONIN:   No results for input(s): Cheng Pears in the last 72 hours. Lab Results   Component Value Date/Time    URRFLXCULT Not Indicated 08/15/2022 09:16 AM       No results for input(s): TSHREFLEX in the last 72 hours. No components found for: [id number]  POC GLUCOSE:    Recent Labs     08/17/22  1526 08/18/22  0734 08/18/22  1125 08/18/22  1529 08/19/22  0810   POCGLU 97 92 96 102* 104*     No results for input(s): LABA1C in the last 72 hours. No results found for: LABA1C    Echocardiogram shows normal ejection fraction  Grade 1 diastolic dysfunction      ASSESSMENT AND PLAN      Acute respiratory failure  Intubated 8/8  Patient remains on the ventilator with FiO2 60%    Aspiration pna  Status post Zosyn  Now on meropenem only    Acute on chronic diastolic CHF  Patient has grade 1 diastolic dysfunction  Fluid overload status  Continue 3 times daily IV Lasix  Continue to monitor creatinine        Partial small Bowel obstruction  CT scan of the abdomen with contrast shows no obstruction  Og placed per GI 8/10  BM 8/9  Tolerating tube feed  Patient can be started on oral intake once extubated      SVT  better  H/o atrial fibrillation  HR controled  Cardiology is following        CHARLENE  Cr 1.2  Nephrology is following      Hypotention  Wean off pressors    Hiatal hernia  - CT chest/abd/pelv: Large hiatal hernia containing much of the stomach which is distended       Atrial Fibrillation  Patient developed the rapid ventricular rate  IV Lopressor  Consult to cardiology     HTN  - monitor blood pressure  - restart home meds when able        Code Status: Full Code        Dispo - cc        The patient and / or the family were informed of the results of any tests, a time was given to answer questions, a plan was proposed and they agreed with plan. Cady Winslow MD    This note was transcribed using [id number]. Please disregard any translational errors.

## 2022-08-19 NOTE — PROGRESS NOTES
Pulmonary Progress Note    Date of Admission: 8/5/2022   LOS: 14 days     CC:  Chief Complaint   Patient presents with    Abdominal Pain    Chest Pain     Sub-sternal, pressure, onset 1300 hours today    Shortness of Breath     EMS reports pt 88% on RA           Assessment/Plan     Acute Hypoxemic Respiratory Failure with SAO2 <90% on Room Air  -Full vent support, Wean supplemental oxygen to goal saturation of >90%  -Chest x-ray  ABG  -Limit IV fluid    Aspiration Pneumonia  -Merrem  -Respiratory culture pending, Pro-Sunny still coming down    Acute on Chronic Diastolic Heart failure  -Continue TID Lasix  -Dose of Diuril today  -+14 L for the stay    Recurrent SVT  -Beta-blocker    Partial SBO  -resolved    CHARLENE  -Stable creatinine    EARNESTINE/OHS  -Currently on ventilator    Morbid obesity  -BMI 56    Peridex  Lovenox  Pepcid    Due to the immediate potential for life-threatening deterioration due to respiratory failure, I spent 33 minutes providing critical care. This time is excluding time spent performing separately billable procedures. HPI/Subjective  T-max 100.2F overnight. Net positive fluid balance for the day. Still on a lot of sedation    ROS: UnAble to obtain due to mechanical ventilation      Intake/Output Summary (Last 24 hours) at 8/19/2022 0851  Last data filed at 8/19/2022 0500  Gross per 24 hour   Intake 3206.3 ml   Output 2400 ml   Net 806.3 ml         PHYSICAL EXAM:   Blood pressure 99/65, pulse 81, temperature 100.2 °F (37.9 °C), temperature source Axillary, resp. rate 18, height 5' 7\" (1.702 m), weight (!) 360 lb 1.6 oz (163.3 kg), SpO2 94 %.'  Gen:  No acute distress. Eyes: PERRL. Anicteric sclera. No conjunctival injection. ENT: No discharge. OP with ETT external appearance of ears and nose normal.  Neck: Trachea midline. No mass   Resp:  No crackles. No wheezes. No rhonchi. No dullness on percussion. CV: Regular rate. Regular rhythm. No murmur or rub.  + Edema. GI: Soft, Non-tender. Obese abdomen. +BS  Skin: Warm, dry, w/o erythema. Lymph: No cervical or supraclavicular LAD. M/S: No cyanosis. No clubbing. Neuro:  no focal neurologic deficit. Moves all extremities  Psych: Intubated and sedated      Medications:    Scheduled Meds:   gabapentin  400 mg Oral TID    docusate  100 mg Oral Daily    senna  10 mL Oral Nightly    polyethylene glycol  17 g Oral Daily    furosemide  40 mg IntraVENous 3 times per day    metoprolol tartrate  25 mg Oral BID    meropenem  1,000 mg IntraVENous Q8H    famotidine (PEPCID) injection  20 mg IntraVENous BID    chlorhexidine  15 mL Mouth/Throat BID    ipratropium-albuterol  1 ampule Inhalation Q4H WA    sodium chloride flush  5-40 mL IntraVENous 2 times per day    levothyroxine  200 mcg Oral Daily    [Held by provider] spironolactone  50 mg Oral Daily    enoxaparin  40 mg SubCUTAneous BID       Continuous Infusions:   fentaNYL 75 mcg/hr (08/19/22 0514)    propofol 15 mcg/kg/min (08/19/22 0514)    sodium chloride         PRN Meds:  artificial tears, acetaminophen, potassium chloride, midazolam, fentaNYL **AND** fentaNYL, acetaminophen, sodium chloride flush, sodium chloride, ondansetron **OR** ondansetron, diazePAM    Labs reviewed:  CBC:   Recent Labs     08/17/22  0452 08/18/22  0400 08/19/22  0421   WBC 7.4 7.6 8.0   HGB 9.4* 9.0* 9.1*   HCT 28.2* 27.0* 27.2*   MCV 94.2 95.2 95.0   * 497* 482*     BMP:   Recent Labs     08/17/22  0452 08/17/22  1345 08/18/22  0400 08/18/22  1414 08/18/22  2151 08/19/22  0421      < > 142 145 139 144   K 3.4*   < > 3.9 3.3* 3.4* 3.6      < > 106 108 103 106   CO2 28   < > 29 28 27 28   PHOS 2.3*  --  2.9  --   --  2.7   BUN 40*   < > 46* 48* 51* 52*   CREATININE 1.1   < > 1.3 1.2 1.1 1.2    < > = values in this interval not displayed. LIVER PROFILE: No results for input(s): AST, ALT, LIPASE, BILIDIR, BILITOT, ALKPHOS in the last 72 hours. Invalid input(s):   AMYLASE,  ALB  PT/INR: No results for input(s): PROTIME, INR in the last 72 hours. APTT: No results for input(s): APTT in the last 72 hours. UA:  No results for input(s): NITRITE, COLORU, PHUR, LABCAST, WBCUA, RBCUA, MUCUS, TRICHOMONAS, YEAST, BACTERIA, CLARITYU, SPECGRAV, LEUKOCYTESUR, UROBILINOGEN, BILIRUBINUR, BLOODU, GLUCOSEU, AMORPHOUS in the last 72 hours. Invalid input(s): Tito Rod    No results for input(s): PH, PCO2, PO2 in the last 72 hours. Films:  Radiology Review:  Pertinent images / reports were reviewed as a part of this visit. Access  CVC   Arterial  Arterial Line 08/18/22 Right Radial (Active)   $ Arterial line insertion $ Yes 08/18/22 1600   Site Assessment Clean, dry & intact 08/19/22 0600   Line Status Arterial fluids per protocol 08/19/22 0600   Art Line Interventions Zeroed and calibrated; Leveled; Connections checked and tightened 08/19/22 0600   Color/Movement/Sensation Capillary refill less than 3 sec 08/19/22 0600   Dressing Type Transparent 08/19/22 0600   Dressing Status Clean, dry & intact 08/19/22 0600   Number of days: 0          PICC        PICC Triple Lumen 57/92/61 Right Basilic (Active)   Central Line Being Utilized Yes 08/19/22 0600   Criteria for Appropriate Use Hemodynamically unstable, requiring monitoring lines, vasopressors, or volume resuscitation 08/19/22 0600   Site Assessment Clean, dry & intact 08/19/22 0600   Phlebitis Assessment No symptoms 08/19/22 0600   Infiltration Assessment 0 08/19/22 0600   Extremity Circumference (cm) 38 cm 08/08/22 0607   External Catheter Length (cm) 0 cm 08/08/22 7162   Proximal Lumen Color/Status Red;Capped; Infusing 08/19/22 0600   Medial Lumen Status White;Capped; Infusing 08/19/22 0600   Distal Lumen Color/Status Gray;Capped; Infusing 08/19/22 0600   Line Care Connections checked and tightened 08/19/22 0600   Alcohol Cap Used Yes 08/19/22 0600   Date of Last Dressing Change 08/17/22 08/19/22 0600   Dressing Type Transparent; Antimicrobial 08/19/22 0600   Dressing Status Clean, dry & intact 08/19/22 0600   Dressing Intervention New;Dressing changed 08/17/22 0400   Number of days: 11       CVC                  Fermin  Urinary Catheter Fermin (Active)   Catheter Indications Need for fluid volume management of the critically ill patient in a critical care setting 08/19/22 0400   Site Assessment No urethral drainage 08/19/22 0400   Urine Color Yellow 08/19/22 0400   Urine Appearance Clear 08/19/22 0400   Collection Container Standard 08/19/22 0400   Securement Method Securing device (Describe) 08/19/22 0400   Catheter Care Completed Yes 08/18/22 2000   Catheter Best Practices  Drainage tube clipped to bed;Catheter secured to thigh; Tamper seal intact; Bag below bladder;Bag not on floor; Lack of dependent loop in tubing;Drainage bag less than half full 08/19/22 0400   Status Patent;Draining 08/19/22 0400   Output (mL) 175 mL 08/19/22 0400   Number of days: 3         Thank you for this consult,    Saúl Duarte MD  [facility] Pulmonary, Critical Care, and Sleep Medicine

## 2022-08-19 NOTE — PROGRESS NOTES
now on Inflectra infusions,  so he is immunocompromised. Geovanna Jones is now helping him with his rheumatoid tremendously. he has been on steroids in the past.     Rule out adrenal insufficiency. cortisol level. normal      History of thyroidectomy in the past. .    Small bowel obstruction. Large hiatus hernia with colon and stomach in the chest.  Further management as per medical team.       ROS:   Unable to assess      PMH:   Past medical history, surgical history, social history, family history are reviewed and updated as appropriate. Reviewed current medication list.   Allergies reviewed and updated as needed. PE:   Vitals:    08/19/22 1200   BP: 91/63   Pulse: 81   Resp: 18   Temp: 98.7 °F (37.1 °C)   SpO2: 93%       General appearance:  in NAD, intubated and sedated  HEENT: EOM intact, no icterus. Trachea is midline. Neck : No masses, appears symmetrical  Respiratory: Respiratory effort appears normal, bilateral equal chest rise, no wheeze, no crackles ETT to vent   Cardiovascular: Ausculation shows RRR trace edema  Abdomen: mildly distended but soft   Musculoskeletal:  Joints with no swelling or deformity. Skin:no rashes, ulcers, induration, no jaundice.    Neuro: sedated      Lab Results   Component Value Date    CREATININE 1.2 08/19/2022    BUN 52 (H) 08/19/2022     08/19/2022    K 3.6 08/19/2022     08/19/2022    CO2 28 08/19/2022      Lab Results   Component Value Date    WBC 8.0 08/19/2022    HGB 9.1 (L) 08/19/2022    HCT 27.2 (L) 08/19/2022    MCV 95.0 08/19/2022     (H) 08/19/2022     Lab Results   Component Value Date    CALCIUM 7.9 (L) 08/19/2022    CAION 1.06 (L) 08/15/2022    PHOS 2.7 08/19/2022

## 2022-08-19 NOTE — CARE COORDINATION
Discharge Planning:    [facility] who confirmed patient's information is under review. She will return call with determination as soon as possible. Electronically signed by Elaine Paredes RN on 8/19/2022 at 52:13 AM    Precert started with [facility]. Will continue to monitor for updates.     Electronically signed by Elaine Paredes RN on 8/19/2022 at 4:00 PM

## 2022-08-19 NOTE — CARE COORDINATION
[address]  ph: Rafaela 51, Sr.  Administrative Assist, Case Management  [phone]  Electronically signed by Mimi Schmidt on 8/19/2022 at 9:56 AM

## 2022-08-19 NOTE — PROGRESS NOTES
General Surgery    On vent, more alert    Tolerating tube feeds at goal  Bowels have bee moving    Recommend removing OG at time of extubation and starting PO when able    No other surgical plans at this time    Will sign off  Please call with any questions or change in condition    Electronically signed by Margie Rivero MD on 8/19/2022 at 9:19 AM

## 2022-08-19 NOTE — PROGRESS NOTES
falls in the past year or any fall with injury in the past year?: No  Receives Help From: Family  ADL Assistance: Independent  Homemaking Assistance:  (Shared with wife.)  Ambulation Assistance: Independent (Without assist device.)  Transfer Assistance: Independent (Difficulty due to OA B Knees.)  Active : Yes  Occupation: Retired  Additional Comments: Spouse at bedside provided social information. Vision/Hearing  Vision  Vision: Impaired  Vision Exceptions: Wears glasses for reading  Hearing  Hearing: Within functional limits    Cognition   Orientation  Orientation Level: Oriented to person (difficult to assess 2/2 intubated)  Cognition  Cognition Comment: Difficult to formally assess 2/2 intubated. Pt able to follow simple commands. Objective           Gross Assessment  AROM: Generally decreased, functional  Strength:  (Sign weak throughout; grossly 2 2+/5.)                    Bed mobility  Rolling to Left: Maximum assistance;2 Person assistance  Rolling to Right: Maximum assistance;2 Person assistance  Bed Mobility Comments: Dependent x 2 for Rolling/Repositioning  Transfers  Bed to Chair: Unable to assess (Defer oob at this time.)           A/AROM Exercises: AAROM Exs; Gentle Ankle DF Stretch. AM-PAC Score  -PAC Inpatient Mobility Raw Score : 6 (08/19/22 1531)  -PAC Inpatient T-Scale Score : 23.55 (08/19/22 1531)  Mobility Inpatient CMS 0-100% Score: 100 (08/19/22 1531)  Mobility Inpatient CMS G-Code Modifier : CN (08/19/22 1531)       Goals  Short Term Goals  Time Frame for Short term goals: Upon d/c acute care setting. Short term goal 1: Bed Mob Mod/Max assist x 2. Short term goal 2: EOB ~ 3-5 min with Min assist; in prep oob activities. Short term goal 3: Transfers via [employer]-Noah as approp. Short term goal 4: Pt participating in approp Strength Exs. Patient Goals   Patient goals : Wife : regain prior level of function and return home.        Education  Patient Education  Education Given To: Patient; Family  Education Provided Comments: Role of PT, POC, Need to call for assist.  Education Method: Verbal;Demonstration  Education Outcome: Continued education needed      Therapy Time   Individual Concurrent Group Co-treatment   Time In 0945         Time Out 1010         Minutes [address] [place] [address],

## 2022-08-20 LAB
ANION GAP SERPL CALCULATED.3IONS-SCNC: 10 MMOL/L (ref 3–16)
ANION GAP SERPL CALCULATED.3IONS-SCNC: 13 MMOL/L (ref 3–16)
ANION GAP SERPL CALCULATED.3IONS-SCNC: 8 MMOL/L (ref 3–16)
BASE EXCESS ARTERIAL: 8.2 MMOL/L (ref -3–3)
BASOPHILS ABSOLUTE: 0.1 K/UL (ref 0–0.2)
BASOPHILS RELATIVE PERCENT: 0.9 %
BUN BLDV-MCNC: 44 MG/DL (ref 7–20)
BUN BLDV-MCNC: 46 MG/DL (ref 7–20)
BUN BLDV-MCNC: 49 MG/DL (ref 7–20)
CALCIUM SERPL-MCNC: 8.4 MG/DL (ref 8.3–10.6)
CALCIUM SERPL-MCNC: 8.5 MG/DL (ref 8.3–10.6)
CALCIUM SERPL-MCNC: 8.6 MG/DL (ref 8.3–10.6)
CARBOXYHEMOGLOBIN ARTERIAL: 0.6 % (ref 0–1.5)
CHLORIDE BLD-SCNC: 102 MMOL/L (ref 99–110)
CHLORIDE BLD-SCNC: 105 MMOL/L (ref 99–110)
CHLORIDE BLD-SCNC: 106 MMOL/L (ref 99–110)
CO2: 28 MMOL/L (ref 21–32)
CO2: 31 MMOL/L (ref 21–32)
CO2: 31 MMOL/L (ref 21–32)
CREAT SERPL-MCNC: 1.1 MG/DL (ref 0.8–1.3)
CREAT SERPL-MCNC: 1.3 MG/DL (ref 0.8–1.3)
CREAT SERPL-MCNC: 1.5 MG/DL (ref 0.8–1.3)
EOSINOPHILS ABSOLUTE: 0.2 K/UL (ref 0–0.6)
EOSINOPHILS RELATIVE PERCENT: 1.8 %
GFR AFRICAN AMERICAN: 58
GFR AFRICAN AMERICAN: >60
GFR AFRICAN AMERICAN: >60
GFR NON-AFRICAN AMERICAN: 48
GFR NON-AFRICAN AMERICAN: 56
GFR NON-AFRICAN AMERICAN: >60
GLUCOSE BLD-MCNC: 100 MG/DL (ref 70–99)
GLUCOSE BLD-MCNC: 106 MG/DL (ref 70–99)
GLUCOSE BLD-MCNC: 106 MG/DL (ref 70–99)
GLUCOSE BLD-MCNC: 107 MG/DL (ref 70–99)
GLUCOSE BLD-MCNC: 118 MG/DL (ref 70–99)
GLUCOSE BLD-MCNC: 97 MG/DL (ref 70–99)
GLUCOSE BLD-MCNC: 99 MG/DL (ref 70–99)
HCO3 ARTERIAL: 31.8 MMOL/L (ref 21–29)
HCT VFR BLD CALC: 28.3 % (ref 40.5–52.5)
HEMOGLOBIN, ART, EXTENDED: 9.9 G/DL (ref 13.5–17.5)
HEMOGLOBIN: 9.3 G/DL (ref 13.5–17.5)
LYMPHOCYTES ABSOLUTE: 0.8 K/UL (ref 1–5.1)
LYMPHOCYTES RELATIVE PERCENT: 9.3 %
MAGNESIUM: 2.3 MG/DL (ref 1.8–2.4)
MCH RBC QN AUTO: 31.2 PG (ref 26–34)
MCHC RBC AUTO-ENTMCNC: 32.8 G/DL (ref 31–36)
MCV RBC AUTO: 95 FL (ref 80–100)
METHEMOGLOBIN ARTERIAL: 0.6 %
MONOCYTES ABSOLUTE: 0.6 K/UL (ref 0–1.3)
MONOCYTES RELATIVE PERCENT: 6.9 %
NEUTROPHILS ABSOLUTE: 7.3 K/UL (ref 1.7–7.7)
NEUTROPHILS RELATIVE PERCENT: 81.1 %
O2 SAT, ARTERIAL: 92.8 %
O2 THERAPY: ABNORMAL
PCO2 ARTERIAL: 39.5 MMHG (ref 35–45)
PDW BLD-RTO: 17.1 % (ref 12.4–15.4)
PERFORMED ON: ABNORMAL
PERFORMED ON: NORMAL
PH ARTERIAL: 7.51 (ref 7.35–7.45)
PHOSPHORUS: 3.2 MG/DL (ref 2.5–4.9)
PLATELET # BLD: 459 K/UL (ref 135–450)
PMV BLD AUTO: 7.1 FL (ref 5–10.5)
PO2 ARTERIAL: 63.5 MMHG (ref 75–108)
POTASSIUM SERPL-SCNC: 3.3 MMOL/L (ref 3.5–5.1)
POTASSIUM SERPL-SCNC: 3.7 MMOL/L (ref 3.5–5.1)
POTASSIUM SERPL-SCNC: 3.8 MMOL/L (ref 3.5–5.1)
RBC # BLD: 2.97 M/UL (ref 4.2–5.9)
SODIUM BLD-SCNC: 143 MMOL/L (ref 136–145)
SODIUM BLD-SCNC: 145 MMOL/L (ref 136–145)
SODIUM BLD-SCNC: 146 MMOL/L (ref 136–145)
TCO2 ARTERIAL: 33 MMOL/L
WBC # BLD: 9 K/UL (ref 4–11)

## 2022-08-20 PROCEDURE — 6360000002 HC RX W HCPCS: Performed by: INTERNAL MEDICINE

## 2022-08-20 PROCEDURE — 2580000003 HC RX 258: Performed by: HOSPITALIST

## 2022-08-20 PROCEDURE — 2700000000 HC OXYGEN THERAPY PER DAY

## 2022-08-20 PROCEDURE — 94640 AIRWAY INHALATION TREATMENT: CPT

## 2022-08-20 PROCEDURE — 82803 BLOOD GASES ANY COMBINATION: CPT

## 2022-08-20 PROCEDURE — 2580000003 HC RX 258: Performed by: NURSE PRACTITIONER

## 2022-08-20 PROCEDURE — 85025 COMPLETE CBC W/AUTO DIFF WBC: CPT

## 2022-08-20 PROCEDURE — 94760 N-INVAS EAR/PLS OXIMETRY 1: CPT

## 2022-08-20 PROCEDURE — 6360000002 HC RX W HCPCS: Performed by: STUDENT IN AN ORGANIZED HEALTH CARE EDUCATION/TRAINING PROGRAM

## 2022-08-20 PROCEDURE — 6370000000 HC RX 637 (ALT 250 FOR IP): Performed by: INTERNAL MEDICINE

## 2022-08-20 PROCEDURE — 6370000000 HC RX 637 (ALT 250 FOR IP): Performed by: NURSE PRACTITIONER

## 2022-08-20 PROCEDURE — 94003 VENT MGMT INPAT SUBQ DAY: CPT

## 2022-08-20 PROCEDURE — 6360000002 HC RX W HCPCS: Performed by: NURSE PRACTITIONER

## 2022-08-20 PROCEDURE — 2000000000 HC ICU R&B

## 2022-08-20 PROCEDURE — 83735 ASSAY OF MAGNESIUM: CPT

## 2022-08-20 PROCEDURE — 2580000003 HC RX 258: Performed by: INTERNAL MEDICINE

## 2022-08-20 PROCEDURE — 80048 BASIC METABOLIC PNL TOTAL CA: CPT

## 2022-08-20 PROCEDURE — 84100 ASSAY OF PHOSPHORUS: CPT

## 2022-08-20 PROCEDURE — 6360000002 HC RX W HCPCS: Performed by: HOSPITALIST

## 2022-08-20 PROCEDURE — 2500000003 HC RX 250 WO HCPCS: Performed by: HOSPITALIST

## 2022-08-20 PROCEDURE — 99291 CRITICAL CARE FIRST HOUR: CPT | Performed by: INTERNAL MEDICINE

## 2022-08-20 RX ORDER — POTASSIUM CHLORIDE 29.8 MG/ML
20 INJECTION INTRAVENOUS
Status: COMPLETED | OUTPATIENT
Start: 2022-08-20 | End: 2022-08-20

## 2022-08-20 RX ADMIN — IPRATROPIUM BROMIDE AND ALBUTEROL SULFATE 1 AMPULE: 2.5; .5 SOLUTION RESPIRATORY (INHALATION) at 19:23

## 2022-08-20 RX ADMIN — POTASSIUM CHLORIDE 20 MEQ: 29.8 INJECTION, SOLUTION INTRAVENOUS at 17:30

## 2022-08-20 RX ADMIN — CHLORHEXIDINE GLUCONATE 0.12% ORAL RINSE 15 ML: 1.2 LIQUID ORAL at 09:51

## 2022-08-20 RX ADMIN — IPRATROPIUM BROMIDE AND ALBUTEROL SULFATE 1 AMPULE: 2.5; .5 SOLUTION RESPIRATORY (INHALATION) at 11:38

## 2022-08-20 RX ADMIN — FAMOTIDINE 20 MG: 10 INJECTION, SOLUTION INTRAVENOUS at 20:36

## 2022-08-20 RX ADMIN — POTASSIUM CHLORIDE 20 MEQ: 29.8 INJECTION, SOLUTION INTRAVENOUS at 16:24

## 2022-08-20 RX ADMIN — PROPOFOL 15 MCG/KG/MIN: 10 INJECTION, EMULSION INTRAVENOUS at 23:45

## 2022-08-20 RX ADMIN — CHLORHEXIDINE GLUCONATE 0.12% ORAL RINSE 15 ML: 1.2 LIQUID ORAL at 19:24

## 2022-08-20 RX ADMIN — CHLOROTHIAZIDE SODIUM 500 MG: 500 INJECTION, POWDER, LYOPHILIZED, FOR SOLUTION INTRAVENOUS at 09:55

## 2022-08-20 RX ADMIN — GABAPENTIN 400 MG: 400 CAPSULE ORAL at 13:57

## 2022-08-20 RX ADMIN — FUROSEMIDE 80 MG: 10 INJECTION, SOLUTION INTRAMUSCULAR; INTRAVENOUS at 08:45

## 2022-08-20 RX ADMIN — GABAPENTIN 400 MG: 400 CAPSULE ORAL at 08:44

## 2022-08-20 RX ADMIN — POTASSIUM CHLORIDE 20 MEQ: 29.8 INJECTION, SOLUTION INTRAVENOUS at 15:08

## 2022-08-20 RX ADMIN — ENOXAPARIN SODIUM 40 MG: 100 INJECTION SUBCUTANEOUS at 08:45

## 2022-08-20 RX ADMIN — PROPOFOL 15 MCG/KG/MIN: 10 INJECTION, EMULSION INTRAVENOUS at 09:59

## 2022-08-20 RX ADMIN — MEROPENEM 1000 MG: 1 INJECTION, POWDER, FOR SOLUTION INTRAVENOUS at 11:09

## 2022-08-20 RX ADMIN — FAMOTIDINE 20 MG: 10 INJECTION, SOLUTION INTRAVENOUS at 08:44

## 2022-08-20 RX ADMIN — LEVOTHYROXINE SODIUM 200 MCG: 0.1 TABLET ORAL at 08:43

## 2022-08-20 RX ADMIN — DOCUSATE SODIUM 100 MG: 50 LIQUID ORAL at 08:44

## 2022-08-20 RX ADMIN — POTASSIUM CHLORIDE 20 MEQ: 29.8 INJECTION, SOLUTION INTRAVENOUS at 18:37

## 2022-08-20 RX ADMIN — MEROPENEM 1000 MG: 1 INJECTION, POWDER, FOR SOLUTION INTRAVENOUS at 04:22

## 2022-08-20 RX ADMIN — SODIUM CHLORIDE, PRESERVATIVE FREE 10 ML: 5 INJECTION INTRAVENOUS at 10:05

## 2022-08-20 RX ADMIN — FUROSEMIDE 80 MG: 10 INJECTION, SOLUTION INTRAMUSCULAR; INTRAVENOUS at 14:01

## 2022-08-20 RX ADMIN — IPRATROPIUM BROMIDE AND ALBUTEROL SULFATE 1 AMPULE: 2.5; .5 SOLUTION RESPIRATORY (INHALATION) at 15:38

## 2022-08-20 RX ADMIN — IPRATROPIUM BROMIDE AND ALBUTEROL SULFATE 1 AMPULE: 2.5; .5 SOLUTION RESPIRATORY (INHALATION) at 07:41

## 2022-08-20 RX ADMIN — ENOXAPARIN SODIUM 40 MG: 100 INJECTION SUBCUTANEOUS at 20:35

## 2022-08-20 RX ADMIN — MEROPENEM 1000 MG: 1 INJECTION, POWDER, FOR SOLUTION INTRAVENOUS at 19:35

## 2022-08-20 RX ADMIN — FUROSEMIDE 80 MG: 10 INJECTION, SOLUTION INTRAMUSCULAR; INTRAVENOUS at 20:36

## 2022-08-20 RX ADMIN — PROPOFOL 15 MCG/KG/MIN: 10 INJECTION, EMULSION INTRAVENOUS at 16:22

## 2022-08-20 RX ADMIN — POLYETHYLENE GLYCOL 3350 17 G: 17 POWDER, FOR SOLUTION ORAL at 08:44

## 2022-08-20 RX ADMIN — SODIUM CHLORIDE, PRESERVATIVE FREE 10 ML: 5 INJECTION INTRAVENOUS at 20:41

## 2022-08-20 ASSESSMENT — PAIN SCALES - GENERAL
PAINLEVEL_OUTOF10: 0

## 2022-08-20 ASSESSMENT — PULMONARY FUNCTION TESTS
PIF_VALUE: 33
PIF_VALUE: 33
PIF_VALUE: 32
PIF_VALUE: 31
PIF_VALUE: 33
PIF_VALUE: 31
PIF_VALUE: 33
PIF_VALUE: 31
PIF_VALUE: 32
PIF_VALUE: 33
PIF_VALUE: 31
PIF_VALUE: 32
PIF_VALUE: 31
PIF_VALUE: 31
PIF_VALUE: 33
PIF_VALUE: 33
PIF_VALUE: 31
PIF_VALUE: 33
PIF_VALUE: 31
PIF_VALUE: 33
PIF_VALUE: 31

## 2022-08-20 ASSESSMENT — PAIN SCALES - WONG BAKER
WONGBAKER_NUMERICALRESPONSE: 0

## 2022-08-20 NOTE — PROGRESS NOTES
Hospitalist Progress Note  8/20/2022 10:16 AM    PCP: Uri Ruiz MD    [phone]     Date of Admission: 8/5/2022                                                                                                                     HOSPITAL COURSE    Patient demographics:  The patient  Boni Edmonds is a 63 y.o. male     Significant past medical history:   Patient Active Problem List   Diagnosis    Partial bowel obstruction (HCC)    Hiatal hernia    Acute respiratory failure with hypoxia and hypercapnia (HCC)    SBO (small bowel obstruction) (HCC)    Aspiration into airway    Acute respiratory failure with hypoxia (HCC)    Small bowel obstruction (HCC)    Diastolic heart failure (HCC)    History of atrial fibrillation    Supraventricular tachycardia (HCC)    Arterial hypotension         Presenting symptoms:  abdominal pain    Diagnostic workup:      CONSULTS DURING ADMISSION :   IP CONSULT TO GENERAL SURGERY  IP CONSULT TO HOSPITALIST  IP CONSULT TO GENERAL SURGERY  IP CONSULT TO DIETITIAN  IP CONSULT TO CARDIOLOGY      Patient was diagnosed with:  Low grade or Partial Bowel obstruction  Pneumonia and septic shock. Hiatal hernia  Atrial Fibrillation      Treatment while inpatient:  63years old male with medical history significant for morbid obesity, paraesophageal hernia. Patient presented to the emergency room with abdominal pain nausea vomiting and diarrhea. Patient was diagnosed with small bowel obstruction. Patient was also diagnosed with pneumonia and septic shock. Patient developed acute respiratory failure and acute renal failure                                                                             ----------------------------------------------------------      SUBJECTIVE COMPLAINTS- follow up for abdominal pain    Diet: Diet NPO  ADULT TUBE FEEDING; Orogastric; Peptide Based High Protein; Continuous; 20; Yes; 10; Q 4 hours; 35; 100; Q 4 hours; Protein;  Four proteinex daily      OBJECTIVE:   Patient Active Problem List   Diagnosis    Partial bowel obstruction (HCC)    Hiatal hernia    Acute respiratory failure with hypoxia and hypercapnia (HCC)    SBO (small bowel obstruction) (HCC)    Aspiration into airway    Acute respiratory failure with hypoxia (HCC)    Small bowel obstruction (HCC)    Diastolic heart failure (HCC)    History of atrial fibrillation    Supraventricular tachycardia (HCC)    Arterial hypotension       Allergies  Codeine    Medications    Scheduled Meds:   chlorothiazide (DIURIL) IVPB  500 mg IntraVENous Once    furosemide  80 mg IntraVENous TID    gabapentin  400 mg Oral TID    docusate  100 mg Oral Daily    senna  10 mL Oral Nightly    polyethylene glycol  17 g Oral Daily    metoprolol tartrate  25 mg Oral BID    meropenem  1,000 mg IntraVENous Q8H    famotidine (PEPCID) injection  20 mg IntraVENous BID    chlorhexidine  15 mL Mouth/Throat BID    ipratropium-albuterol  1 ampule Inhalation Q4H WA    sodium chloride flush  5-40 mL IntraVENous 2 times per day    levothyroxine  200 mcg Oral Daily    [Held by provider] spironolactone  50 mg Oral Daily    enoxaparin  40 mg SubCUTAneous BID     Continuous Infusions:   fentaNYL 75 mcg/hr (08/19/22 0514)    propofol 15 mcg/kg/min (08/20/22 0959)    sodium chloride       PRN Meds:  fentanNYL, midazolam, artificial tears, acetaminophen, potassium chloride, fentaNYL **AND** fentaNYL, acetaminophen, sodium chloride flush, sodium chloride, ondansetron **OR** ondansetron, diazePAM    Vitals   Vitals /wt Patient Vitals for the past 8 hrs:   BP Temp Temp src Pulse Resp SpO2 Weight   08/20/22 0800 105/65 99 °F (37.2 °C) Axillary 90 18 93 % --   08/20/22 0742 -- -- -- 85 18 93 % --   08/20/22 0700 107/74 -- -- 85 18 92 % --   08/20/22 0600 113/73 -- -- 88 18 93 % --   08/20/22 0500 103/67 -- -- 87 18 91 % (!) 353 lb 11.2 oz (160.4 kg)   08/20/22 0400 102/67 99.8 °F (37.7 °C) Axillary 91 18 91 % --   08/20/22 0345 -- -- -- 90 19 90 % --   08/20/22 0300 -- -- -- 92 20 93 % --        72HR INTAKE/OUTPUT:    Intake/Output Summary (Last 24 hours) at 8/20/2022 1016  Last data filed at 8/20/2022 0800  Gross per 24 hour   Intake 2254.84 ml   Output 4910 ml   Net -2655.16 ml       Exam:    Gen:  intubated and sedated  Eyes: PERRL. No sclera icterus. No conjunctival injection. ENT: No discharge. Pharynx clear. External appearance of ears and nose normal.  Neck: Trachea midline. No obvious mass. Resp: No accessory muscle use. No crackles. No wheezes. No rhonchi. CV: Regular rate. Regular rhythm. No murmur or rub. No edema. GI: Non-tender. Non-distended. No hernia. Skin: Warm, dry, normal texture and turgor. Lymph: No cervical LAD. No supraclavicular LAD. M/S: / Ext. No cyanosis. No clubbing. No joint deformity. Neuro: CN 2-12 are intact,  no neurologic deficits noted. PT/INR:   No results for input(s): PROTIME, INR in the last 72 hours. APTT: No results for input(s): APTT in the last 72 hours. CBC:   Recent Labs     08/18/22  0400 08/19/22  0421 08/20/22  0430   WBC 7.6 8.0 9.0   HGB 9.0* 9.1* 9.3*   HCT 27.0* 27.2* 28.3*   MCV 95.2 95.0 95.0   * 482* 459*       BMP:   Recent Labs     08/18/22  0400 08/18/22  1414 08/19/22  0421 08/19/22  1430 08/19/22  2150 08/20/22  0430      < > 144 144 145 143   K 3.9   < > 3.6 3.3* 4.2 3.7      < > 106 107 106 102   CO2 29   < > 28 26 30 28   PHOS 2.9  --  2.7  --   --  3.2   BUN 46*   < > 52* 50* 53* 49*   CREATININE 1.3   < > 1.2 1.1 1.3 1.1    < > = values in this interval not displayed. LIVER PROFILE:   No results for input(s): ALKPHOS, AST, ALT, ALB, BILIDIR, BILITOT, ALKPHOS in the last 72 hours. No results for input(s): AMYLASE in the last 72 hours. No results for input(s): LIPASE in the last 72 hours. UA:  No results for input(s): NITRITE, LABCAST, WBCUA, RBCUA, MUCUS in the last 72 hours.         TROPONIN:   No results for input(s): CKTOTAL, TROPONINI in the last 72 hours. Lab Results   Component Value Date/Time    URRFLXCULT Not Indicated 08/15/2022 09:16 AM       No results for input(s): TSHREFLEX in the last 72 hours. No components found for: [id number]  POC GLUCOSE:    Recent Labs     08/19/22  0810 08/19/22  1230 08/19/22  1611 08/19/22  2041 08/19/22  2354   POCGLU 104* 99 114* 95 96     No results for input(s): LABA1C in the last 72 hours. No results found for: LABA1C    Echocardiogram shows normal ejection fraction  Grade 1 diastolic dysfunction      ASSESSMENT AND PLAN    Acute respiratory failure  Intubated 8/8  Patient remains on the ventilator with FiO2 60%  Patient not making any progress for several days    Aspiration pna  Status post Zosyn  Now on meropenem only  Still has low-grade temperature    Acute on chronic diastolic CHF  Patient has grade 1 diastolic dysfunction  Fluid overload status  Continue 3 times daily IV Lasix  Continue to monitor creatinine        Partial small Bowel obstruction  CT scan of the abdomen with contrast shows no obstruction  Og placed per GI 8/10  BM 8/9  Tolerating tube feed  Patient can be started on oral intake once extubated      SVT  better  H/o atrial fibrillation  HR controled      CHARLENE  Renal function improved      Hypotention  Blood pressure is stable    Hiatal hernia  - CT chest/abd/pelv: Large hiatal hernia containing much of the stomach which is distended       Atrial Fibrillation  Patient developed the rapid ventricular rate  IV Lopressor  Consult to cardiology     HTN  - monitor blood pressure  - restart home meds when able        Code Status: Full Code        Dispo - cc        The patient and / or the family were informed of the results of any tests, a time was given to answer questions, a plan was proposed and they agreed with plan. Delta Lott MD    This note was transcribed using [id number] [employer]. Please disregard any translational errors.

## 2022-08-20 NOTE — PROGRESS NOTES
Spoke to Dr. Igor Araya regarding potassium. 40 mEq given per potassium replacement protocol in addition to his order for 40 mEq. Scheduled BNP to be drawn this evening.

## 2022-08-20 NOTE — PLAN OF CARE
Problem: Discharge Planning  Goal: Discharge to home or other facility with appropriate resources  Outcome: Progressing  Flowsheets (Taken 8/19/2022 2000)  Discharge to home or other facility with appropriate resources: Identify barriers to discharge with patient and caregiver     Problem: Pain  Goal: Verbalizes/displays adequate comfort level or baseline comfort level  Outcome: Progressing  Flowsheets  Taken 8/20/2022 0400  Verbalizes/displays adequate comfort level or baseline comfort level: Encourage patient to monitor pain and request assistance  Taken 8/19/2022 2000  Verbalizes/displays adequate comfort level or baseline comfort level: Encourage patient to monitor pain and request assistance     Problem: ABCDS Injury Assessment  Goal: Absence of physical injury  Outcome: Progressing  Flowsheets (Taken 8/19/2022 2119)  Absence of Physical Injury: Implement safety measures based on patient assessment     Problem: Safety - Adult  Goal: Free from fall injury  Outcome: Progressing  Flowsheets (Taken 8/19/2022 2119)  Free From Fall Injury: Instruct family/caregiver on patient safety     Problem: Skin/Tissue Integrity  Goal: Absence of new skin breakdown  Description: 1. Monitor for areas of redness and/or skin breakdown  2. Assess vascular access sites hourly  Outcome: Progressing     Problem: Safety - Medical Restraint  Goal: Remains free of injury from restraints (Restraint for Interference with Medical Device)  Description: INTERVENTIONS:  1. Determine that other, less restrictive measures have been tried or would not be effective before applying the restraint  2. Evaluate the patient's condition at the time of restraint application  3. Inform patient/family regarding the reason for restraint  4.  Q2H: Monitor safety, psychosocial status, comfort, nutrition and hydration  Outcome: Progressing     Problem: Nutrition Deficit:  Goal: Optimize nutritional status  Outcome: Progressing     Problem: Neurosensory - Adult  Goal: Achieves stable or improved neurological status  Outcome: Progressing  Flowsheets (Taken 8/19/2022 2000)  Achieves stable or improved neurological status: Assess for and report changes in neurological status  Goal: Absence of seizures  Outcome: Progressing  Flowsheets (Taken 8/19/2022 2000)  Absence of seizures: Monitor for seizure activity.   If seizure occurs, document type and location of movements and any associated apnea  Goal: Remains free of injury related to seizures activity  Outcome: Progressing  Flowsheets (Taken 8/19/2022 2000)  Remains free of injury related to seizure activity: Maintain airway, patient safety  and administer oxygen as ordered  Goal: Achieves maximal functionality and self care  Outcome: Progressing  Flowsheets (Taken 8/19/2022 2000)  Achieves maximal functionality and self care: Monitor swallowing and airway patency with patient fatigue and changes in neurological status     Problem: Respiratory - Adult  Goal: Achieves optimal ventilation and oxygenation  Outcome: Progressing  Flowsheets (Taken 8/19/2022 2000)  Achieves optimal ventilation and oxygenation: Assess for changes in respiratory status     Problem: Cardiovascular - Adult  Goal: Maintains optimal cardiac output and hemodynamic stability  Outcome: Progressing  Flowsheets (Taken 8/19/2022 2000)  Maintains optimal cardiac output and hemodynamic stability: Monitor blood pressure and heart rate  Goal: Absence of cardiac dysrhythmias or at baseline  Outcome: Progressing  Flowsheets (Taken 8/19/2022 2000)  Absence of cardiac dysrhythmias or at baseline: Monitor cardiac rate and rhythm     Problem: Skin/Tissue Integrity - Adult  Goal: Skin integrity remains intact  Outcome: Progressing  Flowsheets (Taken 8/19/2022 2000)  Skin Integrity Remains Intact: Monitor for areas of redness and/or skin breakdown  Goal: Incisions, wounds, or drain sites healing without S/S of infection  Outcome: Progressing  Goal: Oral mucous membranes remain intact  Outcome: Progressing  Flowsheets (Taken 8/19/2022 2000)  Oral Mucous Membranes Remain Intact: Assess oral mucosa and hygiene practices     Problem: Musculoskeletal - Adult  Goal: Return mobility to safest level of function  Outcome: Progressing  Flowsheets (Taken 8/19/2022 2000)  Return Mobility to Safest Level of Function: Apply continuous passive motion per provider or physical therapy orders to increase flexion toward goal  Goal: Maintain proper alignment of affected body part  Outcome: Progressing  Flowsheets (Taken 8/19/2022 2000)  Maintain proper alignment of affected body part: Support and protect limb and body alignment per provider's orders  Goal: Return ADL status to a safe level of function  Outcome: Progressing  Flowsheets (Taken 8/19/2022 2000)  Return ADL Status to a Safe Level of Function: Administer medication as ordered     Problem: Gastrointestinal - Adult  Goal: Minimal or absence of nausea and vomiting  Outcome: Progressing  Flowsheets (Taken 8/19/2022 2000)  Minimal or absence of nausea and vomiting: Administer IV fluids as ordered to ensure adequate hydration  Goal: Maintains or returns to baseline bowel function  Outcome: Progressing  Flowsheets (Taken 8/19/2022 2000)  Maintains or returns to baseline bowel function: Assess bowel function  Goal: Maintains adequate nutritional intake  Outcome: Progressing  Flowsheets (Taken 8/19/2022 2000)  Maintains adequate nutritional intake: Monitor percentage of each meal consumed  Goal: Establish and maintain optimal ostomy function  Outcome: Progressing  Flowsheets (Taken 8/19/2022 2000)  Establish and maintain optimal ostomy function: Nutrition consult     Problem: Genitourinary - Adult  Goal: Absence of urinary retention  Outcome: Progressing  Flowsheets (Taken 8/19/2022 2000)  Absence of urinary retention: Monitor intake/output and perform bladder scan as needed  Goal: Urinary catheter remains patent  Outcome: Progressing  Flowsheets (Taken 8/19/2022 2000)  Urinary catheter remains patent: Assess patency of urinary catheter     Problem: Anxiety  Goal: Will report anxiety at manageable levels  Description: INTERVENTIONS:  1. Administer medication as ordered  2. Teach and rehearse alternative coping skills  3. Provide emotional support with 1:1 interaction with staff  Outcome: Progressing  Flowsheets (Taken 8/19/2022 2000)  Will report anxiety at manageable levels: Administer medication as ordered     Problem: Coping  Goal: Pt/Family able to verbalize concerns and demonstrate effective coping strategies  Description: INTERVENTIONS:  1. Assist patient/family to identify coping skills, available support systems and cultural and spiritual values  2. Provide emotional support, including active listening and acknowledgement of concerns of patient and caregivers  3. Reduce environmental stimuli, as able  4. Instruct patient/family in relaxation techniques, as appropriate  5. Assess for spiritual pain/suffering and initiate Spiritual Care, Psychosocial Clinical Specialist consults as needed  Outcome: Progressing  Flowsheets (Taken 8/19/2022 2000)  Patient/family able to verbalize anxieties, fears, and concerns, and demonstrate effective coping: Provide emotional support, including active listening and acknowledgement of concerns of patient and caregivers     Problem: Change in Body Image  Goal: Pt/Family communicate acceptance of loss or change in body image and feel psychological comfort and peace  Description: INTERVENTIONS:  1. Assess patient/family anxiety and grief process related to change in body image, loss of functional status, loss of sense of self, and forgiveness  2. Provide emotional and spiritual support  3. Provide information about the patient's health status with consideration of family and cultural values  4.  Communicate willingness to discuss loss and facilitate grief process with patient/family as appropriate  5. Emphasize sustaining relationships within family system and community, or aurora/spiritual traditions  6. Initiate Spiritual Care, Psychosocial Clinical Specialist consult as needed  Outcome: Progressing  Flowsheets (Taken 8/19/2022 2000)  Patient/family communicate acceptance of loss or change in body image and feel psychological comfort and peace: Provide emotional and spiritual support     Problem: Decision Making  Goal: Pt/Family able to effectively weigh alternatives and participate in decision making related to treatment and care  Description: INTERVENTIONS:  1. Determine when there are differences between patient's view, family's view, and healthcare provider's view of condition  2. Facilitate patient and family articulation of goals for care  3. Help patient and family identify pros/cons of alternative solutions  4. Provide information as requested by patient/family  5. Respect patient/family right to receive or not to receive information  6. Serve as a liaison between patient and family and health care team  7. Initiate Consults from Ethics, Palliative Care or initiate [facility] as is appropriate  Outcome: Progressing  Flowsheets (Taken 8/19/2022 2000)  Patient/family able to effectively weigh alternatives and participate in decision making related to treatment and care: Determine when there are differences between patient's view, family's view, and healthcare provider's view of condition     Problem: Behavior  Goal: Pt/Family maintain appropriate behavior and adhere to behavioral management agreement, if implemented  Description: INTERVENTIONS:  1. Assess patient/family's coping skills and  non-compliant behavior (including use of illegal substances)  2. Notify security of behavior or suspected illegal substances which indicate the need for search of the family and/or belongings  3. Encourage verbalization of thoughts and concerns in a socially appropriate manner  4.  Utilize positive, consistent limit setting strategies supporting safety of patient, staff and others  5. Encourage participation in the decision making process about the behavioral management agreement  6. If a visitor's behavior poses a threat to safety call refer to organization policy.   7. Initiate consult with , Psychosocial CNS, Spiritual Care as appropriate  Outcome: Progressing  Flowsheets (Taken 8/19/2022 2000)  Patient/family maintains appropriate behavior and adheres to behavioral management agreement, if implemented: Assess patient/familys coping skills and  non-compliant behavior (including use of illegal substances)

## 2022-08-20 NOTE — PROGRESS NOTES
Upon repositioning pt, OG came out. 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 MD notified and aware. No further orders at this time.

## 2022-08-20 NOTE — PROGRESS NOTES
Spoke to Dr. Lorena Meyers from GI about new OG placement. Per MD OG not emergent at this time and GI will place a new one on Monday.

## 2022-08-20 NOTE — PROGRESS NOTES
[facility]                                               Progress note    Summary:   Yas Burt is being seen by nephrology for CHARLENE and hypernatremia. Admitted with paraesophageal hernia and SBO. Had EGD 8/9 with biopsy. Still NPO     Interval History  Seen at bedside  On lasix IV 80 mg TID, diuril 500 mg once. Made 5280 mL urine yesterday, achieved negative fluid balance  Cr stable. Hypokalemia noted. Na 145  FiO2 is still high at 60%      Plan:   Got another dose of diuril 500 mg today  Continue lasix IV 80 mg TID  Getting 40 mEq KCL IV per prn replacement protocol. Will give an additional 40 mEq for a total of 80 mEq replacement today      Esha Wolfe MD  [facility]  Office: [phone]    Assessment:   Acute kidney injury  Urine Na < 20    creatinine on admission 1   next day on 08/06/2022 was 0.8  suddenly jumped up to 2.7. Due to episode of severe hypotension with blood pressure drop 78/44. also got CT with IV contrast on 08/05/2022 but most likely  this is ATN related due to hypotension and hypercapnic  respiratory failure. CT angio s normal renal arteries and normal kidneys      Sepsis/ possible septic shock. emperature was 101.1   High white cell count. '  immunocompromised due to rheumatoid arthritis and treatment. History of sleep apnea   on CPAP. Acute respiratory failure with high pCO2 in the range of 50s with pH  of 7.24    now intubated. History of abnormal stress test in 06/2022 with stress-induced  ischemia and some scarring. Ejection fraction  was  64%.     had a followup angiogram which was reported as normal as per wife. I do not have that result     History of rheumatoid arthritis   longstanding,was on methotrexate,  has taken Celebrex and now on Inflectra infusions,  so he is immunocompromised. Geovanna Jones is now helping him with his rheumatoid tremendously. he has been on steroids in the past.     Rule out adrenal insufficiency. cortisol level. normal      History of thyroidectomy in the past. .    Small bowel obstruction. Large hiatus hernia with colon and stomach in the chest.  Further management as per medical team.       ROS:   Unable to assess      PMH:   Past medical history, surgical history, social history, family history are reviewed and updated as appropriate. Reviewed current medication list.   Allergies reviewed and updated as needed. PE:   Vitals:    08/20/22 1538   BP:    Pulse: 92   Resp: 19   Temp:    SpO2: 95%       General appearance:  in NAD, intubated and sedated  HEENT: EOM intact, no icterus. Trachea is midline. Neck : No masses, appears symmetrical  Respiratory: Respiratory effort appears normal, bilateral equal chest rise, no wheeze, no crackles ETT to vent   Cardiovascular: Ausculation shows RRR trace edema  Abdomen: mildly distended but soft   Musculoskeletal:  Joints with no swelling or deformity. Skin:no rashes, ulcers, induration, no jaundice.    Neuro: sedated      Lab Results   Component Value Date    CREATININE 1.3 08/20/2022    BUN 46 (H) 08/20/2022     08/20/2022    K 3.3 (L) 08/20/2022     08/20/2022    CO2 31 08/20/2022      Lab Results   Component Value Date    WBC 9.0 08/20/2022    HGB 9.3 (L) 08/20/2022    HCT 28.3 (L) 08/20/2022    MCV 95.0 08/20/2022     (H) 08/20/2022     Lab Results   Component Value Date    CALCIUM 8.5 08/20/2022    CAION 1.06 (L) 08/15/2022    PHOS 3.2 08/20/2022

## 2022-08-20 NOTE — PROGRESS NOTES
Pulmonary Progress Note    Date of Admission: 8/5/2022   LOS: 15 days     CC:  Chief Complaint   Patient presents with    Abdominal Pain    Chest Pain     Sub-sternal, pressure, onset 1300 hours today    Shortness of Breath     EMS reports pt 88% on RA           Assessment/Plan     Acute Hypoxemic Respiratory Failure with SAO2 <90% on Room Air  -Full vent support, Wean supplemental oxygen to goal saturation of >90%  Aspiration Pneumonia  -Merrem  -Respiratory culture NGTD    Acute on Chronic Diastolic Heart failure  -Continue TID Lasix, dose with Diuril yesterday  -Finally negative for the day but remains 11 L positive for the stay    Recurrent SVT  -Beta-blocker    Partial SBO  -resolved    CHARLENE  -Stable creatinine    EARNESTINE/OHS  -Currently on ventilator    Morbid obesity  -BMI 56    Peridex  Lovenox  Pepcid    Due to the immediate potential for life-threatening deterioration due to respiratory failure, I spent 31 minutes providing critical care. This time is excluding time spent performing separately billable procedures. HPI/Subjective  Afebrile overnight. Awake and alert on vent following commands. Worked with PT yesterday    ROS: UnAble to obtain due to mechanical ventilation      Intake/Output Summary (Last 24 hours) at 8/20/2022 0904  Last data filed at 8/20/2022 0800  Gross per 24 hour   Intake 2254.84 ml   Output 5050 ml   Net -2795.16 ml         PHYSICAL EXAM:   Blood pressure 105/65, pulse 90, temperature 99 °F (37.2 °C), temperature source Axillary, resp. rate 18, height 5' 7\" (1.702 m), weight (!) 353 lb 11.2 oz (160.4 kg), SpO2 93 %.'  Gen:  No acute distress. Eyes: PERRL. Anicteric sclera. No conjunctival injection. ENT: No discharge. OP with ETT external appearance of ears and nose normal.  Neck: Trachea midline. No mass   Resp:  No crackles. No wheezes. No rhonchi. No dullness on percussion. CV: Regular rate. Regular rhythm. No murmur or rub.  + Edema. GI: Soft, Non-tender.   Obese abdomen. +BS  Skin: Warm, dry, w/o erythema. Lymph: No cervical or supraclavicular LAD. M/S: No cyanosis. No clubbing. Neuro:  no focal neurologic deficit. Moves all extremities  Psych: Intubated awake and alert on vent      Medications:    Scheduled Meds:   chlorothiazide (DIURIL) IVPB  500 mg IntraVENous Once    furosemide  80 mg IntraVENous TID    gabapentin  400 mg Oral TID    docusate  100 mg Oral Daily    senna  10 mL Oral Nightly    polyethylene glycol  17 g Oral Daily    metoprolol tartrate  25 mg Oral BID    meropenem  1,000 mg IntraVENous Q8H    famotidine (PEPCID) injection  20 mg IntraVENous BID    chlorhexidine  15 mL Mouth/Throat BID    ipratropium-albuterol  1 ampule Inhalation Q4H WA    sodium chloride flush  5-40 mL IntraVENous 2 times per day    levothyroxine  200 mcg Oral Daily    [Held by provider] spironolactone  50 mg Oral Daily    enoxaparin  40 mg SubCUTAneous BID       Continuous Infusions:   fentaNYL 75 mcg/hr (08/19/22 0514)    propofol 15 mcg/kg/min (08/20/22 0558)    sodium chloride         PRN Meds:  fentanNYL, midazolam, artificial tears, acetaminophen, potassium chloride, fentaNYL **AND** fentaNYL, acetaminophen, sodium chloride flush, sodium chloride, ondansetron **OR** ondansetron, diazePAM    Labs reviewed:  CBC:   Recent Labs     08/18/22  0400 08/19/22  0421 08/20/22  0430   WBC 7.6 8.0 9.0   HGB 9.0* 9.1* 9.3*   HCT 27.0* 27.2* 28.3*   MCV 95.2 95.0 95.0   * 482* 459*     BMP:   Recent Labs     08/18/22  0400 08/18/22  1414 08/19/22  0421 08/19/22  1430 08/19/22  2150 08/20/22  0430      < > 144 144 145 143   K 3.9   < > 3.6 3.3* 4.2 3.7      < > 106 107 106 102   CO2 29   < > 28 26 30 28   PHOS 2.9  --  2.7  --   --  3.2   BUN 46*   < > 52* 50* 53* 49*   CREATININE 1.3   < > 1.2 1.1 1.3 1.1    < > = values in this interval not displayed.      LIVER PROFILE: No results for input(s): AST, ALT, LIPASE, BILIDIR, BILITOT, ALKPHOS in the last 72 hours.    Invalid input(s): AMYLASE,  ALB  PT/INR: No results for input(s): PROTIME, INR in the last 72 hours. APTT: No results for input(s): APTT in the last 72 hours. UA:  No results for input(s): NITRITE, COLORU, PHUR, LABCAST, WBCUA, RBCUA, MUCUS, TRICHOMONAS, YEAST, BACTERIA, CLARITYU, SPECGRAV, LEUKOCYTESUR, UROBILINOGEN, BILIRUBINUR, BLOODU, GLUCOSEU, AMORPHOUS in the last 72 hours. Invalid input(s): Ron Pollard    No results for input(s): PH, PCO2, PO2 in the last 72 hours. Films:  Radiology Review:  Pertinent images / reports were reviewed as a part of this visit. Access  CVC   Arterial  Arterial Line 08/18/22 Right Radial (Active)   $ Arterial line insertion $ Yes 08/18/22 1600   Site Assessment Clean, dry & intact 08/20/22 0800   Line Status Arterial fluids per protocol 08/20/22 0800   Art Line Interventions Connections checked and tightened;Zeroed and calibrated; Leveled 08/20/22 0800   Color/Movement/Sensation Capillary refill less than 3 sec 08/20/22 0800   Dressing Type Transparent 08/20/22 0800   Dressing Status Clean, dry & intact 08/20/22 0800   Number of days: 1          PICC        PICC Triple Lumen 32/60/97 Right Basilic (Active)   Central Line Being Utilized Yes 08/20/22 0800   Criteria for Appropriate Use Hemodynamically unstable, requiring monitoring lines, vasopressors, or volume resuscitation 08/20/22 0800   Site Assessment Clean, dry & intact 08/20/22 0800   Phlebitis Assessment No symptoms 08/20/22 0800   Infiltration Assessment 0 08/20/22 0800   Extremity Circumference (cm) 38 cm 08/08/22 0607   External Catheter Length (cm) 0 cm 08/08/22 3167   Proximal Lumen Color/Status Red;Capped; Infusing 08/20/22 0800   Medial Lumen Status White;Capped; Infusing 08/20/22 0800   Distal Lumen Color/Status Gray;Capped; Infusing 08/20/22 0800   Line Care Connections checked and tightened 08/20/22 0800   Alcohol Cap Used Yes 08/20/22 0800   Date of Last Dressing Change 08/17/22 08/20/22 0800

## 2022-08-21 ENCOUNTER — APPOINTMENT (OUTPATIENT)
Dept: CT IMAGING | Age: 62
DRG: 853 | End: 2022-08-21
Payer: COMMERCIAL

## 2022-08-21 LAB
ANION GAP SERPL CALCULATED.3IONS-SCNC: 11 MMOL/L (ref 3–16)
ANION GAP SERPL CALCULATED.3IONS-SCNC: 11 MMOL/L (ref 3–16)
ANION GAP SERPL CALCULATED.3IONS-SCNC: 13 MMOL/L (ref 3–16)
BASOPHILS ABSOLUTE: 0.1 K/UL (ref 0–0.2)
BASOPHILS RELATIVE PERCENT: 0.8 %
BUN BLDV-MCNC: 32 MG/DL (ref 7–20)
BUN BLDV-MCNC: 37 MG/DL (ref 7–20)
BUN BLDV-MCNC: 41 MG/DL (ref 7–20)
CALCIUM SERPL-MCNC: 7.9 MG/DL (ref 8.3–10.6)
CALCIUM SERPL-MCNC: 8.3 MG/DL (ref 8.3–10.6)
CALCIUM SERPL-MCNC: 8.6 MG/DL (ref 8.3–10.6)
CHLORIDE BLD-SCNC: 104 MMOL/L (ref 99–110)
CHLORIDE BLD-SCNC: 106 MMOL/L (ref 99–110)
CHLORIDE BLD-SCNC: 107 MMOL/L (ref 99–110)
CO2: 26 MMOL/L (ref 21–32)
CO2: 29 MMOL/L (ref 21–32)
CO2: 30 MMOL/L (ref 21–32)
CREAT SERPL-MCNC: 1.2 MG/DL (ref 0.8–1.3)
CREAT SERPL-MCNC: 1.3 MG/DL (ref 0.8–1.3)
CREAT SERPL-MCNC: 1.4 MG/DL (ref 0.8–1.3)
EOSINOPHILS ABSOLUTE: 0.1 K/UL (ref 0–0.6)
EOSINOPHILS RELATIVE PERCENT: 1.3 %
GFR AFRICAN AMERICAN: >60
GFR NON-AFRICAN AMERICAN: 52
GFR NON-AFRICAN AMERICAN: 56
GFR NON-AFRICAN AMERICAN: >60
GLUCOSE BLD-MCNC: 100 MG/DL (ref 70–99)
GLUCOSE BLD-MCNC: 91 MG/DL (ref 70–99)
GLUCOSE BLD-MCNC: 94 MG/DL (ref 70–99)
GLUCOSE BLD-MCNC: 95 MG/DL (ref 70–99)
GLUCOSE BLD-MCNC: 98 MG/DL (ref 70–99)
GLUCOSE BLD-MCNC: 99 MG/DL (ref 70–99)
HCT VFR BLD CALC: 27.5 % (ref 40.5–52.5)
HEMOGLOBIN: 9.2 G/DL (ref 13.5–17.5)
LYMPHOCYTES ABSOLUTE: 1 K/UL (ref 1–5.1)
LYMPHOCYTES RELATIVE PERCENT: 10.8 %
MAGNESIUM: 2.3 MG/DL (ref 1.8–2.4)
MCH RBC QN AUTO: 31.7 PG (ref 26–34)
MCHC RBC AUTO-ENTMCNC: 33.5 G/DL (ref 31–36)
MCV RBC AUTO: 94.5 FL (ref 80–100)
MONOCYTES ABSOLUTE: 0.7 K/UL (ref 0–1.3)
MONOCYTES RELATIVE PERCENT: 6.8 %
NEUTROPHILS ABSOLUTE: 7.8 K/UL (ref 1.7–7.7)
NEUTROPHILS RELATIVE PERCENT: 80.3 %
PDW BLD-RTO: 17.2 % (ref 12.4–15.4)
PERFORMED ON: ABNORMAL
PERFORMED ON: NORMAL
PHOSPHORUS: 3.6 MG/DL (ref 2.5–4.9)
PLATELET # BLD: 478 K/UL (ref 135–450)
PMV BLD AUTO: 6.9 FL (ref 5–10.5)
POTASSIUM SERPL-SCNC: 3.2 MMOL/L (ref 3.5–5.1)
POTASSIUM SERPL-SCNC: 3.3 MMOL/L (ref 3.5–5.1)
POTASSIUM SERPL-SCNC: 3.6 MMOL/L (ref 3.5–5.1)
RBC # BLD: 2.92 M/UL (ref 4.2–5.9)
SODIUM BLD-SCNC: 145 MMOL/L (ref 136–145)
SODIUM BLD-SCNC: 145 MMOL/L (ref 136–145)
SODIUM BLD-SCNC: 147 MMOL/L (ref 136–145)
TRIGL SERPL-MCNC: 169 MG/DL (ref 0–150)
WBC # BLD: 9.7 K/UL (ref 4–11)

## 2022-08-21 PROCEDURE — 6360000002 HC RX W HCPCS: Performed by: INTERNAL MEDICINE

## 2022-08-21 PROCEDURE — 2580000003 HC RX 258: Performed by: HOSPITALIST

## 2022-08-21 PROCEDURE — 6360000002 HC RX W HCPCS: Performed by: NURSE PRACTITIONER

## 2022-08-21 PROCEDURE — 99291 CRITICAL CARE FIRST HOUR: CPT | Performed by: INTERNAL MEDICINE

## 2022-08-21 PROCEDURE — 84478 ASSAY OF TRIGLYCERIDES: CPT

## 2022-08-21 PROCEDURE — 6370000000 HC RX 637 (ALT 250 FOR IP): Performed by: INTERNAL MEDICINE

## 2022-08-21 PROCEDURE — 2580000003 HC RX 258: Performed by: STUDENT IN AN ORGANIZED HEALTH CARE EDUCATION/TRAINING PROGRAM

## 2022-08-21 PROCEDURE — 6370000000 HC RX 637 (ALT 250 FOR IP): Performed by: NURSE PRACTITIONER

## 2022-08-21 PROCEDURE — 94761 N-INVAS EAR/PLS OXIMETRY MLT: CPT

## 2022-08-21 PROCEDURE — 80048 BASIC METABOLIC PNL TOTAL CA: CPT

## 2022-08-21 PROCEDURE — 2500000003 HC RX 250 WO HCPCS: Performed by: HOSPITALIST

## 2022-08-21 PROCEDURE — 2000000000 HC ICU R&B

## 2022-08-21 PROCEDURE — 94760 N-INVAS EAR/PLS OXIMETRY 1: CPT

## 2022-08-21 PROCEDURE — 6360000002 HC RX W HCPCS: Performed by: HOSPITALIST

## 2022-08-21 PROCEDURE — 71250 CT THORAX DX C-: CPT

## 2022-08-21 PROCEDURE — 84100 ASSAY OF PHOSPHORUS: CPT

## 2022-08-21 PROCEDURE — 94003 VENT MGMT INPAT SUBQ DAY: CPT

## 2022-08-21 PROCEDURE — 2700000000 HC OXYGEN THERAPY PER DAY

## 2022-08-21 PROCEDURE — 6360000002 HC RX W HCPCS: Performed by: STUDENT IN AN ORGANIZED HEALTH CARE EDUCATION/TRAINING PROGRAM

## 2022-08-21 PROCEDURE — 85025 COMPLETE CBC W/AUTO DIFF WBC: CPT

## 2022-08-21 PROCEDURE — 2580000003 HC RX 258: Performed by: NURSE PRACTITIONER

## 2022-08-21 PROCEDURE — 2580000003 HC RX 258: Performed by: INTERNAL MEDICINE

## 2022-08-21 PROCEDURE — 94640 AIRWAY INHALATION TREATMENT: CPT

## 2022-08-21 PROCEDURE — 83735 ASSAY OF MAGNESIUM: CPT

## 2022-08-21 RX ORDER — ACETAZOLAMIDE 500 MG/5ML
250 INJECTION, POWDER, LYOPHILIZED, FOR SOLUTION INTRAVENOUS DAILY
Status: COMPLETED | OUTPATIENT
Start: 2022-08-21 | End: 2022-08-23

## 2022-08-21 RX ORDER — ACETAZOLAMIDE 250 MG/1
250 TABLET ORAL DAILY
Status: DISCONTINUED | OUTPATIENT
Start: 2022-08-21 | End: 2022-08-21

## 2022-08-21 RX ADMIN — FAMOTIDINE 20 MG: 10 INJECTION, SOLUTION INTRAVENOUS at 19:33

## 2022-08-21 RX ADMIN — PROPOFOL 15 MCG/KG/MIN: 10 INJECTION, EMULSION INTRAVENOUS at 13:57

## 2022-08-21 RX ADMIN — MEROPENEM 1000 MG: 1 INJECTION, POWDER, FOR SOLUTION INTRAVENOUS at 12:03

## 2022-08-21 RX ADMIN — IPRATROPIUM BROMIDE AND ALBUTEROL SULFATE 1 AMPULE: 2.5; .5 SOLUTION RESPIRATORY (INHALATION) at 08:12

## 2022-08-21 RX ADMIN — PROPOFOL 35 MCG/KG/MIN: 10 INJECTION, EMULSION INTRAVENOUS at 23:56

## 2022-08-21 RX ADMIN — IPRATROPIUM BROMIDE AND ALBUTEROL SULFATE 1 AMPULE: 2.5; .5 SOLUTION RESPIRATORY (INHALATION) at 11:31

## 2022-08-21 RX ADMIN — FUROSEMIDE 80 MG: 10 INJECTION, SOLUTION INTRAMUSCULAR; INTRAVENOUS at 08:04

## 2022-08-21 RX ADMIN — ACETAZOLAMIDE 250 MG: 500 INJECTION, POWDER, LYOPHILIZED, FOR SOLUTION INTRAVENOUS at 12:04

## 2022-08-21 RX ADMIN — IPRATROPIUM BROMIDE AND ALBUTEROL SULFATE 1 AMPULE: 2.5; .5 SOLUTION RESPIRATORY (INHALATION) at 19:28

## 2022-08-21 RX ADMIN — SODIUM CHLORIDE, PRESERVATIVE FREE 10 ML: 5 INJECTION INTRAVENOUS at 19:34

## 2022-08-21 RX ADMIN — PROPOFOL 35 MCG/KG/MIN: 10 INJECTION, EMULSION INTRAVENOUS at 21:00

## 2022-08-21 RX ADMIN — FUROSEMIDE 20 MG/HR: 10 INJECTION, SOLUTION INTRAMUSCULAR; INTRAVENOUS at 15:28

## 2022-08-21 RX ADMIN — IPRATROPIUM BROMIDE AND ALBUTEROL SULFATE 1 AMPULE: 2.5; .5 SOLUTION RESPIRATORY (INHALATION) at 15:47

## 2022-08-21 RX ADMIN — PROPOFOL 15 MCG/KG/MIN: 10 INJECTION, EMULSION INTRAVENOUS at 06:04

## 2022-08-21 RX ADMIN — CHLORHEXIDINE GLUCONATE 0.12% ORAL RINSE 15 ML: 1.2 LIQUID ORAL at 08:07

## 2022-08-21 RX ADMIN — FAMOTIDINE 20 MG: 10 INJECTION, SOLUTION INTRAVENOUS at 08:05

## 2022-08-21 RX ADMIN — MEROPENEM 1000 MG: 1 INJECTION, POWDER, FOR SOLUTION INTRAVENOUS at 03:34

## 2022-08-21 RX ADMIN — POTASSIUM CHLORIDE 20 MEQ: 29.8 INJECTION, SOLUTION INTRAVENOUS at 15:28

## 2022-08-21 RX ADMIN — POTASSIUM CHLORIDE 20 MEQ: 29.8 INJECTION, SOLUTION INTRAVENOUS at 17:45

## 2022-08-21 RX ADMIN — MIDAZOLAM 2 MG: 1 INJECTION INTRAMUSCULAR; INTRAVENOUS at 17:40

## 2022-08-21 RX ADMIN — CHLORHEXIDINE GLUCONATE 0.12% ORAL RINSE 15 ML: 1.2 LIQUID ORAL at 19:34

## 2022-08-21 RX ADMIN — PROPOFOL 25 MCG/KG/MIN: 10 INJECTION, EMULSION INTRAVENOUS at 17:46

## 2022-08-21 RX ADMIN — MEROPENEM 1000 MG: 1 INJECTION, POWDER, FOR SOLUTION INTRAVENOUS at 19:27

## 2022-08-21 RX ADMIN — ENOXAPARIN SODIUM 40 MG: 100 INJECTION SUBCUTANEOUS at 19:33

## 2022-08-21 RX ADMIN — SODIUM CHLORIDE, PRESERVATIVE FREE 10 ML: 5 INJECTION INTRAVENOUS at 10:46

## 2022-08-21 RX ADMIN — ENOXAPARIN SODIUM 40 MG: 100 INJECTION SUBCUTANEOUS at 08:05

## 2022-08-21 RX ADMIN — FUROSEMIDE 80 MG: 10 INJECTION, SOLUTION INTRAMUSCULAR; INTRAVENOUS at 13:58

## 2022-08-21 ASSESSMENT — PAIN SCALES - GENERAL
PAINLEVEL_OUTOF10: 0

## 2022-08-21 ASSESSMENT — PAIN SCALES - WONG BAKER
WONGBAKER_NUMERICALRESPONSE: 0

## 2022-08-21 ASSESSMENT — PULMONARY FUNCTION TESTS
PIF_VALUE: 31
PIF_VALUE: 31
PIF_VALUE: 33
PIF_VALUE: 33
PIF_VALUE: 34
PIF_VALUE: 31
PIF_VALUE: 33
PIF_VALUE: 33
PIF_VALUE: 40
PIF_VALUE: 33
PIF_VALUE: 31
PIF_VALUE: 33
PIF_VALUE: 31
PIF_VALUE: 34
PIF_VALUE: 33
PIF_VALUE: 31
PIF_VALUE: 33
PIF_VALUE: 35
PIF_VALUE: 33
PIF_VALUE: 31
PIF_VALUE: 36
PIF_VALUE: 34

## 2022-08-21 NOTE — PROGRESS NOTES
Gastroenterology Progress Note    Casey Bliss is a 63 y.o. male patient. Principal Problem:    Partial bowel obstruction (HCC)  Active Problems:    Hiatal hernia    Acute respiratory failure with hypoxia and hypercapnia (HCC)    SBO (small bowel obstruction) (HCC)    Aspiration into airway    Acute respiratory failure with hypoxia (HCC)    Small bowel obstruction (HCC)    Diastolic heart failure (HCC)    History of atrial fibrillation    Supraventricular tachycardia (HCC)    Arterial hypotension  Resolved Problems:    * No resolved hospital problems. *      SUBJECTIVE:  Asked to see again as his OG became dislodged. He remains sedated on ventilator. On prophylactic lovenox but no other blood thinners.   He is in restraints on a propofol drip    Current Facility-Administered Medications: acetaZOLAMIDE (DIAMOX) injection 250 mg, 250 mg, IntraVENous, Daily  furosemide (LASIX) 500 mg in dextrose 5 % 100 mL infusion, 20 mg/hr, IntraVENous, Continuous  fentaNYL (SUBLIMAZE) injection 50 mcg, 50 mcg, IntraVENous, Q1H PRN  midazolam (VERSED) injection 2 mg, 2 mg, IntraVENous, Q2H PRN  gabapentin (NEURONTIN) capsule 400 mg, 400 mg, Oral, TID  docusate (COLACE) 50 MG/5ML liquid 100 mg, 100 mg, Oral, Daily  senna (SENOKOT) 176 MG/5ML syrup 10 mL, 10 mL, Oral, Nightly  polyethylene glycol (GLYCOLAX) packet 17 g, 17 g, Oral, Daily  metoprolol tartrate (LOPRESSOR) tablet 25 mg, 25 mg, Oral, BID  [COMPLETED] meropenem (MERREM) 1,000 mg in sodium chloride 0.9 % 100 mL IVPB (mini-bag), 1,000 mg, IntraVENous, Once **FOLLOWED BY** meropenem (MERREM) 1,000 mg in sodium chloride 0.9 % 100 mL IVPB (mini-bag), 1,000 mg, IntraVENous, Q8H  lubrifresh P.M. (artificial tears) ophthalmic ointment, , Both Eyes, PRN  acetaminophen (TYLENOL) tablet 650 mg, 650 mg, Oral, Q4H PRN  potassium chloride 20 mEq/50 mL IVPB (Central Line), 20 mEq, IntraVENous, PRN  famotidine (PEPCID) 20 mg in sodium chloride (PF) 10 mL injection, 20 mg, IntraVENous, BID  chlorhexidine (PERIDEX) 0.12 % solution 15 mL, 15 mL, Mouth/Throat, BID  fentaNYL (SUBLIMAZE) 1,000 mcg in sodium chloride 0.9% 100 mL infusion,  mcg/hr, IntraVENous, Continuous **AND** fentaNYL (SUBLIMAZE) 1,000 mcg in sodium chloride 0.9% 100 mL infusion, 50 mcg, IntraVENous, Q30 Min PRN  propofol injection, 5-50 mcg/kg/min, IntraVENous, Continuous  acetaminophen (TYLENOL) suppository 650 mg, 650 mg, Rectal, Q6H PRN  ipratropium-albuterol (DUONEB) nebulizer solution 1 ampule, 1 ampule, Inhalation, Q4H WA  sodium chloride flush 0.9 % injection 5-40 mL, 5-40 mL, IntraVENous, 2 times per day  sodium chloride flush 0.9 % injection 5-40 mL, 5-40 mL, IntraVENous, PRN  0.9 % sodium chloride infusion, 25 mL, IntraVENous, PRN  ondansetron (ZOFRAN-ODT) disintegrating tablet 4 mg, 4 mg, Oral, Q8H PRN **OR** ondansetron (ZOFRAN) injection 4 mg, 4 mg, IntraVENous, Q6H PRN  diazePAM (VALIUM) tablet 5 mg, 5 mg, Oral, 4x Daily PRN  levothyroxine (SYNTHROID) tablet 200 mcg, 200 mcg, Oral, Daily  [Held by provider] spironolactone (ALDACTONE) tablet 50 mg, 50 mg, Oral, Daily  enoxaparin (LOVENOX) injection 40 mg, 40 mg, SubCUTAneous, BID    Physical    VITALS:  /69   Pulse 89   Temp 98.6 °F (37 °C) (Axillary)   Resp 19   Ht 5' 7\" (1.702 m)   Wt (!) 351 lb 13.7 oz (159.6 kg)   SpO2 95%   BMI 55.11 kg/m²   TEMPERATURE:  Current - Temp: 98.6 °F (37 °C);  Max - Temp  Av °F (37.2 °C)  Min: 98.3 °F (36.8 °C)  Max: 99.8 °F (37.7 °C)    NAD, awake on ventilator  Eyes: No icterus  RRR  Lungs CTA Bilaterally, normal effort  Abdomen soft, ND, NT, Bowel sounds normal.      Data    Data Review:    Recent Labs     22  0421 22  0430 22  0435   WBC 8.0 9.0 9.7   HGB 9.1* 9.3* 9.2*   HCT 27.2* 28.3* 27.5*   MCV 95.0 95.0 94.5   * 459* 478*     Recent Labs     22  0421 22  1430 22  0430 22  1417 22  2200 22  0435 22  1430      < > 143   < > 146* 145 147*   K 3.6   < > 3.7   < > 3.8 3.6 3.2*      < > 102   < > 105 104 107   CO2 28   < > 28   < > 31 30 29   PHOS 2.7  --  3.2  --   --  3.6  --    BUN 52*   < > 49*   < > 44* 41* 37*   CREATININE 1.2   < > 1.1   < > 1.5* 1.3 1.4*    < > = values in this interval not displayed. No results for input(s): AST, ALT, ALB, BILIDIR, BILITOT, ALKPHOS in the last 72 hours. No results for input(s): LIPASE, AMYLASE in the last 72 hours. No results for input(s): PROTIME, INR in the last 72 hours. No results for input(s): PTT in the last 72 hours. ASSESSMENT:  63 y.o. male with a PMH of Rheumatoid Arthritis, HTN, Afib, hypothyroidism, obesity, and EARNESTINE on CPAP who presented on 8/5/2022 with abdominal pain, chest pain, nausea, vomiting and diarrhea. He was diagnosed in [place] with a large paraesophageal hernia without volvulus or outlet obstruction. Plan was to follow up with surgeon at [facility]. Repeat CT this admission showed distention of the stomach and duodenum with gradual transition in the mid small bowel c/w low-grade or partial obstruction. Also noted was large hiatal hernia containing much of the stomach and colonic wall thickening possibly due to incomplete distention vs underlying colitis. 8/7 he developed respiratory distress and hypoxia. He was transferred to ICU and is being treated for aspiration pneumonia. 8/8 he was intubated. Unable to place NG tube so endoscopy performed that showed a large hiatal hernia, 1cm lipoma 2nd portion of duodenum and OG tube placed. OG has been dislodged. I attempted to replace at bedside but it repeatedly coils at 35cm from the lips, likely in the hiatal hernia. Paraesophageal hernia leading to inability to pass NG tube. We are asked for endoscopic placement. PLAN :  Endoscopic placement of OG tube tomorrow. Thank you for allowing me to participate in the care of your patient. Please feel free to contact me with any concerns. [address] [facility], [place]

## 2022-08-21 NOTE — PROGRESS NOTES
Pulmonary Progress Note    Date of Admission: 8/5/2022   LOS: 16 days     CC:  Chief Complaint   Patient presents with    Abdominal Pain    Chest Pain     Sub-sternal, pressure, onset 1300 hours today    Shortness of Breath     EMS reports pt 88% on RA           Assessment/Plan     Acute Hypoxemic Respiratory Failure with SAO2 <90% on Room Air  -Full vent support, Wean supplemental oxygen to goal saturation of >90%  -Oxygen requirement actually going up overnight despite good diuresis, will check CT of the chest    Aspiration Pneumonia  -Merrem x7 days  -Respiratory culture NGTD    Acute on Chronic Diastolic Heart failure  -Continue TID Lasix, diamox today  -negative x2day, still +7L    Recurrent SVT  -Beta-blocker    Partial SBO  -resolved    CHARLENE  -Stable creatinine    EARNESTINE/OHS  -Currently on ventilator    Morbid obesity  -BMI 56    Peridex  Lovenox  Pepcid    Due to the immediate potential for life-threatening deterioration due to respiratory failure, I spent 32 minutes providing critical care. This time is excluding time spent performing separately billable procedures. HPI/Subjective  FiO2 increased overnight to 70. Good diuresis. Awake and alert on vent    ROS: UnAble to obtain due to mechanical ventilation      Intake/Output Summary (Last 24 hours) at 8/21/2022 0805  Last data filed at 8/21/2022 0601  Gross per 24 hour   Intake 1553.77 ml   Output 5285 ml   Net -3731.23 ml         PHYSICAL EXAM:   Blood pressure 101/69, pulse 79, temperature 98.9 °F (37.2 °C), temperature source Axillary, resp. rate 18, height 5' 7\" (1.702 m), weight (!) 351 lb 13.7 oz (159.6 kg), SpO2 90 %.'  Gen:  No acute distress. Eyes: PERRL. Anicteric sclera. No conjunctival injection. ENT: No discharge. OP with ETT external appearance of ears and nose normal.  Neck: Trachea midline. No mass   Resp:  No crackles. No wheezes. No rhonchi. No dullness on percussion. CV: Regular rate. Regular rhythm. No murmur or rub.  + Edema. hours.    Invalid input(s): AMYLASE,  ALB  PT/INR: No results for input(s): PROTIME, INR in the last 72 hours. APTT: No results for input(s): APTT in the last 72 hours. UA:  No results for input(s): NITRITE, COLORU, PHUR, LABCAST, WBCUA, RBCUA, MUCUS, TRICHOMONAS, YEAST, BACTERIA, CLARITYU, SPECGRAV, LEUKOCYTESUR, UROBILINOGEN, BILIRUBINUR, BLOODU, GLUCOSEU, AMORPHOUS in the last 72 hours. Invalid input(s): [place]    No results for input(s): PH, PCO2, PO2 in the last 72 hours. Films:  Radiology Review:  Pertinent images / reports were reviewed as a part of this visit. Access  CVC   Arterial  Arterial Line 08/18/22 Right Radial (Active)   $ Arterial line insertion $ Yes 08/18/22 1600   Site Assessment Clean, dry & intact 08/21/22 0600   Line Status Arterial fluids per protocol 08/21/22 0600   Art Line Interventions Connections checked and tightened 08/21/22 0600   Color/Movement/Sensation Capillary refill less than 3 sec 08/21/22 0600   Dressing Type Transparent 08/21/22 0600   Dressing Status Clean, dry & intact 08/21/22 0600   Number of days: 2          PICC        PICC Triple Lumen 52/14/09 Right Basilic (Active)   Central Line Being Utilized Yes 08/21/22 0600   Criteria for Appropriate Use Hemodynamically unstable, requiring monitoring lines, vasopressors, or volume resuscitation 08/21/22 0600   Site Assessment Clean, dry & intact 08/21/22 0600   Phlebitis Assessment No symptoms 08/21/22 0600   Infiltration Assessment 0 08/21/22 0600   Extremity Circumference (cm) 38 cm 08/08/22 0607   External Catheter Length (cm) 0 cm 08/08/22 0607   Proximal Lumen Color/Status Red;Capped; Infusing 08/21/22 0600   Medial Lumen Status White;Capped; Infusing 08/21/22 0600   Distal Lumen Color/Status Gray;Capped; Infusing 08/21/22 0600   Line Care Connections checked and tightened 08/21/22 0600   Alcohol Cap Used Yes 08/21/22 0600   Date of Last Dressing Change 08/17/22 08/21/22 0600   Dressing Type Transparent; Antimicrobial 08/21/22 0600   Dressing Status Clean, dry & intact 08/21/22 0600   Dressing Intervention New;Dressing changed 08/17/22 0400   Number of days: 13       CVC                  Fermin  Urinary Catheter Fermin (Active)   Catheter Indications Need for fluid volume management of the critically ill patient in a critical care setting 08/21/22 0600   Site Assessment No urethral drainage 08/21/22 0600   Urine Color Yellow 08/21/22 0600   Urine Appearance Clear 08/21/22 0600   Collection Container Standard 08/21/22 0600   Securement Method Securing device (Describe) 08/21/22 0600   Catheter Care Completed Yes 08/20/22 0400   Catheter Best Practices  Drainage tube clipped to bed;Catheter secured to thigh; Tamper seal intact; Bag below bladder;Bag not on floor; Lack of dependent loop in tubing;Drainage bag less than half full 08/21/22 0600   Status Patent;Draining 08/21/22 0600   Output (mL) 225 mL 08/21/22 0600   Number of days: 5         Thank you for this consult,    Deedee Nazario MD  [facility], Critical Care, and Sleep Medicine

## 2022-08-21 NOTE — PROGRESS NOTES
[facility]                                               Progress note    Summary:   Carla Cunningham is being seen by nephrology for CHARLENE and hypernatremia. Admitted with paraesophageal hernia and SBO. Had EGD 8/9 with biopsy. Still NPO     Interval History  Seen at bedside  On lasix IV 80 mg TID, diuril 500 mg once. Made 5005 mL urine yesterday, achieved negative fluid balance  Cr stable. 1.3 today  Hypokalemia noted. Na 145  FiO2 worse, CT chest showing worsening pulmonary effusions. Plan:   Change lasix IV 80 mg TID to lasix drip 20 mg/h with prn diuril and acetazolamide. Alexei Stewart MD  [id number]ph[facility]  Office: [phone]    Assessment:   Acute kidney injury  Urine Na < 20    creatinine on admission 1   next day on 08/06/2022 was 0.8  suddenly jumped up to 2.7. Due to episode of severe hypotension with blood pressure drop 78/44. also got CT with IV contrast on 08/05/2022 but most likely  this is ATN related due to hypotension and hypercapnic  respiratory failure. CT angio s normal renal arteries and normal kidneys      Sepsis/ possible septic shock. emperature was 101.1   High white cell count. '  immunocompromised due to rheumatoid arthritis and treatment. History of sleep apnea   on CPAP. Acute respiratory failure with high pCO2 in the range of 50s with pH  of 7.24    now intubated. History of abnormal stress test in 06/2022 with stress-induced  ischemia and some scarring. Ejection fraction  was  64%.     had a followup angiogram which was reported as normal as per wife. I do not have that result     History of rheumatoid arthritis   longstanding,was on methotrexate,  has taken Celebrex and now on Inflectra infusions,  so he is immunocompromised. [facility] is now helping him with his rheumatoid tremendously. he has been on steroids in the past.     Rule out adrenal insufficiency. cortisol level.  normal      History of thyroidectomy in the past. .    Small bowel obstruction. Large hiatus hernia with colon and stomach in the chest.  Further management as per medical team.       ROS:   Unable to assess      PMH:   Past medical history, surgical history, social history, family history are reviewed and updated as appropriate. Reviewed current medication list.   Allergies reviewed and updated as needed. PE:   Vitals:    08/21/22 1200   BP:    Pulse:    Resp:    Temp: 98.3 °F (36.8 °C)   SpO2:        General appearance:  in NAD, intubated and sedated  HEENT: EOM intact, no icterus. Trachea is midline. Neck : No masses, appears symmetrical  Respiratory: Respiratory effort appears normal, bilateral equal chest rise, no wheeze, no crackles ETT to vent   Cardiovascular: Ausculation shows RRR trace edema  Abdomen: mildly distended but soft   Musculoskeletal:  Joints with no swelling or deformity. Skin:no rashes, ulcers, induration, no jaundice.    Neuro: sedated      Lab Results   Component Value Date    CREATININE 1.3 08/21/2022    BUN 41 (H) 08/21/2022     08/21/2022    K 3.6 08/21/2022     08/21/2022    CO2 30 08/21/2022      Lab Results   Component Value Date    WBC 9.7 08/21/2022    HGB 9.2 (L) 08/21/2022    HCT 27.5 (L) 08/21/2022    MCV 94.5 08/21/2022     (H) 08/21/2022     Lab Results   Component Value Date    CALCIUM 8.6 08/21/2022    CAION 1.06 (L) 08/15/2022    PHOS 3.6 08/21/2022

## 2022-08-22 PROBLEM — J69.0 ASPIRATION PNEUMONIA OF BOTH LOWER LOBES DUE TO GASTRIC SECRETIONS (HCC): Status: ACTIVE | Noted: 2022-08-22

## 2022-08-22 PROBLEM — E66.01 MORBID OBESITY WITH BMI OF 50.0-59.9, ADULT (HCC): Status: ACTIVE | Noted: 2022-08-22

## 2022-08-22 LAB
ANION GAP SERPL CALCULATED.3IONS-SCNC: 12 MMOL/L (ref 3–16)
ANION GAP SERPL CALCULATED.3IONS-SCNC: 13 MMOL/L (ref 3–16)
ANION GAP SERPL CALCULATED.3IONS-SCNC: 16 MMOL/L (ref 3–16)
APPEARANCE BAL (LAVAGE): ABNORMAL
BASOPHILS ABSOLUTE: 0 K/UL (ref 0–0.2)
BASOPHILS RELATIVE PERCENT: 0.5 %
BUN BLDV-MCNC: 32 MG/DL (ref 7–20)
BUN BLDV-MCNC: 37 MG/DL (ref 7–20)
BUN BLDV-MCNC: 38 MG/DL (ref 7–20)
CALCIUM SERPL-MCNC: 8.2 MG/DL (ref 8.3–10.6)
CALCIUM SERPL-MCNC: 8.3 MG/DL (ref 8.3–10.6)
CALCIUM SERPL-MCNC: 8.6 MG/DL (ref 8.3–10.6)
CHLORIDE BLD-SCNC: 102 MMOL/L (ref 99–110)
CHLORIDE BLD-SCNC: 103 MMOL/L (ref 99–110)
CHLORIDE BLD-SCNC: 103 MMOL/L (ref 99–110)
CLOT EVALUATION BAL: ABNORMAL
CO2: 24 MMOL/L (ref 21–32)
CO2: 26 MMOL/L (ref 21–32)
CO2: 27 MMOL/L (ref 21–32)
COLOR LAVAGE: ABNORMAL
CREAT SERPL-MCNC: 1.2 MG/DL (ref 0.8–1.3)
CREAT SERPL-MCNC: 1.4 MG/DL (ref 0.8–1.3)
CREAT SERPL-MCNC: 1.4 MG/DL (ref 0.8–1.3)
EOSIN: 1 %
EOSINOPHILS ABSOLUTE: 0.2 K/UL (ref 0–0.6)
EOSINOPHILS RELATIVE PERCENT: 2.6 %
GFR AFRICAN AMERICAN: >60
GFR NON-AFRICAN AMERICAN: 52
GFR NON-AFRICAN AMERICAN: 52
GFR NON-AFRICAN AMERICAN: >60
GLUCOSE BLD-MCNC: 105 MG/DL (ref 70–99)
GLUCOSE BLD-MCNC: 106 MG/DL (ref 70–99)
GLUCOSE BLD-MCNC: 108 MG/DL (ref 70–99)
GLUCOSE BLD-MCNC: 84 MG/DL (ref 70–99)
GLUCOSE BLD-MCNC: 90 MG/DL (ref 70–99)
GLUCOSE BLD-MCNC: 93 MG/DL (ref 70–99)
HCT VFR BLD CALC: 27.8 % (ref 40.5–52.5)
HEMOGLOBIN: 9 G/DL (ref 13.5–17.5)
LYMPHOCYTES ABSOLUTE: 0.9 K/UL (ref 1–5.1)
LYMPHOCYTES RELATIVE PERCENT: 9.9 %
LYMPHOCYTES, BAL: 4 % (ref 5–10)
MACROPHAGES, BAL: 6 % (ref 90–95)
MAGNESIUM: 2.3 MG/DL (ref 1.8–2.4)
MCH RBC QN AUTO: 30.7 PG (ref 26–34)
MCHC RBC AUTO-ENTMCNC: 32.5 G/DL (ref 31–36)
MCV RBC AUTO: 94.2 FL (ref 80–100)
MONOCYTES ABSOLUTE: 0.5 K/UL (ref 0–1.3)
MONOCYTES RELATIVE PERCENT: 5.4 %
NEUTROPHILS ABSOLUTE: 7 K/UL (ref 1.7–7.7)
NEUTROPHILS RELATIVE PERCENT: 81.6 %
NUMBER OF CELLS COUNTED BAL (LAVAGE): 100
PDW BLD-RTO: 17.3 % (ref 12.4–15.4)
PERFORMED ON: ABNORMAL
PERFORMED ON: NORMAL
PERFORMED ON: NORMAL
PHOSPHORUS: 4 MG/DL (ref 2.5–4.9)
PLATELET # BLD: 469 K/UL (ref 135–450)
PMV BLD AUTO: 7.2 FL (ref 5–10.5)
POTASSIUM SERPL-SCNC: 3.4 MMOL/L (ref 3.5–5.1)
POTASSIUM SERPL-SCNC: 3.4 MMOL/L (ref 3.5–5.1)
POTASSIUM SERPL-SCNC: 3.7 MMOL/L (ref 3.5–5.1)
POTASSIUM SERPL-SCNC: 5.2 MMOL/L (ref 3.5–5.1)
RBC # BLD: 2.95 M/UL (ref 4.2–5.9)
RBC, BAL: 235 /CUMM
SEGMENTED NEUTROPHILS, BAL: 89 % (ref 5–10)
SODIUM BLD-SCNC: 139 MMOL/L (ref 136–145)
SODIUM BLD-SCNC: 142 MMOL/L (ref 136–145)
SODIUM BLD-SCNC: 145 MMOL/L (ref 136–145)
VOLUME LAVAGE: 16 ML
WBC # BLD: 8.6 K/UL (ref 4–11)
WBC/EPI CELLS BAL: 1380 /CUMM

## 2022-08-22 PROCEDURE — 99291 CRITICAL CARE FIRST HOUR: CPT | Performed by: INTERNAL MEDICINE

## 2022-08-22 PROCEDURE — 6360000002 HC RX W HCPCS: Performed by: NURSE PRACTITIONER

## 2022-08-22 PROCEDURE — 0B9F8ZX DRAINAGE OF RIGHT LOWER LUNG LOBE, VIA NATURAL OR ARTIFICIAL OPENING ENDOSCOPIC, DIAGNOSTIC: ICD-10-PCS | Performed by: INTERNAL MEDICINE

## 2022-08-22 PROCEDURE — 85025 COMPLETE CBC W/AUTO DIFF WBC: CPT

## 2022-08-22 PROCEDURE — 2000000000 HC ICU R&B

## 2022-08-22 PROCEDURE — 6360000002 HC RX W HCPCS: Performed by: INTERNAL MEDICINE

## 2022-08-22 PROCEDURE — 2580000003 HC RX 258: Performed by: HOSPITALIST

## 2022-08-22 PROCEDURE — 6370000000 HC RX 637 (ALT 250 FOR IP): Performed by: NURSE PRACTITIONER

## 2022-08-22 PROCEDURE — 94003 VENT MGMT INPAT SUBQ DAY: CPT

## 2022-08-22 PROCEDURE — 31624 DX BRONCHOSCOPE/LAVAGE: CPT | Performed by: INTERNAL MEDICINE

## 2022-08-22 PROCEDURE — 87102 FUNGUS ISOLATION CULTURE: CPT

## 2022-08-22 PROCEDURE — 89051 BODY FLUID CELL COUNT: CPT

## 2022-08-22 PROCEDURE — 6360000002 HC RX W HCPCS: Performed by: HOSPITALIST

## 2022-08-22 PROCEDURE — 87205 SMEAR GRAM STAIN: CPT

## 2022-08-22 PROCEDURE — 83735 ASSAY OF MAGNESIUM: CPT

## 2022-08-22 PROCEDURE — 6370000000 HC RX 637 (ALT 250 FOR IP): Performed by: INTERNAL MEDICINE

## 2022-08-22 PROCEDURE — 3609013300 HC EGD TUBE PLACEMENT: Performed by: INTERNAL MEDICINE

## 2022-08-22 PROCEDURE — 3609027000 HC BRONCHOSCOPY: Performed by: INTERNAL MEDICINE

## 2022-08-22 PROCEDURE — 2580000003 HC RX 258: Performed by: STUDENT IN AN ORGANIZED HEALTH CARE EDUCATION/TRAINING PROGRAM

## 2022-08-22 PROCEDURE — 2700000000 HC OXYGEN THERAPY PER DAY

## 2022-08-22 PROCEDURE — 9990000010 HC NO CHARGE VISIT

## 2022-08-22 PROCEDURE — 84132 ASSAY OF SERUM POTASSIUM: CPT

## 2022-08-22 PROCEDURE — 94761 N-INVAS EAR/PLS OXIMETRY MLT: CPT

## 2022-08-22 PROCEDURE — 84100 ASSAY OF PHOSPHORUS: CPT

## 2022-08-22 PROCEDURE — 6370000000 HC RX 637 (ALT 250 FOR IP): Performed by: STUDENT IN AN ORGANIZED HEALTH CARE EDUCATION/TRAINING PROGRAM

## 2022-08-22 PROCEDURE — 88305 TISSUE EXAM BY PATHOLOGIST: CPT

## 2022-08-22 PROCEDURE — 6360000002 HC RX W HCPCS: Performed by: STUDENT IN AN ORGANIZED HEALTH CARE EDUCATION/TRAINING PROGRAM

## 2022-08-22 PROCEDURE — 3609010800 HC BRONCHOSCOPY ALVEOLAR LAVAGE: Performed by: INTERNAL MEDICINE

## 2022-08-22 PROCEDURE — 2709999900 HC NON-CHARGEABLE SUPPLY: Performed by: INTERNAL MEDICINE

## 2022-08-22 PROCEDURE — 87070 CULTURE OTHR SPECIMN AEROBIC: CPT

## 2022-08-22 PROCEDURE — 88112 CYTOPATH CELL ENHANCE TECH: CPT

## 2022-08-22 PROCEDURE — 87106 FUNGI IDENTIFICATION YEAST: CPT

## 2022-08-22 PROCEDURE — 2580000003 HC RX 258: Performed by: NURSE PRACTITIONER

## 2022-08-22 PROCEDURE — 94640 AIRWAY INHALATION TREATMENT: CPT

## 2022-08-22 PROCEDURE — 80048 BASIC METABOLIC PNL TOTAL CA: CPT

## 2022-08-22 PROCEDURE — APPNB15 APP NON BILLABLE TIME 0-15 MINS: Performed by: NURSE PRACTITIONER

## 2022-08-22 PROCEDURE — 2580000003 HC RX 258: Performed by: INTERNAL MEDICINE

## 2022-08-22 PROCEDURE — 2500000003 HC RX 250 WO HCPCS: Performed by: HOSPITALIST

## 2022-08-22 PROCEDURE — 2500000003 HC RX 250 WO HCPCS: Performed by: INTERNAL MEDICINE

## 2022-08-22 RX ORDER — POTASSIUM CHLORIDE 29.8 MG/ML
20 INJECTION INTRAVENOUS ONCE
Status: COMPLETED | OUTPATIENT
Start: 2022-08-22 | End: 2022-08-22

## 2022-08-22 RX ORDER — DILTIAZEM HYDROCHLORIDE 5 MG/ML
5 INJECTION INTRAVENOUS ONCE
Status: COMPLETED | OUTPATIENT
Start: 2022-08-22 | End: 2022-08-22

## 2022-08-22 RX ORDER — METOPROLOL TARTRATE 5 MG/5ML
5 INJECTION INTRAVENOUS ONCE
Status: DISCONTINUED | OUTPATIENT
Start: 2022-08-22 | End: 2022-08-23

## 2022-08-22 RX ADMIN — GABAPENTIN 400 MG: 400 CAPSULE ORAL at 20:39

## 2022-08-22 RX ADMIN — POTASSIUM CHLORIDE 20 MEQ: 29.8 INJECTION, SOLUTION INTRAVENOUS at 04:01

## 2022-08-22 RX ADMIN — MUPIROCIN: 20 OINTMENT TOPICAL at 14:54

## 2022-08-22 RX ADMIN — SODIUM CHLORIDE, PRESERVATIVE FREE 10 ML: 5 INJECTION INTRAVENOUS at 20:40

## 2022-08-22 RX ADMIN — PROPOFOL 35 MCG/KG/MIN: 10 INJECTION, EMULSION INTRAVENOUS at 08:15

## 2022-08-22 RX ADMIN — FENTANYL CITRATE 50 MCG: 50 INJECTION, SOLUTION INTRAMUSCULAR; INTRAVENOUS at 13:58

## 2022-08-22 RX ADMIN — ACETAZOLAMIDE 250 MG: 500 INJECTION, POWDER, LYOPHILIZED, FOR SOLUTION INTRAVENOUS at 08:45

## 2022-08-22 RX ADMIN — FUROSEMIDE 20 MG/HR: 10 INJECTION, SOLUTION INTRAMUSCULAR; INTRAVENOUS at 16:22

## 2022-08-22 RX ADMIN — SODIUM CHLORIDE, PRESERVATIVE FREE 5 ML: 5 INJECTION INTRAVENOUS at 09:10

## 2022-08-22 RX ADMIN — MEROPENEM 1000 MG: 1 INJECTION, POWDER, FOR SOLUTION INTRAVENOUS at 11:53

## 2022-08-22 RX ADMIN — FENTANYL CITRATE 50 MCG: 50 INJECTION, SOLUTION INTRAMUSCULAR; INTRAVENOUS at 13:40

## 2022-08-22 RX ADMIN — DILTIAZEM HYDROCHLORIDE 5 MG: 5 INJECTION, SOLUTION INTRAVENOUS at 14:12

## 2022-08-22 RX ADMIN — PROPOFOL 25 MCG/KG/MIN: 10 INJECTION, EMULSION INTRAVENOUS at 11:47

## 2022-08-22 RX ADMIN — IPRATROPIUM BROMIDE AND ALBUTEROL SULFATE 1 AMPULE: 2.5; .5 SOLUTION RESPIRATORY (INHALATION) at 11:59

## 2022-08-22 RX ADMIN — METOPROLOL TARTRATE 25 MG: 25 TABLET, FILM COATED ORAL at 20:39

## 2022-08-22 RX ADMIN — POTASSIUM CHLORIDE 20 MEQ: 29.8 INJECTION, SOLUTION INTRAVENOUS at 11:45

## 2022-08-22 RX ADMIN — CHLORHEXIDINE GLUCONATE 0.12% ORAL RINSE 15 ML: 1.2 LIQUID ORAL at 20:40

## 2022-08-22 RX ADMIN — FAMOTIDINE 20 MG: 10 INJECTION, SOLUTION INTRAVENOUS at 20:39

## 2022-08-22 RX ADMIN — MEROPENEM 1000 MG: 1 INJECTION, POWDER, FOR SOLUTION INTRAVENOUS at 03:01

## 2022-08-22 RX ADMIN — PROPOFOL 35 MCG/KG/MIN: 10 INJECTION, EMULSION INTRAVENOUS at 02:56

## 2022-08-22 RX ADMIN — IPRATROPIUM BROMIDE AND ALBUTEROL SULFATE 1 AMPULE: 2.5; .5 SOLUTION RESPIRATORY (INHALATION) at 19:55

## 2022-08-22 RX ADMIN — FAMOTIDINE 20 MG: 10 INJECTION, SOLUTION INTRAVENOUS at 08:44

## 2022-08-22 RX ADMIN — ENOXAPARIN SODIUM 40 MG: 100 INJECTION SUBCUTANEOUS at 20:39

## 2022-08-22 RX ADMIN — IPRATROPIUM BROMIDE AND ALBUTEROL SULFATE 1 AMPULE: 2.5; .5 SOLUTION RESPIRATORY (INHALATION) at 08:22

## 2022-08-22 RX ADMIN — MIDAZOLAM 2 MG: 1 INJECTION INTRAMUSCULAR; INTRAVENOUS at 13:32

## 2022-08-22 RX ADMIN — MUPIROCIN: 20 OINTMENT TOPICAL at 20:40

## 2022-08-22 RX ADMIN — PROPOFOL 20 MCG/KG/MIN: 10 INJECTION, EMULSION INTRAVENOUS at 20:23

## 2022-08-22 RX ADMIN — GABAPENTIN 400 MG: 400 CAPSULE ORAL at 14:47

## 2022-08-22 RX ADMIN — CHLORHEXIDINE GLUCONATE 0.12% ORAL RINSE 15 ML: 1.2 LIQUID ORAL at 08:43

## 2022-08-22 RX ADMIN — PROPOFOL 25 MCG/KG/MIN: 10 INJECTION, EMULSION INTRAVENOUS at 16:14

## 2022-08-22 RX ADMIN — WHITE PETROLATUM 57.7 %-MINERAL OIL 31.9 % EYE OINTMENT: at 20:40

## 2022-08-22 RX ADMIN — POTASSIUM CHLORIDE 20 MEQ: 29.8 INJECTION, SOLUTION INTRAVENOUS at 19:28

## 2022-08-22 RX ADMIN — ENOXAPARIN SODIUM 40 MG: 100 INJECTION SUBCUTANEOUS at 08:44

## 2022-08-22 RX ADMIN — POTASSIUM CHLORIDE 20 MEQ: 29.8 INJECTION, SOLUTION INTRAVENOUS at 05:06

## 2022-08-22 RX ADMIN — IPRATROPIUM BROMIDE AND ALBUTEROL SULFATE 1 AMPULE: 2.5; .5 SOLUTION RESPIRATORY (INHALATION) at 16:55

## 2022-08-22 RX ADMIN — HYDROCODONE BITARTRATE AND ACETAMINOPHEN 10 ML: 176/5 SOLUTION ORAL at 20:39

## 2022-08-22 RX ADMIN — PROPOFOL 35 MCG/KG/MIN: 10 INJECTION, EMULSION INTRAVENOUS at 05:07

## 2022-08-22 RX ADMIN — POTASSIUM BICARBONATE 40 MEQ: 782 TABLET, EFFERVESCENT ORAL at 20:39

## 2022-08-22 RX ADMIN — POTASSIUM CHLORIDE 20 MEQ: 29.8 INJECTION, SOLUTION INTRAVENOUS at 18:08

## 2022-08-22 RX ADMIN — POTASSIUM BICARBONATE 40 MEQ: 782 TABLET, EFFERVESCENT ORAL at 14:47

## 2022-08-22 ASSESSMENT — PULMONARY FUNCTION TESTS
PIF_VALUE: 33
PIF_VALUE: 34
PIF_VALUE: 33
PIF_VALUE: 34
PIF_VALUE: 33
PIF_VALUE: 34
PIF_VALUE: 33
PIF_VALUE: 33

## 2022-08-22 ASSESSMENT — PAIN SCALES - GENERAL
PAINLEVEL_OUTOF10: 0

## 2022-08-22 NOTE — OP NOTE
Operative Note      Patient: Kim Frazier  YOB: 1960  MRN: [id number]    Date of Procedure: 8/22/2022    Pre-Op Diagnosis: hypoxemia    Post-Op Diagnosis:  pneumonia       Procedure(s):  EGD ESOPHAGOGASTRODUODENOSCOPY ORAL-GASTRIC TUBE INSERTION  BRONCHOSCOPY  BRONCHOSCOPY ALVEOLAR LAVAGE    Surgeon(s):  Mayme Jarred., DO Leotha Cockayne, MD    Assistant:   * No surgical staff found *    Anesthesia: IV Sedation    Estimated Blood Loss (mL): Minimal    Complications: Other: SVT, occurred prior but with procedure as well. Specimens:   * No specimens in log *  BAL Right lower lobe    Implants:  * No implants in log *      Drains:   Rectal Tube (Active)   Site Assessment Clean, dry & intact 08/22/22 0400   Stool Appearance Loose; Watery 08/22/22 0400   Stool Color Brown 08/22/22 0400   Rectal Tube Output (mL) 100 ml 08/22/22 0400       Urinary Catheter Fermin (Active)   Catheter Indications Need for fluid volume management of the critically ill patient in a critical care setting 08/22/22 1200   Site Assessment No urethral drainage 08/22/22 1200   Urine Color Yellow 08/22/22 1200   Urine Appearance Clear 08/22/22 1200   Collection Container Standard 08/22/22 1200   Securement Method Securing device (Describe) 08/22/22 1200   Catheter Care Completed Yes 08/22/22 0815   Catheter Best Practices  Drainage tube clipped to bed;Catheter secured to thigh; Tamper seal intact; Bag below bladder;Bag not on floor; Lack of dependent loop in tubing;Drainage bag less than half full 08/22/22 1200   Status Patent;Draining 08/22/22 1200   Output (mL) 365 mL 08/22/22 1334   Discontinuation Reason Per nurse-driven protocol 65/90/59 0400       [REMOVED] NG/OG/NJ/NE Tube Nasogastric 14 fr Left nostril (Removed)   Surrounding Skin Clean, dry & intact 08/07/22 1938   Securement device Adhesive based perez 08/07/22 1938   Status Suction-low intermittent 08/07/22 1938   Output (mL) 0 ml 08/07/22 0919       [REMOVED] NG/OG/NJ/NE Tube Orogastric Right mouth (Removed)   Surrounding Skin Clean, dry & intact 08/08/22 1200   Securement device Tape 08/08/22 1200   Status Suction-low continuous 08/08/22 1200   Placement Verified X-Ray (Initial); Respiratory Status; External Catheter Length;Gastric Contents 08/08/22 1200   NG/OG/NJ/NE External Measurement (cm) 45 cm 08/08/22 1200   Drainage Appearance Green;Bile 08/08/22 1200       [REMOVED] NG/OG/NJ/NE Tube Center mouth (Removed)   Surrounding Skin Clean, dry & intact 08/20/22 1400   Securement device Tape 08/20/22 1400   Status Continuous feeding 08/20/22 1400   Placement Verified External Catheter Length 08/20/22 1400   NG/OG/NJ/NE External Measurement (cm) 54 cm 08/20/22 1400   Drainage Appearance None 08/20/22 1400   Tube Feeding High Protein 08/20/22 1400   Tube feeding/verify rate (mL/hr) 35 mL/hr 08/20/22 1400   Tube Feeding Supplement (Product) Protein Modular 08/20/22 1400   Tube Feeding Intake (mL) 296 ml 08/20/22 1235   Tube Feeding Supplement Amount (mL) 75 08/20/22 1400   Free Water/Flush (mL) 200 mL 08/20/22 1235   Output (mL) 100 ml 08/11/22 1800   Action Taken Feed set changed 08/20/22 0600   Residual Volume (ml) 5 ml 08/20/22 0800       [REMOVED] Urinary Catheter (Removed)   Catheter Indications Need for fluid volume management of the critically ill patient in a critical care setting 08/15/22 0800   Site Assessment No urethral drainage 08/15/22 0800   Urine Color Yellow 08/15/22 0800   Urine Appearance Sediment 08/15/22 0800   Urine Odor Other (Comment) 08/15/22 0400   Collection Container Standard 08/15/22 0800   Securement Method Securing device (Describe) 08/15/22 0800   Catheter Care Completed Yes 08/15/22 0800   Catheter Best Practices  Drainage tube clipped to bed;Catheter secured to thigh; Tamper seal intact; Bag below bladder;Bag not on floor; Lack of dependent loop in tubing;Drainage bag less than half full 08/15/22 0800   Status Draining;Patent 08/15/22 0800 Output (mL) 150 mL 08/15/22 0916       Findings:   Purulence bilateral lower lobes    Detailed Description of Procedure:   Bronchoscopy inserted via ET tube with appearance of purulence noted at the mady. Thick purulence noted to the right lower lobe. BAL completed with 150 mL and approximately 20 mL out white purulent material.  After sample collected, significant washing completed at the right and left lower lobes until all purulent fluid was removed. During the procedure, patient converted to SVT. Recurrent maneuver completed afterwards with PEEP of 30 for 40 seconds. Hypoxemia of 85% improved to 95%. Return to prior settings.     Electronically signed by Janeth Alberts MD on 8/22/2022 at 2:17 PM

## 2022-08-22 NOTE — PROGRESS NOTES
[facility]                                               Progress note    Summary:   Nelly Solders is being seen by nephrology for CHARLENE and hypernatremia. Admitted with paraesophageal hernia and SBO. Had EGD 8/9 with biopsy. Still NPO     Interval History  Seen at bedside  Changed to lasix infusion 20 mg/h yesterday  BP is still soft, 4950 mL urine documented yesterday  Cr stable at 1.2  FiO2 is still very high. Bronchoscopy mostly showing purulence so hypoxia related to pna. Plan:   Continue lasix IV 20 mg/h  Give KCL via NGT 40 mEq BID in addition to the 20 mEq already given      Madan Farfan MD  [facility] Neph[facility]  Office: [phone]    Assessment:   Acute kidney injury  Urine Na < 20    creatinine on admission 1   next day on 08/06/2022 was 0.8  suddenly jumped up to 2.7. Due to episode of severe hypotension with blood pressure drop 78/44. also got CT with IV contrast on 08/05/2022 but most likely  this is ATN related due to hypotension and hypercapnic  respiratory failure. CT angio s normal renal arteries and normal kidneys      Sepsis/ possible septic shock. emperature was 101.1   High white cell count. '  immunocompromised due to rheumatoid arthritis and treatment. History of sleep apnea   on CPAP. Acute respiratory failure with high pCO2 in the range of 50s with pH  of 7.24    now intubated. History of abnormal stress test in 06/2022 with stress-induced  ischemia and some scarring. Ejection fraction  was  64%.     had a followup angiogram which was reported as normal as per wife. I do not have that result     History of rheumatoid arthritis   longstanding,was on methotrexate,  has taken Celebrex and now on Inflectra infusions,  so he is immunocompromised. [facility] is now helping him with his rheumatoid tremendously. he has been on steroids in the past.     Rule out adrenal insufficiency. cortisol level.  normal      History of thyroidectomy in the past. .    Small bowel obstruction. Large hiatus hernia with colon and stomach in the chest.  Further management as per medical team.       ROS:   Unable to assess      PMH:   Past medical history, surgical history, social history, family history are reviewed and updated as appropriate. Reviewed current medication list.   Allergies reviewed and updated as needed. PE:   Vitals:    08/22/22 1206   BP:    Pulse: 85   Resp: 18   Temp:    SpO2: 92%       General appearance:  in NAD, intubated and sedated  HEENT: EOM intact, no icterus. Trachea is midline. Neck : No masses, appears symmetrical  Respiratory: Respiratory effort appears normal, bilateral equal chest rise, no wheeze, no crackles ETT to vent   Cardiovascular: Ausculation shows RRR trace edema  Abdomen: mildly distended but soft   Musculoskeletal:  Joints with no swelling or deformity. Skin:no rashes, ulcers, induration, no jaundice.    Neuro: sedated      Lab Results   Component Value Date    CREATININE 1.2 08/22/2022    BUN 32 (H) 08/22/2022     08/22/2022    K 3.4 (L) 08/22/2022     08/22/2022    CO2 27 08/22/2022      Lab Results   Component Value Date    WBC 8.6 08/22/2022    HGB 9.0 (L) 08/22/2022    HCT 27.8 (L) 08/22/2022    MCV 94.2 08/22/2022     (H) 08/22/2022     Lab Results   Component Value Date    CALCIUM 8.3 08/22/2022    CAION 1.06 (L) 08/15/2022    PHOS 4.0 08/22/2022

## 2022-08-22 NOTE — H&P
Patient seen and examined by me prior to the procedure. The patient is stable for sedation. There have been no significant changes since the prior initial consultation note. Please reference this note for full details     The patient's POA does wish to proceed with the procedure at this time.     ASA class-4  Malampati- [facility]

## 2022-08-22 NOTE — CARE COORDINATION
Discharge Planning:    Spoke with [facility] with Nicolasa Loni #[phone]. She confirmed that patient's precert is still pending. Will continue to monitor for updates.     Electronically signed by Tonny Solis RN on 8/22/2022 at 11:26 AM

## 2022-08-22 NOTE — PLAN OF CARE
Problem: Discharge Planning  Goal: Discharge to home or other facility with appropriate resources  Outcome: Progressing  Flowsheets  Taken 8/21/2022 2000 by Shay Rubin RN  Discharge to home or other facility with appropriate resources:   Identify barriers to discharge with patient and caregiver   Arrange for needed discharge resources and transportation as appropriate   Identify discharge learning needs (meds, wound care, etc)  Taken 8/21/2022 0800 by Johnathan Hua RN  Discharge to home or other facility with appropriate resources: Identify barriers to discharge with patient and caregiver     Problem: Pain  Goal: Verbalizes/displays adequate comfort level or baseline comfort level  Outcome: Progressing  Flowsheets (Taken 8/21/2022 2000)  Verbalizes/displays adequate comfort level or baseline comfort level:   Encourage patient to monitor pain and request assistance   Assess pain using appropriate pain scale     Problem: ABCDS Injury Assessment  Goal: Absence of physical injury  Outcome: Progressing  Flowsheets (Taken 8/21/2022 2029)  Absence of Physical Injury: Implement safety measures based on patient assessment     Problem: Safety - Adult  Goal: Free from fall injury  Outcome: Progressing     Problem: Skin/Tissue Integrity  Goal: Absence of new skin breakdown  Description: 1. Monitor for areas of redness and/or skin breakdown  2. Assess vascular access sites hourly  Outcome: Progressing     Problem: Safety - Medical Restraint  Goal: Remains free of injury from restraints (Restraint for Interference with Medical Device)  Description: INTERVENTIONS:  1. Determine that other, less restrictive measures have been tried or would not be effective before applying the restraint  2. Evaluate the patient's condition at the time of restraint application  3. Inform patient/family regarding the reason for restraint  4.  Q2H: Monitor safety, psychosocial status, comfort, nutrition and hydration  Outcome: Progressing  Flowsheets (Taken 8/21/2022 0800 by Lilian Bocanegra RN)  Remains free of injury from restraints (restraint for interference with medical device): Every 2 hours: Monitor safety, psychosocial status, comfort, nutrition and hydration     Problem: Nutrition Deficit:  Goal: Optimize nutritional status  Outcome: Progressing     Problem: Neurosensory - Adult  Goal: Achieves stable or improved neurological status  Outcome: Progressing  Flowsheets  Taken 8/21/2022 2000 by Remigio Rubio RN  Achieves stable or improved neurological status:   Assess for and report changes in neurological status   Initiate measures to prevent increased intracranial pressure  Taken 8/21/2022 0800 by Lilian Bocanegra RN  Achieves stable or improved neurological status:   Assess for and report changes in neurological status   Initiate measures to prevent increased intracranial pressure  Goal: Absence of seizures  Outcome: Progressing  Flowsheets  Taken 8/21/2022 2000 by Remigio Rubio RN  Absence of seizures: Monitor for seizure activity. If seizure occurs, document type and location of movements and any associated apnea  Taken 8/21/2022 0800 by Lilian Bocanegra RN  Absence of seizures: Monitor for seizure activity.   If seizure occurs, document type and location of movements and any associated apnea  Goal: Remains free of injury related to seizures activity  Outcome: Progressing  Flowsheets  Taken 8/21/2022 2000 by Remigio Rubio RN  Remains free of injury related to seizure activity:   Maintain airway, patient safety  and administer oxygen as ordered   Monitor patient for seizure activity, document and report duration and description of seizure to Licensed Independent Practitioner  Taken 8/21/2022 0800 by Lilian Bocanegra RN  Remains free of injury related to seizure activity: Maintain airway, patient safety  and administer oxygen as ordered  Goal: Achieves maximal functionality and self care  Outcome: Progressing  Flowsheets (Taken 8/21/2022 0800 by Rana Petite, RN)  Achieves maximal functionality and self care: Monitor swallowing and airway patency with patient fatigue and changes in neurological status     Problem: Respiratory - Adult  Goal: Achieves optimal ventilation and oxygenation  Outcome: Progressing  Flowsheets (Taken 8/21/2022 2000)  Achieves optimal ventilation and oxygenation:   Assess for changes in respiratory status   Assess for changes in mentation and behavior   Oxygen supplementation based on oxygen saturation or arterial blood gases   Position to facilitate oxygenation and minimize respiratory effort     Problem: Cardiovascular - Adult  Goal: Maintains optimal cardiac output and hemodynamic stability  Outcome: Progressing  Flowsheets  Taken 8/21/2022 2000 by Jeffery Amor RN  Maintains optimal cardiac output and hemodynamic stability: Monitor blood pressure and heart rate  Taken 8/21/2022 0800 by Say Metz RN  Maintains optimal cardiac output and hemodynamic stability:   Monitor blood pressure and heart rate   Monitor urine output and notify Licensed Independent Practitioner for values outside of normal range  Goal: Absence of cardiac dysrhythmias or at baseline  Outcome: Progressing  Flowsheets  Taken 8/21/2022 2000 by Jeffery Amor RN  Absence of cardiac dysrhythmias or at baseline: Monitor cardiac rate and rhythm  Taken 8/21/2022 0800 by Say Metz RN  Absence of cardiac dysrhythmias or at baseline: Monitor cardiac rate and rhythm     Problem: Skin/Tissue Integrity - Adult  Goal: Skin integrity remains intact  Outcome: Progressing  Flowsheets  Taken 8/21/2022 2000 by Jeffery Amor RN  Skin Integrity Remains Intact: Monitor for areas of redness and/or skin breakdown  Taken 8/21/2022 0800 by Say Metz RN  Skin Integrity Remains Intact: Monitor for areas of redness and/or skin breakdown  Goal: Incisions, wounds, or drain sites healing without S/S of infection  Outcome: Progressing  Flowsheets (Taken 8/21/2022 2000)  Incisions, Wounds, or Drain Sites Healing Without Sign and Symptoms of Infection: ADMISSION and DAILY: Assess and document risk factors for pressure ulcer development  Goal: Oral mucous membranes remain intact  Outcome: Progressing  Flowsheets  Taken 8/21/2022 2000 by Isabel Basurto RN  Oral Mucous Membranes Remain Intact: Assess oral mucosa and hygiene practices  Taken 8/21/2022 0800 by Vita Mercado RN  Oral Mucous Membranes Remain Intact: Assess oral mucosa and hygiene practices     Problem: Musculoskeletal - Adult  Goal: Return mobility to safest level of function  Outcome: Progressing  Flowsheets  Taken 8/21/2022 2000 by Isabel Basurto RN  Return Mobility to Safest Level of Function: Assess patient stability and activity tolerance for standing, transferring and ambulating with or without assistive devices  Taken 8/21/2022 0800 by Vita Mercado RN  Return Mobility to Safest Level of Function: Apply continuous passive motion per provider or physical therapy orders to increase flexion toward goal  Goal: Maintain proper alignment of affected body part  Outcome: Progressing  Flowsheets  Taken 8/21/2022 2000 by Isabel Basurto RN  Maintain proper alignment of affected body part: Support and protect limb and body alignment per provider's orders  Taken 8/21/2022 0800 by Vita Mercado RN  Maintain proper alignment of affected body part: Support and protect limb and body alignment per provider's orders  Goal: Return ADL status to a safe level of function  Outcome: Progressing  Flowsheets  Taken 8/21/2022 2000 by Isabel Basurto RN  Return ADL Status to a Safe Level of Function: Administer medication as ordered  Taken 8/21/2022 0800 by Vita Mrecado RN  Return ADL Status to a Safe Level of Function: Administer medication as ordered     Problem: Gastrointestinal - Adult  Goal: Minimal or absence of nausea and vomiting  Outcome: Progressing  Flowsheets  Taken 8/21/2022 2000 by Isabel Basurto RN  Minimal or absence of nausea and vomiting: Administer IV fluids as ordered to ensure adequate hydration   Maintain NPO status until nausea and vomiting are resolved  Taken 8/21/2022 0800 by Otis Moreno RN  Minimal or absence of nausea and vomiting: Administer IV fluids as ordered to ensure adequate hydration  Goal: Maintains or returns to baseline bowel function  Outcome: Progressing  Flowsheets  Taken 8/21/2022 2000 by Carlos Jones RN  Maintains or returns to baseline bowel function:   Assess bowel function   Encourage oral fluids to ensure adequate hydration  Taken 8/21/2022 0800 by Otis Moreno RN  Maintains or returns to baseline bowel function: Assess bowel function  Goal: Maintains adequate nutritional intake  Outcome: Progressing  Flowsheets  Taken 8/21/2022 2000 by Carlos Jones RN  Maintains adequate nutritional intake: Monitor percentage of each meal consumed  Taken 8/21/2022 0800 by Otis Moreno RN  Maintains adequate nutritional intake: Monitor intake and output, weight and lab values  Goal: Establish and maintain optimal ostomy function  Outcome: Progressing  Flowsheets (Taken 8/21/2022 2000)  Establish and maintain optimal ostomy function: Monitor output from ostomies     Problem: Genitourinary - Adult  Goal: Absence of urinary retention  Outcome: Progressing  Flowsheets  Taken 8/21/2022 2000 by Carlos Jones RN  Absence of urinary retention: Assess patients ability to void and empty bladder  Taken 8/21/2022 0800 by Otis Moreno RN  Absence of urinary retention: Monitor intake/output and perform bladder scan as needed  Goal: Urinary catheter remains patent  Outcome: Progressing  Flowsheets  Taken 8/21/2022 2000 by Carlos Jones RN  Urinary catheter remains patent: Assess patency of urinary catheter  Taken 8/21/2022 0800 by Otis Moreno RN  Urinary catheter remains patent: Assess patency of urinary catheter     Problem: Anxiety  Goal: Will report anxiety at manageable levels  Description: INTERVENTIONS:  1.  Administer medication as ordered  2. Teach and rehearse alternative coping skills  3. Provide emotional support with 1:1 interaction with staff  Outcome: Progressing  Flowsheets  Taken 8/21/2022 2000 by Garry Silverman RN  Will report anxiety at manageable levels: Administer medication as ordered  Taken 8/21/2022 0800 by Rocío Hernandez RN  Will report anxiety at manageable levels: Administer medication as ordered     Problem: Coping  Goal: Pt/Family able to verbalize concerns and demonstrate effective coping strategies  Description: INTERVENTIONS:  1. Assist patient/family to identify coping skills, available support systems and cultural and spiritual values  2. Provide emotional support, including active listening and acknowledgement of concerns of patient and caregivers  3. Reduce environmental stimuli, as able  4. Instruct patient/family in relaxation techniques, as appropriate  5. Assess for spiritual pain/suffering and initiate Spiritual Care, Psychosocial Clinical Specialist consults as needed  Outcome: Progressing  Flowsheets  Taken 8/21/2022 2000 by Garry Silverman RN  Patient/family able to verbalize anxieties, fears, and concerns, and demonstrate effective coping:   Assist patient/family to identify coping skills, available support systems and cultural and spiritual values   Provide emotional support, including active listening and acknowledgement of concerns of patient and caregivers  Taken 8/21/2022 0800 by Rocío Hernandze RN  Patient/family able to verbalize anxieties, fears, and concerns, and demonstrate effective coping:   Provide emotional support, including active listening and acknowledgement of concerns of patient and caregivers   Assist patient/family to identify coping skills, available support systems and cultural and spiritual values     Problem: Change in Body Image  Goal: Pt/Family communicate acceptance of loss or change in body image and feel psychological comfort and peace  Description: INTERVENTIONS:  1.  Assess patient/family anxiety and grief process related to change in body image, loss of functional status, loss of sense of self, and forgiveness  2. Provide emotional and spiritual support  3. Provide information about the patient's health status with consideration of family and cultural values  4. Communicate willingness to discuss loss and facilitate grief process with patient/family as appropriate  5. Emphasize sustaining relationships within family system and community, or [place]/spiritual traditions  6. Initiate Spiritual Care, Psychosocial Clinical Specialist consult as needed  Outcome: Progressing  Flowsheets  Taken 8/21/2022 2000 by Love Morales RN  Patient/family communicate acceptance of loss or change in body image and feel psychological comfort and peace:   Assess patient/family anxiety and grief process related to change in body image, loss of functional status, loss of sense of self, and forgiveness   Provide emotional and spiritual support  Taken 8/21/2022 0800 by Jc Licona RN  Patient/family communicate acceptance of loss or change in body image and feel psychological comfort and peace: Provide emotional and spiritual support     Problem: Decision Making  Goal: Pt/Family able to effectively weigh alternatives and participate in decision making related to treatment and care  Description: INTERVENTIONS:  1. Determine when there are differences between patient's view, family's view, and healthcare provider's view of condition  2. Facilitate patient and family articulation of goals for care  3. Help patient and family identify pros/cons of alternative solutions  4. Provide information as requested by patient/family  5. Respect patient/family right to receive or not to receive information  6. Serve as a liaison between patient and family and health care team  7.  Initiate Consults from Ethics, Palliative Care or initiate [address] [place] [address] as is appropriate  Outcome: Progressing  Flowsheets  Taken 8/21/2022 2000 by Montana Wagner RN  Patient/family able to effectively weigh alternatives and participate in decision making related to treatment and care: Facilitate patient and family articulation of goals for care   Determine when there are differences between patient's view, family's view, and healthcare provider's view of condition  Taken 8/21/2022 0800 by Ibis Cárdenas RN  Patient/family able to effectively weigh alternatives and participate in decision making related to treatment and care: Facilitate patient and family articulation of goals for care   Help patient and family identify pros/cons of alternative solutions     Problem: Behavior  Goal: Pt/Family maintain appropriate behavior and adhere to behavioral management agreement, if implemented  Description: INTERVENTIONS:  1. Assess patient/family's coping skills and  non-compliant behavior (including use of illegal substances)  2. Notify security of behavior or suspected illegal substances which indicate the need for search of the family and/or belongings  3. Encourage verbalization of thoughts and concerns in a socially appropriate manner  4. Utilize positive, consistent limit setting strategies supporting safety of patient, staff and others  5. Encourage participation in the decision making process about the behavioral management agreement  6. If a visitor's behavior poses a threat to safety call refer to organization policy.   7. Initiate consult with , Psychosocial CNS, Spiritual Care as appropriate  Outcome: Progressing  Flowsheets  Taken 8/21/2022 2000 by Montana Wagner RN  Patient/family maintains appropriate behavior and adheres to behavioral management agreement, if implemented:   Assess patient/familys coping skills and  non-compliant behavior (including use of illegal substances)   Notify security of behavior or suspected illegal substances which indicate the need for search of the patient and/or belongings  Taken 8/21/2022 0800 by Richard Lee RN  Patient/family maintains appropriate behavior and adheres to behavioral management agreement, if implemented: Assess patient/familys coping skills and  non-compliant behavior (including use of illegal substances)

## 2022-08-22 NOTE — PROGRESS NOTES
Pulmonary Progress Note    Date of Admission: 8/5/2022   LOS: 17 days       CC:  Chief Complaint   Patient presents with    Abdominal Pain    Chest Pain     Sub-sternal, pressure, onset 1300 hours today    Shortness of Breath     EMS reports pt 88% on RA        Subjective:  Currently sedated and intubated. Plan for placement of NG tube today. ROS:        Assessment:          Plan: This note may have been transcribed using [id number] [employer]. Please disregard any translational errors. Hospital Day: 17     Atelectasis versus pneumonia with acute hypoxemic respiratory failure after aspiration  Completing 7 days of meropenem today. Continues to have significant atelectasis bibasilar. This could be secondary to pneumonia versus abdominal distention obesity causing atelectasis. Complete bronchoscopy today with serial washings to remove as much secretions as possible. Discussed with his wife. She acknowledges this is difficulty secondary to obesity and bronchoscopy is less likely to be successful. However, given his current condition, bronchoscopy with recurrent maneuver after his worth a try  Considering proning. Given his weight this could be complicated    Bronchoscopy. High pithc bowel shoudns    Sedated. Recurrent SVT  Beta-blocker      Partial small bowel obstruction  High-pitched bowel sounds on exam  NG versus G-tube placement today      History of diastolic CHF  Lasix for volume control  Total volume status decreased from 14 L positive down to 1.7 L positive for the hospital stay. No significant improvement in hypoxemia with this volume change. Obesity with sleep apnea and obesity hypoventilation syndrome  Wife aware of complications from obesity. Consider PAP after extubation. Currently intubated.          Prophylaxis  - GI - pepcid  - DVT -   lovenox         Nutrition  - Diet NPO  ADULT TUBE FEEDING; Orogastric; Peptide Based High Protein; Continuous; 20; Yes; 10; Q 4 hours; 35; 100; Q 4 hours; Protein; Four proteinex daily  -   Intake/Output Summary (Last 24 hours) at 8/22/2022 0949  Last data filed at 8/22/2022 0842  Gross per 24 hour   Intake 1181.5 ml   Output 5465 ml   Net -4283.5 ml       Mobility       Access  Arterial   Arterial Line 08/18/22 Right Radial (Active)   $ Arterial line insertion $ Yes 08/18/22 1600   Site Assessment Clean, dry & intact 08/22/22 0600   Line Status Arterial fluids per protocol; Other (Comment) 08/22/22 0600   Art Line Interventions Connections checked and tightened 08/21/22 1800   Color/Movement/Sensation Capillary refill less than 3 sec 08/22/22 0600   Dressing Type Transparent 08/22/22 0600   Dressing Status Clean, dry & intact 08/22/22 0600   Number of days: 3       PICC         PICC Triple Lumen 77/83/10 Right Basilic (Active)   Central Line Being Utilized Yes 08/22/22 0600   Criteria for Appropriate Use Hemodynamically unstable, requiring monitoring lines, vasopressors, or volume resuscitation 08/22/22 0600   Site Assessment Clean, dry & intact 08/22/22 0600   Phlebitis Assessment No symptoms 08/22/22 0600   Infiltration Assessment 0 08/22/22 0600   Extremity Circumference (cm) 38 cm 08/08/22 0607   External Catheter Length (cm) 0 cm 08/08/22 0607   Proximal Lumen Color/Status Red;Capped; Infusing 08/22/22 0600   Medial Lumen Status White;Capped; Infusing 08/22/22 0600   Distal Lumen Color/Status Gray;Capped; Infusing 08/22/22 0600   Line Care Connections checked and tightened 08/21/22 1800   Alcohol Cap Used Yes 08/21/22 0600   Date of Last Dressing Change 08/17/22 08/22/22 0600   Dressing Type Transparent; Antimicrobial 08/22/22 0600   Dressing Status Clean, dry & intact 08/22/22 0600   Dressing Intervention New;Dressing changed 08/17/22 0400   Number of days: 14     CVC                      I spent 35 minutes of critical care time with this patient excluding any procedures.      Data:        PHYSICAL EXAM:   Blood pressure 103/68, pulse 83, temperature 98.3 °F (36.8 °C), temperature source Axillary, resp. rate 18, height 5' 7\" (1.702 m), weight (!) 333 lb 14.4 oz (151.5 kg), SpO2 91 %.'  Body mass index is 52.3 kg/m². Gen: No distress. ENT:   Resp: No accessory muscle use. No crackles. No wheezes. No rhonchi. CV: Regular rate. Regular rhythm. No murmur or rub. No edema. GI high-pitched bowel sounds  Skin: Warm, dry, normal texture and turgor. No nodule on exposed extremities. M/S: No cyanosis. No clubbing. No joint deformity.   Psych: sedated      Medications:    Scheduled Meds:   acetaZOLAMIDE  250 mg IntraVENous Daily    gabapentin  400 mg Oral TID    docusate  100 mg Oral Daily    senna  10 mL Oral Nightly    polyethylene glycol  17 g Oral Daily    metoprolol tartrate  25 mg Oral BID    meropenem  1,000 mg IntraVENous Q8H    famotidine (PEPCID) injection  20 mg IntraVENous BID    chlorhexidine  15 mL Mouth/Throat BID    ipratropium-albuterol  1 ampule Inhalation Q4H WA    sodium chloride flush  5-40 mL IntraVENous 2 times per day    levothyroxine  200 mcg Oral Daily    [Held by provider] spironolactone  50 mg Oral Daily    enoxaparin  40 mg SubCUTAneous BID       Continuous Infusions:   furosemide 20 mg/hr (08/22/22 0600)    fentaNYL 75 mcg/hr (08/19/22 0514)    propofol 35 mcg/kg/min (08/22/22 0815)    sodium chloride         PRN Meds:  fentanNYL, midazolam, artificial tears, acetaminophen, potassium chloride, fentaNYL **AND** fentaNYL, acetaminophen, sodium chloride flush, sodium chloride, ondansetron **OR** ondansetron, diazePAM    Labs reviewed:  CBC:   Recent Labs     08/20/22  0430 08/21/22  0435 08/22/22  0310   WBC 9.0 9.7 8.6   HGB 9.3* 9.2* 9.0*   HCT 28.3* 27.5* 27.8*   MCV 95.0 94.5 94.2   * 478* 469*     BMP:   Recent Labs     08/20/22  0430 08/20/22  1417 08/21/22  0435 08/21/22  1430 08/21/22  2150 08/22/22  0310      < > 145 147* 145 142   K 3.7   < > 3.6 3.2* 3.3* 3.4*      < > 104 107 106 102   CO2 28 < > 30 29 26 27   PHOS 3.2  --  3.6  --   --  4.0   BUN 49*   < > 41* 37* 32* 32*   CREATININE 1.1   < > 1.3 1.4* 1.2 1.2    < > = values in this interval not displayed. LIVER PROFILE: No results for input(s): AST, ALT, LIPASE, BILIDIR, BILITOT, ALKPHOS in the last 72 hours. Invalid input(s): AMYLASE,  ALB  PT/INR: No results for input(s): PROTIME, INR in the last 72 hours. APTT: No results for input(s): APTT in the last 72 hours. UA:No results for input(s): NITRITE, COLORU, PHUR, LABCAST, WBCUA, RBCUA, MUCUS, TRICHOMONAS, YEAST, BACTERIA, CLARITYU, SPECGRAV, LEUKOCYTESUR, UROBILINOGEN, BILIRUBINUR, BLOODU, GLUCOSEU, AMORPHOUS in the last 72 hours. Invalid input(s): Lorella Poll  No results for input(s): PH, PCO2, PO2 in the last 72 hours. Cx:      Films:  Radiology Review:  Pertinent images / reports were reviewed as a part of this visit. CT Chest w/ contrast: No results found for this or any previous visit. CT Chest w/o contrast: Results for orders placed during the hospital encounter of 08/05/22    CT CHEST WO CONTRAST    Narrative  EXAMINATION:  CT OF THE CHEST WITHOUT CONTRAST 8/21/2022 9:43 am    TECHNIQUE:  CT of the chest was performed without the administration of intravenous  contrast. Multiplanar reformatted images are provided for review. Automated  exposure control, iterative reconstruction, and/or weight based adjustment of  the mA/kV was utilized to reduce the radiation dose to as low as reasonably  achievable. COMPARISON:  08/05/2022    HISTORY:  ORDERING SYSTEM PROVIDED HISTORY: worsening  hypoxemia  TECHNOLOGIST PROVIDED HISTORY:  Reason for exam:->worsening  hypoxemia  Reason for Exam: worsening hypoxemia    FINDINGS:  Mediastinum: Tip of ET tube seen in midthoracic trachea    Small pericardial effusion is seen. Thickness measures approximately 8 mm. Large hiatal hernia is seen. Small mediastinal and hilar nodes are noted.     Tip of PICC is in the region of the proximal SVC.    Lungs/pleura: Respiratory motion limits evaluation of fine pulmonary  parenchymal change. Small left-sided pleural effusion is seen. There is  adjacent left basilar consolidation. There is adjacent consolidation of the  left upper lobe. Septal thickening is seen    Small right-sided pleural effusion is seen. There is adjacent right basilar  consolidation. Septal thickening is seen on the right. No obstructing endobronchial lesions are seen    Upper Abdomen: Adrenal glands unremarkable. Soft Tissues/Bones: Spurring is seen in the spine. Spurring is seen in the  shoulder joints. Impression  Small bilateral pleural effusions with adjacent consolidation lungs, either  due to atelectasis or pneumonia. Septal thickening is seen, suggesting a  component of fluid overload. .  Pleural effusions and adjacent consolidation,  is increased compared to prior      CTPA: No results found for this or any previous visit. CXR PA/LAT: No results found for this or any previous visit. CXR portable: Results for orders placed during the hospital encounter of 08/05/22    XR CHEST PORTABLE    Narrative  EXAMINATION:  ONE XRAY VIEW OF THE CHEST    8/19/2022 9:43 am    COMPARISON:  08/15/2022 radiograph    HISTORY:  ORDERING SYSTEM PROVIDED HISTORY: hypoxemia  TECHNOLOGIST PROVIDED HISTORY:  Reason for exam:->hypoxemia  Reason for Exam: hypoxemia    FINDINGS:  Supportive devices are stable. The heart is enlarged. Mediastinum is  normal.  Mild perihilar opacities persist centrally and there are mild  bibasilar ground-glass opacities. Probable trace left pleural effusion. No  significant skeletal finding. Impression  Relatively stable pattern of mild edema with no detrimental change. This note was transcribed using [id number] [email or web address]. Please disregard any translational errors.       Jaya God[facility] [phone]

## 2022-08-22 NOTE — PROGRESS NOTES
Hospitalist Progress Note  8/22/2022 9:13 AM    PCP: Nayeli Camacho MD    [phone]     Date of Admission: 8/5/2022                                                                                                                     HOSPITAL COURSE    Patient demographics:  The patient  Son Galeas is a 63 y.o. male     Significant past medical history:   Patient Active Problem List   Diagnosis    Partial bowel obstruction (HCC)    Hiatal hernia    Acute respiratory failure with hypoxia and hypercapnia (HCC)    SBO (small bowel obstruction) (HCC)    Aspiration into airway    Acute respiratory failure with hypoxia (HCC)    Small bowel obstruction (HCC)    Diastolic heart failure (HCC)    History of atrial fibrillation    Supraventricular tachycardia ([address]    Arterial hypotension         Presenting symptoms:  abdominal pain    Diagnostic workup:      CONSULTS DURING ADMISSION :   IP CONSULT TO GENERAL SURGERY  IP CONSULT TO HOSPITALIST  IP CONSULT TO GENERAL SURGERY  IP CONSULT TO DIETITIAN  IP CONSULT TO CARDIOLOGY  IP CONSULT TO GI      Patient was diagnosed with:  Low grade or Partial Bowel obstruction  Pneumonia and septic shock. Hiatal hernia  Atrial Fibrillation      Treatment while inpatient:  63years old male with medical history significant for morbid obesity, paraesophageal hernia. Patient presented to the emergency room with abdominal pain nausea vomiting and diarrhea. Patient was diagnosed with small bowel obstruction. Patient was also diagnosed with pneumonia and septic shock. Patient developed acute respiratory failure and acute renal failure. Managed on the ventilator with a prolonged course in ICU.          Renal function improved.                                                                   ----------------------------------------------------------      SUBJECTIVE COMPLAINTS- follow up for abdominal pain    Diet: Diet NPO  ADULT TUBE FEEDING; Orogastric; Peptide Based High Protein; Continuous; 20; Yes; 10; Q 4 hours; 35; 100; Q 4 hours; Protein;  Four proteinex daily      OBJECTIVE:   Patient Active Problem List   Diagnosis    Partial bowel obstruction (HCC)    Hiatal hernia    Acute respiratory failure with hypoxia and hypercapnia (HCC)    SBO (small bowel obstruction) (HCC)    Aspiration into airway    Acute respiratory failure with hypoxia (HCC)    Small bowel obstruction (HCC)    Diastolic heart failure (HCC)    History of atrial fibrillation    Supraventricular tachycardia (HCC)    Arterial hypotension       Allergies  Codeine    Medications    Scheduled Meds:   acetaZOLAMIDE  250 mg IntraVENous Daily    gabapentin  400 mg Oral TID    docusate  100 mg Oral Daily    senna  10 mL Oral Nightly    polyethylene glycol  17 g Oral Daily    metoprolol tartrate  25 mg Oral BID    meropenem  1,000 mg IntraVENous Q8H    famotidine (PEPCID) injection  20 mg IntraVENous BID    chlorhexidine  15 mL Mouth/Throat BID    ipratropium-albuterol  1 ampule Inhalation Q4H WA    sodium chloride flush  5-40 mL IntraVENous 2 times per day    levothyroxine  200 mcg Oral Daily    [Held by provider] spironolactone  50 mg Oral Daily    enoxaparin  40 mg SubCUTAneous BID     Continuous Infusions:   furosemide 20 mg/hr (08/22/22 0600)    fentaNYL 75 mcg/hr (08/19/22 0514)    propofol 35 mcg/kg/min (08/22/22 0815)    sodium chloride       PRN Meds:  fentanNYL, midazolam, artificial tears, acetaminophen, potassium chloride, fentaNYL **AND** fentaNYL, acetaminophen, sodium chloride flush, sodium chloride, ondansetron **OR** ondansetron, diazePAM    Vitals   Vitals /wt Patient Vitals for the past 8 hrs:   BP Temp Temp src Pulse Resp SpO2 Height Weight   08/22/22 0900 103/68 -- -- 83 18 91 % -- --   08/22/22 0828 -- -- -- 83 18 90 % -- --   08/22/22 0825 -- -- -- 81 18 93 % -- --   08/22/22 0824 -- -- -- 81 18 93 % -- --   08/22/22 0800 95/69 98.3 °F (36.8 °C) Axillary 83 18 92 % -- --   08/22/22 0700 101/61 -- -- 83 18 -- -- --   08/22/22 0600 93/66 -- -- 83 18 94 % -- --   08/22/22 0500 100/70 -- -- 86 18 100 % 5' 7\" (1.702 m) (!) 333 lb 14.4 oz (151.5 kg)   08/22/22 0400 90/67 98.4 °F (36.9 °C) Axillary 81 18 94 % -- --   08/22/22 0334 -- -- -- 82 18 90 % -- --   08/22/22 0200 92/64 -- -- 84 18 90 % -- --        72HR INTAKE/OUTPUT:    Intake/Output Summary (Last 24 hours) at 8/22/2022 0913  Last data filed at 8/22/2022 0842  Gross per 24 hour   Intake 1181.5 ml   Output 6000 ml   Net -4818.5 ml       Exam:    Gen:  intubated and sedated  Eyes: PERRL. No sclera icterus. No conjunctival injection. ENT: No discharge. Pharynx clear. External appearance of ears and nose normal.  Neck: Trachea midline. No obvious mass. Resp: No accessory muscle use. No crackles. No wheezes. No rhonchi. CV: Regular rate. Regular rhythm. No murmur or rub. No edema. GI: Non-tender. Non-distended. No hernia. Skin: Warm, dry, normal texture and turgor. Lymph: No cervical LAD. No supraclavicular LAD. M/S: / Ext. No cyanosis. No clubbing. No joint deformity. Neuro: CN 2-12 are intact,  no neurologic deficits noted. PT/INR:   No results for input(s): PROTIME, INR in the last 72 hours. APTT: No results for input(s): APTT in the last 72 hours. CBC:   Recent Labs     08/20/22  0430 08/21/22  0435 08/22/22  0310   WBC 9.0 9.7 8.6   HGB 9.3* 9.2* 9.0*   HCT 28.3* 27.5* 27.8*   MCV 95.0 94.5 94.2   * 478* 469*       BMP:   Recent Labs     08/20/22  0430 08/20/22  1417 08/21/22  0435 08/21/22  1430 08/21/22  2150 08/22/22  0310      < > 145 147* 145 142   K 3.7   < > 3.6 3.2* 3.3* 3.4*      < > 104 107 106 102   CO2 28   < > 30 29 26 27   PHOS 3.2  --  3.6  --   --  4.0   BUN 49*   < > 41* 37* 32* 32*   CREATININE 1.1   < > 1.3 1.4* 1.2 1.2    < > = values in this interval not displayed. LIVER PROFILE:   No results for input(s): ALKPHOS, AST, ALT, ALB, BILIDIR, BILITOT, ALKPHOS in the last 72 hours.     No questions, a plan was proposed and they agreed with plan. Bernardo Meyers MD    This note was transcribed using [id number] [employer]. Please disregard any translational errors.

## 2022-08-22 NOTE — OP NOTE
Endoscopy Note    Patient: Wayne Hill  : 1960  Acct#:     Procedure: Esophagogastroduodenoscopy with endoscopic placement of NG tube                         Date:  2022     Surgeon:   Frandy Mejia DO    Referring Physician:  Grupo Iglesias MD    Indications: This is a 63y.o. year old male who presents today with  large paraesophageal hernia and admitted for partial SBO. ICU team unable to place NG tube. Asked for endoscopic NG tube placement . Postoperative Diagnosis:  1) Successful endoscopically guided nasoenteric tube placement. Tube was placed in left nare and guided into the duodenal bulb  2) Large paraesophageal hernia     Anesthesia:  Per ICU team. Propofol gtt and Versed/Fentanyl IV were administered. Consent:  The patient or their legal guardian has signed an informed consent, and is aware of the potential risks, benefits, alternatives, and potential complications of this procedure. These include, but are not limited to hemorrhage, bleeding, post procedural pain, perforation, phlebitis, aspiration, hypotension, hypoxia, cardiovascular events such as arryhthmia, and possibly death. Description of Procedure: The patient was then taken to the endoscopy suite, placed in the supine position and the above IV sedation was administrered. The Olympus video endoscope was placed through the patient's oropharynx without difficulty to the extent of the duodenal bulb. Both forward and retroflexed views of the stomach were obtained. Findings:    Esophagus: The esophagus appeared normal without evidence of [place]'s esophagus or reflux esophagitis. Stomach: Large parasesophageal hernia was noted. Duodenum: The first portion of the duodenum appeared normal with normal villous pattern    The nasoenteric tube was inserted in the left nares and guided successfully into the duodenal bulb using endoscopic guidance.      The scope was then withdrawn back into the stomach, it was decompressed, and the scope was completely withdrawn. The patient tolerated the procedure well and was taken to the post anesthesia care unit in good condition. Estimated blood loss: <5ml    * No specimens in log *        Impression:   1) See post procedure diagnoses    Recommendations:   1) Per ICU team.   2) Call with questions.          Bhaskar Gilbert,   [address] and [address] [facility]

## 2022-08-22 NOTE — PROGRESS NOTES
Occupational Therapy    Pt cont Intubated; requiring more sedation per Nursing. Pt unable to actively follow any commands or participate with OT tx. Will  follow-up as schedule and pt condition permit.     Electronically signed by Lokesh Hernandez OT on 8/22/2022 at 9:50 AM

## 2022-08-23 ENCOUNTER — APPOINTMENT (OUTPATIENT)
Dept: GENERAL RADIOLOGY | Age: 62
DRG: 853 | End: 2022-08-23
Payer: COMMERCIAL

## 2022-08-23 LAB
ANION GAP SERPL CALCULATED.3IONS-SCNC: 12 MMOL/L (ref 3–16)
ANION GAP SERPL CALCULATED.3IONS-SCNC: 12 MMOL/L (ref 3–16)
ANION GAP SERPL CALCULATED.3IONS-SCNC: 13 MMOL/L (ref 3–16)
APTT: 34.1 SEC (ref 23–34.3)
APTT: 80 SEC (ref 23–34.3)
BASOPHILS ABSOLUTE: 0.1 K/UL (ref 0–0.2)
BASOPHILS RELATIVE PERCENT: 1 %
BUN BLDV-MCNC: 42 MG/DL (ref 7–20)
BUN BLDV-MCNC: 47 MG/DL (ref 7–20)
BUN BLDV-MCNC: 48 MG/DL (ref 7–20)
CALCIUM SERPL-MCNC: 7.8 MG/DL (ref 8.3–10.6)
CALCIUM SERPL-MCNC: 8 MG/DL (ref 8.3–10.6)
CALCIUM SERPL-MCNC: 8.2 MG/DL (ref 8.3–10.6)
CHLORIDE BLD-SCNC: 102 MMOL/L (ref 99–110)
CHLORIDE BLD-SCNC: 105 MMOL/L (ref 99–110)
CHLORIDE BLD-SCNC: 107 MMOL/L (ref 99–110)
CO2: 24 MMOL/L (ref 21–32)
CO2: 25 MMOL/L (ref 21–32)
CO2: 27 MMOL/L (ref 21–32)
CREAT SERPL-MCNC: 1.4 MG/DL (ref 0.8–1.3)
CREAT SERPL-MCNC: 1.5 MG/DL (ref 0.8–1.3)
CREAT SERPL-MCNC: 1.6 MG/DL (ref 0.8–1.3)
EOSINOPHILS ABSOLUTE: 0.3 K/UL (ref 0–0.6)
EOSINOPHILS RELATIVE PERCENT: 3.8 %
GFR AFRICAN AMERICAN: 54
GFR AFRICAN AMERICAN: 58
GFR AFRICAN AMERICAN: >60
GFR NON-AFRICAN AMERICAN: 44
GFR NON-AFRICAN AMERICAN: 48
GFR NON-AFRICAN AMERICAN: 52
GLUCOSE BLD-MCNC: 100 MG/DL (ref 70–99)
GLUCOSE BLD-MCNC: 102 MG/DL (ref 70–99)
GLUCOSE BLD-MCNC: 106 MG/DL (ref 70–99)
GLUCOSE BLD-MCNC: 108 MG/DL (ref 70–99)
GLUCOSE BLD-MCNC: 116 MG/DL (ref 70–99)
GLUCOSE BLD-MCNC: 174 MG/DL (ref 70–99)
GLUCOSE BLD-MCNC: 98 MG/DL (ref 70–99)
GLUCOSE BLD-MCNC: 99 MG/DL (ref 70–99)
HCT VFR BLD CALC: 30.7 % (ref 40.5–52.5)
HEMOGLOBIN: 10 G/DL (ref 13.5–17.5)
LYMPHOCYTES ABSOLUTE: 1 K/UL (ref 1–5.1)
LYMPHOCYTES RELATIVE PERCENT: 11.2 %
MAGNESIUM: 2.3 MG/DL (ref 1.8–2.4)
MCH RBC QN AUTO: 30.7 PG (ref 26–34)
MCHC RBC AUTO-ENTMCNC: 32.6 G/DL (ref 31–36)
MCV RBC AUTO: 94 FL (ref 80–100)
MONOCYTES ABSOLUTE: 0.4 K/UL (ref 0–1.3)
MONOCYTES RELATIVE PERCENT: 5 %
NEUTROPHILS ABSOLUTE: 6.9 K/UL (ref 1.7–7.7)
NEUTROPHILS RELATIVE PERCENT: 79 %
PDW BLD-RTO: 17 % (ref 12.4–15.4)
PERFORMED ON: ABNORMAL
PERFORMED ON: NORMAL
PHOSPHORUS: 3.6 MG/DL (ref 2.5–4.9)
PLATELET # BLD: 464 K/UL (ref 135–450)
PMV BLD AUTO: 7.1 FL (ref 5–10.5)
POTASSIUM SERPL-SCNC: 3.4 MMOL/L (ref 3.5–5.1)
POTASSIUM SERPL-SCNC: 3.5 MMOL/L (ref 3.5–5.1)
POTASSIUM SERPL-SCNC: 4.3 MMOL/L (ref 3.5–5.1)
PROCALCITONIN: 0.08 NG/ML (ref 0–0.15)
RBC # BLD: 3.27 M/UL (ref 4.2–5.9)
SODIUM BLD-SCNC: 138 MMOL/L (ref 136–145)
SODIUM BLD-SCNC: 142 MMOL/L (ref 136–145)
SODIUM BLD-SCNC: 147 MMOL/L (ref 136–145)
WBC # BLD: 8.7 K/UL (ref 4–11)

## 2022-08-23 PROCEDURE — 2700000000 HC OXYGEN THERAPY PER DAY

## 2022-08-23 PROCEDURE — 6360000002 HC RX W HCPCS: Performed by: NURSE PRACTITIONER

## 2022-08-23 PROCEDURE — 84100 ASSAY OF PHOSPHORUS: CPT

## 2022-08-23 PROCEDURE — 94640 AIRWAY INHALATION TREATMENT: CPT

## 2022-08-23 PROCEDURE — 94760 N-INVAS EAR/PLS OXIMETRY 1: CPT

## 2022-08-23 PROCEDURE — APPNB15 APP NON BILLABLE TIME 0-15 MINS: Performed by: NURSE PRACTITIONER

## 2022-08-23 PROCEDURE — 6370000000 HC RX 637 (ALT 250 FOR IP): Performed by: NURSE PRACTITIONER

## 2022-08-23 PROCEDURE — 2580000003 HC RX 258: Performed by: HOSPITALIST

## 2022-08-23 PROCEDURE — 99291 CRITICAL CARE FIRST HOUR: CPT | Performed by: INTERNAL MEDICINE

## 2022-08-23 PROCEDURE — 6370000000 HC RX 637 (ALT 250 FOR IP): Performed by: INTERNAL MEDICINE

## 2022-08-23 PROCEDURE — 36415 COLL VENOUS BLD VENIPUNCTURE: CPT

## 2022-08-23 PROCEDURE — 2000000000 HC ICU R&B

## 2022-08-23 PROCEDURE — 94003 VENT MGMT INPAT SUBQ DAY: CPT

## 2022-08-23 PROCEDURE — 80048 BASIC METABOLIC PNL TOTAL CA: CPT

## 2022-08-23 PROCEDURE — 2500000003 HC RX 250 WO HCPCS: Performed by: NURSE PRACTITIONER

## 2022-08-23 PROCEDURE — 85025 COMPLETE CBC W/AUTO DIFF WBC: CPT

## 2022-08-23 PROCEDURE — 2500000003 HC RX 250 WO HCPCS: Performed by: HOSPITALIST

## 2022-08-23 PROCEDURE — 83735 ASSAY OF MAGNESIUM: CPT

## 2022-08-23 PROCEDURE — 71045 X-RAY EXAM CHEST 1 VIEW: CPT

## 2022-08-23 PROCEDURE — 6360000002 HC RX W HCPCS: Performed by: HOSPITALIST

## 2022-08-23 PROCEDURE — 6360000002 HC RX W HCPCS: Performed by: INTERNAL MEDICINE

## 2022-08-23 PROCEDURE — 9990000010 HC NO CHARGE VISIT

## 2022-08-23 PROCEDURE — 85730 THROMBOPLASTIN TIME PARTIAL: CPT

## 2022-08-23 PROCEDURE — 2580000003 HC RX 258: Performed by: INTERNAL MEDICINE

## 2022-08-23 PROCEDURE — 84145 PROCALCITONIN (PCT): CPT

## 2022-08-23 PROCEDURE — 2580000003 HC RX 258: Performed by: NURSE PRACTITIONER

## 2022-08-23 RX ORDER — METHYLPREDNISOLONE SODIUM SUCCINATE 40 MG/ML
40 INJECTION, POWDER, LYOPHILIZED, FOR SOLUTION INTRAMUSCULAR; INTRAVENOUS EVERY 12 HOURS
Status: DISCONTINUED | OUTPATIENT
Start: 2022-08-24 | End: 2022-08-29

## 2022-08-23 RX ORDER — METHYLPREDNISOLONE SODIUM SUCCINATE 125 MG/2ML
125 INJECTION, POWDER, LYOPHILIZED, FOR SOLUTION INTRAMUSCULAR; INTRAVENOUS ONCE
Status: COMPLETED | OUTPATIENT
Start: 2022-08-23 | End: 2022-08-23

## 2022-08-23 RX ORDER — HEPARIN SODIUM 10000 [USP'U]/100ML
0-3000 INJECTION, SOLUTION INTRAVENOUS CONTINUOUS
Status: DISCONTINUED | OUTPATIENT
Start: 2022-08-23 | End: 2022-08-24

## 2022-08-23 RX ORDER — METRONIDAZOLE 500 MG/100ML
500 INJECTION, SOLUTION INTRAVENOUS EVERY 8 HOURS
Status: COMPLETED | OUTPATIENT
Start: 2022-08-23 | End: 2022-08-28

## 2022-08-23 RX ORDER — CALCIUM GLUCONATE 20 MG/ML
1000 INJECTION, SOLUTION INTRAVENOUS ONCE
Status: COMPLETED | OUTPATIENT
Start: 2022-08-23 | End: 2022-08-23

## 2022-08-23 RX ORDER — FENTANYL CITRATE-0.9 % NACL/PF 10 MCG/ML
25-200 PLASTIC BAG, INJECTION (ML) INTRAVENOUS CONTINUOUS
Status: DISCONTINUED | OUTPATIENT
Start: 2022-08-23 | End: 2022-08-24

## 2022-08-23 RX ORDER — PROPOFOL 10 MG/ML
5-50 INJECTION, EMULSION INTRAVENOUS CONTINUOUS
Status: DISCONTINUED | OUTPATIENT
Start: 2022-08-23 | End: 2022-08-24

## 2022-08-23 RX ORDER — HEPARIN SODIUM 1000 [USP'U]/ML
10000 INJECTION, SOLUTION INTRAVENOUS; SUBCUTANEOUS ONCE
Status: COMPLETED | OUTPATIENT
Start: 2022-08-23 | End: 2022-08-23

## 2022-08-23 RX ORDER — MIDAZOLAM HYDROCHLORIDE 1 MG/ML
1-10 INJECTION, SOLUTION INTRAVENOUS CONTINUOUS
Status: DISCONTINUED | OUTPATIENT
Start: 2022-08-23 | End: 2022-08-24

## 2022-08-23 RX ORDER — FENTANYL CITRATE 50 UG/ML
50 INJECTION, SOLUTION INTRAMUSCULAR; INTRAVENOUS EVERY 30 MIN PRN
Status: DISCONTINUED | OUTPATIENT
Start: 2022-08-23 | End: 2022-08-28

## 2022-08-23 RX ADMIN — CALCIUM GLUCONATE 1000 MG: 20 INJECTION, SOLUTION INTRAVENOUS at 10:25

## 2022-08-23 RX ADMIN — Medication 200 MCG/HR: at 10:29

## 2022-08-23 RX ADMIN — SODIUM CHLORIDE, PRESERVATIVE FREE 10 ML: 5 INJECTION INTRAVENOUS at 08:05

## 2022-08-23 RX ADMIN — ACETAZOLAMIDE 250 MG: 500 INJECTION, POWDER, LYOPHILIZED, FOR SOLUTION INTRAVENOUS at 07:58

## 2022-08-23 RX ADMIN — CHLORHEXIDINE GLUCONATE 0.12% ORAL RINSE 15 ML: 1.2 LIQUID ORAL at 07:59

## 2022-08-23 RX ADMIN — PROPOFOL 20 MCG/KG/MIN: 10 INJECTION, EMULSION INTRAVENOUS at 06:27

## 2022-08-23 RX ADMIN — HYDROCODONE BITARTRATE AND ACETAMINOPHEN 10 ML: 176/5 SOLUTION ORAL at 20:55

## 2022-08-23 RX ADMIN — Medication 200 MCG/HR: at 18:38

## 2022-08-23 RX ADMIN — SODIUM CHLORIDE, PRESERVATIVE FREE 10 ML: 5 INJECTION INTRAVENOUS at 20:56

## 2022-08-23 RX ADMIN — CEFEPIME 2000 MG: 2 INJECTION, POWDER, FOR SOLUTION INTRAVENOUS at 13:05

## 2022-08-23 RX ADMIN — CEFEPIME 2000 MG: 2 INJECTION, POWDER, FOR SOLUTION INTRAVENOUS at 21:13

## 2022-08-23 RX ADMIN — PROPOFOL 40 MCG/KG/MIN: 10 INJECTION, EMULSION INTRAVENOUS at 20:53

## 2022-08-23 RX ADMIN — PROPOFOL 40 MCG/KG/MIN: 10 INJECTION, EMULSION INTRAVENOUS at 23:38

## 2022-08-23 RX ADMIN — IPRATROPIUM BROMIDE AND ALBUTEROL SULFATE 1 AMPULE: 2.5; .5 SOLUTION RESPIRATORY (INHALATION) at 12:40

## 2022-08-23 RX ADMIN — WHITE PETROLATUM 57.7 %-MINERAL OIL 31.9 % EYE OINTMENT: at 21:16

## 2022-08-23 RX ADMIN — GABAPENTIN 400 MG: 400 CAPSULE ORAL at 07:59

## 2022-08-23 RX ADMIN — POTASSIUM CHLORIDE 20 MEQ: 29.8 INJECTION, SOLUTION INTRAVENOUS at 17:34

## 2022-08-23 RX ADMIN — Medication 200 MCG/HR: at 22:45

## 2022-08-23 RX ADMIN — GABAPENTIN 400 MG: 400 CAPSULE ORAL at 13:08

## 2022-08-23 RX ADMIN — HEPARIN SODIUM 2550 UNITS/HR: 10000 INJECTION, SOLUTION INTRAVENOUS at 14:44

## 2022-08-23 RX ADMIN — MUPIROCIN: 20 OINTMENT TOPICAL at 20:56

## 2022-08-23 RX ADMIN — CHLORHEXIDINE GLUCONATE 0.12% ORAL RINSE 15 ML: 1.2 LIQUID ORAL at 20:55

## 2022-08-23 RX ADMIN — ENOXAPARIN SODIUM 40 MG: 100 INJECTION SUBCUTANEOUS at 08:00

## 2022-08-23 RX ADMIN — POTASSIUM CHLORIDE 20 MEQ: 29.8 INJECTION, SOLUTION INTRAVENOUS at 16:30

## 2022-08-23 RX ADMIN — PROPOFOL 50 MCG/KG/MIN: 10 INJECTION, EMULSION INTRAVENOUS at 18:03

## 2022-08-23 RX ADMIN — VANCOMYCIN HYDROCHLORIDE 1750 MG: 1 INJECTION, POWDER, LYOPHILIZED, FOR SOLUTION INTRAVENOUS at 15:46

## 2022-08-23 RX ADMIN — PROPOFOL 50 MCG/KG/MIN: 10 INJECTION, EMULSION INTRAVENOUS at 13:34

## 2022-08-23 RX ADMIN — PROPOFOL 20 MCG/KG/MIN: 10 INJECTION, EMULSION INTRAVENOUS at 02:19

## 2022-08-23 RX ADMIN — PROPOFOL 40 MCG/KG/MIN: 10 INJECTION, EMULSION INTRAVENOUS at 10:20

## 2022-08-23 RX ADMIN — GABAPENTIN 400 MG: 400 CAPSULE ORAL at 20:55

## 2022-08-23 RX ADMIN — METRONIDAZOLE 500 MG: 500 INJECTION, SOLUTION INTRAVENOUS at 21:12

## 2022-08-23 RX ADMIN — POTASSIUM CHLORIDE 20 MEQ: 29.8 INJECTION, SOLUTION INTRAVENOUS at 10:48

## 2022-08-23 RX ADMIN — Medication 200 MCG/HR: at 14:31

## 2022-08-23 RX ADMIN — METHYLPREDNISOLONE SODIUM SUCCINATE 125 MG: 125 INJECTION, POWDER, FOR SOLUTION INTRAMUSCULAR; INTRAVENOUS at 14:46

## 2022-08-23 RX ADMIN — POTASSIUM CHLORIDE 20 MEQ: 29.8 INJECTION, SOLUTION INTRAVENOUS at 09:44

## 2022-08-23 RX ADMIN — PROPOFOL 50 MCG/KG/MIN: 10 INJECTION, EMULSION INTRAVENOUS at 15:47

## 2022-08-23 RX ADMIN — IPRATROPIUM BROMIDE AND ALBUTEROL SULFATE 1 AMPULE: 2.5; .5 SOLUTION RESPIRATORY (INHALATION) at 20:33

## 2022-08-23 RX ADMIN — LEVOTHYROXINE SODIUM 200 MCG: 0.1 TABLET ORAL at 06:22

## 2022-08-23 RX ADMIN — MUPIROCIN: 20 OINTMENT TOPICAL at 08:03

## 2022-08-23 RX ADMIN — PHENYLEPHRINE HYDROCHLORIDE 10 MCG/MIN: 10 INJECTION INTRAVENOUS at 13:12

## 2022-08-23 RX ADMIN — HEPARIN SODIUM 10000 UNITS: 1000 INJECTION INTRAVENOUS; SUBCUTANEOUS at 14:41

## 2022-08-23 RX ADMIN — FAMOTIDINE 20 MG: 10 INJECTION, SOLUTION INTRAVENOUS at 07:59

## 2022-08-23 RX ADMIN — METRONIDAZOLE 500 MG: 500 INJECTION, SOLUTION INTRAVENOUS at 14:39

## 2022-08-23 RX ADMIN — FAMOTIDINE 20 MG: 10 INJECTION, SOLUTION INTRAVENOUS at 20:55

## 2022-08-23 RX ADMIN — IPRATROPIUM BROMIDE AND ALBUTEROL SULFATE 1 AMPULE: 2.5; .5 SOLUTION RESPIRATORY (INHALATION) at 16:23

## 2022-08-23 RX ADMIN — IPRATROPIUM BROMIDE AND ALBUTEROL SULFATE 1 AMPULE: 2.5; .5 SOLUTION RESPIRATORY (INHALATION) at 08:06

## 2022-08-23 RX ADMIN — PROPOFOL 50 MCG/KG/MIN: 10 INJECTION, EMULSION INTRAVENOUS at 11:57

## 2022-08-23 ASSESSMENT — PAIN SCALES - GENERAL
PAINLEVEL_OUTOF10: 0

## 2022-08-23 ASSESSMENT — PULMONARY FUNCTION TESTS
PIF_VALUE: 32
PIF_VALUE: 33
PIF_VALUE: 33
PIF_VALUE: 32
PIF_VALUE: 33
PIF_VALUE: 35
PIF_VALUE: 31
PIF_VALUE: 31
PIF_VALUE: 34
PIF_VALUE: 34
PIF_VALUE: 31
PIF_VALUE: 31
PIF_VALUE: 33
PIF_VALUE: 31
PIF_VALUE: 35
PIF_VALUE: 35
PIF_VALUE: 32
PIF_VALUE: 33
PIF_VALUE: 31
PIF_VALUE: 33
PIF_VALUE: 31
PIF_VALUE: 33
PIF_VALUE: 33
PIF_VALUE: 34
PIF_VALUE: 33
PIF_VALUE: 31

## 2022-08-23 NOTE — PROGRESS NOTES
[facility]                                               Progress note    Summary:   Ary Farooq is being seen by nephrology for CHARLENE and hypernatremia. Admitted with paraesophageal hernia and SBO. Had EGD 8/9 with biopsy. Still NPO     Interval History  Seen at bedside  On lasix infusion 20 mg/h  BP is still soft, 4925 mL urine documented yesterday  Cr saw a mild bump to 1.6 this AM  FiO2 90% today. Bronch showing purulence. Hypernatremic today  BP keeps trending down    Plan: Will stop the lasix infusion today  Net -ve 10 L for the admission, I think he has been diuresed sufficiently. Hypoxia more related to pna rather than volume overload at this point. Consideration for a PE, started on heparin infusion empirically. If a CTPA is deemed necessary in the near future, would be ok with proceeding with the CTPA at the discretion of the critical care team.  Discussed with Dr. Lyn Trimble MD  [facility]  Office: [phone]    Assessment:   Acute kidney injury  Urine Na < 20    creatinine on admission 1   next day on 08/06/2022 was 0.8  suddenly jumped up to 2.7. Due to episode of severe hypotension with blood pressure drop 78/44. also got CT with IV contrast on 08/05/2022 but most likely  this is ATN related due to hypotension and hypercapnic  respiratory failure. CT angio s normal renal arteries and normal kidneys      Sepsis/ possible septic shock. emperature was 101.1   High white cell count. '  immunocompromised due to rheumatoid arthritis and treatment. History of sleep apnea   on CPAP. Acute respiratory failure with high pCO2 in the range of 50s with pH  of 7.24    now intubated. History of abnormal stress test in 06/2022 with stress-induced  ischemia and some scarring. Ejection fraction  was  64%.     had a followup angiogram which was reported as normal as per wife.   I do not have that result     History of rheumatoid arthritis longstanding,was on methotrexate,  has taken Celebrex and now on Inflectra infusions,  so he is immunocompromised. Alexisapdmini Null is now helping him with his rheumatoid tremendously. he has been on steroids in the past.     Rule out adrenal insufficiency. cortisol level. normal      History of thyroidectomy in the past. .    Small bowel obstruction. Large hiatus hernia with colon and stomach in the chest.  Further management as per medical team.       ROS:   Unable to assess      PMH:   Past medical history, surgical history, social history, family history are reviewed and updated as appropriate. Reviewed current medication list.   Allergies reviewed and updated as needed. PE:   Vitals:    08/23/22 0900   BP: 88/73   Pulse: 80   Resp: 18   Temp:    SpO2: 94%       General appearance:  in NAD, intubated and sedated  HEENT: EOM intact, no icterus. Trachea is midline. Neck : No masses, appears symmetrical  Respiratory: Respiratory effort appears normal, bilateral equal chest rise, no wheeze, no crackles ETT to vent   Cardiovascular: Ausculation shows RRR trace edema  Abdomen: mildly distended but soft   Musculoskeletal:  Joints with no swelling or deformity. Skin:no rashes, ulcers, induration, no jaundice.    Neuro: sedated      Lab Results   Component Value Date    CREATININE 1.6 (H) 08/23/2022    BUN 42 (H) 08/23/2022     (H) 08/23/2022    K 3.4 (L) 08/23/2022     08/23/2022    CO2 27 08/23/2022      Lab Results   Component Value Date    WBC 8.7 08/23/2022    HGB 10.0 (L) 08/23/2022    HCT 30.7 (L) 08/23/2022    MCV 94.0 08/23/2022     (H) 08/23/2022     Lab Results   Component Value Date    CALCIUM 8.2 (L) 08/23/2022    CAION 1.06 (L) 08/15/2022    PHOS 3.6 08/23/2022

## 2022-08-23 NOTE — PROGRESS NOTES
Occupational Therapy    OT tx held due to cont decline Respiratory Status. Will follow-up with nursing tomorrow.   Palliative care consulted    Electronically signed by Xiomara Haque OT on 8/23/2022 at 2:54 PM

## 2022-08-23 NOTE — PROGRESS NOTES
provided emotional support and prayer with patient and his wife Daryl Oseguera. They have four adult children and eleven siblings for support.

## 2022-08-23 NOTE — PROGRESS NOTES
Clinical Pharmacy Note  Vancomycin Consult    Stefanie Miller is a 63 y.o. male ordered Vancomycin; consult received from [facility] to manage therapy. Also receiving cefepime and metronidazole. Allergies:  Codeine     Temp max:  Temp (24hrs), Av.4 °F (37.4 °C), Min:97.3 °F (36.3 °C), Max:101.1 °F (38.4 °C)      Recent Labs     22  0435 22  0310 22  0355   WBC 9.7 8.6 8.7       Recent Labs     22  1445 22  2230 22  0355   BUN 37* 38* 42*   CREATININE 1.4* 1.4* 1.6*         Intake/Output Summary (Last 24 hours) at 2022 1346  Last data filed at 2022 1108  Gross per 24 hour   Intake 1319.94 ml   Output 4685 ml   Net -3365.06 ml       Culture Results:  pending    Ht Readings from Last 1 Encounters:   22 5' 7\" (1.702 m)        Wt Readings from Last 1 Encounters:   22 (!) 311 lb 14.4 oz (141.5 kg)         Estimated Creatinine Clearance: 67 mL/min (A) (based on SCr of 1.6 mg/dL (H)). Assessment/Plan:  Vancomycin 1750 mg IV x 1 today. Random vancomycin level tomorrow in AM.    Thank you for the consult.      Wesley Ron, PharmD, BCCCP

## 2022-08-23 NOTE — CONSULTS
[facility]  Palliative Care   Consult Note    NAME:  Umer [facility] Jacquie RECORD NUMBER:  [id number]  AGE: 28[address] [facility] y.o. GENDER: male  : 1960  TODAY'S DATE:  2022    Subjective     Reason for Consult:  goals of care  Visit Type: Initial Consult      Maria Esther Carmichael is a 28[address] [facility] [address] y.o. male referred by:   [x] Physician    PAST MEDICAL HISTORY      Diagnosis Date    Arthritis     Atrial fibrillation ([address]     Hypertension        PAST SURGICAL HISTORY  Past Surgical History:   Procedure Laterality Date    BRONCHOSCOPY  2022    BRONCHOSCOPY ALVEOLAR LAVAGE performed by Diana Fine DO at [address]  2022    BRONCHOSCOPY performed by Barrera Luna MD at [place] [address]  2022    EGD BIOPSY performed by Loi Jean-Baptiste MD at [place] [address]  2022    EGD ESOPHAGOGASTRODUODENOSCOPY TUBE INSERTION performed by Loi Jean-Baptiste MD at [place] [address] N/A 2022    EGD ESOPHAGOGASTRODUODENOSCOPY ORAL-GASTRIC TUBE INSERTION performed by Diana Fine DO at [address]  History reviewed. No pertinent family history. SOCIAL HISTORY  Social History     Tobacco Use    Smoking status: Never    Smokeless tobacco: Never   Substance Use Topics    Alcohol use: Not Currently    Drug use: Never       ALLERGIES  Allergies   Allergen Reactions    Codeine Nausea Only and Nausea And Vomiting     Stomach upset          MEDICATIONS  No current facility-administered medications on file prior to encounter. Current Outpatient Medications on File Prior to Encounter   Medication Sig Dispense Refill    oxyCODONE (ROXICODONE) 5 MG immediate release tablet Take 10 mg by mouth every 6 hours as needed. pantoprazole (PROTONIX) 40 MG tablet Take 40 mg by mouth in the morning.       folic acid (FOLVITE) 1 MG tablet Take 1 mg by mouth in the morning. acetaminophen (TYLENOL) 500 MG tablet Take 1,000 mg by mouth every 8 hours as needed      diazePAM (VALIUM) 5 MG tablet Take 5 mg by mouth 4 times daily as needed. levothyroxine (SYNTHROID) 200 MCG tablet Take 200 mcg by mouth every morning      lisinopril (PRINIVIL;ZESTRIL) 10 MG tablet Take 10 mg by mouth in the morning. metoprolol tartrate (LOPRESSOR) 25 MG tablet Take 25 mg by mouth in the morning and 25 mg before bedtime. spironolactone (ALDACTONE) 50 MG tablet Take 50 mg by mouth in the morning. methotrexate (RHEUMATREX) 2.5 MG chemo tablet Take 15 mg by mouth once a week      celecoxib (CELEBREX) 200 MG capsule Take 200 mg by mouth in the morning and 200 mg before bedtime. ASPIRIN LOW DOSE 81 MG EC tablet Take 81 mg by mouth daily      gabapentin (NEURONTIN) 800 MG tablet Take 800 mg by mouth in the morning and 800 mg at noon and 800 mg before bedtime. loratadine (CLARITIN) 10 MG tablet Take 10 mg by mouth in the morning. Objective         BP 88/73   Pulse 75   Temp 99.1 °F (37.3 °C)   Resp 14   Ht 5' 7\" (1.702 m)   Wt (!) 311 lb 14.4 oz (141.5 kg)   SpO2 (!) 79%   BMI 48.85 kg/m²     Code Status: Full Code    Advanced Directives: not completed his wife would be WERNER Vanegas     Assessment        Management and Education    Persons available for education:        [] Self     [] Caregiver       [x] Spouse       [] Other Family Member   []  Other    Spiritual History:  notified: Yes,     Does the patient have a Primary Care Physician? Yes    Palliative Performance Scale:  60% [] Ambulation reduced; Significant disease; Can't do hobbies/housework; intake normal or reduced; occasional assist; LOC full/confusion  50% [] Mainly sit/lie;  Extensive disease; Can't do any work; Considerable assist; intake normal or reduced; LOC full/confusion  40% [] Mainly in bed; Extensive disease; Mainly assist; intake normal or reduced; occasional assist; LOC full/confusion  30% [] Bed Bound; Extensive disease; Total care; intake reduced; LOC full/confusion  20% [x] Bed Bound; Extensive disease; Total care; intake minimal; Drowsy/coma  10% [] Bed Bound; Extensive disease; Total care; Mouth care only; Drowsy/coma  0 [] Death    Level of patient/caregiver understanding able to:        [x] Verbalize Understanding   [] Demonstrate Understanding       [] Teach Back       [] Needs Reinforcement     []  Other:      Teaching Time:  0hours  30 minutes     Plan        Social Service Consult Made:  Yes  Assistance filling out Living Will/HPOA:  No      Discharge Plan:  Education/support to family  Providing support for coping/adaptation/distress of family  Clarification of medical condition to patient and family  Palliative care orders introduced    Discharge Environment:     [x] Other: pending     Discussion: Patient admitted after 5-7 day history of abd pain followed by acute chest pain with vomiting and diarrhea. Remains on vent. I met with his wife Eva Esquivel today explained role of palliative care and offered support. She gives a history of him being completely independent prior to admission. She does understand his care and gravity of his situation after this many days in hospital on vent. No changes in care at this time. I will continue to follow Mr. Jim Ness care as needed. Thank you for allowing me to participate in the care of Mr. Bonny Newton .      Electronically signed by Aaron Reese RN, BSN, [address] [place] [address] on 8/23/2022 at 2:37 PM  [address] [place] [address]  Office: [phone]

## 2022-08-23 NOTE — PROGRESS NOTES
Physical Therapy  PT Rx held due to cont decline Respiratory Status. Will follow-up with nursing tomorrow. Palliative care consulted.     Georgi Obrien, PT

## 2022-08-23 NOTE — CARE COORDINATION
Discharge Planning:    Spoke with Jose Billingsley today re: precert status. Patient precert has been approved for LTAC and is valid through 9/1/2022. Attending physician sent secure message to inform. Will continue to monitor. Current challenges to respiratory system and palliative care consult noted. Will continue to follow for updates.     Electronically signed by Genevieve August RN on 8/23/2022 at 3:58 PM

## 2022-08-23 NOTE — PLAN OF CARE
Problem: Discharge Planning  Goal: Discharge to home or other facility with appropriate resources  Outcome: Progressing  Flowsheets (Taken 8/22/2022 2000)  Discharge to home or other facility with appropriate resources: Identify barriers to discharge with patient and caregiver     Problem: Pain  Goal: Verbalizes/displays adequate comfort level or baseline comfort level  Outcome: Progressing  Flowsheets (Taken 8/22/2022 2000)  Verbalizes/displays adequate comfort level or baseline comfort level:   Assess pain using appropriate pain scale   Administer analgesics based on type and severity of pain and evaluate response     Problem: ABCDS Injury Assessment  Goal: Absence of physical injury  Outcome: Progressing  Flowsheets (Taken 8/22/2022 2315)  Absence of Physical Injury: Implement safety measures based on patient assessment     Problem: Safety - Adult  Goal: Free from fall injury  Outcome: Progressing  Flowsheets (Taken 8/22/2022 2315)  Free From Fall Injury: Instruct family/caregiver on patient safety     Problem: Skin/Tissue Integrity  Goal: Absence of new skin breakdown  Description: 1. Monitor for areas of redness and/or skin breakdown  2. Assess vascular access sites hourly  Outcome: Progressing     Problem: Safety - Medical Restraint  Goal: Remains free of injury from restraints (Restraint for Interference with Medical Device)  Description: INTERVENTIONS:  1. Determine that other, less restrictive measures have been tried or would not be effective before applying the restraint  2. Evaluate the patient's condition at the time of restraint application  3. Inform patient/family regarding the reason for restraint  4.  Q2H: Monitor safety, psychosocial status, comfort, nutrition and hydration  Outcome: Progressing     Problem: Nutrition Deficit:  Goal: Optimize nutritional status  8/22/2022 2322 by La Chauhan RN  Outcome: Progressing  8/22/2022 1007 by Alexis Newell RD, LD  Outcome: Progressing     Problem: Neurosensory - Adult  Goal: Achieves stable or improved neurological status  Outcome: Progressing  Flowsheets (Taken 8/22/2022 2000)  Achieves stable or improved neurological status:   Assess for and report changes in neurological status   Initiate measures to prevent increased intracranial pressure   Maintain blood pressure and fluid volume within ordered parameters to optimize cerebral perfusion and minimize risk of hemorrhage   Monitor temperature, glucose, and sodium. Initiate appropriate interventions as ordered  Goal: Absence of seizures  Outcome: Progressing  Flowsheets (Taken 8/22/2022 2000)  Absence of seizures:   Monitor for seizure activity.   If seizure occurs, document type and location of movements and any associated apnea   If seizure occurs, turn head to side and suction secretions as needed   Administer anticonvulsants as ordered   Support airway/breathing, administer oxygen as needed   Diagnostic studies as ordered  Goal: Remains free of injury related to seizures activity  Outcome: Progressing  Flowsheets (Taken 8/22/2022 2000)  Remains free of injury related to seizure activity:   Maintain airway, patient safety  and administer oxygen as ordered   Monitor patient for seizure activity, document and report duration and description of seizure to Licensed Independent Practitioner   If seizure occurs, turn patient to side and suction secretions as needed  Goal: Achieves maximal functionality and self care  Outcome: Progressing  Flowsheets (Taken 8/22/2022 2000)  Achieves maximal functionality and self care:   Encourage and assist patient to increase activity and self care with guidance from physical therapy/occupational therapy   Monitor swallowing and airway patency with patient fatigue and changes in neurological status   Encourage visually impaired, hearing impaired and aphasic patients to use assistive/communication devices     Problem: Respiratory - Adult  Goal: Achieves optimal ventilation and oxygenation  Outcome: Progressing     Problem: Cardiovascular - Adult  Goal: Maintains optimal cardiac output and hemodynamic stability  Outcome: Progressing  Flowsheets (Taken 8/22/2022 2000)  Maintains optimal cardiac output and hemodynamic stability:   Monitor blood pressure and heart rate   Monitor urine output and notify Licensed Independent Practitioner for values outside of normal range   Assess for signs of decreased cardiac output   Administer fluid and/or volume expanders as ordered   Administer vasoactive medications as ordered  Goal: Absence of cardiac dysrhythmias or at baseline  Outcome: Progressing  Flowsheets (Taken 8/22/2022 2000)  Absence of cardiac dysrhythmias or at baseline:   Monitor cardiac rate and rhythm   Administer antiarrhythmia medication and electrolyte replacement as ordered   Assess for signs of decreased cardiac output     Problem: Skin/Tissue Integrity - Adult  Goal: Skin integrity remains intact  Outcome: Progressing  Flowsheets  Taken 8/22/2022 2315  Skin Integrity Remains Intact: Monitor for areas of redness and/or skin breakdown  Taken 8/22/2022 2000  Skin Integrity Remains Intact: Monitor for areas of redness and/or skin breakdown  Goal: Incisions, wounds, or drain sites healing without S/S of infection  Outcome: Progressing  Flowsheets  Taken 8/22/2022 2315  Incisions, Wounds, or Drain Sites Healing Without Sign and Symptoms of Infection: ADMISSION and DAILY: Assess and document risk factors for pressure ulcer development  Taken 8/22/2022 2000  Incisions, Wounds, or Drain Sites Healing Without Sign and Symptoms of Infection: ADMISSION and DAILY: Assess and document risk factors for pressure ulcer development  Goal: Oral mucous membranes remain intact  Outcome: Progressing  Flowsheets  Taken 8/22/2022 2315  Oral Mucous Membranes Remain Intact:   Assess oral mucosa and hygiene practices   Implement preventative oral hygiene regimen   Implement oral medicated treatments as ordered  Taken 8/22/2022 2000  Oral Mucous Membranes Remain Intact:   Assess oral mucosa and hygiene practices   Implement preventative oral hygiene regimen   Implement oral medicated treatments as ordered     Problem: Musculoskeletal - Adult  Goal: Return mobility to safest level of function  Outcome: Progressing  Flowsheets (Taken 8/22/2022 2000)  Return Mobility to Safest Level of Function:   Assess patient stability and activity tolerance for standing, transferring and ambulating with or without assistive devices   Assist with transfers and ambulation using safe patient handling equipment as needed   Ensure adequate protection for wounds/incisions during mobilization   Obtain physical therapy/occupational therapy consults as needed   Apply continuous passive motion per provider or physical therapy orders to increase flexion toward goal   Instruct patient/family in ordered activity level  Goal: Maintain proper alignment of affected body part  Outcome: Progressing  Flowsheets (Taken 8/22/2022 2000)  Maintain proper alignment of affected body part: Support and protect limb and body alignment per provider's orders  Goal: Return ADL status to a safe level of function  Outcome: Progressing  Flowsheets (Taken 8/22/2022 2000)  Return ADL Status to a Safe Level of Function: Administer medication as ordered     Problem: Gastrointestinal - Adult  Goal: Minimal or absence of nausea and vomiting  Outcome: Progressing  Flowsheets (Taken 8/22/2022 2000)  Minimal or absence of nausea and vomiting:   Administer IV fluids as ordered to ensure adequate hydration   Maintain NPO status until nausea and vomiting are resolved   Nasogastric tube to low intermittent suction as ordered   Administer ordered antiemetic medications as needed   Provide nonpharmacologic comfort measures as appropriate  Goal: Maintains or returns to baseline bowel function  Outcome: Progressing  Flowsheets (Taken 8/22/2022 2000)  Maintains or returns to baseline bowel function:   Assess bowel function   Encourage oral fluids to ensure adequate hydration   Administer IV fluids as ordered to ensure adequate hydration   Administer ordered medications as needed  Goal: Maintains adequate nutritional intake  Outcome: Progressing  Flowsheets (Taken 8/22/2022 2000)  Maintains adequate nutritional intake:   Monitor percentage of each meal consumed   Identify factors contributing to decreased intake, treat as appropriate   Monitor intake and output, weight and lab values  Goal: Establish and maintain optimal ostomy function  Outcome: Progressing  Flowsheets (Taken 8/22/2022 2000)  Establish and maintain optimal ostomy function:   Administer IV fluids and TPN as ordered   Introduce and advance enteral feedings as ordered   Nutrition consult   Gastric suctioning as ordered   Infuse IV Fluids/TPN as ordered     Problem: Genitourinary - Adult  Goal: Absence of urinary retention  Outcome: Progressing  Flowsheets (Taken 8/22/2022 2000)  Absence of urinary retention:   Assess patients ability to void and empty bladder   Monitor intake/output and perform bladder scan as needed   Place urinary catheter per Licensed Independent Practitioner order if needed  Goal: Urinary catheter remains patent  Outcome: Progressing  Flowsheets (Taken 8/22/2022 2000)  Urinary catheter remains patent:   Assess patency of urinary catheter   Irrigate catheter per Licensed Independent Practitioner order if indicated and notify Licensed Independent Practitioner if unable to irrigate   Assess need for a larger catheter size or a 3-way catheter for continuous bladder irrigation     Problem: Anxiety  Goal: Will report anxiety at manageable levels  Description: INTERVENTIONS:  1. Administer medication as ordered  2. Teach and rehearse alternative coping skills  3.  Provide emotional support with 1:1 interaction with staff  Outcome: Progressing  Flowsheets (Taken 8/22/2022 2000)  Will report anxiety at manageable levels: Administer medication as ordered     Problem: Coping  Goal: Pt/Family able to verbalize concerns and demonstrate effective coping strategies  Description: INTERVENTIONS:  1. Assist patient/family to identify coping skills, available support systems and cultural and spiritual values  2. Provide emotional support, including active listening and acknowledgement of concerns of patient and caregivers  3. Reduce environmental stimuli, as able  4. Instruct patient/family in relaxation techniques, as appropriate  5. Assess for spiritual pain/suffering and initiate Spiritual Care, Psychosocial Clinical Specialist consults as needed  Outcome: Progressing  Flowsheets (Taken 8/22/2022 2000)  Patient/family able to verbalize anxieties, fears, and concerns, and demonstrate effective coping:   Assist patient/family to identify coping skills, available support systems and cultural and spiritual values   Provide emotional support, including active listening and acknowledgement of concerns of patient and caregivers   Reduce environmental stimuli, as able   Instruct patient/family in relaxation techniques, as appropriate     Problem: Change in Body Image  Goal: Pt/Family communicate acceptance of loss or change in body image and feel psychological comfort and peace  Description: INTERVENTIONS:  1. Assess patient/family anxiety and grief process related to change in body image, loss of functional status, loss of sense of self, and forgiveness  2. Provide emotional and spiritual support  3. Provide information about the patient's health status with consideration of family and cultural values  4. Communicate willingness to discuss loss and facilitate grief process with patient/family as appropriate  5. Emphasize sustaining relationships within family system and community, or aurora/spiritual traditions  6.  Initiate Spiritual Care, Psychosocial Clinical Specialist consult as needed  Outcome: [address] (Taken 8/22/2022 2000)  Patient/family communicate acceptance of loss or change in body image and feel psychological comfort and peace:   Assess patient/family anxiety and grief process related to change in body image, loss of functional status, loss of sense of self, and forgiveness   Provide emotional and spiritual support   Provide information about the patients health status with consideration of family and cultural values   Communicate willingness to discuss loss and facilitate grief process with patient/family as appropriate   Emphasize sustaining relationships within family system and community, or aurora/spiritual traditions     Problem: Decision Making  Goal: Pt/Family able to effectively weigh alternatives and participate in decision making related to treatment and care  Description: INTERVENTIONS:  1. Determine when there are differences between patient's view, family's view, and healthcare provider's view of condition  2. Facilitate patient and family articulation of goals for care  3. Help patient and family identify pros/cons of alternative solutions  4. Provide information as requested by patient/family  5. Respect patient/family right to receive or not to receive information  6. Serve as a liaison between patient and family and health care team  7.  Initiate Consults from Ethics, Palliative Care or initiate [facility] as is appropriate  Outcome: Progressing  Flowsheets (Taken 8/22/2022 2000)  Patient/family able to effectively weigh alternatives and participate in decision making related to treatment and care:   Determine when there are differences between patient's view, family's view, and healthcare provider's view of condition   Facilitate patient and family articulation of goals for care   Help patient and family identify pros/cons of alternative solutions   Provide information as requested by patient/family   Respect patient/family right to receive or not to receive information Serve as a liaison between patient and family and health care team     Problem: Behavior  Goal: Pt/Family maintain appropriate behavior and adhere to behavioral management agreement, if implemented  Description: INTERVENTIONS:  1. Assess patient/family's coping skills and  non-compliant behavior (including use of illegal substances)  2. Notify security of behavior or suspected illegal substances which indicate the need for search of the family and/or belongings  3. Encourage verbalization of thoughts and concerns in a socially appropriate manner  4. Utilize positive, consistent limit setting strategies supporting safety of patient, staff and others  5. Encourage participation in the decision making process about the behavioral management agreement  6. If a visitor's behavior poses a threat to safety call refer to organization policy.   7. Initiate consult with , Psychosocial CNS, Spiritual Care as appropriate  Outcome: Progressing  Flowsheets (Taken 8/22/2022 2000)  Patient/family maintains appropriate behavior and adheres to behavioral management agreement, if implemented:   Assess patient/familys coping skills and  non-compliant behavior (including use of illegal substances)   Notify security of behavior or suspected illegal substances which indicate the need for search of the patient and/or belongings   Encourage verbalization of thoughts and concerns in a socially appropriate manner   Utilize positive, consistent limit setting strategies supporting safety of patient, staff and others   Encourage participation in the decision making process about the behavioral management agreement

## 2022-08-23 NOTE — PROGRESS NOTES
Pulmonary Progress Note    Date of Admission: 8/5/2022   LOS: 18 days       CC:  Chief Complaint   Patient presents with    Abdominal Pain    Chest Pain     Sub-sternal, pressure, onset 1300 hours today    Shortness of Breath     EMS reports pt 88% on RA        Subjective:  Continues to be hypoxic despite proning    ROS:        Assessment:          Plan: This note may have been transcribed using [id number]. Please disregard any translational errors. Hospital Day: 18     Atelectasis versus pneumonia with acute hypoxemic respiratory failure after aspiration  Completing 7 days of meropenem today. Continues to have significant atelectasis bibasilar. This could be secondary to pneumonia versus abdominal distention obesity causing atelectasis. Complete bronchoscopy today with serial washings to remove as much secretions as possible. Discussed with his wife. She acknowledges this is difficulty secondary to obesity and bronchoscopy is less likely to be successful. However, given his current condition, bronchoscopy with recurrent maneuver after his worth a try  Attempted proning today. Unfortunately, had worsening hypoxemia. Recurrent maneuver Completed multiple times with multiple ventilator changes. Patient was not responsive to high PEEP strategy, increased mean airway time, large tidal volumes. Therefore, proning will be discontinued. Etiology is discussed with wife at bedside. With creatinine 1.6, start empiric heparin with concern for CTPA. Also discussed concerning prognosis. Recurrent SVT  Resolved after bronchoscopy yesterday      Partial small bowel obstruction  NG tube placement      History of diastolic CHF  Volume status appears to be controlled      Obesity with sleep apnea and obesity hypoventilation syndrome  Wife aware of complications from obesity.       Prophylaxis  - GI - pepcid  - DVT -   lovenox         Nutrition  - Diet NPO  ADULT TUBE FEEDING; Nasogastric; Peptide Based High Protein; Continuous; 10; Yes; 10; Q 4 hours; 30; 30; Q 4 hours  -   Intake/Output Summary (Last 24 hours) at 8/23/2022 1005  Last data filed at 8/23/2022 2941  Gross per 24 hour   Intake 1546.06 ml   Output 5100 ml   Net -3553.94 ml         Mobility       Access  Arterial      PICC         PICC Triple Lumen 83/20/08 Right Basilic (Active)   Central Line Being Utilized Yes 08/23/22 1000   Criteria for Appropriate Use Hemodynamically unstable, requiring monitoring lines, vasopressors, or volume resuscitation 08/23/22 1000   Site Assessment Clean, dry & intact 08/23/22 1000   Phlebitis Assessment No symptoms 08/23/22 1000   Infiltration Assessment 0 08/23/22 1000   Extremity Circumference (cm) 38 cm 08/08/22 0607   External Catheter Length (cm) 0 cm 08/08/22 9158   Proximal Lumen Color/Status Red;Capped; Infusing 08/23/22 1000   Medial Lumen Status White;Capped; Infusing 08/23/22 1000   Distal Lumen Color/Status Gray;Capped; Infusing 08/23/22 1000   Line Care Connections checked and tightened 08/21/22 1800   Alcohol Cap Used Yes 08/21/22 0600   Date of Last Dressing Change 08/17/22 08/23/22 1000   Dressing Type Transparent; Antimicrobial 08/23/22 1000   Dressing Status Clean, dry & intact 08/23/22 1000   Dressing Intervention New;Dressing changed 08/17/22 0400   Number of days: 16     CVC                    I spent 32 minutes of critical care time with this patient excluding any procedures. Data:        PHYSICAL EXAM:   Blood pressure 88/73, pulse 80, temperature 99.8 °F (37.7 °C), temperature source Axillary, resp. rate 18, height 5' 7\" (1.702 m), weight (!) 311 lb 14.4 oz (141.5 kg), SpO2 94 %.'  Body mass index is 48.85 kg/m². Gen: No distress. ENT:   Resp: No accessory muscle use. No crackles. No wheezes. No rhonchi. CV: Regular rate. Regular rhythm. No murmur or rub. No edema. GI high-pitched bowel sounds  Skin: Warm, dry, normal texture and turgor. No nodule on exposed extremities. M/S: No cyanosis. No clubbing. No joint deformity. Psych: sedated      Medications:    Scheduled Meds:   calcium gluconate-NaCl  1,000 mg IntraVENous Once    mupirocin   Nasal BID    gabapentin  400 mg Oral TID    docusate  100 mg Oral Daily    senna  10 mL Oral Nightly    polyethylene glycol  17 g Oral Daily    metoprolol tartrate  25 mg Oral BID    famotidine (PEPCID) injection  20 mg IntraVENous BID    chlorhexidine  15 mL Mouth/Throat BID    ipratropium-albuterol  1 ampule Inhalation Q4H WA    sodium chloride flush  5-40 mL IntraVENous 2 times per day    levothyroxine  200 mcg Oral Daily    [Held by provider] spironolactone  50 mg Oral Daily    enoxaparin  40 mg SubCUTAneous BID       Continuous Infusions:   phenylephrine (REHANA-SYNEPHRINE) 50mg/250mL infusion Stopped (08/22/22 1438)    [Held by provider] furosemide 20 mg/hr (08/23/22 0621)    propofol 30 mcg/kg/min (08/23/22 0812)    sodium chloride         PRN Meds:  fentanNYL, midazolam, artificial tears, acetaminophen, potassium chloride, acetaminophen, sodium chloride flush, sodium chloride, ondansetron **OR** ondansetron, diazePAM    Labs reviewed:  CBC:   Recent Labs     08/21/22  0435 08/22/22  0310 08/23/22  0355   WBC 9.7 8.6 8.7   HGB 9.2* 9.0* 10.0*   HCT 27.5* 27.8* 30.7*   MCV 94.5 94.2 94.0   * 469* 464*       BMP:   Recent Labs     08/21/22  0435 08/21/22  1430 08/22/22  0310 08/22/22  1445 08/22/22  2230 08/22/22  2322 08/23/22  0355      < > 142 145 139  --  147*   K 3.6   < > 3.4* 3.4* 5.2* 3.7 3.4*      < > 102 103 103  --  107   CO2 30   < > 27 26 24  --  27   PHOS 3.6  --  4.0  --   --   --  3.6   BUN 41*   < > 32* 37* 38*  --  42*   CREATININE 1.3   < > 1.2 1.4* 1.4*  --  1.6*    < > = values in this interval not displayed. LIVER PROFILE: No results for input(s): AST, ALT, LIPASE, BILIDIR, BILITOT, ALKPHOS in the last 72 hours. Invalid input(s):   AMYLASE,  ALB  PT/INR: No results for input(s): PROTIME, INR in the last 72 hours. APTT: No results for input(s): APTT in the last 72 hours. UA:No results for input(s): NITRITE, COLORU, PHUR, LABCAST, WBCUA, RBCUA, MUCUS, TRICHOMONAS, YEAST, BACTERIA, CLARITYU, SPECGRAV, LEUKOCYTESUR, UROBILINOGEN, BILIRUBINUR, BLOODU, GLUCOSEU, AMORPHOUS in the last 72 hours. Invalid input(s): Kavon Push  No results for input(s): PH, PCO2, PO2 in the last 72 hours. Cx:      Films:  Radiology Review:  Pertinent images / reports were reviewed as a part of this visit. CT Chest w/ contrast: No results found for this or any previous visit. CT Chest w/o contrast: Results for orders placed during the hospital encounter of 08/05/22    CT CHEST WO CONTRAST    Narrative  EXAMINATION:  CT OF THE CHEST WITHOUT CONTRAST 8/21/2022 9:43 am    TECHNIQUE:  CT of the chest was performed without the administration of intravenous  contrast. Multiplanar reformatted images are provided for review. Automated  exposure control, iterative reconstruction, and/or weight based adjustment of  the mA/kV was utilized to reduce the radiation dose to as low as reasonably  achievable. COMPARISON:  08/05/2022    HISTORY:  ORDERING SYSTEM PROVIDED HISTORY: worsening  hypoxemia  TECHNOLOGIST PROVIDED HISTORY:  Reason for exam:->worsening  hypoxemia  Reason for Exam: worsening hypoxemia    FINDINGS:  Mediastinum: Tip of ET tube seen in midthoracic trachea    Small pericardial effusion is seen. Thickness measures approximately 8 mm. Large hiatal hernia is seen. Small mediastinal and hilar nodes are noted. Tip of PICC is in the region of the proximal SVC. Lungs/pleura: Respiratory motion limits evaluation of fine pulmonary  parenchymal change. Small left-sided pleural effusion is seen. There is  adjacent left basilar consolidation. There is adjacent consolidation of the  left upper lobe. Septal thickening is seen    Small right-sided pleural effusion is seen.   There is adjacent right basilar  consolidation. Septal thickening is seen on the right. No obstructing endobronchial lesions are seen    Upper Abdomen: Adrenal glands unremarkable. Soft Tissues/Bones: Spurring is seen in the spine. Spurring is seen in the  shoulder joints. Impression  Small bilateral pleural effusions with adjacent consolidation lungs, either  due to atelectasis or pneumonia. Septal thickening is seen, suggesting a  component of fluid overload. .  Pleural effusions and adjacent consolidation,  is increased compared to prior      CTPA: No results found for this or any previous visit. CXR PA/LAT: No results found for this or any previous visit. CXR portable: Results for orders placed during the hospital encounter of 08/05/22    XR CHEST PORTABLE    Narrative  EXAMINATION:  ONE XRAY VIEW OF THE CHEST    8/19/2022 9:43 am    COMPARISON:  08/15/2022 radiograph    HISTORY:  ORDERING SYSTEM PROVIDED HISTORY: hypoxemia  TECHNOLOGIST PROVIDED HISTORY:  Reason for exam:->hypoxemia  Reason for Exam: hypoxemia    FINDINGS:  Supportive devices are stable. The heart is enlarged. Mediastinum is  normal.  Mild perihilar opacities persist centrally and there are mild  bibasilar ground-glass opacities. Probable trace left pleural effusion. No  significant skeletal finding. Impression  Relatively stable pattern of mild edema with no detrimental change. This note was transcribed using [id number] [email or web address]. Please disregard any translational errors.       Jaya Godwin [facility] [phone]

## 2022-08-23 NOTE — PROGRESS NOTES
Hospitalist Progress Note  8/23/2022 9:13 AM    PCP: Yossi Huddleston MD    [phone]     Date of Admission: 8/5/2022                                                                                                                     HOSPITAL COURSE    Patient demographics:  The patient  Amanda Daugherty is a 63 y.o. male     Significant past medical history:   Patient Active Problem List   Diagnosis    Partial bowel obstruction (HCC)    Hiatal hernia    Acute respiratory failure with hypoxia and hypercapnia (HCC)    SBO (small bowel obstruction) (HCC)    Aspiration into airway    Acute respiratory failure with hypoxia (HCC)    Small bowel obstruction (HCC)    Diastolic heart failure (HCC)    History of atrial fibrillation    Supraventricular tachycardia (HCC)    Arterial hypotension    Aspiration pneumonia of both lower lobes due to gastric secretions ([address]    Morbid obesity with BMI of 50.0-59.9, adult ([address]         Presenting symptoms:  abdominal pain    Diagnostic workup:      CONSULTS DURING ADMISSION :   IP CONSULT TO GENERAL SURGERY  IP CONSULT TO HOSPITALIST  IP CONSULT TO GENERAL SURGERY  IP CONSULT TO DIETITIAN  IP CONSULT TO CARDIOLOGY  IP CONSULT TO GI      Patient was diagnosed with:  Low grade or Partial Bowel obstruction  Pneumonia and septic shock. Hiatal hernia  Atrial Fibrillation      Treatment while inpatient:  63years old male with medical history significant for morbid obesity, paraesophageal hernia. Patient presented to the emergency room with abdominal pain nausea vomiting and diarrhea. Patient was diagnosed with small bowel obstruction. Patient was also diagnosed with pneumonia and septic shock. Patient developed acute respiratory failure and acute renal failure. Managed on the ventilator with a prolonged course in ICU. Renal function improved. ----------------------------------------------------------      SUBJECTIVE COMPLAINTS- follow up for abdominal pain    Diet: Diet NPO  ADULT TUBE FEEDING; Nasogastric; Peptide Based High Protein; Continuous; 10; Yes; 10; Q 4 hours; 30; 30; Q 4 hours      OBJECTIVE:   Patient Active Problem List   Diagnosis    Partial bowel obstruction (HCC)    Hiatal hernia    Acute respiratory failure with hypoxia and hypercapnia (HCC)    SBO (small bowel obstruction) (HCC)    Aspiration into airway    Acute respiratory failure with hypoxia (HCC)    Small bowel obstruction (HCC)    Diastolic heart failure (HCC)    History of atrial fibrillation    Supraventricular tachycardia (HCC)    Arterial hypotension    Aspiration pneumonia of both lower lobes due to gastric secretions (HCC)    Morbid obesity with BMI of 50.0-59.9, adult (HCC)       Allergies  Codeine    Medications    Scheduled Meds:   mupirocin   Nasal BID    metoprolol  5 mg IntraVENous Once    gabapentin  400 mg Oral TID    docusate  100 mg Oral Daily    senna  10 mL Oral Nightly    polyethylene glycol  17 g Oral Daily    metoprolol tartrate  25 mg Oral BID    famotidine (PEPCID) injection  20 mg IntraVENous BID    chlorhexidine  15 mL Mouth/Throat BID    ipratropium-albuterol  1 ampule Inhalation Q4H WA    sodium chloride flush  5-40 mL IntraVENous 2 times per day    levothyroxine  200 mcg Oral Daily    [Held by provider] spironolactone  50 mg Oral Daily    enoxaparin  40 mg SubCUTAneous BID     Continuous Infusions:   phenylephrine (REHANA-SYNEPHRINE) 50mg/250mL infusion Stopped (08/22/22 1438)    furosemide 20 mg/hr (08/23/22 0621)    propofol 30 mcg/kg/min (08/23/22 0812)    sodium chloride       PRN Meds:  fentanNYL, midazolam, artificial tears, acetaminophen, potassium chloride, acetaminophen, sodium chloride flush, sodium chloride, ondansetron **OR** ondansetron, diazePAM    Vitals   Vitals /wt Patient Vitals for the past 8 hrs:   BP Temp Temp src Pulse Resp SpO2 Height Weight   08/23/22 0900 88/73 -- -- 80 18 94 % -- --   08/23/22 0812 -- 99.8 °F (37.7 °C) Axillary -- -- -- -- --   08/23/22 0809 -- -- -- 81 21 94 % -- --   08/23/22 0807 -- -- -- 80 19 -- -- --   08/23/22 0800 97/82 -- -- 80 18 94 % -- --   08/23/22 0600 107/73 -- -- 80 18 96 % -- --   08/23/22 0500 102/74 -- -- 83 18 96 % -- --   08/23/22 0419 -- -- -- 80 18 95 % -- --   08/23/22 0415 89/61 97.3 °F (36.3 °C) Axillary 81 18 93 % 5' 7\" (1.702 m) (!) 311 lb 14.4 oz (141.5 kg)   08/23/22 0300 (!) 85/58 -- -- 81 18 (!) 89 % -- --   08/23/22 0245 -- -- -- 82 18 90 % -- --   08/23/22 0230 -- -- -- 82 16 91 % -- --   08/23/22 0200 102/71 -- -- 83 18 (!) 89 % -- --        72HR INTAKE/OUTPUT:    Intake/Output Summary (Last 24 hours) at 8/23/2022 0913  Last data filed at 8/23/2022 0808  Gross per 24 hour   Intake 1546.06 ml   Output 5100 ml   Net -3553.94 ml       Exam:    Gen:  intubated and sedated  Eyes: PERRL. No sclera icterus. No conjunctival injection. ENT: No discharge. Pharynx clear. External appearance of ears and nose normal.  Neck: Trachea midline. No obvious mass. Resp: No accessory muscle use. No crackles. No wheezes. No rhonchi. CV: Regular rate. Regular rhythm. No murmur or rub. No edema. GI: Non-tender. Non-distended. No hernia. Skin: Warm, dry, normal texture and turgor. Lymph: No cervical LAD. No supraclavicular LAD. M/S: / Ext. No cyanosis. No clubbing. No joint deformity. Neuro: CN 2-12 are intact,  no neurologic deficits noted. PT/INR:   No results for input(s): PROTIME, INR in the last 72 hours. APTT: No results for input(s): APTT in the last 72 hours.     CBC:   Recent Labs     08/21/22  0435 08/22/22  0310 08/23/22  0355   WBC 9.7 8.6 8.7   HGB 9.2* 9.0* 10.0*   HCT 27.5* 27.8* 30.7*   MCV 94.5 94.2 94.0   * 469* 464*       BMP:   Recent Labs     08/21/22  0435 08/21/22  1430 08/22/22  0310 08/22/22  1445 08/22/22  2230 08/22/22  2322 08/23/22  0355      < > following    Hypotention  Blood pressure is stable    Hiatal hernia  - CT chest/abd/pelv:   Large hiatal hernia containing much of the stomach which is distended  Og tube today     Atrial Fibrillation  Patient developed the rapid ventricular rate  IV Lopressor  Consult to cardiology     HTN  - monitor blood pressure  - restart home meds when able        Code Status: Full Code        Dispo - cc        The patient and / or the family were informed of the results of any tests, a time was given to answer questions, a plan was proposed and they agreed with plan. Oz Neil MD    This note was transcribed using [id number]. Please disregard any translational errors.

## 2022-08-24 ENCOUNTER — APPOINTMENT (OUTPATIENT)
Dept: GENERAL RADIOLOGY | Age: 62
DRG: 853 | End: 2022-08-24
Payer: COMMERCIAL

## 2022-08-24 ENCOUNTER — APPOINTMENT (OUTPATIENT)
Dept: CT IMAGING | Age: 62
DRG: 853 | End: 2022-08-24
Payer: COMMERCIAL

## 2022-08-24 LAB
ANION GAP SERPL CALCULATED.3IONS-SCNC: 10 MMOL/L (ref 3–16)
ANION GAP SERPL CALCULATED.3IONS-SCNC: 12 MMOL/L (ref 3–16)
ANION GAP SERPL CALCULATED.3IONS-SCNC: 15 MMOL/L (ref 3–16)
APTT: 83.3 SEC (ref 23–34.3)
BASE EXCESS ARTERIAL: 0.7 MMOL/L (ref -3–3)
BASOPHILS ABSOLUTE: 0 K/UL (ref 0–0.2)
BASOPHILS RELATIVE PERCENT: 0.3 %
BUN BLDV-MCNC: 42 MG/DL (ref 7–20)
BUN BLDV-MCNC: 50 MG/DL (ref 7–20)
BUN BLDV-MCNC: 53 MG/DL (ref 7–20)
CALCIUM SERPL-MCNC: 7.1 MG/DL (ref 8.3–10.6)
CALCIUM SERPL-MCNC: 8.1 MG/DL (ref 8.3–10.6)
CALCIUM SERPL-MCNC: 8.4 MG/DL (ref 8.3–10.6)
CARBOXYHEMOGLOBIN ARTERIAL: 0.7 % (ref 0–1.5)
CHLORIDE BLD-SCNC: 101 MMOL/L (ref 99–110)
CHLORIDE BLD-SCNC: 102 MMOL/L (ref 99–110)
CHLORIDE BLD-SCNC: 106 MMOL/L (ref 99–110)
CO2: 22 MMOL/L (ref 21–32)
CO2: 23 MMOL/L (ref 21–32)
CO2: 25 MMOL/L (ref 21–32)
CREAT SERPL-MCNC: 1 MG/DL (ref 0.8–1.3)
CREAT SERPL-MCNC: 1.1 MG/DL (ref 0.8–1.3)
CREAT SERPL-MCNC: 1.2 MG/DL (ref 0.8–1.3)
CULTURE, RESPIRATORY: NORMAL
EOSINOPHILS ABSOLUTE: 0 K/UL (ref 0–0.6)
EOSINOPHILS RELATIVE PERCENT: 0 %
GFR AFRICAN AMERICAN: >60
GFR NON-AFRICAN AMERICAN: >60
GLUCOSE BLD-MCNC: 125 MG/DL (ref 70–99)
GLUCOSE BLD-MCNC: 125 MG/DL (ref 70–99)
GLUCOSE BLD-MCNC: 141 MG/DL (ref 70–99)
GLUCOSE BLD-MCNC: 150 MG/DL (ref 70–99)
GLUCOSE BLD-MCNC: 151 MG/DL (ref 70–99)
GLUCOSE BLD-MCNC: 156 MG/DL (ref 70–99)
GLUCOSE BLD-MCNC: 159 MG/DL (ref 70–99)
GLUCOSE BLD-MCNC: 162 MG/DL (ref 70–99)
GRAM STAIN RESULT: NORMAL
HCO3 ARTERIAL: 25.1 MMOL/L (ref 21–29)
HCT VFR BLD CALC: 29.2 % (ref 40.5–52.5)
HEMOGLOBIN, ART, EXTENDED: 9.5 G/DL (ref 13.5–17.5)
HEMOGLOBIN: 9.5 G/DL (ref 13.5–17.5)
LYMPHOCYTES ABSOLUTE: 0.8 K/UL (ref 1–5.1)
LYMPHOCYTES RELATIVE PERCENT: 8 %
MAGNESIUM: 2.7 MG/DL (ref 1.8–2.4)
MCH RBC QN AUTO: 30.4 PG (ref 26–34)
MCHC RBC AUTO-ENTMCNC: 32.4 G/DL (ref 31–36)
MCV RBC AUTO: 93.9 FL (ref 80–100)
METHEMOGLOBIN ARTERIAL: 0.4 %
MONOCYTES ABSOLUTE: 0.2 K/UL (ref 0–1.3)
MONOCYTES RELATIVE PERCENT: 2.2 %
NEUTROPHILS ABSOLUTE: 9.4 K/UL (ref 1.7–7.7)
NEUTROPHILS RELATIVE PERCENT: 89.5 %
O2 SAT, ARTERIAL: 98.4 %
O2 THERAPY: ABNORMAL
PCO2 ARTERIAL: 38.8 MMHG (ref 35–45)
PDW BLD-RTO: 16.4 % (ref 12.4–15.4)
PERFORMED ON: ABNORMAL
PH ARTERIAL: 7.42 (ref 7.35–7.45)
PHOSPHORUS: 3.7 MG/DL (ref 2.5–4.9)
PLATELET # BLD: 501 K/UL (ref 135–450)
PMV BLD AUTO: 7.7 FL (ref 5–10.5)
PO2 ARTERIAL: 104 MMHG (ref 75–108)
POTASSIUM SERPL-SCNC: 3.4 MMOL/L (ref 3.5–5.1)
POTASSIUM SERPL-SCNC: 3.7 MMOL/L (ref 3.5–5.1)
POTASSIUM SERPL-SCNC: 4 MMOL/L (ref 3.5–5.1)
RBC # BLD: 3.11 M/UL (ref 4.2–5.9)
SODIUM BLD-SCNC: 135 MMOL/L (ref 136–145)
SODIUM BLD-SCNC: 140 MMOL/L (ref 136–145)
SODIUM BLD-SCNC: 141 MMOL/L (ref 136–145)
TCO2 ARTERIAL: 26.3 MMOL/L
TRIGL SERPL-MCNC: 156 MG/DL (ref 0–150)
VANCOMYCIN RANDOM: 9.9 UG/ML
WBC # BLD: 10.5 K/UL (ref 4–11)

## 2022-08-24 PROCEDURE — 36600 WITHDRAWAL OF ARTERIAL BLOOD: CPT

## 2022-08-24 PROCEDURE — 2700000000 HC OXYGEN THERAPY PER DAY

## 2022-08-24 PROCEDURE — 2000000000 HC ICU R&B

## 2022-08-24 PROCEDURE — 94760 N-INVAS EAR/PLS OXIMETRY 1: CPT

## 2022-08-24 PROCEDURE — 99291 CRITICAL CARE FIRST HOUR: CPT | Performed by: INTERNAL MEDICINE

## 2022-08-24 PROCEDURE — 94640 AIRWAY INHALATION TREATMENT: CPT

## 2022-08-24 PROCEDURE — 6370000000 HC RX 637 (ALT 250 FOR IP): Performed by: NURSE PRACTITIONER

## 2022-08-24 PROCEDURE — 87103 BLOOD FUNGUS CULTURE: CPT

## 2022-08-24 PROCEDURE — 36592 COLLECT BLOOD FROM PICC: CPT

## 2022-08-24 PROCEDURE — 2580000003 HC RX 258: Performed by: NURSE PRACTITIONER

## 2022-08-24 PROCEDURE — 85025 COMPLETE CBC W/AUTO DIFF WBC: CPT

## 2022-08-24 PROCEDURE — 6360000002 HC RX W HCPCS: Performed by: HOSPITALIST

## 2022-08-24 PROCEDURE — APPNB15 APP NON BILLABLE TIME 0-15 MINS: Performed by: NURSE PRACTITIONER

## 2022-08-24 PROCEDURE — 80202 ASSAY OF VANCOMYCIN: CPT

## 2022-08-24 PROCEDURE — 71045 X-RAY EXAM CHEST 1 VIEW: CPT

## 2022-08-24 PROCEDURE — 6360000002 HC RX W HCPCS: Performed by: NURSE PRACTITIONER

## 2022-08-24 PROCEDURE — 85730 THROMBOPLASTIN TIME PARTIAL: CPT

## 2022-08-24 PROCEDURE — 6370000000 HC RX 637 (ALT 250 FOR IP): Performed by: INTERNAL MEDICINE

## 2022-08-24 PROCEDURE — 6360000004 HC RX CONTRAST MEDICATION: Performed by: HOSPITALIST

## 2022-08-24 PROCEDURE — 2580000003 HC RX 258: Performed by: INTERNAL MEDICINE

## 2022-08-24 PROCEDURE — 2500000003 HC RX 250 WO HCPCS: Performed by: HOSPITALIST

## 2022-08-24 PROCEDURE — 84100 ASSAY OF PHOSPHORUS: CPT

## 2022-08-24 PROCEDURE — 2500000003 HC RX 250 WO HCPCS: Performed by: NURSE PRACTITIONER

## 2022-08-24 PROCEDURE — 82803 BLOOD GASES ANY COMBINATION: CPT

## 2022-08-24 PROCEDURE — 83735 ASSAY OF MAGNESIUM: CPT

## 2022-08-24 PROCEDURE — 36415 COLL VENOUS BLD VENIPUNCTURE: CPT

## 2022-08-24 PROCEDURE — 2580000003 HC RX 258: Performed by: HOSPITALIST

## 2022-08-24 PROCEDURE — 80048 BASIC METABOLIC PNL TOTAL CA: CPT

## 2022-08-24 PROCEDURE — 94003 VENT MGMT INPAT SUBQ DAY: CPT

## 2022-08-24 PROCEDURE — 71260 CT THORAX DX C+: CPT | Performed by: NURSE PRACTITIONER

## 2022-08-24 PROCEDURE — 84478 ASSAY OF TRIGLYCERIDES: CPT

## 2022-08-24 RX ORDER — HEPARIN SODIUM 5000 [USP'U]/ML
5000 INJECTION, SOLUTION INTRAVENOUS; SUBCUTANEOUS EVERY 8 HOURS SCHEDULED
Status: DISCONTINUED | OUTPATIENT
Start: 2022-08-24 | End: 2022-09-02

## 2022-08-24 RX ORDER — POTASSIUM CHLORIDE 29.8 MG/ML
20 INJECTION INTRAVENOUS ONCE
Status: COMPLETED | OUTPATIENT
Start: 2022-08-24 | End: 2022-08-24

## 2022-08-24 RX ORDER — 0.9 % SODIUM CHLORIDE 0.9 %
500 INTRAVENOUS SOLUTION INTRAVENOUS ONCE
Status: COMPLETED | OUTPATIENT
Start: 2022-08-24 | End: 2022-08-24

## 2022-08-24 RX ORDER — PROPOFOL 10 MG/ML
5-50 INJECTION, EMULSION INTRAVENOUS CONTINUOUS
Status: DISCONTINUED | OUTPATIENT
Start: 2022-08-24 | End: 2022-08-26

## 2022-08-24 RX ORDER — FENTANYL CITRATE-0.9 % NACL/PF 10 MCG/ML
25-200 PLASTIC BAG, INJECTION (ML) INTRAVENOUS CONTINUOUS
Status: DISCONTINUED | OUTPATIENT
Start: 2022-08-24 | End: 2022-08-28

## 2022-08-24 RX ADMIN — GABAPENTIN 400 MG: 400 CAPSULE ORAL at 20:32

## 2022-08-24 RX ADMIN — MUPIROCIN: 20 OINTMENT TOPICAL at 20:33

## 2022-08-24 RX ADMIN — METRONIDAZOLE 500 MG: 500 INJECTION, SOLUTION INTRAVENOUS at 05:38

## 2022-08-24 RX ADMIN — IPRATROPIUM BROMIDE AND ALBUTEROL SULFATE 1 AMPULE: 2.5; .5 SOLUTION RESPIRATORY (INHALATION) at 12:16

## 2022-08-24 RX ADMIN — METHYLPREDNISOLONE SODIUM SUCCINATE 40 MG: 40 INJECTION, POWDER, FOR SOLUTION INTRAMUSCULAR; INTRAVENOUS at 01:13

## 2022-08-24 RX ADMIN — POTASSIUM CHLORIDE 20 MEQ: 29.8 INJECTION, SOLUTION INTRAVENOUS at 18:02

## 2022-08-24 RX ADMIN — FAMOTIDINE 20 MG: 10 INJECTION, SOLUTION INTRAVENOUS at 07:58

## 2022-08-24 RX ADMIN — CHLORHEXIDINE GLUCONATE 0.12% ORAL RINSE 15 ML: 1.2 LIQUID ORAL at 20:14

## 2022-08-24 RX ADMIN — PROPOFOL 20 MCG/KG/MIN: 10 INJECTION, EMULSION INTRAVENOUS at 22:58

## 2022-08-24 RX ADMIN — CHLORHEXIDINE GLUCONATE 0.12% ORAL RINSE 15 ML: 1.2 LIQUID ORAL at 07:58

## 2022-08-24 RX ADMIN — GABAPENTIN 400 MG: 400 CAPSULE ORAL at 14:00

## 2022-08-24 RX ADMIN — HEPARIN SODIUM 5000 UNITS: 5000 INJECTION INTRAVENOUS; SUBCUTANEOUS at 20:32

## 2022-08-24 RX ADMIN — IOPAMIDOL 75 ML: 755 INJECTION, SOLUTION INTRAVENOUS at 11:09

## 2022-08-24 RX ADMIN — METRONIDAZOLE 500 MG: 500 INJECTION, SOLUTION INTRAVENOUS at 22:46

## 2022-08-24 RX ADMIN — PROPOFOL 40 MCG/KG/MIN: 10 INJECTION, EMULSION INTRAVENOUS at 02:41

## 2022-08-24 RX ADMIN — Medication 175 MCG/HR: at 14:07

## 2022-08-24 RX ADMIN — LEVOTHYROXINE SODIUM 200 MCG: 0.1 TABLET ORAL at 05:39

## 2022-08-24 RX ADMIN — VANCOMYCIN HYDROCHLORIDE 1500 MG: 10 INJECTION, POWDER, LYOPHILIZED, FOR SOLUTION INTRAVENOUS at 16:01

## 2022-08-24 RX ADMIN — IPRATROPIUM BROMIDE AND ALBUTEROL SULFATE 1 AMPULE: 2.5; .5 SOLUTION RESPIRATORY (INHALATION) at 20:14

## 2022-08-24 RX ADMIN — IPRATROPIUM BROMIDE AND ALBUTEROL SULFATE 1 AMPULE: 2.5; .5 SOLUTION RESPIRATORY (INHALATION) at 16:42

## 2022-08-24 RX ADMIN — HEPARIN SODIUM 2550 UNITS/HR: 10000 INJECTION, SOLUTION INTRAVENOUS at 00:52

## 2022-08-24 RX ADMIN — METRONIDAZOLE 500 MG: 500 INJECTION, SOLUTION INTRAVENOUS at 14:48

## 2022-08-24 RX ADMIN — CEFEPIME 2000 MG: 2 INJECTION, POWDER, FOR SOLUTION INTRAVENOUS at 13:59

## 2022-08-24 RX ADMIN — CEFEPIME 2000 MG: 2 INJECTION, POWDER, FOR SOLUTION INTRAVENOUS at 20:35

## 2022-08-24 RX ADMIN — POTASSIUM CHLORIDE 20 MEQ: 29.8 INJECTION, SOLUTION INTRAVENOUS at 20:25

## 2022-08-24 RX ADMIN — GABAPENTIN 400 MG: 400 CAPSULE ORAL at 07:58

## 2022-08-24 RX ADMIN — PROPOFOL 40 MCG/KG/MIN: 10 INJECTION, EMULSION INTRAVENOUS at 11:25

## 2022-08-24 RX ADMIN — PROPOFOL 40 MCG/KG/MIN: 10 INJECTION, EMULSION INTRAVENOUS at 07:56

## 2022-08-24 RX ADMIN — HYDROCODONE BITARTRATE AND ACETAMINOPHEN 10 ML: 176/5 SOLUTION ORAL at 20:33

## 2022-08-24 RX ADMIN — CEFEPIME 2000 MG: 2 INJECTION, POWDER, FOR SOLUTION INTRAVENOUS at 05:39

## 2022-08-24 RX ADMIN — Medication 175 MCG/HR: at 19:25

## 2022-08-24 RX ADMIN — FAMOTIDINE 20 MG: 10 INJECTION, SOLUTION INTRAVENOUS at 20:32

## 2022-08-24 RX ADMIN — MUPIROCIN: 20 OINTMENT TOPICAL at 07:59

## 2022-08-24 RX ADMIN — METHYLPREDNISOLONE SODIUM SUCCINATE 40 MG: 40 INJECTION, POWDER, FOR SOLUTION INTRAMUSCULAR; INTRAVENOUS at 14:26

## 2022-08-24 RX ADMIN — POTASSIUM CHLORIDE 20 MEQ: 29.8 INJECTION, SOLUTION INTRAVENOUS at 10:13

## 2022-08-24 RX ADMIN — Medication 200 MCG/HR: at 09:40

## 2022-08-24 RX ADMIN — SODIUM CHLORIDE, PRESERVATIVE FREE 10 ML: 5 INJECTION INTRAVENOUS at 07:58

## 2022-08-24 RX ADMIN — SODIUM CHLORIDE, PRESERVATIVE FREE 10 ML: 5 INJECTION INTRAVENOUS at 20:39

## 2022-08-24 RX ADMIN — IPRATROPIUM BROMIDE AND ALBUTEROL SULFATE 1 AMPULE: 2.5; .5 SOLUTION RESPIRATORY (INHALATION) at 08:15

## 2022-08-24 RX ADMIN — SODIUM CHLORIDE 500 ML: 9 INJECTION, SOLUTION INTRAVENOUS at 09:36

## 2022-08-24 RX ADMIN — HEPARIN SODIUM 2550 UNITS/HR: 10000 INJECTION, SOLUTION INTRAVENOUS at 12:23

## 2022-08-24 RX ADMIN — PROPOFOL 30 MCG/KG/MIN: 10 INJECTION, EMULSION INTRAVENOUS at 14:10

## 2022-08-24 RX ADMIN — PROPOFOL 20 MCG/KG/MIN: 10 INJECTION, EMULSION INTRAVENOUS at 17:08

## 2022-08-24 ASSESSMENT — PULMONARY FUNCTION TESTS
PIF_VALUE: 31
PIF_VALUE: 32
PIF_VALUE: 32
PIF_VALUE: 31
PIF_VALUE: 32
PIF_VALUE: 31
PIF_VALUE: 32
PIF_VALUE: 31
PIF_VALUE: 31
PIF_VALUE: 33
PIF_VALUE: 31
PIF_VALUE: 32
PIF_VALUE: 31
PIF_VALUE: 32
PIF_VALUE: 32
PIF_VALUE: 31
PIF_VALUE: 31
PIF_VALUE: 32
PIF_VALUE: 31
PIF_VALUE: 31
PIF_VALUE: 32
PIF_VALUE: 31
PIF_VALUE: 32
PIF_VALUE: 31
PIF_VALUE: 32
PIF_VALUE: 31
PIF_VALUE: 32
PIF_VALUE: 31
PIF_VALUE: 30
PIF_VALUE: 31
PIF_VALUE: 35
PIF_VALUE: 31
PIF_VALUE: 32

## 2022-08-24 ASSESSMENT — PAIN SCALES - GENERAL
PAINLEVEL_OUTOF10: 0

## 2022-08-24 NOTE — PROGRESS NOTES
[facility]                                               Progress note    Summary:   Eva Schmidt is being seen by nephrology for CHARLENE and hypernatremia. Admitted with paraesophageal hernia and SBO. Had EGD 8/9 with biopsy. Still NPO     Interval History  Patient seen and examined at bedside. He remains intubated. Sedation was just decreased, he was apparently follow commands yesterday     /73  93% on 80% fio2 and P 12  Not on pressors. Lasix stopped yesterday   UO 2.4 L   Negative 15 L for admission. Labs  Na 140  K 3.7  Bicartb 23  BUN 53  Cr 1.2    Plan:   Cr coming down with holding lasix. Appears euvolemic. Not much edema, negative 15 L for admit   No acute electrolyte issues. Will continue to hold lasix for now. Has persistent hypoxia, CTPA showed no PE but did show cardiomegaly, pulmonary edema and bilateral effusions. Has G2DD and normal EF on 8/6    The imaging does suggest volume overload still but clinically hard to really discern his volume status. May need to consider RHC and repeating the echo       Frannie Mari MD  [facility]  Office: [phone]    Assessment:   Acute kidney injury  Urine Na < 20    creatinine on admission 1   next day on 08/06/2022 was 0.8  suddenly jumped up to 2.7. Due to episode of severe hypotension with blood pressure drop 78/44. also got CT with IV contrast on 08/05/2022 but most likely  this is ATN related due to hypotension and hypercapnic  respiratory failure. CT angio s normal renal arteries and normal kidneys      Sepsis/ possible septic shock. emperature was 101.1   High white cell count. '  immunocompromised due to rheumatoid arthritis and treatment. History of sleep apnea   on CPAP. Acute respiratory failure with high pCO2 in the range of 50s with pH  of 7.24    now intubated. History of abnormal stress test in 06/2022 with stress-induced  ischemia and some scarring. Ejection fraction  was  64%. had a followup angiogram which was reported as normal as per wife. I do not have that result     History of rheumatoid arthritis   longstanding,was on methotrexate,  has taken Celebrex and now on Inflectra infusions,  so he is immunocompromised. Fabio Wood is now helping him with his rheumatoid tremendously. he has been on steroids in the past.     Rule out adrenal insufficiency. cortisol level. normal      History of thyroidectomy in the past. .    Small bowel obstruction. Large hiatus hernia with colon and stomach in the chest.  Further management as per medical team.       ROS:   Unable to assess      PMH:   Past medical history, surgical history, social history, family history are reviewed and updated as appropriate. Reviewed current medication list.   Allergies reviewed and updated as needed. PE:   Vitals:    08/24/22 1217   BP:    Pulse: 54   Resp: 15   Temp:    SpO2: 96%       General appearance:  in NAD, intubated and sedated  HEENT: EOM intact, no icterus. Trachea is midline. Neck : No masses, appears symmetrical  Respiratory: Respiratory effort appears normal, bilateral equal chest rise, no wheeze, no crackles ETT to vent   Cardiovascular: Ausculation shows RRR trace edema  Abdomen: soft non distended  Musculoskeletal:  Joints with no swelling or deformity. Skin:no rashes, ulcers, induration, no jaundice.    Neuro: sedated      Lab Results   Component Value Date    CREATININE 1.2 08/24/2022    BUN 53 (H) 08/24/2022     08/24/2022    K 3.7 08/24/2022     08/24/2022    CO2 23 08/24/2022      Lab Results   Component Value Date    WBC 10.5 08/24/2022    HGB 9.5 (L) 08/24/2022    HCT 29.2 (L) 08/24/2022    MCV 93.9 08/24/2022     (H) 08/24/2022     Lab Results   Component Value Date    CALCIUM 8.4 08/24/2022    CAION 1.06 (L) 08/15/2022    PHOS 3.7 08/24/2022

## 2022-08-24 NOTE — PROGRESS NOTES
Clinical Pharmacy Note  Vancomycin Consult    Evy Graves is a 63 y.o. male ordered Vancomycin; consult received from Jocelin Lang, DRE to manage therapy. Also receiving cefepime and metronidazole. Allergies:  Codeine     Temp max:  Temp (24hrs), Av.2 °F (37.3 °C), Min:98.6 °F (37 °C), Max:100.1 °F (37.8 °C)      Recent Labs     22  0310 22  0355 22  0522   WBC 8.6 8.7 10.5         Recent Labs     22  1435 22  2130 22  0522   BUN 47* 48* 53*   CREATININE 1.4* 1.5* 1.2           Intake/Output Summary (Last 24 hours) at 2022 1438  Last data filed at 2022 1051  Gross per 24 hour   Intake 3543.55 ml   Output 3115 ml   Net 428.55 ml         Culture Results:  pending    Ht Readings from Last 1 Encounters:   22 5' 7\" (1.702 m)        Wt Readings from Last 1 Encounters:   22 (!) 319 lb 4.8 oz (144.8 kg)         Estimated Creatinine Clearance: 90 mL/min (based on SCr of 1.2 mg/dL). Assessment/Plan:  Day # 2 of vancomycin. Random vancomycin level 9.9 mg/L. Discussed with Dr. Samanta Mcadams. Hold vancomycin until review of CT scan. After review of scan, decision to continue antimicrobials. Vancomycin 1500 mg IV x 1 today. Thank you for the consult.      Riky Ruiz [facility], PharmD, BCCCP

## 2022-08-24 NOTE — PROGRESS NOTES
Clinical Pharmacy Note  Heparin Dosing       Lab Results   Component Value Date/Time    APTT 80.0 08/23/2022 09:30 PM     Lab Results   Component Value Date/Time    HGB 10.0 08/23/2022 03:55 AM    HCT 30.7 08/23/2022 03:55 AM     08/23/2022 03:55 AM    INR 1.33 08/06/2022 04:45 AM       Current Infusion Rate: 2550 units/hr    Plan:  -Rate: continue at 2550 units/hr  Next aPTT: 0300 on 8/24    Pharmacy will continue to monitor and adjust based on aPTT results.   Anabel Stringer, Alisha  8/23/2022 10:09 PM

## 2022-08-24 NOTE — PROGRESS NOTES
Occupational Therapy    Attempted to see pt for OT tx. Per RN, continue to hold therapy d/t pt medical status. Pt has been on hold the past few days d/t medical status. Will sign off at this time. Please re-order therapy when pt stable and able to participate in therapy.      Electronically signed by Amadeo Hill OT on 8/24/2022 at 1:23 PM

## 2022-08-24 NOTE — PROGRESS NOTES
Clinical Pharmacy Note  Heparin Dosing       Lab Results   Component Value Date/Time    APTT 83.3 08/24/2022 03:17 AM     Lab Results   Component Value Date/Time    HGB 10.0 08/23/2022 03:55 AM    HCT 30.7 08/23/2022 03:55 AM     08/23/2022 03:55 AM    INR 1.33 08/06/2022 04:45 AM       Current Infusion Rate: 2550 units/hr    Plan:  -Rate: continue at 2550 units/hr  Next aPTT: 0600 on 8/25    Pharmacy will continue to monitor and adjust based on aPTT results.   Rivas Sullivan, PharmJACOBY  8/24/2022 4:10 AM

## 2022-08-24 NOTE — PROGRESS NOTES
Hospitalist Progress Note  8/24/2022 9:46 AM    PCP: Marcial Cortes MD    [phone]     Date of Admission: 8/5/2022                                                                                                                     HOSPITAL COURSE    Patient demographics:  The patient  Casey Bliss is a 63 y.o. male     Significant past medical history:   Patient Active Problem List   Diagnosis    Partial bowel obstruction (HCC)    Hiatal hernia    Acute respiratory failure with hypoxia and hypercapnia (HCC)    SBO (small bowel obstruction) (HCC)    Aspiration into airway    Acute respiratory failure with hypoxia (HCC)    Small bowel obstruction (HCC)    Diastolic heart failure (HCC)    History of atrial fibrillation    Supraventricular tachycardia (HCC)    Arterial hypotension    Aspiration pneumonia of both lower lobes due to gastric secretions ([address]    Morbid obesity with BMI of 50.0-59.9, adult ([address]         Presenting symptoms:  abdominal pain    Diagnostic workup:      CONSULTS DURING ADMISSION :   IP CONSULT TO GENERAL SURGERY  IP CONSULT TO HOSPITALIST  IP CONSULT TO GENERAL SURGERY  IP CONSULT TO DIETITIAN  IP CONSULT TO CARDIOLOGY  IP CONSULT TO GI  IP CONSULT TO PALLIATIVE CARE  PHARMACY TO DOSE MEDICATION  PHARMACY TO DOSE VANCOMYCIN      Patient was diagnosed with:  Low grade or Partial Bowel obstruction  Pneumonia and septic shock. Hiatal hernia  Atrial Fibrillation      Treatment while inpatient:  63years old male with medical history significant for morbid obesity, paraesophageal hernia. Patient presented to the emergency room with abdominal pain nausea vomiting and diarrhea. Patient was diagnosed with small bowel obstruction. Patient was also diagnosed with pneumonia and septic shock. Patient developed acute respiratory failure and acute renal failure. Managed on the ventilator with a prolonged course in ICU. Renal function improved. ----------------------------------------------------------      SUBJECTIVE COMPLAINTS- follow up for abdominal pain    Diet: Diet NPO  ADULT TUBE FEEDING; Nasogastric; Peptide Based High Protein; Continuous; 10; Yes; 10; Q 4 hours; 30; 30; Q 4 hours      OBJECTIVE:   Patient Active Problem List   Diagnosis    Partial bowel obstruction (HCC)    Hiatal hernia    Acute respiratory failure with hypoxia and hypercapnia (HCC)    SBO (small bowel obstruction) (HCC)    Aspiration into airway    Acute respiratory failure with hypoxia (HCC)    Small bowel obstruction (HCC)    Diastolic heart failure (HCC)    History of atrial fibrillation    Supraventricular tachycardia (HCC)    Arterial hypotension    Aspiration pneumonia of both lower lobes due to gastric secretions (HCC)    Morbid obesity with BMI of 50.0-59.9, adult (HCC)       Allergies  Codeine    Medications    Scheduled Meds:   potassium chloride  20 mEq IntraVENous Once    sodium chloride  500 mL IntraVENous Once    [Held by provider] vancomycin  1,500 mg IntraVENous Once    metroNIDAZOLE  500 mg IntraVENous Q8H    cefepime  2,000 mg IntraVENous Q8H    vancomycin (VANCOCIN) intermittent dosing (placeholder)   Other RX Placeholder    methylPREDNISolone  40 mg IntraVENous Q12H    mupirocin   Nasal BID    gabapentin  400 mg Oral TID    docusate  100 mg Oral Daily    senna  10 mL Oral Nightly    polyethylene glycol  17 g Oral Daily    [Held by provider] metoprolol tartrate  25 mg Oral BID    famotidine (PEPCID) injection  20 mg IntraVENous BID    chlorhexidine  15 mL Mouth/Throat BID    ipratropium-albuterol  1 ampule Inhalation Q4H WA    sodium chloride flush  5-40 mL IntraVENous 2 times per day    levothyroxine  200 mcg Oral Daily    [Held by provider] spironolactone  50 mg Oral Daily     Continuous Infusions:   fentaNYL 200 mcg/hr (08/24/22 0940)    propofol 40 mcg/kg/min (08/24/22 0928)    heparin (PORCINE) Infusion 2,550 Units/hr (08/24/22 0507)    phenylephrine (REHANA-SYNEPHRINE) 50mg/250mL infusion 20 mcg/min (08/24/22 0507)    [Held by provider] furosemide Stopped (08/23/22 0945)    sodium chloride       PRN Meds:  fentanNYL, midazolam, artificial tears, acetaminophen, potassium chloride, acetaminophen, sodium chloride flush, sodium chloride, ondansetron **OR** ondansetron, diazePAM    Vitals   Vitals /wt Patient Vitals for the past 8 hrs:   BP Temp Temp src Pulse Resp SpO2 Weight   08/24/22 0816 -- -- -- 53 15 97 % --   08/24/22 0815 -- -- -- 55 15 96 % --   08/24/22 0800 98/63 98.6 °F (37 °C) Axillary 53 15 93 % --   08/24/22 0700 (!) 91/59 -- -- 55 15 94 % --   08/24/22 0600 117/74 -- -- 60 15 93 % (!) 319 lb 4.8 oz (144.8 kg)   08/24/22 0500 111/72 98.7 °F (37.1 °C) Axillary 59 (!) 0 92 % --   08/24/22 0400 114/73 -- -- 60 (!) 0 91 % --   08/24/22 0348 -- -- -- 58 15 96 % --   08/24/22 0300 117/77 -- -- 60 (!) 0 93 % --   08/24/22 0200 114/72 -- -- 62 (!) 5 93 % --        72HR INTAKE/OUTPUT:    Intake/Output Summary (Last 24 hours) at 8/24/2022 0946  Last data filed at 8/24/2022 0807  Gross per 24 hour   Intake 3513.55 ml   Output 3340 ml   Net 173.55 ml       Exam:    Gen:  intubated and sedated  Eyes: PERRL. No sclera icterus. No conjunctival injection. ENT: No discharge. Pharynx clear. External appearance of ears and nose normal.  Neck: Trachea midline. No obvious mass. Resp: No accessory muscle use. No crackles. No wheezes. No rhonchi. CV: Regular rate. Regular rhythm. No murmur or rub. No edema. GI: Non-tender. Non-distended. No hernia. Skin: Warm, dry, normal texture and turgor. Lymph: No cervical LAD. No supraclavicular LAD. M/S: / Ext. No cyanosis. No clubbing. No joint deformity. Neuro: CN 2-12 are intact,  no neurologic deficits noted. PT/INR:   No results for input(s): PROTIME, INR in the last 72 hours.     APTT:   Recent Labs     08/23/22  1435 08/23/22  2130 08/24/22  0317   APTT 34.1 80.0* 83.3*       CBC: Recent Labs     08/22/22  0310 08/23/22  0355 08/24/22  0522   WBC 8.6 8.7 10.5   HGB 9.0* 10.0* 9.5*   HCT 27.8* 30.7* 29.2*   MCV 94.2 94.0 93.9   * 464* 501*       BMP:   Recent Labs     08/22/22  0310 08/22/22  1445 08/23/22  0355 08/23/22  1435 08/23/22  2130 08/24/22  0522      < > 147* 138 142 140   K 3.4*   < > 3.4* 3.5 4.3 3.7      < > 107 102 105 102   CO2 27   < > 27 24 25 23   PHOS 4.0  --  3.6  --   --  3.7   BUN 32*   < > 42* 47* 48* 53*   CREATININE 1.2   < > 1.6* 1.4* 1.5* 1.2    < > = values in this interval not displayed. LIVER PROFILE:   No results for input(s): ALKPHOS, AST, ALT, ALB, BILIDIR, BILITOT, ALKPHOS in the last 72 hours. No results for input(s): AMYLASE in the last 72 hours. No results for input(s): LIPASE in the last 72 hours. UA:  No results for input(s): NITRITE, LABCAST, WBCUA, RBCUA, MUCUS in the last 72 hours. TROPONIN:   No results for input(s): [facility] in the last 72 hours. Lab Results   Component Value Date/Time    URRFLXCULT Not Indicated 08/15/2022 09:16 AM       No results for input(s): TSHREFLEX in the last 72 hours. No components found for: [id number]  POC GLUCOSE:    Recent Labs     08/23/22  0354 08/23/22  0753 08/23/22  1112 08/23/22  1558 08/24/22  0811   POCGLU 98 108* 102* 106* 141*     No results for input(s): LABA1C in the last 72 hours.  No results found for: LABA1C    Echocardiogram shows normal ejection fraction  Grade 1 diastolic dysfunction      ASSESSMENT AND PLAN    Acute respiratory failure  Intubated 8/8  Patient's respiratory status is not improving  FiO2 80%  Palliative services consulted      Aspiration pna  Continues to be on cefepime and metronidazole    Acute on chronic diastolic CHF  Patient has grade 1 diastolic dysfunction  Lasix drip discontinued  500 cc fluid bolus  Patient now started on pressors      Partial small Bowel obstruction  improved  Og placed per GI 8/10 came out replacement 8/22  Continue on tube feed    SVT  better  H/o atrial fibrillation  HR controled      CHARLENE  Renal function improved      Hypotention  Continue with pressors and fluid bolus    Hiatal hernia  - CT chest/abd/pelv:   Large hiatal hernia containing much of the stomach which is distended  Og tube today     Atrial Fibrillation  Patient developed the rapid ventricular rate  IV Lopressor  Consult to cardiology     HTN  - monitor blood pressure  - restart home meds when able        Code Status: Full Code        Dispo - cc        The patient and / or the family were informed of the results of any tests, a time was given to answer questions, a plan was proposed and they agreed with plan. Lucille Webb MD    This note was transcribed using [id number] [employer]. Please disregard any translational errors.

## 2022-08-24 NOTE — PROGRESS NOTES
(Active)   Central Line Being Utilized Yes 08/24/22 0800   Criteria for Appropriate Use Hemodynamically unstable, requiring monitoring lines, vasopressors, or volume resuscitation 08/24/22 0800   Site Assessment Clean, dry & intact 08/24/22 0800   Phlebitis Assessment No symptoms 08/24/22 0800   Infiltration Assessment 0 08/24/22 0800   Extremity Circumference (cm) 38 cm 08/08/22 0607   External Catheter Length (cm) 0 cm 08/08/22 0607   Proximal Lumen Color/Status Red; Infusing; Alcohol cap applied 08/24/22 0800   Medial Lumen Status White; Infusing; Alcohol cap applied 08/24/22 0800   Distal Lumen Color/Status [place]; Infusing; Alcohol cap applied 08/24/22 0800   Line Care Connections checked and tightened 08/21/22 1800   Alcohol Cap Used Yes 08/21/22 0600   Date of Last Dressing Change 08/17/22 08/24/22 0800   Dressing Type Transparent; Antimicrobial 08/24/22 0800   Dressing Status Clean, dry & intact 08/24/22 0800   Dressing Intervention New;Dressing changed 08/17/22 0400   Number of days: 16     CVC                    I spent 32 minutes of critical care time with this patient excluding any procedures. Data:        PHYSICAL EXAM:   Blood pressure 98/63, pulse 53, temperature 98.6 °F (37 °C), temperature source Axillary, resp. rate 15, height 5' 7\" (1.702 m), weight (!) 319 lb 4.8 oz (144.8 kg), SpO2 97 %.'  Body mass index is 50.01 kg/m². Gen: No distress. ENT:   Resp: No accessory muscle use. No crackles. No wheezes. No rhonchi. CV: Regular rate. Regular rhythm. No murmur or rub. No edema. GI high-pitched bowel sounds  Skin: Warm, dry, normal texture and turgor. No nodule on exposed extremities. M/S: No cyanosis. No clubbing. No joint deformity.   Psych: sedated      Medications:    Scheduled Meds:   potassium chloride  20 mEq IntraVENous Once    metroNIDAZOLE  500 mg IntraVENous Q8H    cefepime  2,000 mg IntraVENous Q8H    vancomycin (VANCOCIN) intermittent dosing (placeholder)   Other RX Placeholder methylPREDNISolone  40 mg IntraVENous Q12H    mupirocin   Nasal BID    gabapentin  400 mg Oral TID    docusate  100 mg Oral Daily    senna  10 mL Oral Nightly    polyethylene glycol  17 g Oral Daily    metoprolol tartrate  25 mg Oral BID    famotidine (PEPCID) injection  20 mg IntraVENous BID    chlorhexidine  15 mL Mouth/Throat BID    ipratropium-albuterol  1 ampule Inhalation Q4H WA    sodium chloride flush  5-40 mL IntraVENous 2 times per day    levothyroxine  200 mcg Oral Daily    [Held by provider] spironolactone  50 mg Oral Daily       Continuous Infusions:   fentaNYL 200 mcg/hr (08/24/22 0507)    midazolam Stopped (08/23/22 1108)    propofol 40 mcg/kg/min (08/24/22 0756)    heparin (PORCINE) Infusion 2,550 Units/hr (08/24/22 0507)    phenylephrine (REHANA-SYNEPHRINE) 50mg/250mL infusion 20 mcg/min (08/24/22 0507)    [Held by provider] furosemide Stopped (08/23/22 0945)    sodium chloride         PRN Meds:  fentanNYL, midazolam, artificial tears, acetaminophen, potassium chloride, acetaminophen, sodium chloride flush, sodium chloride, ondansetron **OR** ondansetron, diazePAM    Labs reviewed:  CBC:   Recent Labs     08/22/22  0310 08/23/22  0355 08/24/22  0522   WBC 8.6 8.7 10.5   HGB 9.0* 10.0* 9.5*   HCT 27.8* 30.7* 29.2*   MCV 94.2 94.0 93.9   * 464* 501*       BMP:   Recent Labs     08/22/22  0310 08/22/22  1445 08/23/22  0355 08/23/22  1435 08/23/22  2130 08/24/22  0522      < > 147* 138 142 140   K 3.4*   < > 3.4* 3.5 4.3 3.7      < > 107 102 105 102   CO2 27   < > 27 24 25 23   PHOS 4.0  --  3.6  --   --  3.7   BUN 32*   < > 42* 47* 48* 53*   CREATININE 1.2   < > 1.6* 1.4* 1.5* 1.2    < > = values in this interval not displayed. LIVER PROFILE: No results for input(s): AST, ALT, LIPASE, BILIDIR, BILITOT, ALKPHOS in the last 72 hours. Invalid input(s): AMYLASE,  ALB  PT/INR: No results for input(s): PROTIME, INR in the last 72 hours.   APTT:   Recent Labs     08/23/22  1435 08/23/22  2130 08/24/22  0317   APTT 34.1 80.0* 83.3*     UA:No results for input(s): NITRITE, COLORU, PHUR, LABCAST, WBCUA, RBCUA, MUCUS, TRICHOMONAS, YEAST, BACTERIA, CLARITYU, SPECGRAV, LEUKOCYTESUR, UROBILINOGEN, BILIRUBINUR, BLOODU, GLUCOSEU, AMORPHOUS in the last 72 hours. Invalid input(s): Florette Tarzana  No results for input(s): PH, PCO2, PO2 in the last 72 hours. Cx:      Films:  Radiology Review:  Pertinent images / reports were reviewed as a part of this visit. CT Chest w/ contrast: No results found for this or any previous visit. CT Chest w/o contrast: Results for orders placed during the hospital encounter of 08/05/22    CT CHEST WO CONTRAST    Narrative  EXAMINATION:  CT OF THE CHEST WITHOUT CONTRAST 8/21/2022 9:43 am    TECHNIQUE:  CT of the chest was performed without the administration of intravenous  contrast. Multiplanar reformatted images are provided for review. Automated  exposure control, iterative reconstruction, and/or weight based adjustment of  the mA/kV was utilized to reduce the radiation dose to as low as reasonably  achievable. COMPARISON:  08/05/2022    HISTORY:  ORDERING SYSTEM PROVIDED HISTORY: worsening  hypoxemia  TECHNOLOGIST PROVIDED HISTORY:  Reason for exam:->worsening  hypoxemia  Reason for Exam: worsening hypoxemia    FINDINGS:  Mediastinum: Tip of ET tube seen in midthoracic trachea    Small pericardial effusion is seen. Thickness measures approximately 8 mm. Large hiatal hernia is seen. Small mediastinal and hilar nodes are noted. Tip of PICC is in the region of the proximal SVC. Lungs/pleura: Respiratory motion limits evaluation of fine pulmonary  parenchymal change. Small left-sided pleural effusion is seen. There is  adjacent left basilar consolidation. There is adjacent consolidation of the  left upper lobe. Septal thickening is seen    Small right-sided pleural effusion is seen. There is adjacent right basilar  consolidation.   Septal thickening is seen on the right. No obstructing endobronchial lesions are seen    Upper Abdomen: Adrenal glands unremarkable. Soft Tissues/Bones: Spurring is seen in the spine. Spurring is seen in the  shoulder joints. Impression  Small bilateral pleural effusions with adjacent consolidation lungs, either  due to atelectasis or pneumonia. Septal thickening is seen, suggesting a  component of fluid overload. .  Pleural effusions and adjacent consolidation,  is increased compared to prior      CTPA: No results found for this or any previous visit. CXR PA/LAT: No results found for this or any previous visit. CXR portable: Results for orders placed during the hospital encounter of 08/05/22    XR CHEST PORTABLE    Narrative  EXAMINATION:  ONE XRAY VIEW OF THE CHEST    8/19/2022 9:43 am    COMPARISON:  08/15/2022 radiograph    HISTORY:  ORDERING SYSTEM PROVIDED HISTORY: hypoxemia  TECHNOLOGIST PROVIDED HISTORY:  Reason for exam:->hypoxemia  Reason for Exam: hypoxemia    FINDINGS:  Supportive devices are stable. The heart is enlarged. Mediastinum is  normal.  Mild perihilar opacities persist centrally and there are mild  bibasilar ground-glass opacities. Probable trace left pleural effusion. No  significant skeletal finding. Impression  Relatively stable pattern of mild edema with no detrimental change. This note was transcribed using [id number] [employer]. Please disregard any translational errors.       Jaya Godwin [facility] [phone]

## 2022-08-24 NOTE — CARE COORDINATION
[facility]  Palliative Care   Progress Note    NAME:  [facility] Jacquie RECORD NUMBER:  [id number]  AGE: 63 y.o. GENDER: male  : 1960  TODAY'S DATE:  2022    Subjective: remains on vent decreasing vent settings and sedation. Objective:    Vitals:    22 1217   BP:    Pulse: 54   Resp: 15   Temp:    SpO2: 96%     Lab Results   Component Value Date    WBC 10.5 2022    HGB 9.5 (L) 2022    HCT 29.2 (L) 2022    MCV 93.9 2022     (H) 2022     Lab Results   Component Value Date    CREATININE 1.2 2022    BUN 53 (H) 2022     2022    K 3.7 2022     2022    CO2 23 2022     Lab Results   Component Value Date    ALT 23 2022    AST 22 2022    ALKPHOS 72 2022    BILITOT 0.7 2022       Plan: I met with his wife Dylan العراقي she is hopeful he is getting better but does understand he has a long way to go. Offered support, discussed options of improvement and options of comfort care, obviously no decisions as yet. Code Status: Full Code  Discharge Environment:  [] Hospice Consult Agency:  [] Inpatient Hospice    [] Home with [facility]   [] ECF with Hospice  [] ECF skilled care with Hospice to follow   [] Other:    Teaching Time:  0hours  30 min     I will continue to follow Mr. Nori Alan care as needed. Thank you for allowing me to participate in the care of Mr. Arturo Moreno .      Electronically signed by Barbara Lyons RN, BSN,CHPN on 2022 at 12:53 PM  [address] [place] [address]  Office: [phone]

## 2022-08-24 NOTE — CARE COORDINATION
Discharge Planning:    Spoke with patient's wife at bedside to update her on LTAC placement. Informed her that patient has been approved and precert active until 9/3/03. Answered all questions at this time. Will continue to monitor for updates.     Electronically signed by Kay Arboleda RN on 8/24/2022 at 10:11 AM

## 2022-08-25 ENCOUNTER — APPOINTMENT (OUTPATIENT)
Dept: GENERAL RADIOLOGY | Age: 62
DRG: 853 | End: 2022-08-25
Payer: COMMERCIAL

## 2022-08-25 LAB
ANION GAP SERPL CALCULATED.3IONS-SCNC: 10 MMOL/L (ref 3–16)
ANION GAP SERPL CALCULATED.3IONS-SCNC: 8 MMOL/L (ref 3–16)
ANION GAP SERPL CALCULATED.3IONS-SCNC: 9 MMOL/L (ref 3–16)
BASOPHILS ABSOLUTE: 0.1 K/UL (ref 0–0.2)
BASOPHILS RELATIVE PERCENT: 0.6 %
BUN BLDV-MCNC: 43 MG/DL (ref 7–20)
BUN BLDV-MCNC: 43 MG/DL (ref 7–20)
BUN BLDV-MCNC: 46 MG/DL (ref 7–20)
CALCIUM SERPL-MCNC: 7.7 MG/DL (ref 8.3–10.6)
CALCIUM SERPL-MCNC: 8.1 MG/DL (ref 8.3–10.6)
CALCIUM SERPL-MCNC: 8.2 MG/DL (ref 8.3–10.6)
CHLORIDE BLD-SCNC: 108 MMOL/L (ref 99–110)
CHLORIDE BLD-SCNC: 110 MMOL/L (ref 99–110)
CHLORIDE BLD-SCNC: 112 MMOL/L (ref 99–110)
CO2: 24 MMOL/L (ref 21–32)
CO2: 24 MMOL/L (ref 21–32)
CO2: 25 MMOL/L (ref 21–32)
CREAT SERPL-MCNC: 0.8 MG/DL (ref 0.8–1.3)
CREAT SERPL-MCNC: 0.9 MG/DL (ref 0.8–1.3)
CREAT SERPL-MCNC: 1.1 MG/DL (ref 0.8–1.3)
EOSINOPHILS ABSOLUTE: 0 K/UL (ref 0–0.6)
EOSINOPHILS RELATIVE PERCENT: 0.2 %
GFR AFRICAN AMERICAN: >60
GFR NON-AFRICAN AMERICAN: >60
GLUCOSE BLD-MCNC: 101 MG/DL (ref 70–99)
GLUCOSE BLD-MCNC: 106 MG/DL (ref 70–99)
GLUCOSE BLD-MCNC: 108 MG/DL (ref 70–99)
GLUCOSE BLD-MCNC: 126 MG/DL (ref 70–99)
GLUCOSE BLD-MCNC: 130 MG/DL (ref 70–99)
GLUCOSE BLD-MCNC: 147 MG/DL (ref 70–99)
GLUCOSE BLD-MCNC: 148 MG/DL (ref 70–99)
HCT VFR BLD CALC: 28.9 % (ref 40.5–52.5)
HEMOGLOBIN: 9.4 G/DL (ref 13.5–17.5)
LYMPHOCYTES ABSOLUTE: 1 K/UL (ref 1–5.1)
LYMPHOCYTES RELATIVE PERCENT: 10.3 %
MAGNESIUM: 2.6 MG/DL (ref 1.8–2.4)
MCH RBC QN AUTO: 30.7 PG (ref 26–34)
MCHC RBC AUTO-ENTMCNC: 32.5 G/DL (ref 31–36)
MCV RBC AUTO: 94.7 FL (ref 80–100)
MONOCYTES ABSOLUTE: 0.4 K/UL (ref 0–1.3)
MONOCYTES RELATIVE PERCENT: 3.7 %
NEUTROPHILS ABSOLUTE: 8.5 K/UL (ref 1.7–7.7)
NEUTROPHILS RELATIVE PERCENT: 85.2 %
PDW BLD-RTO: 16.5 % (ref 12.4–15.4)
PERFORMED ON: ABNORMAL
PHOSPHORUS: 3.1 MG/DL (ref 2.5–4.9)
PLATELET # BLD: 448 K/UL (ref 135–450)
PMV BLD AUTO: 7.4 FL (ref 5–10.5)
POTASSIUM SERPL-SCNC: 3.4 MMOL/L (ref 3.5–5.1)
POTASSIUM SERPL-SCNC: 3.8 MMOL/L (ref 3.5–5.1)
POTASSIUM SERPL-SCNC: 4.2 MMOL/L (ref 3.5–5.1)
RBC # BLD: 3.05 M/UL (ref 4.2–5.9)
SODIUM BLD-SCNC: 142 MMOL/L (ref 136–145)
SODIUM BLD-SCNC: 144 MMOL/L (ref 136–145)
SODIUM BLD-SCNC: 144 MMOL/L (ref 136–145)
VANCOMYCIN RANDOM: 7.4 UG/ML
WBC # BLD: 10 K/UL (ref 4–11)

## 2022-08-25 PROCEDURE — 6370000000 HC RX 637 (ALT 250 FOR IP): Performed by: NURSE PRACTITIONER

## 2022-08-25 PROCEDURE — 2000000000 HC ICU R&B

## 2022-08-25 PROCEDURE — 99291 CRITICAL CARE FIRST HOUR: CPT | Performed by: INTERNAL MEDICINE

## 2022-08-25 PROCEDURE — 2580000003 HC RX 258: Performed by: INTERNAL MEDICINE

## 2022-08-25 PROCEDURE — 85025 COMPLETE CBC W/AUTO DIFF WBC: CPT

## 2022-08-25 PROCEDURE — APPNB15 APP NON BILLABLE TIME 0-15 MINS: Performed by: NURSE PRACTITIONER

## 2022-08-25 PROCEDURE — 94640 AIRWAY INHALATION TREATMENT: CPT

## 2022-08-25 PROCEDURE — 2580000003 HC RX 258: Performed by: NURSE PRACTITIONER

## 2022-08-25 PROCEDURE — 71045 X-RAY EXAM CHEST 1 VIEW: CPT

## 2022-08-25 PROCEDURE — 2500000003 HC RX 250 WO HCPCS: Performed by: HOSPITALIST

## 2022-08-25 PROCEDURE — 2500000003 HC RX 250 WO HCPCS: Performed by: NURSE PRACTITIONER

## 2022-08-25 PROCEDURE — 9990000010 HC NO CHARGE VISIT

## 2022-08-25 PROCEDURE — 6360000002 HC RX W HCPCS: Performed by: NURSE PRACTITIONER

## 2022-08-25 PROCEDURE — 80202 ASSAY OF VANCOMYCIN: CPT

## 2022-08-25 PROCEDURE — 2700000000 HC OXYGEN THERAPY PER DAY

## 2022-08-25 PROCEDURE — 80048 BASIC METABOLIC PNL TOTAL CA: CPT

## 2022-08-25 PROCEDURE — 94003 VENT MGMT INPAT SUBQ DAY: CPT

## 2022-08-25 PROCEDURE — 6360000002 HC RX W HCPCS: Performed by: HOSPITALIST

## 2022-08-25 PROCEDURE — 2580000003 HC RX 258: Performed by: HOSPITALIST

## 2022-08-25 PROCEDURE — 94761 N-INVAS EAR/PLS OXIMETRY MLT: CPT

## 2022-08-25 PROCEDURE — 6370000000 HC RX 637 (ALT 250 FOR IP): Performed by: INTERNAL MEDICINE

## 2022-08-25 PROCEDURE — 83735 ASSAY OF MAGNESIUM: CPT

## 2022-08-25 PROCEDURE — 84100 ASSAY OF PHOSPHORUS: CPT

## 2022-08-25 RX ORDER — CALCIUM GLUCONATE 20 MG/ML
1000 INJECTION, SOLUTION INTRAVENOUS ONCE
Status: COMPLETED | OUTPATIENT
Start: 2022-08-25 | End: 2022-08-25

## 2022-08-25 RX ORDER — POLYETHYLENE GLYCOL 3350 17 G/17G
17 POWDER, FOR SOLUTION ORAL DAILY PRN
Status: DISCONTINUED | OUTPATIENT
Start: 2022-08-25 | End: 2022-09-07 | Stop reason: HOSPADM

## 2022-08-25 RX ORDER — SENNA LEAF EXTRACT 176MG/5ML
10 SYRUP ORAL NIGHTLY PRN
Status: DISCONTINUED | OUTPATIENT
Start: 2022-08-25 | End: 2022-09-07 | Stop reason: HOSPADM

## 2022-08-25 RX ORDER — POTASSIUM CHLORIDE 29.8 MG/ML
20 INJECTION INTRAVENOUS ONCE
Status: COMPLETED | OUTPATIENT
Start: 2022-08-25 | End: 2022-08-25

## 2022-08-25 RX ADMIN — PROPOFOL 20 MCG/KG/MIN: 10 INJECTION, EMULSION INTRAVENOUS at 04:55

## 2022-08-25 RX ADMIN — GABAPENTIN 400 MG: 400 CAPSULE ORAL at 14:25

## 2022-08-25 RX ADMIN — METHYLPREDNISOLONE SODIUM SUCCINATE 40 MG: 40 INJECTION, POWDER, FOR SOLUTION INTRAMUSCULAR; INTRAVENOUS at 00:14

## 2022-08-25 RX ADMIN — MUPIROCIN: 20 OINTMENT TOPICAL at 09:38

## 2022-08-25 RX ADMIN — CEFEPIME 2000 MG: 2 INJECTION, POWDER, FOR SOLUTION INTRAVENOUS at 20:52

## 2022-08-25 RX ADMIN — PROPOFOL 15 MCG/KG/MIN: 10 INJECTION, EMULSION INTRAVENOUS at 14:53

## 2022-08-25 RX ADMIN — Medication 175 MCG/HR: at 17:23

## 2022-08-25 RX ADMIN — METRONIDAZOLE 500 MG: 500 INJECTION, SOLUTION INTRAVENOUS at 14:10

## 2022-08-25 RX ADMIN — HEPARIN SODIUM 5000 UNITS: 5000 INJECTION INTRAVENOUS; SUBCUTANEOUS at 05:19

## 2022-08-25 RX ADMIN — IPRATROPIUM BROMIDE AND ALBUTEROL SULFATE 1 AMPULE: 2.5; .5 SOLUTION RESPIRATORY (INHALATION) at 08:28

## 2022-08-25 RX ADMIN — CALCIUM GLUCONATE 1000 MG: 20 INJECTION, SOLUTION INTRAVENOUS at 10:10

## 2022-08-25 RX ADMIN — GABAPENTIN 400 MG: 400 CAPSULE ORAL at 20:49

## 2022-08-25 RX ADMIN — Medication 175 MCG/HR: at 11:46

## 2022-08-25 RX ADMIN — POTASSIUM CHLORIDE 20 MEQ: 29.8 INJECTION, SOLUTION INTRAVENOUS at 17:08

## 2022-08-25 RX ADMIN — PHENYLEPHRINE HYDROCHLORIDE 5 MCG/MIN: 10 INJECTION INTRAVENOUS at 17:31

## 2022-08-25 RX ADMIN — IPRATROPIUM BROMIDE AND ALBUTEROL SULFATE 1 AMPULE: 2.5; .5 SOLUTION RESPIRATORY (INHALATION) at 12:14

## 2022-08-25 RX ADMIN — CEFEPIME 2000 MG: 2 INJECTION, POWDER, FOR SOLUTION INTRAVENOUS at 14:14

## 2022-08-25 RX ADMIN — FAMOTIDINE 20 MG: 10 INJECTION, SOLUTION INTRAVENOUS at 20:49

## 2022-08-25 RX ADMIN — METRONIDAZOLE 500 MG: 500 INJECTION, SOLUTION INTRAVENOUS at 22:09

## 2022-08-25 RX ADMIN — SODIUM CHLORIDE, PRESERVATIVE FREE 10 ML: 5 INJECTION INTRAVENOUS at 09:15

## 2022-08-25 RX ADMIN — GABAPENTIN 400 MG: 400 CAPSULE ORAL at 09:39

## 2022-08-25 RX ADMIN — IPRATROPIUM BROMIDE AND ALBUTEROL SULFATE 1 AMPULE: 2.5; .5 SOLUTION RESPIRATORY (INHALATION) at 15:40

## 2022-08-25 RX ADMIN — HEPARIN SODIUM 5000 UNITS: 5000 INJECTION INTRAVENOUS; SUBCUTANEOUS at 20:49

## 2022-08-25 RX ADMIN — VANCOMYCIN HYDROCHLORIDE 1500 MG: 10 INJECTION, POWDER, LYOPHILIZED, FOR SOLUTION INTRAVENOUS at 17:14

## 2022-08-25 RX ADMIN — CHLORHEXIDINE GLUCONATE 0.12% ORAL RINSE 15 ML: 1.2 LIQUID ORAL at 20:49

## 2022-08-25 RX ADMIN — Medication 200 MCG/HR: at 06:14

## 2022-08-25 RX ADMIN — POTASSIUM CHLORIDE 20 MEQ: 29.8 INJECTION, SOLUTION INTRAVENOUS at 10:01

## 2022-08-25 RX ADMIN — POTASSIUM CHLORIDE 20 MEQ: 29.8 INJECTION, SOLUTION INTRAVENOUS at 18:33

## 2022-08-25 RX ADMIN — HEPARIN SODIUM 5000 UNITS: 5000 INJECTION INTRAVENOUS; SUBCUTANEOUS at 14:16

## 2022-08-25 RX ADMIN — IPRATROPIUM BROMIDE AND ALBUTEROL SULFATE 1 AMPULE: 2.5; .5 SOLUTION RESPIRATORY (INHALATION) at 20:10

## 2022-08-25 RX ADMIN — METRONIDAZOLE 500 MG: 500 INJECTION, SOLUTION INTRAVENOUS at 05:19

## 2022-08-25 RX ADMIN — SODIUM CHLORIDE, PRESERVATIVE FREE 10 ML: 5 INJECTION INTRAVENOUS at 20:50

## 2022-08-25 RX ADMIN — CHLORHEXIDINE GLUCONATE 0.12% ORAL RINSE 15 ML: 1.2 LIQUID ORAL at 09:16

## 2022-08-25 RX ADMIN — Medication 175 MCG/HR: at 00:55

## 2022-08-25 RX ADMIN — MUPIROCIN: 20 OINTMENT TOPICAL at 20:50

## 2022-08-25 RX ADMIN — LEVOTHYROXINE SODIUM 200 MCG: 0.1 TABLET ORAL at 05:19

## 2022-08-25 RX ADMIN — CEFEPIME 2000 MG: 2 INJECTION, POWDER, FOR SOLUTION INTRAVENOUS at 05:17

## 2022-08-25 RX ADMIN — FAMOTIDINE 20 MG: 10 INJECTION, SOLUTION INTRAVENOUS at 09:24

## 2022-08-25 RX ADMIN — METHYLPREDNISOLONE SODIUM SUCCINATE 40 MG: 40 INJECTION, POWDER, FOR SOLUTION INTRAMUSCULAR; INTRAVENOUS at 14:15

## 2022-08-25 RX ADMIN — PROPOFOL 25 MCG/KG/MIN: 10 INJECTION, EMULSION INTRAVENOUS at 10:05

## 2022-08-25 ASSESSMENT — PULMONARY FUNCTION TESTS
PIF_VALUE: 31
PIF_VALUE: 33
PIF_VALUE: 28
PIF_VALUE: 30
PIF_VALUE: 28
PIF_VALUE: 31
PIF_VALUE: 30
PIF_VALUE: 31
PIF_VALUE: 31
PIF_VALUE: 30
PIF_VALUE: 28
PIF_VALUE: 30
PIF_VALUE: 30
PIF_VALUE: 29
PIF_VALUE: 30
PIF_VALUE: 29
PIF_VALUE: 31
PIF_VALUE: 30
PIF_VALUE: 28
PIF_VALUE: 28
PIF_VALUE: 31
PIF_VALUE: 30
PIF_VALUE: 31
PIF_VALUE: 31
PIF_VALUE: 29
PIF_VALUE: 29
PIF_VALUE: 31
PIF_VALUE: 30
PIF_VALUE: 30
PIF_VALUE: 29
PIF_VALUE: 31
PIF_VALUE: 30
PIF_VALUE: 31
PIF_VALUE: 30
PIF_VALUE: 28
PIF_VALUE: 30
PIF_VALUE: 30
PIF_VALUE: 33
PIF_VALUE: 29
PIF_VALUE: 30
PIF_VALUE: 31
PIF_VALUE: 31
PIF_VALUE: 30
PIF_VALUE: 28
PIF_VALUE: 30
PIF_VALUE: 28
PIF_VALUE: 29
PIF_VALUE: 30
PIF_VALUE: 31
PIF_VALUE: 30
PIF_VALUE: 31
PIF_VALUE: 31
PIF_VALUE: 30
PIF_VALUE: 31
PIF_VALUE: 30
PIF_VALUE: 30
PIF_VALUE: 28
PIF_VALUE: 30
PIF_VALUE: 31
PIF_VALUE: 30
PIF_VALUE: 30
PIF_VALUE: 29
PIF_VALUE: 31
PIF_VALUE: 30
PIF_VALUE: 31
PIF_VALUE: 28
PIF_VALUE: 28
PIF_VALUE: 30
PIF_VALUE: 31
PIF_VALUE: 30

## 2022-08-25 ASSESSMENT — PAIN SCALES - GENERAL
PAINLEVEL_OUTOF10: 2
PAINLEVEL_OUTOF10: 0
PAINLEVEL_OUTOF10: 0
PAINLEVEL_OUTOF10: 2
PAINLEVEL_OUTOF10: 0
PAINLEVEL_OUTOF10: 0

## 2022-08-25 NOTE — PROGRESS NOTES
Comprehensive Nutrition Assessment    Type and Reason for Visit:  Reassess    Nutrition Recommendations/Plan:   Modify TF to Vital HP with goal rate of 50 mL/hr +2 protienex daily (adjusted for propofol dose). Flush per provider. Monitor for changes in propofol dose, currently 17 mL/hr. Malnutrition Assessment:  Malnutrition Status: At risk for malnutrition (Comment) (08/11/22 2912)    Context:  Acute Illness     Findings of the 6 clinical characteristics of malnutrition:  Energy Intake:  50% or less of estimated energy requirements for 5 or more days  Weight Loss:  Unable to assess     Body Fat Loss:  Unable to assess     Muscle Mass Loss:  Unable to assess    Fluid Accumulation:  No significant fluid accumulation     Strength:  Not Performed    Nutrition Assessment:    Follow-up. Pt had NG placed by GI. Attempted to prone but was hypoxic. Propofol now @ 17 mL/hr providing 449 kcal daily. Dillon on board. TF running @ goal rate. Will adjust tnow that propofol dose decreased. Nutrition Related Findings:    non pitting BLE and BUE edema; BM 8/25 via rectal tube Wound Type: None       Current Nutrition Intake & Therapies:    Average Meal Intake: NPO  Average Supplements Intake: NPO  Diet NPO  ADULT TUBE FEEDING; Nasogastric; Peptide Based High Protein; Continuous; 10; Yes; 10; Q 4 hours; 30; 30; Q 4 hours  Current Tube Feeding (TF) Orders:  Feeding Route: Nasogastric  Formula: Standard with Fiber  Schedule: Continuous  Feeding Regimen: @ 30 mL/hr  Additives/Modulars: None  Water Flushes: per provider  Current TF & Flush Orders Provides: 600mL TV, 912 kcal, 131 gm pro, 502 mL free water (includes 3 protienex). Goal TF & Flush Orders Provides: Vital HP @ 50 +2 protienex daily (adjusted for propofol dose). Flush per provider. TF regimen provides: 1000 mL TV, 1208 kcal, 140 gm pro, 836  mL free water (includes 2 proteinex).   Additional Calorie Sources:  propofol @ 17 mL/hr providing 449 kcal daily    Anthropometric Measures:  Height: 5' 7\" (170.2 cm)  Ideal Body Weight (IBW): 148 lbs (67 kg)    Admission Body Weight: 340 lb (154.2 kg)  Current Body Weight: 330 lb (149.7 kg), 223 % IBW. Weight Source: Bed Scale  Current BMI (kg/m2): 51.7        Weight Adjustment For: No Adjustment                 BMI Categories: Obese Class 3 (BMI 40.0 or greater)    Estimated Daily Nutrient Needs:  Energy Requirements Based On: Kcal/kg  Weight Used for Energy Requirements: Ideal  Energy (kcal/day): 3714-0027 (22-25kcal/67kg)  Weight Used for Protein Requirements: Ideal  Protein (g/day): 134-168 (2-2.5kcal/67kg)  Method Used for Fluid Requirements: Other (Comment)  Fluid (ml/day): per provider    Nutrition Diagnosis:   Inadequate oral intake related to impaired respiratory function as evidenced by intubation    Nutrition Interventions:   Food and/or Nutrient Delivery: Modify Tube Feeding  Nutrition Education/Counseling: Education not indicated  Coordination of Nutrition Care: Continue to monitor while inpatient       Goals:  Previous Goal Met: Progressing toward Goal(s)  Goals: Tolerate nutrition support at goal rate       Nutrition Monitoring and Evaluation:   Behavioral-Environmental Outcomes: None Identified  Food/Nutrient Intake Outcomes: Enteral Nutrition Intake/Tolerance, IVF Intake  Physical Signs/Symptoms Outcomes: Biochemical Data, Chewing or Swallowing, GI Status, Nausea or Vomiting, Fluid Status or Edema, Hemodynamic Status, Weight    Discharge Planning:     Too soon to determine     Andrea Kahn RD, JALEN  Contact: [phone]

## 2022-08-25 NOTE — PROGRESS NOTES
Physical Therapy  PT referral received and chart reviewed. Spoke with nursing who reports Critical Care request PT for PROM. PT initially eval pt 8/18/2022; wife educated in ROM Exs at that time. Request reconsult when medically stable to pursue functional activities. Will sign off at this time.   Cleatis Galeazzi, PT

## 2022-08-25 NOTE — PLAN OF CARE
Problem: Nutrition Deficit:   Goal: Optimize nutritional status  8/25/2022 1821 by Silvia Madrigal RN  Outcome: Progressing  Pt remains intubated continues with NG tube feed at goal rate and tolerating to present time, see flowsheet for intake. Problem: Pain  Goal: Verbalizes/displays adequate comfort level or baseline comfort level  Outcome: Progressing  Pt remains intubated to present time on fentanyl drip and denies pain. Problem: ABCDS Injury Assessment  Goal: Absence of physical injury  Outcome: Progressing  Flowsheets (Taken 8/25/2022 1330)  Absence of Physical Injury: Implement safety measures based on patient assessment     Problem: Safety - Adult  Goal: Free from fall injury  Outcome: Progressing  Flowsheets (Taken 8/25/2022 1330)  Free From Fall Injury: Instruct family/caregiver on patient safety  Falling star program remains in place, call light within reach, wife at bedside. Frequent visual monitoring continues. Problem: Skin/Tissue Integrity  Goal: Absence of new skin breakdown  Description: 1. Monitor for areas of redness and/or skin breakdown  2. Assess vascular access sites hourly  Outcome: Progressing  Monitoring pt for skin breakdown, turning and repositioning with pillow support per protocol. Problem: Safety - Medical Restraint  Goal: Remains free of injury from restraints (Restraint for Interference with Medical Device)  Description: INTERVENTIONS:  1. Determine that other, less restrictive measures have been tried or would not be effective before applying the restraint  2. Evaluate the patient's condition at the time of restraint application  3. Inform patient/family regarding the reason for restraint  4.  Q2H: Monitor safety, psychosocial status, comfort, nutrition and hydration  Outcome: Progressing  Flowsheets (Taken 8/25/2022 0915)  Remains free of injury from restraints (restraint for interference with medical device): Every 2 hours: Monitor safety, psychosocial status, comfort, nutrition and hydration. Continues with bilateral soft wrist restraints, see flowsheets. Problem: Neurosensory - Adult  Goal: Achieves stable or improved neurological status  Outcome: Progressing  Flowsheets (Taken 8/25/2022 0830)  Achieves stable or improved neurological status:   Assess for and report changes in neurological status   Maintain blood pressure and fluid volume within ordered parameters to optimize cerebral perfusion and minimize risk of hemorrhage  Goal: Absence of seizures  Outcome: Progressing  Flowsheets (Taken 8/25/2022 0830)  Absence of seizures: Monitor for seizure activity.   If seizure occurs, document type and location of movements and any associated apnea  Goal: Remains free of injury related to seizures activity  Outcome: Progressing  Flowsheets (Taken 8/25/2022 0830)  Remains free of injury related to seizure activity: Maintain airway, patient safety  and administer oxygen as ordered  Goal: Achieves maximal functionality and self care  Outcome: Progressing  Flowsheets (Taken 8/25/2022 0830)  Achieves maximal functionality and self care: Monitor swallowing and airway patency with patient fatigue and changes in neurological status     Problem: Skin/Tissue Integrity - Adult  Goal: Skin integrity remains intact  Outcome: Progressing  Flowsheets  Taken 8/25/2022 1330  Skin Integrity Remains Intact: Monitor for areas of redness and/or skin breakdown  Taken 8/25/2022 0830  Skin Integrity Remains Intact: Monitor for areas of redness and/or skin breakdown     Problem: Musculoskeletal - Adult  Goal: Return mobility to safest level of function  Outcome: Progressing  Flowsheets (Taken 8/25/2022 0830)  Return Mobility to Safest Level of Function: Assess patient stability and activity tolerance for standing, transferring and ambulating with or without assistive devices  Goal: Return ADL status to a safe level of function  Outcome: Progressing  Flowsheets (Taken 8/25/2022 0830)  Return ADL Status to a Safe Level of Function: Administer medication as ordered     Problem: Gastrointestinal - Adult  Goal: Minimal or absence of nausea and vomiting  Outcome: Progressing  Flowsheets (Taken 8/25/2022 0830)  Minimal or absence of nausea and vomiting: Administer IV fluids as ordered to ensure adequate hydration  Goal: Maintains or returns to baseline bowel function  Outcome: Progressing  Flowsheets (Taken 8/25/2022 0830)  Maintains or returns to baseline bowel function: Assess bowel function  Goal: Maintains adequate nutritional intake  Outcome: Progressing  Flowsheets (Taken 8/25/2022 0830)  Maintains adequate nutritional intake: Monitor intake and output, weight and lab values     Problem: Genitourinary - Adult  Goal: Absence of urinary retention  Outcome: Progressing  Flowsheets (Taken 8/25/2022 0830)  Absence of urinary retention: Monitor intake/output and perform bladder scan as needed  Goal: Urinary catheter remains patent  Outcome: Progressing  Flowsheets (Taken 8/25/2022 0830)  Urinary catheter remains patent: Assess patency of urinary catheter     Problem: Anxiety  Goal: Will report anxiety at manageable levels  Description: INTERVENTIONS:  1. Administer medication as ordered  2. Teach and rehearse alternative coping skills  3. Provide emotional support with 1:1 interaction with staff  Outcome: Progressing  Flowsheets (Taken 8/25/2022 0830)  Will report anxiety at manageable levels: Administer medication as ordered     Problem: Coping  Goal: Pt/Family able to verbalize concerns and demonstrate effective coping strategies  Description: INTERVENTIONS:  1. Assist patient/family to identify coping skills, available support systems and cultural and spiritual values  2. Provide emotional support, including active listening and acknowledgement of concerns of patient and caregivers  3. Reduce environmental stimuli, as able  4. Instruct patient/family in relaxation techniques, as appropriate  5.  Assess for spiritual pain/suffering and initiate Spiritual Care, Psychosocial Clinical Specialist consults as needed  Outcome: Progressing  Flowsheets (Taken 8/25/2022 0830)  Patient/family able to verbalize anxieties, fears, and concerns, and demonstrate effective coping: Provide emotional support, including active listening and acknowledgement of concerns of patient and caregivers     Problem: Change in Body Image  Goal: Pt/Family communicate acceptance of loss or change in body image and feel psychological comfort and peace  Description: INTERVENTIONS:  1. Assess patient/family anxiety and grief process related to change in body image, loss of functional status, loss of sense of self, and forgiveness  2. Provide emotional and spiritual support  3. Provide information about the patient's health status with consideration of family and cultural values  4. Communicate willingness to discuss loss and facilitate grief process with patient/family as appropriate  5. Emphasize sustaining relationships within family system and community, or aurora/spiritual traditions  6. Initiate Spiritual Care, Psychosocial Clinical Specialist consult as needed  Outcome: Progressing  Flowsheets (Taken 8/25/2022 0830)  Patient/family communicate acceptance of loss or change in body image and feel psychological comfort and peace:   Assess patient/family anxiety and grief process related to change in body image, loss of functional status, loss of sense of self, and forgiveness   Provide information about the patients health status with consideration of family and cultural values   Provide emotional and spiritual support     Problem: Decision Making  Goal: Pt/Family able to effectively weigh alternatives and participate in decision making related to treatment and care  Description: INTERVENTIONS:  1. Determine when there are differences between patient's view, family's view, and healthcare provider's view of condition  2.  Facilitate patient and family articulation of goals for care  3. Help patient and family identify pros/cons of alternative solutions  4. Provide information as requested by patient/family  5. Respect patient/family right to receive or not to receive information  6. Serve as a liaison between patient and family and health care team  7. Initiate Consults from Ethics, Palliative Care or initiate [address] [place] [address] as is appropriate  Outcome: Progressing  Flowsheets (Taken 8/25/2022 0830)  Patient/family able to effectively weigh alternatives and participate in decision making related to treatment and care:   Determine when there are differences between patient's view, family's view, and healthcare provider's view of condition   Respect patient/family right to receive or not to receive information   Provide information as requested by patient/family     Problem: Behavior  Goal: Pt/Family maintain appropriate behavior and adhere to behavioral management agreement, if implemented  Description: INTERVENTIONS:  1. Assess patient/family's coping skills and  non-compliant behavior (including use of illegal substances)  2. Notify security of behavior or suspected illegal substances which indicate the need for search of the family and/or belongings  3. Encourage verbalization of thoughts and concerns in a socially appropriate manner  4. Utilize positive, consistent limit setting strategies supporting safety of patient, staff and others  5. Encourage participation in the decision making process about the behavioral management agreement  6. If a visitor's behavior poses a threat to safety call refer to organization policy.   7. Initiate consult with , Psychosocial CNS, Spiritual Care as appropriate  Outcome: Progressing  Flowsheets (Taken 8/25/2022 0830)  Patient/family maintains appropriate behavior and adheres to behavioral management agreement, if implemented:   Assess patient/familys coping skills and  non-compliant behavior (including use of illegal substances)   Utilize positive, consistent limit setting strategies supporting safety of patient, staff and others   Encourage verbalization of thoughts and concerns in a socially appropriate manner

## 2022-08-25 NOTE — PROGRESS NOTES
Ventilator circuit changed at this time, along with heater and inspiratory filter. RN @ bedside and bagged pt while circuit was being changed.         08/24/22 2327   Patient Observation   Heart Rate 60   Resp 15   SpO2 96 %   Breath Sounds   Right Upper Lobe Diminished   Right Middle Lobe Diminished   Right Lower Lobe Diminished   Left Upper Lobe Diminished   Left Lower Lobe Diminished   Vent Information   Ventilator ID [id number]   Equipment Changed (S)  Vent Circuit  (heater, and filter changed as well)   Vent Mode AC/PC   Ventilator Settings   FiO2  80 %   Resp Rate (Set) 15 bmp   PEEP/CPAP (cmH2O) 12   Pressure Ordered 18   Vent Patient Data (Readings)   Vt (Measured) 696 mL   Peak Inspiratory Pressure (cmH2O) 31 cmH2O   Rate Measured 15 br/min   Minute Volume (L/min) 10.5 Liters   Mean Airway Pressure (cmH2O) 17 cmH20   Plateau Pressure (cm H2O) 28 cm H2O   I:E Ratio 1:3.00   I Time/ I Time % 1 s   Vent Alarm Settings   Low Minute Volume (lpm) 3 L/min   High Minute Volumn (lpm) 20 L/min   Low Exhaled Vt (ml) 100 mL   High Exhaled Vt (ml) 1000 mL   RR High (bpm) 40 br/min   Apnea (secs) 20 secs   Additional Respiratoray Assessments   Humidification Source Heated wire   Humidification Temp 37   Circuit Condensation Drained   Ambu Bag With Mask At Bedside Yes   Airway Clearance   Suction ET Tube;Oral   Subglottic Suction Done Yes   Suction Device Inline suction catheter   Sputum Method Obtained Endotracheal   Sputum Color/Odor Yellow   Sputum Consistency Thick   ETT (adult)   Placement Date: 08/08/22   Airway Tube Size: 7.5 mm  Secured At: 25 cm  Measured From: Lips   Secured At 24 cm   Measured From Lips   ETT Placement (S)  Left   Secured By Commercial tube perez   Site Assessment Dry   Cuff Pressure 30 cm H2O   Skin Assessment   Skin Assessment Clean, dry, & intact   Ventilator Associated Pneumonia Bundle   Elevation of Head of Bed to 30-45 Degrees  Yes   Oral Care Completed Yes   Oral Care Performed Mouthwash;Mouth swabbed;Mouth suctioned   Oral Suctioning Yes   ETT/Trach Suctioning Yes

## 2022-08-25 NOTE — PROGRESS NOTES
Pulmonary Progress Note    Date of Admission: 8/5/2022   LOS: 20 days       CC:  Chief Complaint   Patient presents with    Abdominal Pain    Chest Pain     Sub-sternal, pressure, onset 1300 hours today    Shortness of Breath     EMS reports pt 88% on RA        Subjective:  Hypoxemia chest x-ray improving    ROS:        Assessment:     History of recurrent SVT  Obesity with sleep apnea and obesity hypoventilation syndrome    Plan: This note may have been transcribed using [id number]. Please disregard any translational errors. Hospital Day: 20     Atelectasis versus pneumonia with acute hypoxemic respiratory failure after aspiration  Completed 2 rounds of antibiotics prior to this  Wife is able to get access to [address] records from approximately 1 month ago. He had abnormal chest x-ray with bibasilar opacities. Culture positive for WBCs 1+ yeast.  Unlikely to be true pathogen. Continue empiric antibiotics despite normal white count and low procalcitonin. Follow culture. Neutrophilia may be secondary to residual from prior infection versus   Cultures difficult to know what to do with this. Greater than 100,000 bacteria \"normal amarilys \". He is improving with antibiotics and steroids. Infection and cryptogenic or pneumonia still on the table. Since he is improving, continue both. Partial small bowel obstruction  Resolved. Back on tube feeds. History of diastolic CHF  Volume status controlled. Renal status improved.         Prophylaxis  - GI - pepcid  - DVT -   lovenox         Nutrition  - Diet NPO  ADULT TUBE FEEDING; Nasogastric; Peptide Based High Protein; Continuous; 10; Yes; 10; Q 4 hours; 30; 30; Q 4 hours  -   Intake/Output Summary (Last 24 hours) at 8/25/2022 0916  Last data filed at 8/25/2022 0552  Gross per 24 hour   Intake 3814.69 ml   Output 2335 ml   Net 1479.69 ml         Mobility       Access  Arterial      PICC         PICC Triple Lumen 19/97/89 Right Basilic (Active)   Central Line Being Utilized Yes 08/25/22 0600   Criteria for Appropriate Use Hemodynamically unstable, requiring monitoring lines, vasopressors, or volume resuscitation 08/25/22 0600   Site Assessment Clean, dry & intact 08/25/22 0600   Phlebitis Assessment No symptoms 08/25/22 0600   Infiltration Assessment 0 08/25/22 0600   Extremity Circumference (cm) 38 cm 08/08/22 0607   External Catheter Length (cm) 0 cm 08/08/22 0607   Proximal Lumen Color/Status Red; Infusing;Capped 08/25/22 0600   Medial Lumen Status White; Infusing;Capped 08/25/22 0600   Distal Lumen Color/Status Priscella Coil; Infusing;Capped 08/25/22 0600   Line Care Connections checked and tightened 08/21/22 1800   Alcohol Cap Used Yes 08/21/22 0600   Date of Last Dressing Change 08/17/22 08/25/22 0600   Dressing Type Transparent; Antimicrobial 08/25/22 0600   Dressing Status Clean, dry & intact 08/25/22 0600   Dressing Intervention New;Dressing changed 08/17/22 0400   Number of days: 17     CVC                     I spent 32 minutes of critical care time with this patient excluding any procedures. Data:        PHYSICAL EXAM:   Blood pressure (!) 88/56, pulse 61, temperature 97.9 °F (36.6 °C), temperature source Oral, resp. rate 15, height 5' 7\" (1.702 m), weight (!) 330 lb 4.8 oz (149.8 kg), SpO2 93 %.'  Body mass index is 51.73 kg/m². Gen: No distress. ENT:   Resp: No accessory muscle use. No crackles. No wheezes. No rhonchi. CV: Regular rate. Regular rhythm. No murmur or rub. No edema. GI high-pitched bowel sounds  Skin: Warm, dry, normal texture and turgor. No nodule on exposed extremities. M/S: No cyanosis. No clubbing. No joint deformity.   Psych: sedated      Medications:    Scheduled Meds:   calcium gluconate-NaCl  1,000 mg IntraVENous Once    potassium chloride  20 mEq IntraVENous Once    [Held by provider] vancomycin  1,500 mg IntraVENous Once    heparin (porcine)  5,000 Units SubCUTAneous 3 times per day    metroNIDAZOLE  500 mg IntraVENous Q8H    cefepime  2,000 mg IntraVENous Q8H    vancomycin (VANCOCIN) intermittent dosing (placeholder)   Other RX Placeholder    methylPREDNISolone  40 mg IntraVENous Q12H    mupirocin   Nasal BID    gabapentin  400 mg Oral TID    docusate  100 mg Oral Daily    senna  10 mL Oral Nightly    polyethylene glycol  17 g Oral Daily    [Held by provider] metoprolol tartrate  25 mg Oral BID    famotidine (PEPCID) injection  20 mg IntraVENous BID    chlorhexidine  15 mL Mouth/Throat BID    ipratropium-albuterol  1 ampule Inhalation Q4H WA    sodium chloride flush  5-40 mL IntraVENous 2 times per day    levothyroxine  200 mcg Oral Daily    [Held by provider] spironolactone  50 mg Oral Daily       Continuous Infusions:   fentaNYL 200 mcg/hr (08/25/22 0614)    propofol 20 mcg/kg/min (08/25/22 0552)    phenylephrine (REHANA-SYNEPHRINE) 50mg/250mL infusion 5 mcg/min (08/25/22 0552)    [Held by provider] furosemide Stopped (08/23/22 0945)    sodium chloride         PRN Meds:  fentanNYL, midazolam, artificial tears, acetaminophen, potassium chloride, acetaminophen, sodium chloride flush, sodium chloride, ondansetron **OR** ondansetron, diazePAM    Labs reviewed:  CBC:   Recent Labs     08/23/22  0355 08/24/22  0522 08/25/22  0355   WBC 8.7 10.5 10.0   HGB 10.0* 9.5* 9.4*   HCT 30.7* 29.2* 28.9*   MCV 94.0 93.9 94.7   * 501* 448       BMP:   Recent Labs     08/23/22  0355 08/23/22  1435 08/24/22  0522 08/24/22  1357 08/24/22 2015 08/25/22  0354   *   < > 140 135* 141 142   K 3.4*   < > 3.7 3.4* 4.0 3.8      < > 102 101 106 108   CO2 27   < > 23 22 25 24   PHOS 3.6  --  3.7  --   --  3.1   BUN 42*   < > 53* 42* 50* 43*   CREATININE 1.6*   < > 1.2 1.0 1.1 1.1    < > = values in this interval not displayed. LIVER PROFILE: No results for input(s): AST, ALT, LIPASE, BILIDIR, BILITOT, ALKPHOS in the last 72 hours. Invalid input(s):   AMYLASE,  ALB  PT/INR: No results for input(s): PROTIME, INR in the last 72 hours. APTT:   Recent Labs     08/23/22  1435 08/23/22  2130 08/24/22  0317   APTT 34.1 80.0* 83.3*       UA:No results for input(s): NITRITE, COLORU, PHUR, LABCAST, WBCUA, RBCUA, MUCUS, TRICHOMONAS, YEAST, BACTERIA, CLARITYU, SPECGRAV, LEUKOCYTESUR, UROBILINOGEN, BILIRUBINUR, BLOODU, GLUCOSEU, AMORPHOUS in the last 72 hours. Invalid input(s): Alli Barajas  No results for input(s): PH, PCO2, PO2 in the last 72 hours. Cx:      Films:  Radiology Review:  Pertinent images / reports were reviewed as a part of this visit. CT Chest w/ contrast: No results found for this or any previous visit. CT Chest w/o contrast: Results for orders placed during the hospital encounter of 08/05/22    CT CHEST WO CONTRAST    Narrative  EXAMINATION:  CT OF THE CHEST WITHOUT CONTRAST 8/21/2022 9:43 am    TECHNIQUE:  CT of the chest was performed without the administration of intravenous  contrast. Multiplanar reformatted images are provided for review. Automated  exposure control, iterative reconstruction, and/or weight based adjustment of  the mA/kV was utilized to reduce the radiation dose to as low as reasonably  achievable. COMPARISON:  08/05/2022    HISTORY:  ORDERING SYSTEM PROVIDED HISTORY: worsening  hypoxemia  TECHNOLOGIST PROVIDED HISTORY:  Reason for exam:->worsening  hypoxemia  Reason for Exam: worsening hypoxemia    FINDINGS:  Mediastinum: Tip of ET tube seen in midthoracic trachea    Small pericardial effusion is seen. Thickness measures approximately 8 mm. Large hiatal hernia is seen. Small mediastinal and hilar nodes are noted. Tip of PICC is in the region of the proximal SVC. Lungs/pleura: Respiratory motion limits evaluation of fine pulmonary  parenchymal change. Small left-sided pleural effusion is seen. There is  adjacent left basilar consolidation. There is adjacent consolidation of the  left upper lobe. Septal thickening is seen    Small right-sided pleural effusion is seen.   There is

## 2022-08-25 NOTE — PROGRESS NOTES
[facility]                                               Progress note    Summary:   Wayne Hill is being seen by nephrology for CHARLENE and hypernatremia. Admitted with paraesophageal hernia and SBO. Had EGD 8/9 with biopsy. Still NPO     Interval History  Patient was seen and examined at bedside  He is intubated and on sedation. He awakens and moves his extremities spontaneously. Blood pressure is 88/56 on lona 5 micrograms  Satting 95% on 80% FiO2 and PEEP of 12  Urine output 2.2 L yesterday and +1.4 L for the day. Weight 340 on admission> 319 yesterday> 330 today    Labs reviewed  Sodium 142 potassium 3.8 bicarb 24 BUN 43 creatinine 1.1 magnesium 2.6  Phosphorus 3.1 calcium 7.7  Hemoglobin 9.4    Plan:   Renal function is stable and decent UO over 2 L without diuretics. No acute electrolyte issues  Daily intake was 3.8 L yesterday and positive fluid balance past day   Will hold off on diuretics today but will need some soon if acquiring positive fluid balance every day. Has persistent hypoxia, CTPA showed no PE but did show cardiomegaly, pulmonary edema and bilateral effusions. Has G2DD and normal EF on 8/6. The pulmonary artery was not well visualized     The imaging does suggest volume overload still but clinically hard to really discern his volume status. May need to consider RHC and repeating the echo       Maryellen Armendariz MD  [facility]  Office: [phone]    Assessment:   Acute kidney injury  Urine Na < 20    creatinine on admission 1   next day on 08/06/2022 was 0.8  suddenly jumped up to 2.7. Due to episode of severe hypotension with blood pressure drop 78/44. also got CT with IV contrast on 08/05/2022 but most likely  this is ATN related due to hypotension and hypercapnic  respiratory failure. CT angio s normal renal arteries and normal kidneys      Sepsis/ possible septic shock.   emperature was 101.1   High white cell count. '  immunocompromised due to rheumatoid arthritis and treatment. History of sleep apnea   on CPAP. Acute respiratory failure with high pCO2 in the range of 50s with pH  of 7.24    now intubated. History of abnormal stress test in 06/2022 with stress-induced  ischemia and some scarring. Ejection fraction  was  64%.     had a followup angiogram which was reported as normal as per wife. I do not have that result     History of rheumatoid arthritis   longstanding,was on methotrexate,  has taken Celebrex and now on Inflectra infusions,  so he is immunocompromised. [facility] is now helping him with his rheumatoid tremendously. he has been on steroids in the past.     Rule out adrenal insufficiency. cortisol level. normal      History of thyroidectomy in the past. .    Small bowel obstruction. Large hiatus hernia with colon and stomach in the chest.  Further management as per medical team.       ROS:   Unable to assess      PMH:   Past medical history, surgical history, social history, family history are reviewed and updated as appropriate. Reviewed current medication list.   Allergies reviewed and updated as needed. PE:   Vitals:    08/25/22 0715   BP: (!) 88/56   Pulse: 51   Resp: 15   Temp:    SpO2: 95%       General appearance:  in NAD, intubated and sedated  HEENT: EOM intact, no icterus. Trachea is midline. Neck : No masses, appears symmetrical  Respiratory: Respiratory effort appears normal, bilateral equal chest rise, no wheeze, no crackles ETT to vent   Cardiovascular: Ausculation shows RRR trace edema  Abdomen: soft non distended  Musculoskeletal:  Joints with no swelling or deformity. Skin:no rashes, ulcers, induration, no jaundice.    Neuro: sedated      Lab Results   Component Value Date    CREATININE 1.1 08/25/2022    BUN 43 (H) 08/25/2022     08/25/2022    K 3.8 08/25/2022     08/25/2022    CO2 24 08/25/2022      Lab Results   Component Value Date    WBC 10.0 08/25/2022    HGB 9.4 (L) 08/25/2022 HCT 28.9 (L) 08/25/2022    MCV 94.7 08/25/2022     08/25/2022     Lab Results   Component Value Date    CALCIUM 7.7 (L) 08/25/2022    CAION 1.06 (L) 08/15/2022    PHOS 3.1 08/25/2022

## 2022-08-25 NOTE — PROGRESS NOTES
Physical Therapy     PT attempt cont Rx per POC. Per RN, continue to hold PT d/t pt medical status. Pt has been on hold the past few days d/t medical status. Will sign off at this time. Please reconsult PT when status improves enable pt rosa and ability to participate with PT POC.   Thank you, Jordi Lujan, PT

## 2022-08-25 NOTE — PROGRESS NOTES
Clinical Pharmacy Note  Vancomycin Consult    Seth Cordero is a 63 y.o. male ordered Vancomycin; consult received from DRE Matthews to manage therapy. Also receiving cefepime and metronidazole. Allergies:  Codeine     Temp max:  Temp (24hrs), Av.1 °F (36.7 °C), Min:97.5 °F (36.4 °C), Max:98.6 °F (37 °C)      Recent Labs     22  0355 22  0522 22  0355   WBC 8.7 10.5 10.0         Recent Labs     22  0354 22  1446   BUN 50* 43* 46*   CREATININE 1.1 1.1 0.9           Intake/Output Summary (Last 24 hours) at 2022 1647  Last data filed at 2022 1449  Gross per 24 hour   Intake 4930.29 ml   Output 2670 ml   Net 2260.29 ml         Culture Results:  pending    Ht Readings from Last 1 Encounters:   22 5' 7\" (1.702 m)        Wt Readings from Last 1 Encounters:   22 (!) 330 lb 4.8 oz (149.8 kg)         Estimated Creatinine Clearance: 123 mL/min (based on SCr of 0.9 mg/dL). Assessment/Plan:  Day # 3 of vancomycin. Random vancomycin level 7.4 mg/L. Vancomycin 1500 mg IV x 1. Random vancomycin level tomorrow in AM    Thank you for the consult.      Jerrica Murray, [facility], PharmD, BCCCP

## 2022-08-25 NOTE — PROGRESS NOTES
Hospitalist Progress Note  8/25/2022 9:43 AM    PCP: Smooth Tracy MD    [phone]     Date of Admission: 8/5/2022                                                                                                                     HOSPITAL COURSE    Patient demographics:  The patient  Maki Rivera is a 63 y.o. male     Significant past medical history:   Patient Active Problem List   Diagnosis    Partial bowel obstruction (HCC)    Hiatal hernia    Acute respiratory failure with hypoxia and hypercapnia (HCC)    SBO (small bowel obstruction) (HCC)    Aspiration into airway    Acute respiratory failure with hypoxia (HCC)    Small bowel obstruction (HCC)    Diastolic heart failure (HCC)    History of atrial fibrillation    Supraventricular tachycardia (HCC)    Arterial hypotension    Aspiration pneumonia of both lower lobes due to gastric secretions ([address]    Morbid obesity with BMI of 50.0-59.9, adult ([facility] [address]         Presenting symptoms:  abdominal pain    Diagnostic workup:      CONSULTS DURING ADMISSION :   IP CONSULT TO GENERAL SURGERY  IP CONSULT TO HOSPITALIST  IP CONSULT TO GENERAL SURGERY  IP CONSULT TO DIETITIAN  IP CONSULT TO CARDIOLOGY  IP CONSULT TO GI  IP CONSULT TO PALLIATIVE CARE  PHARMACY TO DOSE VANCOMYCIN      Patient was diagnosed with:  Low grade or Partial Bowel obstruction  Pneumonia and septic shock. Hiatal hernia  Atrial Fibrillation      Treatment while inpatient:  63years old male with medical history significant for morbid obesity, paraesophageal hernia. Patient presented to the emergency room with abdominal pain nausea vomiting and diarrhea. Patient was diagnosed with small bowel obstruction. Patient was also diagnosed with pneumonia and septic shock. Patient developed acute respiratory failure and acute renal failure. Managed on the ventilator with a prolonged course in ICU. Renal function improved. ----------------------------------------------------------      SUBJECTIVE COMPLAINTS- follow up for abdominal pain    Diet: Diet NPO  ADULT TUBE FEEDING; Nasogastric; Peptide Based High Protein; Continuous; 10; Yes; 10; Q 4 hours; 30; 30; Q 4 hours      OBJECTIVE:   Patient Active Problem List   Diagnosis    Partial bowel obstruction (HCC)    Hiatal hernia    Acute respiratory failure with hypoxia and hypercapnia (HCC)    SBO (small bowel obstruction) (HCC)    Aspiration into airway    Acute respiratory failure with hypoxia (HCC)    Small bowel obstruction (HCC)    Diastolic heart failure (HCC)    History of atrial fibrillation    Supraventricular tachycardia (HCC)    Arterial hypotension    Aspiration pneumonia of both lower lobes due to gastric secretions (HCC)    Morbid obesity with BMI of 50.0-59.9, adult (HCC)       Allergies  Codeine    Medications    Scheduled Meds:   calcium gluconate-NaCl  1,000 mg IntraVENous Once    potassium chloride  20 mEq IntraVENous Once    heparin (porcine)  5,000 Units SubCUTAneous 3 times per day    metroNIDAZOLE  500 mg IntraVENous Q8H    cefepime  2,000 mg IntraVENous Q8H    vancomycin (VANCOCIN) intermittent dosing (placeholder)   Other RX Placeholder    methylPREDNISolone  40 mg IntraVENous Q12H    mupirocin   Nasal BID    gabapentin  400 mg Oral TID    [Held by provider] metoprolol tartrate  25 mg Oral BID    famotidine (PEPCID) injection  20 mg IntraVENous BID    chlorhexidine  15 mL Mouth/Throat BID    ipratropium-albuterol  1 ampule Inhalation Q4H WA    sodium chloride flush  5-40 mL IntraVENous 2 times per day    levothyroxine  200 mcg Oral Daily    [Held by provider] spironolactone  50 mg Oral Daily     Continuous Infusions:   fentaNYL 200 mcg/hr (08/25/22 0614)    propofol 20 mcg/kg/min (08/25/22 0552)    phenylephrine (REHANA-SYNEPHRINE) 50mg/250mL infusion 5 mcg/min (08/25/22 0552)    [Held by provider] furosemide Stopped (08/23/22 0945)    sodium chloride       PRN Meds:  senna, polyethylene glycol, docusate, fentanNYL, midazolam, artificial tears, acetaminophen, potassium chloride, acetaminophen, sodium chloride flush, sodium chloride, ondansetron **OR** ondansetron, diazePAM    Vitals   Vitals /wt Patient Vitals for the past 8 hrs:   BP Temp Temp src Pulse Resp SpO2 Height Weight   08/25/22 0832 -- -- -- 61 15 93 % -- --   08/25/22 0730 -- -- -- 58 -- -- -- --   08/25/22 0715 (!) 88/56 -- -- 51 15 95 % -- --   08/25/22 0700 (!) 90/58 -- -- 51 15 95 % -- --   08/25/22 0645 (!) 94/59 -- -- (!) 49 15 94 % -- --   08/25/22 0630 94/60 -- -- 50 15 95 % -- --   08/25/22 0615 (!) 92/57 -- -- 51 15 95 % -- --   08/25/22 0600 (!) 96/59 -- -- 51 15 95 % -- --   08/25/22 0545 94/60 -- -- 52 15 95 % -- --   08/25/22 0530 (!) 91/57 -- -- 54 15 95 % -- --   08/25/22 0515 94/60 -- -- 54 15 95 % 5' 7\" (1.702 m) (!) 330 lb 4.8 oz (149.8 kg)   08/25/22 0500 (!) 98/59 -- -- 56 15 96 % -- --   08/25/22 0445 97/64 -- -- 58 15 -- -- --   08/25/22 0430 112/70 -- -- 57 15 96 % -- --   08/25/22 0415 120/77 -- -- 62 15 97 % -- --   08/25/22 0400 116/77 97.9 °F (36.6 °C) Oral 61 15 96 % -- --   08/25/22 0345 122/89 -- -- 62 16 96 % -- --   08/25/22 0330 (!) 106/95 -- -- 61 13 98 % -- --   08/25/22 0328 -- -- -- (!) 48 15 97 % -- --   08/25/22 0315 106/74 -- -- (!) 48 15 97 % -- --   08/25/22 0300 104/69 -- -- (!) 49 15 96 % -- --   08/25/22 0245 108/73 -- -- (!) 47 15 95 % -- --   08/25/22 0230 109/77 -- -- (!) 47 15 95 % -- --   08/25/22 0215 108/75 -- -- (!) 49 15 95 % -- --   08/25/22 0200 108/75 -- -- (!) 48 15 96 % -- --   08/25/22 0145 107/77 -- -- (!) 47 15 95 % -- --        72HR INTAKE/OUTPUT:    Intake/Output Summary (Last 24 hours) at 8/25/2022 0943  Last data filed at 8/25/2022 1420  Gross per 24 hour   Intake 3814.69 ml   Output 2335 ml   Net 1479.69 ml       Exam:    Gen:  intubated and sedated  Eyes: PERRL. No sclera icterus. No conjunctival injection. ENT: No discharge. Pharynx clear. External appearance of ears and nose normal.  Neck: Trachea midline. No obvious mass. Resp: No accessory muscle use. No crackles. No wheezes. No rhonchi. CV: Regular rate. Regular rhythm. No murmur or rub. No edema. GI: Non-tender. Non-distended. No hernia. Skin: Warm, dry, normal texture and turgor. Lymph: No cervical LAD. No supraclavicular LAD. M/S: / Ext. No cyanosis. No clubbing. No joint deformity. Neuro: CN 2-12 are intact,  no neurologic deficits noted. PT/INR:   No results for input(s): PROTIME, INR in the last 72 hours. APTT:   Recent Labs     08/23/22  1435 08/23/22  2130 08/24/22  0317   APTT 34.1 80.0* 83.3*       CBC:   Recent Labs     08/23/22  0355 08/24/22  0522 08/25/22  0355   WBC 8.7 10.5 10.0   HGB 10.0* 9.5* 9.4*   HCT 30.7* 29.2* 28.9*   MCV 94.0 93.9 94.7   * 501* 448       BMP:   Recent Labs     08/23/22  0355 08/23/22  1435 08/24/22  0522 08/24/22  1357 08/24/22 2015 08/25/22  0354   *   < > 140 135* 141 142   K 3.4*   < > 3.7 3.4* 4.0 3.8      < > 102 101 106 108   CO2 27   < > 23 22 25 24   PHOS 3.6  --  3.7  --   --  3.1   BUN 42*   < > 53* 42* 50* 43*   CREATININE 1.6*   < > 1.2 1.0 1.1 1.1    < > = values in this interval not displayed. LIVER PROFILE:   No results for input(s): ALKPHOS, AST, ALT, ALB, BILIDIR, BILITOT, ALKPHOS in the last 72 hours. No results for input(s): AMYLASE in the last 72 hours. No results for input(s): LIPASE in the last 72 hours. UA:  No results for input(s): NITRITE, LABCAST, WBCUA, RBCUA, MUCUS in the last 72 hours. TROPONIN:   No results for input(s): Rachael Curiel in the last 72 hours. Lab Results   Component Value Date/Time    URRFLXCULT Not Indicated 08/15/2022 09:16 AM       No results for input(s): TSHREFLEX in the last 72 hours.       No components found for: [id number]  POC GLUCOSE:    Recent Labs     08/24/22  1209 08/24/22  1625 08/24/22  2047 08/24/22  2335 08/25/22  0804   POCGLU 125* 125* 156* 150* 126*     No results for input(s): LABA1C in the last 72 hours. No results found for: LABA1C    Echocardiogram shows normal ejection fraction  Grade 1 diastolic dysfunction      ASSESSMENT AND PLAN    Acute respiratory failure  Intubated 8/8  Patient's respiratory status is not improving  FiO2 to 50%  Pulmonary following      Aspiration pna  Continues to be on cefepime and metronidazole    Acute on chronic diastolic CHF  Patient has grade 1 diastolic dysfunction  Lasix drip discontinued  500 cc fluid bolus  Patient now started on pressors      Partial small Bowel obstruction  improved  Og placed per GI 8/10 came out replacement 8/22  Continue on tube feed    SVT  better  H/o atrial fibrillation  HR controled      CHARLENE  Renal function improved      Hypotention  Continue with pressors and fluid bolus    Hiatal hernia  - CT chest/abd/pelv:   Large hiatal hernia containing much of the stomach which is distended  Og tube today     Atrial Fibrillation  Patient developed the rapid ventricular rate  IV Lopressor  Consult to cardiology     HTN  - monitor blood pressure  - restart home meds when able        Code Status: Full Code        Dispo - cc        The patient and / or the family were informed of the results of any tests, a time was given to answer questions, a plan was proposed and they agreed with plan. Marce Hall MD    This note was transcribed using [id number] Please disregard any translational errors.

## 2022-08-25 NOTE — PLAN OF CARE
Problem: Discharge Planning  Goal: Discharge to home or other facility with appropriate resources  Outcome: Progressing  Flowsheets (Taken 8/25/2022 0000)  Discharge to home or other facility with appropriate resources: Identify barriers to discharge with patient and caregiver     Problem: Pain  Goal: Verbalizes/displays adequate comfort level or baseline comfort level  Outcome: Progressing  Flowsheets (Taken 8/25/2022 0000)  Verbalizes/displays adequate comfort level or baseline comfort level:   Encourage patient to monitor pain and request assistance   Assess pain using appropriate pain scale     Problem: ABCDS Injury Assessment  Goal: Absence of physical injury  Outcome: Progressing  Flowsheets (Taken 8/25/2022 0036)  Absence of Physical Injury: Implement safety measures based on patient assessment     Problem: Safety - Adult  Goal: Free from fall injury  Outcome: Progressing  Flowsheets (Taken 8/25/2022 0036)  Free From Fall Injury: Instruct family/caregiver on patient safety     Problem: Skin/Tissue Integrity  Goal: Absence of new skin breakdown  Description: 1. Monitor for areas of redness and/or skin breakdown  2. Assess vascular access sites hourly  Outcome: Progressing     Problem: Safety - Medical Restraint  Goal: Remains free of injury from restraints (Restraint for Interference with Medical Device)  Description: INTERVENTIONS:  1. Determine that other, less restrictive measures have been tried or would not be effective before applying the restraint  2. Evaluate the patient's condition at the time of restraint application  3. Inform patient/family regarding the reason for restraint  4.  Q2H: Monitor safety, psychosocial status, comfort, nutrition and hydration  Outcome: Progressing     Problem: Nutrition Deficit:  Goal: Optimize nutritional status  Outcome: Progressing     Problem: Neurosensory - Adult  Goal: Achieves stable or improved neurological status  Outcome: Progressing  Flowsheets  Taken 8/25/2022 0000 by Kathy Gallegos RN  Achieves stable or improved neurological status: Assess for and report changes in neurological status  Taken 8/24/2022 2000 by Peter Ariza RN  Achieves stable or improved neurological status:   Assess for and report changes in neurological status   Initiate measures to prevent increased intracranial pressure  Goal: Absence of seizures  Outcome: Progressing  Flowsheets  Taken 8/25/2022 0000 by Kathy Gallegos RN  Absence of seizures: Monitor for seizure activity. If seizure occurs, document type and location of movements and any associated apnea  Taken 8/24/2022 2000 by Peter Ariza RN  Absence of seizures:   If seizure occurs, turn head to side and suction secretions as needed   Monitor for seizure activity.   If seizure occurs, document type and location of movements and any associated apnea  Goal: Remains free of injury related to seizures activity  Outcome: Progressing  Flowsheets  Taken 8/25/2022 0000 by Kathy Gallegos RN  Remains free of injury related to seizure activity: Maintain airway, patient safety  and administer oxygen as ordered  Taken 8/24/2022 2000 by Peter Ariza RN  Remains free of injury related to seizure activity:   Maintain airway, patient safety  and administer oxygen as ordered   Monitor patient for seizure activity, document and report duration and description of seizure to Licensed Independent Practitioner  Goal: Achieves maximal functionality and self care  Outcome: Progressing  Flowsheets  Taken 8/25/2022 0000 by Kathy Gallegos RN  Achieves maximal functionality and self care: Monitor swallowing and airway patency with patient fatigue and changes in neurological status  Taken 8/24/2022 2000 by Peter Ariza RN  Achieves maximal functionality and self care:   Encourage and assist patient to increase activity and self care with guidance from physical therapy/occupational therapy   Monitor swallowing and airway patency with patient fatigue and changes in neurological status     Problem: Respiratory - Adult  Goal: Achieves optimal ventilation and oxygenation  Outcome: Progressing  Flowsheets  Taken 8/25/2022 0000 by Ani Barnes RN  Achieves optimal ventilation and oxygenation: Assess for changes in respiratory status  Taken 8/24/2022 1200 by Norm Villela RN  Achieves optimal ventilation and oxygenation: Assess for changes in respiratory status     Problem: Cardiovascular - Adult  Goal: Maintains optimal cardiac output and hemodynamic stability  Outcome: Progressing  Flowsheets  Taken 8/25/2022 0000 by Ani Barnes RN  Maintains optimal cardiac output and hemodynamic stability: Monitor blood pressure and heart rate  Taken 8/24/2022 1200 by Norm Villela RN  Maintains optimal cardiac output and hemodynamic stability: Monitor urine output and notify Licensed Independent Practitioner for values outside of normal range  Goal: Absence of cardiac dysrhythmias or at baseline  Outcome: Progressing  Flowsheets  Taken 8/25/2022 0000 by Ani Barnes RN  Absence of cardiac dysrhythmias or at baseline:   Monitor cardiac rate and rhythm   Assess for signs of decreased cardiac output  Taken 8/24/2022 1200 by Norm Villela RN  Absence of cardiac dysrhythmias or at baseline: Monitor cardiac rate and rhythm     Problem: Skin/Tissue Integrity - Adult  Goal: Skin integrity remains intact  Outcome: Progressing  Flowsheets  Taken 8/25/2022 0036 by Ani Barnes RN  Skin Integrity Remains Intact: Monitor for areas of redness and/or skin breakdown  Taken 8/25/2022 0000 by Ani Barnes RN  Skin Integrity Remains Intact: Monitor for areas of redness and/or skin breakdown  Taken 8/24/2022 1200 by Norm Villela RN  Skin Integrity Remains Intact: Monitor for areas of redness and/or skin breakdown  Goal: Incisions, wounds, or drain sites healing without S/S of infection  Outcome: Progressing  Flowsheets (Taken 8/25/2022 0000)  Incisions, Wounds, or Drain Sites Healing Without Sign and Symptoms of Infection: ADMISSION and DAILY: Assess and document risk factors for pressure ulcer development  Goal: Oral mucous membranes remain intact  Outcome: Progressing  Flowsheets (Taken 8/25/2022 0000)  Oral Mucous Membranes Remain Intact: Assess oral mucosa and hygiene practices     Problem: Musculoskeletal - Adult  Goal: Return mobility to safest level of function  Outcome: Progressing  Flowsheets (Taken 8/25/2022 0000)  Return Mobility to Safest Level of Function: Assess patient stability and activity tolerance for standing, transferring and ambulating with or without assistive devices  Goal: Maintain proper alignment of affected body part  Outcome: Progressing  Flowsheets (Taken 8/25/2022 0000)  Maintain proper alignment of affected body part: Support and protect limb and body alignment per provider's orders  Goal: Return ADL status to a safe level of function  Outcome: Progressing  Flowsheets (Taken 8/25/2022 0000)  Return ADL Status to a Safe Level of Function: Administer medication as ordered     Problem: Gastrointestinal - Adult  Goal: Minimal or absence of nausea and vomiting  Outcome: Progressing  Flowsheets (Taken 8/25/2022 0000)  Minimal or absence of nausea and vomiting: Administer IV fluids as ordered to ensure adequate hydration  Goal: Maintains or returns to baseline bowel function  Outcome: Progressing  Flowsheets (Taken 8/25/2022 0000)  Maintains or returns to baseline bowel function: Assess bowel function  Goal: Maintains adequate nutritional intake  Outcome: Progressing  Flowsheets (Taken 8/25/2022 0000)  Maintains adequate nutritional intake: Monitor percentage of each meal consumed  Goal: Establish and maintain optimal ostomy function  Outcome: Progressing  Flowsheets (Taken 8/25/2022 0000)  Establish and maintain optimal ostomy function: Monitor output from ostomies     Problem: Genitourinary - Adult  Goal: Absence of urinary retention  Outcome: Progressing  Flowsheets (Taken 8/25/2022 0000)  Absence of urinary retention: Assess patients ability to void and empty bladder  Goal: Urinary catheter remains patent  Outcome: Progressing  Flowsheets (Taken 8/25/2022 0000)  Urinary catheter remains patent: Assess patency of urinary catheter     Problem: Anxiety  Goal: Will report anxiety at manageable levels  Description: INTERVENTIONS:  1. Administer medication as ordered  2. Teach and rehearse alternative coping skills  3. Provide emotional support with 1:1 interaction with staff  Outcome: Progressing  Flowsheets  Taken 8/25/2022 0000 by Isabel Basurto RN  Will report anxiety at manageable levels: Administer medication as ordered  Taken 8/24/2022 2000 by Vlad Rvai RN  Will report anxiety at manageable levels:   Administer medication as ordered   Teach and rehearse alternative coping skills     Problem: Coping  Goal: Pt/Family able to verbalize concerns and demonstrate effective coping strategies  Description: INTERVENTIONS:  1. Assist patient/family to identify coping skills, available support systems and cultural and spiritual values  2. Provide emotional support, including active listening and acknowledgement of concerns of patient and caregivers  3. Reduce environmental stimuli, as able  4. Instruct patient/family in relaxation techniques, as appropriate  5.  Assess for spiritual pain/suffering and initiate Spiritual Care, Psychosocial Clinical Specialist consults as needed  Outcome: Progressing  Flowsheets  Taken 8/25/2022 0000 by Isabel Basurto RN  Patient/family able to verbalize anxieties, fears, and concerns, and demonstrate effective coping: Assist patient/family to identify coping skills, available support systems and cultural and spiritual values  Taken 8/24/2022 2000 by Vlad Ravi RN  Patient/family able to verbalize anxieties, fears, and concerns, and demonstrate effective coping:   Assist patient/family to identify coping skills, available support systems and cultural and spiritual values   Provide emotional support, including active listening and acknowledgement of concerns of patient and caregivers     Problem: Change in Body Image  Goal: Pt/Family communicate acceptance of loss or change in body image and feel psychological comfort and peace  Description: INTERVENTIONS:  1. Assess patient/family anxiety and grief process related to change in body image, loss of functional status, loss of sense of self, and forgiveness  2. Provide emotional and spiritual support  3. Provide information about the patient's health status with consideration of family and cultural values  4. Communicate willingness to discuss loss and facilitate grief process with patient/family as appropriate  5. Emphasize sustaining relationships within family system and community, or [place]/spiritual traditions  6. Initiate Spiritual Care, Psychosocial Clinical Specialist consult as needed  Outcome: Progressing  Flowsheets  Taken 8/25/2022 0000 by Riccardo Romero RN  Patient/family communicate acceptance of loss or change in body image and feel psychological comfort and peace: Assess patient/family anxiety and grief process related to change in body image, loss of functional status, loss of sense of self, and forgiveness  Taken 8/24/2022 2000 by Karthikeyan Salmon RN  Patient/family communicate acceptance of loss or change in body image and feel psychological comfort and peace:   Assess patient/family anxiety and grief process related to change in body image, loss of functional status, loss of sense of self, and forgiveness   Provide emotional and spiritual support     Problem: Decision Making  Goal: Pt/Family able to effectively weigh alternatives and participate in decision making related to treatment and care  Description: INTERVENTIONS:  1. Determine when there are differences between patient's view, family's view, and healthcare provider's view of condition  2.  Facilitate patient and family articulation of goals for care  3. Help patient and family identify pros/cons of alternative solutions  4. Provide information as requested by patient/family  5. Respect patient/family right to receive or not to receive information  6. Serve as a liaison between patient and family and health care team  7. Initiate Consults from Ethics, Palliative Care or initiate [facility] as is appropriate  Outcome: Progressing  Flowsheets  Taken 8/25/2022 0000 by Sammy Crane RN  Patient/family able to effectively weigh alternatives and participate in decision making related to treatment and care: Determine when there are differences between patient's view, family's view, and healthcare provider's view of condition  Taken 8/24/2022 2000 by Eva Walters RN  Patient/family able to effectively weigh alternatives and participate in decision making related to treatment and care:   Determine when there are differences between patient's view, family's view, and healthcare provider's view of condition   Facilitate patient and family articulation of goals for care     Problem: Behavior  Goal: Pt/Family maintain appropriate behavior and adhere to behavioral management agreement, if implemented  Description: INTERVENTIONS:  1. Assess patient/family's coping skills and  non-compliant behavior (including use of illegal substances)  2. Notify security of behavior or suspected illegal substances which indicate the need for search of the family and/or belongings  3. Encourage verbalization of thoughts and concerns in a socially appropriate manner  4. Utilize positive, consistent limit setting strategies supporting safety of patient, staff and others  5. Encourage participation in the decision making process about the behavioral management agreement  6. If a visitor's behavior poses a threat to safety call refer to organization policy.   7. Initiate consult with , Psychosocial CNS, Spiritual Care as appropriate  Outcome: Progressing  Flowsheets  Taken 8/25/2022 0000 by Montana Wagner, RN  Patient/family maintains appropriate behavior and adheres to behavioral management agreement, if implemented: Assess patient/familys coping skills and  non-compliant behavior (including use of illegal substances)  Taken 8/24/2022 2000 by Jacob Olivo RN  Patient/family maintains appropriate behavior and adheres to behavioral management agreement, if implemented:   Assess patient/familys coping skills and  non-compliant behavior (including use of illegal substances)   Notify security of behavior or suspected illegal substances which indicate the need for search of the patient and/or belongings

## 2022-08-26 ENCOUNTER — APPOINTMENT (OUTPATIENT)
Dept: GENERAL RADIOLOGY | Age: 62
DRG: 853 | End: 2022-08-26
Payer: COMMERCIAL

## 2022-08-26 LAB
ANION GAP SERPL CALCULATED.3IONS-SCNC: 7 MMOL/L (ref 3–16)
ANION GAP SERPL CALCULATED.3IONS-SCNC: 9 MMOL/L (ref 3–16)
BASE EXCESS ARTERIAL: -0.3 MMOL/L (ref -3–3)
BASOPHILS ABSOLUTE: 0 K/UL (ref 0–0.2)
BASOPHILS RELATIVE PERCENT: 0.5 %
BUN BLDV-MCNC: 36 MG/DL (ref 7–20)
BUN BLDV-MCNC: 40 MG/DL (ref 7–20)
CALCIUM SERPL-MCNC: 7.8 MG/DL (ref 8.3–10.6)
CALCIUM SERPL-MCNC: 8 MG/DL (ref 8.3–10.6)
CARBOXYHEMOGLOBIN ARTERIAL: 0.6 % (ref 0–1.5)
CHLORIDE BLD-SCNC: 107 MMOL/L (ref 99–110)
CHLORIDE BLD-SCNC: 110 MMOL/L (ref 99–110)
CO2: 23 MMOL/L (ref 21–32)
CO2: 24 MMOL/L (ref 21–32)
CREAT SERPL-MCNC: 0.7 MG/DL (ref 0.8–1.3)
CREAT SERPL-MCNC: 0.8 MG/DL (ref 0.8–1.3)
EOSINOPHILS ABSOLUTE: 0.1 K/UL (ref 0–0.6)
EOSINOPHILS RELATIVE PERCENT: 0.7 %
GFR AFRICAN AMERICAN: >60
GFR AFRICAN AMERICAN: >60
GFR NON-AFRICAN AMERICAN: >60
GFR NON-AFRICAN AMERICAN: >60
GLUCOSE BLD-MCNC: 124 MG/DL (ref 70–99)
GLUCOSE BLD-MCNC: 125 MG/DL (ref 70–99)
GLUCOSE BLD-MCNC: 139 MG/DL (ref 70–99)
GLUCOSE BLD-MCNC: 144 MG/DL (ref 70–99)
HCO3 ARTERIAL: 23.9 MMOL/L (ref 21–29)
HCT VFR BLD CALC: 28.6 % (ref 40.5–52.5)
HEMOGLOBIN, ART, EXTENDED: 17 G/DL (ref 13.5–17.5)
HEMOGLOBIN: 9.4 G/DL (ref 13.5–17.5)
LYMPHOCYTES ABSOLUTE: 0.8 K/UL (ref 1–5.1)
LYMPHOCYTES RELATIVE PERCENT: 9.1 %
MAGNESIUM: 2.5 MG/DL (ref 1.8–2.4)
MCH RBC QN AUTO: 31.1 PG (ref 26–34)
MCHC RBC AUTO-ENTMCNC: 32.9 G/DL (ref 31–36)
MCV RBC AUTO: 94.3 FL (ref 80–100)
METHEMOGLOBIN ARTERIAL: 0.2 %
MONOCYTES ABSOLUTE: 0.4 K/UL (ref 0–1.3)
MONOCYTES RELATIVE PERCENT: 4.4 %
NEUTROPHILS ABSOLUTE: 7.1 K/UL (ref 1.7–7.7)
NEUTROPHILS RELATIVE PERCENT: 85.3 %
O2 SAT, ARTERIAL: 98.6 %
O2 THERAPY: ABNORMAL
PCO2 ARTERIAL: 37.4 MMHG (ref 35–45)
PDW BLD-RTO: 16.9 % (ref 12.4–15.4)
PERFORMED ON: ABNORMAL
PERFORMED ON: ABNORMAL
PH ARTERIAL: 7.41 (ref 7.35–7.45)
PHOSPHORUS: 2 MG/DL (ref 2.5–4.9)
PLATELET # BLD: 407 K/UL (ref 135–450)
PMV BLD AUTO: 7.4 FL (ref 5–10.5)
PO2 ARTERIAL: 115 MMHG (ref 75–108)
POTASSIUM SERPL-SCNC: 3.5 MMOL/L (ref 3.5–5.1)
POTASSIUM SERPL-SCNC: 4.1 MMOL/L (ref 3.5–5.1)
RBC # BLD: 3.04 M/UL (ref 4.2–5.9)
SODIUM BLD-SCNC: 138 MMOL/L (ref 136–145)
SODIUM BLD-SCNC: 142 MMOL/L (ref 136–145)
TCO2 ARTERIAL: 25.1 MMOL/L
VANCOMYCIN RANDOM: 11.1 UG/ML
VANCOMYCIN RANDOM: 11.1 UG/ML
WBC # BLD: 8.3 K/UL (ref 4–11)

## 2022-08-26 PROCEDURE — 94761 N-INVAS EAR/PLS OXIMETRY MLT: CPT

## 2022-08-26 PROCEDURE — 6370000000 HC RX 637 (ALT 250 FOR IP): Performed by: HOSPITALIST

## 2022-08-26 PROCEDURE — 80202 ASSAY OF VANCOMYCIN: CPT

## 2022-08-26 PROCEDURE — 2580000003 HC RX 258: Performed by: INTERNAL MEDICINE

## 2022-08-26 PROCEDURE — 99291 CRITICAL CARE FIRST HOUR: CPT | Performed by: INTERNAL MEDICINE

## 2022-08-26 PROCEDURE — 94640 AIRWAY INHALATION TREATMENT: CPT

## 2022-08-26 PROCEDURE — 6370000000 HC RX 637 (ALT 250 FOR IP): Performed by: NURSE PRACTITIONER

## 2022-08-26 PROCEDURE — 80048 BASIC METABOLIC PNL TOTAL CA: CPT

## 2022-08-26 PROCEDURE — 97530 THERAPEUTIC ACTIVITIES: CPT

## 2022-08-26 PROCEDURE — 82803 BLOOD GASES ANY COMBINATION: CPT

## 2022-08-26 PROCEDURE — 2500000003 HC RX 250 WO HCPCS: Performed by: INTERNAL MEDICINE

## 2022-08-26 PROCEDURE — 83735 ASSAY OF MAGNESIUM: CPT

## 2022-08-26 PROCEDURE — 85025 COMPLETE CBC W/AUTO DIFF WBC: CPT

## 2022-08-26 PROCEDURE — 6370000000 HC RX 637 (ALT 250 FOR IP): Performed by: INTERNAL MEDICINE

## 2022-08-26 PROCEDURE — 2500000003 HC RX 250 WO HCPCS: Performed by: HOSPITALIST

## 2022-08-26 PROCEDURE — 36600 WITHDRAWAL OF ARTERIAL BLOOD: CPT

## 2022-08-26 PROCEDURE — 71045 X-RAY EXAM CHEST 1 VIEW: CPT

## 2022-08-26 PROCEDURE — 6360000002 HC RX W HCPCS: Performed by: INTERNAL MEDICINE

## 2022-08-26 PROCEDURE — 2580000003 HC RX 258: Performed by: NURSE PRACTITIONER

## 2022-08-26 PROCEDURE — 2500000003 HC RX 250 WO HCPCS: Performed by: NURSE PRACTITIONER

## 2022-08-26 PROCEDURE — 94003 VENT MGMT INPAT SUBQ DAY: CPT

## 2022-08-26 PROCEDURE — 2580000003 HC RX 258: Performed by: HOSPITALIST

## 2022-08-26 PROCEDURE — 2700000000 HC OXYGEN THERAPY PER DAY

## 2022-08-26 PROCEDURE — 97110 THERAPEUTIC EXERCISES: CPT

## 2022-08-26 PROCEDURE — 2000000000 HC ICU R&B

## 2022-08-26 PROCEDURE — 84100 ASSAY OF PHOSPHORUS: CPT

## 2022-08-26 PROCEDURE — 6360000002 HC RX W HCPCS: Performed by: NURSE PRACTITIONER

## 2022-08-26 RX ORDER — CELECOXIB 200 MG/1
200 CAPSULE ORAL 2 TIMES DAILY
Status: DISCONTINUED | OUTPATIENT
Start: 2022-08-26 | End: 2022-09-07 | Stop reason: HOSPADM

## 2022-08-26 RX ORDER — CELECOXIB 200 MG/1
200 CAPSULE ORAL 2 TIMES DAILY
Status: DISCONTINUED | OUTPATIENT
Start: 2022-08-26 | End: 2022-08-26

## 2022-08-26 RX ADMIN — HEPARIN SODIUM 5000 UNITS: 5000 INJECTION INTRAVENOUS; SUBCUTANEOUS at 14:51

## 2022-08-26 RX ADMIN — Medication 1250 MG: at 22:26

## 2022-08-26 RX ADMIN — METRONIDAZOLE 500 MG: 500 INJECTION, SOLUTION INTRAVENOUS at 05:34

## 2022-08-26 RX ADMIN — VANCOMYCIN HYDROCHLORIDE 1500 MG: 10 INJECTION, POWDER, LYOPHILIZED, FOR SOLUTION INTRAVENOUS at 06:35

## 2022-08-26 RX ADMIN — METHYLPREDNISOLONE SODIUM SUCCINATE 40 MG: 40 INJECTION, POWDER, FOR SOLUTION INTRAMUSCULAR; INTRAVENOUS at 14:51

## 2022-08-26 RX ADMIN — CEFEPIME 2000 MG: 2 INJECTION, POWDER, FOR SOLUTION INTRAVENOUS at 22:25

## 2022-08-26 RX ADMIN — CELECOXIB 200 MG: 200 CAPSULE ORAL at 20:04

## 2022-08-26 RX ADMIN — HEPARIN SODIUM 5000 UNITS: 5000 INJECTION INTRAVENOUS; SUBCUTANEOUS at 21:04

## 2022-08-26 RX ADMIN — GABAPENTIN 400 MG: 400 CAPSULE ORAL at 14:52

## 2022-08-26 RX ADMIN — METRONIDAZOLE 500 MG: 500 INJECTION, SOLUTION INTRAVENOUS at 21:05

## 2022-08-26 RX ADMIN — IPRATROPIUM BROMIDE AND ALBUTEROL SULFATE 1 AMPULE: 2.5; .5 SOLUTION RESPIRATORY (INHALATION) at 11:58

## 2022-08-26 RX ADMIN — CEFEPIME 2000 MG: 2 INJECTION, POWDER, FOR SOLUTION INTRAVENOUS at 14:58

## 2022-08-26 RX ADMIN — LEVOTHYROXINE SODIUM 200 MCG: 0.1 TABLET ORAL at 08:49

## 2022-08-26 RX ADMIN — Medication 75 MCG/HR: at 01:05

## 2022-08-26 RX ADMIN — HEPARIN SODIUM 5000 UNITS: 5000 INJECTION INTRAVENOUS; SUBCUTANEOUS at 05:39

## 2022-08-26 RX ADMIN — PROPOFOL 15 MCG/KG/MIN: 10 INJECTION, EMULSION INTRAVENOUS at 00:05

## 2022-08-26 RX ADMIN — FAMOTIDINE 20 MG: 10 INJECTION, SOLUTION INTRAVENOUS at 20:09

## 2022-08-26 RX ADMIN — METHYLPREDNISOLONE SODIUM SUCCINATE 40 MG: 40 INJECTION, POWDER, FOR SOLUTION INTRAMUSCULAR; INTRAVENOUS at 01:07

## 2022-08-26 RX ADMIN — MUPIROCIN: 20 OINTMENT TOPICAL at 20:10

## 2022-08-26 RX ADMIN — FAMOTIDINE 20 MG: 10 INJECTION, SOLUTION INTRAVENOUS at 08:49

## 2022-08-26 RX ADMIN — GABAPENTIN 400 MG: 400 CAPSULE ORAL at 20:06

## 2022-08-26 RX ADMIN — SODIUM PHOSPHATE, MONOBASIC, MONOHYDRATE AND SODIUM PHOSPHATE, DIBASIC, ANHYDROUS 20 MMOL: 276; 142 INJECTION, SOLUTION INTRAVENOUS at 09:12

## 2022-08-26 RX ADMIN — IPRATROPIUM BROMIDE AND ALBUTEROL SULFATE 1 AMPULE: 2.5; .5 SOLUTION RESPIRATORY (INHALATION) at 19:37

## 2022-08-26 RX ADMIN — GABAPENTIN 400 MG: 400 CAPSULE ORAL at 08:49

## 2022-08-26 RX ADMIN — CHLORHEXIDINE GLUCONATE 0.12% ORAL RINSE 15 ML: 1.2 LIQUID ORAL at 20:10

## 2022-08-26 RX ADMIN — CHLORHEXIDINE GLUCONATE 0.12% ORAL RINSE 15 ML: 1.2 LIQUID ORAL at 08:48

## 2022-08-26 RX ADMIN — SODIUM CHLORIDE, PRESERVATIVE FREE 10 ML: 5 INJECTION INTRAVENOUS at 20:09

## 2022-08-26 RX ADMIN — METRONIDAZOLE 500 MG: 500 INJECTION, SOLUTION INTRAVENOUS at 15:01

## 2022-08-26 RX ADMIN — MUPIROCIN: 20 OINTMENT TOPICAL at 09:13

## 2022-08-26 RX ADMIN — ACETAMINOPHEN 650 MG: 325 TABLET, FILM COATED ORAL at 17:23

## 2022-08-26 RX ADMIN — CEFEPIME 2000 MG: 2 INJECTION, POWDER, FOR SOLUTION INTRAVENOUS at 05:36

## 2022-08-26 RX ADMIN — IPRATROPIUM BROMIDE AND ALBUTEROL SULFATE 1 AMPULE: 2.5; .5 SOLUTION RESPIRATORY (INHALATION) at 16:28

## 2022-08-26 RX ADMIN — IPRATROPIUM BROMIDE AND ALBUTEROL SULFATE 1 AMPULE: 2.5; .5 SOLUTION RESPIRATORY (INHALATION) at 09:04

## 2022-08-26 RX ADMIN — DIAZEPAM 5 MG: 5 TABLET ORAL at 18:26

## 2022-08-26 ASSESSMENT — PAIN DESCRIPTION - FREQUENCY
FREQUENCY: CONTINUOUS
FREQUENCY: CONTINUOUS

## 2022-08-26 ASSESSMENT — PULMONARY FUNCTION TESTS
PIF_VALUE: 28
PIF_VALUE: 29
PIF_VALUE: 28
PIF_VALUE: 11
PIF_VALUE: 11

## 2022-08-26 ASSESSMENT — PAIN SCALES - GENERAL
PAINLEVEL_OUTOF10: 1
PAINLEVEL_OUTOF10: 8
PAINLEVEL_OUTOF10: 8
PAINLEVEL_OUTOF10: 2

## 2022-08-26 ASSESSMENT — PAIN DESCRIPTION - LOCATION
LOCATION: BACK
LOCATION: GENERALIZED

## 2022-08-26 ASSESSMENT — PAIN - FUNCTIONAL ASSESSMENT
PAIN_FUNCTIONAL_ASSESSMENT: PREVENTS OR INTERFERES SOME ACTIVE ACTIVITIES AND ADLS
PAIN_FUNCTIONAL_ASSESSMENT: PREVENTS OR INTERFERES SOME ACTIVE ACTIVITIES AND ADLS

## 2022-08-26 ASSESSMENT — PAIN DESCRIPTION - DESCRIPTORS
DESCRIPTORS: ACHING
DESCRIPTORS: DISCOMFORT

## 2022-08-26 ASSESSMENT — PAIN DESCRIPTION - ONSET
ONSET: ON-GOING
ONSET: ON-GOING

## 2022-08-26 ASSESSMENT — PAIN DESCRIPTION - PAIN TYPE
TYPE: CHRONIC PAIN
TYPE: ACUTE PAIN

## 2022-08-26 ASSESSMENT — PAIN DESCRIPTION - ORIENTATION: ORIENTATION: MID

## 2022-08-26 NOTE — PROGRESS NOTES
Physical Therapy  [facility]  Physical Therapy Re-Evaluation     Name: Yas Burt  : 1960  MRN: [id number]  Date of Service: 2022    Discharge Recommendations:  Continue to assess pending progress, Subacute/Skilled Nursing Facility   PT Equipment Recommendations  Other: Defer to next level of care. Yas Burt scored a 6/24 on the AM-PAC short mobility form. Current research shows that an AM-PAC score of 17 or less is typically not associated with a discharge to the patient's home setting. Based on the patient's AM-PAC score and their current functional mobility deficits, it is recommended that the patient have 3-5 sessions per week of Physical Therapy at d/c to increase the patient's independence. Please see assessment section for further patient specific details. If patient discharges prior to next session this note will serve as a discharge summary. Please see below for the latest assessment towards goals. Assessment   Assessment: 64 y/o male admit 2022 with N&V. Pt dx with Partial SBO, Aspiration Pneumonia, A-Fib and  Septic Shock. Pt developed Acute Respiratory Failure and Acute Renal Failure. -2022 Pt Intubated. CT  Abdomen : Large Hiatal Hernia. PMH OA B Knees (per family). PTA pt living with wife in home with ramp access and 1st floor bed/bath; independent daily care and functional mobility (some limit due to OA knees). Currently, Pt extubated following 18 days intubated. Pt following simple commands and rosa oob via Maxi-Noah. Rosa ex and attempts at brief unsupported sitting. At this time, anticipate cont PT (3-5) upon d/c. Will cont to monitor pt's progress. Therapy Prognosis: Fair;Good  History: 64 y/o male admit 2022 with N&V. Pt dx with Partial SBO, Aspiration Pneumonia, A-Fib and  Septic Shock. Pt developed Acute Respiratory Failure and Acute Renal Failure. -2022 Pt Intubated.   CT  Abdomen : Large Hiatal No  Receives Help From: Family  ADL Assistance: Independent  Homemaking Assistance:  (Shared with wife.)  Ambulation Assistance: Independent (Without assist device.)  Transfer Assistance: Independent (Difficulty due to OA B Knees.)  Active : Yes  Occupation: Retired  Additional Comments: Spouse at bedside provided social information. Vision/Hearing  Vision  Vision: Impaired  Vision Exceptions: Wears glasses for reading  Hearing  Hearing: Within functional limits    Cognition   Orientation  Orientation Level: Oriented to person;Oriented to place (Partial situation/time)  Cognition  Cognition Comment: Difficult to formally assess with whispered speech. Pt able to follow all one step commands. Objective           Gross Assessment  AROM: Generally decreased, functional  Strength:  (LEs at least 3/5 with ex following oob.)           Transfers  Bed to Chair: Unable to assess (OOB via Maxi-Noah.)        Balance  Comments: Following oob to chair via Maxi-Noah; Max assist to unsupported sitting in chair with pt requiring Min assist to maintain ~ 5 sec x 3 reps. A/AROM Exercises: Sitting : Hip Flex, Knee Ext, Ankle Pumps (5x2 B LEs)            AM-PAC Score  AM-PAC Inpatient Mobility Raw Score : 6 (08/26/22 1440)  AM-PAC Inpatient T-Scale Score : 23.55 (08/26/22 1440)  Mobility Inpatient CMS 0-100% Score: 100 (08/26/22 1440)  Mobility Inpatient CMS G-Code Modifier : CN (08/26/22 1440)            Goals  Short Term Goals  Time Frame for Short term goals: Upon d/c acute care setting. Short term goal 1: Bed Mob Mod/Max assist x 2. Short term goal 2: EOB ~ 3-5 min with Min assist; in prep oob activities. Short term goal 3: Transfers via Maxi-Noah as approp. Short term goal 4: Pt participating in approp Strength Exs. Patient Goals   Patient goals : Wife : regain prior level of function and return home. Education  Patient Education  Education Given To: Patient; Family  Education Provided Comments: Role of PT, POC,

## 2022-08-26 NOTE — PROGRESS NOTES
Hospitalist Progress Note  8/26/2022 11:43 AM    PCP: Smooth Tracy MD    [phone]     Date of Admission: 8/5/2022                                                                                                                     HOSPITAL COURSE    Patient demographics:  The patient  Maki Rivera is a 63 y.o. male     Significant past medical history:   Patient Active Problem List   Diagnosis    Partial bowel obstruction (HCC)    Hiatal hernia    Acute respiratory failure with hypoxia and hypercapnia (HCC)    SBO (small bowel obstruction) (HCC)    Aspiration into airway    Acute respiratory failure with hypoxia (HCC)    Small bowel obstruction (HCC)    Diastolic heart failure (HCC)    History of atrial fibrillation    Supraventricular tachycardia (HCC)    Arterial hypotension    Aspiration pneumonia of both lower lobes due to gastric secretions ([address]    Morbid obesity with BMI of 50.0-59.9, adult ([address]         Presenting symptoms:  abdominal pain    Diagnostic workup:      CONSULTS DURING ADMISSION :   IP CONSULT TO GENERAL SURGERY  IP CONSULT TO HOSPITALIST  IP CONSULT TO GENERAL SURGERY  IP CONSULT TO DIETITIAN  IP CONSULT TO CARDIOLOGY  IP CONSULT TO GI  IP CONSULT TO PALLIATIVE CARE  PHARMACY TO DOSE VANCOMYCIN      Patient was diagnosed with:  Low grade or Partial Bowel obstruction  Pneumonia and septic shock. Hiatal hernia  Atrial Fibrillation      Treatment while inpatient:  63years old male with medical history significant for morbid obesity, paraesophageal hernia. Patient presented to the emergency room with abdominal pain nausea vomiting and diarrhea. Patient was diagnosed with small bowel obstruction. Patient was also diagnosed with pneumonia and septic shock. Patient developed acute respiratory failure and acute renal failure. Managed on the ventilator with a prolonged course in ICU. Extubated 8/26. Renal function improved. ----------------------------------------------------------      SUBJECTIVE COMPLAINTS- follow up for abdominal pain    Diet: Diet NPO  ADULT TUBE FEEDING; Nasogastric; Peptide Based High Protein; Continuous; 10; Yes; 20; Q 4 hours; 50; 30; Q 4 hours; Protein;  Bolus 2 protienex daily      OBJECTIVE:   Patient Active Problem List   Diagnosis    Partial bowel obstruction (HCC)    Hiatal hernia    Acute respiratory failure with hypoxia and hypercapnia (HCC)    SBO (small bowel obstruction) (HCC)    Aspiration into airway    Acute respiratory failure with hypoxia (HCC)    Small bowel obstruction (HCC)    Diastolic heart failure (HCC)    History of atrial fibrillation    Supraventricular tachycardia (HCC)    Arterial hypotension    Aspiration pneumonia of both lower lobes due to gastric secretions (HCC)    Morbid obesity with BMI of 50.0-59.9, adult (HCC)       Allergies  Codeine    Medications    Scheduled Meds:   sodium phosphate IVPB  20 mmol IntraVENous Once    heparin (porcine)  5,000 Units SubCUTAneous 3 times per day    metroNIDAZOLE  500 mg IntraVENous Q8H    cefepime  2,000 mg IntraVENous Q8H    vancomycin (VANCOCIN) intermittent dosing (placeholder)   Other RX Placeholder    methylPREDNISolone  40 mg IntraVENous Q12H    mupirocin   Nasal BID    gabapentin  400 mg Oral TID    [Held by provider] metoprolol tartrate  25 mg Oral BID    famotidine (PEPCID) injection  20 mg IntraVENous BID    chlorhexidine  15 mL Mouth/Throat BID    ipratropium-albuterol  1 ampule Inhalation Q4H WA    sodium chloride flush  5-40 mL IntraVENous 2 times per day    levothyroxine  200 mcg Oral Daily    [Held by provider] spironolactone  50 mg Oral Daily     Continuous Infusions:   fentaNYL Stopped (08/26/22 0915)    propofol Stopped (08/26/22 0846)    phenylephrine (REHANA-SYNEPHRINE) 50mg/250mL infusion Stopped (08/25/22 2008)    [Held by provider] furosemide Stopped (08/23/22 0945)    sodium chloride       PRN Meds:  senna, polyethylene glycol, docusate, fentanNYL, midazolam, artificial tears, acetaminophen, potassium chloride, acetaminophen, sodium chloride flush, sodium chloride, ondansetron **OR** ondansetron, diazePAM    Vitals   Vitals /wt Patient Vitals for the past 8 hrs:   BP Temp Temp src Pulse Resp SpO2   08/26/22 1000 131/78 -- -- 68 14 94 %   08/26/22 0900 (!) 140/91 -- -- 65 (!) 9 92 %   08/26/22 0800 (!) 111/93 98.2 °F (36.8 °C) Axillary 56 15 94 %   08/26/22 0700 111/74 -- -- 56 15 --   08/26/22 0630 125/78 -- -- 57 16 --   08/26/22 0600 113/72 -- -- 56 14 96 %   08/26/22 0530 112/73 -- -- 51 15 --   08/26/22 0500 102/67 -- -- (!) 49 15 94 %   08/26/22 0430 114/71 -- -- (!) 49 15 --   08/26/22 0400 114/72 -- -- (!) 49 15 --   08/26/22 0350 -- -- -- (!) 48 15 94 %        72HR INTAKE/OUTPUT:    Intake/Output Summary (Last 24 hours) at 8/26/2022 1143  Last data filed at 8/26/2022 1019  Gross per 24 hour   Intake 3239.62 ml   Output 2930 ml   Net 309.62 ml       Exam:    Gen:  intubated and sedated  Eyes: PERRL. No sclera icterus. No conjunctival injection. ENT: No discharge. Pharynx clear. External appearance of ears and nose normal.  Neck: Trachea midline. No obvious mass. Resp: No accessory muscle use. No crackles. No wheezes. No rhonchi. CV: Regular rate. Regular rhythm. No murmur or rub. No edema. GI: Non-tender. Non-distended. No hernia. Skin: Warm, dry, normal texture and turgor. Lymph: No cervical LAD. No supraclavicular LAD. M/S: / Ext. No cyanosis. No clubbing. No joint deformity. Neuro: CN 2-12 are intact,  no neurologic deficits noted. PT/INR:   No results for input(s): PROTIME, INR in the last 72 hours.     APTT:   Recent Labs     08/23/22  1435 08/23/22  2130 08/24/22  0317   APTT 34.1 80.0* 83.3*       CBC:   Recent Labs     08/24/22  0522 08/25/22  0355 08/26/22  0445   WBC 10.5 10.0 8.3   HGB 9.5* 9.4* 9.4*   HCT 29.2* 28.9* 28.6*   MCV 93.9 94.7 94.3   * 448 407       BMP:   Recent Labs     08/24/22  0522 08/24/22  1357 08/25/22  0354 08/25/22  1446 08/25/22  2210 08/26/22  0445      < > 142 144 144 138   K 3.7   < > 3.8 3.4* 4.2 4.1      < > 108 110 112* 107   CO2 23   < > 24 25 24 24   PHOS 3.7  --  3.1  --   --  2.0*   BUN 53*   < > 43* 46* 43* 40*   CREATININE 1.2   < > 1.1 0.9 0.8 0.8    < > = values in this interval not displayed. LIVER PROFILE:   No results for input(s): ALKPHOS, AST, ALT, ALB, BILIDIR, BILITOT, ALKPHOS in the last 72 hours. No results for input(s): AMYLASE in the last 72 hours. No results for input(s): LIPASE in the last 72 hours. UA:  No results for input(s): NITRITE, LABCAST, WBCUA, RBCUA, MUCUS in the last 72 hours. TROPONIN:   No results for input(s): Wayna Khat in the last 72 hours. Lab Results   Component Value Date/Time    URRFLXCULT Not Indicated 08/15/2022 09:16 AM       No results for input(s): TSHREFLEX in the last 72 hours. No components found for: [id number]  POC GLUCOSE:    Recent Labs     08/24/22  2335 08/25/22  0804 08/25/22  1211 08/25/22  1605 08/25/22  2322   POCGLU 150* 126* 101* 108* 130*     No results for input(s): LABA1C in the last 72 hours.  No results found for: LABA1C    Echocardiogram shows normal ejection fraction  Grade 1 diastolic dysfunction      ASSESSMENT AND PLAN    Acute respiratory failure  Intubated 8/8  Extubated 8/26  Doing well  Titrate oxygen for saturations greater than or equal to 90%  Obesity with obstructive sleep apnea and hypoventilation syndrome      Aspiration pna  Continues to be on cefepime and metronidazole  Ct abxs    Acute on chronic diastolic CHF  Patient has grade 1 diastolic dysfunction  Lasix drip discontinued  500 cc fluid bolus  Patient now started on pressors      Partial small Bowel obstruction  improved  Og placed per GI 8/10 came out replacement 8/22  Continue on tube feed    SVT  better  H/o atrial fibrillation  HR controled      CHARLENE  Renal function improved      Hypotention  Stable now    Hiatal hernia  - CT chest/abd/pelv:   Large hiatal hernia containing much of the stomach which is distended  Og tube today     Atrial Fibrillation  Patient developed the rapid ventricular rate  IV Lopressor  Consult to cardiology             Code Status: Full Code        Dispo - cc        The patient and / or the family were informed of the results of any tests, a time was given to answer questions, a plan was proposed and they agreed with plan. Niko Eldridge MD    This note was transcribed using [id number]. Please disregard any translational errors.

## 2022-08-26 NOTE — PROGRESS NOTES
[facility]                                               Progress note    Summary:   Ge Lugo is being seen by nephrology for CHARLENE and hypernatremia. Admitted with paraesophageal hernia and SBO. Had EGD 8/9 with biopsy. Still NPO     Interval History  Patient was seen and examined at bedside. He has been extubated, sitting up in the chair working with therapy    Blood pressure 118/71  Satting 86% on 6 L  Weight 330  Urine output 2 L yesterday  -50 cc for the day    Labs reviewed  Sodium 138 potassium 4.1 bicarb 24 BUN 40 creatinine 0.8 magnesium 2.5 phosphorus 2 calcium 7.8  Hemoglobin 9.4      Plan:   Patient's renal function has recovered, no signs of volume overload  There are no acute electrolyte abnormalities  However he does need phosphorus replacement today  We will follow his labs peripherally over the weekend, please call if any acute issues arise. Garrett Martell MD  [facility]  Office: [phone]    Assessment:   Acute kidney injury  Urine Na < 20    creatinine on admission 1   next day on 08/06/2022 was 0.8  suddenly jumped up to 2.7. Due to episode of severe hypotension with blood pressure drop 78/44. also got CT with IV contrast on 08/05/2022 but most likely  this is ATN related due to hypotension and hypercapnic  respiratory failure. CT angio s normal renal arteries and normal kidneys      Sepsis/ possible septic shock. emperature was 101.1   High white cell count. '  immunocompromised due to rheumatoid arthritis and treatment. History of sleep apnea   on CPAP. Acute respiratory failure with high pCO2 in the range of 50s with pH  of 7.24    now intubated. History of abnormal stress test in 06/2022 with stress-induced  ischemia and some scarring. Ejection fraction  was  64%.     had a followup angiogram which was reported as normal as per wife.   I do not have that result     History of rheumatoid arthritis   longstanding,was on deformity. Skin:no rashes, ulcers, induration, no jaundice.    Neuro: Awake and alert, following commands sitting up in the chair      Lab Results   Component Value Date    CREATININE 0.8 08/26/2022    BUN 40 (H) 08/26/2022     08/26/2022    K 4.1 08/26/2022     08/26/2022    CO2 24 08/26/2022      Lab Results   Component Value Date    WBC 8.3 08/26/2022    HGB 9.4 (L) 08/26/2022    HCT 28.6 (L) 08/26/2022    MCV 94.3 08/26/2022     08/26/2022     Lab Results   Component Value Date    CALCIUM 7.8 (L) 08/26/2022    CAION 1.06 (L) 08/15/2022    PHOS 2.0 (L) 08/26/2022

## 2022-08-26 NOTE — CARE COORDINATION
Discharge Planning:    Spoke with Jacy Gan, Senior clinical Liaison with [facility] #[phone], who noted that patient could come to [place] [id number] on pressors once stabilized. Patient's precert is still active, so he could come over the weekend, if deemed appropriate by his [id number] [place] [address] team. Will continue to monitor for updates.     Electronically signed by Jean Matute RN on 8/26/2022 at 11:41 AM

## 2022-08-26 NOTE — PROGRESS NOTES
Occupational Therapy  Facility/Department: [facility] ICU  Occupational Therapy Re-evaluation    Name: Gabriella Shook  : 1960  MRN: [id number]  Date of Service: 2022    Discharge Recommendations:  Patient would benefit from continued therapy after discharge, 3-5 sessions per week  OT Equipment Recommendations  Other: defer to [place] facility     Gabriella Shook scored a  on the AM-PAC ADL Inpatient form. Current research shows that an AM-PAC score of 17 or less is typically not associated with a discharge to the patient's home setting. Based on the patient's AM-PAC score and their current ADL deficits, it is recommended that the patient have 3-5 sessions per week of Occupational Therapy at d/c to increase the patient's independence. Please see assessment section for further patient specific details. If patient discharges prior to next session this note will serve as a discharge summary. Please see below for the latest assessment towards goals. Patient Diagnosis(es): The primary encounter diagnosis was Small bowel obstruction ([address] Diagnoses of Elevated TSH, Chest pain, unspecified type, Hiatal hernia, and Intestinal obstruction, unspecified cause, unspecified whether partial or complete [facility]) were also pertinent to this visit. Past Medical History:  has a past medical history of Arthritis, Atrial fibrillation ([address] and Hypertension. Past Surgical History:  has a past surgical history that includes Upper gastrointestinal endoscopy (2022); Upper gastrointestinal endoscopy (2022); Upper gastrointestinal endoscopy (N/A, 2022); bronchoscopy (2022); and bronchoscopy (2022). Assessment   Performance deficits / Impairments: Decreased functional mobility ; Decreased safe awareness;Decreased balance;Decreased ADL status; Decreased cognition;Decreased ROM; Decreased endurance;Decreased high-level IADLs;Decreased strength;Decreased fine motor control  Assessment: 62 y/o male admitted 8/7/2022 with nausea/vomitting. Pt diagnosed with partial SBO, aspiration PNA, afib, and septic shock. Patient developed acute respiratory failure and acute renal failure. CT scan showed Large hiatal hernia. Pt intubated 8/8-8/26. Extubated 8/26. PTA pt lived at home with spouse and independent with ADLs and functional mobility. Today, pt on 6L NCand able to follow commands. Pt functioning well below baseline most limited by decreased strength and endurance from prolonged hospital stay. Safe pt lift equipment required for EOB>recliner transfers in overhead lift. Pt with stable vitals throughout. BUE weakness, however, improved since initial evaluation; completes BUE exercises x5 reps to improve strength and endurance for ADL. Anticipate pt will be Max A LB ADL, UB bathing/dressing, and Min A feeding and grooming based on ROM and cognition observed. Cont acute OT to address above deficits. Rec OT at low-moderate pace 3-5x/week to maximize IND with ADL and fxl mobility  Prognosis: Good  REQUIRES OT FOLLOW-UP: Yes  Activity Tolerance  Activity Tolerance: Treatment limited secondary to medical complications (free text); Patient Tolerated treatment well;Patient limited by fatigue (just extubated 10 am 8/26)        Plan   Plan  Times per Week: 3-5  Current Treatment Recommendations: Strengthening, Balance training, Functional mobility training, Endurance training, ROM, Gait training, Neuromuscular re-education, Cognitive reorientation, Self-Care / ADL, Safety education & training     Restrictions  Restrictions/Precautions  Restrictions/Precautions: Fall Risk  Position Activity Restriction  Other position/activity restrictions: NG Feeding Tube. 6L. Rectal tube. Fermin cath. RUE PICC    Subjective   General  Chart Reviewed: Yes  Patient assessed for rehabilitation services?: Yes  Additional Pertinent Hx: 64 y/o male admitted 8/7/2022 with nausea/vomitting.  Pt diagnosed with partial SBO, aspiration PNA, afib, and septic shock. Patient developed acute respiratory failure and acute renal failure. CT scan showed Large hiatal hernia. Pt intubated 8/8-8/26. Extubated 8/26  Family / Caregiver Present: Yes (brother and sister in law)  Referring Practitioner: Darin Chambers CNP  Subjective  Subjective: Pt seen bedside, extubated. Pt agreeable to OT/PT re-evaluation. Pt able to whisper and follow commands. Pt reporting no pain  General Comment  Comments: Per RN ok for therapy. Social/Functional History  Social/Functional History  Lives With: Spouse  Type of Home: House  Home Layout: Two level, Able to Live on Main level with bedroom/bathroom, Laundry in basement  Home Access: Ramped entrance  Bathroom Shower/Tub: Tub/Shower unit, Walk-in shower  Bathroom Toilet: Standard  Bathroom Accessibility: Accessible  Has the patient had two or more falls in the past year or any fall with injury in the past year?: No  Receives Help From: Family  ADL Assistance: Independent  Homemaking Assistance:  (Shared with wife.)  Ambulation Assistance: Independent (Without assist device.)  Transfer Assistance: Independent (Difficulty due to OA B Knees.)  Active : Yes  Occupation: Retired  Additional Comments: Spouse at bedside provided social information. Objective              Safety Devices  Type of Devices: Call light within reach; Patient at risk for falls;Nurse notified; Left in chair (waffle cushion)        AROM: Generally decreased, functional  PROM: Within functional limits  Strength: Generally decreased, functional  Coordination: Generally decreased, functional  ADL  Toileting: Dependent/Total  Toileting Skilled Clinical Factors: quiroga and rectal tube  Additional Comments: Anticipate pt will be Max A LB ADL, UB bathing/dressing, and Min A feeding and grooming based on ROM and cognition observed        Bed mobility  Bed Mobility Comments: pt moved from supine in bed with lift pad sheet > recliner with overhead lift as first time OOB in 18 days. Transfers  Transfer Comments: not attempted  Vision  Vision: Impaired  Vision Exceptions: Wears glasses for reading  Hearing  Hearing: Within functional limits  Cognition  Cognition Comment: Difficult to formally assess with whispered speech. Pt able to follow all one step commands. Orientation  Orientation Level: Oriented to person;Oriented to place (partial situation/time)               A/AROM Exercises: BUE hand squeezes x5, wrist flex/ext x 5, elbow flex/ext 5x, forearm rotation, shoulder flex 5x (pt initiates movement, therapist assisted with achieving full range for BUE shoulder and LUE elbow)  Education Given To: Patient; Family  Education Provided: Role of Therapy;Plan of Care;Transfer Training  Education Method: Demonstration;Verbal  Barriers to Learning: Cognition  Education Outcome: Continued education needed  LUE AROM (degrees)  LUE General AROM: able to initiate ROM. not full elbow flexion. shoulder flexion ~90*  Left Hand PROM (degrees)  Left Hand PROM: WNL  Left Hand AROM (degrees)  Left Hand General AROM: moderate grasp strength  RUE PROM (degrees)  RUE PROM: WNL  RUE AROM (degrees)  RUE General AROM: able to initiate ROM. full elbow AROM.  shoulder flexion ~100*  Right Hand AROM (degrees)  Right Hand General AROM: weak grasp                   AM-PAC Score        AM-[facility] Inpatient Daily Activity Raw Score: 11 (08/26/22 1337)  AM-PAC Inpatient ADL T-Scale Score : 29.04 (08/26/22 1337)  ADL Inpatient CMS 0-100% Score: 70.42 (08/26/22 1337)  ADL Inpatient CMS G-Code Modifier : CL (08/26/22 1337)    Goals  Short Term Goals  Time Frame for Short term goals: Prior to DC: ongoing 8/26  Short Term Goal 1: Pt will complete bed mobility with max A  Short Term Goal 2: Pt will tolerate sitting EOB ~ 3 min in prep for transfers with min A  Short Term Goal 3: Pt will complete rolling R/L for supine ADLs with mod A  Short Term Goal 4: Pt will complete UE exercises in all planes to inc strength/endurance. Patient Goals   Patient goals : spouse: to regain PLOF and eventually return home       Therapy Time   Individual Concurrent Group Co-treatment   Time In 1300         Time Out 1330         Minutes 30         Timed Code Treatment Minutes: 30 Minutes (20 TA.  10 TE)       LUCIANO Osborn, OTR/L

## 2022-08-26 NOTE — PROGRESS NOTES
Pulmonary Progress Note    Date of Admission: 8/5/2022   LOS: 21 days       CC:  Chief Complaint   Patient presents with    Abdominal Pain    Chest Pain     Sub-sternal, pressure, onset 1300 hours today    Shortness of Breath     EMS reports pt 88% on RA        Subjective:  Significant improvement over the last 24 hours. Discussed with wife. Started on spontaneous 5 / 5. Voluntary tidal volumes greater than 1300  ROS:        Assessment:     History of recurrent SVT  Obesity with sleep apnea and obesity hypoventilation syndrome    Plan: This note may have been transcribed using [employer] Please disregard any translational errors. Hospital Day: 21     Atelectasis versus pneumonia with acute hypoxemic respiratory failure after aspiration  Completed 2 rounds of antibiotics prior to this  Wife is able to get access to [address] records from approximately 1 month ago. He had abnormal chest x-ray with bibasilar opacities. Culture positive for WBCs 1+ yeast.  Unlikely to be true pathogen. Continue empiric antibiotics despite normal white count and low procalcitonin. Follow culture. Neutrophilia may be secondary to residual from prior infection versus   Cultures difficult to know what to do with this. Greater than 100,000 bacteria \"normal amarilys \". He is improving with antibiotics and steroids. Infection and cryptogenic or pneumonia still on the table. Since he is improving, continue both. After starting high-dose steroids, he has had significant improvement. SBT today. D/w wife, nurse, RT      Partial small bowel obstruction  Resolved. Back on tube feeds. History of diastolic CHF  Volume status controlled. Renal status improved. Prophylaxis  - GI - pepcid  - DVT -   lovenox         Nutrition  - Diet NPO  ADULT TUBE FEEDING; Nasogastric; Peptide Based High Protein; Continuous; 10; Yes; 20; Q 4 hours; 50; 30; Q 4 hours; Protein;  Bolus 2 protienex daily  - Intake/Output Summary (Last 24 hours) at 8/26/2022 0834  Last data filed at 8/26/2022 0755  Gross per 24 hour   Intake 3089.82 ml   Output 3040 ml   Net 49.82 ml         Mobility       Access  Arterial      PICC         PICC Triple Lumen 61/47/33 Right Basilic (Active)   Central Line Being Utilized Yes 08/25/22 0600   Criteria for Appropriate Use Hemodynamically unstable, requiring monitoring lines, vasopressors, or volume resuscitation 08/25/22 0600   Site Assessment Clean, dry & intact 08/25/22 0600   Phlebitis Assessment No symptoms 08/25/22 0600   Infiltration Assessment 0 08/25/22 0600   Extremity Circumference (cm) 38 cm 08/08/22 0607   External Catheter Length (cm) 0 cm 08/08/22 0607   Proximal Lumen Color/Status Red; Infusing;Capped 08/25/22 0600   Medial Lumen Status White; Infusing;Capped 08/25/22 0600   Distal Lumen Color/Status Boomer Yamini; Infusing;Capped 08/25/22 0600   Line Care Connections checked and tightened 08/21/22 1800   Alcohol Cap Used Yes 08/21/22 0600   Date of Last Dressing Change 08/17/22 08/25/22 0600   Dressing Type Transparent; Antimicrobial 08/25/22 0600   Dressing Status Clean, dry & intact 08/25/22 0600   Dressing Intervention New;Dressing changed 08/17/22 0400   Number of days: 17     CVC                     I spent 31  minutes of critical care time with this patient excluding any procedures. Data:        PHYSICAL EXAM:   Blood pressure 111/74, pulse 56, temperature 98 °F (36.7 °C), temperature source Axillary, resp. rate 15, height 5' 7\" (1.702 m), weight (!) 330 lb 9.6 oz (150 kg), SpO2 96 %.'  Body mass index is 51.78 kg/m². Gen: No distress. ENT:   Resp: No accessory muscle use. No crackles. No wheezes. No rhonchi. CV: Regular rate. Regular rhythm. No murmur or rub. No edema. GI high-pitched bowel sounds  Skin: Warm, dry, normal texture and turgor. No nodule on exposed extremities. M/S: No cyanosis. No clubbing. No joint deformity. Psych: awake. Alert. Medications:    Scheduled Meds:   heparin (porcine)  5,000 Units SubCUTAneous 3 times per day    metroNIDAZOLE  500 mg IntraVENous Q8H    cefepime  2,000 mg IntraVENous Q8H    vancomycin (VANCOCIN) intermittent dosing (placeholder)   Other RX Placeholder    methylPREDNISolone  40 mg IntraVENous Q12H    mupirocin   Nasal BID    gabapentin  400 mg Oral TID    [Held by provider] metoprolol tartrate  25 mg Oral BID    famotidine (PEPCID) injection  20 mg IntraVENous BID    chlorhexidine  15 mL Mouth/Throat BID    ipratropium-albuterol  1 ampule Inhalation Q4H WA    sodium chloride flush  5-40 mL IntraVENous 2 times per day    levothyroxine  200 mcg Oral Daily    [Held by provider] spironolactone  50 mg Oral Daily       Continuous Infusions:   fentaNYL 50 mcg/hr (08/26/22 0537)    propofol 5 mcg/kg/min (08/26/22 0544)    phenylephrine (REHANA-SYNEPHRINE) 50mg/250mL infusion Stopped (08/25/22 2008)    [Held by provider] furosemide Stopped (08/23/22 0945)    sodium chloride         PRN Meds:  senna, polyethylene glycol, docusate, fentanNYL, midazolam, artificial tears, acetaminophen, potassium chloride, acetaminophen, sodium chloride flush, sodium chloride, ondansetron **OR** ondansetron, diazePAM    Labs reviewed:  CBC:   Recent Labs     08/24/22  0522 08/25/22  0355 08/26/22  0445   WBC 10.5 10.0 8.3   HGB 9.5* 9.4* 9.4*   HCT 29.2* 28.9* 28.6*   MCV 93.9 94.7 94.3   * 448 407       BMP:   Recent Labs     08/24/22  0522 08/24/22  1357 08/25/22  0354 08/25/22  1446 08/25/22  2210 08/26/22  0445      < > 142 144 144 138   K 3.7   < > 3.8 3.4* 4.2 4.1      < > 108 110 112* 107   CO2 23   < > 24 25 24 24   PHOS 3.7  --  3.1  --   --  2.0*   BUN 53*   < > 43* 46* 43* 40*   CREATININE 1.2   < > 1.1 0.9 0.8 0.8    < > = values in this interval not displayed. LIVER PROFILE: No results for input(s): AST, ALT, LIPASE, BILIDIR, BILITOT, ALKPHOS in the last 72 hours. Invalid input(s):   AMYLASE, ALB  PT/INR: No results for input(s): PROTIME, INR in the last 72 hours. APTT:   Recent Labs     08/23/22  1435 08/23/22  2130 08/24/22  0317   APTT 34.1 80.0* 83.3*       UA:No results for input(s): NITRITE, COLORU, PHUR, LABCAST, WBCUA, RBCUA, MUCUS, TRICHOMONAS, YEAST, BACTERIA, CLARITYU, SPECGRAV, LEUKOCYTESUR, UROBILINOGEN, BILIRUBINUR, BLOODU, GLUCOSEU, AMORPHOUS in the last 72 hours. Invalid input(s): Tito Rod  No results for input(s): PH, PCO2, PO2 in the last 72 hours. Cx:      Films:  Radiology Review:  Pertinent images / reports were reviewed as a part of this visit. CT Chest w/ contrast: No results found for this or any previous visit. CT Chest w/o contrast: Results for orders placed during the hospital encounter of 08/05/22    CT CHEST WO CONTRAST    Narrative  EXAMINATION:  CT OF THE CHEST WITHOUT CONTRAST 8/21/2022 9:43 am    TECHNIQUE:  CT of the chest was performed without the administration of intravenous  contrast. Multiplanar reformatted images are provided for review. Automated  exposure control, iterative reconstruction, and/or weight based adjustment of  the mA/kV was utilized to reduce the radiation dose to as low as reasonably  achievable. COMPARISON:  08/05/2022    HISTORY:  ORDERING SYSTEM PROVIDED HISTORY: worsening  hypoxemia  TECHNOLOGIST PROVIDED HISTORY:  Reason for exam:->worsening  hypoxemia  Reason for Exam: worsening hypoxemia    FINDINGS:  Mediastinum: Tip of ET tube seen in midthoracic trachea    Small pericardial effusion is seen. Thickness measures approximately 8 mm. Large hiatal hernia is seen. Small mediastinal and hilar nodes are noted. Tip of PICC is in the region of the proximal SVC. Lungs/pleura: Respiratory motion limits evaluation of fine pulmonary  parenchymal change. Small left-sided pleural effusion is seen. There is  adjacent left basilar consolidation. There is adjacent consolidation of the  left upper lobe.   Septal thickening is seen    Small right-sided pleural effusion is seen. There is adjacent right basilar  consolidation. Septal thickening is seen on the right. No obstructing endobronchial lesions are seen    Upper Abdomen: Adrenal glands unremarkable. Soft Tissues/Bones: Spurring is seen in the spine. Spurring is seen in the  shoulder joints. Impression  Small bilateral pleural effusions with adjacent consolidation lungs, either  due to atelectasis or pneumonia. Septal thickening is seen, suggesting a  component of fluid overload. .  Pleural effusions and adjacent consolidation,  is increased compared to prior      CTPA: No results found for this or any previous visit. CXR PA/LAT: No results found for this or any previous visit. CXR portable: Results for orders placed during the hospital encounter of 08/05/22    XR CHEST PORTABLE    Narrative  EXAMINATION:  ONE XRAY VIEW OF THE CHEST    8/19/2022 9:43 am    COMPARISON:  08/15/2022 radiograph    HISTORY:  ORDERING SYSTEM PROVIDED HISTORY: hypoxemia  TECHNOLOGIST PROVIDED HISTORY:  Reason for exam:->hypoxemia  Reason for Exam: hypoxemia    FINDINGS:  Supportive devices are stable. The heart is enlarged. Mediastinum is  normal.  Mild perihilar opacities persist centrally and there are mild  bibasilar ground-glass opacities. Probable trace left pleural effusion. No  significant skeletal finding. Impression  Relatively stable pattern of mild edema with no detrimental change. This note was transcribed using [id number]. Please disregard any translational errors.       Jaya Godwin [facility] [phone]

## 2022-08-27 ENCOUNTER — APPOINTMENT (OUTPATIENT)
Dept: GENERAL RADIOLOGY | Age: 62
DRG: 853 | End: 2022-08-27
Payer: COMMERCIAL

## 2022-08-27 LAB
ANION GAP SERPL CALCULATED.3IONS-SCNC: 10 MMOL/L (ref 3–16)
ANION GAP SERPL CALCULATED.3IONS-SCNC: 10 MMOL/L (ref 3–16)
ANISOCYTOSIS: ABNORMAL
BANDED NEUTROPHILS RELATIVE PERCENT: 1 % (ref 0–7)
BASOPHILS ABSOLUTE: 0 K/UL (ref 0–0.2)
BASOPHILS RELATIVE PERCENT: 0 %
BUN BLDV-MCNC: 32 MG/DL (ref 7–20)
BUN BLDV-MCNC: 32 MG/DL (ref 7–20)
CALCIUM SERPL-MCNC: 7.7 MG/DL (ref 8.3–10.6)
CALCIUM SERPL-MCNC: 7.9 MG/DL (ref 8.3–10.6)
CHLORIDE BLD-SCNC: 112 MMOL/L (ref 99–110)
CHLORIDE BLD-SCNC: 113 MMOL/L (ref 99–110)
CO2: 23 MMOL/L (ref 21–32)
CO2: 23 MMOL/L (ref 21–32)
CREAT SERPL-MCNC: 0.8 MG/DL (ref 0.8–1.3)
CREAT SERPL-MCNC: 0.8 MG/DL (ref 0.8–1.3)
EOSINOPHILS ABSOLUTE: 0.1 K/UL (ref 0–0.6)
EOSINOPHILS RELATIVE PERCENT: 1 %
GFR AFRICAN AMERICAN: >60
GFR AFRICAN AMERICAN: >60
GFR NON-AFRICAN AMERICAN: >60
GFR NON-AFRICAN AMERICAN: >60
GLUCOSE BLD-MCNC: 111 MG/DL (ref 70–99)
GLUCOSE BLD-MCNC: 116 MG/DL (ref 70–99)
GLUCOSE BLD-MCNC: 119 MG/DL (ref 70–99)
GLUCOSE BLD-MCNC: 121 MG/DL (ref 70–99)
GLUCOSE BLD-MCNC: 123 MG/DL (ref 70–99)
GLUCOSE BLD-MCNC: 126 MG/DL (ref 70–99)
GLUCOSE BLD-MCNC: 129 MG/DL (ref 70–99)
GLUCOSE BLD-MCNC: 131 MG/DL (ref 70–99)
HCT VFR BLD CALC: 32.9 % (ref 40.5–52.5)
HEMOGLOBIN: 11 G/DL (ref 13.5–17.5)
HYPOCHROMIA: ABNORMAL
LYMPHOCYTES ABSOLUTE: 0.7 K/UL (ref 1–5.1)
LYMPHOCYTES RELATIVE PERCENT: 7 %
MACROCYTES: ABNORMAL
MAGNESIUM: 2.3 MG/DL (ref 1.8–2.4)
MCH RBC QN AUTO: 31.1 PG (ref 26–34)
MCHC RBC AUTO-ENTMCNC: 33.4 G/DL (ref 31–36)
MCV RBC AUTO: 93.1 FL (ref 80–100)
METAMYELOCYTES RELATIVE PERCENT: 2 %
MICROCYTES: ABNORMAL
MONOCYTES ABSOLUTE: 0.8 K/UL (ref 0–1.3)
MONOCYTES RELATIVE PERCENT: 8 %
MYELOCYTE PERCENT: 3 %
NEUTROPHILS ABSOLUTE: 8.1 K/UL (ref 1.7–7.7)
NEUTROPHILS RELATIVE PERCENT: 78 %
PDW BLD-RTO: 17 % (ref 12.4–15.4)
PERFORMED ON: ABNORMAL
PHOSPHORUS: 1.9 MG/DL (ref 2.5–4.9)
PLATELET # BLD: 451 K/UL (ref 135–450)
PMV BLD AUTO: 7.3 FL (ref 5–10.5)
POTASSIUM SERPL-SCNC: 3.6 MMOL/L (ref 3.5–5.1)
POTASSIUM SERPL-SCNC: 3.6 MMOL/L (ref 3.5–5.1)
RBC # BLD: 3.53 M/UL (ref 4.2–5.9)
SODIUM BLD-SCNC: 145 MMOL/L (ref 136–145)
SODIUM BLD-SCNC: 146 MMOL/L (ref 136–145)
STOMATOCYTES: ABNORMAL
WBC # BLD: 9.7 K/UL (ref 4–11)

## 2022-08-27 PROCEDURE — 2580000003 HC RX 258: Performed by: INTERNAL MEDICINE

## 2022-08-27 PROCEDURE — 2580000003 HC RX 258: Performed by: NURSE PRACTITIONER

## 2022-08-27 PROCEDURE — 2500000003 HC RX 250 WO HCPCS: Performed by: HOSPITALIST

## 2022-08-27 PROCEDURE — 2580000003 HC RX 258: Performed by: HOSPITALIST

## 2022-08-27 PROCEDURE — 6360000002 HC RX W HCPCS: Performed by: INTERNAL MEDICINE

## 2022-08-27 PROCEDURE — 1200000000 HC SEMI PRIVATE

## 2022-08-27 PROCEDURE — 85025 COMPLETE CBC W/AUTO DIFF WBC: CPT

## 2022-08-27 PROCEDURE — 80048 BASIC METABOLIC PNL TOTAL CA: CPT

## 2022-08-27 PROCEDURE — 6360000002 HC RX W HCPCS: Performed by: NURSE PRACTITIONER

## 2022-08-27 PROCEDURE — 6370000000 HC RX 637 (ALT 250 FOR IP): Performed by: NURSE PRACTITIONER

## 2022-08-27 PROCEDURE — 92610 EVALUATE SWALLOWING FUNCTION: CPT

## 2022-08-27 PROCEDURE — 80202 ASSAY OF VANCOMYCIN: CPT

## 2022-08-27 PROCEDURE — 36415 COLL VENOUS BLD VENIPUNCTURE: CPT

## 2022-08-27 PROCEDURE — 2500000003 HC RX 250 WO HCPCS: Performed by: INTERNAL MEDICINE

## 2022-08-27 PROCEDURE — 6370000000 HC RX 637 (ALT 250 FOR IP): Performed by: HOSPITALIST

## 2022-08-27 PROCEDURE — 83735 ASSAY OF MAGNESIUM: CPT

## 2022-08-27 PROCEDURE — 94761 N-INVAS EAR/PLS OXIMETRY MLT: CPT

## 2022-08-27 PROCEDURE — 84100 ASSAY OF PHOSPHORUS: CPT

## 2022-08-27 PROCEDURE — 71045 X-RAY EXAM CHEST 1 VIEW: CPT

## 2022-08-27 PROCEDURE — 6370000000 HC RX 637 (ALT 250 FOR IP): Performed by: INTERNAL MEDICINE

## 2022-08-27 PROCEDURE — 2500000003 HC RX 250 WO HCPCS: Performed by: STUDENT IN AN ORGANIZED HEALTH CARE EDUCATION/TRAINING PROGRAM

## 2022-08-27 PROCEDURE — 99233 SBSQ HOSP IP/OBS HIGH 50: CPT | Performed by: INTERNAL MEDICINE

## 2022-08-27 PROCEDURE — 2500000003 HC RX 250 WO HCPCS: Performed by: NURSE PRACTITIONER

## 2022-08-27 PROCEDURE — 2700000000 HC OXYGEN THERAPY PER DAY

## 2022-08-27 PROCEDURE — 94640 AIRWAY INHALATION TREATMENT: CPT

## 2022-08-27 PROCEDURE — 6360000002 HC RX W HCPCS: Performed by: STUDENT IN AN ORGANIZED HEALTH CARE EDUCATION/TRAINING PROGRAM

## 2022-08-27 RX ORDER — IPRATROPIUM BROMIDE AND ALBUTEROL SULFATE 2.5; .5 MG/3ML; MG/3ML
1 SOLUTION RESPIRATORY (INHALATION) EVERY 4 HOURS PRN
Status: DISCONTINUED | OUTPATIENT
Start: 2022-08-27 | End: 2022-09-07 | Stop reason: HOSPADM

## 2022-08-27 RX ORDER — POTASSIUM CHLORIDE 29.8 MG/ML
20 INJECTION INTRAVENOUS
Status: COMPLETED | OUTPATIENT
Start: 2022-08-27 | End: 2022-08-27

## 2022-08-27 RX ORDER — METOPROLOL TARTRATE 5 MG/5ML
5 INJECTION INTRAVENOUS EVERY 6 HOURS
Status: COMPLETED | OUTPATIENT
Start: 2022-08-27 | End: 2022-08-27

## 2022-08-27 RX ADMIN — CEFEPIME 2000 MG: 2 INJECTION, POWDER, FOR SOLUTION INTRAVENOUS at 05:18

## 2022-08-27 RX ADMIN — METOPROLOL TARTRATE 5 MG: 5 INJECTION INTRAVENOUS at 06:08

## 2022-08-27 RX ADMIN — SODIUM CHLORIDE, PRESERVATIVE FREE 10 ML: 5 INJECTION INTRAVENOUS at 08:25

## 2022-08-27 RX ADMIN — HEPARIN SODIUM 5000 UNITS: 5000 INJECTION INTRAVENOUS; SUBCUTANEOUS at 22:03

## 2022-08-27 RX ADMIN — GABAPENTIN 400 MG: 400 CAPSULE ORAL at 08:24

## 2022-08-27 RX ADMIN — METRONIDAZOLE 500 MG: 500 INJECTION, SOLUTION INTRAVENOUS at 05:20

## 2022-08-27 RX ADMIN — CELECOXIB 200 MG: 200 CAPSULE ORAL at 08:24

## 2022-08-27 RX ADMIN — POTASSIUM CHLORIDE 20 MEQ: 29.8 INJECTION, SOLUTION INTRAVENOUS at 06:21

## 2022-08-27 RX ADMIN — POTASSIUM CHLORIDE 20 MEQ: 29.8 INJECTION, SOLUTION INTRAVENOUS at 08:10

## 2022-08-27 RX ADMIN — IPRATROPIUM BROMIDE AND ALBUTEROL SULFATE 1 AMPULE: 2.5; .5 SOLUTION RESPIRATORY (INHALATION) at 08:06

## 2022-08-27 RX ADMIN — METRONIDAZOLE 500 MG: 500 INJECTION, SOLUTION INTRAVENOUS at 21:58

## 2022-08-27 RX ADMIN — HEPARIN SODIUM 5000 UNITS: 5000 INJECTION INTRAVENOUS; SUBCUTANEOUS at 14:10

## 2022-08-27 RX ADMIN — CEFEPIME 2000 MG: 2 INJECTION, POWDER, FOR SOLUTION INTRAVENOUS at 21:56

## 2022-08-27 RX ADMIN — Medication 1250 MG: at 11:20

## 2022-08-27 RX ADMIN — GABAPENTIN 400 MG: 400 CAPSULE ORAL at 14:09

## 2022-08-27 RX ADMIN — METRONIDAZOLE 500 MG: 500 INJECTION, SOLUTION INTRAVENOUS at 14:23

## 2022-08-27 RX ADMIN — LEVOTHYROXINE SODIUM 200 MCG: 0.1 TABLET ORAL at 08:24

## 2022-08-27 RX ADMIN — CEFEPIME 2000 MG: 2 INJECTION, POWDER, FOR SOLUTION INTRAVENOUS at 14:21

## 2022-08-27 RX ADMIN — FAMOTIDINE 20 MG: 10 INJECTION, SOLUTION INTRAVENOUS at 08:24

## 2022-08-27 RX ADMIN — HEPARIN SODIUM 5000 UNITS: 5000 INJECTION INTRAVENOUS; SUBCUTANEOUS at 05:20

## 2022-08-27 RX ADMIN — METHYLPREDNISOLONE SODIUM SUCCINATE 40 MG: 40 INJECTION, POWDER, FOR SOLUTION INTRAMUSCULAR; INTRAVENOUS at 14:10

## 2022-08-27 RX ADMIN — FAMOTIDINE 20 MG: 10 INJECTION, SOLUTION INTRAVENOUS at 21:59

## 2022-08-27 RX ADMIN — METHYLPREDNISOLONE SODIUM SUCCINATE 40 MG: 40 INJECTION, POWDER, FOR SOLUTION INTRAMUSCULAR; INTRAVENOUS at 01:40

## 2022-08-27 RX ADMIN — GABAPENTIN 400 MG: 400 CAPSULE ORAL at 22:02

## 2022-08-27 RX ADMIN — CELECOXIB 200 MG: 200 CAPSULE ORAL at 22:02

## 2022-08-27 RX ADMIN — CHLORHEXIDINE GLUCONATE 0.12% ORAL RINSE 15 ML: 1.2 LIQUID ORAL at 08:24

## 2022-08-27 ASSESSMENT — PAIN SCALES - GENERAL
PAINLEVEL_OUTOF10: 4
PAINLEVEL_OUTOF10: 6
PAINLEVEL_OUTOF10: 4

## 2022-08-27 ASSESSMENT — PAIN DESCRIPTION - LOCATION
LOCATION: BACK

## 2022-08-27 ASSESSMENT — PAIN DESCRIPTION - ORIENTATION
ORIENTATION: MID

## 2022-08-27 ASSESSMENT — PAIN DESCRIPTION - PAIN TYPE
TYPE: CHRONIC PAIN
TYPE: CHRONIC PAIN

## 2022-08-27 ASSESSMENT — PAIN DESCRIPTION - FREQUENCY
FREQUENCY: CONTINUOUS
FREQUENCY: CONTINUOUS

## 2022-08-27 ASSESSMENT — PAIN DESCRIPTION - DESCRIPTORS
DESCRIPTORS: ACHING

## 2022-08-27 ASSESSMENT — PAIN DESCRIPTION - ONSET
ONSET: ON-GOING
ONSET: ON-GOING

## 2022-08-27 NOTE — PROGRESS NOTES
Pt arrived to bed 4267. VS stable. Alert to self. Wife at bedside. FSBS stable. New tube feeding hung. Fermin and Flexeseal hanging on left side of bed. Denies any needs.

## 2022-08-27 NOTE — PROGRESS NOTES
Hospitalist Progress Note  8/27/2022 4:58 PM    PCP: Zenobia Amos MD    [phone]     Date of Admission: 8/5/2022    Patient demographics:  The patient  Álvaro Argueta is a 63 y.o. male     Significant past medical history:   Patient Active Problem List   Diagnosis    Partial bowel obstruction ([facility]    Hiatal hernia    Acute respiratory failure with hypoxia and hypercapnia (HCC)    SBO (small bowel obstruction) (HCC)    Aspiration into airway    Acute respiratory failure with hypoxia (HCC)    Small bowel obstruction (HCC)    Diastolic heart failure (HCC)    History of atrial fibrillation    Supraventricular tachycardia ([address]    Arterial hypotension    Aspiration pneumonia of both lower lobes due to gastric secretions ([address]    Morbid obesity with BMI of 50.0-59.9, adult ([address]         Patient was diagnosed with:  Low grade or Partial Bowel obstruction  Pneumonia and septic shock. Hiatal hernia  Atrial Fibrillation      Treatment while inpatient:  63years old male with medical history significant for morbid obesity, paraesophageal hernia. Patient presented to the emergency room with abdominal pain nausea vomiting and diarrhea. Patient was diagnosed with small bowel obstruction. Patient was also diagnosed with pneumonia and septic shock. Patient developed acute respiratory failure and acute renal failure. Managed on the ventilator with a prolonged course in ICU. Extubated 8/26.    ----------------------------------------------------------      SUBJECTIVE COMPLAINTS-patient seen and evaluated at the bedside, denied chest pain. Spoke to the family at the bedside, answered all questions. Diet: Diet NPO  ADULT TUBE FEEDING; Nasogastric; Peptide Based High Protein; Continuous; 10; Yes; 20; Q 4 hours; 50; 30; Q 4 hours; Protein;  Bolus 2 protienex daily      OBJECTIVE:   Patient Active Problem List   Diagnosis    Partial bowel obstruction (HCC)    Hiatal hernia    Acute respiratory failure with hypoxia and hypercapnia (HCC)    SBO (small bowel obstruction) (HCC)    Aspiration into airway    Acute respiratory failure with hypoxia (HCC)    Small bowel obstruction (HCC)    Diastolic heart failure (HCC)    History of atrial fibrillation    Supraventricular tachycardia (HCC)    Arterial hypotension    Aspiration pneumonia of both lower lobes due to gastric secretions (HCC)    Morbid obesity with BMI of 50.0-59.9, adult (HCC)       Allergies  Codeine    Medications    Scheduled Meds:   celecoxib  200 mg Per NG tube BID    vancomycin  1,250 mg IntraVENous Q12H    heparin (porcine)  5,000 Units SubCUTAneous 3 times per day    metroNIDAZOLE  500 mg IntraVENous Q8H    cefepime  2,000 mg IntraVENous Q8H    vancomycin (VANCOCIN) intermittent dosing (placeholder)   Other RX Placeholder    methylPREDNISolone  40 mg IntraVENous Q12H    gabapentin  400 mg Oral TID    [Held by provider] metoprolol tartrate  25 mg Oral BID    famotidine (PEPCID) injection  20 mg IntraVENous BID    chlorhexidine  15 mL Mouth/Throat BID    sodium chloride flush  5-40 mL IntraVENous 2 times per day    levothyroxine  200 mcg Oral Daily    [Held by provider] spironolactone  50 mg Oral Daily     Continuous Infusions:   fentaNYL Stopped (08/26/22 0915)    phenylephrine (REHANA-SYNEPHRINE) 50mg/250mL infusion Stopped (08/25/22 2008)    [Held by provider] furosemide Stopped (08/23/22 0945)    sodium chloride       PRN Meds:  ipratropium-albuterol, senna, polyethylene glycol, docusate, fentanNYL, midazolam, artificial tears, acetaminophen, potassium chloride, acetaminophen, sodium chloride flush, sodium chloride, ondansetron **OR** ondansetron, diazePAM    Vitals   Vitals /wt Patient Vitals for the past 8 hrs:   BP Temp Temp src Pulse Resp SpO2   08/27/22 1600 (!) 158/94 -- Oral 72 23 95 %   08/27/22 1553 -- -- -- 71 26 94 %   08/27/22 1500 (!) 153/86 -- -- 77 27 95 %   08/27/22 1400 (!) 154/91 -- -- 80 22 94 %   08/27/22 1300 136/87 -- -- 72 22 99 %   08/27/22 1200 (!) 140/88 97.9 °F (36.6 °C) Oral 69 24 94 %   08/27/22 1149 -- -- -- 74 22 96 %   08/27/22 1100 (!) 156/91 -- -- 68 19 (!) 85 %   08/27/22 1000 (!) 155/94 -- -- 74 26 (!) 87 %   08/27/22 0900 119/69 -- -- 75 24 (!) 89 %          72HR INTAKE/OUTPUT:    Intake/Output Summary (Last 24 hours) at 8/27/2022 1658  Last data filed at 8/27/2022 1600  Gross per 24 hour   Intake 1776.04 ml   Output 2455 ml   Net -678.96 ml         Exam:    Gen: Awake alert, holding conversations, having a speech therapy evaluation at the bedside, patient's family is also at bedside, he is on 3 L nasal cannula  Eyes: PERRL. No sclera icterus. No conjunctival injection. ENT: No discharge. Pharynx clear. External appearance of ears and nose normal.  Neck: Trachea midline. No obvious mass. Resp: No accessory muscle use. No crackles. No wheezes. No rhonchi. CV: Regular rate. Regular rhythm. No murmur or rub. No edema. GI: Non-tender. Non-distended. No hernia. Skin: Warm, dry, normal texture and turgor. Lymph: No cervical LAD. No supraclavicular LAD. M/S: / Ext. No cyanosis. No clubbing. No joint deformity. Neuro: CN 2-12 are intact,  no neurologic deficits noted. PT/INR:   No results for input(s): PROTIME, INR in the last 72 hours. APTT:   No results for input(s): APTT in the last 72 hours. CBC:   Recent Labs     08/25/22  0355 08/26/22  0445 08/27/22  0500   WBC 10.0 8.3 9.7   HGB 9.4* 9.4* 11.0*   HCT 28.9* 28.6* 32.9*   MCV 94.7 94.3 93.1    407 451*         BMP:   Recent Labs     08/25/22  0354 08/25/22  1446 08/26/22  0445 08/26/22 2020 08/27/22  0500 08/27/22  1122      < > 138 142 146* 145   K 3.8   < > 4.1 3.5 3.6 3.6      < > 107 110 113* 112*   CO2 24   < > 24 23 23 23   PHOS 3.1  --  2.0*  --  1.9*  --    BUN 43*   < > 40* 36* 32* 32*   CREATININE 1.1   < > 0.8 0.7* 0.8 0.8    < > = values in this interval not displayed.          LIVER PROFILE:   No results for input(s): ALKPHOS, AST, ALT, ALB, BILIDIR, BILITOT, ALKPHOS in the last 72 hours. No results for input(s): AMYLASE in the last 72 hours. No results for input(s): LIPASE in the last 72 hours. UA:  No results for input(s): NITRITE, LABCAST, WBCUA, RBCUA, MUCUS in the last 72 hours. TROPONIN:   No results for input(s): Rhae Childes in the last 72 hours. Lab Results   Component Value Date/Time    URRFLXCULT Not Indicated 08/15/2022 09:16 AM       No results for input(s): TSHREFLEX in the last 72 hours. No components found for: [id number]  POC GLUCOSE:    Recent Labs     08/26/22  2331 08/27/22  0806 08/27/22  0823 08/27/22  1207 08/27/22  1559   POCGLU 125* 129* 121* 119* 123*       No results for input(s): LABA1C in the last 72 hours.  No results found for: LABA1C    Echocardiogram shows normal ejection fraction  Grade 1 diastolic dysfunction      ASSESSMENT AND PLAN    Acute respiratory failure  Intubated 8/8  Extubated 8/26  Doing well  Now on 3 L nasal cannula, wean as tolerated  Obesity with obstructive sleep apnea and hypoventilation syndrome    Aspiration pna  Continues to be on cefepime and metronidazole  Ct abxs  Speech therapy evaluation, advance diet as started      Acute on chronic diastolic CHF  Patient has grade 1 diastolic dysfunction  Lasix drip discontinued  500 cc fluid bolus  Patient now started on pressors      Partial small Bowel obstruction-improving, advance diet as tolerated    SVT  better  H/o atrial fibrillation  HR controled      CHARLENE  Renal function improved      Hypotention  Stable now    Hiatal hernia  - CT chest/abd/pelv:   Large hiatal hernia containing much of the stomach which is distended  Og tube today     Atrial Fibrillation  Patient developed the rapid ventricular rate  IV Lopressor  Cardiology, nephrology following      Advance diet as tolerated, okay to transfer to the floor    Code Status: Full Code      The patient and / or the family were informed of the results of any tests,

## 2022-08-27 NOTE — PROGRESS NOTES
advised below. 6. Mild left subpectoral lymphadenopathy, of questionable clinical   significance. 7. Interval decrease in size of the patient's small pericardial effusion. CHEST X-RAY 8/23/22  Impression   Interval extubation. Interval vascular congestion. Ground-glass opacity in the lungs is likely pulmonary edema given the fairly   rapid development. Modified Barium Swallow Study: any recent history in chart review: None in EMR    Reason for referral: SLP evaluation orders received due to recent intubation     History/Prior Level of Function:   Living Status: Lives with spouse  Baseline diet: Regular/thin liquids  Prior Dysphagia History: None documented, patient denies    Dysphagia Impressions/Diagnosis: Oropharyngeal Dysphagia   OROPHARYNGEAL DYSPHAGIA DIAGNOSIS: Moderate oral dysphagia, severe pharyngeal dysphagia  Accepted and tolerated evaluation at bedside  Patient oriented to self, kind of place, month and year  Patient presents with moderate oral dysphagia characterized by decreased lingual coordination, slow disorganized formation of bolus, decreased AP transit, suspect premature loss of bolus  Severe pharyngeal dysphagia characterized by cough with thin liquids via cup's edge as trials progressed, cough with mildly and moderately thick liquids via cup's edge and immediate throat clear with puree textures. Solid trials only limited to puree and minced and moist textures due to patient report of difficulty \"getting down\" solids. Of note patient did not initially exhibit s/s of aspiration/penetration with liquids but as trials progressed patient exhibited cough with liquids suspect mechanism fatigue  ST to recommend NPO.   ST to continue to follow during acute admission       Recommended Diet and Intervention 8/27/2022:  Diet Solids Recommendation:  NPO  Liquid Consistency Recommendation:  NPO Recommended form of Meds: Via alternative means      SHORT TERM DYSPHAGIA GOALS/PLAN OF CARE: Speech therapy for dysphagia tx 3-5 times per week during acute care stay. Pt will functionally tolerate ongoing assessment of swallow function with diet to be determined as indicated          Dysphagia Therapeutic Intervention:  Oral Care, Therapeutic Trials with SLP     Dysphagia Prognosis: [] good []fair  [x]Guarded d/t weakness, severity of swallow []poor     Discharge Recommendations: Recommend ongoing SLP for dysphagia therapy upon discharge from hospital       TEST DATA  Vision: Adequate for assessment needs  Hearing: Adequate for assessment needs    Consistencies Presented:   Ice chips; Thin liquid;   Mildly / nectar thick liquid ;    Moderately / honey thick liquid  Puree food  Minced and moist food       Cognitive/behavioral:   []orientation [x] to self [] place [x] Date (month and year) [] reason for admit [] purpose of evaluation  []distractible  [x]verbally responsive  [x]follows one step commands  []agitated  []impulsive  [x] other: of note, patient's wife reports more confusion this afternoon      Patient Positioning: Upright in bed     Dentition:  [x]Adequate  []Dentures   []Missing Many Teeth  []Edentulous  []Other:    Baseline Vocal Quality:  []Normal  []Dysphonic   []Aphonic   []Hoarse  []Wet  [x]Weak  []Other:    Volitional Cough:  []Strong  [x]Weak  []Wet  []Absent  []Congested  []Other:    Volitional Swallow:   []Absent   []Delayed     []Adequate     []Required use of drink     Oral Mechanism Exam:  []WFL [x]Mild   [] Moderate  []Severe  []To be assessed  Impaired:   []Left side      []Right side    []Labial ROM/Coordination    []Labial Symmetry   [x]Lingual ROM/Coordination   []Lingual Symmetry  []Gag  []Other:     Oral Phase: []WFL []Mild   [] Moderate  []Severe  []To be assessed   [x]Impaired/Prolonged Mastication:   []Spillage Left:   []Spillage Right:  []Pocketing Left:   []Pocketing Right:   [x]Decreased Anterior to Posterior Transit:   [x]Suspected Premature Bolus Loss:   []Lingual/Palatal Residue:   []Other:     Pharyngeal Phase: []WFL []Mild   [] Moderate  [x]Severe  []To be assessed   [x]Delayed Swallow:   []Suspected Pharyngeal Pooling:   [x]Decreased Laryngeal Elevation:   []Absent Swallow:  []Wet Vocal Quality:   [x]Throat Clearing-Immediate: puree   []Throat Clearing-Delayed:   [x]Cough-Immediate: all liquid trials via cup's edge as trials progressed   []Cough-Delayed:  []Change in Vital Signs:  []Suspected Delayed Pharyngeal Clearing:  []Other:       Eating Assistance:  []Independent  []Setup or clean-up assistance   [] Supervision or touching assistance   [] Partial or moderate assistance   [] Substantial or maximal assistance  [x] Dependent       EDUCATION:   Provided education regarding role of SLP, results of assessment, recommendations and general speech pathology plan of care. [] Pt verbalized understanding and agreement   [x] Pt requires ongoing learning   [] No evidence of comprehension     If patient discharges prior to next visit, this note will serve as discharge.      Timed Code Minutes: 0  Total Treatment Minutes: 50    Electronically signed by:    Rigoberto Lozano, [address] [place] [address] Jacquie, [address] [place] [address]  Speech-Language Pathologist  On 08/27/22 at 1:19 PM

## 2022-08-27 NOTE — PROGRESS NOTES
Called and gave report to oncoming nurse Daria Hawkins RN. All questions asked and answered. Phone number provided for any questions. Patient and wife made aware of transfer orders to different floor. Call light within reach, will continue to monitor.

## 2022-08-27 NOTE — PROGRESS NOTES
Pulmonary Progress Note    Date of Admission: 8/5/2022   LOS: 22 days       CC:  Chief Complaint   Patient presents with    Abdominal Pain    Chest Pain     Sub-sternal, pressure, onset 1300 hours today    Shortness of Breath     EMS reports pt 88% on RA        Subjective:  Complains of back discomfort. This is chronic. ROS:        Assessment:     History of recurrent SVT  Obesity with sleep apnea and obesity hypoventilation syndrome    Plan: This note may have been transcribed using [id number] [employer]. Please disregard any translational errors. Hospital Day: 22     Atelectasis versus pneumonia with acute hypoxemic respiratory failure after aspiration  Also diagnosis is likely cryptogenic organizing pneumonia. After starting high-dose steroids, he had significant improvement. Now down to 3 L nasal cannula. Culture was positive for greater than 100,000 normal amarilys. Please stop date tomorrow for all antibiotics  Continue Solu-Medrol through tomorrow then changed to prednisone. Change duo nebs to as needed. Partial small bowel obstruction  Resolved. Back on tube feeds. History of diastolic CHF  Volume status controlled. Renal status improved. Nutrition  - Diet NPO  ADULT TUBE FEEDING; Nasogastric; Peptide Based High Protein; Continuous; 10; Yes; 20; Q 4 hours; 50; 30; Q 4 hours; Protein; Bolus 2 protienex daily  Consult speech-language pathology  -   Intake/Output Summary (Last 24 hours) at 8/27/2022 0945  Last data filed at 8/27/2022 0508  Gross per 24 hour   Intake 2344.44 ml   Output 2090 ml   Net 254.44 ml         Mobility       Access      Transfer to floor. Data:        PHYSICAL EXAM:   Blood pressure 119/69, pulse 75, temperature 98.3 °F (36.8 °C), temperature source Oral, resp. rate 24, height 5' 7\" (1.702 m), weight (!) 332 lb 14.4 oz (151 kg), SpO2 (!) 89 %.'  Body mass index is 52.14 kg/m². Gen: No distress. ENT:   Resp: No accessory muscle use. No crackles. No wheezes. No rhonchi. CV: Regular rate. Regular rhythm. No murmur or rub. No edema. GI high-pitched bowel sounds  Skin: Warm, dry, normal texture and turgor. No nodule on exposed extremities. M/S: No cyanosis. No clubbing. No joint deformity. Psych: Extubated. Intermittently hoarse.       Medications:    Scheduled Meds:   celecoxib  200 mg Per NG tube BID    vancomycin  1,250 mg IntraVENous Q12H    heparin (porcine)  5,000 Units SubCUTAneous 3 times per day    metroNIDAZOLE  500 mg IntraVENous Q8H    cefepime  2,000 mg IntraVENous Q8H    vancomycin (VANCOCIN) intermittent dosing (placeholder)   Other RX Placeholder    methylPREDNISolone  40 mg IntraVENous Q12H    gabapentin  400 mg Oral TID    [Held by provider] metoprolol tartrate  25 mg Oral BID    famotidine (PEPCID) injection  20 mg IntraVENous BID    chlorhexidine  15 mL Mouth/Throat BID    ipratropium-albuterol  1 ampule Inhalation Q4H WA    sodium chloride flush  5-40 mL IntraVENous 2 times per day    levothyroxine  200 mcg Oral Daily    [Held by provider] spironolactone  50 mg Oral Daily       Continuous Infusions:   fentaNYL Stopped (08/26/22 0915)    phenylephrine (REHANA-SYNEPHRINE) 50mg/250mL infusion Stopped (08/25/22 2008)    [Held by provider] furosemide Stopped (08/23/22 0945)    sodium chloride         PRN Meds:  senna, polyethylene glycol, docusate, fentanNYL, midazolam, artificial tears, acetaminophen, potassium chloride, acetaminophen, sodium chloride flush, sodium chloride, ondansetron **OR** ondansetron, diazePAM    Labs reviewed:  CBC:   Recent Labs     08/25/22  0355 08/26/22  0445 08/27/22  0500   WBC 10.0 8.3 9.7   HGB 9.4* 9.4* 11.0*   HCT 28.9* 28.6* 32.9*   MCV 94.7 94.3 93.1    407 451*       BMP:   Recent Labs     08/25/22  0354 08/25/22  1446 08/26/22  0445 08/26/22 2020 08/27/22  0500      < > 138 142 146*   K 3.8   < > 4.1 3.5 3.6      < > 107 110 113*   CO2 24   < > 24 23 23   PHOS 3.1  -- 2.0*  --  1.9*   BUN 43*   < > 40* 36* 32*   CREATININE 1.1   < > 0.8 0.7* 0.8    < > = values in this interval not displayed. LIVER PROFILE: No results for input(s): AST, ALT, LIPASE, BILIDIR, BILITOT, ALKPHOS in the last 72 hours. Invalid input(s): AMYLASE,  ALB  PT/INR: No results for input(s): PROTIME, INR in the last 72 hours. APTT: No results for input(s): APTT in the last 72 hours. UA:No results for input(s): NITRITE, COLORU, PHUR, LABCAST, WBCUA, RBCUA, MUCUS, TRICHOMONAS, YEAST, BACTERIA, CLARITYU, SPECGRAV, LEUKOCYTESUR, UROBILINOGEN, BILIRUBINUR, BLOODU, GLUCOSEU, AMORPHOUS in the last 72 hours. Invalid input(s): Mireille Benders  No results for input(s): PH, PCO2, PO2 in the last 72 hours. Cx:      Films:  Radiology Review:  Pertinent images / reports were reviewed as a part of this visit. CT Chest w/ contrast: No results found for this or any previous visit. CT Chest w/o contrast: Results for orders placed during the hospital encounter of 08/05/22    CT CHEST WO CONTRAST    Narrative  EXAMINATION:  CT OF THE CHEST WITHOUT CONTRAST 8/21/2022 9:43 am    TECHNIQUE:  CT of the chest was performed without the administration of intravenous  contrast. Multiplanar reformatted images are provided for review. Automated  exposure control, iterative reconstruction, and/or weight based adjustment of  the mA/kV was utilized to reduce the radiation dose to as low as reasonably  achievable. COMPARISON:  08/05/2022    HISTORY:  ORDERING SYSTEM PROVIDED HISTORY: worsening  hypoxemia  TECHNOLOGIST PROVIDED HISTORY:  Reason for exam:->worsening  hypoxemia  Reason for Exam: worsening hypoxemia    FINDINGS:  Mediastinum: Tip of ET tube seen in midthoracic trachea    Small pericardial effusion is seen. Thickness measures approximately 8 mm. Large hiatal hernia is seen. Small mediastinal and hilar nodes are noted. Tip of PICC is in the region of the proximal SVC.     Lungs/pleura: Respiratory motion limits evaluation of fine pulmonary  parenchymal change. Small left-sided pleural effusion is seen. There is  adjacent left basilar consolidation. There is adjacent consolidation of the  left upper lobe. Septal thickening is seen    Small right-sided pleural effusion is seen. There is adjacent right basilar  consolidation. Septal thickening is seen on the right. No obstructing endobronchial lesions are seen    Upper Abdomen: Adrenal glands unremarkable. Soft Tissues/Bones: Spurring is seen in the spine. Spurring is seen in the  shoulder joints. Impression  Small bilateral pleural effusions with adjacent consolidation lungs, either  due to atelectasis or pneumonia. Septal thickening is seen, suggesting a  component of fluid overload. .  Pleural effusions and adjacent consolidation,  is increased compared to prior      CTPA: No results found for this or any previous visit. CXR PA/LAT: No results found for this or any previous visit. CXR portable: Results for orders placed during the hospital encounter of 08/05/22    XR CHEST PORTABLE    Narrative  EXAMINATION:  ONE XRAY VIEW OF THE CHEST    8/19/2022 9:43 am    COMPARISON:  08/15/2022 radiograph    HISTORY:  ORDERING SYSTEM PROVIDED HISTORY: hypoxemia  TECHNOLOGIST PROVIDED HISTORY:  Reason for exam:->hypoxemia  Reason for Exam: hypoxemia    FINDINGS:  Supportive devices are stable. The heart is enlarged. Mediastinum is  normal.  Mild perihilar opacities persist centrally and there are mild  bibasilar ground-glass opacities. Probable trace left pleural effusion. No  significant skeletal finding. Impression  Relatively stable pattern of mild edema with no detrimental change. This note was transcribed using [id number] [employer]. Please disregard any translational errors.       Jaya Godwin [facility] [phone]

## 2022-08-27 NOTE — PLAN OF CARE
Problem: Discharge Planning  Goal: Discharge to home or other facility with appropriate resources  Outcome: Progressing     Problem: Pain  Goal: Verbalizes/displays adequate comfort level or baseline comfort level  Outcome: Progressing     Problem: ABCDS Injury Assessment  Goal: Absence of physical injury  Outcome: Progressing     Problem: Safety - Adult  Goal: Free from fall injury  Outcome: Progressing     Problem: Skin/Tissue Integrity  Goal: Absence of new skin breakdown  Description: 1. Monitor for areas of redness and/or skin breakdown  2.   Assess vascular access sites hourly  Outcome: Progressing     Problem: Nutrition Deficit:  Goal: Optimize nutritional status  Outcome: Progressing

## 2022-08-27 NOTE — PROGRESS NOTES
Clinical Pharmacy Note  Vancomycin Consult    Luisa Pollard is a 63 y.o. male ordered Vancomycin; consult received from DRE Burnett to manage therapy. Also receiving cefepime and metronidazole. Allergies:  Codeine     Temp max:  Temp (24hrs), Av.2 °F (36.8 °C), Min:98 °F (36.7 °C), Max:98.4 °F (36.9 °C)      Recent Labs     22  0522 22  0355 22  0445   WBC 10.5 10.0 8.3         Recent Labs     22  2210 22  0445 22   BUN 43* 40* 36*   CREATININE 0.8 0.8 0.7*           Intake/Output Summary (Last 24 hours) at 2022  Last data filed at 2022 2105  Gross per 24 hour   Intake 2912.52 ml   Output 2905 ml   Net 7.52 ml         Culture Results:  pending    Ht Readings from Last 1 Encounters:   22 5' 7\" (1.702 m)        Wt Readings from Last 1 Encounters:   22 (!) 330 lb 9.6 oz (150 kg)         Estimated Creatinine Clearance: 158 mL/min (A) (based on SCr of 0.7 mg/dL (L)). Assessment/Plan:  Day # 4 of Vancomycin  Will schedule Vancomycin 1,250 mg IVPB Q12H at this time   Regimen projects an AUC of 560 and Trough of 17 ug/mL  Will repeat a Vancomycin trough on 22 at 2100    Thank you for the consult. Will continue to follow.     Hadley [facility], PharmD, BCPS  2022 9:33 PM

## 2022-08-28 ENCOUNTER — APPOINTMENT (OUTPATIENT)
Dept: GENERAL RADIOLOGY | Age: 62
DRG: 853 | End: 2022-08-28
Payer: COMMERCIAL

## 2022-08-28 ENCOUNTER — APPOINTMENT (OUTPATIENT)
Dept: CT IMAGING | Age: 62
DRG: 853 | End: 2022-08-28
Payer: COMMERCIAL

## 2022-08-28 PROBLEM — R41.0 ACUTE DELIRIUM: Status: ACTIVE | Noted: 2022-08-28

## 2022-08-28 PROBLEM — G47.33 OSA (OBSTRUCTIVE SLEEP APNEA): Status: ACTIVE | Noted: 2022-08-28

## 2022-08-28 LAB
ANION GAP SERPL CALCULATED.3IONS-SCNC: 10 MMOL/L (ref 3–16)
ANION GAP SERPL CALCULATED.3IONS-SCNC: 11 MMOL/L (ref 3–16)
ANION GAP SERPL CALCULATED.3IONS-SCNC: 7 MMOL/L (ref 3–16)
ATYPICAL LYMPHOCYTE RELATIVE PERCENT: 1 % (ref 0–6)
BANDED NEUTROPHILS RELATIVE PERCENT: 10 % (ref 0–7)
BASE EXCESS ARTERIAL: 0.2 MMOL/L (ref -3–3)
BASOPHILS ABSOLUTE: 0.1 K/UL (ref 0–0.2)
BASOPHILS RELATIVE PERCENT: 1 %
BUN BLDV-MCNC: 24 MG/DL (ref 7–20)
BUN BLDV-MCNC: 27 MG/DL (ref 7–20)
BUN BLDV-MCNC: 29 MG/DL (ref 7–20)
CALCIUM SERPL-MCNC: 7.8 MG/DL (ref 8.3–10.6)
CALCIUM SERPL-MCNC: 8 MG/DL (ref 8.3–10.6)
CALCIUM SERPL-MCNC: 8.1 MG/DL (ref 8.3–10.6)
CARBOXYHEMOGLOBIN ARTERIAL: 1.1 % (ref 0–1.5)
CHLORIDE BLD-SCNC: 110 MMOL/L (ref 99–110)
CHLORIDE BLD-SCNC: 111 MMOL/L (ref 99–110)
CHLORIDE BLD-SCNC: 112 MMOL/L (ref 99–110)
CO2: 22 MMOL/L (ref 21–32)
CREAT SERPL-MCNC: 0.6 MG/DL (ref 0.8–1.3)
CREAT SERPL-MCNC: 0.7 MG/DL (ref 0.8–1.3)
CREAT SERPL-MCNC: 0.8 MG/DL (ref 0.8–1.3)
EOSINOPHILS ABSOLUTE: 0.3 K/UL (ref 0–0.6)
EOSINOPHILS RELATIVE PERCENT: 3 %
GFR AFRICAN AMERICAN: >60
GFR NON-AFRICAN AMERICAN: >60
GLUCOSE BLD-MCNC: 103 MG/DL (ref 70–99)
GLUCOSE BLD-MCNC: 106 MG/DL (ref 70–99)
GLUCOSE BLD-MCNC: 111 MG/DL (ref 70–99)
GLUCOSE BLD-MCNC: 120 MG/DL (ref 70–99)
GLUCOSE BLD-MCNC: 122 MG/DL (ref 70–99)
GLUCOSE BLD-MCNC: 122 MG/DL (ref 70–99)
GLUCOSE BLD-MCNC: 128 MG/DL (ref 70–99)
GLUCOSE BLD-MCNC: 134 MG/DL (ref 70–99)
HCO3 ARTERIAL: 24.2 MMOL/L (ref 21–29)
HCT VFR BLD CALC: 35 % (ref 40.5–52.5)
HEMOGLOBIN, ART, EXTENDED: 14.5 G/DL (ref 13.5–17.5)
HEMOGLOBIN: 11.5 G/DL (ref 13.5–17.5)
LYMPHOCYTES ABSOLUTE: 1.1 K/UL (ref 1–5.1)
LYMPHOCYTES RELATIVE PERCENT: 11 %
MAGNESIUM: 2.2 MG/DL (ref 1.8–2.4)
MCH RBC QN AUTO: 30.7 PG (ref 26–34)
MCHC RBC AUTO-ENTMCNC: 32.8 G/DL (ref 31–36)
MCV RBC AUTO: 93.5 FL (ref 80–100)
METHEMOGLOBIN ARTERIAL: 0.3 %
MONOCYTES ABSOLUTE: 0.6 K/UL (ref 0–1.3)
MONOCYTES RELATIVE PERCENT: 7 %
MYELOCYTE PERCENT: 1 %
NEUTROPHILS ABSOLUTE: 6.9 K/UL (ref 1.7–7.7)
NEUTROPHILS RELATIVE PERCENT: 65 %
O2 SAT, ARTERIAL: 91.5 %
O2 THERAPY: ABNORMAL
PCO2 ARTERIAL: 36.4 MMHG (ref 35–45)
PDW BLD-RTO: 16.8 % (ref 12.4–15.4)
PERFORMED ON: ABNORMAL
PH ARTERIAL: 7.43 (ref 7.35–7.45)
PHOSPHORUS: 2.5 MG/DL (ref 2.5–4.9)
PLATELET # BLD: 398 K/UL (ref 135–450)
PLATELET SLIDE REVIEW: ADEQUATE
PMV BLD AUTO: 7.4 FL (ref 5–10.5)
PO2 ARTERIAL: 60.7 MMHG (ref 75–108)
POTASSIUM SERPL-SCNC: 3.5 MMOL/L (ref 3.5–5.1)
POTASSIUM SERPL-SCNC: 3.6 MMOL/L (ref 3.5–5.1)
POTASSIUM SERPL-SCNC: 3.6 MMOL/L (ref 3.5–5.1)
PRO-BNP: 1277 PG/ML (ref 0–124)
PROMYELOCYTES PERCENT: 1 %
RBC # BLD: 3.74 M/UL (ref 4.2–5.9)
SLIDE REVIEW: ABNORMAL
SMUDGE CELLS: PRESENT
SODIUM BLD-SCNC: 140 MMOL/L (ref 136–145)
SODIUM BLD-SCNC: 142 MMOL/L (ref 136–145)
SODIUM BLD-SCNC: 145 MMOL/L (ref 136–145)
TCO2 ARTERIAL: 25.3 MMOL/L
TOXIC GRANULATION: PRESENT
VANCOMYCIN TROUGH: 9.9 UG/ML (ref 10–20)
WBC # BLD: 8.9 K/UL (ref 4–11)

## 2022-08-28 PROCEDURE — 2580000003 HC RX 258: Performed by: INTERNAL MEDICINE

## 2022-08-28 PROCEDURE — 99232 SBSQ HOSP IP/OBS MODERATE 35: CPT | Performed by: NURSE PRACTITIONER

## 2022-08-28 PROCEDURE — 6370000000 HC RX 637 (ALT 250 FOR IP): Performed by: NURSE PRACTITIONER

## 2022-08-28 PROCEDURE — 6360000002 HC RX W HCPCS: Performed by: INTERNAL MEDICINE

## 2022-08-28 PROCEDURE — 1200000000 HC SEMI PRIVATE

## 2022-08-28 PROCEDURE — C1751 CATH, INF, PER/CENT/MIDLINE: HCPCS

## 2022-08-28 PROCEDURE — 84100 ASSAY OF PHOSPHORUS: CPT

## 2022-08-28 PROCEDURE — 36415 COLL VENOUS BLD VENIPUNCTURE: CPT

## 2022-08-28 PROCEDURE — 85025 COMPLETE CBC W/AUTO DIFF WBC: CPT

## 2022-08-28 PROCEDURE — 70450 CT HEAD/BRAIN W/O DYE: CPT

## 2022-08-28 PROCEDURE — 6370000000 HC RX 637 (ALT 250 FOR IP): Performed by: HOSPITALIST

## 2022-08-28 PROCEDURE — 36569 INSJ PICC 5 YR+ W/O IMAGING: CPT

## 2022-08-28 PROCEDURE — 83735 ASSAY OF MAGNESIUM: CPT

## 2022-08-28 PROCEDURE — 2700000000 HC OXYGEN THERAPY PER DAY

## 2022-08-28 PROCEDURE — 2500000003 HC RX 250 WO HCPCS: Performed by: INTERNAL MEDICINE

## 2022-08-28 PROCEDURE — 80048 BASIC METABOLIC PNL TOTAL CA: CPT

## 2022-08-28 PROCEDURE — 6360000002 HC RX W HCPCS: Performed by: NURSE PRACTITIONER

## 2022-08-28 PROCEDURE — 83880 ASSAY OF NATRIURETIC PEPTIDE: CPT

## 2022-08-28 PROCEDURE — 94760 N-INVAS EAR/PLS OXIMETRY 1: CPT

## 2022-08-28 PROCEDURE — 71045 X-RAY EXAM CHEST 1 VIEW: CPT

## 2022-08-28 PROCEDURE — 82803 BLOOD GASES ANY COMBINATION: CPT

## 2022-08-28 RX ORDER — SODIUM CHLORIDE 0.9 % (FLUSH) 0.9 %
5-40 SYRINGE (ML) INJECTION PRN
Status: DISCONTINUED | OUTPATIENT
Start: 2022-08-28 | End: 2022-09-07 | Stop reason: HOSPADM

## 2022-08-28 RX ORDER — SODIUM CHLORIDE 0.9 % (FLUSH) 0.9 %
5-40 SYRINGE (ML) INJECTION EVERY 12 HOURS SCHEDULED
Status: DISCONTINUED | OUTPATIENT
Start: 2022-08-28 | End: 2022-09-07 | Stop reason: HOSPADM

## 2022-08-28 RX ORDER — LIDOCAINE HYDROCHLORIDE 10 MG/ML
5 INJECTION, SOLUTION EPIDURAL; INFILTRATION; INTRACAUDAL; PERINEURAL ONCE
Status: DISCONTINUED | OUTPATIENT
Start: 2022-08-28 | End: 2022-09-07 | Stop reason: HOSPADM

## 2022-08-28 RX ORDER — HYDRALAZINE HYDROCHLORIDE 20 MG/ML
10 INJECTION INTRAMUSCULAR; INTRAVENOUS EVERY 6 HOURS PRN
Status: DISCONTINUED | OUTPATIENT
Start: 2022-08-28 | End: 2022-09-07 | Stop reason: HOSPADM

## 2022-08-28 RX ORDER — SODIUM CHLORIDE 9 MG/ML
25 INJECTION, SOLUTION INTRAVENOUS PRN
Status: DISCONTINUED | OUTPATIENT
Start: 2022-08-28 | End: 2022-09-07

## 2022-08-28 RX ADMIN — LEVOTHYROXINE SODIUM 200 MCG: 0.1 TABLET ORAL at 06:24

## 2022-08-28 RX ADMIN — METRONIDAZOLE 500 MG: 500 INJECTION, SOLUTION INTRAVENOUS at 06:43

## 2022-08-28 RX ADMIN — SODIUM CHLORIDE, PRESERVATIVE FREE 10 ML: 5 INJECTION INTRAVENOUS at 21:21

## 2022-08-28 RX ADMIN — CELECOXIB 200 MG: 200 CAPSULE ORAL at 21:17

## 2022-08-28 RX ADMIN — HEPARIN SODIUM 5000 UNITS: 5000 INJECTION INTRAVENOUS; SUBCUTANEOUS at 21:18

## 2022-08-28 RX ADMIN — DIAZEPAM 5 MG: 5 TABLET ORAL at 14:07

## 2022-08-28 RX ADMIN — CEFEPIME 2000 MG: 2 INJECTION, POWDER, FOR SOLUTION INTRAVENOUS at 06:26

## 2022-08-28 RX ADMIN — SODIUM CHLORIDE, PRESERVATIVE FREE 10 ML: 5 INJECTION INTRAVENOUS at 10:00

## 2022-08-28 RX ADMIN — HEPARIN SODIUM 5000 UNITS: 5000 INJECTION INTRAVENOUS; SUBCUTANEOUS at 06:24

## 2022-08-28 RX ADMIN — SODIUM CHLORIDE, PRESERVATIVE FREE 10 ML: 5 INJECTION INTRAVENOUS at 00:21

## 2022-08-28 RX ADMIN — GABAPENTIN 400 MG: 400 CAPSULE ORAL at 14:07

## 2022-08-28 RX ADMIN — METHYLPREDNISOLONE SODIUM SUCCINATE 40 MG: 40 INJECTION, POWDER, FOR SOLUTION INTRAMUSCULAR; INTRAVENOUS at 03:50

## 2022-08-28 RX ADMIN — METHYLPREDNISOLONE SODIUM SUCCINATE 40 MG: 40 INJECTION, POWDER, FOR SOLUTION INTRAMUSCULAR; INTRAVENOUS at 16:06

## 2022-08-28 RX ADMIN — CELECOXIB 200 MG: 200 CAPSULE ORAL at 10:05

## 2022-08-28 RX ADMIN — GABAPENTIN 400 MG: 400 CAPSULE ORAL at 10:08

## 2022-08-28 RX ADMIN — HYDRALAZINE HYDROCHLORIDE 10 MG: 20 INJECTION INTRAMUSCULAR; INTRAVENOUS at 21:18

## 2022-08-28 RX ADMIN — CEFEPIME 2000 MG: 2 INJECTION, POWDER, FOR SOLUTION INTRAVENOUS at 16:07

## 2022-08-28 RX ADMIN — Medication 1250 MG: at 18:20

## 2022-08-28 RX ADMIN — DIAZEPAM 5 MG: 5 TABLET ORAL at 21:18

## 2022-08-28 RX ADMIN — GABAPENTIN 400 MG: 400 CAPSULE ORAL at 21:17

## 2022-08-28 RX ADMIN — CHLORHEXIDINE GLUCONATE 0.12% ORAL RINSE 15 ML: 1.2 LIQUID ORAL at 00:20

## 2022-08-28 RX ADMIN — HEPARIN SODIUM 5000 UNITS: 5000 INJECTION INTRAVENOUS; SUBCUTANEOUS at 14:07

## 2022-08-28 RX ADMIN — Medication 1250 MG: at 04:27

## 2022-08-28 RX ADMIN — DIAZEPAM 5 MG: 5 TABLET ORAL at 03:54

## 2022-08-28 ASSESSMENT — PAIN SCALES - GENERAL
PAINLEVEL_OUTOF10: 0
PAINLEVEL_OUTOF10: 8
PAINLEVEL_OUTOF10: 0

## 2022-08-28 NOTE — PROGRESS NOTES
Arrived to place PICC line in patient with, Bishnu Brooke RN at bedside, pre procedure and allergies reviewed, no issues accessing cephalic vein, pt tolerated well, blood return and flushed well, tip verified with 3cg technology with peaked p-waves, OK to use PICC. Pt left in stable condition and bed braked and in lowest position. Pt call light within reach.  Handoff to RN

## 2022-08-28 NOTE — PROGRESS NOTES
Hospitalist Progress Note  8/28/2022 5:58 PM    PCP: Toby Gonsalez MD    [phone]     Date of Admission: 8/5/2022    Patient demographics:  The patient  Anuj Chamberlain is a 63 y.o. male     Significant past medical history:   Patient Active Problem List   Diagnosis    Partial bowel obstruction ([address]    Hiatal hernia    Acute respiratory failure with hypoxia and hypercapnia (HCC)    SBO (small bowel obstruction) (HCC)    Aspiration into airway    Acute respiratory failure with hypoxia (HCC)    Small bowel obstruction (HCC)    Diastolic heart failure (HCC)    History of atrial fibrillation    Supraventricular tachycardia ([address]    Arterial hypotension    Aspiration pneumonia of both lower lobes due to gastric secretions ([address]    Morbid obesity with BMI of 50.0-59.9, adult ([address]    Acute delirium    EARNESTINE (obstructive sleep apnea)         Patient was diagnosed with:  Low grade or Partial Bowel obstruction  Pneumonia and septic shock. Hiatal hernia  Atrial Fibrillation    Treatment while inpatient:  63years old male with medical history significant for morbid obesity, paraesophageal hernia. Patient presented to the emergency room with abdominal pain nausea vomiting and diarrhea. Patient was diagnosed with small bowel obstruction. Patient was also diagnosed with pneumonia and septic shock. Patient developed acute respiratory failure and acute renal failure. Managed on the ventilator with a prolonged course in ICU. Extubated 8/26.    ----------------------------------------------------------    SUBJECTIVE COMPLAINTS-patient seen and evaluated at the bedside, no fevers, chills overnight, denied CP, SOB    Diet: Diet NPO  ADULT TUBE FEEDING; Nasogastric; Peptide Based High Protein; Continuous; 10; Yes; 20; Q 4 hours; 50; 30; Q 4 hours; Protein;  Bolus 2 protienex daily      OBJECTIVE:   Patient Active Problem List   Diagnosis    Partial bowel obstruction (HCC)    Hiatal hernia    Acute respiratory failure with hypoxia and hypercapnia (HCC)    SBO (small bowel obstruction) (HCC)    Aspiration into airway    Acute respiratory failure with hypoxia (HCC)    Small bowel obstruction (HCC)    Diastolic heart failure (HCC)    History of atrial fibrillation    Supraventricular tachycardia (HCC)    Arterial hypotension    Aspiration pneumonia of both lower lobes due to gastric secretions (HCC)    Morbid obesity with BMI of 50.0-59.9, adult (HCC)    Acute delirium    EARNESTINE (obstructive sleep apnea)       Allergies  Codeine    Medications    Scheduled Meds:   lidocaine 1 % injection  5 mL IntraDERmal Once    sodium chloride flush  5-40 mL IntraVENous 2 times per day    celecoxib  200 mg Per NG tube BID    vancomycin  1,250 mg IntraVENous Q12H    heparin (porcine)  5,000 Units SubCUTAneous 3 times per day    cefepime  2,000 mg IntraVENous Q8H    vancomycin (VANCOCIN) intermittent dosing (placeholder)   Other RX Placeholder    methylPREDNISolone  40 mg IntraVENous Q12H    gabapentin  400 mg Oral TID    [Held by provider] metoprolol tartrate  25 mg Oral BID    levothyroxine  200 mcg Oral Daily    [Held by provider] spironolactone  50 mg Oral Daily     Continuous Infusions:   sodium chloride      [Held by provider] furosemide Stopped (08/23/22 0945)     PRN Meds:  sodium chloride flush, sodium chloride, ipratropium-albuterol, senna, polyethylene glycol, docusate, artificial tears, acetaminophen, potassium chloride, acetaminophen, ondansetron **OR** ondansetron, diazePAM    Vitals   Vitals /wt Patient Vitals for the past 8 hrs:   BP Temp Temp src Pulse Resp SpO2   08/28/22 1701 (!) 77/50 98.1 °F (36.7 °C) Axillary 71 20 94 %   08/28/22 1309 -- -- -- -- -- 95 %   08/28/22 1105 (!) 168/102 98 °F (36.7 °C) Axillary 76 20 94 %          72HR INTAKE/OUTPUT:    Intake/Output Summary (Last 24 hours) at 8/28/2022 3428  Last data filed at 8/28/2022 1006  Gross per 24 hour   Intake --   Output 1600 ml   Net -1600 ml         Exam:  Gen: NAD, on 3L NC  Eyes: PERRL. No sclera icterus. No conjunctival injection. ENT: No discharge. Pharynx clear. External appearance of ears and nose normal.  Neck: Trachea midline. No obvious mass. Resp: No accessory muscle use. No crackles. No wheezes. No rhonchi. CV: Regular rate. Regular rhythm. No murmur or rub. No edema. GI: Non-tender. Non-distended. No hernia. Skin: Warm, dry, normal texture and turgor. Lymph: No cervical LAD. No supraclavicular LAD. M/S: / Ext. No cyanosis. No clubbing. No joint deformity. Neuro: CN 2-12 are intact,  no neurologic deficits noted. PT/INR:   No results for input(s): PROTIME, INR in the last 72 hours. APTT:   No results for input(s): APTT in the last 72 hours. CBC:   Recent Labs     08/26/22  0445 08/27/22  0500 08/28/22  1211   WBC 8.3 9.7 8.9   HGB 9.4* 11.0* 11.5*   HCT 28.6* 32.9* 35.0*   MCV 94.3 93.1 93.5    451* 398         BMP:   Recent Labs     08/26/22  0445 08/26/22  2020 08/27/22  0500 08/27/22  1122 08/27/22  2356 08/28/22  1211      < > 146* 145 145 140   K 4.1   < > 3.6 3.6 3.5 3.6      < > 113* 112* 112* 111*   CO2 24   < > 23 23 22 22   PHOS 2.0*  --  1.9*  --   --  2.5   BUN 40*   < > 32* 32* 29* 27*   CREATININE 0.8   < > 0.8 0.8 0.8 0.6*    < > = values in this interval not displayed. LIVER PROFILE:   No results for input(s): ALKPHOS, AST, ALT, ALB, BILIDIR, BILITOT, ALKPHOS in the last 72 hours. No results for input(s): AMYLASE in the last 72 hours. No results for input(s): LIPASE in the last 72 hours. UA:  No results for input(s): NITRITE, LABCAST, WBCUA, RBCUA, MUCUS in the last 72 hours. TROPONIN:   No results for input(s): Tanesha Height in the last 72 hours. Lab Results   Component Value Date/Time    URRFLXCULT Not Indicated 08/15/2022 09:16 AM       No results for input(s): TSHREFLEX in the last 72 hours.       No components found for: [id number]  POC GLUCOSE:    Recent Labs     08/27/22  3856 08/28/22  0204 08/28/22  0749 08/28/22  1103 08/28/22  1658   POCGLU 131* 103* 122* 120* 111*       No results for input(s): LABA1C in the last 72 hours. No results found for: LABA1C    Echocardiogram shows normal ejection fraction  Grade 1 diastolic dysfunction      ASSESSMENT AND PLAN    Acute respiratory failure  Intubated 8/8  Extubated 8/26  Doing well  on 3 L nasal cannula, wean as tolerated  Obesity with obstructive sleep apnea and hypoventilation syndrome    Aspiration pna  Continues to be on cefepime and metronidazole  Ct abxs  Speech therapy evaluation, advance diet as started    Acute on chronic diastolic CHF  Patient has grade 1 diastolic dysfunction  Lasix drip discontinued  500 cc fluid bolus  Patient now started on pressors      Partial small Bowel obstruction-improving, advance diet as tolerated    SVT  better  H/o atrial fibrillation  HR controled    CHARLENE  Renal function improved    Hypotention  Stable now    Hiatal hernia  - CT chest/abd/pelv:   Large hiatal hernia containing much of the stomach which is distended  Og tube today     Atrial Fibrillation  Patient developed the rapid ventricular rate  IV Lopressor  Cardiology, nephrology following    Advance diet as tolerated, CXR shows Pulmonary edema - order BNP, has labile BP, holding AntiHTN meds for now, Hydralazine with parameters    Code Status: Full Code      The patient and / or the family were informed of the results of any tests, a time was given to answer questions, a plan was proposed and they agreed with plan. Tyler Valenzuela MD    This note was transcribed using [id number] [employer]. Please disregard any translational errors.

## 2022-08-28 NOTE — PROGRESS NOTES
Pulmonary/Critical Care Progress Note    CC:  Follow-up:  Acute hypoxemic respiratory failure with SPO2 <90% on room air  Aspiration pneumonia  EARNESTINE  Partial SBO  Diastolic heart failure    Subjective:  Transferred from ICU yesterday after prolonged intubation  Remains on 3L NC  Had a fall overnight, head CT negative  Wife at bedside, states patient has had some confusion  Patient without complaints. Able to answer orientation questions  SOB improved  Some coughing    ROS  No fever  Some cough  Some confusion overnight    Intake/Output Summary (Last 24 hours) at 8/28/2022 0807  Last data filed at 8/28/2022 0521  Gross per 24 hour   Intake 60 ml   Output 1875 ml   Net -1815 ml         PHYSICAL EXAM:  Blood pressure (!) 164/102, pulse 79, temperature 98.3 °F (36.8 °C), temperature source Axillary, resp. rate 24, height 5' 7\" (1.702 m), weight (!) 321 lb (145.6 kg), SpO2 94 %.'  Gen: No distress. Well developed, well-nourished  Eyes: No scleral icterus. No conjunctival injection. ENT: External appearance of ears and nose normal.  Neck: Trachea midline. No obvious mass. No visible thyroid enlargement     Respiratory: No crackles. No wheezes. No rhonchi. No accessory muscle use  Cardiovascular: Regular rate. Regular rhythm. No murmur or rub. No edema  GI: Non-tender. Non-distended. No hernia. Bowel sounds present  Skin: Warm, dry, normal texture and turgor. No abnormalities on exposed extremities. Musculoskeletal: No cyanosis. No clubbing. No joint deformity. Neuro: Moves all four extremities.    Psychiatric:  Normal mood and affect; exhibits normal insight and judgement     Medications:    Scheduled Meds:   celecoxib  200 mg Per NG tube BID    vancomycin  1,250 mg IntraVENous Q12H    heparin (porcine)  5,000 Units SubCUTAneous 3 times per day    cefepime  2,000 mg IntraVENous Q8H    vancomycin (VANCOCIN) intermittent dosing (placeholder)   Other RX Placeholder    methylPREDNISolone  40 mg IntraVENous Q12H gabapentin  400 mg Oral TID    [Held by provider] metoprolol tartrate  25 mg Oral BID    famotidine (PEPCID) injection  20 mg IntraVENous BID    chlorhexidine  15 mL Mouth/Throat BID    sodium chloride flush  5-40 mL IntraVENous 2 times per day    levothyroxine  200 mcg Oral Daily    [Held by provider] spironolactone  50 mg Oral Daily       Continuous Infusions:   fentaNYL Stopped (08/26/22 0915)    [Held by provider] furosemide Stopped (08/23/22 0945)    sodium chloride         PRN Meds:  ipratropium-albuterol, senna, polyethylene glycol, docusate, fentanNYL, midazolam, artificial tears, acetaminophen, potassium chloride, acetaminophen, sodium chloride flush, sodium chloride, ondansetron **OR** ondansetron, diazePAM    Labs:  CBC:   Recent Labs     08/26/22  0445 08/27/22  0500   WBC 8.3 9.7   HGB 9.4* 11.0*   HCT 28.6* 32.9*   MCV 94.3 93.1    451*     BMP:   Recent Labs     08/26/22  0445 08/26/22  2020 08/27/22  0500 08/27/22  1122 08/27/22  2356      < > 146* 145 145   K 4.1   < > 3.6 3.6 3.5      < > 113* 112* 112*   CO2 24   < > 23 23 22   PHOS 2.0*  --  1.9*  --   --    BUN 40*   < > 32* 32* 29*   CREATININE 0.8   < > 0.8 0.8 0.8    < > = values in this interval not displayed. LIVER PROFILE: No results for input(s): AST, ALT, LIPASE, BILIDIR, BILITOT, ALKPHOS in the last 72 hours. Invalid input(s): AMYLASE,  ALB  PT/INR: No results for input(s): PROTIME, INR in the last 72 hours. APTT: No results for input(s): APTT in the last 72 hours. UA:No results for input(s): NITRITE, COLORU, PHUR, LABCAST, WBCUA, RBCUA, MUCUS, TRICHOMONAS, YEAST, BACTERIA, CLARITYU, SPECGRAV, LEUKOCYTESUR, UROBILINOGEN, BILIRUBINUR, BLOODU, GLUCOSEU, AMORPHOUS in the last 72 hours. Invalid input(s): Jerry Nguyne  No results for input(s): PH, PCO2, PO2 in the last 72 hours. Films:  Chest imaging and associated reports were personally reviewed and showed CXR 8/27  Interval extubation.        Interval

## 2022-08-28 NOTE — PROGRESS NOTES
Writer notified by Francisca patient was off monitor. Upon entering room. Pt found kneeling against bed with O2 removed. Pt assisted to ground by staff. Pt assisted to bed via [address]. Once in bed spouse Melinda Davis and Dr Deven Hand notified. Per provider STAT Ct of head within out contrast and ABG.

## 2022-08-28 NOTE — PROGRESS NOTES
PICC line removed per Gil Kirkpatrick NP. IV catheter intact. No complications noted. BUE peripheral pulses strong +3. BLE Moderate +2. Petroleum gauze applied. Md messaged for new PICC placement.  Pt wife request new PICC  as well

## 2022-08-28 NOTE — PLAN OF CARE
Problem: Discharge Planning  Goal: Discharge to home or other facility with appropriate resources  Outcome: Progressing     Problem: Pain  Goal: Verbalizes/displays adequate comfort level or baseline comfort level  Outcome: Progressing     Problem: ABCDS Injury Assessment  Goal: Absence of physical injury  Outcome: Progressing     Problem: Safety - Adult  Goal: Free from fall injury  Outcome: Progressing     Problem: Skin/Tissue Integrity  Goal: Absence of new skin breakdown  Description: 1. Monitor for areas of redness and/or skin breakdown  2.   Assess vascular access sites hourly  Outcome: Progressing     Problem: Neurosensory - Adult  Goal: Achieves stable or improved neurological status  Outcome: Progressing  Goal: Absence of seizures  Outcome: Progressing  Goal: Remains free of injury related to seizures activity  Outcome: Progressing  Goal: Achieves maximal functionality and self care  Outcome: Progressing     Problem: Respiratory - Adult  Goal: Achieves optimal ventilation and oxygenation  Outcome: Progressing     Problem: Cardiovascular - Adult  Goal: Maintains optimal cardiac output and hemodynamic stability  Outcome: Progressing  Goal: Absence of cardiac dysrhythmias or at baseline  Outcome: Progressing     Problem: Skin/Tissue Integrity - Adult  Goal: Skin integrity remains intact  Outcome: Progressing  Goal: Incisions, wounds, or drain sites healing without S/S of infection  Outcome: Progressing  Goal: Oral mucous membranes remain intact  Outcome: Progressing     Problem: Musculoskeletal - Adult  Goal: Return mobility to safest level of function  Outcome: Progressing  Goal: Maintain proper alignment of affected body part  Outcome: Progressing  Goal: Return ADL status to a safe level of function  Outcome: Progressing     Problem: Genitourinary - Adult  Goal: Absence of urinary retention  Outcome: Progressing  Goal: Urinary catheter remains patent  Outcome: Progressing     Problem: Change in Body Image  Goal: Pt/Family communicate acceptance of loss or change in body image and feel psychological comfort and peace  Description: INTERVENTIONS:  1. Assess patient/family anxiety and grief process related to change in body image, loss of functional status, loss of sense of self, and forgiveness  2. Provide emotional and spiritual support  3. Provide information about the patient's health status with consideration of family and cultural values  4. Communicate willingness to discuss loss and facilitate grief process with patient/family as appropriate  5. Emphasize sustaining relationships within family system and community, or aurora/spiritual traditions  6. Initiate Spiritual Care, Psychosocial Clinical Specialist consult as needed  Outcome: Progressing     Problem: Decision Making  Goal: Pt/Family able to effectively weigh alternatives and participate in decision making related to treatment and care  Description: INTERVENTIONS:  1. Determine when there are differences between patient's view, family's view, and healthcare provider's view of condition  2. Facilitate patient and family articulation of goals for care  3. Help patient and family identify pros/cons of alternative solutions  4. Provide information as requested by patient/family  5. Respect patient/family right to receive or not to receive information  6. Serve as a liaison between patient and family and health care team  7. Initiate Consults from Ethics, Palliative Care or initiate [facility] as is appropriate  Outcome: Progressing     Problem: Behavior  Goal: Pt/Family maintain appropriate behavior and adhere to behavioral management agreement, if implemented  Description: INTERVENTIONS:  1. Assess patient/family's coping skills and  non-compliant behavior (including use of illegal substances)  2. Notify security of behavior or suspected illegal substances which indicate the need for search of the family and/or belongings  3.  Encourage verbalization of thoughts and concerns in a socially appropriate manner  4. Utilize positive, consistent limit setting strategies supporting safety of patient, staff and others  5. Encourage participation in the decision making process about the behavioral management agreement  6. If a visitor's behavior poses a threat to safety call refer to organization policy.   7. Initiate consult with , Psychosocial CNS, Spiritual Care as appropriate  Outcome: Progressing

## 2022-08-29 LAB
ANION GAP SERPL CALCULATED.3IONS-SCNC: 6 MMOL/L (ref 3–16)
ANION GAP SERPL CALCULATED.3IONS-SCNC: 8 MMOL/L (ref 3–16)
BASOPHILS ABSOLUTE: 0 K/UL (ref 0–0.2)
BASOPHILS RELATIVE PERCENT: 0.3 %
BUN BLDV-MCNC: 25 MG/DL (ref 7–20)
BUN BLDV-MCNC: 27 MG/DL (ref 7–20)
CALCIUM SERPL-MCNC: 8 MG/DL (ref 8.3–10.6)
CALCIUM SERPL-MCNC: 8.1 MG/DL (ref 8.3–10.6)
CHLORIDE BLD-SCNC: 108 MMOL/L (ref 99–110)
CHLORIDE BLD-SCNC: 110 MMOL/L (ref 99–110)
CO2: 22 MMOL/L (ref 21–32)
CO2: 26 MMOL/L (ref 21–32)
CREAT SERPL-MCNC: 0.6 MG/DL (ref 0.8–1.3)
CREAT SERPL-MCNC: 0.7 MG/DL (ref 0.8–1.3)
EOSINOPHILS ABSOLUTE: 0 K/UL (ref 0–0.6)
EOSINOPHILS RELATIVE PERCENT: 0.5 %
GFR AFRICAN AMERICAN: >60
GFR AFRICAN AMERICAN: >60
GFR NON-AFRICAN AMERICAN: >60
GFR NON-AFRICAN AMERICAN: >60
GLUCOSE BLD-MCNC: 104 MG/DL (ref 70–99)
GLUCOSE BLD-MCNC: 110 MG/DL (ref 70–99)
GLUCOSE BLD-MCNC: 113 MG/DL (ref 70–99)
GLUCOSE BLD-MCNC: 113 MG/DL (ref 70–99)
GLUCOSE BLD-MCNC: 149 MG/DL (ref 70–99)
GLUCOSE BLD-MCNC: 149 MG/DL (ref 70–99)
GLUCOSE BLD-MCNC: 91 MG/DL (ref 70–99)
HCT VFR BLD CALC: 35.1 % (ref 40.5–52.5)
HEMOGLOBIN: 11.6 G/DL (ref 13.5–17.5)
LYMPHOCYTES ABSOLUTE: 0.9 K/UL (ref 1–5.1)
LYMPHOCYTES RELATIVE PERCENT: 8.6 %
MAGNESIUM: 2.2 MG/DL (ref 1.8–2.4)
MCH RBC QN AUTO: 30.9 PG (ref 26–34)
MCHC RBC AUTO-ENTMCNC: 33 G/DL (ref 31–36)
MCV RBC AUTO: 93.7 FL (ref 80–100)
MONOCYTES ABSOLUTE: 0.4 K/UL (ref 0–1.3)
MONOCYTES RELATIVE PERCENT: 4.3 %
NEUTROPHILS ABSOLUTE: 8.6 K/UL (ref 1.7–7.7)
NEUTROPHILS RELATIVE PERCENT: 86.3 %
PDW BLD-RTO: 17.2 % (ref 12.4–15.4)
PERFORMED ON: ABNORMAL
PERFORMED ON: NORMAL
PHOSPHORUS: 2.4 MG/DL (ref 2.5–4.9)
PLATELET # BLD: 356 K/UL (ref 135–450)
PMV BLD AUTO: 7.6 FL (ref 5–10.5)
POTASSIUM SERPL-SCNC: 3.8 MMOL/L (ref 3.5–5.1)
POTASSIUM SERPL-SCNC: 3.9 MMOL/L (ref 3.5–5.1)
RBC # BLD: 3.75 M/UL (ref 4.2–5.9)
SODIUM BLD-SCNC: 138 MMOL/L (ref 136–145)
SODIUM BLD-SCNC: 142 MMOL/L (ref 136–145)
VANCOMYCIN TROUGH: 11.2 UG/ML (ref 10–20)
WBC # BLD: 10 K/UL (ref 4–11)

## 2022-08-29 PROCEDURE — 2700000000 HC OXYGEN THERAPY PER DAY

## 2022-08-29 PROCEDURE — 1200000000 HC SEMI PRIVATE

## 2022-08-29 PROCEDURE — 6370000000 HC RX 637 (ALT 250 FOR IP): Performed by: NURSE PRACTITIONER

## 2022-08-29 PROCEDURE — 6360000002 HC RX W HCPCS: Performed by: NURSE PRACTITIONER

## 2022-08-29 PROCEDURE — 6370000000 HC RX 637 (ALT 250 FOR IP): Performed by: STUDENT IN AN ORGANIZED HEALTH CARE EDUCATION/TRAINING PROGRAM

## 2022-08-29 PROCEDURE — 6370000000 HC RX 637 (ALT 250 FOR IP): Performed by: HOSPITALIST

## 2022-08-29 PROCEDURE — 2500000003 HC RX 250 WO HCPCS: Performed by: INTERNAL MEDICINE

## 2022-08-29 PROCEDURE — 80048 BASIC METABOLIC PNL TOTAL CA: CPT

## 2022-08-29 PROCEDURE — 2580000003 HC RX 258: Performed by: INTERNAL MEDICINE

## 2022-08-29 PROCEDURE — 36592 COLLECT BLOOD FROM PICC: CPT

## 2022-08-29 PROCEDURE — 94761 N-INVAS EAR/PLS OXIMETRY MLT: CPT

## 2022-08-29 PROCEDURE — 83735 ASSAY OF MAGNESIUM: CPT

## 2022-08-29 PROCEDURE — 85025 COMPLETE CBC W/AUTO DIFF WBC: CPT

## 2022-08-29 PROCEDURE — 84100 ASSAY OF PHOSPHORUS: CPT

## 2022-08-29 PROCEDURE — 6360000002 HC RX W HCPCS: Performed by: INTERNAL MEDICINE

## 2022-08-29 PROCEDURE — 99232 SBSQ HOSP IP/OBS MODERATE 35: CPT | Performed by: INTERNAL MEDICINE

## 2022-08-29 PROCEDURE — 92526 ORAL FUNCTION THERAPY: CPT

## 2022-08-29 PROCEDURE — 80202 ASSAY OF VANCOMYCIN: CPT

## 2022-08-29 RX ORDER — LABETALOL HYDROCHLORIDE 5 MG/ML
10 INJECTION, SOLUTION INTRAVENOUS EVERY 4 HOURS PRN
Status: DISCONTINUED | OUTPATIENT
Start: 2022-08-29 | End: 2022-09-07 | Stop reason: HOSPADM

## 2022-08-29 RX ADMIN — GABAPENTIN 400 MG: 400 CAPSULE ORAL at 23:14

## 2022-08-29 RX ADMIN — HEPARIN SODIUM 5000 UNITS: 5000 INJECTION INTRAVENOUS; SUBCUTANEOUS at 06:23

## 2022-08-29 RX ADMIN — HEPARIN SODIUM 5000 UNITS: 5000 INJECTION INTRAVENOUS; SUBCUTANEOUS at 13:31

## 2022-08-29 RX ADMIN — CELECOXIB 200 MG: 200 CAPSULE ORAL at 09:21

## 2022-08-29 RX ADMIN — DIAZEPAM 5 MG: 5 TABLET ORAL at 06:23

## 2022-08-29 RX ADMIN — POTASSIUM & SODIUM PHOSPHATES POWDER PACK 280-160-250 MG 500 MG: 280-160-250 PACK at 14:58

## 2022-08-29 RX ADMIN — GABAPENTIN 400 MG: 400 CAPSULE ORAL at 13:31

## 2022-08-29 RX ADMIN — METHYLPREDNISOLONE SODIUM SUCCINATE 40 MG: 40 INJECTION, POWDER, FOR SOLUTION INTRAMUSCULAR; INTRAVENOUS at 02:18

## 2022-08-29 RX ADMIN — POTASSIUM & SODIUM PHOSPHATES POWDER PACK 280-160-250 MG 500 MG: 280-160-250 PACK at 23:14

## 2022-08-29 RX ADMIN — GABAPENTIN 400 MG: 400 CAPSULE ORAL at 09:21

## 2022-08-29 RX ADMIN — HEPARIN SODIUM 5000 UNITS: 5000 INJECTION INTRAVENOUS; SUBCUTANEOUS at 23:14

## 2022-08-29 RX ADMIN — METOPROLOL TARTRATE 25 MG: 25 TABLET, FILM COATED ORAL at 23:16

## 2022-08-29 RX ADMIN — LABETALOL HYDROCHLORIDE 10 MG: 5 INJECTION INTRAVENOUS at 23:35

## 2022-08-29 RX ADMIN — LEVOTHYROXINE SODIUM 200 MCG: 0.1 TABLET ORAL at 06:23

## 2022-08-29 RX ADMIN — SODIUM CHLORIDE, PRESERVATIVE FREE 10 ML: 5 INJECTION INTRAVENOUS at 23:17

## 2022-08-29 RX ADMIN — Medication 1250 MG: at 06:35

## 2022-08-29 RX ADMIN — CELECOXIB 200 MG: 200 CAPSULE ORAL at 23:14

## 2022-08-29 ASSESSMENT — PAIN - FUNCTIONAL ASSESSMENT: PAIN_FUNCTIONAL_ASSESSMENT: PREVENTS OR INTERFERES SOME ACTIVE ACTIVITIES AND ADLS

## 2022-08-29 ASSESSMENT — PAIN SCALES - GENERAL
PAINLEVEL_OUTOF10: 7
PAINLEVEL_OUTOF10: 0

## 2022-08-29 ASSESSMENT — PAIN DESCRIPTION - LOCATION: LOCATION: BACK

## 2022-08-29 ASSESSMENT — PAIN DESCRIPTION - DESCRIPTORS: DESCRIPTORS: SHARP

## 2022-08-29 NOTE — PROGRESS NOTES
[facility]                                               Progress note    Summary:   Yas Burt is being seen by nephrology for CHARLENE and hypernatremia. Admitted with paraesophageal hernia and SBO. Had EGD 8/9 with biopsy. Still NPO     Interval History  Seen at bedside  BP quite high today  Cr stable at 0.6  Hypophosphatemia noted      Plan:   Replace phosphorus - ordered  Will begin slowly resuming antihypertensive medications. Resume home lopressor 25 mg BID        Esha Wolfe MD  [facility]  Office: [phone]    Assessment:   Acute kidney injury  Urine Na < 20    creatinine on admission 1   next day on 08/06/2022 was 0.8  suddenly jumped up to 2.7. Due to episode of severe hypotension with blood pressure drop 78/44. also got CT with IV contrast on 08/05/2022 but most likely  this is ATN related due to hypotension and hypercapnic  respiratory failure. CT angio s normal renal arteries and normal kidneys      Sepsis/ possible septic shock. emperature was 101.1   High white cell count. '  immunocompromised due to rheumatoid arthritis and treatment. History of sleep apnea   on CPAP. Acute respiratory failure with high pCO2 in the range of 50s with pH  of 7.24    now intubated. History of abnormal stress test in 06/2022 with stress-induced  ischemia and some scarring. Ejection fraction  was  64%.     had a followup angiogram which was reported as normal as per wife. I do not have that result     History of rheumatoid arthritis   longstanding,was on methotrexate,  has taken Celebrex and now on Inflectra infusions,  so he is immunocompromised. Geovanna Jones is now helping him with his rheumatoid tremendously. he has been on steroids in the past.     Rule out adrenal insufficiency. cortisol level. normal      History of thyroidectomy in the past. .    Small bowel obstruction.     Large hiatus hernia with colon and stomach in the chest.  Further management as per medical team.       ROS:   [address]. All other remaining systems are negative. Constitutional:  fever, chills, weakness, weight change, fatigue,      Skin:  rash, pruritus, hair loss, bruising, dry skin, petechiae. Head, Face, Neck   headaches, swelling,  cervical adenopathy. Respiratory: shortness of breath, cough, or wheezing  Cardiovascular: chest pain, palpitations, dizzy, edema  Gastrointestinal: nausea, vomiting, diarrhea, constipation,belly pain    Yellow skin, blood in stool  Musculoskeletal:  back pain, muscle weakness, gait problems,       joint pain or swelling. Genitourinary:  dysuria, poor urine flow, flank pain, blood in urine  Neurologic:  vertigo, TIA'S, syncope, seizures, focal weakness  Psychosocial:  insomnia, anxiety, or depression. Additional positive findings: -         PMH:   Past medical history, surgical history, social history, family history are reviewed and updated as appropriate. Reviewed current medication list.   Allergies reviewed and updated as needed. PE:   Vitals:    08/29/22 1205   BP: (!) 171/109   Pulse: 76   Resp: 28   Temp: 97.7 °F (36.5 °C)   SpO2: 95%       General appearance: Awake and alert in no acute distress  HEENT: EOM intact, no icterus. Trachea is midline. Neck : No masses, appears symmetrical  Respiratory: Respiratory effort appears normal, bilateral equal chest rise, no wheeze, no crackles   Cardiovascular: Ausculation shows RRR trace edema  Abdomen: soft non distended  Musculoskeletal:  Joints with no swelling or deformity. Skin:no rashes, ulcers, induration, no jaundice.    Neuro: Awake and alert, following commands sitting up in the chair      Lab Results   Component Value Date    CREATININE 0.6 (L) 08/29/2022    BUN 25 (H) 08/29/2022     08/29/2022    K 3.9 08/29/2022     08/29/2022    CO2 22 08/29/2022      Lab Results   Component Value Date    WBC 10.0 08/29/2022    HGB 11.6 (L) 08/29/2022    HCT 35.1 (L) 08/29/2022    MCV 93.7 08/29/2022     08/29/2022     Lab Results   Component Value Date    CALCIUM 8.1 (L) 08/29/2022    CAION 1.06 (L) 08/15/2022    PHOS 2.4 (L) 08/29/2022

## 2022-08-29 NOTE — PROGRESS NOTES
Patient alert and oriented x4, VSS, sitting up in bed, wife at bedside. Patient c/o chronic back pain and SOB, scheduled celebrex given. Patient denies n/b, diarrhea. Rectal tube and quiroga catheter in place. Patient without s/s of distress at this time. Bed in lowest and locked position with bed alarm in place. Non-slip socks on, call light within reach. Will continue to monitor pt needs.

## 2022-08-29 NOTE — FLOWSHEET NOTE
Pt climbed out of bed and was standing at bedside and having iv lines and indwelling catheter lines and rectal tube lines pulled and stretched tauntly as he had gotten out on the right side of the bed. Tele Camera alerted. Pt was restless and resistant with getting back into bed. Required 4 staff RN's to get him situated and back into bed with clean linens and pt had already received his prn anxiety medications. Call placed to provider on call for orders for restraints after non -restrictive alternatives ineffective.

## 2022-08-29 NOTE — PROGRESS NOTES
Comprehensive Nutrition Assessment    Type and Reason for Visit:  Reassess    Nutrition Recommendations/Plan:   Modify  TF. Recommend Jevity 1.5 with goal rate of 45 mL/hr +2 proteinex daily. Flush 250 mL q 4 hrs or per provider. Malnutrition Assessment:  Malnutrition Status: At risk for malnutrition (Comment) (08/11/22 0934)    Context:  Acute Illness     Findings of the 6 clinical characteristics of malnutrition:  Energy Intake:  50% or less of estimated energy requirements for 5 or more days  Weight Loss:  Unable to assess     Body Fat Loss:  Unable to assess     Muscle Mass Loss:  Unable to assess    Fluid Accumulation:  No significant fluid accumulation     Strength:  Not Performed    Nutrition Assessment:    Follow-up. Pt extubated on 8/26. SLP recommended NPO on 8/27. NG in place with previous TF order running. Will adjust TF now that pt is no longer intubated. Nutrition Related Findings:    non pitting BLE and trace BUE edema; BM 8/29; Glu 149 Wound Type: None       Current Nutrition Intake & Therapies:    Average Meal Intake: NPO  Average Supplements Intake: NPO  Diet NPO  ADULT TUBE FEEDING; Nasogastric; Peptide Based High Protein; Continuous; 10; Yes; 20; Q 4 hours; 50; 30; Q 4 hours; Protein; Bolus 2 protienex daily  Current Tube Feeding (TF) Orders:  Feeding Route: Nasogastric  Formula: Peptide Based High protein  Schedule: Continuous  Feeding Regimen: @ 30 mL/hr  Additives/Modulars: 2 proteinex  Water Flushes: per provider  Current TF & Flush Orders Provides: 600mL TV, 808 kcal, 87gm pro, 502 mL free water (includes 2protienex). Goal TF & Flush Orders Provides: Recommend Jevity 1.5 @ 45 mL/hr +2 proteienx daily. Flush 250 mL q 4 hrs. TF regimen provides: 900 mL TV, 1558 kcal daily. 109 gm pro, 684  mL free water (includes 2 proteinex).     Anthropometric Measures:  Height: 5' 7\" (170.2 cm)  Ideal Body Weight (IBW): 148 lbs (67 kg)    Admission Body Weight: 340 lb (154.2 kg)  Current Body

## 2022-08-29 NOTE — PLAN OF CARE
psychosocial status, comfort, nutrition and hydration  Taken 8/29/2022 0000  Remains free of injury from restraints (restraint for interference with medical device): Every 2 hours: Monitor safety, psychosocial status, comfort, nutrition and hydration  Taken 8/28/2022 2254  Remains free of injury from restraints (restraint for interference with medical device): Every 2 hours: Monitor safety, psychosocial status, comfort, nutrition and hydration     Problem: Safety - Medical Restraint  Goal: Remains free of injury from restraints (Restraint for Interference with Medical Device)  Description: INTERVENTIONS:  1. Determine that other, less restrictive measures have been tried or would not be effective before applying the restraint  2. Evaluate the patient's condition at the time of restraint application  3. Inform patient/family regarding the reason for restraint  4.  Q2H: Monitor safety, psychosocial status, comfort, nutrition and hydration  8/29/2022 1159 by Emerald Garcia RN  Outcome: Progressing  Flowsheets  Taken 8/29/2022 1000 by Emerald Garcia RN  Remains free of injury from restraints (restraint for interference with medical device): Every 2 hours: Monitor safety, psychosocial status, comfort, nutrition and hydration  Taken 8/29/2022 0600 by Negrito Granados RN  Remains free of injury from restraints (restraint for interference with medical device): Every 2 hours: Monitor safety, psychosocial status, comfort, nutrition and hydration  Taken 8/29/2022 0400 by Negrito Granados RN  Remains free of injury from restraints (restraint for interference with medical device): Every 2 hours: Monitor safety, psychosocial status, comfort, nutrition and hydration  8/29/2022 0303 by Negrito Granados RN  Outcome: Not Progressing  Flowsheets  Taken 8/29/2022 0200  Remains free of injury from restraints (restraint for interference with medical device): Every 2 hours: Monitor safety, psychosocial status, comfort, nutrition and hydration  Taken 8/29/2022 0000  Remains free of injury from restraints (restraint for interference with medical device): Every 2 hours: Monitor safety, psychosocial status, comfort, nutrition and hydration  Taken 8/28/2022 2254  Remains free of injury from restraints (restraint for interference with medical device): Every 2 hours: Monitor safety, psychosocial status, comfort, nutrition and hydration

## 2022-08-29 NOTE — PROGRESS NOTES
[facility]   Speech Therapy  Daily Dysphagia Treatment Note    Miles Vanegas  AGE: 63 y.o. GENDER: male  : 1960  [id number]  EPISODE DATE:  2022    Patient Active Problem List   Diagnosis    Partial bowel obstruction (HCC)    Hiatal hernia    Acute respiratory failure with hypoxia and hypercapnia (HCC)    SBO (small bowel obstruction) (HCC)    Aspiration into airway    Acute respiratory failure with hypoxia (HCC)    Small bowel obstruction (HCC)    Diastolic heart failure (HCC)    History of atrial fibrillation    Supraventricular tachycardia (HCC)    Arterial hypotension    Aspiration pneumonia ([facility] [address]    Morbid obesity with BMI of 50.0-59.9, adult ([facility] [address]    Acute delirium    EARNESTINE (obstructive sleep apnea)     Allergies   Allergen Reactions    Codeine Nausea Only and Nausea And Vomiting     Stomach upset        Treatment Diagnosis: Dysphagia     CHART REVIEW:  2022 admitted with SBO    Prolonged medical course:  2022 ICU transfer  2022 INTUBATED  2022 EXTUBATED  2022 CSE: rec NPO    IMAGING:  CT CHEST: 2022  Impression   1. No CT evidence of a pulmonary embolism. 2. Findings most consistent with mild CHF, including cardiomegaly, small   bilateral pleural effusions, and mild pulmonary edema. There is new partial   loculation of the right pleural effusion along the paramediastinal aspect of   the right upper lobe. 3. Persistent partial consolidation of the bilateral lower lobes, most likely   passive atelectasis, less likely aspiration or pneumonia. 4. Persistent reticulonodular opacities within the right middle lobe and   lingula, likely reflecting either additional atelectasis or pneumonia. 5. Multiple pulmonary nodules within both lungs, the largest 10 mm within the   left upper lobe. While this likely reflects benign granulomatous disease,   further follow-up of these nodules is as advised below.    6. Mild left subpectoral lymphadenopathy, of questionable clinical   significance. 7. Interval decrease in size of the patient's small pericardial effusion. CXR: 8/28/2022  Impression   Right PICC tip in the distal right internal jugular vein. Pulmonary edema. CT HEAD: 8/28/2022  Impression   No acute intracranial abnormality. Subjective:     Current diet: NPO; NGT in place; tube feeds ordered    Comments regarding tolerating Current Diet: improving mental status       Objective:     Pain: did not state    Cognitive/Behavior   Alert, cooperative, pleasant; Ox4  Follows dx  Verbally responsive; aphonic    Positioning upright in bariatric bed (semi-reclined d/t positioning limitation of bed)    PO Trials:   Thin Liquidsx3: concern for premature bolus loss to the pharynx and reduced pharyngeal control; delayed swallow; decreased laryngeal elevation; no overt signs/symptoms of penetration/aspiration  Nectar thick liquidsx3: concern for premature bolus loss to the pharynx; delayed swallow; decreased laryngeal elevation; delayed throat clear  Honey Thick liquidsx3: concern for premature bolus loss to the pharynx; delayed swallow; decreased laryngeal elevation; delayed cough increasing in frequency as trials progress  Pureex1: concern for premature bolus loss to the pharynx; delayed swallow; decreased laryngeal elevation; immediate cough   Soft food  Regular food    Dysphagia Tx:   Direct Dysphagia tx: PO trials as described above; chart reviewed and initial CSE reviewed - patient with h/o GI involvements presenting patient at increased risk for dysphagia and penetration/aspiration; patient also with prolonged intubation with increased penetration/aspiration s/p extubation; concern for overt signs/symptoms of penetration/aspiration, but appear to present as PO trials progress - concern for latent sensation for penetration/aspiration or residue vs potential for GI involvements with impact on pharyngeal phase of swallow and potential for penetration/aspiration of possible backflow; recommend MBS for continued assessment  Dysphagia ex: NA  Training in compensatory strategies: NA  Pt response to ex/training: good; suspect need for reinforcement for new dysphagia learning, but appears to comprehend and understanding current recs as well as rationale for recs    Goals:    Pt will functionally tolerate ongoing assessment of swallow function with diet to be determined as indicated rec MBS    Assessment:   Impressions:   Accepted and tolerated re-evaluation at bedside. Patient alert, cooperative, pleasant; ox4; follows dx; verbally responsive; aphonic. Concern for potential oropharyngeal dysphagia characterized by suspected premature bolus loss to the pharynx; delayed swallow; decreased laryngeal elevation; potential for reduced pharyngeal peristalsis. Patient with h/o GI involvements presenting patient at increased risk for dysphagia and penetration/aspiration.  Patient also with prolonged intubation with increased penetration/aspiration s/p extubation  Concern for overt signs/symptoms of penetration/aspiration that appear to present as PO trials progress - concern for latent sensation for penetration/aspiration or residue vs potential for GI involvements with impact on pharyngeal phase of swallow and potential for penetration/aspiration of possible backflow; recommend MBS for continued assessment    Diet Recommendations:  Solids: NPO;   Liquids: NPO;  Meds: Meds via alt means of nutrition    Strategies:    TBD    Education:  Completed on results/recs/plan with patient and wife; verbalized understanding    Prognosis:   Guarded for PO safety d/t medical and GI comorbidities    Plan:     Continue Dysphagia Therapy: YES  Interventions:  Therapeutic Trials with SLP  Duration/Frequency of therapy while on unit:  Speech therapy for dysphagia tx 3-5 times per week during acute care stay    Discharge Instructions:   Anticipate POTENTIAL NEED for further skilled Speech Therapy for Dysphagia at discharge    This note serves as a D/C Summary in the event that this patient is discharged prior to the next therapy session.     Coded treatment time: 0  Total treatment time: 34    Electronically signed by DON Boo on 8/29/2022 at 2:02 PM

## 2022-08-29 NOTE — FLOWSHEET NOTE
Vanco trough results 11.2 in therapeutic range reported to pharmacy and okay to go ahead and hang scheduled 6am dose.  No changes at this time

## 2022-08-29 NOTE — PROGRESS NOTES
Pulmonary Progress Note    CC:  Follow up respiratory failure, aspiration    Subjective:  3 liters of oxygen   Confused overnight  He is not complaining of significant SOB  Wife at bedside        Intake/Output Summary (Last 24 hours) at 8/29/2022 0839  Last data filed at 8/29/2022 0756  Gross per 24 hour   Intake 2357.2 ml   Output 3625 ml   Net -1267.8 ml         PHYSICAL EXAM:  Blood pressure (!) 182/114, pulse 77, temperature 97.7 °F (36.5 °C), temperature source Axillary, resp. rate 28, height 5' 7\" (1.702 m), weight (!) 324 lb (147 kg), SpO2 93 %.'  Gen: Chronically ill   Eyes: PERRL. No sclera icterus. No conjunctival injection. ENT: No discharge. Pharynx with NG  Neck: Trachea midline. No obvious mass. Resp: Diminished . CV: Regular rate. Regular rhythm. No murmur or rub. GI: large abdomen, soft   Skin: Pale  Lymph: No cervical LAD. No supraclavicular LAD. M/S: No cyanosis. No clubbing. No joint deformity. Neuro: Moves all four extremities. CN 2-12 tested, no defect noted.   Ext:   no edema    Medications:    Scheduled Meds:   lidocaine 1 % injection  5 mL IntraDERmal Once    sodium chloride flush  5-40 mL IntraVENous 2 times per day    celecoxib  200 mg Per NG tube BID    vancomycin  1,250 mg IntraVENous Q12H    heparin (porcine)  5,000 Units SubCUTAneous 3 times per day    vancomycin (VANCOCIN) intermittent dosing (placeholder)   Other RX Placeholder    methylPREDNISolone  40 mg IntraVENous Q12H    gabapentin  400 mg Oral TID    [Held by provider] metoprolol tartrate  25 mg Oral BID    levothyroxine  200 mcg Oral Daily       Continuous Infusions:   sodium chloride         PRN Meds:  sodium chloride flush, sodium chloride, hydrALAZINE, ipratropium-albuterol, senna, polyethylene glycol, docusate, artificial tears, acetaminophen, potassium chloride, acetaminophen, ondansetron **OR** ondansetron, diazePAM    Labs:  CBC:   Recent Labs     08/27/22  0500 08/28/22  1211 08/29/22  0645   WBC 9.7 8.9 10. 0   HGB 11.0* 11.5* 11.6*   HCT 32.9* 35.0* 35.1*   MCV 93.1 93.5 93.7   * 398 356     BMP:   Recent Labs     22  0500 22  1122 22  1211 22  2304 22  0645   *   < > 140 142 138   K 3.6   < > 3.6 3.6 3.9   *   < > 111* 110 108   CO2 23   < > 22 22 22   PHOS 1.9*  --  2.5  --  2.4*   BUN 32*   < > 27* 24* 25*   CREATININE 0.8   < > 0.6* 0.7* 0.6*    < > = values in this interval not displayed. LIVER PROFILE: No results for input(s): AST, ALT, LIPASE, BILIDIR, BILITOT, ALKPHOS in the last 72 hours. Invalid input(s): AMYLASE,  ALB  PT/INR: No results for input(s): PROTIME, INR in the last 72 hours. APTT: No results for input(s): APTT in the last 72 hours. UA:No results for input(s): NITRITE, COLORU, PHUR, LABCAST, WBCUA, RBCUA, MUCUS, TRICHOMONAS, YEAST, BACTERIA, CLARITYU, SPECGRAV, LEUKOCYTESUR, UROBILINOGEN, BILIRUBINUR, BLOODU, GLUCOSEU, AMORPHOUS in the last 72 hours. Invalid input(s): Shereen Dirk  No results for input(s): PH, PCO2, PO2 in the last 72 hours.         Films:  Chest imaging reports were reviewed and imaging was reviewed by me and showed no new films    AB    Cultures:  NGTD    I reviewed the labs and images listed above    Assessment/Plan:   Acute Hypoxic Respiratory Failure with saturations less than 90% on room air  Titrate oxygen for saturations greater than or equal to 90%  Increase activity   Aspiration pneumonia   Completed cefepime  DC Vanco   DC steroids due delirium   EARNESTINE  Hold off on bilevel due to bowel issues  SBO  Resolved       DVT prophylaxis  Heparin      Virgia Cassette, DO  [place] [facility] [facility]

## 2022-08-29 NOTE — PLAN OF CARE
Problem: Safety - Medical Restraint  Goal: Remains free of injury from restraints (Restraint for Interference with Medical Device)  Description: INTERVENTIONS:  1. Determine that other, less restrictive measures have been tried or would not be effective before applying the restraint  2. Evaluate the patient's condition at the time of restraint application  3. Inform patient/family regarding the reason for restraint  4. Q2H: Monitor safety, psychosocial status, comfort, nutrition and hydration  Outcome: Not Progressing  Flowsheets  Taken 8/29/2022 0200  Remains free of injury from restraints (restraint for interference with medical device): Every 2 hours: Monitor safety, psychosocial status, comfort, nutrition and hydration  Taken 8/29/2022 0000  Remains free of injury from restraints (restraint for interference with medical device): Every 2 hours: Monitor safety, psychosocial status, comfort, nutrition and hydration  Taken 8/28/2022 2254  Remains free of injury from restraints (restraint for interference with medical device): Every 2 hours: Monitor safety, psychosocial status, comfort, nutrition and hydration     Problem: Safety - Medical Restraint  Goal: Remains free of injury from restraints (Restraint for Interference with Medical Device)  Description: INTERVENTIONS:  1. Determine that other, less restrictive measures have been tried or would not be effective before applying the restraint  2. Evaluate the patient's condition at the time of restraint application  3. Inform patient/family regarding the reason for restraint  4.  Q2H: Monitor safety, psychosocial status, comfort, nutrition and hydration  Outcome: Not Progressing  Flowsheets  Taken 8/29/2022 0200  Remains free of injury from restraints (restraint for interference with medical device): Every 2 hours: Monitor safety, psychosocial status, comfort, nutrition and hydration  Taken 8/29/2022 0000  Remains free of injury from restraints (restraint for interference with medical device): Every 2 hours: Monitor safety, psychosocial status, comfort, nutrition and hydration  Taken 8/28/2022 2254  Remains free of injury from restraints (restraint for interference with medical device): Every 2 hours: Monitor safety, psychosocial status, comfort, nutrition and hydration

## 2022-08-30 ENCOUNTER — APPOINTMENT (OUTPATIENT)
Dept: GENERAL RADIOLOGY | Age: 62
DRG: 853 | End: 2022-08-30
Payer: COMMERCIAL

## 2022-08-30 LAB
BASOPHILS ABSOLUTE: 0 K/UL (ref 0–0.2)
BASOPHILS RELATIVE PERCENT: 0.3 %
EOSINOPHILS ABSOLUTE: 0.4 K/UL (ref 0–0.6)
EOSINOPHILS RELATIVE PERCENT: 4.1 %
GLUCOSE BLD-MCNC: 112 MG/DL (ref 70–99)
GLUCOSE BLD-MCNC: 113 MG/DL (ref 70–99)
GLUCOSE BLD-MCNC: 114 MG/DL (ref 70–99)
GLUCOSE BLD-MCNC: 114 MG/DL (ref 70–99)
GLUCOSE BLD-MCNC: 119 MG/DL (ref 70–99)
HCT VFR BLD CALC: 34.8 % (ref 40.5–52.5)
HEMOGLOBIN: 11.3 G/DL (ref 13.5–17.5)
LYMPHOCYTES ABSOLUTE: 1.1 K/UL (ref 1–5.1)
LYMPHOCYTES RELATIVE PERCENT: 9.7 %
MCH RBC QN AUTO: 30.7 PG (ref 26–34)
MCHC RBC AUTO-ENTMCNC: 32.6 G/DL (ref 31–36)
MCV RBC AUTO: 94.1 FL (ref 80–100)
MONOCYTES ABSOLUTE: 0.7 K/UL (ref 0–1.3)
MONOCYTES RELATIVE PERCENT: 6.3 %
NEUTROPHILS ABSOLUTE: 8.7 K/UL (ref 1.7–7.7)
NEUTROPHILS RELATIVE PERCENT: 79.6 %
PDW BLD-RTO: 17.3 % (ref 12.4–15.4)
PERFORMED ON: ABNORMAL
PLATELET # BLD: 315 K/UL (ref 135–450)
PMV BLD AUTO: 7.4 FL (ref 5–10.5)
RBC # BLD: 3.7 M/UL (ref 4.2–5.9)
WBC # BLD: 10.9 K/UL (ref 4–11)

## 2022-08-30 PROCEDURE — 6370000000 HC RX 637 (ALT 250 FOR IP): Performed by: NURSE PRACTITIONER

## 2022-08-30 PROCEDURE — 2580000003 HC RX 258: Performed by: INTERNAL MEDICINE

## 2022-08-30 PROCEDURE — 97530 THERAPEUTIC ACTIVITIES: CPT

## 2022-08-30 PROCEDURE — 85025 COMPLETE CBC W/AUTO DIFF WBC: CPT

## 2022-08-30 PROCEDURE — 2500000003 HC RX 250 WO HCPCS: Performed by: INTERNAL MEDICINE

## 2022-08-30 PROCEDURE — 6360000002 HC RX W HCPCS: Performed by: NURSE PRACTITIONER

## 2022-08-30 PROCEDURE — 92611 MOTION FLUOROSCOPY/SWALLOW: CPT

## 2022-08-30 PROCEDURE — 94760 N-INVAS EAR/PLS OXIMETRY 1: CPT

## 2022-08-30 PROCEDURE — 74230 X-RAY XM SWLNG FUNCJ C+: CPT

## 2022-08-30 PROCEDURE — 6360000002 HC RX W HCPCS: Performed by: INTERNAL MEDICINE

## 2022-08-30 PROCEDURE — 6370000000 HC RX 637 (ALT 250 FOR IP): Performed by: INTERNAL MEDICINE

## 2022-08-30 PROCEDURE — 2700000000 HC OXYGEN THERAPY PER DAY

## 2022-08-30 PROCEDURE — 1200000000 HC SEMI PRIVATE

## 2022-08-30 PROCEDURE — 6370000000 HC RX 637 (ALT 250 FOR IP): Performed by: HOSPITALIST

## 2022-08-30 PROCEDURE — 97535 SELF CARE MNGMENT TRAINING: CPT

## 2022-08-30 PROCEDURE — 99233 SBSQ HOSP IP/OBS HIGH 50: CPT | Performed by: INTERNAL MEDICINE

## 2022-08-30 RX ORDER — ASPIRIN 81 MG/1
81 TABLET, CHEWABLE ORAL DAILY
Status: DISCONTINUED | OUTPATIENT
Start: 2022-08-30 | End: 2022-09-07 | Stop reason: HOSPADM

## 2022-08-30 RX ORDER — FOLIC ACID 1 MG/1
1 TABLET ORAL DAILY
Status: DISCONTINUED | OUTPATIENT
Start: 2022-08-30 | End: 2022-09-07 | Stop reason: HOSPADM

## 2022-08-30 RX ORDER — OXYCODONE HYDROCHLORIDE 10 MG/1
10 TABLET ORAL EVERY 6 HOURS PRN
Status: DISCONTINUED | OUTPATIENT
Start: 2022-08-30 | End: 2022-09-07 | Stop reason: HOSPADM

## 2022-08-30 RX ORDER — PANTOPRAZOLE SODIUM 40 MG/1
40 TABLET, DELAYED RELEASE ORAL
Status: DISCONTINUED | OUTPATIENT
Start: 2022-08-31 | End: 2022-09-01

## 2022-08-30 RX ORDER — ASPIRIN 81 MG/1
81 TABLET ORAL DAILY
Status: DISCONTINUED | OUTPATIENT
Start: 2022-08-30 | End: 2022-09-01

## 2022-08-30 RX ORDER — FUROSEMIDE 10 MG/ML
20 INJECTION INTRAMUSCULAR; INTRAVENOUS ONCE
Status: COMPLETED | OUTPATIENT
Start: 2022-08-30 | End: 2022-08-30

## 2022-08-30 RX ORDER — TORSEMIDE 20 MG/1
40 TABLET ORAL DAILY
Status: DISCONTINUED | OUTPATIENT
Start: 2022-08-31 | End: 2022-09-02

## 2022-08-30 RX ADMIN — CELECOXIB 200 MG: 200 CAPSULE ORAL at 20:52

## 2022-08-30 RX ADMIN — SODIUM CHLORIDE, PRESERVATIVE FREE 10 ML: 5 INJECTION INTRAVENOUS at 20:53

## 2022-08-30 RX ADMIN — METOPROLOL TARTRATE 25 MG: 25 TABLET, FILM COATED ORAL at 08:09

## 2022-08-30 RX ADMIN — FUROSEMIDE 20 MG: 10 INJECTION, SOLUTION INTRAMUSCULAR; INTRAVENOUS at 12:25

## 2022-08-30 RX ADMIN — SODIUM CHLORIDE, PRESERVATIVE FREE 10 ML: 5 INJECTION INTRAVENOUS at 08:17

## 2022-08-30 RX ADMIN — CELECOXIB 200 MG: 200 CAPSULE ORAL at 08:09

## 2022-08-30 RX ADMIN — LEVOTHYROXINE SODIUM 200 MCG: 0.1 TABLET ORAL at 07:43

## 2022-08-30 RX ADMIN — GABAPENTIN 400 MG: 400 CAPSULE ORAL at 14:30

## 2022-08-30 RX ADMIN — HEPARIN SODIUM 5000 UNITS: 5000 INJECTION INTRAVENOUS; SUBCUTANEOUS at 14:49

## 2022-08-30 RX ADMIN — HEPARIN SODIUM 5000 UNITS: 5000 INJECTION INTRAVENOUS; SUBCUTANEOUS at 07:43

## 2022-08-30 RX ADMIN — GABAPENTIN 400 MG: 400 CAPSULE ORAL at 08:09

## 2022-08-30 RX ADMIN — LABETALOL HYDROCHLORIDE 10 MG: 5 INJECTION INTRAVENOUS at 21:07

## 2022-08-30 RX ADMIN — METOPROLOL TARTRATE 25 MG: 25 TABLET, FILM COATED ORAL at 20:52

## 2022-08-30 RX ADMIN — HEPARIN SODIUM 5000 UNITS: 5000 INJECTION INTRAVENOUS; SUBCUTANEOUS at 21:06

## 2022-08-30 RX ADMIN — FOLIC ACID 1 MG: 1 TABLET ORAL at 14:30

## 2022-08-30 RX ADMIN — GABAPENTIN 400 MG: 400 CAPSULE ORAL at 20:52

## 2022-08-30 ASSESSMENT — PAIN DESCRIPTION - ONSET: ONSET: ON-GOING

## 2022-08-30 ASSESSMENT — PAIN DESCRIPTION - PAIN TYPE: TYPE: CHRONIC PAIN

## 2022-08-30 ASSESSMENT — PAIN SCALES - GENERAL
PAINLEVEL_OUTOF10: 0
PAINLEVEL_OUTOF10: 0
PAINLEVEL_OUTOF10: 5
PAINLEVEL_OUTOF10: 0

## 2022-08-30 ASSESSMENT — PAIN DESCRIPTION - DESCRIPTORS: DESCRIPTORS: SHARP

## 2022-08-30 ASSESSMENT — PAIN - FUNCTIONAL ASSESSMENT: PAIN_FUNCTIONAL_ASSESSMENT: PREVENTS OR INTERFERES SOME ACTIVE ACTIVITIES AND ADLS

## 2022-08-30 ASSESSMENT — PAIN DESCRIPTION - FREQUENCY: FREQUENCY: CONTINUOUS

## 2022-08-30 ASSESSMENT — PAIN DESCRIPTION - LOCATION: LOCATION: BACK

## 2022-08-30 ASSESSMENT — PAIN DESCRIPTION - ORIENTATION: ORIENTATION: MID

## 2022-08-30 NOTE — PLAN OF CARE
Progressing  Goal: Return ADL status to a safe level of function  Outcome: Progressing     Problem: Gastrointestinal - Adult  Goal: Minimal or absence of nausea and vomiting  Outcome: Progressing  Goal: Maintains or returns to baseline bowel function  Outcome: Progressing  Goal: Maintains adequate nutritional intake  Outcome: Progressing  Goal: Establish and maintain optimal ostomy function  Outcome: Progressing     Problem: Genitourinary - Adult  Goal: Absence of urinary retention  Outcome: Progressing  Goal: Urinary catheter remains patent  Outcome: Progressing     Problem: Anxiety  Goal: Will report anxiety at manageable levels  Description: INTERVENTIONS:  1. Administer medication as ordered  2. Teach and rehearse alternative coping skills  3. Provide emotional support with 1:1 interaction with staff  Outcome: Progressing  Flowsheets (Taken 8/29/2022 2015)  Will report anxiety at manageable levels: Provide emotional support with 1:1 interaction with staff     Problem: Coping  Goal: Pt/Family able to verbalize concerns and demonstrate effective coping strategies  Description: INTERVENTIONS:  1. Assist patient/family to identify coping skills, available support systems and cultural and spiritual values  2. Provide emotional support, including active listening and acknowledgement of concerns of patient and caregivers  3. Reduce environmental stimuli, as able  4. Instruct patient/family in relaxation techniques, as appropriate  5.  Assess for spiritual pain/suffering and initiate Spiritual Care, Psychosocial Clinical Specialist consults as needed  Outcome: Progressing  Flowsheets (Taken 8/29/2022 2015)  Patient/family able to verbalize anxieties, fears, and concerns, and demonstrate effective coping:   Assist patient/family to identify coping skills, available support systems and cultural and spiritual values   Provide emotional support, including active listening and acknowledgement of concerns of patient and caregivers   Reduce environmental stimuli, as able   Instruct patient/family in relaxation techniques, as appropriate     Problem: Change in Body Image  Goal: Pt/Family communicate acceptance of loss or change in body image and feel psychological comfort and peace  Description: INTERVENTIONS:  1. Assess patient/family anxiety and grief process related to change in body image, loss of functional status, loss of sense of self, and forgiveness  2. Provide emotional and spiritual support  3. Provide information about the patient's health status with consideration of family and cultural values  4. Communicate willingness to discuss loss and facilitate grief process with patient/family as appropriate  5. Emphasize sustaining relationships within family system and community, or aurora/spiritual traditions  6. Initiate Spiritual Care, Psychosocial Clinical Specialist consult as needed  Outcome: Progressing     Problem: Decision Making  Goal: Pt/Family able to effectively weigh alternatives and participate in decision making related to treatment and care  Description: INTERVENTIONS:  1. Determine when there are differences between patient's view, family's view, and healthcare provider's view of condition  2. Facilitate patient and family articulation of goals for care  3. Help patient and family identify pros/cons of alternative solutions  4. Provide information as requested by patient/family  5. Respect patient/family right to receive or not to receive information  6. Serve as a liaison between patient and family and health care team  7. Initiate Consults from Ethics, Palliative Care or initiate [address] [place] [address] as is appropriate  Outcome: Progressing  Flowsheets (Taken 8/29/2022 2015)  Patient/family able to effectively weigh alternatives and participate in decision making related to treatment and care:    Facilitate patient and family articulation of goals for care   Help patient and family identify pros/cons of alternative solutions     Problem: Behavior  Goal: Pt/Family maintain appropriate behavior and adhere to behavioral management agreement, if implemented  Description: INTERVENTIONS:  1. Assess patient/family's coping skills and  non-compliant behavior (including use of illegal substances)  2. Notify security of behavior or suspected illegal substances which indicate the need for search of the family and/or belongings  3. Encourage verbalization of thoughts and concerns in a socially appropriate manner  4. Utilize positive, consistent limit setting strategies supporting safety of patient, staff and others  5. Encourage participation in the decision making process about the behavioral management agreement  6. If a visitor's behavior poses a threat to safety call refer to organization policy.   7. Initiate consult with , Psychosocial CNS, Spiritual Care as appropriate  Outcome: Progressing  Flowsheets (Taken 8/29/2022 2015)  Patient/family maintains appropriate behavior and adheres to behavioral management agreement, if implemented: Encourage verbalization of thoughts and concerns in a socially appropriate manner

## 2022-08-30 NOTE — FLOWSHEET NOTE
Pt is alert and oriented at this time except for the date of the month. Pt re-oriented to the exact date and time. Pt did well with rom exercises with restraint release for rom /exercise. Pt able to to 10 reps of extremities and tolerated well without sob. Pt also was able to demonstrate his ability to leave tubes alone while supervised for his exercises. Bilateral wrist restraints still needed as pt requires reinforcement. Pt wife remains at the bedside will continue to monitor. Po care and mouth swabbing with rounds of turning and repositioning and restraint release with rom and exercise and skin checks. Pt continues with rectal tube and indwelling urinary catheter draining well and secured.

## 2022-08-30 NOTE — PROGRESS NOTES
Gastroenterology Progress Note    Zander Bergeron is a 63 y.o. male patient. Principal Problem:    Partial bowel obstruction (HCC)  Active Problems:    Hiatal hernia    Acute respiratory failure with hypoxia and hypercapnia (HCC)    SBO (small bowel obstruction) (HCC)    Aspiration into airway    Acute respiratory failure with hypoxia (HCC)    Small bowel obstruction (HCC)    Diastolic heart failure (HCC)    History of atrial fibrillation    Supraventricular tachycardia (HCC)    Arterial hypotension    Aspiration pneumonia ([address]    Morbid obesity with BMI of 50.0-59.9, adult (HCC)    Acute delirium    EARNESTINE (obstructive sleep apnea)  Resolved Problems:    * No resolved hospital problems. *      SUBJECTIVE: Patient previously known to service, reconsulted for evaluation of PEG tube. Patient has evidence of aspiration following prolonged intubation, and needs PEG tube for enteral nutrition.     Current Facility-Administered Medications: aspirin EC tablet 81 mg, 81 mg, Oral, Daily  folic acid (FOLVITE) tablet 1 mg, 1 mg, Oral, Daily  oxyCODONE HCl (OXY-IR) immediate release tablet 10 mg, 10 mg, Oral, Q6H PRN  [START ON 8/31/2022] pantoprazole (PROTONIX) tablet 40 mg, 40 mg, Oral, QAM AC  [START ON 8/31/2022] torsemide (DEMADEX) tablet 40 mg, 40 mg, Oral, Daily  aspirin chewable tablet 81 mg, 81 mg, Oral, Daily  labetalol (NORMODYNE;TRANDATE) injection 10 mg, 10 mg, IntraVENous, Q4H PRN  lidocaine PF 1 % injection 5 mL, 5 mL, IntraDERmal, Once  sodium chloride flush 0.9 % injection 5-40 mL, 5-40 mL, IntraVENous, 2 times per day  sodium chloride flush 0.9 % injection 5-40 mL, 5-40 mL, IntraVENous, PRN  0.9 % sodium chloride infusion, 25 mL, IntraVENous, PRN  hydrALAZINE (APRESOLINE) injection 10 mg, 10 mg, IntraVENous, Q6H PRN  ipratropium-albuterol (DUONEB) nebulizer solution 1 ampule, 1 ampule, Inhalation, Q4H PRN  celecoxib (CELEBREX) capsule 200 mg, 200 mg, Per NG tube, BID  senna (SENOKOT) 176 MG/5ML syrup 10 mL, 10 mL, Oral, Nightly PRN  polyethylene glycol (GLYCOLAX) packet 17 g, 17 g, Oral, Daily PRN  docusate (COLACE) 50 MG/5ML liquid 100 mg, 100 mg, Oral, BID PRN  heparin (porcine) injection 5,000 Units, 5,000 Units, SubCUTAneous, 3 times per day  gabapentin (NEURONTIN) capsule 400 mg, 400 mg, Oral, TID  metoprolol tartrate (LOPRESSOR) tablet 25 mg, 25 mg, Oral, BID  lubrifresh P.M. (artificial tears) ophthalmic ointment, , Both Eyes, PRN  acetaminophen (TYLENOL) tablet 650 mg, 650 mg, Oral, Q4H PRN  potassium chloride 20 mEq/50 mL IVPB (Central Line), 20 mEq, IntraVENous, PRN  acetaminophen (TYLENOL) suppository 650 mg, 650 mg, Rectal, Q6H PRN  ondansetron (ZOFRAN-ODT) disintegrating tablet 4 mg, 4 mg, Oral, Q8H PRN **OR** ondansetron (ZOFRAN) injection 4 mg, 4 mg, IntraVENous, Q6H PRN  diazePAM (VALIUM) tablet 5 mg, 5 mg, Oral, 4x Daily PRN  levothyroxine (SYNTHROID) tablet 200 mcg, 200 mcg, Oral, Daily    Physical    VITALS:  BP (!) 172/99   Pulse 85   Temp 98.7 °F (37.1 °C) (Oral)   Resp 20   Ht 5' 7\" (1.702 m)   Wt (!) 334 lb 12.8 oz (151.9 kg)   SpO2 90%   BMI 52.44 kg/m²   TEMPERATURE:  Current - Temp: 98.7 °F (37.1 °C); Max - Temp  Av °F (36.7 °C)  Min: 97.7 °F (36.5 °C)  Max: 98.7 °F (37.1 °C)    Physical Exam:  Gen: Resting in bed, NAD  HEENT: Normocephalic, atraumatic, no scleral icterus. NG tube placed in left nare  CV: RRR no MRG   Pul: CTAB, normal work of breathing without wheezing  Abd: Good bowel sounds throughout, soft, NT/ND, no masses, no HSM   Ext: + LE edema, moves all 4 extremities. Neuro:  Moves all four extremities, no gross deficits, follows commands   Skin: No jaundice, spider angiomas, palmar erythema       Data    Data Review:    Recent Labs     22  1211 22  0645 22  0758   WBC 8.9 10.0 10.9   HGB 11.5* 11.6* 11.3*   HCT 35.0* 35.1* 34.8*   MCV 93.5 93.7 94.1    356 315     Recent Labs     22  1211 22  2304 22  0645 22  1345   NA 140 142 138 142   K 3.6 3.6 3.9 3.8   * 110 108 110   CO2 22 22 22 26   PHOS 2.5  --  2.4*  --    BUN 27* 24* 25* 27*   CREATININE 0.6* 0.7* 0.6* 0.7*     No results for input(s): AST, ALT, ALB, BILIDIR, BILITOT, ALKPHOS in the last 72 hours. No results for input(s): LIPASE, AMYLASE in the last 72 hours. No results for input(s): PROTIME, INR in the last 72 hours. No results for input(s): PTT in the last 72 hours. ASSESSMENT:  63 y.o. male with a PMH of rheumatoid arthritis, HTN, Afib, hypothyroidism, obesity, and EARNESTINE on CPAP who presented on 8/5/2022 with abdominal pain, chest pain, nausea, vomiting and diarrhea. He was diagnosed in [place] with a large paraesophageal hernia without volvulus or outlet obstruction. Plan was to follow up with surgeon at [facility]. Repeat CT this admission showed distention of the stomach and duodenum with gradual transition in the mid small bowel consistent with a low-grade or partial obstruction. Also noted was large hiatal hernia containing much of the stomach and colonic wall thickening possibly due to incomplete distention vs underlying colitis. On 8/7 he developed respiratory distress and hypoxia, and he was transferred to ICU due to aspiration pneumonia, and on 8/8 he was intubated. He had two NG tubes placed endoscopically, notable for a large hiatal hernia, 1cm lipoma in the 2nd portion of duodenum. Dysphagia with aspiration: Patient needs enteral access for nutrition and we have been asked to place a PEG. Given large hiatal hernia with paraesophageal component, I think it is unlikely that tube will be successfully placed endoscopically. I personally reviewed the patient's most recent CT abdomen/pelvis, which confirms this suspicion. I discussed the case with Dr. Russella Gowers and Dr. John Crane. Following her discussion, Dr. John Crane plans for surgical laparoscopic PEG tube placement with reduction of hernia, possibly 8/31/2022.   I will make patient n.p.o. for surgical intervention. I am available to assist as needed. PLAN :  Surgical PEG tube placement and correction of paraesophageal hernia with Dr. Rebecca Paz, possibly 8/31/2022. Thank you for allowing me to participate in the care of your patient. Please feel free to contact me with any concerns.   [id number] MD

## 2022-08-30 NOTE — PLAN OF CARE
Problem: Discharge Planning  Goal: Discharge to home or other facility with appropriate resources  8/30/2022 1107 by Felicity Shell RN  Outcome: Progressing  8/30/2022 0505 by Jasmine Ndiaye RN  Outcome: Progressing     Problem: Pain  Goal: Verbalizes/displays adequate comfort level or baseline comfort level  8/30/2022 1107 by Felicity Shell RN  Outcome: Progressing  8/30/2022 0505 by Jsamine dNiaye RN  Outcome: Progressing     Problem: ABCDS Injury Assessment  Goal: Absence of physical injury  8/30/2022 1107 by Felicity Shell RN  Outcome: Progressing  8/30/2022 0505 by Jasmine Ndiaye RN  Outcome: Progressing  Flowsheets (Taken 8/29/2022 2000)  Absence of Physical Injury: Implement safety measures based on patient assessment     Problem: Safety - Adult  Goal: Free from fall injury  8/30/2022 1107 by Felicity Shell RN  Outcome: Progressing  8/30/2022 0505 by Jasmine Ndiaye RN  Outcome: Progressing  Flowsheets (Taken 8/29/2022 2000)  Free From Fall Injury: Instruct family/caregiver on patient safety     Problem: Skin/Tissue Integrity  Goal: Absence of new skin breakdown  Description: 1. Monitor for areas of redness and/or skin breakdown  2. Assess vascular access sites hourly  8/30/2022 1107 by Felicity Shell RN  Outcome: Progressing  8/30/2022 0505 by Jasmine Ndiaye RN  Outcome: Progressing     Problem: Safety - Medical Restraint  Goal: Remains free of injury from restraints (Restraint for Interference with Medical Device)  Description: INTERVENTIONS:  1. Determine that other, less restrictive measures have been tried or would not be effective before applying the restraint  2. Evaluate the patient's condition at the time of restraint application  3. Inform patient/family regarding the reason for restraint  4.  Q2H: Monitor safety, psychosocial status, comfort, nutrition and hydration  8/30/2022 1107 by Felicity Shell RN  Outcome: Progressing  Flowsheets (Taken 8/30/2022 0600 by Matilde Nip, RN)  Remains free of injury from restraints (restraint for interference with medical device): Every 2 hours: Monitor safety, psychosocial status, comfort, nutrition and hydration  8/30/2022 0505 by Matilde Lyons RN  Outcome: Progressing  Flowsheets  Taken 8/30/2022 0400  Remains free of injury from restraints (restraint for interference with medical device): Every 2 hours: Monitor safety, psychosocial status, comfort, nutrition and hydration  Taken 8/30/2022 0200  Remains free of injury from restraints (restraint for interference with medical device): Every 2 hours: Monitor safety, psychosocial status, comfort, nutrition and hydration  Taken 8/30/2022 0000  Remains free of injury from restraints (restraint for interference with medical device): Every 2 hours: Monitor safety, psychosocial status, comfort, nutrition and hydration  Taken 8/29/2022 2257  Remains free of injury from restraints (restraint for interference with medical device):   Every 2 hours: Monitor safety, psychosocial status, comfort, nutrition and hydration   Inform patient/family regarding the reason for restraint  Taken 8/29/2022 2200  Remains free of injury from restraints (restraint for interference with medical device): Every 2 hours: Monitor safety, psychosocial status, comfort, nutrition and hydration  Taken 8/29/2022 2000  Remains free of injury from restraints (restraint for interference with medical device): Every 2 hours: Monitor safety, psychosocial status, comfort, nutrition and hydration     Problem: Nutrition Deficit:  Goal: Optimize nutritional status  8/30/2022 1107 by Elma Gale RN  Outcome: Progressing  8/30/2022 0505 by Matilde Lyons RN  Outcome: Progressing     Problem: Neurosensory - Adult  Goal: Achieves stable or improved neurological status  8/30/2022 1107 by Elma Gale RN  Outcome: Progressing  8/30/2022 0505 by Matilde Lyons RN  Outcome: Progressing  Goal: Absence of Progressing  8/30/2022 0505 by Marciano Burnett RN  Outcome: Progressing  Goal: Maintain proper alignment of affected body part  8/30/2022 1107 by Ken Abraham RN  Outcome: Progressing  8/30/2022 0505 by Marciano Burnett RN  Outcome: Progressing  Goal: Return ADL status to a safe level of function  8/30/2022 1107 by Ken Abraham RN  Outcome: Progressing  8/30/2022 0505 by Marciano Burnett RN  Outcome: Progressing     Problem: Gastrointestinal - Adult  Goal: Minimal or absence of nausea and vomiting  8/30/2022 1107 by Ken Abraham RN  Outcome: Progressing  8/30/2022 0505 by Marciano Burnett RN  Outcome: Progressing  Goal: Maintains or returns to baseline bowel function  8/30/2022 1107 by Ken Abraham RN  Outcome: Progressing  8/30/2022 0505 by Marciano Burnett RN  Outcome: Progressing  Goal: Maintains adequate nutritional intake  8/30/2022 1107 by Ken Abraham RN  Outcome: Progressing  8/30/2022 0505 by Marciano Burnett RN  Outcome: Progressing  Goal: Establish and maintain optimal ostomy function  8/30/2022 1107 by Ken Abraham RN  Outcome: Progressing  8/30/2022 0505 by Marciano Burnett RN  Outcome: Progressing     Problem: Genitourinary - Adult  Goal: Absence of urinary retention  8/30/2022 1107 by Ken Abraham RN  Outcome: Progressing  8/30/2022 0505 by Marciano Burnett RN  Outcome: Progressing  Goal: Urinary catheter remains patent  8/30/2022 1107 by Ken Abraham RN  Outcome: Progressing  8/30/2022 0505 by Marciano Burnett RN  Outcome: Progressing     Problem: Anxiety  Goal: Will report anxiety at manageable levels  Description: INTERVENTIONS:  1. Administer medication as ordered  2. Teach and rehearse alternative coping skills  3.  Provide emotional support with 1:1 interaction with staff  8/30/2022 1107 by Ken Abraham RN  Outcome: Progressing  8/30/2022 0505 by Marciano Burnett RN  Outcome: Progressing  Flowsheets (Taken 8/29/2022 2015)  Will report anxiety at manageable levels: Provide emotional support with 1:1 interaction with staff     Problem: Coping  Goal: Pt/Family able to verbalize concerns and demonstrate effective coping strategies  Description: INTERVENTIONS:  1. Assist patient/family to identify coping skills, available support systems and cultural and spiritual values  2. Provide emotional support, including active listening and acknowledgement of concerns of patient and caregivers  3. Reduce environmental stimuli, as able  4. Instruct patient/family in relaxation techniques, as appropriate  5. Assess for spiritual pain/suffering and initiate Spiritual Care, Psychosocial Clinical Specialist consults as needed  8/30/2022 1107 by Jean Marie Roman RN  Outcome: Progressing  8/30/2022 0505 by Ly Huerta RN  Outcome: Progressing  Flowsheets (Taken 8/29/2022 2015)  Patient/family able to verbalize anxieties, fears, and concerns, and demonstrate effective coping:   Assist patient/family to identify coping skills, available support systems and cultural and spiritual values   Provide emotional support, including active listening and acknowledgement of concerns of patient and caregivers   Reduce environmental stimuli, as able   Instruct patient/family in relaxation techniques, as appropriate     Problem: Change in Body Image  Goal: Pt/Family communicate acceptance of loss or change in body image and feel psychological comfort and peace  Description: INTERVENTIONS:  1. Assess patient/family anxiety and grief process related to change in body image, loss of functional status, loss of sense of self, and forgiveness  2. Provide emotional and spiritual support  3. Provide information about the patient's health status with consideration of family and cultural values  4. Communicate willingness to discuss loss and facilitate grief process with patient/family as appropriate  5.  Emphasize sustaining relationships within family system and community, or aurora/spiritual traditions  6. Initiate Spiritual Care, Psychosocial Clinical Specialist consult as needed  8/30/2022 1107 by Rafiq Anguiano RN  Outcome: Progressing  8/30/2022 0505 by Caroline Tabor RN  Outcome: Progressing     Problem: Decision Making  Goal: Pt/Family able to effectively weigh alternatives and participate in decision making related to treatment and care  Description: INTERVENTIONS:  1. Determine when there are differences between patient's view, family's view, and healthcare provider's view of condition  2. Facilitate patient and family articulation of goals for care  3. Help patient and family identify pros/cons of alternative solutions  4. Provide information as requested by patient/family  5. Respect patient/family right to receive or not to receive information  6. Serve as a liaison between patient and family and health care team  7. Initiate Consults from Ethics, Palliative Care or initiate [facility] as is appropriate  8/30/2022 1107 by Rafiq Anguiano RN  Outcome: Progressing  8/30/2022 0505 by Caroline Tabor RN  Outcome: Progressing  Flowsheets (Taken 8/29/2022 2015)  Patient/family able to effectively weigh alternatives and participate in decision making related to treatment and care: Facilitate patient and family articulation of goals for care   Help patient and family identify pros/cons of alternative solutions     Problem: Behavior  Goal: Pt/Family maintain appropriate behavior and adhere to behavioral management agreement, if implemented  Description: INTERVENTIONS:  1. Assess patient/family's coping skills and  non-compliant behavior (including use of illegal substances)  2. Notify security of behavior or suspected illegal substances which indicate the need for search of the family and/or belongings  3. Encourage verbalization of thoughts and concerns in a socially appropriate manner  4.  Utilize positive, consistent limit setting strategies supporting safety of patient, staff and others  5. Encourage participation in the decision making process about the behavioral management agreement  6. If a visitor's behavior poses a threat to safety call refer to organization policy.   7. Initiate consult with , Psychosocial CNS, Spiritual Care as appropriate  8/30/2022 1107 by Jose Antonio Pollard RN  Outcome: Progressing  8/30/2022 0505 by Sona Bridges RN  Outcome: Progressing  Flowsheets (Taken 8/29/2022 2015)  Patient/family maintains appropriate behavior and adheres to behavioral management agreement, if implemented: Encourage verbalization of thoughts and concerns in a socially appropriate manner

## 2022-08-30 NOTE — CARE COORDINATION
Left 2 vm for [facility] with [facility] #[phone] this morning re: dc today after MBS. Sera Bradley RN, BSN,   [phone]  Electronically signed by Sera Bradley RN on 8/30/2022 at 10:23 AM     [facility] able to accept & transport set up. MD stated pt failed MBS & wants to keep pt here for GI to see, notified [facility] can f/u but he wants to keep him here. Transport cancelled [employer] with [facility] notified .     Sera Bradley RN, BSN,   [phone]  Electronically signed by Sera Bradley RN on 8/30/2022 at 12:58 PM

## 2022-08-30 NOTE — PROGRESS NOTES
Pulmonary Progress Note    CC:  Follow up respiratory failure, aspiration    Subjective:  3 liters of oxygen   NO new issues  No SOB but breathing is noted to be \"harsh\" by wife  He denies being SOB  Still in restraints      ROS  Stable breathing         Intake/Output Summary (Last 24 hours) at 8/30/2022 0835  Last data filed at 8/30/2022 0432  Gross per 24 hour   Intake 2605 ml   Output 1700 ml   Net 905 ml           PHYSICAL EXAM:  Blood pressure (!) 148/88, pulse 81, temperature 98.7 °F (37.1 °C), temperature source Oral, resp. rate 20, height 5' 7\" (1.702 m), weight (!) 334 lb 12.8 oz (151.9 kg), SpO2 94 %.'  Gen: Chronically ill   Eyes: PERRL. No sclera icterus. No conjunctival injection. ENT: No discharge. Pharynx with NG  Neck: Trachea midline. No obvious mass. Resp: Diminished with slight accessory muscle use  CV: Regular rate. Regular rhythm. No murmur or rub. GI: large abdomen, soft   Skin: Pale  Lymph: No cervical LAD. No supraclavicular LAD. M/S: No cyanosis. No clubbing. No joint deformity. Neuro: Moves all four extremities. CN 2-12 tested, no defect noted.   Ext:   no edema    Medications:    Scheduled Meds:   lidocaine 1 % injection  5 mL IntraDERmal Once    sodium chloride flush  5-40 mL IntraVENous 2 times per day    celecoxib  200 mg Per NG tube BID    heparin (porcine)  5,000 Units SubCUTAneous 3 times per day    gabapentin  400 mg Oral TID    metoprolol tartrate  25 mg Oral BID    levothyroxine  200 mcg Oral Daily       Continuous Infusions:   sodium chloride         PRN Meds:  labetalol, sodium chloride flush, sodium chloride, hydrALAZINE, ipratropium-albuterol, senna, polyethylene glycol, docusate, artificial tears, acetaminophen, potassium chloride, acetaminophen, ondansetron **OR** ondansetron, diazePAM    Labs:  CBC:   Recent Labs     08/28/22  1211 08/29/22  0645 08/30/22  0758   WBC 8.9 10.0 10.9   HGB 11.5* 11.6* 11.3*   HCT 35.0* 35.1* 34.8*   MCV 93.5 93.7 94.1    356 315       BMP:   Recent Labs     22  1211 22  2304 22  0645 22  1345    142 138 142   K 3.6 3.6 3.9 3.8   * 110 108 110   CO2  26   PHOS 2.5  --  2.4*  --    BUN 27* 24* 25* 27*   CREATININE 0.6* 0.7* 0.6* 0.7*       LIVER PROFILE: No results for input(s): AST, ALT, LIPASE, BILIDIR, BILITOT, ALKPHOS in the last 72 hours. Invalid input(s): AMYLASE,  ALB  PT/INR: No results for input(s): PROTIME, INR in the last 72 hours. APTT: No results for input(s): APTT in the last 72 hours. UA:No results for input(s): NITRITE, COLORU, PHUR, LABCAST, WBCUA, RBCUA, MUCUS, TRICHOMONAS, YEAST, BACTERIA, CLARITYU, SPECGRAV, LEUKOCYTESUR, UROBILINOGEN, BILIRUBINUR, BLOODU, GLUCOSEU, AMORPHOUS in the last 72 hours. Invalid input(s): Skeet Yuliya  No results for input(s): PH, PCO2, PO2 in the last 72 hours.         Films:  Chest imaging reports were reviewed and imaging was reviewed by me and showed no new films    AB    Cultures:  NGTD    I reviewed the labs and images listed above    Assessment/Plan:   Acute Hypoxic Respiratory Failure with saturations less than 90% on room air  Titrate oxygen for saturations greater than or equal to 90%  Increase activity   Lasix 20 mg IV once (to help with volume, work of breathing etc)  Aspiration pneumonia   Completed cefepime and vanco  Solumedrol stopped due to onset of delirium two nights ago  EARNESTINE  Hold off on bilevel due to bowel issues  SBO  Resolved       DVT prophylaxis  Heparin      DO [facility]

## 2022-08-30 NOTE — PROGRESS NOTES
Hospitalist Progress Note  8/29/2022 8:08 PM    PCP: Yossi Huddleston MD    [id number]     Date of Admission: 8/5/2022    Patient demographics:  The patient  Amanda Daugherty is a 63 y.o. male     Significant past medical history:   Patient Active Problem List   Diagnosis    Partial bowel obstruction ([facility] [address]    Hiatal hernia    Acute respiratory failure with hypoxia and hypercapnia (HCC)    SBO (small bowel obstruction) (HCC)    Aspiration into airway    Acute respiratory failure with hypoxia (HCC)    Small bowel obstruction (HCC)    Diastolic heart failure (HCC)    History of atrial fibrillation    Supraventricular tachycardia ([address]    Arterial hypotension    Aspiration pneumonia ([address]    Morbid obesity with BMI of 50.0-59.9, adult ([facility] [address]    Acute delirium    EARNESTINE (obstructive sleep apnea)         Patient was diagnosed with:  Low grade or Partial Bowel obstruction  Pneumonia and septic shock. Hiatal hernia  Atrial Fibrillation    Treatment while inpatient:  63years old male with medical history significant for morbid obesity, paraesophageal hernia. Patient presented to the emergency room with abdominal pain nausea vomiting and diarrhea. Patient was diagnosed with small bowel obstruction. Patient was also diagnosed with pneumonia and septic shock. Patient developed acute respiratory failure and acute renal failure. Managed on the ventilator with a prolonged course in ICU. Extubated 8/26.    ----------------------------------------------------------    SUBJECTIVE COMPLAINTS-patient seen and evaluated at the bedside, he is on 6L NC today, denied CP, SOB, he silver no complaints    Diet: Diet NPO  ADULT TUBE FEEDING; Nasogastric; Standard with Fiber; Continuous; 20; Yes; 25; Q 4 hours; 45; 250; Q 4 hours; Protein;  Bolus 2 protienex daily      OBJECTIVE:   Patient Active Problem List   Diagnosis    Partial bowel obstruction (HCC)    Hiatal hernia    Acute respiratory failure with hypoxia and hypercapnia (HCC) SBO (small bowel obstruction) (HCC)    Aspiration into airway    Acute respiratory failure with hypoxia (HCC)    Small bowel obstruction (HCC)    Diastolic heart failure (HCC)    History of atrial fibrillation    Supraventricular tachycardia (HCC)    Arterial hypotension    Aspiration pneumonia (HCC)    Morbid obesity with BMI of 50.0-59.9, adult (HCC)    Acute delirium    EARNESTINE (obstructive sleep apnea)       Allergies  Codeine    Medications    Scheduled Meds:   potassium & sodium phosphates  2 packet Per NG tube BID    lidocaine 1 % injection  5 mL IntraDERmal Once    sodium chloride flush  5-40 mL IntraVENous 2 times per day    celecoxib  200 mg Per NG tube BID    heparin (porcine)  5,000 Units SubCUTAneous 3 times per day    gabapentin  400 mg Oral TID    metoprolol tartrate  25 mg Oral BID    levothyroxine  200 mcg Oral Daily     Continuous Infusions:   sodium chloride       PRN Meds:  labetalol, sodium chloride flush, sodium chloride, hydrALAZINE, ipratropium-albuterol, senna, polyethylene glycol, docusate, artificial tears, acetaminophen, potassium chloride, acetaminophen, ondansetron **OR** ondansetron, diazePAM    Vitals   Vitals /wt Patient Vitals for the past 8 hrs:   BP Temp Temp src Pulse Resp SpO2 Height   08/29/22 1613 (!) 172/108 97.7 °F (36.5 °C) Oral 82 28 94 % --   08/29/22 1241 -- -- -- -- -- -- 5' 7\" (1.702 m)          72HR INTAKE/OUTPUT:    Intake/Output Summary (Last 24 hours) at 8/29/2022 2008  Last data filed at 8/29/2022 1615  Gross per 24 hour   Intake 1607.2 ml   Output 2100 ml   Net -492.8 ml         Exam:  Gen: NAD, on 6L NC  Eyes: PERRL. No sclera icterus. No conjunctival injection. ENT: No discharge. Pharynx clear. External appearance of ears and nose normal.  Neck: Trachea midline. No obvious mass. Resp: No accessory muscle use. No crackles. No wheezes. No rhonchi. CV: Regular rate. Regular rhythm. No murmur or rub. No edema. GI: Non-tender. Non-distended. No hernia.    Skin: Warm, dry, normal texture and turgor. Lymph: No cervical LAD. No supraclavicular LAD. M/S: / Ext. No cyanosis. No clubbing. No joint deformity. Neuro: CN 2-12 are intact,  no neurologic deficits noted. PT/INR:   No results for input(s): PROTIME, INR in the last 72 hours. APTT:   No results for input(s): APTT in the last 72 hours. CBC:   Recent Labs     08/27/22  0500 08/28/22  1211 08/29/22  0645   WBC 9.7 8.9 10.0   HGB 11.0* 11.5* 11.6*   HCT 32.9* 35.0* 35.1*   MCV 93.1 93.5 93.7   * 398 356         BMP:   Recent Labs     08/27/22  0500 08/27/22  1122 08/28/22  1211 08/28/22  2304 08/29/22  0645 08/29/22  1345   *   < > 140 142 138 142   K 3.6   < > 3.6 3.6 3.9 3.8   *   < > 111* 110 108 110   CO2 23   < > 22 22 22 26   PHOS 1.9*  --  2.5  --  2.4*  --    BUN 32*   < > 27* 24* 25* 27*   CREATININE 0.8   < > 0.6* 0.7* 0.6* 0.7*    < > = values in this interval not displayed. LIVER PROFILE:   No results for input(s): ALKPHOS, AST, ALT, ALB, BILIDIR, BILITOT, ALKPHOS in the last 72 hours. No results for input(s): AMYLASE in the last 72 hours. No results for input(s): LIPASE in the last 72 hours. UA:  No results for input(s): NITRITE, LABCAST, WBCUA, RBCUA, MUCUS in the last 72 hours. TROPONIN:   No results for input(s): Mary Risk in the last 72 hours. Lab Results   Component Value Date/Time    URRFLXCULT Not Indicated 08/15/2022 09:16 AM       No results for input(s): TSHREFLEX in the last 72 hours. No components found for: [id number]  POC GLUCOSE:    Recent Labs     08/28/22  1658 08/28/22  2115 08/29/22  0822 08/29/22  1203 08/29/22  1615   POCGLU 111* 134* 149* 113* 91       No results for input(s): LABA1C in the last 72 hours.  No results found for: LABA1C    Echocardiogram shows normal ejection fraction  Grade 1 diastolic dysfunction      ASSESSMENT AND PLAN    Acute respiratory failure  Intubated 8/8  Extubated 8/26  Doing well  on 3 L nasal cannula, wean as tolerated  Obesity with obstructive sleep apnea and hypoventilation syndrome    Aspiration pna  Continues to be on cefepime and metronidazole  Ct abxs  Speech therapy evaluation, advance diet as started    Acute on chronic diastolic CHF  Patient has grade 1 diastolic dysfunction  Lasix drip discontinued  500 cc fluid bolus  Patient now started on pressors      Partial small Bowel obstruction-improving, advance diet as tolerated    SVT  better  H/o atrial fibrillation  HR controled    CHARLENE  Renal function improved    Hypotention  Stable now    Hiatal hernia  - CT chest/abd/pelv:   Large hiatal hernia containing much of the stomach which is distended  Og tube today     Atrial Fibrillation  Patient developed the rapid ventricular rate  IV Lopressor with paramenters  Cardiology, nephrology following    Advance diet as tolerated, CXR shows Pulmonary edema - order BNP, has labile BP, holding AntiHTN meds for now, Hydralazine with parameters  Wean o2 as tolerated  Plan for MBS tomorrow    Code Status: Full Code    The patient and / or the family were informed of the results of any tests, a time was given to answer questions, a plan was proposed and they agreed with plan. Dustin Arroyo MD    This note was transcribed using [id number] [employer]. Please disregard any translational errors.

## 2022-08-30 NOTE — DISCHARGE INSTR - COC
Continuity of Care Form    Patient Name: Evy Graves   :  1960  MRN:  [id number]    Admit date:  2022  Discharge date:  2022    Code Status Order: Full Code   Advance Directives:     Admitting Physician:  Marky Lawson DO  PCP: Serina Taylor MD    Discharging Nurse: Tatiana Lay Unit/Room#: N1R-9413/7339-56  Discharging Unit Phone Number: [phone]    Emergency Contact:   Extended Emergency Contact Information  Primary Emergency Contact: kaela lanza  Mobile Phone: [phone]  Relation: Spouse   needed?  No    Past Surgical History:  Past Surgical History:   Procedure Laterality Date    BRONCHOSCOPY  2022    BRONCHOSCOPY ALVEOLAR LAVAGE performed by Juliet Dickson DO at [address]  2022    BRONCHOSCOPY performed by Benito Bernard MD at [place] [address]  2022    EGD BIOPSY performed by Stuart Lemus MD at [place] [address]  2022    EGD ESOPHAGOGASTRODUODENOSCOPY TUBE INSERTION performed by Stuart Lemus MD at [place] [address] N/A 2022    EGD ESOPHAGOGASTRODUODENOSCOPY ORAL-GASTRIC TUBE INSERTION performed by Juliet Dickson DO at [facility] ENDOSCOPY       Immunization History:   Immunization History   Administered Date(s) Administered    COVID-19, MODERNA BLUE border, Primary or Immunocompromised, (age 12y+), IM, 100 mcg/0.5mL 2021, 2021       Active Problems:  Patient Active Problem List   Diagnosis Code    Partial bowel obstruction ([address] K56.600    Hiatal hernia K44.9    Acute respiratory failure with hypoxia and hypercapnia (HCC) J96.01, J96.02    SBO (small bowel obstruction) ([address] K56.609    Aspiration into airway T17.908A    Acute respiratory failure with hypoxia ([address] J96.01    Small bowel obstruction ([address] X28.076    Diastolic heart failure (HCC) I50.30    History of atrial fibrillation Z86.79    Supraventricular tachycardia (HCC) I47.1    Arterial hypotension I95.9    Aspiration pneumonia (HCC) J69.0    Morbid obesity with BMI of 50.0-59.9, adult ([facility]) E66.01, Z68.43    Acute delirium R41.0    EARNESTINE (obstructive sleep apnea) G47.33       Isolation/Infection:   Isolation            No Isolation          Patient Infection Status       Infection Onset Added Last Indicated Last Indicated By Review Planned Expiration Resolved Resolved By    None active    Resolved    COVID-19 (Rule Out) 08/15/22 08/15/22 08/15/22 Respiratory Panel, Molecular, with COVID-19 (Restricted: peds pts or suitable admitted adults) (Ordered)   08/15/22 Rule-Out Test Resulted    COVID-19 (Rule Out) 08/05/22 08/05/22 08/05/22 COVID-19, Rapid (Ordered)   08/05/22 Rule-Out Test Resulted            Nurse Assessment:  Last Vital Signs: BP (!) 172/99   Pulse 85   Temp 98.7 °F (37.1 °C) (Oral)   Resp 20   Ht 5' 7\" (1.702 m)   Wt (!) 334 lb 12.8 oz (151.9 kg)   SpO2 90%   BMI 52.44 kg/m²     Last documented pain score (0-10 scale): Pain Level: 5  Last Weight:   Wt Readings from Last 1 Encounters:   08/30/22 (!) 334 lb 12.8 oz (151.9 kg)     Mental Status:  oriented and alert    IV Access:  - PICC - site  L Upper Arm, insertion date: 8/28/2022    Nursing Mobility/ADLs:  Walking   Assisted  Transfer  Assisted  Bathing  Assisted  Dressing  Assisted  Toileting  Assisted  Feeding  Assisted  Med Admin  Dependent  Med Delivery   crushed through G tube     Wound Care Documentation and Therapy:        Elimination:  Continence: Bowel: No  Bladder: No  Urinary Catheter: None   Colostomy/Ileostomy/Ileal Conduit: No  Rectal Tube-Stool Appearance: Loose  Rectal Tube-Stool Color: Brown  Rectal Tube-Stool Amount: Medium    Date of Last BM: unsure of last BM.  Is passing gas and getting bowel regimen     Intake/Output Summary (Last 24 hours) at 8/30/2022 1256  Last data filed at 8/30/2022 0432  Gross per 24 hour   Intake 2606 ml   Output 1700 ml   Net 905 ml     I/O last 3 completed shifts: In: 4212.2 [I.V.:1.2; NG/GT:2605; IV BUUSRSWLI:8580]  Out: 6772 [Urine:2825; Stool:300]    Safety Concerns: At Risk for Falls and Aspiration Risk    Impairments/Disabilities:      Speech- hoarse and raspy voice from intubation     Nutrition Therapy:  Current Nutrition Therapy:   - Tube Feedings:  jevity 1.5     Routes of Feeding: Gastrostomy Tube  Liquids: No Liquids  Daily Fluid Restriction: no  Last Modified Barium Swallow with Video (Video Swallowing Test): done on 8/31/2022/     Treatments at the Time of Hospital Discharge:   Respiratory Treatments: ***  Oxygen Therapy:  is on oxygen at 3 L/min per nasal cannula.   Ventilator:    - No ventilator support    Rehab Therapies: Physical Therapy, Occupational Therapy, and Speech/Language Therapy  Weight Bearing Status/Restrictions: No weight bearing restrictions  Other Medical Equipment (for information only, NOT a DME order):  walker  Other Treatments: ***    Patient's personal belongings (please select all that are sent with patient):  Pt leaving with home CPAP machine,clothing, cell phone     RN SIGNATURE:  Electronically signed by Ford Kennedy on 9/7/22 at 1:03 PM EDT    CASE MANAGEMENT/SOCIAL WORK SECTION    Inpatient Status Date: 8/5/22    Readmission Risk Assessment Score:  Readmission Risk              Risk of Unplanned Readmission:  21         Discharging to Facility/ Agency   Name: Benito Mckinney at [facility]  Phone: [phone]  Fax: [phone]      / signature: Electronically signed by Ford Cooley RN on 9/7/2022 at 11:12 AM      PHYSICIAN SECTION    Prognosis: Fair    Condition at Discharge: Stable    Rehab Potential (if transferring to Rehab): Good    Recommended Labs or Other Treatments After Discharge:   Continue Npo and tube feeding until he see the surgeon     Continue home cpap    Resume methotrexate when ok with the surgeon Physician Certification: I certify the above information and transfer of [place]  is necessary for the continuing treatment of the diagnosis listed and that he requires [facility] for less 30 days.      Update Admission H&P: No change in H&P    PHYSICIAN SIGNATURE:  Electronically signed by Carlos Horan MD on 9/7/22 at 10:13 AM EDT

## 2022-08-30 NOTE — PROGRESS NOTES
Physical Therapy  Facility/Department: [facility]  Physical Therapy Initial Assessment    Name: Seth Cordero  : 1960  MRN: [id number]  Date of Service: 2022    Discharge Recommendations:  Therapy recommended at discharge, Continue to assess pending progress   PT Equipment Recommendations  Other: Defer to next level of care. Seth Cordero scored a 10/24 on the AM-PAC short mobility form. Current research shows that an AM-PAC score of 17 or less is typically not associated with a discharge to the patient's home setting. Based on the patient's AM-PAC score and their current functional mobility deficits, it is recommended that the patient have 3-5 sessions per week of Physical Therapy at d/c to increase the patient's independence. Please see assessment section for further patient specific details. If patient discharges prior to next session this note will serve as a discharge summary. Please see below for the latest assessment towards goals. Patient Diagnosis(es): The primary encounter diagnosis was Small bowel obstruction ([facility] [address] Diagnoses of Elevated TSH, Chest pain, unspecified type, Hiatal hernia, and Intestinal obstruction, unspecified cause, unspecified whether partial or complete [facility]) were also pertinent to this visit. Past Medical History:  has a past medical history of Arthritis, Atrial fibrillation ([address] and Hypertension. Past Surgical History:  has a past surgical history that includes Upper gastrointestinal endoscopy (2022); Upper gastrointestinal endoscopy (2022); Upper gastrointestinal endoscopy (N/A, 2022); bronchoscopy (2022); and bronchoscopy (2022). Assessment   Body Structures, Functions, Activity Limitations Requiring Skilled Therapeutic Intervention: Decreased functional mobility ; Decreased strength;Decreased endurance;Decreased ADL status; Decreased safe awareness;Decreased cognition;Decreased balance  Assessment: 62 y/o male admit 8/7/2022 with N&V. Pt dx with Partial SBO, Aspiration Pneumonia, A-Fib and  Septic Shock. Pt developed Acute Respiratory Failure and Acute Renal Failure. Pt Intubated 8/8/22 - 8/26/22. Prior to admission, pt living with wife in home with ramp access and 1st floor bed/bath; independent daily care and functional mobility (some limit due to OA knees). Pt currently functioning below baseline, however able to stand at stedy this date. Recommend continued skilled therapy 3-5x/week to maximize independence and safety with functional mobility. Therapy Prognosis: Fair  Decision Making: High Complexity  History: see above  Exam: See below  Clinical Presentation: unpredictable  Activity Tolerance  Activity Tolerance Comments: Pt intubated for 18 days during this admission     Plan   Plan  Plan: 3-5 times per week  Current Treatment Recommendations: Strengthening, Functional mobility training, Transfer training, Safety education & training, Patient/Caregiver education & training  Safety Devices  Type of Devices: Call light within reach, Left in bed, Patient at risk for falls, Nurse notified  Restraints  Restraints Initially in Place: Yes  Restraints: B UE Soft Wrist Restraints - only one tied down     Restrictions  Restrictions/Precautions  Restrictions/Precautions: Fall Risk  Position Activity Restriction  Other position/activity restrictions: O2 3L via NC.  NG Feeding Tube. Flexiseal.     Subjective   General  Chart Reviewed: Yes  Patient assessed for rehabilitation services?: Yes  Additional Pertinent Hx: 64 y/o male admit 8/7/2022 with N&V. Pt dx with Partial SBO, Aspiration Pneumonia, A-Fib and  Septic Shock. Pt developed Acute Respiratory Failure and Acute Renal Failure. 8/8-8/26/2022 Pt Intubated. CT  Abdomen : Large Hiatal Hernia. PMH OA B Knees (per family). Family / Caregiver Present: Yes  Referring Practitioner: Dr. Gaurav Shaw  Other (Comment): Pt follows simple commands.   Subjective  Subjective: Pt/family agreeable to PT. Social/Functional History  Social/Functional History  Lives With: Spouse  Type of Home: House  Home Layout: Two level, Able to Live on Main level with bedroom/bathroom, Laundry in basement  Home Access: Ramped entrance  Bathroom Shower/Tub: Tub/Shower unit, Walk-in shower  Bathroom Toilet: Standard  Bathroom Accessibility: Accessible  Has the patient had two or more falls in the past year or any fall with injury in the past year?: No  Receives Help From: Family  ADL Assistance: Independent  Homemaking Assistance:  (Shared with wife.)  Ambulation Assistance: Independent (Without assist device.)  Transfer Assistance: Independent (Difficulty due to OA B Knees.)  Active : Yes  Occupation: Retired  Additional Comments: Spouse at bedside provided social information. Vision/Hearing  Vision  Vision Exceptions: Wears glasses for reading  Hearing  Hearing: Within functional limits      Cognition   Orientation  Orientation Level: Oriented to person;Oriented to place (Partial situation/time)  Cognition  Cognition Comment: Difficult to formally assess with whispered speech. Pt able to follow all one step commands. Objective   Gross Assessment  AROM: Generally decreased, functional  Strength:  (LEs at least 3/5 with ex following oob.)     Bed mobility  Rolling to Left: Minimal assistance  Rolling to Right: Minimal assistance  Supine to Sit: Minimal assistance; Moderate assistance (for trunk)  Sit to Supine: Maximum assistance (for bilat LE)  Scooting: Stand by assistance  Transfers  Sit to Stand: Contact guard assistance;Minimal Assistance (within stedy)  Stand to sit: Contact guard assistance;Minimal Assistance  Comment: Pt reporting progressive dizziness within stedy. O2 sats at 93% on 3L O2. Rectal tube noted to not be catching much stool.         Balance  Posture: Fair  Sitting - Static: Fair  Sitting - Dynamic: Fair  Standing - Static: Fair;-           AM-PAC Score  AM-PAC Inpatient Mobility Raw Score : 10 (08/30/22 1433)  AM-PAC Inpatient T-Scale Score : 32.29 (08/30/22 1433)  Mobility Inpatient CMS 0-100% Score: 76.75 (08/30/22 1433)  Mobility Inpatient CMS G-Code Modifier : CL (08/30/22 1433)          Goals  Short Term Goals  Time Frame for Short term goals: Upon d/c acute care setting. - all goals met on 8/30/22, new goals written  Short term goal 1: bed mobility SBA  Short term goal 2: sit<>stand to RW with min A  Short term goal 3: stand pivot with RW with min A  Short term goal 4: ambulate > 10' with RW and min A  Patient Goals   Patient goals : Wife : regain prior level of function and return home. Education  Patient Education  Education Given To: Patient; Family  Education Provided: Role of Therapy;Plan of Care  Education Method: Verbal  Barriers to Learning: Cognition  Education Outcome: Verbalized understanding;Continued education needed      Therapy Time   Individual Concurrent Group Co-treatment   Time In 1350         Time Out 1430         Minutes 40         Timed Code Treatment Minutes: Trisha 64, PT

## 2022-08-30 NOTE — PROGRESS NOTES
[facility]                                               Progress note    Summary:   Boni Edmonds is being seen by nephrology for CHARLENE and hypernatremia. Admitted with paraesophageal hernia and SBO. Had EGD 8/9 with biopsy. Still NPO     Interval History  Seen at bedside  Work of breathing a little worse today  Positive fluid balance for yesterday  Received lasix IV 20 mg once today  BP better controlled after resuming lopressor. Plan: Will add PO torsemide 40 mg daily from tomorrow for volume maintenance  Agree with lasix given yesterday  Reduce lab draw to daily from q8h        Genevieve Lobo MD  [facility]  Office: [phone]    Assessment:   Acute kidney injury  Urine Na < 20    creatinine on admission 1   next day on 08/06/2022 was 0.8  suddenly jumped up to 2.7. Due to episode of severe hypotension with blood pressure drop 78/44. also got CT with IV contrast on 08/05/2022 but most likely  this is ATN related due to hypotension and hypercapnic  respiratory failure. CT angio s normal renal arteries and normal kidneys      Sepsis/ possible septic shock. emperature was 101.1   High white cell count. '  immunocompromised due to rheumatoid arthritis and treatment. History of sleep apnea   on CPAP. Acute respiratory failure with high pCO2 in the range of 50s with pH  of 7.24    now intubated. History of abnormal stress test in 06/2022 with stress-induced  ischemia and some scarring. Ejection fraction  was  64%.     had a followup angiogram which was reported as normal as per wife. I do not have that result     History of rheumatoid arthritis   longstanding,was on methotrexate,  has taken Celebrex and now on Inflectra infusions,  so he is immunocompromised. Jose Day is now helping him with his rheumatoid tremendously. he has been on steroids in the past.     Rule out adrenal insufficiency. cortisol level.  normal      History of thyroidectomy in the past. .    Small bowel obstruction. Large hiatus hernia with colon and stomach in the chest.  Further management as per medical team.       ROS:   [address] All other remaining systems are negative. Constitutional:  fever, chills, weakness, weight change, fatigue,      Skin:  rash, pruritus, hair loss, bruising, dry skin, petechiae. Head, Face, Neck   headaches, swelling,  cervical adenopathy. Respiratory: shortness of breath, cough, or wheezing  Cardiovascular: chest pain, palpitations, dizzy, edema  Gastrointestinal: nausea, vomiting, diarrhea, constipation,belly pain    Yellow skin, blood in stool  Musculoskeletal:  back pain, muscle weakness, gait problems,       joint pain or swelling. Genitourinary:  dysuria, poor urine flow, flank pain, blood in urine  Neurologic:  vertigo, TIA'S, syncope, seizures, focal weakness  Psychosocial:  insomnia, anxiety, or depression. Additional positive findings: -         PMH:   Past medical history, surgical history, social history, family history are reviewed and updated as appropriate. Reviewed current medication list.   Allergies reviewed and updated as needed. PE:   Vitals:    08/30/22 0852   BP:    Pulse:    Resp:    Temp:    SpO2: 95%       General appearance: Awake and alert in no acute distress  HEENT: EOM intact, no icterus. Trachea is midline. Neck : No masses, appears symmetrical  Respiratory: Respiratory effort appears normal, bilateral equal chest rise, no wheeze, no crackles   Cardiovascular: Ausculation shows RRR trace edema  Abdomen: soft non distended  Musculoskeletal:  Joints with no swelling or deformity. Skin:no rashes, ulcers, induration, no jaundice.    Neuro: Awake and alert, following commands sitting up in the chair      Lab Results   Component Value Date    CREATININE 0.7 (L) 08/29/2022    BUN 27 (H) 08/29/2022     08/29/2022    K 3.8 08/29/2022     08/29/2022    CO2 26 08/29/2022      Lab Results

## 2022-08-30 NOTE — PROCEDURES
INSTRUMENTAL SWALLOW REPORT  MODIFIED BARIUM SWALLOW    NAME: Fabiano Cates   : 1960  MRN: [id number]       Date of Eval: 2022     Ordering Physician: Dr. Heather Stack  Radiologist: Dr. Francisco J Valencia     Referring Diagnosis(es):  Oropharyngeal Dysphagia post prolonged intubation  Type of Study: Initial MBS    Medical Diagnosis: Small bowel obstruction ([address] [P31.646]; Elevated TSH [R79.89]  Partial bowel obstruction ([address] [K56.600]; Respiratory Failure requiring intubation  Past Medical History:  has a past medical history of Arthritis, Atrial fibrillation ([address] and Hypertension. Past Surgical History:  has a past surgical history that includes Upper gastrointestinal endoscopy (2022); Upper gastrointestinal endoscopy (2022); Upper gastrointestinal endoscopy (N/A, 2022); bronchoscopy (2022); and bronchoscopy (2022). Onset: 2022    Chart Review:   H&P: 63years old male with medical history significant for morbid obesity, paraesophageal hernia. Patient presented to the emergency room with abdominal pain nausea vomiting and diarrhea. Patient was diagnosed with small bowel obstruction. Patient was also diagnosed with pneumonia and septic shock. Patient developed acute respiratory failure and acute renal failure. Managed on the ventilator with a prolonged course in ICU.  22: intubated  22: extubated, placed on 6 LPM  22: clinical bedside swallow evaluation ordered received following extubation  Pt continues with NG tube feeding    2022: CT head  Impression   No acute intracranial abnormality. 2022 CT chest documented findings noted    Patient Complaints/Reason for Referral:  Fabiano Cates was referred for a MBS to assess the efficiency of his/her swallow function, assess for aspiration, and to make recommendations regarding safe dietary consistencies, effective compensatory strategies, and safe eating environment.   MD referral for MBSS for assessment of po diet readiness  Pt seen for SLP for RACHAEL 8/27/2022 and f/u 8/29/2022 with recommendation for MBSS, for more objective assessment of pharyngeal phase of the swallows, prior to po diet consideration due to concern for aspiration risk    MBSS Assessment Impressions:  Behavior/Cognition/Vision/Hearing:  Alert; Cooperative; Requires cueing (Hoarse voice quality; delayed/weak to absent volitional cough)  Vision/hearing: WFL for procedure; Patient Position: Lateral (C-ARM; upright ~60 to 70 degrees HOB elevated in bed)   Pain: denied  Dependent on positioning in bed. Pt able to use upper extremities but in restraints due to pulling at IV/NG due to mental status deficits  Consistencies Administered:  limited to puree; moderately thick and mildly thick liquids. Discontinued due to silent aspiration before/during swallow; post swallow valleculae residue with poor sensation/risk post swallow    2. Oropharyngeal Dysphagia with high aspiration risk. Oropharyngeal dysphagia characterized by reduced lingual coordination for bolus control and A-P oral transit; delayed initiation of the swallow; reduced laryngeal rom; reduced tongue base retraction and reduced sensation to aspiration/residue. Symptoms:   Mastication was not assessed due to severity of pharyngeal phase deficits. Premature loss to pharynx ;   gradual pooling to pharynx  Mildly thick and moderately thick penetration/aspiration prior to swallow; no cough response.    Pooling of puree to valleculae with post swallow valleculae residue with difficulty clearing with clearance swallow and poor sensation of residue  Compensatory strategies:   Inconsistent ability to achieve a volitional cough which was weak  Inconsistent ability to achieve clearance swallow which did not clear valleculae residue  Pt was unable to complete compensatory head postures  Pt did attempt at lingual hold but inconsistent; persistent premature loss of liquid    Dysphagia Outcome Severity Scale: Level 1: Severe dysphagia- NPO. Unable to tolerate any PO safely  Penetration-Aspiration Scale (PAS): 8 - Material Enters the airway, passes below the vocal folds, and no effort is made to eject    Recommended Diet:  NPO (continue TF) secondary to aspiration risk with po  Medication administration: Via NG/PEG    Safe Swallow Protocol:  Compensatory Swallowing Strategies :  (oral care)    Recommendations/Treatment  Requires SLP Intervention: Yes   Frequency: 3-5 times a week for dysphagia treatment trial  D/C Recommendations: Ongoing speech therapy is recommended at next level of care    Recommended Exercises:    Therapeutic Interventions: Bolus control exercises; Pharyngeal exercises; Laryngeal exercises;Oral motor exercises; Patient/Family education;Oral care; Therapeutic PO trials with SLP;Vital Stim/NMES         Education: Images and recommendations were reviewed with pt and pt's mercy staff RN following this exam.   Patient Education Response: Needs reinforcement; No evidence of learning        Oral Preparation / Oral Phase   Mastication was not assessed due to severity of pharyngeal phase deficits. Reduced lingual coordination for bolus control; lingual mashing A-P oral transit; reduced tongue base retraction. Premature loss to pharynx ; gradual pooling to pharynx        Pharyngeal Phase   Delayed initiation of swallow; reduced laryngeal rom; reduced tongue base retraction. Mildly tick and moderately thick penetration/aspiration prior to swallow; no cough response. Pooling of puree to valleculae with post swallow valleculae residue with difficulty clearing with clearance swallow. Cue dependent      Esophageal Phase  Esophageal Screen:  (DNT)      Therapy Time:   Individual   Time In 1120   Time Out 1153   Minutes 35         Signed  Suly Lechuga,MS,CCC,SLP [id number]  Speech and Language Pathologist  8/30/2022, 11:53 AM

## 2022-08-30 NOTE — PROGRESS NOTES
Occupational Therapy  Facility/Department: [address] MED SURG  Occupational Therapy Daily Treatment    Name: Kim Frazier  : 1960  MRN: [id number]  Date of Service: 2022    Discharge Recommendations:  Patient would benefit from continued therapy after discharge, 3-5 sessions per week  OT Equipment Recommendations  Other: defer to [place] facility  Venda Files scored a 13/24 on the AM-PAC ADL Inpatient form. Current research shows that an AM-PAC score of 17 or less is typically not associated with a discharge to the patient's home setting. Based on the patient's AM-PAC score and their current ADL deficits, it is recommended that the patient have 3-5 sessions per week of Occupational Therapy at d/c to increase the patient's independence. Please see assessment section for further patient specific details. If patient discharges prior to next session this note will serve as a discharge summary. Please see below for the latest assessment towards goals. Patient Diagnosis(es): The primary encounter diagnosis was Small bowel obstruction ([address] Diagnoses of Elevated TSH, Chest pain, unspecified type, Hiatal hernia, and Intestinal obstruction, unspecified cause, unspecified whether partial or complete [facility]) were also pertinent to this visit. Past Medical History:  has a past medical history of Arthritis, Atrial fibrillation ([address] and Hypertension. Past Surgical History:  has a past surgical history that includes Upper gastrointestinal endoscopy (2022); Upper gastrointestinal endoscopy (2022); Upper gastrointestinal endoscopy (N/A, 2022); bronchoscopy (2022); and bronchoscopy (2022). Assessment   Performance deficits / Impairments: Decreased functional mobility ; Decreased safe awareness;Decreased balance;Decreased ADL status; Decreased cognition;Decreased ROM; Decreased endurance;Decreased high-level IADLs;Decreased strength;Decreased fine motor control  Assessment: 64 y/o male Per RN ok for therapy. Social/Functional History  Social/Functional History  Lives With: Spouse  Type of Home: House  Home Layout: Two level, Able to Live on Main level with bedroom/bathroom, Laundry in basement  Home Access: Ramped entrance  Bathroom Shower/Tub: Tub/Shower unit, Walk-in shower  Bathroom Toilet: Standard  Bathroom Accessibility: Accessible  Has the patient had two or more falls in the past year or any fall with injury in the past year?: No  Receives Help From: Family  ADL Assistance: Independent  Homemaking Assistance:  (Shared with wife.)  Ambulation Assistance: Independent (Without assist device.)  Transfer Assistance: Independent (Difficulty due to OA B Knees.)  Active : Yes  Occupation: Retired  Additional Comments: Spouse at bedside provided social information. Objective   Safety Devices  Type of Devices: Call light within reach; Left in bed;Patient at risk for falls;Nurse notified  Restraints  Restraints Initially in Place: Yes  Restraints: B UE Soft Wrist Restraints - only one tied down  Balance  Sitting: Intact  Standing: With support (min A in queta webb)        ADL  LE Dressing: Dependent/Total  LE Dressing Skilled Clinical Factors: Don/doff footies  Toileting: Dependent/Total  Toileting Skilled Clinical Factors: quiroga and rectal tube. Rectal tube leaking, total A for pericare and changing bed linens in bed     Activity Tolerance  Activity Tolerance Comments: Pt intubated for 18 days during this admission  Bed mobility  Rolling to Left: Minimal assistance  Rolling to Right: Minimal assistance  Supine to Sit: Minimal assistance; Moderate assistance (for trunk)  Sit to Supine: Maximum assistance (for bilat LE)  Scooting: Stand by assistance  Transfers  Sit to stand: Minimal assistance  Stand to sit: Minimal assistance  Transfer Comments: to/from queta webb  Vision  Vision Exceptions: Wears glasses for reading  Hearing  Hearing: Within functional limits  Cognition  Cognition Comment: Difficult to formally assess with whispered speech. Pt able to follow all one step commands. Orientation  Orientation Level: Oriented to person;Oriented to place (Partial situation/time)                  Education Given To: Patient; Family  Education Provided: Role of Therapy;Plan of Care;Transfer Training;ADL Adaptive Strategies  Education Method: Demonstration;Verbal  Barriers to Learning: Cognition  Education Outcome: Continued education needed           AM-PAC Score  AM-PAC Inpatient Daily Activity Raw Score: 13 (08/30/22 1437)  AM-PAC Inpatient ADL T-Scale Score : 32.03 (08/30/22 1437)  ADL Inpatient CMS 0-100% Score: 63.03 (08/30/22 1437)  ADL Inpatient CMS G-Code Modifier : CL (08/30/22 1437)        Goals  Short Term Goals  Time Frame for Short term goals: Prior to DC: goals updated below on 8/30  Short Term Goal 1: Pt will complete bed mobility SBA  Short Term Goal 2: Pt will complete ADL transfers w/ CGA using LRAD  Short Term Goal 3: Pt will groom w/ setup  Short Term Goal 4: Pt will complete UE exercises in all planes to inc strength/endurance.   Short Term Goal 5: Pt will complete UB bathing w/ SBA  Patient Goals   Patient goals : spouse: to regain PLOF and eventually return home       Therapy Time   Individual Concurrent Group Co-treatment   Time In [facility] 73         Time Out 0         Minutes [address], [place] [address]

## 2022-08-31 ENCOUNTER — ANESTHESIA EVENT (OUTPATIENT)
Dept: OPERATING ROOM | Age: 62
DRG: 853 | End: 2022-08-31
Payer: COMMERCIAL

## 2022-08-31 ENCOUNTER — APPOINTMENT (OUTPATIENT)
Dept: GENERAL RADIOLOGY | Age: 62
DRG: 853 | End: 2022-08-31
Payer: COMMERCIAL

## 2022-08-31 ENCOUNTER — ANESTHESIA (OUTPATIENT)
Dept: OPERATING ROOM | Age: 62
DRG: 853 | End: 2022-08-31
Payer: COMMERCIAL

## 2022-08-31 PROBLEM — R13.10 DYSPHAGIA: Status: ACTIVE | Noted: 2022-08-31

## 2022-08-31 LAB
ALBUMIN SERPL-MCNC: 2.8 G/DL (ref 3.4–5)
ANION GAP SERPL CALCULATED.3IONS-SCNC: 9 MMOL/L (ref 3–16)
BASE EXCESS ARTERIAL: -0.1 MMOL/L (ref -3–3)
BASOPHILS ABSOLUTE: 0 K/UL (ref 0–0.2)
BASOPHILS RELATIVE PERCENT: 0.3 %
BUN BLDV-MCNC: 23 MG/DL (ref 7–20)
CALCIUM SERPL-MCNC: 8.1 MG/DL (ref 8.3–10.6)
CARBOXYHEMOGLOBIN ARTERIAL: 1 % (ref 0–1.5)
CHLORIDE BLD-SCNC: 108 MMOL/L (ref 99–110)
CO2: 24 MMOL/L (ref 21–32)
CREAT SERPL-MCNC: 0.6 MG/DL (ref 0.8–1.3)
EKG ATRIAL RATE: 97 BPM
EKG DIAGNOSIS: NORMAL
EKG P AXIS: 30 DEGREES
EKG P-R INTERVAL: 154 MS
EKG Q-T INTERVAL: 364 MS
EKG QRS DURATION: 100 MS
EKG QTC CALCULATION (BAZETT): 462 MS
EKG R AXIS: 62 DEGREES
EKG T AXIS: -4 DEGREES
EKG VENTRICULAR RATE: 97 BPM
EOSINOPHILS ABSOLUTE: 0.3 K/UL (ref 0–0.6)
EOSINOPHILS RELATIVE PERCENT: 2.5 %
GFR AFRICAN AMERICAN: >60
GFR NON-AFRICAN AMERICAN: >60
GLUCOSE BLD-MCNC: 101 MG/DL (ref 70–99)
GLUCOSE BLD-MCNC: 110 MG/DL (ref 70–99)
GLUCOSE BLD-MCNC: 113 MG/DL (ref 70–99)
GLUCOSE BLD-MCNC: 118 MG/DL (ref 70–99)
HCO3 ARTERIAL: 25.1 MMOL/L (ref 21–29)
HCT VFR BLD CALC: 34.5 % (ref 40.5–52.5)
HEMOGLOBIN, ART, EXTENDED: 10.7 G/DL (ref 13.5–17.5)
HEMOGLOBIN: 11.3 G/DL (ref 13.5–17.5)
LYMPHOCYTES ABSOLUTE: 1 K/UL (ref 1–5.1)
LYMPHOCYTES RELATIVE PERCENT: 8.7 %
MCH RBC QN AUTO: 30.8 PG (ref 26–34)
MCHC RBC AUTO-ENTMCNC: 32.8 G/DL (ref 31–36)
MCV RBC AUTO: 93.9 FL (ref 80–100)
METHEMOGLOBIN ARTERIAL: 0.4 %
MONOCYTES ABSOLUTE: 0.8 K/UL (ref 0–1.3)
MONOCYTES RELATIVE PERCENT: 6.8 %
NEUTROPHILS ABSOLUTE: 9.7 K/UL (ref 1.7–7.7)
NEUTROPHILS RELATIVE PERCENT: 81.7 %
O2 SAT, ARTERIAL: 95.1 %
O2 THERAPY: ABNORMAL
PCO2 ARTERIAL: 42.3 MMHG (ref 35–45)
PDW BLD-RTO: 17.9 % (ref 12.4–15.4)
PERFORMED ON: ABNORMAL
PH ARTERIAL: 7.38 (ref 7.35–7.45)
PHOSPHORUS: 2.6 MG/DL (ref 2.5–4.9)
PLATELET # BLD: 281 K/UL (ref 135–450)
PMV BLD AUTO: 7.7 FL (ref 5–10.5)
PO2 ARTERIAL: 76.6 MMHG (ref 75–108)
POTASSIUM SERPL-SCNC: 3.8 MMOL/L (ref 3.5–5.1)
RBC # BLD: 3.67 M/UL (ref 4.2–5.9)
SODIUM BLD-SCNC: 141 MMOL/L (ref 136–145)
TCO2 ARTERIAL: 26.4 MMOL/L
WBC # BLD: 11.9 K/UL (ref 4–11)

## 2022-08-31 PROCEDURE — 6370000000 HC RX 637 (ALT 250 FOR IP): Performed by: NURSE PRACTITIONER

## 2022-08-31 PROCEDURE — 80069 RENAL FUNCTION PANEL: CPT

## 2022-08-31 PROCEDURE — 6360000002 HC RX W HCPCS: Performed by: INTERNAL MEDICINE

## 2022-08-31 PROCEDURE — 99222 1ST HOSP IP/OBS MODERATE 55: CPT | Performed by: SURGERY

## 2022-08-31 PROCEDURE — 7100000001 HC PACU RECOVERY - ADDTL 15 MIN: Performed by: SURGERY

## 2022-08-31 PROCEDURE — 2500000003 HC RX 250 WO HCPCS

## 2022-08-31 PROCEDURE — 3600000004 HC SURGERY LEVEL 4 BASE: Performed by: SURGERY

## 2022-08-31 PROCEDURE — 6370000000 HC RX 637 (ALT 250 FOR IP): Performed by: NURSE ANESTHETIST, CERTIFIED REGISTERED

## 2022-08-31 PROCEDURE — 6370000000 HC RX 637 (ALT 250 FOR IP): Performed by: SURGERY

## 2022-08-31 PROCEDURE — 6360000002 HC RX W HCPCS: Performed by: SURGERY

## 2022-08-31 PROCEDURE — 3600000014 HC SURGERY LEVEL 4 ADDTL 15MIN: Performed by: SURGERY

## 2022-08-31 PROCEDURE — 2580000003 HC RX 258: Performed by: SURGERY

## 2022-08-31 PROCEDURE — 6360000002 HC RX W HCPCS: Performed by: NURSE ANESTHETIST, CERTIFIED REGISTERED

## 2022-08-31 PROCEDURE — 2580000003 HC RX 258: Performed by: INTERNAL MEDICINE

## 2022-08-31 PROCEDURE — 0DH60UZ INSERTION OF FEEDING DEVICE INTO STOMACH, OPEN APPROACH: ICD-10-PCS | Performed by: SURGERY

## 2022-08-31 PROCEDURE — 3700000000 HC ANESTHESIA ATTENDED CARE: Performed by: SURGERY

## 2022-08-31 PROCEDURE — 6360000002 HC RX W HCPCS

## 2022-08-31 PROCEDURE — 9990000010 HC NO CHARGE VISIT

## 2022-08-31 PROCEDURE — 7100000000 HC PACU RECOVERY - FIRST 15 MIN: Performed by: SURGERY

## 2022-08-31 PROCEDURE — 3E0G76Z INTRODUCTION OF NUTRITIONAL SUBSTANCE INTO UPPER GI, VIA NATURAL OR ARTIFICIAL OPENING: ICD-10-PCS | Performed by: SURGERY

## 2022-08-31 PROCEDURE — 93010 ELECTROCARDIOGRAM REPORT: CPT | Performed by: INTERNAL MEDICINE

## 2022-08-31 PROCEDURE — A4217 STERILE WATER/SALINE, 500 ML: HCPCS | Performed by: SURGERY

## 2022-08-31 PROCEDURE — 36600 WITHDRAWAL OF ARTERIAL BLOOD: CPT

## 2022-08-31 PROCEDURE — 94002 VENT MGMT INPAT INIT DAY: CPT

## 2022-08-31 PROCEDURE — 2000000000 HC ICU R&B

## 2022-08-31 PROCEDURE — 2709999900 HC NON-CHARGEABLE SUPPLY: Performed by: SURGERY

## 2022-08-31 PROCEDURE — 94761 N-INVAS EAR/PLS OXIMETRY MLT: CPT

## 2022-08-31 PROCEDURE — 82803 BLOOD GASES ANY COMBINATION: CPT

## 2022-08-31 PROCEDURE — 6370000000 HC RX 637 (ALT 250 FOR IP): Performed by: INTERNAL MEDICINE

## 2022-08-31 PROCEDURE — 2720000010 HC SURG SUPPLY STERILE: Performed by: SURGERY

## 2022-08-31 PROCEDURE — C9290 INJ, BUPIVACAINE LIPOSOME: HCPCS | Performed by: SURGERY

## 2022-08-31 PROCEDURE — 0BQT4ZZ REPAIR DIAPHRAGM, PERCUTANEOUS ENDOSCOPIC APPROACH: ICD-10-PCS | Performed by: SURGERY

## 2022-08-31 PROCEDURE — 6360000002 HC RX W HCPCS: Performed by: NURSE PRACTITIONER

## 2022-08-31 PROCEDURE — 43281 LAP PARAESOPHAG HERN REPAIR: CPT | Performed by: SURGERY

## 2022-08-31 PROCEDURE — 71045 X-RAY EXAM CHEST 1 VIEW: CPT

## 2022-08-31 PROCEDURE — 85025 COMPLETE CBC W/AUTO DIFF WBC: CPT

## 2022-08-31 PROCEDURE — 99232 SBSQ HOSP IP/OBS MODERATE 35: CPT | Performed by: INTERNAL MEDICINE

## 2022-08-31 PROCEDURE — 0DS64ZZ REPOSITION STOMACH, PERCUTANEOUS ENDOSCOPIC APPROACH: ICD-10-PCS | Performed by: SURGERY

## 2022-08-31 PROCEDURE — 93005 ELECTROCARDIOGRAM TRACING: CPT | Performed by: INTERNAL MEDICINE

## 2022-08-31 PROCEDURE — 2500000003 HC RX 250 WO HCPCS: Performed by: NURSE ANESTHETIST, CERTIFIED REGISTERED

## 2022-08-31 PROCEDURE — 3700000001 HC ADD 15 MINUTES (ANESTHESIA): Performed by: SURGERY

## 2022-08-31 RX ORDER — GLYCOPYRROLATE 0.2 MG/ML
INJECTION INTRAMUSCULAR; INTRAVENOUS PRN
Status: DISCONTINUED | OUTPATIENT
Start: 2022-08-31 | End: 2022-08-31 | Stop reason: SDUPTHER

## 2022-08-31 RX ORDER — EPHEDRINE SULFATE/0.9% NACL/PF 50 MG/5 ML
SYRINGE (ML) INTRAVENOUS PRN
Status: DISCONTINUED | OUTPATIENT
Start: 2022-08-31 | End: 2022-08-31 | Stop reason: SDUPTHER

## 2022-08-31 RX ORDER — FENTANYL CITRATE-0.9 % NACL/PF 10 MCG/ML
50 PLASTIC BAG, INJECTION (ML) INTRAVENOUS EVERY 30 MIN PRN
Status: DISCONTINUED | OUTPATIENT
Start: 2022-08-31 | End: 2022-09-01

## 2022-08-31 RX ORDER — VASOPRESSIN 20 U/ML
INJECTION PARENTERAL PRN
Status: DISCONTINUED | OUTPATIENT
Start: 2022-08-31 | End: 2022-08-31 | Stop reason: SDUPTHER

## 2022-08-31 RX ORDER — MEPERIDINE HYDROCHLORIDE 25 MG/ML
12.5 INJECTION INTRAMUSCULAR; INTRAVENOUS; SUBCUTANEOUS
Status: DISCONTINUED | OUTPATIENT
Start: 2022-08-31 | End: 2022-08-31 | Stop reason: HOSPADM

## 2022-08-31 RX ORDER — FENTANYL CITRATE-0.9 % NACL/PF 10 MCG/ML
25-200 PLASTIC BAG, INJECTION (ML) INTRAVENOUS CONTINUOUS
Status: DISCONTINUED | OUTPATIENT
Start: 2022-08-31 | End: 2022-09-01

## 2022-08-31 RX ORDER — PROPOFOL 10 MG/ML
INJECTION, EMULSION INTRAVENOUS
Status: DISPENSED
Start: 2022-08-31 | End: 2022-09-01

## 2022-08-31 RX ORDER — SODIUM CHLORIDE 0.9 % (FLUSH) 0.9 %
5-40 SYRINGE (ML) INJECTION EVERY 12 HOURS SCHEDULED
Status: DISCONTINUED | OUTPATIENT
Start: 2022-08-31 | End: 2022-08-31 | Stop reason: HOSPADM

## 2022-08-31 RX ORDER — ROCURONIUM BROMIDE 10 MG/ML
INJECTION, SOLUTION INTRAVENOUS PRN
Status: DISCONTINUED | OUTPATIENT
Start: 2022-08-31 | End: 2022-08-31 | Stop reason: SDUPTHER

## 2022-08-31 RX ORDER — PROPOFOL 10 MG/ML
INJECTION, EMULSION INTRAVENOUS PRN
Status: DISCONTINUED | OUTPATIENT
Start: 2022-08-31 | End: 2022-08-31 | Stop reason: SDUPTHER

## 2022-08-31 RX ORDER — MAGNESIUM HYDROXIDE 1200 MG/15ML
LIQUID ORAL CONTINUOUS PRN
Status: DISCONTINUED | OUTPATIENT
Start: 2022-08-31 | End: 2022-08-31 | Stop reason: HOSPADM

## 2022-08-31 RX ORDER — FENTANYL CITRATE 50 UG/ML
25 INJECTION, SOLUTION INTRAMUSCULAR; INTRAVENOUS EVERY 5 MIN PRN
Status: DISCONTINUED | OUTPATIENT
Start: 2022-08-31 | End: 2022-08-31 | Stop reason: HOSPADM

## 2022-08-31 RX ORDER — SODIUM CHLORIDE 9 MG/ML
INJECTION, SOLUTION INTRAVENOUS PRN
Status: DISCONTINUED | OUTPATIENT
Start: 2022-08-31 | End: 2022-08-31 | Stop reason: HOSPADM

## 2022-08-31 RX ORDER — ALBUTEROL SULFATE 90 UG/1
AEROSOL, METERED RESPIRATORY (INHALATION) PRN
Status: DISCONTINUED | OUTPATIENT
Start: 2022-08-31 | End: 2022-08-31 | Stop reason: SDUPTHER

## 2022-08-31 RX ORDER — SODIUM CHLORIDE 0.9 % (FLUSH) 0.9 %
5-40 SYRINGE (ML) INJECTION PRN
Status: DISCONTINUED | OUTPATIENT
Start: 2022-08-31 | End: 2022-08-31 | Stop reason: HOSPADM

## 2022-08-31 RX ORDER — FUROSEMIDE 10 MG/ML
40 INJECTION INTRAMUSCULAR; INTRAVENOUS ONCE
Status: DISCONTINUED | OUTPATIENT
Start: 2022-08-31 | End: 2022-08-31

## 2022-08-31 RX ORDER — PROPOFOL 10 MG/ML
5-50 INJECTION, EMULSION INTRAVENOUS CONTINUOUS
Status: DISCONTINUED | OUTPATIENT
Start: 2022-08-31 | End: 2022-09-03 | Stop reason: ALTCHOICE

## 2022-08-31 RX ORDER — MIDAZOLAM HYDROCHLORIDE 1 MG/ML
2 INJECTION INTRAMUSCULAR; INTRAVENOUS ONCE
Status: COMPLETED | OUTPATIENT
Start: 2022-08-31 | End: 2022-08-31

## 2022-08-31 RX ORDER — FENTANYL CITRATE 50 UG/ML
50 INJECTION, SOLUTION INTRAMUSCULAR; INTRAVENOUS EVERY 5 MIN PRN
Status: DISCONTINUED | OUTPATIENT
Start: 2022-08-31 | End: 2022-08-31 | Stop reason: HOSPADM

## 2022-08-31 RX ORDER — SUCCINYLCHOLINE/SOD CL,ISO/PF 200MG/10ML
SYRINGE (ML) INTRAVENOUS PRN
Status: DISCONTINUED | OUTPATIENT
Start: 2022-08-31 | End: 2022-08-31 | Stop reason: SDUPTHER

## 2022-08-31 RX ORDER — LIDOCAINE HYDROCHLORIDE 20 MG/ML
INJECTION, SOLUTION EPIDURAL; INFILTRATION; INTRACAUDAL; PERINEURAL PRN
Status: DISCONTINUED | OUTPATIENT
Start: 2022-08-31 | End: 2022-08-31 | Stop reason: SDUPTHER

## 2022-08-31 RX ORDER — MIDAZOLAM HYDROCHLORIDE 1 MG/ML
INJECTION INTRAMUSCULAR; INTRAVENOUS PRN
Status: DISCONTINUED | OUTPATIENT
Start: 2022-08-31 | End: 2022-08-31 | Stop reason: SDUPTHER

## 2022-08-31 RX ORDER — ONDANSETRON 2 MG/ML
4 INJECTION INTRAMUSCULAR; INTRAVENOUS
Status: DISCONTINUED | OUTPATIENT
Start: 2022-08-31 | End: 2022-08-31 | Stop reason: HOSPADM

## 2022-08-31 RX ORDER — ONDANSETRON 2 MG/ML
INJECTION INTRAMUSCULAR; INTRAVENOUS PRN
Status: DISCONTINUED | OUTPATIENT
Start: 2022-08-31 | End: 2022-08-31 | Stop reason: SDUPTHER

## 2022-08-31 RX ORDER — FENTANYL CITRATE 50 UG/ML
INJECTION, SOLUTION INTRAMUSCULAR; INTRAVENOUS PRN
Status: DISCONTINUED | OUTPATIENT
Start: 2022-08-31 | End: 2022-08-31 | Stop reason: SDUPTHER

## 2022-08-31 RX ORDER — DEXAMETHASONE SODIUM PHOSPHATE 4 MG/ML
INJECTION, SOLUTION INTRA-ARTICULAR; INTRALESIONAL; INTRAMUSCULAR; INTRAVENOUS; SOFT TISSUE PRN
Status: DISCONTINUED | OUTPATIENT
Start: 2022-08-31 | End: 2022-08-31 | Stop reason: SDUPTHER

## 2022-08-31 RX ORDER — PHENYLEPHRINE HCL IN 0.9% NACL 1 MG/10 ML
SYRINGE (ML) INTRAVENOUS PRN
Status: DISCONTINUED | OUTPATIENT
Start: 2022-08-31 | End: 2022-08-31 | Stop reason: SDUPTHER

## 2022-08-31 RX ADMIN — Medication 100 MCG: at 13:44

## 2022-08-31 RX ADMIN — Medication 300 MCG: at 13:55

## 2022-08-31 RX ADMIN — SUGAMMADEX 400 MG: 100 INJECTION, SOLUTION INTRAVENOUS at 15:08

## 2022-08-31 RX ADMIN — ONDANSETRON 4 MG: 2 INJECTION INTRAMUSCULAR; INTRAVENOUS at 02:15

## 2022-08-31 RX ADMIN — Medication 200 MCG: at 14:30

## 2022-08-31 RX ADMIN — ONDANSETRON 4 MG: 2 INJECTION INTRAMUSCULAR; INTRAVENOUS at 14:26

## 2022-08-31 RX ADMIN — PROPOFOL 40 MCG/KG/MIN: 10 INJECTION, EMULSION INTRAVENOUS at 19:37

## 2022-08-31 RX ADMIN — HEPARIN SODIUM 5000 UNITS: 5000 INJECTION INTRAVENOUS; SUBCUTANEOUS at 21:45

## 2022-08-31 RX ADMIN — ROCURONIUM BROMIDE 30 MG: 10 INJECTION, SOLUTION INTRAVENOUS at 14:26

## 2022-08-31 RX ADMIN — ALBUTEROL SULFATE 3 PUFF: 90 AEROSOL, METERED RESPIRATORY (INHALATION) at 15:02

## 2022-08-31 RX ADMIN — METOPROLOL TARTRATE 25 MG: 25 TABLET, FILM COATED ORAL at 21:45

## 2022-08-31 RX ADMIN — Medication 200 MCG: at 14:22

## 2022-08-31 RX ADMIN — LIDOCAINE HYDROCHLORIDE 40 MG: 20 INJECTION, SOLUTION EPIDURAL; INFILTRATION; INTRACAUDAL; PERINEURAL at 13:34

## 2022-08-31 RX ADMIN — VASOPRESSIN 4 UNITS: 20 INJECTION INTRAVENOUS at 14:42

## 2022-08-31 RX ADMIN — LEVOTHYROXINE SODIUM 200 MCG: 0.1 TABLET ORAL at 06:36

## 2022-08-31 RX ADMIN — PROPOFOL 20 MCG/KG/MIN: 10 INJECTION, EMULSION INTRAVENOUS at 16:57

## 2022-08-31 RX ADMIN — DIAZEPAM 5 MG: 5 TABLET ORAL at 01:06

## 2022-08-31 RX ADMIN — PROPOFOL 30 MCG/KG/MIN: 10 INJECTION, EMULSION INTRAVENOUS at 23:15

## 2022-08-31 RX ADMIN — SODIUM CHLORIDE: 9 INJECTION, SOLUTION INTRAVENOUS at 13:26

## 2022-08-31 RX ADMIN — Medication 200 MCG: at 13:46

## 2022-08-31 RX ADMIN — Medication 3000 MG: at 13:41

## 2022-08-31 RX ADMIN — VASOPRESSIN 4 UNITS: 20 INJECTION INTRAVENOUS at 14:52

## 2022-08-31 RX ADMIN — MIDAZOLAM 2 MG: 1 INJECTION INTRAMUSCULAR; INTRAVENOUS at 13:20

## 2022-08-31 RX ADMIN — HYDRALAZINE HYDROCHLORIDE 10 MG: 20 INJECTION INTRAMUSCULAR; INTRAVENOUS at 01:06

## 2022-08-31 RX ADMIN — Medication 200 MG: at 13:34

## 2022-08-31 RX ADMIN — ROCURONIUM BROMIDE 50 MG: 10 INJECTION, SOLUTION INTRAVENOUS at 13:43

## 2022-08-31 RX ADMIN — Medication 300 MCG: at 13:49

## 2022-08-31 RX ADMIN — SODIUM CHLORIDE, PRESERVATIVE FREE 10 ML: 5 INJECTION INTRAVENOUS at 20:55

## 2022-08-31 RX ADMIN — GABAPENTIN 400 MG: 400 CAPSULE ORAL at 20:55

## 2022-08-31 RX ADMIN — GLYCOPYRROLATE 0.1 MG: 0.2 INJECTION, SOLUTION INTRAMUSCULAR; INTRAVENOUS at 14:06

## 2022-08-31 RX ADMIN — SODIUM CHLORIDE, PRESERVATIVE FREE 10 ML: 5 INJECTION INTRAVENOUS at 09:00

## 2022-08-31 RX ADMIN — FENTANYL CITRATE 50 MCG: 50 INJECTION INTRAMUSCULAR; INTRAVENOUS at 14:07

## 2022-08-31 RX ADMIN — CELECOXIB 200 MG: 200 CAPSULE ORAL at 20:55

## 2022-08-31 RX ADMIN — Medication 20 MG: at 14:31

## 2022-08-31 RX ADMIN — Medication 100 MCG: at 14:00

## 2022-08-31 RX ADMIN — MIDAZOLAM 2 MG: 1 INJECTION INTRAMUSCULAR; INTRAVENOUS at 16:59

## 2022-08-31 RX ADMIN — FENTANYL CITRATE 50 MCG: 50 INJECTION INTRAMUSCULAR; INTRAVENOUS at 14:00

## 2022-08-31 RX ADMIN — Medication 100 MCG: at 14:18

## 2022-08-31 RX ADMIN — PANTOPRAZOLE SODIUM 40 MG: 40 TABLET, DELAYED RELEASE ORAL at 06:36

## 2022-08-31 RX ADMIN — DEXAMETHASONE SODIUM PHOSPHATE 8 MG: 4 INJECTION, SOLUTION INTRAMUSCULAR; INTRAVENOUS at 13:46

## 2022-08-31 RX ADMIN — PROPOFOL 200 MG: 10 INJECTION, EMULSION INTRAVENOUS at 13:34

## 2022-08-31 ASSESSMENT — PULMONARY FUNCTION TESTS
PIF_VALUE: 25
PIF_VALUE: 28
PIF_VALUE: 26
PIF_VALUE: 27
PIF_VALUE: 23
PIF_VALUE: 19
PIF_VALUE: 30
PIF_VALUE: 25
PIF_VALUE: 30
PIF_VALUE: 23
PIF_VALUE: 28
PIF_VALUE: 23
PIF_VALUE: 21

## 2022-08-31 ASSESSMENT — PAIN SCALES - GENERAL
PAINLEVEL_OUTOF10: 0

## 2022-08-31 NOTE — FLOWSHEET NOTE
Pt had a run of tacchycardia with heart in the 170's-180's per telemetry monitor. Pt denies Chest pain and only reported some nausea. Order placed for Stat EKG and will notify oncall provider of results.

## 2022-08-31 NOTE — BRIEF OP NOTE
Brief Postoperative Note      Patient: Ge Lugo  YOB: 1960  MRN: [id number]    Date of Procedure: 8/31/2022    Pre-Op Diagnosis: dysphagia, hiatal hernia    Post-Op Diagnosis: Same       Procedure(s):  LAPAROSCOPIC REDUCTION HIATAL HERNIA,  24F HADLEY GASTROSTOMY TUBE PLACEMENT    Surgeon(s):  Renny Watson MD    Assistant:  Surgical Assistant: Nik Poon    Anesthesia: General    Estimated Blood Loss (mL): less than 50     Complications: None    Specimens:   * No specimens in log *    Implants:  * No implants in log *      Drains:   Gastrostomy/Enterostomy/Jejunostomy Tube Gastrostomy LUQ 24 fr (Active)       Rectal Tube (Active)   Site Assessment Clean, dry & intact 08/28/22 1628   Stool Appearance Loose 08/28/22 1838   Stool Color Brown 08/29/22 1615   Stool Amount Medium 08/28/22 1628   Rectal Tube Output (mL) 300 ml 08/29/22 1615       Urinary Catheter Fermin (Active)   Catheter Indications Need for fluid volume management of the critically ill patient in a critical care setting 08/31/22 0900   Site Assessment Pink; No urethral drainage 08/31/22 0900   Urine Color Yellow 08/31/22 0900   Urine Appearance Clear 08/31/22 0900   Urine Odor Other (Comment) 08/31/22 0423   Collection Container Standard 08/31/22 0900   Securement Method Securing device (Describe) 08/31/22 0900   Catheter Care Completed Yes 08/28/22 2123   Catheter Best Practices  Drainage tube clipped to bed;Catheter secured to thigh; Tamper seal intact; Bag below bladder;Bag not on floor; Lack of dependent loop in tubing;Drainage bag less than half full 08/31/22 0900   Status Patent;Draining 08/31/22 0900   Output (mL) 625 mL 08/31/22 0423   Discontinuation Reason Per nurse-driven protocol 94/86/95 0400       [REMOVED] NG/OG/NJ/NE Tube Nasogastric 14 fr Left nostril (Removed)   Surrounding Skin Clean, dry & intact 08/07/22 1938   Securement device Adhesive based perez 08/07/22 1938   Status Suction-low intermittent 08/07/22 1938   Output (mL) 0 ml 08/07/22 0919       [REMOVED] NG/OG/NJ/NE Tube Orogastric Right mouth (Removed)   Surrounding Skin Clean, dry & intact 08/08/22 1200   Securement device Tape 08/08/22 1200   Status Suction-low continuous 08/08/22 1200   Placement Verified X-Ray (Initial); Respiratory Status; External Catheter Length;Gastric Contents 08/08/22 1200   NG/OG/NJ/NE External Measurement (cm) 45 cm 08/08/22 1200   Drainage Appearance Green;Bile 08/08/22 1200       [REMOVED] NG/OG/NJ/NE Tube Center mouth (Removed)   Surrounding Skin Clean, dry & intact 08/20/22 1400   Securement device Tape 08/20/22 1400   Status Continuous feeding 08/20/22 1400   Placement Verified External Catheter Length 08/20/22 1400   NG/OG/NJ/NE External Measurement (cm) 54 cm 08/20/22 1400   Drainage Appearance None 08/20/22 1400   Tube Feeding High Protein 08/20/22 1400   Tube feeding/verify rate (mL/hr) 35 mL/hr 08/20/22 1400   Tube Feeding Supplement (Product) Protein Modular 08/20/22 1400   Tube Feeding Intake (mL) 296 ml 08/20/22 1235   Tube Feeding Supplement Amount (mL) 75 08/20/22 1400   Free Water/Flush (mL) 200 mL 08/20/22 1235   Output (mL) 100 ml 08/11/22 1800   Action Taken Feed set changed 08/20/22 0600   Residual Volume (ml) 5 ml 08/20/22 0800       [REMOVED] NG/OG/NJ/NE Tube Nasogastric Left nostril (Removed)   Surrounding Skin Clean, dry & intact 08/31/22 1024   Securement device Bridle 08/31/22 0900   Status Clamped 08/31/22 1024   Placement Verified Respiratory Status 08/28/22 1838   Drainage Appearance None 08/31/22 0900   Tube Feeding Other Tube Feeding (must specify product in comment) 08/31/22 0900   Tube feeding/verify rate (mL/hr) 45 mL/hr 08/29/22 2015   Tube Feeding Supplement (Product) Protein Modular 08/31/22 0900   Tube Feeding Intake (mL) 180 ml 08/30/22 2337   Tube Feeding Supplement Amount (mL) 1450 08/30/22 0432   Free Water/Flush (mL) 1000 mL 08/30/22 2337   Output (mL) 1100 ml 08/28/22 3077   Action Taken Feed set changed; Tubing changed 08/28/22 1838   Residual Volume (ml) 5 ml 08/29/22 2015       [REMOVED] Urinary Catheter (Removed)   Catheter Indications Need for fluid volume management of the critically ill patient in a critical care setting 08/15/22 0800   Site Assessment No urethral drainage 08/15/22 0800   Urine Color Yellow 08/15/22 0800   Urine Appearance Sediment 08/15/22 0800   Urine Odor Other (Comment) 08/15/22 0400   Collection Container Standard 08/15/22 0800   Securement Method Securing device (Describe) 08/15/22 0800   Catheter Care Completed Yes 08/15/22 0800   Catheter Best Practices  Drainage tube clipped to bed;Catheter secured to thigh; Tamper seal intact; Bag below bladder;Bag not on floor; Lack of dependent loop in tubing;Drainage bag less than half full 08/15/22 0800   Status Draining;Patent 08/15/22 0800   Output (mL) 150 mL 08/15/22 0916       Findings: large omentum and liver, stomach able to be reduced to be brought to abdominal wall    Electronically signed by Ana Andrews MD on 8/31/2022 at 3:09 PM

## 2022-08-31 NOTE — PROGRESS NOTES
Patient to pacu from [address] [place] [address], patient on 15l non-rebreather, patient is tachypenic, and unable to keep sats above 90%, anesthesia aware and preparing to re-intubate patient. Will continue to monitor. 80: Patient's wife at bedside speaking with patient before intubation. 1623:Dr. Rosado at bedside intubating patient with RT and CRNA assisting, patient tolerated well, patient will be transferred to ICU 2108.    1630: Report called to [facility].    8956: Patient transferred to ICU.

## 2022-08-31 NOTE — PROGRESS NOTES
Pulmonary Progress Note    CC:  Follow up respiratory failure, aspiration    Subjective:  4 liters of oxygen   Tachycardic overnight   Given lasix yesterday   Still not swallowing and will need PEG  Denies any SOB      ROS  Still not swallowing well  Breathing         Intake/Output Summary (Last 24 hours) at 8/31/2022 0817  Last data filed at 8/31/2022 0423  Gross per 24 hour   Intake 1180 ml   Output 1300 ml   Net -120 ml           PHYSICAL EXAM:  Blood pressure (!) 161/93, pulse 99, temperature 98.8 °F (37.1 °C), temperature source Oral, resp. rate 26, height 5' 7\" (1.702 m), weight (!) 336 lb 3.1 oz (152.5 kg), SpO2 91 %.'  Gen: Chronically ill   Eyes: PERRL. No sclera icterus. No conjunctival injection. ENT: No discharge. Pharynx with NG  Neck: Trachea midline. No obvious mass. Resp: Diminished with accessory muscle use  CV: Regular rate. Regular rhythm. No murmur or rub. GI: large abdomen, soft   Skin: Pale  Lymph: No cervical LAD. No supraclavicular LAD. M/S: No cyanosis. No clubbing. No joint deformity. Neuro: Moves all four extremities. CN 2-12 tested, no defect noted.   Ext:   no edema    Medications:    Scheduled Meds:   aspirin  81 mg Oral Daily    folic acid  1 mg Oral Daily    pantoprazole  40 mg Oral QAM AC    torsemide  40 mg Oral Daily    aspirin  81 mg Oral Daily    lidocaine 1 % injection  5 mL IntraDERmal Once    sodium chloride flush  5-40 mL IntraVENous 2 times per day    celecoxib  200 mg Per NG tube BID    heparin (porcine)  5,000 Units SubCUTAneous 3 times per day    gabapentin  400 mg Oral TID    metoprolol tartrate  25 mg Oral BID    levothyroxine  200 mcg Oral Daily       Continuous Infusions:   sodium chloride         PRN Meds:  oxyCODONE, labetalol, sodium chloride flush, sodium chloride, hydrALAZINE, ipratropium-albuterol, senna, polyethylene glycol, docusate, artificial tears, acetaminophen, potassium chloride, acetaminophen, ondansetron **OR** ondansetron, diazePAM    Labs:  CBC:   Recent Labs     22  0645 22  0758 22  0653   WBC 10.0 10.9 11.9*   HGB 11.6* 11.3* 11.3*   HCT 35.1* 34.8* 34.5*   MCV 93.7 94.1 93.9    315 281       BMP:   Recent Labs     22  1211 22  2304 22  0645 22  1345 22  0653      < > 138 142 141   K 3.6   < > 3.9 3.8 3.8   *   < > 108 110 108   CO2 22   < > 22 26 24   PHOS 2.5  --  2.4*  --  2.6   BUN 27*   < > 25* 27* 23*   CREATININE 0.6*   < > 0.6* 0.7* 0.6*    < > = values in this interval not displayed. LIVER PROFILE: No results for input(s): AST, ALT, LIPASE, BILIDIR, BILITOT, ALKPHOS in the last 72 hours. Invalid input(s): AMYLASE,  ALB  PT/INR: No results for input(s): PROTIME, INR in the last 72 hours. APTT: No results for input(s): APTT in the last 72 hours. UA:No results for input(s): NITRITE, COLORU, PHUR, LABCAST, WBCUA, RBCUA, MUCUS, TRICHOMONAS, YEAST, BACTERIA, CLARITYU, SPECGRAV, LEUKOCYTESUR, UROBILINOGEN, BILIRUBINUR, BLOODU, GLUCOSEU, AMORPHOUS in the last 72 hours. Invalid input(s): Jerry Nguyen  No results for input(s): PH, PCO2, PO2 in the last 72 hours.         Films:  Chest imaging reports were reviewed and imaging was reviewed by me and showed no new films    AB.43    Cultures:  NGTD    I reviewed the labs and images listed above    Assessment/Plan:   Acute Hypoxic Respiratory Failure with saturations less than 90% on room air  Titrate oxygen for saturations greater than or equal to 90%  Demadex 40 mg given today by Nephrology   Aspiration pneumonia   Completed cefepime and vanco  Solumedrol stopped due to onset of delirium two nights ago  EARNESTINE  Hold off on bilevel due to bowel issues  SBO  Resolved   Going for PEG tube per wife       DVT prophylaxis  Heparin      Berny Conley,   [facility]

## 2022-08-31 NOTE — PLAN OF CARE
Problem: Discharge Planning  Goal: Discharge to home or other facility with appropriate resources  Outcome: Progressing     Problem: Pain  Goal: Verbalizes/displays adequate comfort level or baseline comfort level  Outcome: Progressing     Problem: ABCDS Injury Assessment  Goal: Absence of physical injury  Outcome: Progressing     Problem: Safety - Adult  Goal: Free from fall injury  Outcome: Progressing     Problem: Skin/Tissue Integrity  Goal: Absence of new skin breakdown  Description: 1. Monitor for areas of redness and/or skin breakdown  2. Assess vascular access sites hourly  Outcome: Progressing     Problem: Safety - Medical Restraint  Goal: Remains free of injury from restraints (Restraint for Interference with Medical Device)  Description: INTERVENTIONS:  1. Determine that other, less restrictive measures have been tried or would not be effective before applying the restraint  2. Evaluate the patient's condition at the time of restraint application  3. Inform patient/family regarding the reason for restraint  4.  Q2H: Monitor safety, psychosocial status, comfort, nutrition and hydration  Outcome: Progressing  Flowsheets  Taken 8/31/2022 0600 by Ly Huerta RN  Remains free of injury from restraints (restraint for interference with medical device): Every 2 hours: Monitor safety, psychosocial status, comfort, nutrition and hydration  Taken 8/31/2022 0400 by Ly Huerta RN  Remains free of injury from restraints (restraint for interference with medical device): Every 2 hours: Monitor safety, psychosocial status, comfort, nutrition and hydration  Taken 8/31/2022 0200 by Ly Huerta RN  Remains free of injury from restraints (restraint for interference with medical device): Every 2 hours: Monitor safety, psychosocial status, comfort, nutrition and hydration  Taken 8/31/2022 0000 by Ly Huerta RN  Remains free of injury from restraints (restraint for interference with medical device): Every 2 hours: Monitor safety, psychosocial status, comfort, nutrition and hydration  Taken 8/30/2022 2245 by Angela Benitez RN  Remains free of injury from restraints (restraint for interference with medical device): Every 2 hours: Monitor safety, psychosocial status, comfort, nutrition and hydration  Taken 8/30/2022 2200 by Angela Benitez RN  Remains free of injury from restraints (restraint for interference with medical device): Every 2 hours: Monitor safety, psychosocial status, comfort, nutrition and hydration     Problem: Nutrition Deficit:  Goal: Optimize nutritional status  Outcome: Progressing     Problem: Neurosensory - Adult  Goal: Achieves stable or improved neurological status  Outcome: Progressing  Goal: Absence of seizures  Outcome: Progressing  Goal: Remains free of injury related to seizures activity  Outcome: Progressing  Goal: Achieves maximal functionality and self care  Outcome: Progressing     Problem: Respiratory - Adult  Goal: Achieves optimal ventilation and oxygenation  Outcome: Progressing     Problem: Cardiovascular - Adult  Goal: Maintains optimal cardiac output and hemodynamic stability  Outcome: Progressing  Goal: Absence of cardiac dysrhythmias or at baseline  Outcome: Progressing     Problem: Skin/Tissue Integrity - Adult  Goal: Skin integrity remains intact  Outcome: Progressing  Goal: Incisions, wounds, or drain sites healing without S/S of infection  Outcome: Progressing  Goal: Oral mucous membranes remain intact  Outcome: Progressing     Problem: Musculoskeletal - Adult  Goal: Return mobility to safest level of function  Outcome: Progressing  Goal: Maintain proper alignment of affected body part  Outcome: Progressing  Goal: Return ADL status to a safe level of function  Outcome: Progressing     Problem: Gastrointestinal - Adult  Goal: Minimal or absence of nausea and vomiting  Outcome: Progressing  Goal: Maintains or returns to baseline bowel function  Outcome: Progressing  Goal: Maintains adequate nutritional intake  Outcome: Progressing  Goal: Establish and maintain optimal ostomy function  Outcome: Progressing     Problem: Genitourinary - Adult  Goal: Absence of urinary retention  Outcome: Progressing  Goal: Urinary catheter remains patent  Outcome: Progressing     Problem: Anxiety  Goal: Will report anxiety at manageable levels  Description: INTERVENTIONS:  1. Administer medication as ordered  2. Teach and rehearse alternative coping skills  3. Provide emotional support with 1:1 interaction with staff  Outcome: Progressing     Problem: Coping  Goal: Pt/Family able to verbalize concerns and demonstrate effective coping strategies  Description: INTERVENTIONS:  1. Assist patient/family to identify coping skills, available support systems and cultural and spiritual values  2. Provide emotional support, including active listening and acknowledgement of concerns of patient and caregivers  3. Reduce environmental stimuli, as able  4. Instruct patient/family in relaxation techniques, as appropriate  5. Assess for spiritual pain/suffering and initiate Spiritual Care, Psychosocial Clinical Specialist consults as needed  Outcome: Progressing     Problem: Change in Body Image  Goal: Pt/Family communicate acceptance of loss or change in body image and feel psychological comfort and peace  Description: INTERVENTIONS:  1. Assess patient/family anxiety and grief process related to change in body image, loss of functional status, loss of sense of self, and forgiveness  2. Provide emotional and spiritual support  3. Provide information about the patient's health status with consideration of family and cultural values  4. Communicate willingness to discuss loss and facilitate grief process with patient/family as appropriate  5. Emphasize sustaining relationships within family system and community, or aurora/spiritual traditions  6.  Initiate Spiritual Care, Psychosocial Clinical Specialist consult as needed  Outcome: Progressing     Problem: Decision Making  Goal: Pt/Family able to effectively weigh alternatives and participate in decision making related to treatment and care  Description: INTERVENTIONS:  1. Determine when there are differences between patient's view, family's view, and healthcare provider's view of condition  2. Facilitate patient and family articulation of goals for care  3. Help patient and family identify pros/cons of alternative solutions  4. Provide information as requested by patient/family  5. Respect patient/family right to receive or not to receive information  6. Serve as a liaison between patient and family and health care team  7. Initiate Consults from Ethics, Palliative Care or initiate [facility] as is appropriate  Outcome: Progressing     Problem: Behavior  Goal: Pt/Family maintain appropriate behavior and adhere to behavioral management agreement, if implemented  Description: INTERVENTIONS:  1. Assess patient/family's coping skills and  non-compliant behavior (including use of illegal substances)  2. Notify security of behavior or suspected illegal substances which indicate the need for search of the family and/or belongings  3. Encourage verbalization of thoughts and concerns in a socially appropriate manner  4. Utilize positive, consistent limit setting strategies supporting safety of patient, staff and others  5. Encourage participation in the decision making process about the behavioral management agreement  6. If a visitor's behavior poses a threat to safety call refer to organization policy.   7. Initiate consult with , Psychosocial CNS, Spiritual Care as appropriate  Outcome: Progressing

## 2022-08-31 NOTE — PROGRESS NOTES
Pt to surgery. Consent signed and in chart, Pre-op checklist complete.      Electronically signed by Love Rivers RN on 8/31/2022 at 10:03 AM

## 2022-08-31 NOTE — CARE COORDINATION
Transportation is set for tomorrow to [facility] at 1700. Call to [facility] to notify of pickup time tomorrow & ask if covid needed, vm left. Wife notified.     Giuseppe Guy RN, BSN,   [phone]  Electronically signed by Giuseppe Guy RN on 8/31/2022 at 3:43 PM

## 2022-08-31 NOTE — PROGRESS NOTES
Bp 154/103. Dr Willie Rivera made aware and this RN asked for parameters to be placed on PRN BP orders. No new orders at this time.     Electronically signed by Rafiq Anguiano RN on 8/31/2022 at 11:31 AM

## 2022-08-31 NOTE — FLOWSHEET NOTE
Pt medicated with prn valium for anxiety. Pt manipulated catheter secure  device and secure device was replaced. Pt also to be medicated with prn hydralazine for elevated b/p. Pt denies pain but is restless.

## 2022-08-31 NOTE — PROGRESS NOTES
Occupational Therapy Attempt  Miles Vanegas    Attempted to see pt for OT. Pt off floor at time of attempt for PEG tube placement. Will re-attempt as schedule allows.     Electronically signed by Lorene Sykes OT on 8/31/22 at 11:14 AM EDT

## 2022-08-31 NOTE — ANESTHESIA PRE PROCEDURE
[facility] Department of Anesthesiology  Pre-Anesthesia Evaluation/Consultation       Name:  Ghislaine Littlejohn  : 1960  Age:  63 y.o.                                            MRN:  [id number]  Date: 2022           Surgeon: Surgeon(s):  Cori Al MD    Procedure: Procedure(s):  LAPAROSCOPIC REDUCTION HIATEL HERNIA, POSSIBLE OPEN  GASTROSTOMY TUBE PLACEMENT     Allergies   Allergen Reactions    Codeine Nausea Only and Nausea And Vomiting     Stomach upset        Patient Active Problem List   Diagnosis    Partial bowel obstruction (HCC)    Hiatal hernia    Acute respiratory failure with hypoxia and hypercapnia (HCC)    SBO (small bowel obstruction) (HCC)    Aspiration into airway    Acute respiratory failure with hypoxia (HCC)    Small bowel obstruction (HCC)    Diastolic heart failure (HCC)    History of atrial fibrillation    Supraventricular tachycardia (HCC)    Arterial hypotension    Aspiration pneumonia (HCC)    Morbid obesity with BMI of 50.0-59.9, adult (HCC)    Acute delirium    EARNESTINE (obstructive sleep apnea)     Past Medical History:   Diagnosis Date    Arthritis     Atrial fibrillation ([facility] [address]     Hypertension      Past Surgical History:   Procedure Laterality Date    BRONCHOSCOPY  2022    BRONCHOSCOPY ALVEOLAR LAVAGE performed by Sudeep Perez DO at [address] [place] [address]  2022    BRONCHOSCOPY performed by Smith Doshi MD at [address] [place] [address]  2022    EGD BIOPSY performed by Steve Farley MD at [address] [place] [address]  2022    EGD ESOPHAGOGASTRODUODENOSCOPY TUBE INSERTION performed by Steve Farley MD at [address] [place] [address] N/A 2022    EGD ESOPHAGOGASTRODUODENOSCOPY ORAL-GASTRIC TUBE INSERTION performed by Sudeep Perez DO at [facility] History     Tobacco Use    Smoking status: Never  Smokeless tobacco: Never   Substance Use Topics    Alcohol use: Not Currently    Drug use: Never     Medications  No current facility-administered medications on file prior to encounter. Current Outpatient Medications on File Prior to Encounter   Medication Sig Dispense Refill    oxyCODONE (ROXICODONE) 5 MG immediate release tablet Take 10 mg by mouth every 6 hours as needed.  pantoprazole (PROTONIX) 40 MG tablet Take 40 mg by mouth in the morning.  folic acid (FOLVITE) 1 MG tablet Take 1 mg by mouth in the morning.  acetaminophen (TYLENOL) 500 MG tablet Take 1,000 mg by mouth every 8 hours as needed      diazePAM (VALIUM) 5 MG tablet Take 5 mg by mouth 4 times daily as needed.  levothyroxine (SYNTHROID) 200 MCG tablet Take 200 mcg by mouth every morning      lisinopril (PRINIVIL;ZESTRIL) 10 MG tablet Take 10 mg by mouth in the morning.  metoprolol tartrate (LOPRESSOR) 25 MG tablet Take 25 mg by mouth in the morning and 25 mg before bedtime.  spironolactone (ALDACTONE) 50 MG tablet Take 50 mg by mouth in the morning.  methotrexate (RHEUMATREX) 2.5 MG chemo tablet Take 15 mg by mouth once a week      celecoxib (CELEBREX) 200 MG capsule Take 200 mg by mouth in the morning and 200 mg before bedtime.  ASPIRIN LOW DOSE 81 MG EC tablet Take 81 mg by mouth daily      gabapentin (NEURONTIN) 800 MG tablet Take 800 mg by mouth in the morning and 800 mg at noon and 800 mg before bedtime.  loratadine (CLARITIN) 10 MG tablet Take 10 mg by mouth in the morning.        Current Facility-Administered Medications   Medication Dose Route Frequency Provider Last Rate Last Admin    aspirin EC tablet 81 mg  81 mg Oral Daily Damien Wood MD        folic acid (FOLVITE) tablet 1 mg  1 mg Oral Daily Harris Scott MD   1 mg at 08/30/22 1430    oxyCODONE HCl (OXY-IR) immediate release tablet 10 mg  10 mg Oral Q6H PRN Damien Wood MD        pantoprazole (PROTONIX) tablet 40 mg  40 mg Oral QAM AC Kat Benitez MD   40 mg at 08/31/22 0636    torsemide (DEMADEX) tablet 40 mg  40 mg Oral Daily Ton Camp MD        aspirin chewable tablet 81 mg  81 mg Oral Daily Kat Benitez MD        labetalol (NORMODYNE;TRANDATE) injection 10 mg  10 mg IntraVENous Q4H PRN Kat Benitez MD   10 mg at 08/30/22 2107    lidocaine PF 1 % injection 5 mL  5 mL IntraDERmal Once Kat Benitez MD        sodium chloride flush 0.9 % injection 5-40 mL  5-40 mL IntraVENous 2 times per day Kat Benitez MD   10 mL at 08/30/22 2053    sodium chloride flush 0.9 % injection 5-40 mL  5-40 mL IntraVENous PRN Kat Benitez MD        0.9 % sodium chloride infusion  25 mL IntraVENous PRN Kat Benitez MD        hydrALAZINE (APRESOLINE) injection 10 mg  10 mg IntraVENous Q6H PRN Kat Benitez MD   10 mg at 08/31/22 0106    ipratropium-albuterol (DUONEB) nebulizer solution 1 ampule  1 ampule Inhalation Q4H PRN Nerissa Hauser MD        celecoxib (CELEBREX) capsule 200 mg  200 mg Per NG tube BID Amita Zepeda MD   200 mg at 08/30/22 2052    senna (SENOKOT) 176 MG/5ML syrup 10 mL  10 mL Oral Nightly PRN JARON Wright CNP        polyethylene glycol (GLYCOLAX) packet 17 g  17 g Oral Daily PRN JARON Wright CNP        docusate (COLACE) 50 MG/5ML liquid 100 mg  100 mg Oral BID PRN JARON Wright CNP        heparin (porcine) injection 5,000 Units  5,000 Units SubCUTAneous 3 times per day JARON Wright CNP   5,000 Units at 08/30/22 2106    gabapentin (NEURONTIN) capsule 400 mg  400 mg Oral TID JARON Wright CNP   400 mg at 08/30/22 2052    metoprolol tartrate (LOPRESSOR) tablet 25 mg  25 mg Oral BID JARON Ryan CNP   25 mg at 08/30/22 2052    lubrifresh P.M. (artificial tears) ophthalmic ointment   Both Eyes PRN JARON Wright - CNP   Given at 08/23/22 2116    acetaminophen (TYLENOL) tablet 650 mg  650 mg Oral Q4H PRN Amita Zepeda MD   650 mg at 22 1723    potassium chloride 20 mEq/50 mL IVPB (Central Line)  20 mEq IntraVENous PRN Aaron Medrano MD   Stopped at 22 1936    acetaminophen (TYLENOL) suppository 650 mg  650 mg Rectal Q6H PRN Aaron Medrano MD        ondansetron (ZOFRAN-ODT) disintegrating tablet 4 mg  4 mg Oral Q8H PRN Leverne Grapes, APRN - CNP        Or    ondansetron (ZOFRAN) injection 4 mg  4 mg IntraVENous Q6H PRN Leverne Grapes, APRN - CNP   4 mg at 22 0215    diazePAM (VALIUM) tablet 5 mg  5 mg Oral 4x Daily PRN Leverne Grapes, APRN - CNP   5 mg at 22 0106    levothyroxine (SYNTHROID) tablet 200 mcg  200 mcg Oral Daily Leverne Grapes, APRN - CNP   200 mcg at 22 0636     Vital Signs (Current)   Vitals:    22 0327 22 0330 22 0518 22 0825   BP:  (!) 161/93  (!) 154/103   Pulse:  99  95   Resp:     Temp:  98.8 °F (37.1 °C)  97.8 °F (36.6 °C)   TempSrc:  Oral  Oral   SpO2: 91% 91%  95%   Weight:   (!) 336 lb 3.1 oz (152.5 kg)    Height:                                                Vital Signs Statistics (for past 48 hrs)     Temp  Av.1 °F (36.7 °C)  Min: 97.6 °F (36.4 °C)   Min taken time: 22 1617  Max: 98.8 °F (37.1 °C)   Max taken time: 22 033  Pulse  Av.6  Min: 68   Min taken time: 22 1205  Max: 99   Max taken time: 22 0330  Resp  Av.2  Min: 25   Min taken time: 22 0825  Max: 28   Max taken time: 22 1613  BP  Min: 139/101   Min taken time: 22 0221  Max: 183/104   Max taken time: 22 2337  MAP (mmHg)  Av.8  Min: 108   Min taken time: 22 0410  Max: 130   Max taken time: 22 2337  SpO2  Av.3 %  Min: 90 %   Min taken time: 22 1122  Max: 95 %   Max taken time: 22 0825  BP Readings from Last 3 Encounters:   22 (!) 154/103       BMI  Body mass index is 52.66 kg/m².   Estimated body mass index is 52.66 kg/m² as calculated from the following:    Height as of this encounter: 5' 7\" (1.702 m). Weight as of this encounter: 336 lb 3.1 oz (152.5 kg).     CBC   Lab Results   Component Value Date/Time    WBC 11.9 08/31/2022 06:53 AM    RBC 3.67 08/31/2022 06:53 AM    HGB 11.3 08/31/2022 06:53 AM    HCT 34.5 08/31/2022 06:53 AM    MCV 93.9 08/31/2022 06:53 AM    RDW 17.9 08/31/2022 06:53 AM     08/31/2022 06:53 AM     CMP    Lab Results   Component Value Date/Time     08/31/2022 06:53 AM    K 3.8 08/31/2022 06:53 AM    K 3.2 08/06/2022 04:45 AM     08/31/2022 06:53 AM    CO2 24 08/31/2022 06:53 AM    BUN 23 08/31/2022 06:53 AM    CREATININE 0.6 08/31/2022 06:53 AM    GFRAA >60 08/31/2022 06:53 AM    AGRATIO 0.8 08/06/2022 04:45 AM    LABGLOM >60 08/31/2022 06:53 AM    GLUCOSE 113 08/31/2022 06:53 AM    PROT 6.3 08/06/2022 04:45 AM    CALCIUM 8.1 08/31/2022 06:53 AM    BILITOT 0.7 08/06/2022 04:45 AM    ALKPHOS 72 08/06/2022 04:45 AM    AST 22 08/06/2022 04:45 AM    ALT 23 08/06/2022 04:45 AM     BMP    Lab Results   Component Value Date/Time     08/31/2022 06:53 AM    K 3.8 08/31/2022 06:53 AM    K 3.2 08/06/2022 04:45 AM     08/31/2022 06:53 AM    CO2 24 08/31/2022 06:53 AM    BUN 23 08/31/2022 06:53 AM    CREATININE 0.6 08/31/2022 06:53 AM    CALCIUM 8.1 08/31/2022 06:53 AM    GFRAA >60 08/31/2022 06:53 AM    LABGLOM >60 08/31/2022 06:53 AM    GLUCOSE 113 08/31/2022 06:53 AM     POCGlucose  Recent Labs     08/29/22  0645 08/29/22  1345 08/31/22  0653   GLUCOSE 149* 104* 113*      Coags    Lab Results   Component Value Date/Time    PROTIME 16.4 08/06/2022 04:45 AM    INR 1.33 08/06/2022 04:45 AM    APTT 83.3 08/24/2022 03:17 AM     HCG (If Applicable) No results found for: PREGTESTUR, PREGSERUM, HCG, HCGQUANT   ABGs   Lab Results   Component Value Date/Time    PHART 7.430 08/28/2022 03:03 AM    PO2ART 60.7 08/28/2022 03:03 AM    VOW7WUM 36.4 08/28/2022 03:03 AM    YQY1MTH 24.2 08/28/2022 03:03 AM    BEART 0.2 08/28/2022 03:03 AM    X4ZMUUGK 91.5 08/28/2022 03:03 AM      Type & Screen (If Applicable)  No results found for: LABABO, LABRH                         BMI: Wt Readings from Last 3 Encounters:       NPO Status:   Date of last liquid consumption: 08/30/22   Time of last liquid consumption: 0000   Date of last solid food consumption: 08/30/22      Time of last solid consumption: 0000       Anesthesia Evaluation  Patient summary reviewed no history of anesthetic complications:   Airway: Mallampati: II  TM distance: >3 FB   Neck ROM: full  Mouth opening: > = 3 FB   Dental: normal exam         Pulmonary: breath sounds clear to auscultation  (+) sleep apnea:      (-) COPD and asthma                           Cardiovascular:    (+) hypertension:, dysrhythmias: atrial fibrillation and SVT,     (-)  angina and no hyperlipidemia        Rate: normal                    Neuro/Psych:      (-) seizures, TIA, CVA and depression/anxiety            GI/Hepatic/Renal:   (+) hiatal hernia, morbid obesity     (-) hepatitis, liver disease and no renal disease       Endo/Other:        (-) diabetes mellitus, hypothyroidism               Abdominal:             Vascular:     - DVT and PE. Other Findings:           Anesthesia Plan      general     ASA 3       Induction: intravenous. MIPS: Postoperative opioids intended and Prophylactic antiemetics administered. Anesthetic plan and risks discussed with patient. Plan discussed with CRNA. This pre-anesthesia assessment may be used as a history and physical.    DOS STAFF ADDENDUM:    Pt seen and examined, chart reviewed (including anesthesia, drug and allergy history). No interval changes to history and physical examination. Anesthetic plan, risks, benefits, alternatives, and personnel involved discussed with patient. Patient verbalized an understanding and agrees to proceed.       Shawn Dhillon MD  August 31, 2022  11:36 AM

## 2022-08-31 NOTE — ANESTHESIA POSTPROCEDURE EVALUATION
Department of Anesthesiology  Postprocedure Note    Patient: Maria Esther Carmichael  MRN: [id number]  YOB: 1960  Date of evaluation: 8/31/2022      Procedure Summary     Date: 08/31/22 Room / Location: [address]    Anesthesia Start: 1320 Anesthesia Stop: 8993    Procedures:       [address], (Abdomen)      GASTROSTOMY TUBE PLACEMENT (Abdomen) Diagnosis:       Dysphagia, unspecified type      (dysphagia)    Surgeons: Garret Wilkinson MD Responsible Provider: Real Rodriguez MD    Anesthesia Type: General ASA Status: 3          Anesthesia Type: General    Olamide Phase I: Olamide Score: 8    Olamide Phase II:        Anesthesia Post Evaluation    Patient location during evaluation: PACU  Patient participation: complete - patient cannot participate  Level of consciousness: sedated and ventilated  Pain score: 2  Airway patency: patent  Nausea & Vomiting: no nausea and no vomiting  Complications: no  Cardiovascular status: blood pressure returned to baseline  Respiratory status: intubated and ventilator  Hydration status: euvolemic  Comments: Patient tachypneic in PACU and reporting he is too tired to continue. Impending respiratory collapse necessitated placing endotracheal tube and putting patient back on ventilator so he can rest until his status improves.

## 2022-08-31 NOTE — PROGRESS NOTES
Speech Language Pathology      Speech Therapy note regarding SLP Dysphagia therapy    Attempt 8/31/2022 at 1224 pm  Pt off the floor for medical procedure    Plan: re-attempt 8/31/2022 pm or 9/1/2022    Signed  Suly Lechuga MS,CCC,SLP [id number]  Speech and Language Pathologist  8/31/2022 at 1225 pm

## 2022-08-31 NOTE — CONSULTS
General Surgery Consult Note      Son Galeas   : 1960 MRN: [id number]  Date of Admission: 2022  Admitting [de-identified] A DO Rufino  Primary Care Physician: Nayeli Camacho MD    Chief Complaint   Patient presents with    Abdominal Pain    Chest Pain     Sub-sternal, pressure, onset 1300 hours today    Shortness of Breath     EMS reports pt 88% on RA       Reason for Consult: dysphagia, hiatal hernia    Diagnosis Present on Admission:   Partial bowel obstruction ([address]   Hiatal hernia   Acute respiratory failure with hypoxia and hypercapnia (HCC)   SBO (small bowel obstruction) (HCC)   Aspiration into airway   Acute respiratory failure with hypoxia (HCC)   Small bowel obstruction (HCC)   Diastolic heart failure (HCC)   History of atrial fibrillation   Supraventricular tachycardia (HCC)   Arterial hypotension   Aspiration pneumonia ([address]   Morbid obesity with BMI of 50.0-59.9, adult ([address]   Acute delirium   EARNESTINE (obstructive sleep apnea)      History of Present Illness  Son Galeas is a 63 y.o. male admitted on 2022 for partial small bowel obstruction. He had suspected aspiration and was intubated. He was in the ICU for an extended period of time on the ventilator but was able to be extubated. His bowel obstruction had resolved and he was getting tube feeds through his NG tube. Unfortunately, he has failed his swallow eval, with his dysphagia most likely secondary to his prolonged ventilation. No prior abdominal surgeries.     Past Medical History:   Diagnosis Date    Arthritis     Atrial fibrillation [facility])     Hypertension      Past Surgical History:   Procedure Laterality Date    BRONCHOSCOPY  2022    BRONCHOSCOPY ALVEOLAR LAVAGE performed by Jessy Johnson DO at [facility]  2022    BRONCHOSCOPY performed by Helene Caruso MD at [address] [place] [address]  2022    EGD BIOPSY performed by Citlali Ramos MD at [address] [place] [address]  8/9/2022    EGD ESOPHAGOGASTRODUODENOSCOPY TUBE INSERTION performed by Day Cheema MD at [address] [place] [address] N/A 8/22/2022    EGD ESOPHAGOGASTRODUODENOSCOPY ORAL-GASTRIC TUBE INSERTION performed by Ginny Chavez DO at [facility] history reviewed and otherwise negative. History reviewed. No pertinent family history. Social History     Socioeconomic History    Marital status: Unknown     Spouse name: Not on file    Number of children: Not on file    Years of education: Not on file    Highest education level: Not on file   Occupational History    Not on file   Tobacco Use    Smoking status: Never    Smokeless tobacco: Never   Substance and Sexual Activity    Alcohol use: Not Currently    Drug use: Never    Sexual activity: Not Currently   Other Topics Concern    Not on file   Social History Narrative    Not on file     Social Determinants of Health     Financial Resource Strain: Not on file   Food Insecurity: Not on file   Transportation Needs: Not on file   Physical Activity: Not on file   Stress: Not on file   Social Connections: Not on file   Intimate Partner Violence: Not on file   Housing Stability: Not on file     Allergies   Allergen Reactions    Codeine Nausea Only and Nausea And Vomiting     Stomach upset        Prior to Admission medications    Medication Sig Start Date End Date Taking? Authorizing Provider   oxyCODONE (ROXICODONE) 5 MG immediate release tablet Take 10 mg by mouth every 6 hours as needed. 8/3/22  Yes Historical Provider, MD   pantoprazole (PROTONIX) 40 MG tablet Take 40 mg by mouth in the morning.  6/13/22  Yes Historical Provider, MD   folic acid (FOLVITE) 1 MG tablet Take 1 mg by mouth in the morning. 3/21/22  Yes Historical Provider, MD   acetaminophen (TYLENOL) 500 MG tablet Take 1,000 mg by mouth every 8 hours as needed 8/3/22  Yes Historical Provider, MD   diazePAM (VALIUM) 5 MG tablet Take 5 mg by mouth 4 times daily as needed. 8/3/22  Yes Historical Provider, MD   levothyroxine (SYNTHROID) 200 MCG tablet Take 200 mcg by mouth every morning 6/1/22  Yes Historical Provider, MD   lisinopril (PRINIVIL;ZESTRIL) 10 MG tablet Take 10 mg by mouth in the morning. 6/13/22  Yes Historical Provider, MD   metoprolol tartrate (LOPRESSOR) 25 MG tablet Take 25 mg by mouth in the morning and 25 mg before bedtime. 7/1/22  Yes Historical Provider, MD   spironolactone (ALDACTONE) 50 MG tablet Take 50 mg by mouth in the morning. 5/6/22  Yes Historical Provider, MD   methotrexate (RHEUMATREX) 2.5 MG chemo tablet Take 15 mg by mouth once a week    Historical Provider, MD   celecoxib (CELEBREX) 200 MG capsule Take 200 mg by mouth in the morning and 200 mg before bedtime. Historical Provider, MD   ASPIRIN LOW DOSE 81 MG EC tablet Take 81 mg by mouth daily 6/13/22   Historical Provider, MD   gabapentin (NEURONTIN) 800 MG tablet Take 800 mg by mouth in the morning and 800 mg at noon and 800 mg before bedtime. Historical Provider, MD   loratadine (CLARITIN) 10 MG tablet Take 10 mg by mouth in the morning. Historical Provider, MD       Review of Systems  Pertinent positives and negatives are in HPI, otherwise all systems reviewed and negative    Physical Exam  Vitals:    08/31/22 0825   BP: (!) 154/103   Pulse: 95   Resp: 18   Temp: 97.8 °F (36.6 °C)   SpO2: 95%         Intake/Output Summary (Last 24 hours) at 8/31/2022 1200  Last data filed at 8/31/2022 0423  Gross per 24 hour   Intake 1180 ml   Output 1300 ml   Net -120 ml       Body mass index is 52.66 kg/m². General/Appearance: NAD, appear stated age, morbidly obese  HEENT: PERRLA, Conjunctiva non injected, no scleral icterus. Mucous membranes pink and moist.  Neck is symmetrical. Trachea appears midline.   Lung: normal respiratory effort, no accessory muscle use  Cardiac: Regular rate and rhythm  Abdomen: obese, soft, NT, ND, small umbilical hernia without overlying skin changes  Neuro: No gross motor or sensory deficits. Skin: No open wounds or rashes. Labs  Recent Labs     08/29/22  0645 08/30/22  0758 08/31/22  0653   WBC 10.0 10.9 11.9*   HGB 11.6* 11.3* 11.3*   HCT 35.1* 34.8* 34.5*    315 281     Recent Labs     08/28/22  1211 08/28/22  2304 08/29/22  0645 08/29/22  1345 08/31/22  0653      < > 138 142 141   K 3.6   < > 3.9 3.8 3.8   *   < > 108 110 108   CO2 22   < > 22 26 24   BUN 27*   < > 25* 27* 23*   GFRAA >60   < > >60 >60 >60   MG 2.20  --  2.20  --   --    PHOS 2.5  --  2.4*  --  2.6    < > = values in this interval not displayed. No results for input(s): TP, ALB, GLOB, AST, ALT in the last 72 hours. Invalid input(s): DBIL, TBIL, AGRAT, GPT, KIMBERLY, LIP  No results for input(s): UOSM in the last 72 hours. Invalid input(s): UAPR, [facility], [facility], [place], [facility], [facility], [facility][1]    Imaging  Fluoroscopy modified barium swallow with video   Final Result   Positive for aspiration. Please see separate speech pathology report for full discussion of findings   and recommendations. XR CHEST PORTABLE   Final Result   Right PICC tip in the distal right internal jugular vein. Pulmonary edema. Findings were discussed with Jose Alberto Reid at 11:21 am on 8/28/2022. CT HEAD WO CONTRAST   Final Result   No acute intracranial abnormality. XR CHEST PORTABLE   Final Result   Interval extubation. Interval vascular congestion. Ground-glass opacity in the lungs is likely pulmonary edema given the fairly   rapid development. XR CHEST PORTABLE   Final Result   Overall similar appearing chest with cardiomegaly and central vascular   congestion. XR CHEST PORTABLE   Final Result   Cardiomegaly with central vascular congestion though improved infiltrates in   the lungs bilaterally. CT CHEST PULMONARY EMBOLISM W CONTRAST   Final Result   1.  No CT evidence of a pulmonary embolism. 2. Findings most consistent with mild CHF, including cardiomegaly, small   bilateral pleural effusions, and mild pulmonary edema. There is new partial   loculation of the right pleural effusion along the paramediastinal aspect of   the right upper lobe. 3. Persistent partial consolidation of the bilateral lower lobes, most likely   passive atelectasis, less likely aspiration or pneumonia. 4. Persistent reticulonodular opacities within the right middle lobe and   lingula, likely reflecting either additional atelectasis or pneumonia. 5. Multiple pulmonary nodules within both lungs, the largest 10 mm within the   left upper lobe. While this likely reflects benign granulomatous disease,   further follow-up of these nodules is as advised below. 6. Mild left subpectoral lymphadenopathy, of questionable clinical   significance. 7. Interval decrease in size of the patient's small pericardial effusion. RECOMMENDATIONS:   Fleischner Society guidelines for follow-up and management of incidentally   detected pulmonary nodules:      Multiple Solid Nodules:      Nodule size greater than 8 mm   In a low-risk patient, CT at 3-6 months, then consider CT at 18-24 months. In a high-risk patient, CT at 3-6 months, then CT at 18-24 months. Radiology 2017 http://pubs. rsna.org/doi/full/10.1148/radiol. 9116570254         XR CHEST PORTABLE   Final Result   Supportive tubing is in normal position. Bilateral pleural effusions. Disproportionate left basilar atelectasis,   increased since yesterday. Superimposed pneumonia or aspiration pneumonitis   are considerations. XR CHEST PORTABLE   Final Result   1. Small bilateral pleural effusions with adjacent basilar opacities which   could represent atelectasis or pneumonia. 2.  Right-sided PICC line with the tip overlying the right subclavian vein. The tip appears kinked as well.          CT CHEST WO CONTRAST   Final Result   Small bilateral pleural effusions with adjacent consolidation lungs, either   due to atelectasis or pneumonia. Septal thickening is seen, suggesting a   component of fluid overload. .  Pleural effusions and adjacent consolidation,   is increased compared to prior         XR CHEST PORTABLE   Final Result   Relatively stable pattern of mild edema with no detrimental change. XR CHEST PORTABLE   Final Result   1. Enteric tube tip and side-port in the stomach. 2.  Stable appearance of perihilar opacities. Small left pleural effusion         XR CHEST PORTABLE   Final Result   Relatively stable perihilar and central airspace opacities in the lungs. XR CHEST PORTABLE   Final Result   Probable right pleural effusion      Otherwise, stable chest         XR CHEST PORTABLE   Final Result   1. Endotracheal tube tip is 2 cm above the mady. Recommend retraction by   1.5 cm for optimal positioning. 2.  Stable cardiomegaly and left basilar airspace opacities which could   represent atelectasis or pneumonia. CT ABDOMEN PELVIS WO CONTRAST Additional Contrast? Oral   Final Result   1. No evidence of small-bowel obstruction. 2. Large hiatal hernia. Enteric tube is appropriately positioned beyond the   herniated portion of the stomach in the abdomen. 3. Increasing pleural effusions in the lower chest with diffuse airspace   changes in the visualized lungs. XR CHEST PORTABLE   Final Result   Interval intubation. Mild perihilar and bibasilar airspace opacities are   stable. XR ABDOMEN (KUB) (SINGLE AP VIEW)   Final Result   Persistent moderate proximal small bowel distension suggesting an ongoing   small bowel obstruction. Gaseous distension of the colon. XR CHEST PORTABLE   Final Result   No substantial interval change in bibasilar airspace disease as compared to   prior. Cardiomegaly.          XR CHEST PORTABLE   Final Result   Nonvisualization of the tip of the nasogastric tube. A KUB may be helpful   for further evaluation. Cardiomegaly with pulmonary vascular congestion      Bibasilar atelectasis or infiltrate. CTA CHEST ABDOMEN PELVIS W CONTRAST   Final Result   1. No acute aortic pathology. No significant atherosclerotic disease,   aneurysm or dissection. No brachiocephalic or visceral stenosis. 2. Small bilateral pleural effusions with bilateral lower lobe atelectasis. No acute pulmonary infiltrate. 3. Gastric, duodenal and small bowel distension with gradual transition in   the mid small bowel most consistent with a low-grade or partial obstruction. Large hiatal hernia containing much of the stomach which is distended. 4. Colonic wall thickening in the mid descending colon, likely accentuated by   incomplete distension. The possibility of an underlying colitis is raised. Diverticulosis with no acute features. CT chest from 8/24 and CT abd/pelvis from 8/11 personally reviewed, showing large hiatal hernia without ischemic changes    Assessment  Miles Sargent is a 63 y.o. male with dysphagia, large hiatal hernia    Plan    1. Dysphagia, hiatal hernia  Unable to have PEG placed due to hiatal hernia  Will proceed to OR for laparoscopic reduction of hiatal hernia with placement of gastrostomy tube, possible open. The risks, benefits, and alternatives were discussed with the patient and his wife and they are willing to consent for the operation. NPO, TF on hold  Ancef on call to OR  2. Aspiration pneumonia following prolonged intubation  Hypoxic, on 4 L of oxygen  Diuresis per pulmonology  3.  SBO  Resolved, has been tolerating tube feeds      Lucy Carty MD

## 2022-08-31 NOTE — PLAN OF CARE
Problem: Discharge Planning  Goal: Discharge to home or other facility with appropriate resources  8/30/2022 2010 by Angela Benitez RN  Outcome: Progressing  8/30/2022 1107 by Prosper Brown RN  Outcome: Progressing     Problem: Pain  Goal: Verbalizes/displays adequate comfort level or baseline comfort level  8/30/2022 2010 by Angela Benitez RN  Outcome: Progressing  8/30/2022 1107 by Prosper Brown RN  Outcome: Progressing     Problem: ABCDS Injury Assessment  Goal: Absence of physical injury  8/30/2022 2010 by Angela Benitez RN  Outcome: Progressing  Flowsheets (Taken 8/30/2022 2003)  Absence of Physical Injury: Implement safety measures based on patient assessment  8/30/2022 1107 by Prosper Brown RN  Outcome: Progressing     Problem: Safety - Adult  Goal: Free from fall injury  8/30/2022 2010 by Angela Benitez RN  Outcome: Progressing  Flowsheets (Taken 8/30/2022 2003)  Free From Fall Injury: Instruct family/caregiver on patient safety  8/30/2022 1107 by Prosper Brown RN  Outcome: Progressing     Problem: Skin/Tissue Integrity  Goal: Absence of new skin breakdown  Description: 1. Monitor for areas of redness and/or skin breakdown  2. Assess vascular access sites hourly  8/30/2022 2010 by Angela Benitez RN  Outcome: Progressing  8/30/2022 1107 by Prosper Brown RN  Outcome: Progressing     Problem: Safety - Medical Restraint  Goal: Remains free of injury from restraints (Restraint for Interference with Medical Device)  Description: INTERVENTIONS:  1. Determine that other, less restrictive measures have been tried or would not be effective before applying the restraint  2. Evaluate the patient's condition at the time of restraint application  3. Inform patient/family regarding the reason for restraint  4.  Q2H: Monitor safety, psychosocial status, comfort, nutrition and hydration  8/30/2022 2010 by Angela Benitez RN  Outcome: Progressing  Flowsheets (Taken 8/30/2022 Progressing  8/30/2022 1107 by Rebekah Winter RN  Outcome: Progressing     Problem: Skin/Tissue Integrity - Adult  Goal: Skin integrity remains intact  8/30/2022 2010 by Karin Aleman RN  Outcome: Progressing  Flowsheets (Taken 8/30/2022 2003)  Skin Integrity Remains Intact: Monitor for areas of redness and/or skin breakdown  8/30/2022 1107 by Rebekah Winter RN  Outcome: Progressing  Goal: Incisions, wounds, or drain sites healing without S/S of infection  8/30/2022 2010 by Karin Aleman RN  Outcome: Progressing  8/30/2022 1107 by Rebekah Winter RN  Outcome: Progressing  Goal: Oral mucous membranes remain intact  8/30/2022 2010 by Karin Aleman RN  Outcome: Progressing  8/30/2022 1107 by Rebekah Winter RN  Outcome: Progressing     Problem: Musculoskeletal - Adult  Goal: Return mobility to safest level of function  8/30/2022 2010 by Karin Aleman RN  Outcome: Progressing  8/30/2022 1107 by Rebekah Winter RN  Outcome: Progressing  Goal: Maintain proper alignment of affected body part  8/30/2022 2010 by Karin Aleman RN  Outcome: Progressing  8/30/2022 1107 by Rebekah Winter RN  Outcome: Progressing  Goal: Return ADL status to a safe level of function  8/30/2022 2010 by Karin Aleman RN  Outcome: Progressing  8/30/2022 1107 by Rebekah Winter RN  Outcome: Progressing     Problem: Gastrointestinal - Adult  Goal: Minimal or absence of nausea and vomiting  8/30/2022 2010 by Karin Aleman RN  Outcome: Progressing  8/30/2022 1107 by Rebekah Winter RN  Outcome: Progressing  Goal: Maintains or returns to baseline bowel function  8/30/2022 2010 by Karin Aleman RN  Outcome: Progressing  8/30/2022 1107 by Rebekah Winter RN  Outcome: Progressing  Goal: Maintains adequate nutritional intake  8/30/2022 2010 by Karin Aleman RN  Outcome: Progressing  8/30/2022 1107 by Rebekah Winter RN  Outcome: Progressing  Goal: Establish and maintain optimal ostomy function  8/30/2022 2010 by Twin Schmitt RN  Outcome: Progressing  8/30/2022 1107 by Love Rivers RN  Outcome: Progressing     Problem: Genitourinary - Adult  Goal: Absence of urinary retention  8/30/2022 2010 by Twin Schmitt RN  Outcome: Progressing  8/30/2022 1107 by Love Rivers RN  Outcome: Progressing  Goal: Urinary catheter remains patent  8/30/2022 2010 by Twin Schmitt RN  Outcome: Progressing  8/30/2022 1107 by Love Rivers RN  Outcome: Progressing     Problem: Anxiety  Goal: Will report anxiety at manageable levels  Description: INTERVENTIONS:  1. Administer medication as ordered  2. Teach and rehearse alternative coping skills  3. Provide emotional support with 1:1 interaction with staff  8/30/2022 2010 by Twin Schmitt RN  Outcome: Progressing  8/30/2022 1107 by Love Rivers RN  Outcome: Progressing     Problem: Coping  Goal: Pt/Family able to verbalize concerns and demonstrate effective coping strategies  Description: INTERVENTIONS:  1. Assist patient/family to identify coping skills, available support systems and cultural and spiritual values  2. Provide emotional support, including active listening and acknowledgement of concerns of patient and caregivers  3. Reduce environmental stimuli, as able  4. Instruct patient/family in relaxation techniques, as appropriate  5. Assess for spiritual pain/suffering and initiate Spiritual Care, Psychosocial Clinical Specialist consults as needed  8/30/2022 2010 by Twin Schmitt RN  Outcome: Progressing  8/30/2022 1107 by Love Rivers RN  Outcome: Progressing     Problem: Change in Body Image  Goal: Pt/Family communicate acceptance of loss or change in body image and feel psychological comfort and peace  Description: INTERVENTIONS:  1. Assess patient/family anxiety and grief process related to change in body image, loss of functional status, loss of sense of self, and forgiveness  2.  Provide emotional and spiritual support  3. Provide information about the patient's health status with consideration of family and cultural values  4. Communicate willingness to discuss loss and facilitate grief process with patient/family as appropriate  5. Emphasize sustaining relationships within family system and community, or aurora/spiritual traditions  6. Initiate Spiritual Care, Psychosocial Clinical Specialist consult as needed  8/30/2022 2010 by Edwige Pederson RN  Outcome: Progressing  8/30/2022 1107 by Apoorva Casas RN  Outcome: Progressing     Problem: Decision Making  Goal: Pt/Family able to effectively weigh alternatives and participate in decision making related to treatment and care  Description: INTERVENTIONS:  1. Determine when there are differences between patient's view, family's view, and healthcare provider's view of condition  2. Facilitate patient and family articulation of goals for care  3. Help patient and family identify pros/cons of alternative solutions  4. Provide information as requested by patient/family  5. Respect patient/family right to receive or not to receive information  6. Serve as a liaison between patient and family and health care team  7. Initiate Consults from Ethics, Palliative Care or initiate [address] [place] [address] as is appropriate  8/30/2022 2010 by Edwige Pederson RN  Outcome: Progressing  8/30/2022 1107 by Apoorva Casas RN  Outcome: Progressing     Problem: Behavior  Goal: Pt/Family maintain appropriate behavior and adhere to behavioral management agreement, if implemented  Description: INTERVENTIONS:  1. Assess patient/family's coping skills and  non-compliant behavior (including use of illegal substances)  2. Notify security of behavior or suspected illegal substances which indicate the need for search of the family and/or belongings  3. Encourage verbalization of thoughts and concerns in a socially appropriate manner  4.  Utilize positive, consistent limit setting strategies supporting safety of patient, staff and others  5. Encourage participation in the decision making process about the behavioral management agreement  6. If a visitor's behavior poses a threat to safety call refer to organization policy.   7. Initiate consult with , Psychosocial CNS, Spiritual Care as appropriate  8/30/2022 2010 by Yajaira Ruvalcaba, RN  Outcome: Progressing  8/30/2022 1107 by Breana Suarez RN  Outcome: Progressing

## 2022-08-31 NOTE — PROGRESS NOTES
Hospitalist Progress Note  8/30/2022 9:28 PM    PCP: Brian Marie MD    [id number]     Date of Admission: 8/5/2022    Patient demographics:  The patient  Ge Lugo is a 63 y.o. male     Significant past medical history:   Patient Active Problem List   Diagnosis    Partial bowel obstruction ([address]    Hiatal hernia    Acute respiratory failure with hypoxia and hypercapnia (HCC)    SBO (small bowel obstruction) (HCC)    Aspiration into airway    Acute respiratory failure with hypoxia (HCC)    Small bowel obstruction (HCC)    Diastolic heart failure (HCC)    History of atrial fibrillation    Supraventricular tachycardia ([address]    Arterial hypotension    Aspiration pneumonia ([address]    Morbid obesity with BMI of 50.0-59.9, adult ([address]    Acute delirium    EARNESTINE (obstructive sleep apnea)         Patient was diagnosed with:  Low grade or Partial Bowel obstruction  Pneumonia and septic shock. Hiatal hernia  Atrial Fibrillation    Treatment while inpatient:  63years old male with medical history significant for morbid obesity, paraesophageal hernia. Patient presented to the emergency room with abdominal pain nausea vomiting and diarrhea. Patient was diagnosed with small bowel obstruction. Patient was also diagnosed with pneumonia and septic shock. Patient developed acute respiratory failure and acute renal failure. Managed on the ventilator with a prolonged course in ICU. Extubated 8/26.    ----------------------------------------------------------    SUBJECTIVE COMPLAINTS-patient seen and evaluated at the bedside, discussed with wife, he is on 6L NC today, denied CP, SOB, he silver no complaints    Diet: Diet NPO  ADULT TUBE FEEDING; Nasogastric; Standard with Fiber; Continuous; 20; Yes; 25; Q 4 hours; 45; 250; Q 4 hours; Protein;  Bolus 2 protienex daily      OBJECTIVE:   Patient Active Problem List   Diagnosis    Partial bowel obstruction (HCC)    Hiatal hernia    Acute respiratory failure with hypoxia and hypercapnia (HCC)    SBO (small bowel obstruction) (HCC)    Aspiration into airway    Acute respiratory failure with hypoxia (HCC)    Small bowel obstruction (HCC)    Diastolic heart failure (HCC)    History of atrial fibrillation    Supraventricular tachycardia (HCC)    Arterial hypotension    Aspiration pneumonia (HCC)    Morbid obesity with BMI of 50.0-59.9, adult (HCC)    Acute delirium    EARNESTINE (obstructive sleep apnea)       Allergies  Codeine    Medications    Scheduled Meds:   aspirin  81 mg Oral Daily    folic acid  1 mg Oral Daily    [START ON 8/31/2022] pantoprazole  40 mg Oral QAM AC    [START ON 8/31/2022] torsemide  40 mg Oral Daily    aspirin  81 mg Oral Daily    lidocaine 1 % injection  5 mL IntraDERmal Once    sodium chloride flush  5-40 mL IntraVENous 2 times per day    celecoxib  200 mg Per NG tube BID    heparin (porcine)  5,000 Units SubCUTAneous 3 times per day    gabapentin  400 mg Oral TID    metoprolol tartrate  25 mg Oral BID    levothyroxine  200 mcg Oral Daily     Continuous Infusions:   sodium chloride       PRN Meds:  oxyCODONE, labetalol, sodium chloride flush, sodium chloride, hydrALAZINE, ipratropium-albuterol, senna, polyethylene glycol, docusate, artificial tears, acetaminophen, potassium chloride, acetaminophen, ondansetron **OR** ondansetron, diazePAM    Vitals   Vitals /wt Patient Vitals for the past 8 hrs:   BP Temp Temp src Pulse Resp SpO2   08/30/22 2043 (!) 169/100 98.6 °F (37 °C) -- 95 26 94 %   08/30/22 1617 (!) 157/98 97.6 °F (36.4 °C) Oral 89 -- 93 %          72HR INTAKE/OUTPUT:    Intake/Output Summary (Last 24 hours) at 8/30/2022 2128  Last data filed at 8/30/2022 1919  Gross per 24 hour   Intake 2355 ml   Output 1475 ml   Net 880 ml         Exam:  Gen: NAD, holding conversations  Eyes: PERRL. No sclera icterus. No conjunctival injection. ENT: No discharge. Pharynx clear. External appearance of ears and nose normal.  Neck: Trachea midline. No obvious mass.     Resp: No accessory muscle use. No crackles. No wheezes. No rhonchi. CV: Regular rate. Regular rhythm. No murmur or rub. No edema. GI: Non-tender. Non-distended. No hernia. Skin: Warm, dry, normal texture and turgor. Lymph: No cervical LAD. No supraclavicular LAD. M/S: / Ext. No cyanosis. No clubbing. No joint deformity. Neuro: CN 2-12 are intact,  no neurologic deficits noted. PT/INR:   No results for input(s): PROTIME, INR in the last 72 hours. APTT:   No results for input(s): APTT in the last 72 hours. CBC:   Recent Labs     08/28/22  1211 08/29/22  0645 08/30/22  0758   WBC 8.9 10.0 10.9   HGB 11.5* 11.6* 11.3*   HCT 35.0* 35.1* 34.8*   MCV 93.5 93.7 94.1    356 315         BMP:   Recent Labs     08/28/22  1211 08/28/22  2304 08/29/22  0645 08/29/22  1345    142 138 142   K 3.6 3.6 3.9 3.8   * 110 108 110   CO2 22 22 22 26   PHOS 2.5  --  2.4*  --    BUN 27* 24* 25* 27*   CREATININE 0.6* 0.7* 0.6* 0.7*         LIVER PROFILE:   No results for input(s): ALKPHOS, AST, ALT, ALB, BILIDIR, BILITOT, ALKPHOS in the last 72 hours. No results for input(s): AMYLASE in the last 72 hours. No results for input(s): LIPASE in the last 72 hours. UA:  No results for input(s): NITRITE, LABCAST, WBCUA, RBCUA, MUCUS in the last 72 hours. TROPONIN:   No results for input(s): Berenice Dross in the last 72 hours. Lab Results   Component Value Date/Time    URRFLXCULT Not Indicated 08/15/2022 09:16 AM       No results for input(s): TSHREFLEX in the last 72 hours. No components found for: [id number]  POC GLUCOSE:    Recent Labs     08/30/22  0419 08/30/22  0749 08/30/22  1118 08/30/22  1612 08/30/22  2038   POCGLU 112* 114* 113* 119* 114*       No results for input(s): LABA1C in the last 72 hours.  No results found for: LABA1C    Echocardiogram shows normal ejection fraction  Grade 1 diastolic dysfunction      ASSESSMENT AND PLAN    Acute respiratory failure  Intubated 8/8  Extubated 8/26  Doing well  on 3 L nasal cannula, wean as tolerated  Obesity with obstructive sleep apnea and hypoventilation syndrome    Aspiration pna  Continues to be on cefepime and metronidazole  Ct abxs  Speech therapy evaluation, advance diet as started    Acute on chronic diastolic CHF  Patient has grade 1 diastolic dysfunction  Lasix drip discontinued  500 cc fluid bolus  Patient now started on pressors      Partial small Bowel obstruction-improving, advance diet as tolerated    SVT  better  H/o atrial fibrillation  HR controled    CHARLENE  Renal function improved    Hypotention  Stable now    Hiatal hernia  - CT chest/abd/pelv:   Large hiatal hernia containing much of the stomach which is distended  Og tube today     Atrial Fibrillation  Patient developed the rapid ventricular rate  IV Lopressor with paramenters  Cardiology, nephrology following    Advance diet as tolerated, CXR shows Pulmonary edema - order BNP, has labile BP, holding AntiHTN meds for now, Hydralazine with parameters  Wean o2 as tolerated  Fsailed MBS - consult GI for possible peg tube placement    Code Status: Full Code    The patient and / or the family were informed of the results of any tests, a time was given to answer questions, a plan was proposed and they agreed with plan. Caty Freeman MD    This note was transcribed using [employer]. Please disregard any translational errors.

## 2022-09-01 LAB
ALBUMIN SERPL-MCNC: 2.5 G/DL (ref 3.4–5)
ANION GAP SERPL CALCULATED.3IONS-SCNC: 7 MMOL/L (ref 3–16)
BASE EXCESS ARTERIAL: 1.9 MMOL/L (ref -3–3)
BASOPHILS ABSOLUTE: 0 K/UL (ref 0–0.2)
BASOPHILS RELATIVE PERCENT: 0.4 %
BUN BLDV-MCNC: 23 MG/DL (ref 7–20)
CALCIUM SERPL-MCNC: 7.7 MG/DL (ref 8.3–10.6)
CARBOXYHEMOGLOBIN ARTERIAL: 0.6 % (ref 0–1.5)
CHLORIDE BLD-SCNC: 107 MMOL/L (ref 99–110)
CO2: 24 MMOL/L (ref 21–32)
CREAT SERPL-MCNC: 0.7 MG/DL (ref 0.8–1.3)
EOSINOPHILS ABSOLUTE: 0 K/UL (ref 0–0.6)
EOSINOPHILS RELATIVE PERCENT: 0.4 %
GFR AFRICAN AMERICAN: >60
GFR NON-AFRICAN AMERICAN: >60
GLUCOSE BLD-MCNC: 102 MG/DL (ref 70–99)
GLUCOSE BLD-MCNC: 108 MG/DL (ref 70–99)
GLUCOSE BLD-MCNC: 112 MG/DL (ref 70–99)
GLUCOSE BLD-MCNC: 80 MG/DL (ref 70–99)
GLUCOSE BLD-MCNC: 94 MG/DL (ref 70–99)
GLUCOSE BLD-MCNC: 96 MG/DL (ref 70–99)
HCO3 ARTERIAL: 26.1 MMOL/L (ref 21–29)
HCT VFR BLD CALC: 30.4 % (ref 40.5–52.5)
HEMOGLOBIN, ART, EXTENDED: 10.3 G/DL (ref 13.5–17.5)
HEMOGLOBIN: 9.7 G/DL (ref 13.5–17.5)
LYMPHOCYTES ABSOLUTE: 0.9 K/UL (ref 1–5.1)
LYMPHOCYTES RELATIVE PERCENT: 9.3 %
MAGNESIUM: 2.2 MG/DL (ref 1.8–2.4)
MCH RBC QN AUTO: 30 PG (ref 26–34)
MCHC RBC AUTO-ENTMCNC: 32 G/DL (ref 31–36)
MCV RBC AUTO: 93.9 FL (ref 80–100)
METHEMOGLOBIN ARTERIAL: 0.6 %
MONOCYTES ABSOLUTE: 0.6 K/UL (ref 0–1.3)
MONOCYTES RELATIVE PERCENT: 6 %
NEUTROPHILS ABSOLUTE: 8.3 K/UL (ref 1.7–7.7)
NEUTROPHILS RELATIVE PERCENT: 83.9 %
O2 SAT, ARTERIAL: 98.2 %
O2 THERAPY: ABNORMAL
PCO2 ARTERIAL: 38.7 MMHG (ref 35–45)
PDW BLD-RTO: 17.7 % (ref 12.4–15.4)
PERFORMED ON: ABNORMAL
PERFORMED ON: NORMAL
PERFORMED ON: NORMAL
PH ARTERIAL: 7.44 (ref 7.35–7.45)
PHOSPHORUS: 2.8 MG/DL (ref 2.5–4.9)
PLATELET # BLD: 223 K/UL (ref 135–450)
PMV BLD AUTO: 7.8 FL (ref 5–10.5)
PO2 ARTERIAL: 106 MMHG (ref 75–108)
POTASSIUM SERPL-SCNC: 4.5 MMOL/L (ref 3.5–5.1)
RBC # BLD: 3.24 M/UL (ref 4.2–5.9)
SODIUM BLD-SCNC: 138 MMOL/L (ref 136–145)
TCO2 ARTERIAL: 27.3 MMOL/L
WBC # BLD: 9.9 K/UL (ref 4–11)

## 2022-09-01 PROCEDURE — 36592 COLLECT BLOOD FROM PICC: CPT

## 2022-09-01 PROCEDURE — 99024 POSTOP FOLLOW-UP VISIT: CPT | Performed by: SURGERY

## 2022-09-01 PROCEDURE — 36600 WITHDRAWAL OF ARTERIAL BLOOD: CPT

## 2022-09-01 PROCEDURE — 6360000002 HC RX W HCPCS: Performed by: SURGERY

## 2022-09-01 PROCEDURE — 2580000003 HC RX 258: Performed by: SURGERY

## 2022-09-01 PROCEDURE — 82803 BLOOD GASES ANY COMBINATION: CPT

## 2022-09-01 PROCEDURE — 6370000000 HC RX 637 (ALT 250 FOR IP): Performed by: SURGERY

## 2022-09-01 PROCEDURE — 2000000000 HC ICU R&B

## 2022-09-01 PROCEDURE — 36415 COLL VENOUS BLD VENIPUNCTURE: CPT

## 2022-09-01 PROCEDURE — 6370000000 HC RX 637 (ALT 250 FOR IP): Performed by: NURSE PRACTITIONER

## 2022-09-01 PROCEDURE — 2500000003 HC RX 250 WO HCPCS: Performed by: NURSE PRACTITIONER

## 2022-09-01 PROCEDURE — 6360000002 HC RX W HCPCS: Performed by: NURSE PRACTITIONER

## 2022-09-01 PROCEDURE — C9113 INJ PANTOPRAZOLE SODIUM, VIA: HCPCS | Performed by: NURSE PRACTITIONER

## 2022-09-01 PROCEDURE — 6360000002 HC RX W HCPCS: Performed by: INTERNAL MEDICINE

## 2022-09-01 PROCEDURE — 85025 COMPLETE CBC W/AUTO DIFF WBC: CPT

## 2022-09-01 PROCEDURE — 99024 POSTOP FOLLOW-UP VISIT: CPT | Performed by: NURSE PRACTITIONER

## 2022-09-01 PROCEDURE — 2700000000 HC OXYGEN THERAPY PER DAY

## 2022-09-01 PROCEDURE — 80069 RENAL FUNCTION PANEL: CPT

## 2022-09-01 PROCEDURE — 2580000003 HC RX 258: Performed by: INTERNAL MEDICINE

## 2022-09-01 PROCEDURE — APPNB15 APP NON BILLABLE TIME 0-15 MINS: Performed by: NURSE PRACTITIONER

## 2022-09-01 PROCEDURE — 99291 CRITICAL CARE FIRST HOUR: CPT | Performed by: INTERNAL MEDICINE

## 2022-09-01 PROCEDURE — APPSS15 APP SPLIT SHARED TIME 0-15 MINUTES: Performed by: NURSE PRACTITIONER

## 2022-09-01 PROCEDURE — 83735 ASSAY OF MAGNESIUM: CPT

## 2022-09-01 PROCEDURE — 2580000003 HC RX 258: Performed by: NURSE PRACTITIONER

## 2022-09-01 PROCEDURE — 94761 N-INVAS EAR/PLS OXIMETRY MLT: CPT

## 2022-09-01 PROCEDURE — 94003 VENT MGMT INPAT SUBQ DAY: CPT

## 2022-09-01 RX ORDER — METHYLPREDNISOLONE SODIUM SUCCINATE 40 MG/ML
20 INJECTION, POWDER, LYOPHILIZED, FOR SOLUTION INTRAMUSCULAR; INTRAVENOUS DAILY
Status: DISCONTINUED | OUTPATIENT
Start: 2022-09-01 | End: 2022-09-03

## 2022-09-01 RX ORDER — CALCIUM GLUCONATE 20 MG/ML
1000 INJECTION, SOLUTION INTRAVENOUS ONCE
Status: COMPLETED | OUTPATIENT
Start: 2022-09-01 | End: 2022-09-01

## 2022-09-01 RX ORDER — FENTANYL CITRATE-0.9 % NACL/PF 10 MCG/ML
50 PLASTIC BAG, INJECTION (ML) INTRAVENOUS EVERY 30 MIN PRN
Status: DISCONTINUED | OUTPATIENT
Start: 2022-09-01 | End: 2022-09-03 | Stop reason: ALTCHOICE

## 2022-09-01 RX ORDER — FENTANYL CITRATE-0.9 % NACL/PF 10 MCG/ML
25-200 PLASTIC BAG, INJECTION (ML) INTRAVENOUS CONTINUOUS
Status: DISCONTINUED | OUTPATIENT
Start: 2022-09-01 | End: 2022-09-03 | Stop reason: ALTCHOICE

## 2022-09-01 RX ORDER — CHLORHEXIDINE GLUCONATE 0.12 MG/ML
15 RINSE ORAL 2 TIMES DAILY
Status: DISCONTINUED | OUTPATIENT
Start: 2022-09-01 | End: 2022-09-03 | Stop reason: ALTCHOICE

## 2022-09-01 RX ADMIN — ASPIRIN 81 MG CHEWABLE TABLET 81 MG: 81 TABLET CHEWABLE at 10:52

## 2022-09-01 RX ADMIN — GABAPENTIN 400 MG: 400 CAPSULE ORAL at 10:52

## 2022-09-01 RX ADMIN — PROPOFOL 25 MCG/KG/MIN: 10 INJECTION, EMULSION INTRAVENOUS at 13:14

## 2022-09-01 RX ADMIN — CALCIUM GLUCONATE 1000 MG: 20 INJECTION, SOLUTION INTRAVENOUS at 11:03

## 2022-09-01 RX ADMIN — METHYLPREDNISOLONE SODIUM SUCCINATE 20 MG: 40 INJECTION, POWDER, FOR SOLUTION INTRAMUSCULAR; INTRAVENOUS at 10:52

## 2022-09-01 RX ADMIN — PROPOFOL 20 MCG/KG/MIN: 10 INJECTION, EMULSION INTRAVENOUS at 23:28

## 2022-09-01 RX ADMIN — SODIUM CHLORIDE, PRESERVATIVE FREE 10 ML: 5 INJECTION INTRAVENOUS at 20:56

## 2022-09-01 RX ADMIN — CHLORHEXIDINE GLUCONATE 15 ML: 1.2 RINSE ORAL at 20:56

## 2022-09-01 RX ADMIN — HEPARIN SODIUM 5000 UNITS: 5000 INJECTION INTRAVENOUS; SUBCUTANEOUS at 21:58

## 2022-09-01 RX ADMIN — SODIUM CHLORIDE, PRESERVATIVE FREE 20 ML: 5 INJECTION INTRAVENOUS at 10:00

## 2022-09-01 RX ADMIN — CHLORHEXIDINE GLUCONATE 15 ML: 1.2 RINSE ORAL at 10:52

## 2022-09-01 RX ADMIN — FOLIC ACID 1 MG: 1 TABLET ORAL at 10:52

## 2022-09-01 RX ADMIN — METOPROLOL TARTRATE 25 MG: 25 TABLET, FILM COATED ORAL at 10:52

## 2022-09-01 RX ADMIN — HEPARIN SODIUM 5000 UNITS: 5000 INJECTION INTRAVENOUS; SUBCUTANEOUS at 05:57

## 2022-09-01 RX ADMIN — THIAMINE HYDROCHLORIDE 100 MG: 100 INJECTION, SOLUTION INTRAMUSCULAR; INTRAVENOUS at 14:28

## 2022-09-01 RX ADMIN — PROPOFOL 20 MCG/KG/MIN: 10 INJECTION, EMULSION INTRAVENOUS at 18:22

## 2022-09-01 RX ADMIN — CELECOXIB 200 MG: 200 CAPSULE ORAL at 20:56

## 2022-09-01 RX ADMIN — GABAPENTIN 400 MG: 400 CAPSULE ORAL at 14:22

## 2022-09-01 RX ADMIN — TORSEMIDE 40 MG: 20 TABLET ORAL at 10:52

## 2022-09-01 RX ADMIN — PANTOPRAZOLE SODIUM 40 MG: 40 TABLET, DELAYED RELEASE ORAL at 05:57

## 2022-09-01 RX ADMIN — PROPOFOL 25 MCG/KG/MIN: 10 INJECTION, EMULSION INTRAVENOUS at 03:37

## 2022-09-01 RX ADMIN — LEVOTHYROXINE SODIUM 200 MCG: 0.1 TABLET ORAL at 05:57

## 2022-09-01 RX ADMIN — PROPOFOL 25 MCG/KG/MIN: 10 INJECTION, EMULSION INTRAVENOUS at 08:42

## 2022-09-01 RX ADMIN — Medication 40 MG: at 10:51

## 2022-09-01 RX ADMIN — GABAPENTIN 400 MG: 400 CAPSULE ORAL at 20:56

## 2022-09-01 RX ADMIN — METOPROLOL TARTRATE 25 MG: 25 TABLET, FILM COATED ORAL at 20:56

## 2022-09-01 RX ADMIN — CELECOXIB 200 MG: 200 CAPSULE ORAL at 10:52

## 2022-09-01 RX ADMIN — HEPARIN SODIUM 5000 UNITS: 5000 INJECTION INTRAVENOUS; SUBCUTANEOUS at 14:22

## 2022-09-01 ASSESSMENT — PULMONARY FUNCTION TESTS
PIF_VALUE: 22
PIF_VALUE: 23
PIF_VALUE: 24
PIF_VALUE: 23
PIF_VALUE: 23
PIF_VALUE: 24
PIF_VALUE: 22
PIF_VALUE: 26
PIF_VALUE: 22
PIF_VALUE: 25
PIF_VALUE: 22
PIF_VALUE: 23
PIF_VALUE: 23
PIF_VALUE: 25
PIF_VALUE: 23
PIF_VALUE: 23
PIF_VALUE: 21
PIF_VALUE: 28
PIF_VALUE: 23
PIF_VALUE: 24
PIF_VALUE: 27
PIF_VALUE: 27
PIF_VALUE: 23
PIF_VALUE: 21
PIF_VALUE: 26
PIF_VALUE: 25
PIF_VALUE: 22
PIF_VALUE: 25
PIF_VALUE: 27
PIF_VALUE: 28

## 2022-09-01 ASSESSMENT — PAIN SCALES - GENERAL
PAINLEVEL_OUTOF10: 0

## 2022-09-01 NOTE — PROGRESS NOTES
Patients urine output significantly increased since the beginning of the shift. At 1400 emptied 2150mL since the beginning of the shift. At 1600 emptied 1050mL. At 1800 emptied 800mL. Total of 4L. Patient does not have any fluids running except Propofol running at 18mL/hr. At the beginning of the shift he recieved 100mL of Thiamine and 50mL of Calcium via IV. He also had 120mL flush via PEG tube. MD has been made aware, no new orders at this time, call light within reach, will continue to monitor.

## 2022-09-01 NOTE — PROGRESS NOTES
Hospitalist Progress Note  9/1/2022 5:29 PM    PCP: Dom Zheng MD    [phone]     Date of Admission: 8/5/2022    Patient demographics:  The patient  Seth Cordero is a 63 y.o. male     Significant past medical history:   Patient Active Problem List   Diagnosis    Partial bowel obstruction ([facility]    Hiatal hernia    Acute respiratory failure with hypoxia and hypercapnia (HCC)    SBO (small bowel obstruction) (HCC)    Aspiration into airway    Acute respiratory failure with hypoxia (HCC)    Small bowel obstruction (HCC)    Diastolic heart failure (HCC)    History of atrial fibrillation    Supraventricular tachycardia (HCC)    Arterial hypotension    Aspiration pneumonia ([facility]    Morbid obesity with BMI of 50.0-59.9, adult ([facility]    Acute delirium    EARNESTINE (obstructive sleep apnea)    Dysphagia         Patient was diagnosed with:  Low grade or Partial Bowel obstruction  Pneumonia and septic shock. Hiatal hernia  Atrial Fibrillation    Treatment while inpatient:  63years old male with medical history significant for morbid obesity, paraesophageal hernia. Patient presented to the emergency room with abdominal pain nausea vomiting and diarrhea. Patient was diagnosed with small bowel obstruction. Patient was also diagnosed with pneumonia and septic shock. Patient developed acute respiratory failure and acute renal failure. Managed on the ventilator with a prolonged course in ICU.  Extubated 8/26.    ----------------------------------------------------------    Diet: Diet NPO  ADULT TUBE FEEDING; PEG; Peptide Based High Protein; Continuous; 10; No; 30; Q 4 hours      OBJECTIVE:   Patient Active Problem List   Diagnosis    Partial bowel obstruction (HCC)    Hiatal hernia    Acute respiratory failure with hypoxia and hypercapnia (HCC)    SBO (small bowel obstruction) (HCC)    Aspiration into airway    Acute respiratory failure with hypoxia (HCC)    Small bowel obstruction (HCC)    Diastolic heart failure [facility])    History of atrial fibrillation    Supraventricular tachycardia (HCC)    Arterial hypotension    Aspiration pneumonia (HCC)    Morbid obesity with BMI of 50.0-59.9, adult (HCC)    Acute delirium    EARNESTINE (obstructive sleep apnea)    Dysphagia       Allergies  Codeine    Medications    Scheduled Meds:   chlorhexidine  15 mL Mouth/Throat BID    pantoprazole (PROTONIX) 40 mg injection  40 mg IntraVENous Daily    methylPREDNISolone  20 mg IntraVENous Daily    thiamine (VITAMIN B1) IVPB  100 mg IntraVENous X29V    folic acid  1 mg Oral Daily    torsemide  40 mg Oral Daily    aspirin  81 mg Oral Daily    lidocaine 1 % injection  5 mL IntraDERmal Once    sodium chloride flush  5-40 mL IntraVENous 2 times per day    celecoxib  200 mg Per NG tube BID    heparin (porcine)  5,000 Units SubCUTAneous 3 times per day    gabapentin  400 mg Oral TID    metoprolol tartrate  25 mg Oral BID    levothyroxine  200 mcg Oral Daily     Continuous Infusions:   fentaNYL      propofol 25 mcg/kg/min (09/01/22 1314)    sodium chloride Stopped (08/31/22 1521)     PRN Meds:  fentaNYL **AND** fentaNYL, oxyCODONE, labetalol, sodium chloride flush, sodium chloride, hydrALAZINE, ipratropium-albuterol, senna, polyethylene glycol, docusate, artificial tears, acetaminophen, potassium chloride, acetaminophen, ondansetron **OR** ondansetron, diazePAM    Vitals   Vitals /wt Patient Vitals for the past 8 hrs:   BP Temp Temp src Pulse Resp SpO2   09/01/22 1643 -- -- -- -- -- 93 %   09/01/22 1600 138/80 97.7 °F (36.5 °C) Axillary 68 19 94 %   09/01/22 1500 131/79 -- -- 71 22 --   09/01/22 1400 128/80 -- -- 70 21 --   09/01/22 1300 135/81 -- -- 70 22 --   09/01/22 1219 -- -- -- -- -- 95 %   09/01/22 1200 (!) 145/90 98 °F (36.7 °C) Axillary 65 21 93 %   09/01/22 1100 (!) 145/92 -- -- 69 14 --   09/01/22 1000 128/83 -- -- 71 20 --          72HR INTAKE/OUTPUT:    Intake/Output Summary (Last 24 hours) at 9/1/2022 1723  Last data filed at 9/1/2022 1600  Gross per 24 hour   Intake 806.61 ml   Output 2745 ml   Net -1938.39 ml         Exam:  Patient in OR, could not be seen 8/31/2022    PT/INR:   No results for input(s): PROTIME, INR in the last 72 hours. APTT:   No results for input(s): APTT in the last 72 hours. CBC:   Recent Labs     08/30/22  0758 08/31/22  0653 09/01/22  0429   WBC 10.9 11.9* 9.9   HGB 11.3* 11.3* 9.7*   HCT 34.8* 34.5* 30.4*   MCV 94.1 93.9 93.9    281 223         BMP:   Recent Labs     08/31/22  0653 09/01/22  0429    138   K 3.8 4.5    107   CO2 24 24   PHOS 2.6 2.8   BUN 23* 23*   CREATININE 0.6* 0.7*         LIVER PROFILE:   No results for input(s): ALKPHOS, AST, ALT, ALB, BILIDIR, BILITOT, ALKPHOS in the last 72 hours. No results for input(s): AMYLASE in the last 72 hours. No results for input(s): LIPASE in the last 72 hours. UA:  No results for input(s): NITRITE, LABCAST, WBCUA, RBCUA, MUCUS in the last 72 hours. TROPONIN:   No results for input(s): Wayna Khat in the last 72 hours. Lab Results   Component Value Date/Time    URRFLXCULT Not Indicated 08/15/2022 09:16 AM       No results for input(s): TSHREFLEX in the last 72 hours. No components found for: [id number]  POC GLUCOSE:    Recent Labs     08/31/22 2001 09/01/22  0013 09/01/22  0432 09/01/22  1136 09/01/22  1556   POCGLU 118* 108* 94 80 112*       No results for input(s): LABA1C in the last 72 hours.  No results found for: LABA1C    Echocardiogram shows normal ejection fraction  Grade 1 diastolic dysfunction      ASSESSMENT AND PLAN    Acute respiratory failure  Intubated 8/8  Extubated 8/26  Doing well  on 3 L nasal cannula, wean as tolerated  Obesity with obstructive sleep apnea and hypoventilation syndrome    Aspiration pna  Continues to be on cefepime and metronidazole  Ct abxs  Speech therapy evaluation, advance diet as started    Acute on chronic diastolic CHF  Patient has grade 1 diastolic dysfunction  Lasix drip discontinued  500 cc fluid bolus  Patient now started on pressors      Partial small Bowel obstruction-improving, advance diet as tolerated    SVT  better  H/o atrial fibrillation  HR controled    CHARLENE  Renal function improved    Hypotention  Stable now    Hiatal hernia  - CT chest/abd/pelv:   Large hiatal hernia containing much of the stomach which is distended  Og tube today     Atrial Fibrillation  Patient developed the rapid ventricular rate  IV Lopressor with paramenters  Cardiology, nephrology following    Plan for PEG tube placement per GS    Code Status: Full Code    The patient and / or the family were informed of the results of any tests, a time was given to answer questions, a plan was proposed and they agreed with plan. Danny Montague MD    This note was transcribed using [id number]. Please disregard any translational errors.

## 2022-09-01 NOTE — CARE COORDINATION
Talked to [facility] with [employer] [facility][phone] & updated her on current situation; they have already asked for an extension of the pre-cert. Need to f/u tomorrow with her update on pt's status & update on extension of pre-cert.     Maribell Alarcon RN, BSN,   [phone]  Electronically signed by Maribell Alarcon RN on 9/1/2022 at 4:09 PM

## 2022-09-01 NOTE — CONSULTS
Nutrition Note    Consult \"TF ordering and management\". Pt intubated after PEG placement yesterday. Surgery okay with starting trophic TF. Propofol dose @ 22.9 mL/hr providing 609 kcal daily. Recommend Vital HP @ 10 mL/hr. Flush per provider. Once able to advance, recommend advance to 30 mL/hr +4 proteinex daily. Flush per provider. Goal TF provides: 600 mL TV, 1016 kcal, 157 gm pro, 502  mL free water (includes 4 proteinex). Full RD assessment to follow up tomorrow.     Electronically signed by Radha Simpson RD, LD on 9/1/22 at 10:55 AM EDT    Contact: [phone]

## 2022-09-01 NOTE — PROGRESS NOTES
Hospitalist Progress Note  9/1/2022 5:30 PM    PCP: Smooth Tracy MD    [phone]     Date of Admission: 8/5/2022    Patient demographics:  The patient  Maki Rivera is a 63 y.o. male     Significant past medical history:   Patient Active Problem List   Diagnosis    Partial bowel obstruction ([facility]    Hiatal hernia    Acute respiratory failure with hypoxia and hypercapnia (HCC)    SBO (small bowel obstruction) (HCC)    Aspiration into airway    Acute respiratory failure with hypoxia (HCC)    Small bowel obstruction (HCC)    Diastolic heart failure (HCC)    History of atrial fibrillation    Supraventricular tachycardia (HCC)    Arterial hypotension    Aspiration pneumonia ([facility]    Morbid obesity with BMI of 50.0-59.9, adult ([facility]    Acute delirium    EARNESTINE (obstructive sleep apnea)    Dysphagia         Patient was diagnosed with:  Low grade or Partial Bowel obstruction  Pneumonia and septic shock. Hiatal hernia  Atrial Fibrillation    Treatment while inpatient:  63years old male with medical history significant for morbid obesity, paraesophageal hernia. Patient presented to the emergency room with abdominal pain nausea vomiting and diarrhea. Patient was diagnosed with small bowel obstruction. Patient was also diagnosed with pneumonia and septic shock. Patient developed acute respiratory failure and acute renal failure. Managed on the ventilator with a prolonged course in ICU.  Extubated 8/26.    ----------------------------------------------------------    SUBJECTIVE COMPLAINTS- patient was seen at bedside, intubated and sedated, he was moved to ICU after PEG tube placement by GILBERTO, family is at bedside    Diet: Diet NPO  ADULT TUBE FEEDING; PEG; Peptide Based High Protein; Continuous; 10; No; 30; Q 4 hours      OBJECTIVE:   Patient Active Problem List   Diagnosis    Partial bowel obstruction ([facility]    Hiatal hernia    Acute respiratory failure with hypoxia and hypercapnia (HCC)    SBO (small bowel obstruction) ([facility]    Aspiration into airway    Acute respiratory failure with hypoxia (HCC)    Small bowel obstruction (HCC)    Diastolic heart failure (HCC)    History of atrial fibrillation    Supraventricular tachycardia (HCC)    Arterial hypotension    Aspiration pneumonia (HCC)    Morbid obesity with BMI of 50.0-59.9, adult (HCC)    Acute delirium    EARNESTINE (obstructive sleep apnea)    Dysphagia       Allergies  Codeine    Medications    Scheduled Meds:   chlorhexidine  15 mL Mouth/Throat BID    pantoprazole (PROTONIX) 40 mg injection  40 mg IntraVENous Daily    methylPREDNISolone  20 mg IntraVENous Daily    thiamine (VITAMIN B1) IVPB  100 mg IntraVENous G46D    folic acid  1 mg Oral Daily    torsemide  40 mg Oral Daily    aspirin  81 mg Oral Daily    lidocaine 1 % injection  5 mL IntraDERmal Once    sodium chloride flush  5-40 mL IntraVENous 2 times per day    celecoxib  200 mg Per NG tube BID    heparin (porcine)  5,000 Units SubCUTAneous 3 times per day    gabapentin  400 mg Oral TID    metoprolol tartrate  25 mg Oral BID    levothyroxine  200 mcg Oral Daily     Continuous Infusions:   fentaNYL      propofol 25 mcg/kg/min (09/01/22 1314)    sodium chloride Stopped (08/31/22 1521)     PRN Meds:  fentaNYL **AND** fentaNYL, oxyCODONE, labetalol, sodium chloride flush, sodium chloride, hydrALAZINE, ipratropium-albuterol, senna, polyethylene glycol, docusate, artificial tears, acetaminophen, potassium chloride, acetaminophen, ondansetron **OR** ondansetron, diazePAM    Vitals   Vitals /wt Patient Vitals for the past 8 hrs:   BP Temp Temp src Pulse Resp SpO2   09/01/22 1643 -- -- -- -- -- 93 %   09/01/22 1600 138/80 97.7 °F (36.5 °C) Axillary 68 19 94 %   09/01/22 1500 131/79 -- -- 71 22 --   09/01/22 1400 128/80 -- -- 70 21 --   09/01/22 1300 135/81 -- -- 70 22 --   09/01/22 1219 -- -- -- -- -- 95 %   09/01/22 1200 (!) 145/90 98 °F (36.7 °C) Axillary 65 21 93 %   09/01/22 1100 (!) 145/92 -- -- 69 14 --   09/01/22 1000 128/83 -- -- 71 20 --          72HR INTAKE/OUTPUT:    Intake/Output Summary (Last 24 hours) at 9/1/2022 1730  Last data filed at 9/1/2022 1600  Gross per 24 hour   Intake 806.61 ml   Output 2745 ml   Net -1938.39 ml         Exam:  Gen: intubated and sedated  Eyes: PERRL. No sclera icterus. No conjunctival injection. ENT: No discharge. Pharynx clear. External appearance of ears and nose normal.  Neck: Trachea midline. No obvious mass. Resp: No accessory muscle use. No crackles. No wheezes. No rhonchi. CV: Regular rate. Regular rhythm. No murmur or rub. No edema. GI: Non-tender. Non-distended. No hernia. Skin: Warm, dry, normal texture and turgor. Lymph: No cervical LAD. No supraclavicular LAD. M/S: / Ext. No cyanosis. No clubbing. No joint deformity. Neuro: intubated and sedated    PT/INR:   No results for input(s): PROTIME, INR in the last 72 hours. APTT:   No results for input(s): APTT in the last 72 hours. CBC:   Recent Labs     08/30/22  0758 08/31/22  0653 09/01/22  0429   WBC 10.9 11.9* 9.9   HGB 11.3* 11.3* 9.7*   HCT 34.8* 34.5* 30.4*   MCV 94.1 93.9 93.9    281 223         BMP:   Recent Labs     08/31/22  0653 09/01/22  0429    138   K 3.8 4.5    107   CO2 24 24   PHOS 2.6 2.8   BUN 23* 23*   CREATININE 0.6* 0.7*         LIVER PROFILE:   No results for input(s): ALKPHOS, AST, ALT, ALB, BILIDIR, BILITOT, ALKPHOS in the last 72 hours. No results for input(s): AMYLASE in the last 72 hours. No results for input(s): LIPASE in the last 72 hours. UA:  No results for input(s): NITRITE, LABCAST, WBCUA, RBCUA, MUCUS in the last 72 hours. TROPONIN:   No results for input(s): Kathyrn Belch in the last 72 hours. Lab Results   Component Value Date/Time    URRFLXCULT Not Indicated 08/15/2022 09:16 AM       No results for input(s): TSHREFLEX in the last 72 hours.       No components found for: [id number]  POC GLUCOSE:    Recent Labs     08/31/22 2001 09/01/22  0013 09/01/22  0432 09/01/22  1136 09/01/22  1556   POCGLU 118* 108* 94 80 112*       No results for input(s): LABA1C in the last 72 hours. No results found for: LABA1C    Echocardiogram shows normal ejection fraction  Grade 1 diastolic dysfunction      ASSESSMENT AND PLAN    Acute respiratory failure  Intubated 8/8  Extubated 8/26  Doing well  on 3 L nasal cannula, wean as tolerated  Obesity with obstructive sleep apnea and hypoventilation syndrome    Aspiration pna  Continues to be on cefepime and metronidazole  Ct abxs  Speech therapy evaluation, advance diet as started    Acute on chronic diastolic CHF  Patient has grade 1 diastolic dysfunction  Lasix drip discontinued  500 cc fluid bolus  Patient now started on pressors    SVT  better  H/o atrial fibrillation  HR controled    CHARLENE  Renal function improved    Hypotention  Stable now    Hiatal hernia  - CT chest/abd/pelv:   Large hiatal hernia containing much of the stomach which is distended  Og tube today     Atrial Fibrillation  Patient developed the rapid ventricular rate  IV Lopressor with paramenters  Cardiology, nephrology following    He is intubated and sedated after Peg tube placement per , he was transferred to ICU, he failed MBS    Code Status: Full Code    The patient and / or the family were informed of the results of any tests, a time was given to answer questions, a plan was proposed and they agreed with plan. Mikael Rico MD    This note was transcribed using [id number] [employer]. Please disregard any translational errors.

## 2022-09-01 NOTE — PROGRESS NOTES
[facility] General Surgery                                   Daily Progress Note                                                         Pt Name: Carol Alejandre  Medical Record Number: [id number]  Date of Birth 1960   Today's Date: 9/1/2022      ASSESSMENT/PLAN  Dysphagia and hiatal hernia  -POD #1 (8/31) laparoscopic reduction of hiatal hernia with gastropexy and 24F HADLEY gastrostomy tube placement  -vent weaning per ICU team  -Will ask dietitian to re-eval for tube feeds recommendations  -start trickle TF today vs tomorrow      Smith Worley is intubated, sedated. OBJECTIVE  VITALS:  height is 5' 7\" (1.702 m) and weight is 342 lb 3.2 oz (155.2 kg) (abnormal). His axillary temperature is 98.7 °F (37.1 °C). His blood pressure is 132/88 and his pulse is 74. His respiration is 19 and oxygen saturation is 95%. INTAKE/OUTPUT:    Intake/Output Summary (Last 24 hours) at 9/1/2022 0754  Last data filed at 9/1/2022 0606  Gross per 24 hour   Intake 1422.61 ml   Output 970 ml   Net 452.61 ml     GENERAL: alert, cooperative, no distress  LUNGS: clear to ausculation, without wheezes, rales or rhonci  HEART: normal rate and regular rhythm  ABDOMEN: Soft, incisions c/d/I. 24F G tube present, dressing intact, clamped. EXTREMITY: no cyanosis, clubbing or edema    I/O last 3 completed shifts:   In: 2602.6 [I.V.:1217.6; NG/GT:1355; IV Piggyback:30]  Out: 7056 [Urine:2270]      LABS  Recent Labs     09/01/22  0429   WBC 9.9   HGB 9.7*   HCT 30.4*         K 4.5      CO2 24   BUN 23*   CREATININE 0.7*   PHOS 2.8   CALCIUM 7.7*       EDUCATION  Patient educated about Disease Process, Medications, Smoking Cessation, Oxygenation, Incentive Spirometry and Deep Breath and Cough, Diabetes, Hyperlipidemia, Smoking Cessation, Nutrition, Exercise and Hypertension    Electronically signed by JARON Dela Cruz CNP on 9/1/2022 at 7:54 AM [address] and Vascular Surgery   [phone] Office  [phone] Fax       Agree with above note. The patient was personally seen and examined. Ge Lugo is doing OK. He was reintubated yesterday in the PACU due to tachypnea. No events overnight. NAD, ventilated  Abd soft, NT, ND, incisions c/d/I, no erythema or induration, G-tube in place    WBC 9.9  Cr 0.7    A/P: 62 yo male with dysphagia, hiatal hernia s/p lap reduction of hiatal hernia with G-tube placement, with acute on chronic respiratory failure    Will start trophic TF and advance towards goal.  Ok to pause when nearing extubation.     Vent management per pulm/critical care    Renny Watson MD

## 2022-09-01 NOTE — PROGRESS NOTES
Occupational Therapy  Miles Vanegas    Pt noted to be transferred to ICU on 8/31/22. He is now intubated. OT signing off. Please re-order OT when deemed medically appropriate.     Electronically signed by Hubert Mobley OT on 9/1/22 at 7:36 AM EDT

## 2022-09-01 NOTE — PLAN OF CARE
Problem: Discharge Planning  Goal: Discharge to home or other facility with appropriate resources  Outcome: Progressing  Flowsheets  Taken 8/31/2022 2000 by Hang Amador RN  Discharge to home or other facility with appropriate resources: Identify barriers to discharge with patient and caregiver  Taken 8/31/2022 1700 by Verónica Vogel RN  Discharge to home or other facility with appropriate resources: Identify barriers to discharge with patient and caregiver     Problem: Pain  Goal: Verbalizes/displays adequate comfort level or baseline comfort level  Outcome: Progressing     Problem: ABCDS Injury Assessment  Goal: Absence of physical injury  Outcome: Progressing  Flowsheets (Taken 8/31/2022 2111)  Absence of Physical Injury: Implement safety measures based on patient assessment     Problem: Safety - Adult  Goal: Free from fall injury  Outcome: Progressing  Flowsheets (Taken 8/31/2022 2111)  Free From Fall Injury: Instruct family/caregiver on patient safety     Problem: Skin/Tissue Integrity  Goal: Absence of new skin breakdown  Description: 1. Monitor for areas of redness and/or skin breakdown  2. Assess vascular access sites hourly  Outcome: Progressing     Problem: Safety - Medical Restraint  Goal: Remains free of injury from restraints (Restraint for Interference with Medical Device)  Description: INTERVENTIONS:  1. Determine that other, less restrictive measures have been tried or would not be effective before applying the restraint  2. Evaluate the patient's condition at the time of restraint application  3. Inform patient/family regarding the reason for restraint  4.  Q2H: Monitor safety, psychosocial status, comfort, nutrition and hydration  Outcome: Progressing  Flowsheets  Taken 8/31/2022 2000 by Hang Amador RN  Remains free of injury from restraints (restraint for interference with medical device):   Every 2 hours: Monitor safety, psychosocial status, comfort, nutrition and hydration   Inform patient/family regarding the reason for restraint  Taken 8/31/2022 1715 by Nataly Daniels RN  Remains free of injury from restraints (restraint for interference with medical device): Every 2 hours: Monitor safety, psychosocial status, comfort, nutrition and hydration     Problem: Nutrition Deficit:  Goal: Optimize nutritional status  Outcome: Progressing     Problem: Neurosensory - Adult  Goal: Achieves stable or improved neurological status  Outcome: Progressing  Flowsheets  Taken 8/31/2022 2000 by Kel Gunderson RN  Achieves stable or improved neurological status:   Assess for and report changes in neurological status   Maintain blood pressure and fluid volume within ordered parameters to optimize cerebral perfusion and minimize risk of hemorrhage  Taken 8/31/2022 1700 by Nataly Daniels RN  Achieves stable or improved neurological status: Assess for and report changes in neurological status  Goal: Absence of seizures  Outcome: Progressing  Flowsheets  Taken 8/31/2022 2000 by Kel Gunderson RN  Absence of seizures: Monitor for seizure activity. If seizure occurs, document type and location of movements and any associated apnea  Taken 8/31/2022 1700 by Nataly Daniels RN  Absence of seizures: Monitor for seizure activity.   If seizure occurs, document type and location of movements and any associated apnea  Goal: Remains free of injury related to seizures activity  Outcome: Progressing  Flowsheets  Taken 8/31/2022 2000 by Kel Gunderson RN  Remains free of injury related to seizure activity:   Maintain airway, patient safety  and administer oxygen as ordered   Monitor patient for seizure activity, document and report duration and description of seizure to Licensed Independent Practitioner  Taken 8/31/2022 1700 by Nataly Daniels RN  Remains free of injury related to seizure activity: Maintain airway, patient safety  and administer oxygen as ordered  Goal: Achieves maximal functionality and self care  Outcome: Progressing  Flowsheets  Taken 8/31/2022 2000 by Polo Gottlieb RN  Achieves maximal functionality and self care: Monitor swallowing and airway patency with patient fatigue and changes in neurological status  Taken 8/31/2022 1700 by Eliseo Leon RN  Achieves maximal functionality and self care: Monitor swallowing and airway patency with patient fatigue and changes in neurological status     Problem: Respiratory - Adult  Goal: Achieves optimal ventilation and oxygenation  Outcome: Progressing  Flowsheets  Taken 8/31/2022 2000 by Polo Gottlieb RN  Achieves optimal ventilation and oxygenation:   Assess for changes in respiratory status   Assess for changes in mentation and behavior   Position to facilitate oxygenation and minimize respiratory effort   Assess the need for suctioning and aspirate as needed   Respiratory therapy support as indicated  Taken 8/31/2022 1700 by Eliseo Leon RN  Achieves optimal ventilation and oxygenation: Assess for changes in respiratory status     Problem: Cardiovascular - Adult  Goal: Maintains optimal cardiac output and hemodynamic stability  Outcome: Progressing  Flowsheets  Taken 8/31/2022 2000 by Polo Gottlieb RN  Maintains optimal cardiac output and hemodynamic stability:   Monitor blood pressure and heart rate   Monitor urine output and notify Licensed Independent Practitioner for values outside of normal range   Assess for signs of decreased cardiac output  Taken 8/31/2022 1700 by Eliseo Leon RN  Maintains optimal cardiac output and hemodynamic stability: Monitor blood pressure and heart rate  Goal: Absence of cardiac dysrhythmias or at baseline  Outcome: Progressing  Flowsheets  Taken 8/31/2022 2000 by Polo Gottlieb RN  Absence of cardiac dysrhythmias or at baseline:   Monitor cardiac rate and rhythm   Assess for signs of decreased cardiac output  Taken 8/31/2022 1700 by Eliseo Leon RN  Absence of cardiac dysrhythmias or at baseline: Monitor cardiac rate and rhythm     Problem: Skin/Tissue Integrity - Adult  Goal: Skin integrity remains intact  Outcome: Progressing  Flowsheets (Taken 8/31/2022 2000)  Skin Integrity Remains Intact: Monitor for areas of redness and/or skin breakdown  Goal: Incisions, wounds, or drain sites healing without S/S of infection  Outcome: Progressing  Flowsheets (Taken 8/31/2022 2000)  Incisions, Wounds, or Drain Sites Healing Without Sign and Symptoms of Infection:   ADMISSION and DAILY: Assess and document risk factors for pressure ulcer development   TWICE DAILY: Assess and document skin integrity   TWICE DAILY: Assess and document dressing/incision, wound bed, drain sites and surrounding tissue  Goal: Oral mucous membranes remain intact  Outcome: Progressing  Flowsheets (Taken 8/31/2022 2000)  Oral Mucous Membranes Remain Intact: Assess oral mucosa and hygiene practices     Problem: Musculoskeletal - Adult  Goal: Return mobility to safest level of function  Outcome: Progressing  Flowsheets  Taken 8/31/2022 2000 by Amanda Fermin RN  Return Mobility to Safest Level of Function: Ensure adequate protection for wounds/incisions during mobilization  Taken 8/31/2022 1700 by Myah Grullon RN  Return Mobility to Safest Level of Function: Assess patient stability and activity tolerance for standing, transferring and ambulating with or without assistive devices  Goal: Maintain proper alignment of affected body part  Outcome: Progressing  Flowsheets (Taken 8/31/2022 2000)  Maintain proper alignment of affected body part: Support and protect limb and body alignment per provider's orders  Goal: Return ADL status to a safe level of function  Outcome: Progressing  Flowsheets (Taken 8/31/2022 2000)  Return ADL Status to a Safe Level of Function: Administer medication as ordered     Problem: Gastrointestinal - Adult  Goal: Minimal or absence of nausea and vomiting  Outcome: Progressing  Flowsheets  Taken 8/31/2022 2000 by Amanda Fermin RN  Minimal or absence of nausea and vomiting: Provide nonpharmacologic comfort measures as appropriate  Taken 8/31/2022 1700 by Mae Frost RN  Minimal or absence of nausea and vomiting: Administer IV fluids as ordered to ensure adequate hydration  Goal: Maintains or returns to baseline bowel function  Outcome: Progressing  Flowsheets (Taken 8/31/2022 2000)  Maintains or returns to baseline bowel function: Assess bowel function  Goal: Maintains adequate nutritional intake  Outcome: Progressing  Flowsheets (Taken 8/31/2022 2000)  Maintains adequate nutritional intake: Monitor intake and output, weight and lab values  Goal: Establish and maintain optimal ostomy function  Outcome: Progressing     Problem: Genitourinary - Adult  Goal: Absence of urinary retention  Outcome: Progressing  Flowsheets  Taken 8/31/2022 2000 by Ceasar Gilbert RN  Absence of urinary retention: Monitor intake/output and perform bladder scan as needed  Taken 8/31/2022 1700 by Mae Frost RN  Absence of urinary retention: Assess patients ability to void and empty bladder  Goal: Urinary catheter remains patent  Outcome: Progressing  Flowsheets  Taken 8/31/2022 2000 by Ceasar Gilbert RN  Urinary catheter remains patent: Assess patency of urinary catheter  Taken 8/31/2022 1700 by Mae Frost RN  Urinary catheter remains patent: Assess patency of urinary catheter     Problem: Anxiety  Goal: Will report anxiety at manageable levels  Description: INTERVENTIONS:  1. Administer medication as ordered  2. Teach and rehearse alternative coping skills  3.  Provide emotional support with 1:1 interaction with staff  Outcome: Progressing  Flowsheets  Taken 8/31/2022 2000 by Ceasar Gilbert RN  Will report anxiety at manageable levels: Provide emotional support with 1:1 interaction with staff  Taken 8/31/2022 1700 by Mae Frost RN  Will report anxiety at manageable levels: Provide emotional support with 1:1 interaction with staff     Problem: Coping  Goal: Pt/Family able to verbalize concerns and demonstrate effective coping strategies  Description: INTERVENTIONS:  1. Assist patient/family to identify coping skills, available support systems and cultural and spiritual values  2. Provide emotional support, including active listening and acknowledgement of concerns of patient and caregivers  3. Reduce environmental stimuli, as able  4. Instruct patient/family in relaxation techniques, as appropriate  5. Assess for spiritual pain/suffering and initiate Spiritual Care, Psychosocial Clinical Specialist consults as needed  Outcome: Progressing  Flowsheets  Taken 8/31/2022 2000 by Gallo Wilson RN  Patient/family able to verbalize anxieties, fears, and concerns, and demonstrate effective coping:   Assist patient/family to identify coping skills, available support systems and cultural and spiritual values   Provide emotional support, including active listening and acknowledgement of concerns of patient and caregivers   Reduce environmental stimuli, as able  Taken 8/31/2022 1700 by Zacarias Tee RN  Patient/family able to verbalize anxieties, fears, and concerns, and demonstrate effective coping: Assess for spiritual pain/suffering and initiate Spiritual Care, Psychosocial Clinical Specialist consults as needed     Problem: Change in Body Image  Goal: Pt/Family communicate acceptance of loss or change in body image and feel psychological comfort and peace  Description: INTERVENTIONS:  1. Assess patient/family anxiety and grief process related to change in body image, loss of functional status, loss of sense of self, and forgiveness  2. Provide emotional and spiritual support  3. Provide information about the patient's health status with consideration of family and cultural values  4. Communicate willingness to discuss loss and facilitate grief process with patient/family as appropriate  5.  Emphasize sustaining relationships within family system and community, or aurora/spiritual traditions  6. Initiate Spiritual Care, Psychosocial Clinical Specialist consult as needed  Outcome: Progressing  Flowsheets  Taken 8/31/2022 2000 by Regine Herbert RN  Patient/family communicate acceptance of loss or change in body image and feel psychological comfort and peace: Assess patient/family anxiety and grief process related to change in body image, loss of functional status, loss of sense of self, and forgiveness  Taken 8/31/2022 1700 by Charles Allison RN  Patient/family communicate acceptance of loss or change in body image and feel psychological comfort and peace: Assess patient/family anxiety and grief process related to change in body image, loss of functional status, loss of sense of self, and forgiveness     Problem: Decision Making  Goal: Pt/Family able to effectively weigh alternatives and participate in decision making related to treatment and care  Description: INTERVENTIONS:  1. Determine when there are differences between patient's view, family's view, and healthcare provider's view of condition  2. Facilitate patient and family articulation of goals for care  3. Help patient and family identify pros/cons of alternative solutions  4. Provide information as requested by patient/family  5. Respect patient/family right to receive or not to receive information  6. Serve as a liaison between patient and family and health care team  7. Initiate Consults from Ethics, Palliative Care or initiate [facility] as is appropriate  Outcome: Progressing  Flowsheets  Taken 8/31/2022 2000 by Regine Herbert RN  Patient/family able to effectively weigh alternatives and participate in decision making related to treatment and care: Facilitate patient and family articulation of goals for care  Taken 8/31/2022 1700 by Charles Allison RN  Patient/family able to effectively weigh alternatives and participate in decision making related to treatment and care:  Facilitate patient and family articulation of goals for care     Problem: Behavior  Goal: Pt/Family maintain appropriate behavior and adhere to behavioral management agreement, if implemented  Description: INTERVENTIONS:  1. Assess patient/family's coping skills and  non-compliant behavior (including use of illegal substances)  2. Notify security of behavior or suspected illegal substances which indicate the need for search of the family and/or belongings  3. Encourage verbalization of thoughts and concerns in a socially appropriate manner  4. Utilize positive, consistent limit setting strategies supporting safety of patient, staff and others  5. Encourage participation in the decision making process about the behavioral management agreement  6. If a visitor's behavior poses a threat to safety call refer to organization policy.   7. Initiate consult with , Psychosocial CNS, Spiritual Care as appropriate  Outcome: Progressing  Flowsheets  Taken 8/31/2022 2000 by Shannon Park RN  Patient/family maintains appropriate behavior and adheres to behavioral management agreement, if implemented: Assess patient/familys coping skills and  non-compliant behavior (including use of illegal substances)  Taken 8/31/2022 1700 by Nathan Oswald RN  Patient/family maintains appropriate behavior and adheres to behavioral management agreement, if implemented: Encourage participation in the decision making process about the behavioral management agreement

## 2022-09-01 NOTE — PROGRESS NOTES
Pulmonary Critical Care Progress Note     Patient's name:  Aneta Hadley Record Number: [id number]  Patient's account/billing number: [de-identified]  Patient's YOB: 1960  Age: 63 y.o. Date of Admission: 8/5/2022  1:56 PM  Date of Consult: 9/1/2022      Primary Care Physician: Azalia Pulido MD      Code Status: Full Code    Chief complaint: Acute respiratory failure with hypoxia    Assessment and Plan     Acute respiratory failure hypoxia status post intubation 8/31/2022 on mechanical ventilation. EARNESTINE/obesity ventilation syndrome. Recent aspiration/organizing pneumonia. Small bowel obstruction. Dysphagia status post laparoscopic reduction of hiatal hernia with PEG tube placement. Plan:  Vent support, start on SBT, wean FiO2 as tolerated. Maintain even fluid balance. Start tube feeding. Prednisone 20 mg p.o. daily. GI and DVT prophylaxis. With bradycardia will extubate to BiPAP. Discussed with wife. Overnight:  Got intubated after extubation post OR due to hypoxia and increase wob    REVIEW OF SYSTEMS:  Review of Systems -   Unable to obtain sedated on mechanical ventilation. Physical Exam:    Vitals: BP (!) 145/92   Pulse 69   Temp 97.7 °F (36.5 °C) (Axillary)   Resp 14   Ht 5' 7\" (1.702 m)   Wt (!) 342 lb 3.2 oz (155.2 kg) Comment: bed pump removed  SpO2 96%   BMI 53.60 kg/m²     Last Body weight:   Wt Readings from Last 3 Encounters:   09/01/22 (!) 342 lb 3.2 oz (155.2 kg)       Body Mass Index : Body mass index is 53.6 kg/m². Intake and Output summary:   Intake/Output Summary (Last 24 hours) at 9/1/2022 1155  Last data filed at 9/1/2022 1000  Gross per 24 hour   Intake 1482.61 ml   Output 970 ml   Net 512.61 ml       Physical Examination:     Gen:  No acute distress. Mechanical ventilation  Eyes: PERRL. Anicteric sclera. No conjunctival injection. ENT: No discharge. Posterior oropharynx clear.  External appearance of ears and nose normal.  Neck: Trachea midline. No mass   Resp: Diminished bilaterally with scattered rhonchi  CV: Regular rate. Regular rhythm. No murmur or rub. No edema. GI: Soft, Non-tender. Non-distended. +BS  Skin: Warm, dry, w/o erythema. Lymph: No cervical or supraclavicular LAD. M/S: No cyanosis. No clubbing. Neuro: Sedated on mechanical ventilation no focal deficit        Laboratory findings:-    CBC:   Recent Labs     09/01/22  0429   WBC 9.9   HGB 9.7*        BMP:    Recent Labs     08/29/22  1345 08/31/22  0653 09/01/22  0429    141 138   K 3.8 3.8 4.5    108 107   CO2 26 24 24   BUN 27* 23* 23*   CREATININE 0.7* 0.6* 0.7*   GLUCOSE 104* 113* 96     S. Calcium:  Recent Labs     09/01/22  0429   CALCIUM 7.7*       S. Magnesium:  Recent Labs     09/01/22  0429   MG 2.20     S. Phosphorus:  Recent Labs     09/01/22  0429   PHOS 2.8     S. Glucose:  Recent Labs     09/01/22  0013 09/01/22  0432 09/01/22  1136   POCGLU 108* 94 80           Radiology Review:  Pertinent images / reports were reviewed as a part of this visit.     CXR reviewed  Bibasilar airspace disease     Critical CAre time 35 minutes                Paris Santos MD, M.D.            9/1/2022, 11:55 AM

## 2022-09-01 NOTE — PROGRESS NOTES
Speech Language Pathology      Speech Therapy note regarding SLP therapy for Oropharyngeal dysphagia    Events/procedure noted 8/31/2022. Pt now in ICU and intubated/on vent. Plan: will need new orders when medical stable to resume therapy. Signed  Suly Lechuga MS,CCC,SLP [id number]  Speech and Language Pathologist    9/1/2022 at 1442 pm

## 2022-09-01 NOTE — PLAN OF CARE
Problem: Discharge Planning  Goal: Discharge to home or other facility with appropriate resources  Outcome: Progressing     Problem: Pain  Goal: Verbalizes/displays adequate comfort level or baseline comfort level  Outcome: Progressing     Problem: ABCDS Injury Assessment  Goal: Absence of physical injury  Outcome: Progressing     Problem: Safety - Adult  Goal: Free from fall injury  Outcome: Progressing     Problem: Skin/Tissue Integrity  Goal: Absence of new skin breakdown  Description: 1. Monitor for areas of redness and/or skin breakdown  2. Assess vascular access sites hourly  Outcome: Progressing     Problem: Safety - Medical Restraint  Goal: Remains free of injury from restraints (Restraint for Interference with Medical Device)  Description: INTERVENTIONS:  1. Determine that other, less restrictive measures have been tried or would not be effective before applying the restraint  2. Evaluate the patient's condition at the time of restraint application  3. Inform patient/family regarding the reason for restraint  4.  Q2H: Monitor safety, psychosocial status, comfort, nutrition and hydration  Outcome: Progressing  Flowsheets  Taken 9/1/2022 1600  Remains free of injury from restraints (restraint for interference with medical device):   Determine that other, less restrictive measures have been tried or would not be effective before applying the restraint   Evaluate the patient's condition at the time of restraint application   Inform patient/family regarding the reason for restraint   Every 2 hours: Monitor safety, psychosocial status, comfort, nutrition and hydration  Taken 9/1/2022 1400  Remains free of injury from restraints (restraint for interference with medical device):   Determine that other, less restrictive measures have been tried or would not be effective before applying the restraint   Evaluate the patient's condition at the time of restraint application   Inform patient/family regarding the reason for restraint   Every 2 hours: Monitor safety, psychosocial status, comfort, nutrition and hydration  Taken 9/1/2022 1200  Remains free of injury from restraints (restraint for interference with medical device):   Determine that other, less restrictive measures have been tried or would not be effective before applying the restraint   Evaluate the patient's condition at the time of restraint application   Inform patient/family regarding the reason for restraint   Every 2 hours: Monitor safety, psychosocial status, comfort, nutrition and hydration  Taken 9/1/2022 1000  Remains free of injury from restraints (restraint for interference with medical device):   Determine that other, less restrictive measures have been tried or would not be effective before applying the restraint   Evaluate the patient's condition at the time of restraint application   Inform patient/family regarding the reason for restraint   Every 2 hours: Monitor safety, psychosocial status, comfort, nutrition and hydration  Taken 9/1/2022 0804  Remains free of injury from restraints (restraint for interference with medical device):   Determine that other, less restrictive measures have been tried or would not be effective before applying the restraint   Evaluate the patient's condition at the time of restraint application   Inform patient/family regarding the reason for restraint   Every 2 hours: Monitor safety, psychosocial status, comfort, nutrition and hydration     Problem: Nutrition Deficit:  Goal: Optimize nutritional status  Outcome: Progressing     Problem: Neurosensory - Adult  Goal: Achieves stable or improved neurological status  Outcome: Progressing  Goal: Absence of seizures  Outcome: Progressing  Goal: Remains free of injury related to seizures activity  Outcome: Progressing  Goal: Achieves maximal functionality and self care  Outcome: Progressing     Problem: Respiratory - Adult  Goal: Achieves optimal ventilation and oxygenation  Outcome: Progressing     Problem: Cardiovascular - Adult  Goal: Absence of cardiac dysrhythmias or at baseline  Outcome: Progressing     Problem: Skin/Tissue Integrity - Adult  Goal: Skin integrity remains intact  Outcome: Progressing  Goal: Incisions, wounds, or drain sites healing without S/S of infection  Outcome: Progressing  Goal: Oral mucous membranes remain intact  Outcome: Progressing     Problem: Musculoskeletal - Adult  Goal: Maintain proper alignment of affected body part  Outcome: Progressing     Problem: Gastrointestinal - Adult  Goal: Maintains or returns to baseline bowel function  Outcome: Progressing  Goal: Maintains adequate nutritional intake  Outcome: Progressing     Problem: Genitourinary - Adult  Goal: Absence of urinary retention  Outcome: Progressing  Goal: Urinary catheter remains patent  Outcome: Progressing     Problem: Coping  Goal: Pt/Family able to verbalize concerns and demonstrate effective coping strategies  Description: INTERVENTIONS:  1. Assist patient/family to identify coping skills, available support systems and cultural and spiritual values  2. Provide emotional support, including active listening and acknowledgement of concerns of patient and caregivers  3. Reduce environmental stimuli, as able  4. Instruct patient/family in relaxation techniques, as appropriate  5. Assess for spiritual pain/suffering and initiate Spiritual Care, Psychosocial Clinical Specialist consults as needed  Outcome: Progressing     Problem: Behavior  Goal: Pt/Family maintain appropriate behavior and adhere to behavioral management agreement, if implemented  Description: INTERVENTIONS:  1. Assess patient/family's coping skills and  non-compliant behavior (including use of illegal substances)  2. Notify security of behavior or suspected illegal substances which indicate the need for search of the family and/or belongings  3.  Encourage verbalization of thoughts and concerns in a socially appropriate manner  4. Utilize positive, consistent limit setting strategies supporting safety of patient, staff and others  5. Encourage participation in the decision making process about the behavioral management agreement  6. If a visitor's behavior poses a threat to safety call refer to organization policy.   7. Initiate consult with , Psychosocial CNS, Spiritual Care as appropriate  Outcome: Progressing

## 2022-09-01 NOTE — OP NOTE
[address] [facility]                                OPERATIVE REPORT    PATIENT NAME: Love ROCA                     :        1960  MED REC NO:   [id number]                          ROOM:       2108  ACCOUNT NO:   [de-identified]                           ADMIT DATE: 2022  PROVIDER:     Ron Kanner, MD    DATE OF PROCEDURE:  2022    PREOPERATIVE DIAGNOSES:  Dysphagia and hiatal hernia. POSTOPERATIVE DIAGNOSES:  Dysphagia and hiatal hernia. OPERATION PERFORMED:  Laparoscopic reduction of hiatal hernia with  gastropexy and placement of a 24-Welsh HADLEY gastrostomy tube. SURGEON:  Ron Kanner, MD    ANESTHESIA:  General endotracheal.    ASA CLASS:  III. DVT PROPHYLAXIS:  The patient was wearing bilateral SCDs. ANTIBIOTICS:  The patient received Ancef 3 gm IV preoperatively. INDICATIONS:  The patient is a 79-year-old male, who originally  presented approximately four weeks ago with bowel obstruction. He ended  up aspirating and was intubated for a prolonged period of time. After  being extubated, he had dysphagia and due to his hiatal hernia, he was  unable to have a percutaneous endoscopic gastrostomy tube placement. Therefore, we are consulted for assistance with reduction of his hiatal  hernia and placement of a gastrostomy tube. The risks, benefits, and  alternatives were explained to the patient's family, and they were  willing to consent for the procedure. OPERATIVE PROCEDURE:  After informed consent was obtained, the patient  was brought to the operating room and placed in the supine position. General anesthesia was induced. The patient was then prepped and draped  in the usual sterile fashion. A time-out was then performed. We first started with a supraumbilical incision.   Penetrating towel clip  was used to elevate the abdominal wall, and a Veress needle was inserted  in the abdominal cavity. The abdomen was then insufflated with carbon  dioxide gas to a pressure of 15 mmHg. A 5-mm trocar was then placed. There was no evidence of any injury with our Veress or trocar placement. Then under direct visualization, we placed 5-mm trocar in the left upper  quadrant, and a 5-mm and 12-mm trocar in the right upper quadrant. We  then took a look underneath the liver at the stomach. We were able to  see the distal stomach was still in the abdominal cavity. Due to the  patient's morbid obesity and very large omentum, we had difficulty  visualizing the diaphragmatic defect. However, we were able to grasp  the stomach and bring it further into the abdominal cavity, thereby  reducing the hiatal hernia or the majority of it. We tested the  stomach, and we were able to bring it out to the anterior abdominal  wall. Then, few fingerbreadths inferior to the left subcostal margin,  we made incision. A 5-mm trocar was then placed, and grasper was then  brought out through that trocar in order to grasp a 24-Swiss HADLEY  gastrostomy tube. This was then introduced in the abdominal cavity. We  then created a gastrostomy on the patient's anterior stomach. This was  done using electrocautery and blunt dissection. Once we were in, we  placed the 24-Swiss HADLEY gastrostomy tube into the patient's stomach,  and the balloon was blown up and was easily mobile. We then placed two  pursestring sutures using Endo Stitch with 2-0 Surgidac sutures in order  to perform a Justyna gastrostomy. This tightened well around the tube. Again, the balloon was freely mobile. We then placed four 0 Vicryl  sutures in the cardinal direction points and brought them out through  the abdominal wall with transfascial sutures using a [employer] suture  passer. This was able to secure the stomach up to the anterior  abdominal wall.   We allowed the abdomen to deflate prior to lifting the  balloon and tying off four sutures down. When we reinsufflated, the  stomach was well pexied up to the anterior abdominal wall and again the  HADLEY gastrostomy tube was able to move in and out easily and was not  sutured with any of our gastropexy. At this point, we inspected the  abdominal cavity. There was no evidence of any ongoing bleeding. We  then removed our 12-mm trocar using Ranfac suture passer and 0 Vicryl  suture with a figure-of-eight in order to reapproximate the fascial  edges. Exparel was then injected at the different incision sites, and  the abdomen was allowed to deflate. The incisions were closed with 4-0  Vicryl suture and Skin Affix was placed over top. Dry dressing was  placed at the gastrostomy tube site. An abdominal binder was placed  around the abdominal cavity to help prevent the patient from pulling on  his new gastrostomy tube. Overall, the patient tolerated the procedure  well and was taken to recovery room in stable condition. FINDINGS:  Significant intra-abdominal omental fat and hiatal hernia  with majority of stomach and chest.    ESTIMATED BLOOD LOSS:  Less than 50 mL. COMPLICATIONS:  None. SPECIMEN:  None.         Evan Coto MD    D: 08/31/2022 15:38:11       T: 08/31/2022 16:53:10     [id number]  Job#: [id number]     Doc#: [id number]    CC:

## 2022-09-01 NOTE — PROGRESS NOTES
Physical Therapy  Daily Treatment Attempt and Discharge    22    Name: Arden Fisher   : 1960    MRN: [id number]    Pt noted to be transferred to ICU on 22. He is now intubated. PT signing off. Please re-order PT when deemed medically appropriate.     Electronically signed by Odalis Steele PT on 2022 at 7:22 AM

## 2022-09-02 LAB
ALBUMIN SERPL-MCNC: 2.5 G/DL (ref 3.4–5)
ANION GAP SERPL CALCULATED.3IONS-SCNC: 7 MMOL/L (ref 3–16)
BASE EXCESS ARTERIAL: 5.1 MMOL/L (ref -3–3)
BASOPHILS ABSOLUTE: 0 K/UL (ref 0–0.2)
BASOPHILS RELATIVE PERCENT: 0.7 %
BUN BLDV-MCNC: 27 MG/DL (ref 7–20)
CALCIUM SERPL-MCNC: 7.8 MG/DL (ref 8.3–10.6)
CARBOXYHEMOGLOBIN ARTERIAL: 0.6 % (ref 0–1.5)
CHLORIDE BLD-SCNC: 106 MMOL/L (ref 99–110)
CO2: 28 MMOL/L (ref 21–32)
CREAT SERPL-MCNC: 0.9 MG/DL (ref 0.8–1.3)
EOSINOPHILS ABSOLUTE: 0.1 K/UL (ref 0–0.6)
EOSINOPHILS RELATIVE PERCENT: 1.3 %
GFR AFRICAN AMERICAN: >60
GFR NON-AFRICAN AMERICAN: >60
GLUCOSE BLD-MCNC: 115 MG/DL (ref 70–99)
GLUCOSE BLD-MCNC: 125 MG/DL (ref 70–99)
GLUCOSE BLD-MCNC: 82 MG/DL (ref 70–99)
GLUCOSE BLD-MCNC: 86 MG/DL (ref 70–99)
GLUCOSE BLD-MCNC: 87 MG/DL (ref 70–99)
GLUCOSE BLD-MCNC: 87 MG/DL (ref 70–99)
GLUCOSE BLD-MCNC: 92 MG/DL (ref 70–99)
GLUCOSE BLD-MCNC: 93 MG/DL (ref 70–99)
HCO3 ARTERIAL: 29 MMOL/L (ref 21–29)
HCT VFR BLD CALC: 29.6 % (ref 40.5–52.5)
HEMOGLOBIN, ART, EXTENDED: 11.9 G/DL (ref 13.5–17.5)
HEMOGLOBIN: 9.8 G/DL (ref 13.5–17.5)
LYMPHOCYTES ABSOLUTE: 1.4 K/UL (ref 1–5.1)
LYMPHOCYTES RELATIVE PERCENT: 18.5 %
MAGNESIUM: 2 MG/DL (ref 1.8–2.4)
MCH RBC QN AUTO: 30.8 PG (ref 26–34)
MCHC RBC AUTO-ENTMCNC: 33.2 G/DL (ref 31–36)
MCV RBC AUTO: 93 FL (ref 80–100)
METHEMOGLOBIN ARTERIAL: 0.4 %
MONOCYTES ABSOLUTE: 0.6 K/UL (ref 0–1.3)
MONOCYTES RELATIVE PERCENT: 8.3 %
NEUTROPHILS ABSOLUTE: 5.3 K/UL (ref 1.7–7.7)
NEUTROPHILS RELATIVE PERCENT: 71.2 %
O2 SAT, ARTERIAL: 94.7 %
O2 THERAPY: ABNORMAL
PCO2 ARTERIAL: 39.4 MMHG (ref 35–45)
PDW BLD-RTO: 17.6 % (ref 12.4–15.4)
PERFORMED ON: ABNORMAL
PERFORMED ON: ABNORMAL
PERFORMED ON: NORMAL
PH ARTERIAL: 7.48 (ref 7.35–7.45)
PHOSPHORUS: 3 MG/DL (ref 2.5–4.9)
PLATELET # BLD: 220 K/UL (ref 135–450)
PMV BLD AUTO: 8.2 FL (ref 5–10.5)
PO2 ARTERIAL: 69.7 MMHG (ref 75–108)
POTASSIUM SERPL-SCNC: 3.7 MMOL/L (ref 3.5–5.1)
RBC # BLD: 3.18 M/UL (ref 4.2–5.9)
SODIUM BLD-SCNC: 141 MMOL/L (ref 136–145)
TCO2 ARTERIAL: 30.2 MMOL/L
WBC # BLD: 7.4 K/UL (ref 4–11)

## 2022-09-02 PROCEDURE — 6360000002 HC RX W HCPCS: Performed by: INTERNAL MEDICINE

## 2022-09-02 PROCEDURE — 2580000003 HC RX 258: Performed by: NURSE PRACTITIONER

## 2022-09-02 PROCEDURE — 6370000000 HC RX 637 (ALT 250 FOR IP): Performed by: SURGERY

## 2022-09-02 PROCEDURE — 99024 POSTOP FOLLOW-UP VISIT: CPT | Performed by: SURGERY

## 2022-09-02 PROCEDURE — 2000000000 HC ICU R&B

## 2022-09-02 PROCEDURE — C9113 INJ PANTOPRAZOLE SODIUM, VIA: HCPCS | Performed by: NURSE PRACTITIONER

## 2022-09-02 PROCEDURE — 94761 N-INVAS EAR/PLS OXIMETRY MLT: CPT

## 2022-09-02 PROCEDURE — 82803 BLOOD GASES ANY COMBINATION: CPT

## 2022-09-02 PROCEDURE — 99291 CRITICAL CARE FIRST HOUR: CPT | Performed by: INTERNAL MEDICINE

## 2022-09-02 PROCEDURE — 94640 AIRWAY INHALATION TREATMENT: CPT

## 2022-09-02 PROCEDURE — 2580000003 HC RX 258: Performed by: SURGERY

## 2022-09-02 PROCEDURE — 6360000002 HC RX W HCPCS: Performed by: SURGERY

## 2022-09-02 PROCEDURE — APPNB15 APP NON BILLABLE TIME 0-15 MINS: Performed by: NURSE PRACTITIONER

## 2022-09-02 PROCEDURE — 6360000002 HC RX W HCPCS: Performed by: NURSE PRACTITIONER

## 2022-09-02 PROCEDURE — 6370000000 HC RX 637 (ALT 250 FOR IP): Performed by: NURSE PRACTITIONER

## 2022-09-02 PROCEDURE — 85025 COMPLETE CBC W/AUTO DIFF WBC: CPT

## 2022-09-02 PROCEDURE — 2580000003 HC RX 258: Performed by: INTERNAL MEDICINE

## 2022-09-02 PROCEDURE — 94003 VENT MGMT INPAT SUBQ DAY: CPT

## 2022-09-02 PROCEDURE — 94660 CPAP INITIATION&MGMT: CPT

## 2022-09-02 PROCEDURE — 80069 RENAL FUNCTION PANEL: CPT

## 2022-09-02 PROCEDURE — 83735 ASSAY OF MAGNESIUM: CPT

## 2022-09-02 PROCEDURE — APPSS15 APP SPLIT SHARED TIME 0-15 MINUTES: Performed by: NURSE PRACTITIONER

## 2022-09-02 PROCEDURE — 2700000000 HC OXYGEN THERAPY PER DAY

## 2022-09-02 RX ORDER — TORSEMIDE 20 MG/1
20 TABLET ORAL DAILY
Status: DISCONTINUED | OUTPATIENT
Start: 2022-09-03 | End: 2022-09-07 | Stop reason: HOSPADM

## 2022-09-02 RX ORDER — ENOXAPARIN SODIUM 100 MG/ML
30 INJECTION SUBCUTANEOUS 2 TIMES DAILY
Status: DISCONTINUED | OUTPATIENT
Start: 2022-09-02 | End: 2022-09-07 | Stop reason: HOSPADM

## 2022-09-02 RX ADMIN — ENOXAPARIN SODIUM 30 MG: 100 INJECTION SUBCUTANEOUS at 14:46

## 2022-09-02 RX ADMIN — GABAPENTIN 400 MG: 400 CAPSULE ORAL at 08:04

## 2022-09-02 RX ADMIN — CELECOXIB 200 MG: 200 CAPSULE ORAL at 20:16

## 2022-09-02 RX ADMIN — PROPOFOL 20 MCG/KG/MIN: 10 INJECTION, EMULSION INTRAVENOUS at 04:09

## 2022-09-02 RX ADMIN — ASPIRIN 81 MG CHEWABLE TABLET 81 MG: 81 TABLET CHEWABLE at 08:03

## 2022-09-02 RX ADMIN — LEVOTHYROXINE SODIUM 200 MCG: 0.1 TABLET ORAL at 05:26

## 2022-09-02 RX ADMIN — METHYLPREDNISOLONE SODIUM SUCCINATE 20 MG: 40 INJECTION, POWDER, FOR SOLUTION INTRAMUSCULAR; INTRAVENOUS at 08:03

## 2022-09-02 RX ADMIN — IPRATROPIUM BROMIDE AND ALBUTEROL SULFATE 1 AMPULE: .5; 3 SOLUTION RESPIRATORY (INHALATION) at 08:37

## 2022-09-02 RX ADMIN — SODIUM CHLORIDE, PRESERVATIVE FREE 10 ML: 5 INJECTION INTRAVENOUS at 08:00

## 2022-09-02 RX ADMIN — METOPROLOL TARTRATE 25 MG: 25 TABLET, FILM COATED ORAL at 08:03

## 2022-09-02 RX ADMIN — TORSEMIDE 40 MG: 20 TABLET ORAL at 08:03

## 2022-09-02 RX ADMIN — GABAPENTIN 400 MG: 400 CAPSULE ORAL at 20:16

## 2022-09-02 RX ADMIN — FOLIC ACID 1 MG: 1 TABLET ORAL at 08:03

## 2022-09-02 RX ADMIN — ENOXAPARIN SODIUM 30 MG: 100 INJECTION SUBCUTANEOUS at 20:16

## 2022-09-02 RX ADMIN — SODIUM CHLORIDE, PRESERVATIVE FREE 10 ML: 5 INJECTION INTRAVENOUS at 20:17

## 2022-09-02 RX ADMIN — GABAPENTIN 400 MG: 400 CAPSULE ORAL at 14:47

## 2022-09-02 RX ADMIN — HEPARIN SODIUM 5000 UNITS: 5000 INJECTION INTRAVENOUS; SUBCUTANEOUS at 05:31

## 2022-09-02 RX ADMIN — CHLORHEXIDINE GLUCONATE 15 ML: 1.2 RINSE ORAL at 08:04

## 2022-09-02 RX ADMIN — THIAMINE HYDROCHLORIDE 100 MG: 100 INJECTION, SOLUTION INTRAMUSCULAR; INTRAVENOUS at 14:48

## 2022-09-02 RX ADMIN — Medication 40 MG: at 08:03

## 2022-09-02 RX ADMIN — CELECOXIB 200 MG: 200 CAPSULE ORAL at 08:04

## 2022-09-02 ASSESSMENT — PULMONARY FUNCTION TESTS
PIF_VALUE: 22
PIF_VALUE: 29
PIF_VALUE: 22
PIF_VALUE: 24
PIF_VALUE: 29
PIF_VALUE: 24
PIF_VALUE: 22
PIF_VALUE: 28
PIF_VALUE: 36
PIF_VALUE: 27

## 2022-09-02 ASSESSMENT — PAIN SCALES - GENERAL
PAINLEVEL_OUTOF10: 0

## 2022-09-02 NOTE — PLAN OF CARE
Problem: Discharge Planning  Goal: Discharge to home or other facility with appropriate resources  9/2/2022 0424 by Shannon Park RN  Outcome: Progressing  Flowsheets (Taken 9/1/2022 2000)  Discharge to home or other facility with appropriate resources:   Identify barriers to discharge with patient and caregiver   Arrange for needed discharge resources and transportation as appropriate  9/1/2022 1758 by Afshin Weaver RN  Outcome: Progressing     Problem: Pain  Goal: Verbalizes/displays adequate comfort level or baseline comfort level  9/2/2022 0424 by Shannon Park RN  Outcome: Progressing  9/1/2022 1758 by Afshin Weaver RN  Outcome: Progressing     Problem: ABCDS Injury Assessment  Goal: Absence of physical injury  9/2/2022 0424 by Shannon Park RN  Outcome: Progressing  Flowsheets (Taken 9/1/2022 2030)  Absence of Physical Injury: Implement safety measures based on patient assessment  9/1/2022 1758 by Afshin Weaver RN  Outcome: Progressing     Problem: Safety - Adult  Goal: Free from fall injury  9/2/2022 0424 by Shannon Park RN  Outcome: Progressing  [address] (Taken 9/1/2022 2030)  Free From Fall Injury: Instruct family/caregiver on patient safety  9/1/2022 1758 by Afshin Weaver RN  Outcome: Progressing     Problem: Skin/Tissue Integrity  Goal: Absence of new skin breakdown  Description: 1. Monitor for areas of redness and/or skin breakdown  2. Assess vascular access sites hourly  9/2/2022 0424 by Shannon Park RN  Outcome: Progressing  9/1/2022 1758 by Afshin Weaver RN  Outcome: Progressing     Problem: Safety - Medical Restraint  Goal: Remains free of injury from restraints (Restraint for Interference with Medical Device)  Description: INTERVENTIONS:  1. Determine that other, less restrictive measures have been tried or would not be effective before applying the restraint  2. Evaluate the patient's condition at the time of restraint application  3.  Inform patient/family regarding the reason for restraint  4.  Q2H: Monitor safety, psychosocial status, comfort, nutrition and hydration  9/2/2022 0424 by Mami Ruvalcaba RN  Outcome: Progressing  Flowsheets  Taken 9/1/2022 2000 by Mami Ruvalcaba RN  Remains free of injury from restraints (restraint for interference with medical device):   Determine that other, less restrictive measures have been tried or would not be effective before applying the restraint   Inform patient/family regarding the reason for restraint  Taken 9/1/2022 1800 by Mari Burgess RN  Remains free of injury from restraints (restraint for interference with medical device):   Determine that other, less restrictive measures have been tried or would not be effective before applying the restraint   Evaluate the patient's condition at the time of restraint application   Inform patient/family regarding the reason for restraint   Every 2 hours: Monitor safety, psychosocial status, comfort, nutrition and hydration  9/1/2022 1758 by Mari Burgess RN  Outcome: Progressing  Flowsheets  Taken 9/1/2022 1600  Remains free of injury from restraints (restraint for interference with medical device):   Determine that other, less restrictive measures have been tried or would not be effective before applying the restraint   Evaluate the patient's condition at the time of restraint application   Inform patient/family regarding the reason for restraint   Every 2 hours: Monitor safety, psychosocial status, comfort, nutrition and hydration  Taken 9/1/2022 1400  Remains free of injury from restraints (restraint for interference with medical device):   Determine that other, less restrictive measures have been tried or would not be effective before applying the restraint   Evaluate the patient's condition at the time of restraint application   Inform patient/family regarding the reason for restraint   Every 2 hours: Monitor safety, psychosocial status, comfort, nutrition and hydration  Taken 9/1/2022 1200  Remains free of injury from restraints (restraint for interference with medical device):   Determine that other, less restrictive measures have been tried or would not be effective before applying the restraint   Evaluate the patient's condition at the time of restraint application   Inform patient/family regarding the reason for restraint   Every 2 hours: Monitor safety, psychosocial status, comfort, nutrition and hydration  Taken 9/1/2022 1000  Remains free of injury from restraints (restraint for interference with medical device):   Determine that other, less restrictive measures have been tried or would not be effective before applying the restraint   Evaluate the patient's condition at the time of restraint application   Inform patient/family regarding the reason for restraint   Every 2 hours: Monitor safety, psychosocial status, comfort, nutrition and hydration  Taken 9/1/2022 0804  Remains free of injury from restraints (restraint for interference with medical device):   Determine that other, less restrictive measures have been tried or would not be effective before applying the restraint   Evaluate the patient's condition at the time of restraint application   Inform patient/family regarding the reason for restraint   Every 2 hours: Monitor safety, psychosocial status, comfort, nutrition and hydration     Problem: Nutrition Deficit:  Goal: Optimize nutritional status  9/2/2022 0424 by Jacklyn Pascal, RN  Outcome: Progressing  9/1/2022 1758 by Angelica Mccormack RN  Outcome: Progressing     Problem: Neurosensory - Adult  Goal: Achieves stable or improved neurological status  9/2/2022 0424 by Jacklyn Pascal RN  Outcome: Progressing  Flowsheets (Taken 9/1/2022 2000)  Achieves stable or improved neurological status:   Assess for and report changes in neurological status   Maintain blood pressure and fluid volume within ordered parameters to optimize cerebral perfusion and minimize risk of hemorrhage  9/1/2022 1758 by Erick Rene RN  Outcome: Progressing  Goal: Absence of seizures  9/2/2022 0424 by Kel Gunderson RN  Outcome: Progressing  Flowsheets (Taken 9/1/2022 2000)  Absence of seizures: Monitor for seizure activity.   If seizure occurs, document type and location of movements and any associated apnea  9/1/2022 1758 by Erick Rene RN  Outcome: Progressing  Goal: Remains free of injury related to seizures activity  9/2/2022 0424 by Kel Gunderson RN  Outcome: Progressing  Flowsheets (Taken 9/1/2022 2000)  Remains free of injury related to seizure activity:   Maintain airway, patient safety  and administer oxygen as ordered   Monitor patient for seizure activity, document and report duration and description of seizure to Licensed Independent Practitioner  9/1/2022 1758 by Erick Rene RN  Outcome: Progressing  Goal: Achieves maximal functionality and self care  9/2/2022 0424 by Kel Gunderson RN  Outcome: Progressing  Flowsheets (Taken 9/1/2022 2000)  Achieves maximal functionality and self care:   Monitor swallowing and airway patency with patient fatigue and changes in neurological status   Encourage and assist patient to increase activity and self care with guidance from physical therapy/occupational therapy  9/1/2022 1758 by Erick Rene RN  Outcome: Progressing     Problem: Respiratory - Adult  Goal: Achieves optimal ventilation and oxygenation  9/2/2022 0424 by Kel Gunderson RN  Outcome: Progressing  Flowsheets (Taken 9/1/2022 2000)  Achieves optimal ventilation and oxygenation:   Assess for changes in respiratory status   Assess for changes in mentation and behavior   Position to facilitate oxygenation and minimize respiratory effort   Assess the need for suctioning and aspirate as needed   Respiratory therapy support as indicated  9/1/2022 1758 by Erick Rene RN  Outcome: Progressing     Problem: Cardiovascular - Adult  Goal: Maintains optimal cardiac output and hemodynamic stability  Outcome: Progressing  Flowsheets (Taken 9/1/2022 2000)  Maintains optimal cardiac output and hemodynamic stability:   Monitor blood pressure and heart rate   Assess for signs of decreased cardiac output   Administer fluid and/or volume expanders as ordered  Goal: Absence of cardiac dysrhythmias or at baseline  9/2/2022 0424 by Pennie Ruvalcaba RN  Outcome: Progressing  Flowsheets (Taken 9/1/2022 2000)  Absence of cardiac dysrhythmias or at baseline:   Monitor cardiac rate and rhythm   Assess for signs of decreased cardiac output  9/1/2022 1758 by Xochilt Tobin RN  Outcome: Progressing     Problem: Skin/Tissue Integrity - Adult  Goal: Skin integrity remains intact  9/2/2022 0424 by Pennie Ruvalcaba RN  Outcome: Progressing  Flowsheets (Taken 9/1/2022 2000)  Skin Integrity Remains Intact: Monitor for areas of redness and/or skin breakdown  9/1/2022 1758 by Xochilt Tobin RN  Outcome: Progressing  Goal: Incisions, wounds, or drain sites healing without S/S of infection  9/2/2022 0424 by Pennie Ruvalcaba RN  Outcome: Progressing  Flowsheets (Taken 9/1/2022 2000)  Incisions, Wounds, or Drain Sites Healing Without Sign and Symptoms of Infection:   TWICE DAILY: Assess and document skin integrity   TWICE DAILY: Assess and document dressing/incision, wound bed, drain sites and surrounding tissue   Initiate pressure ulcer prevention bundle as indicated  9/1/2022 1758 by Xochilt Tobin RN  Outcome: Progressing  Goal: Oral mucous membranes remain intact  9/2/2022 0424 by Pennie Ruvalcaba RN  Outcome: Progressing  Flowsheets (Taken 9/1/2022 2000)  Oral Mucous Membranes Remain Intact:   Assess oral mucosa and hygiene practices   Implement preventative oral hygiene regimen  9/1/2022 1758 by Xochilt Tobin RN  Outcome: Progressing     Problem: Musculoskeletal - Adult  Goal: Return mobility to safest level of function  Outcome: Progressing  Flowsheets (Taken 9/1/2022 2000)  Return Mobility to Safest Level of Function: Ensure adequate protection for wounds/incisions during mobilization  Goal: Maintain proper alignment of affected body part  9/2/2022 0424 by Iker Porras RN  Outcome: Progressing  Flowsheets (Taken 9/1/2022 2000)  Maintain proper alignment of affected body part: Support and protect limb and body alignment per provider's orders  9/1/2022 1758 by Narcisa Gallego RN  Outcome: Progressing  Goal: Return ADL status to a safe level of function  Outcome: Progressing  Flowsheets (Taken 9/1/2022 2000)  Return ADL Status to a Safe Level of Function: Assist and instruct patient to increase activity and self care as tolerated     Problem: Gastrointestinal - Adult  Goal: Minimal or absence of nausea and vomiting  Outcome: Progressing  Flowsheets (Taken 9/1/2022 2000)  Minimal or absence of nausea and vomiting: Provide nonpharmacologic comfort measures as appropriate  Goal: Maintains or returns to baseline bowel function  9/2/2022 0424 by Iker Porras RN  Outcome: Progressing  Flowsheets (Taken 9/1/2022 2000)  Maintains or returns to baseline bowel function:   Assess bowel function   Administer ordered medications as needed  9/1/2022 1758 by Narcisa Gallego RN  Outcome: Progressing  Goal: Maintains adequate nutritional intake  9/2/2022 0424 by Iker Porras RN  Outcome: Progressing  Flowsheets (Taken 9/1/2022 2000)  Maintains adequate nutritional intake: Identify factors contributing to decreased intake, treat as appropriate  9/1/2022 1758 by Narcisa Gallego RN  Outcome: Progressing  Goal: Establish and maintain optimal ostomy function  Outcome: Progressing     Problem: Genitourinary - Adult  Goal: Absence of urinary retention  9/2/2022 0424 by Iker Porras RN  Outcome: Progressing  Flowsheets (Taken 9/1/2022 2000)  Absence of urinary retention: Assess patients ability to void and empty bladder  9/1/2022 1758 by Narcisa Gallego RN  Outcome: Progressing  Goal: Urinary catheter remains patent  9/2/2022 0424 by Peg Martinez RN  Outcome: Progressing  Flowsheets (Taken 9/1/2022 2000)  Urinary catheter remains patent: Assess patency of urinary catheter  9/1/2022 1758 by Vani Lopez RN  Outcome: Progressing     Problem: Anxiety  Goal: Will report anxiety at manageable levels  Description: INTERVENTIONS:  1. Administer medication as ordered  2. Teach and rehearse alternative coping skills  3. Provide emotional support with 1:1 interaction with staff  Outcome: Progressing  Flowsheets (Taken 9/1/2022 2000)  Will report anxiety at manageable levels: Provide emotional support with 1:1 interaction with staff     Problem: Coping  Goal: Pt/Family able to verbalize concerns and demonstrate effective coping strategies  Description: INTERVENTIONS:  1. Assist patient/family to identify coping skills, available support systems and cultural and spiritual values  2. Provide emotional support, including active listening and acknowledgement of concerns of patient and caregivers  3. Reduce environmental stimuli, as able  4. Instruct patient/family in relaxation techniques, as appropriate  5. Assess for spiritual pain/suffering and initiate Spiritual Care, Psychosocial Clinical Specialist consults as needed  9/2/2022 0424 by Peg Martinez RN  Outcome: Progressing  Flowsheets (Taken 9/1/2022 2000)  Patient/family able to verbalize anxieties, fears, and concerns, and demonstrate effective coping: Assist patient/family to identify coping skills, available support systems and cultural and spiritual values  9/1/2022 1758 by Vani Lopez RN  Outcome: Progressing     Problem: Change in Body Image  Goal: Pt/Family communicate acceptance of loss or change in body image and feel psychological comfort and peace  Description: INTERVENTIONS:  1. Assess patient/family anxiety and grief process related to change in body image, loss of functional status, loss of sense of self, and forgiveness  2.  Provide emotional and spiritual support  3. Provide information about the patient's health status with consideration of family and cultural values  4. Communicate willingness to discuss loss and facilitate grief process with patient/family as appropriate  5. Emphasize sustaining relationships within family system and community, or [place]/spiritual traditions  6. Initiate Spiritual Care, Psychosocial Clinical Specialist consult as needed  Outcome: Progressing  Flowsheets (Taken 9/1/2022 2000)  Patient/family communicate acceptance of loss or change in body image and feel psychological comfort and peace:   Assess patient/family anxiety and grief process related to change in body image, loss of functional status, loss of sense of self, and forgiveness   Provide emotional and spiritual support     Problem: Decision Making  Goal: Pt/Family able to effectively weigh alternatives and participate in decision making related to treatment and care  Description: INTERVENTIONS:  1. Determine when there are differences between patient's view, family's view, and healthcare provider's view of condition  2. Facilitate patient and family articulation of goals for care  3. Help patient and family identify pros/cons of alternative solutions  4. Provide information as requested by patient/family  5. Respect patient/family right to receive or not to receive information  6. Serve as a liaison between patient and family and health care team  7. Initiate Consults from Ethics, Palliative Care or initiate [facility] as is appropriate  Outcome: Progressing  Flowsheets (Taken 9/1/2022 2000)  Patient/family able to effectively weigh alternatives and participate in decision making related to treatment and care:    Facilitate patient and family articulation of goals for care   Help patient and family identify pros/cons of alternative solutions   Provide information as requested by patient/family     Problem: Behavior  Goal: Pt/Family maintain appropriate behavior and adhere to behavioral management agreement, if implemented  Description: INTERVENTIONS:  1. Assess patient/family's coping skills and  non-compliant behavior (including use of illegal substances)  2. Notify security of behavior or suspected illegal substances which indicate the need for search of the family and/or belongings  3. Encourage verbalization of thoughts and concerns in a socially appropriate manner  4. Utilize positive, consistent limit setting strategies supporting safety of patient, staff and others  5. Encourage participation in the decision making process about the behavioral management agreement  6. If a visitor's behavior poses a threat to safety call refer to organization policy.   7. Initiate consult with , Psychosocial CNS, Spiritual Care as appropriate  9/2/2022 0424 by Ceasar Gilbert, RN  Outcome: Progressing  [address] (Taken 9/1/2022 2000)  Patient/family maintains appropriate behavior and adheres to behavioral management agreement, if implemented:   Encourage verbalization of thoughts and concerns in a socially appropriate manner   Utilize positive, consistent limit setting strategies supporting safety of patient, staff and others  9/1/2022 9605 by Jessica Ortega RN  Outcome: Progressing

## 2022-09-02 NOTE — PROGRESS NOTES
Pulmonary Critical Care Progress Note     Patient's name:  Aneta Hadley Record Number: [id number]  Patient's account/billing number: [de-identified]  Patient's YOB: 1960  Age: 63 y.o. Date of Admission: 8/5/2022  1:56 PM  Date of Consult: 9/2/2022      Primary Care Physician: Erwin Vieyra MD      Code Status: Full Code    Chief complaint: Acute respiratory failure with hypoxia    Assessment and Plan     Acute respiratory failure hypoxia status post intubation 8/31/2022 on mechanical ventilation. EARNESTINE/obesity ventilation syndrome. Recent aspiration/organizing pneumonia. Small bowel obstruction. Dysphagia status post laparoscopic reduction of hiatal hernia with PEG tube placement. Plan:  Vent support, SBT will extubate if tolerated to a BiPAP. BiPAP nightly  Advance tube feeding to goal.  Diuresis  Prednisone 20 mg p.o. daily. GI and DVT prophylaxis. Discussed with wife. Overnight:  On spontaneous currently 50% and PEEP of 10. REVIEW OF SYSTEMS:  Review of Systems -   Unable to obtain sedated on mechanical ventilation. Physical Exam:    Vitals: /80   Pulse 64   Temp 98.1 °F (36.7 °C) (Axillary)   Resp 14   Ht 5' 7\" (1.702 m)   Wt (!) 330 lb 14.4 oz (150.1 kg)   SpO2 91%   BMI 51.83 kg/m²     Last Body weight:   Wt Readings from Last 3 Encounters:   09/02/22 (!) 330 lb 14.4 oz (150.1 kg)       Body Mass Index : Body mass index is 51.83 kg/m². Intake and Output summary:   Intake/Output Summary (Last 24 hours) at 9/2/2022 1133  Last data filed at 9/2/2022 1031  Gross per 24 hour   Intake 1325.94 ml   Output 6401 ml   Net -5075.06 ml       Physical Examination:     Gen:  No acute distress. Mechanical ventilation  Eyes: PERRL. Anicteric sclera. No conjunctival injection. ENT: No discharge. Posterior oropharynx clear. External appearance of ears and nose normal.  Neck: Trachea midline.  No mass   Resp: Diminished bilaterally with scattered rhonchi  CV: Regular rate. Regular rhythm. No murmur or rub. No edema. GI: Soft, Non-tender. Non-distended. +BS  Skin: Warm, dry, w/o erythema. Lymph: No cervical or supraclavicular LAD. M/S: No cyanosis. No clubbing. Neuro: Sedated on mechanical ventilation no focal deficit        Laboratory findings:-    CBC:   Recent Labs     09/02/22  0400   WBC 7.4   HGB 9.8*        BMP:    Recent Labs     08/31/22  0653 09/01/22  0429 09/02/22  0400    138 141   K 3.8 4.5 3.7    107 106   CO2 24 24 28   BUN 23* 23* 27*   CREATININE 0.6* 0.7* 0.9   GLUCOSE 113* 96 87     S. Calcium:  Recent Labs     09/02/22  0400   CALCIUM 7.8*       S. Magnesium:  Recent Labs     09/02/22  0400   MG 2.00     S. Phosphorus:  Recent Labs     09/02/22  0400   PHOS 3.0     S. Glucose:  Recent Labs     09/02/22  0004 09/02/22  0359 09/02/22  0736   POCGLU 93 82 86           Radiology Review:  Pertinent images / reports were reviewed as a part of this visit.     CXR reviewed  Bibasilar airspace disease     Critical CAre time 35 minutes                Maged Spencer MD, MHEMANTH.            9/2/2022, 11:33 AM

## 2022-09-02 NOTE — PROGRESS NOTES
Pt extubated per Dr. Nusrat Black verbal order. Pt tolerated well. Placed on Bipap 12/6 50%. SpO2 91%  Pt tolerating well. Will continue to monitor.    Electronically signed by Xavier Billingsley RCP on 9/2/2022 at 10:37 AM

## 2022-09-02 NOTE — PROGRESS NOTES
Speech Language Pathology      Speech Therapy note regarding Dysphagia therapy    Chart reviewed 9/2/2022 and discussed with RN at 1252 pm . Pt was extubated 9/2/2022 at 1035 am; but pt is on BiPAP. Plan hold 9/2/2022 due to respiratory status. RE-attempt over the weekend. If pt is d/c'd to select over the weekend; would like SLP orders for continued at [address]. [place],MS,CCC,SLP [id number]  Speech and Language Pathologist  9/2/2022 at 1253 pm

## 2022-09-02 NOTE — PLAN OF CARE
Problem: Discharge Planning  Goal: Discharge to home or other facility with appropriate resources  9/2/2022 1349 by Yuri Nolasco RN  Outcome: Progressing  Flowsheets (Taken 9/2/2022 4934)  Discharge to home or other facility with appropriate resources:   Identify barriers to discharge with patient and caregiver   Arrange for needed discharge resources and transportation as appropriate   Identify discharge learning needs (meds, wound care, etc)   Refer to discharge planning if patient needs post-hospital services based on physician order or complex needs related to functional status, cognitive ability or social support system  9/2/2022 0424 by Louise Moseley RN  Outcome: Progressing  Flowsheets (Taken 9/1/2022 2000)  Discharge to home or other facility with appropriate resources:   Identify barriers to discharge with patient and caregiver   Arrange for needed discharge resources and transportation as appropriate     Problem: Pain  Goal: Verbalizes/displays adequate comfort level or baseline comfort level  9/2/2022 1349 by Yuri Nolasco RN  Outcome: Progressing  Flowsheets (Taken 9/2/2022 0737)  Verbalizes/displays adequate comfort level or baseline comfort level:   Assess pain using appropriate pain scale   Encourage patient to monitor pain and request assistance   Administer analgesics based on type and severity of pain and evaluate response   Implement non-pharmacological measures as appropriate and evaluate response   Notify Licensed Independent Practitioner if interventions unsuccessful or patient reports new pain  9/2/2022 0424 by Louise Moseley RN  Outcome: Progressing     Problem: ABCDS Injury Assessment  Goal: Absence of physical injury  9/2/2022 1349 by Yuri Nolasco RN  Outcome: Progressing  Flowsheets (Taken 9/2/2022 0737)  Absence of Physical Injury: Implement safety measures based on patient assessment  9/2/2022 0424 by Louise Moseley RN  Outcome: Progressing  Flowsheets (Taken 9/1/2022 2030)  Absence of Physical Injury: Implement safety measures based on patient assessment     Problem: Safety - Adult  Goal: Free from fall injury  9/2/2022 1349 by Venice Lofton RN  Outcome: Progressing  Flowsheets (Taken 9/2/2022 0737)  Free From Fall Injury: Instruct family/caregiver on patient safety  9/2/2022 0424 by Kelley Hand RN  Outcome: Progressing  Flowsheets (Taken 9/1/2022 2030)  Free From Fall Injury: Instruct family/caregiver on patient safety     Problem: Skin/Tissue Integrity  Goal: Absence of new skin breakdown  Description: 1. Monitor for areas of redness and/or skin breakdown  2. Assess vascular access sites hourly  9/2/2022 1349 by Venice Lofton RN  Outcome: Progressing  9/2/2022 0424 by Kelley Hand RN  Outcome: Progressing     Problem: Safety - Medical Restraint  Goal: Remains free of injury from restraints (Restraint for Interference with Medical Device)  Description: INTERVENTIONS:  1. Determine that other, less restrictive measures have been tried or would not be effective before applying the restraint  2. Evaluate the patient's condition at the time of restraint application  3. Inform patient/family regarding the reason for restraint  4.  Q2H: Monitor safety, psychosocial status, comfort, nutrition and hydration  9/2/2022 1349 by Venice Lofton RN  Outcome: Progressing  Flowsheets (Taken 9/2/2022 0800)  Remains free of injury from restraints (restraint for interference with medical device):   Determine that other, less restrictive measures have been tried or would not be effective before applying the restraint   Inform patient/family regarding the reason for restraint  9/2/2022 0424 by Kelley Hand RN  Outcome: Progressing  Flowsheets  Taken 9/1/2022 2000 by Kelley Hand RN  Remains free of injury from restraints (restraint for interference with medical device):   Determine that other, less restrictive measures have been tried or would not be effective before applying the restraint   Inform patient/family regarding the reason for restraint  Taken 9/1/2022 1800 by Tiffani Rucker RN  Remains free of injury from restraints (restraint for interference with medical device):   Determine that other, less restrictive measures have been tried or would not be effective before applying the restraint   Evaluate the patient's condition at the time of restraint application   Inform patient/family regarding the reason for restraint   Every 2 hours: Monitor safety, psychosocial status, comfort, nutrition and hydration     Problem: Nutrition Deficit:  Goal: Optimize nutritional status  9/2/2022 1349 by Jean Rivera RN  Outcome: Progressing  9/2/2022 1134 by Fátima Kent, [address] [place] [address],   Outcome: Progressing  9/2/2022 0424 by Doni Diaz RN  Outcome: Progressing     Problem: Neurosensory - Adult  Goal: Achieves stable or improved neurological status  9/2/2022 1349 by Jean Rivera RN  Outcome: Progressing  Flowsheets (Taken 9/2/2022 0737)  Achieves stable or improved neurological status:   Assess for and report changes in neurological status   Maintain blood pressure and fluid volume within ordered parameters to optimize cerebral perfusion and minimize risk of hemorrhage  9/2/2022 0424 by Doni Diaz RN  Outcome: Progressing  Flowsheets (Taken 9/1/2022 2000)  Achieves stable or improved neurological status:   Assess for and report changes in neurological status   Maintain blood pressure and fluid volume within ordered parameters to optimize cerebral perfusion and minimize risk of hemorrhage  Goal: Absence of seizures  9/2/2022 1349 by Jean Rivera RN  Outcome: Progressing  Flowsheets (Taken 9/2/2022 0737)  Absence of seizures: Monitor for seizure activity.   If seizure occurs, document type and location of movements and any associated apnea  9/2/2022 0424 by Doni Diaz RN  Outcome: Progressing  Flowsheets (Taken 9/1/2022 2000)  Absence of seizures: Monitor for seizure activity.   If seizure occurs, document type and location of movements and any associated apnea  Goal: Remains free of injury related to seizures activity  9/2/2022 1349 by Gregg Green RN  Outcome: Progressing  Flowsheets (Taken 9/2/2022 1315)  Remains free of injury related to seizure activity:   Maintain airway, patient safety  and administer oxygen as ordered   Monitor patient for seizure activity, document and report duration and description of seizure to Licensed Independent Practitioner  9/2/2022 0424 by Tamela Marte RN  Outcome: Progressing  Flowsheets (Taken 9/1/2022 2000)  Remains free of injury related to seizure activity:   Maintain airway, patient safety  and administer oxygen as ordered   Monitor patient for seizure activity, document and report duration and description of seizure to Licensed Independent Practitioner  Goal: Achieves maximal functionality and self care  9/2/2022 1349 by Gregg Grene RN  Outcome: Progressing  Flowsheets (Taken 9/2/2022 0737)  Achieves maximal functionality and self care:   Monitor swallowing and airway patency with patient fatigue and changes in neurological status   Encourage and assist patient to increase activity and self care with guidance from physical therapy/occupational therapy  9/2/2022 0424 by Tamela Marte RN  Outcome: Progressing  Flowsheets (Taken 9/1/2022 2000)  Achieves maximal functionality and self care:   Monitor swallowing and airway patency with patient fatigue and changes in neurological status   Encourage and assist patient to increase activity and self care with guidance from physical therapy/occupational therapy     Problem: Respiratory - Adult  Goal: Achieves optimal ventilation and oxygenation  9/2/2022 1349 by Gregg Green RN  Outcome: Progressing  Flowsheets (Taken 9/2/2022 0737)  Achieves optimal ventilation and oxygenation:   Assess for changes in respiratory status   Assess for changes in mentation and behavior   Position to facilitate oxygenation and minimize respiratory effort   Assess the need for suctioning and aspirate as needed   Respiratory therapy support as indicated  9/2/2022 0424 by Alfredo Alvarado RN  Outcome: Progressing  Flowsheets (Taken 9/1/2022 2000)  Achieves optimal ventilation and oxygenation:   Assess for changes in respiratory status   Assess for changes in mentation and behavior   Position to facilitate oxygenation and minimize respiratory effort   Assess the need for suctioning and aspirate as needed   Respiratory therapy support as indicated     Problem: Cardiovascular - Adult  Goal: Maintains optimal cardiac output and hemodynamic stability  9/2/2022 1349 by Leida Byers RN  Outcome: Progressing  Flowsheets (Taken 9/2/2022 0737)  Maintains optimal cardiac output and hemodynamic stability:   Monitor blood pressure and heart rate   Assess for signs of decreased cardiac output   Administer fluid and/or volume expanders as ordered  9/2/2022 0424 by Alfredo Alvarado RN  Outcome: Progressing  Flowsheets (Taken 9/1/2022 2000)  Maintains optimal cardiac output and hemodynamic stability:   Monitor blood pressure and heart rate   Assess for signs of decreased cardiac output   Administer fluid and/or volume expanders as ordered  Goal: Absence of cardiac dysrhythmias or at baseline  9/2/2022 1349 by Leida Byers RN  Outcome: Progressing  Flowsheets (Taken 9/2/2022 0737)  Absence of cardiac dysrhythmias or at baseline:   Monitor cardiac rate and rhythm   Assess for signs of decreased cardiac output  9/2/2022 0424 by Alfredo Alvarado RN  Outcome: Progressing  Flowsheets (Taken 9/1/2022 2000)  Absence of cardiac dysrhythmias or at baseline:   Monitor cardiac rate and rhythm   Assess for signs of decreased cardiac output     Problem: Skin/Tissue Integrity - Adult  Goal: Skin integrity remains intact  9/2/2022 1349 by Leida Byers RN  Outcome: Progressing  Flowsheets (Taken 9/2/2022 1900)  Skin Integrity Remains Intact: Monitor for areas of redness and/or skin breakdown  9/2/2022 0424 by Katarzyna Russell RN  Outcome: Progressing  Flowsheets (Taken 9/1/2022 2000)  Skin Integrity Remains Intact: Monitor for areas of redness and/or skin breakdown  Goal: Incisions, wounds, or drain sites healing without S/S of infection  9/2/2022 1349 by Stormy Graves RN  Outcome: Progressing  Flowsheets (Taken 9/2/2022 0737)  Incisions, Wounds, or Drain Sites Healing Without Sign and Symptoms of Infection:   TWICE DAILY: Assess and document skin integrity   TWICE DAILY: Assess and document dressing/incision, wound bed, drain sites and surrounding tissue   Initiate pressure ulcer prevention bundle as indicated  9/2/2022 0424 by Katarzyna Russell RN  Outcome: Progressing  Flowsheets (Taken 9/1/2022 2000)  Incisions, Wounds, or Drain Sites Healing Without Sign and Symptoms of Infection:   TWICE DAILY: Assess and document skin integrity   TWICE DAILY: Assess and document dressing/incision, wound bed, drain sites and surrounding tissue   Initiate pressure ulcer prevention bundle as indicated  Goal: Oral mucous membranes remain intact  9/2/2022 1349 by Stormy Graves RN  Outcome: Progressing  Flowsheets (Taken 9/2/2022 0737)  Oral Mucous Membranes Remain Intact:   Assess oral mucosa and hygiene practices   Implement preventative oral hygiene regimen  9/2/2022 0424 by Katarzyna Russell RN  Outcome: Progressing  Flowsheets (Taken 9/1/2022 2000)  Oral Mucous Membranes Remain Intact:   Assess oral mucosa and hygiene practices   Implement preventative oral hygiene regimen     Problem: Musculoskeletal - Adult  Goal: Return mobility to safest level of function  9/2/2022 1349 by Stormy Graves RN  Outcome: Progressing  Flowsheets (Taken 9/2/2022 0737)  Return Mobility to Safest Level of Function: Ensure adequate protection for wounds/incisions during mobilization  9/2/2022 0424 by Katarzyna Russell RN  Outcome: Progressing  Flowsheets (Taken 9/1/2022 2000)  Return Mobility to Safest Level of Function: Ensure adequate protection for wounds/incisions during mobilization  Goal: Maintain proper alignment of affected body part  9/2/2022 1349 by Zayra Chambers RN  Outcome: Progressing  Flowsheets (Taken 9/2/2022 0737)  Maintain proper alignment of affected body part: Support and protect limb and body alignment per provider's orders  9/2/2022 0424 by Mami Ruvalcaba RN  Outcome: Progressing  Flowsheets (Taken 9/1/2022 2000)  Maintain proper alignment of affected body part: Support and protect limb and body alignment per provider's orders  Goal: Return ADL status to a safe level of function  9/2/2022 1349 by Zayra Chambers RN  Outcome: Progressing  Flowsheets (Taken 9/2/2022 0737)  Return ADL Status to a Safe Level of Function: Assist and instruct patient to increase activity and self care as tolerated  9/2/2022 0424 by Mami Ruvalcaba RN  Outcome: Progressing  Flowsheets (Taken 9/1/2022 2000)  Return ADL Status to a Safe Level of Function: Assist and instruct patient to increase activity and self care as tolerated     Problem: Gastrointestinal - Adult  Goal: Minimal or absence of nausea and vomiting  9/2/2022 1349 by Zayra Chambers RN  Outcome: Progressing  Flowsheets (Taken 9/2/2022 0737)  Minimal or absence of nausea and vomiting: Provide nonpharmacologic comfort measures as appropriate  9/2/2022 0424 by Mami Ruvalcaba RN  Outcome: Progressing  Flowsheets (Taken 9/1/2022 2000)  Minimal or absence of nausea and vomiting: Provide nonpharmacologic comfort measures as appropriate  Goal: Maintains or returns to baseline bowel function  9/2/2022 1349 by Zayra Chambers RN  Outcome: Progressing  Flowsheets (Taken 9/2/2022 0737)  Maintains or returns to baseline bowel function:   Assess bowel function   Administer ordered medications as needed  9/2/2022 0424 by Mami Ruvalcaba RN  Outcome: Progressing  Flowsheets (Taken 9/1/2022 2000)  Maintains or returns to baseline bowel function:   Assess bowel function   Administer ordered medications as needed  Goal: Maintains adequate nutritional intake  9/2/2022 1349 by Tomeka Norris RN  Outcome: Progressing  Flowsheets (Taken 9/2/2022 0737)  Maintains adequate nutritional intake: Identify factors contributing to decreased intake, treat as appropriate  9/2/2022 0424 by Wesley Lucero RN  Outcome: Progressing  Flowsheets (Taken 9/1/2022 2000)  Maintains adequate nutritional intake: Identify factors contributing to decreased intake, treat as appropriate  Goal: Establish and maintain optimal ostomy function  9/2/2022 1349 by Tomeka Norris RN  Outcome: Progressing  Flowsheets (Taken 9/2/2022 0737)  Establish and maintain optimal ostomy function:   Administer IV fluids and TPN as ordered   Nutrition consult   Gastric suctioning as ordered   Infuse IV Fluids/TPN as ordered  9/2/2022 0424 by Wesley Lucero RN  Outcome: Progressing     Problem: Genitourinary - Adult  Goal: Absence of urinary retention  9/2/2022 1349 by Tomeka Norris RN  Outcome: Progressing  Flowsheets (Taken 9/2/2022 0737)  Absence of urinary retention: Assess patients ability to void and empty bladder  9/2/2022 0424 by Wesley Lucero RN  Outcome: Progressing  Flowsheets (Taken 9/1/2022 2000)  Absence of urinary retention: Assess patients ability to void and empty bladder  Goal: Urinary catheter remains patent  9/2/2022 1349 by Tomeka Norris RN  Outcome: Progressing  Flowsheets (Taken 9/2/2022 0737)  Urinary catheter remains patent: Assess patency of urinary catheter  9/2/2022 0424 by Wesley Lucero RN  Outcome: Progressing  Flowsheets (Taken 9/1/2022 2000)  Urinary catheter remains patent: Assess patency of urinary catheter     Problem: Anxiety  Goal: Will report anxiety at manageable levels  Description: INTERVENTIONS:  1. Administer medication as ordered  2.  Teach and rehearse alternative coping skills  3. Provide emotional support with 1:1 interaction with staff  9/2/2022 1349 by Merleen Mcardle, RN  Outcome: Progressing  Flowsheets (Taken 9/2/2022 6821)  Will report anxiety at manageable levels:   Administer medication as ordered   Teach and rehearse alternative coping skills   Provide emotional support with 1:1 interaction with staff  9/2/2022 0424 by Gerardo Mcintosh RN  Outcome: Progressing  Flowsheets (Taken 9/1/2022 2000)  Will report anxiety at manageable levels: Provide emotional support with 1:1 interaction with staff     Problem: Coping  Goal: Pt/Family able to verbalize concerns and demonstrate effective coping strategies  Description: INTERVENTIONS:  1. Assist patient/family to identify coping skills, available support systems and cultural and spiritual values  2. Provide emotional support, including active listening and acknowledgement of concerns of patient and caregivers  3. Reduce environmental stimuli, as able  4. Instruct patient/family in relaxation techniques, as appropriate  5.  Assess for spiritual pain/suffering and initiate Spiritual Care, Psychosocial Clinical Specialist consults as needed  9/2/2022 1349 by Merleen Mcardle, RN  Outcome: Progressing  Flowsheets (Taken 9/2/2022 6636)  Patient/family able to verbalize anxieties, fears, and concerns, and demonstrate effective coping:   Assist patient/family to identify coping skills, available support systems and cultural and spiritual values   Provide emotional support, including active listening and acknowledgement of concerns of patient and caregivers   Reduce environmental stimuli, as able   Instruct patient/family in relaxation techniques, as appropriate  9/2/2022 0424 by Gerardo Mcintosh RN  Outcome: Progressing  Flowsheets (Taken 9/1/2022 2000)  Patient/family able to verbalize anxieties, fears, and concerns, and demonstrate effective coping: Assist patient/family to identify coping skills, available support systems and cultural and spiritual values     Problem: Change in Body Image  Goal: Pt/Family communicate acceptance of loss or change in body image and feel psychological comfort and peace  Description: INTERVENTIONS:  1. Assess patient/family anxiety and grief process related to change in body image, loss of functional status, loss of sense of self, and forgiveness  2. Provide emotional and spiritual support  3. Provide information about the patient's health status with consideration of family and cultural values  4. Communicate willingness to discuss loss and facilitate grief process with patient/family as appropriate  5. Emphasize sustaining relationships within family system and community, or aurora/spiritual traditions  6. Initiate Spiritual Care, Psychosocial Clinical Specialist consult as needed  9/2/2022 1349 by Abbe Schwab, RN  Outcome: Progressing  Flowsheets (Taken 9/2/2022 5749)  Patient/family communicate acceptance of loss or change in body image and feel psychological comfort and peace:   Assess patient/family anxiety and grief process related to change in body image, loss of functional status, loss of sense of self, and forgiveness   Provide emotional and spiritual support  9/2/2022 0424 by Peg Martinez RN  Outcome: Progressing  Flowsheets (Taken 9/1/2022 2000)  Patient/family communicate acceptance of loss or change in body image and feel psychological comfort and peace:   Assess patient/family anxiety and grief process related to change in body image, loss of functional status, loss of sense of self, and forgiveness   Provide emotional and spiritual support     Problem: Decision Making  Goal: Pt/Family able to effectively weigh alternatives and participate in decision making related to treatment and care  Description: INTERVENTIONS:  1. Determine when there are differences between patient's view, family's view, and healthcare provider's view of condition  2.  Facilitate patient and family articulation refer to organization policy.   7. Initiate consult with , Psychosocial CNS, Spiritual Care as appropriate  9/2/2022 1349 by Jocy Lorenz, RN  Outcome: Progressing  [address]h [address] (Taken 9/2/2022 5740)  Patient/family maintains appropriate behavior and adheres to behavioral management agreement, if implemented:   Encourage verbalization of thoughts and concerns in a socially appropriate manner   Utilize positive, consistent limit setting strategies supporting safety of patient, staff and others  9/2/2022 0424 by Alycia Sparks, RN  Outcome: Progressing  [address] (Taken 9/1/2022 2000)  Patient/family maintains appropriate behavior and adheres to behavioral management agreement, if implemented:   Encourage verbalization of thoughts and concerns in a socially appropriate manner   Utilize positive, consistent limit setting strategies supporting safety of patient, staff and others

## 2022-09-02 NOTE — PROGRESS NOTES
Pt extubated at 36 with assistance from RT. Placed on Bipap. Tolerated well. Tube feeds resumed at a rate of 15 mL/hr approximately 30 minutes after extubation. Pt remains stable, will continue to monitor.

## 2022-09-02 NOTE — PROGRESS NOTES
[facility]                                               Progress note    Summary:   Koki Stewart is being seen by nephrology for CHARLENE and hypernatremia. Admitted with paraesophageal hernia and SBO. Had EGD 8/9 with biopsy. Still NPO     Interval History  Seen at bedside  S/p PEG tube placement 8/31/2022  Respiratory status declined following the procedure requiring intubation. Now back in the ICU. BP is at goal.  Cr stable at 0.9  Made 4175 mL urine yesterday      Plan:   PO torsemide 40 mg with good UOP and aim for net even to net -ve fluid balance of 2.9 L yesterday and already -3.6 L today. Continue with diuresis but reduce to 2 0mg tomorrow and orders changed. BP stable 134/88. Brayan Mccauley, [address] [place] [address] Nephrology  Office: [phone]    Assessment:   Acute kidney injury  Urine Na < 20    creatinine on admission 1   next day on 08/06/2022 was 0.8  suddenly jumped up to 2.7. Due to episode of severe hypotension with blood pressure drop 78/44. also got CT with IV contrast on 08/05/2022 but most likely  this is ATN related due to hypotension and hypercapnic  respiratory failure. CT angio s normal renal arteries and normal kidneys      Sepsis/ possible septic shock. emperature was 101.1   High white cell count. '  immunocompromised due to rheumatoid arthritis and treatment. History of sleep apnea   on CPAP. Acute respiratory failure with high pCO2 in the range of 50s with pH  of 7.24    now intubated. History of abnormal stress test in 06/2022 with stress-induced  ischemia and some scarring. Ejection fraction  was  64%.     had a followup angiogram which was reported as normal as per wife. I do not have that result     History of rheumatoid arthritis   longstanding,was on methotrexate,  has taken Celebrex and now on Inflectra infusions,  so he is immunocompromised. Rick Lobo is now helping him with his rheumatoid tremendously.    he has been on steroids in the past.     Rule out adrenal insufficiency. cortisol level. normal      History of thyroidectomy in the past. .    Small bowel obstruction. Large hiatus hernia with colon and stomach in the chest.  Further management as per medical team.       ROS:   Review of systems is unobtainable due to current clinical state. PMH:   Past medical history, surgical history, social history, family history are reviewed and updated as appropriate. Reviewed current medication list.   Allergies reviewed and updated as needed. PE:   Vitals:    09/02/22 1300   BP: 134/88   Pulse: 64   Resp: 15   Temp:    SpO2:        General appearance: intubated, sedated  HEENT: EOM intact, no icterus. Trachea is midline. Neck : No masses, appears symmetrical  Respiratory: ETT to vent, mechanical breath sounds  Cardiovascular: Ausculation shows RRR trace edema  Abdomen: soft non distended  Musculoskeletal:  Joints with no swelling or deformity. Skin:no rashes, ulcers, induration, no jaundice.    Neuro: ALEKSANDR      Lab Results   Component Value Date    CREATININE 0.9 09/02/2022    BUN 27 (H) 09/02/2022     09/02/2022    K 3.7 09/02/2022     09/02/2022    CO2 28 09/02/2022      Lab Results   Component Value Date    WBC 7.4 09/02/2022    HGB 9.8 (L) 09/02/2022    HCT 29.6 (L) 09/02/2022    MCV 93.0 09/02/2022     09/02/2022     Lab Results   Component Value Date    CALCIUM 7.8 (L) 09/02/2022    CAION 1.06 (L) 08/15/2022    PHOS 3.0 09/02/2022

## 2022-09-02 NOTE — PROGRESS NOTES
Hospitalist Progress Note  9/2/2022 4:52 PM    PCP: Dom Zheng MD    [phone]     Date of Admission: 8/5/2022    Patient demographics:  The patient  Seth Cordero is a 63 y.o. male     Significant past medical history:   Patient Active Problem List   Diagnosis    Partial bowel obstruction ([address]    Hiatal hernia    Acute respiratory failure with hypoxia and hypercapnia (HCC)    SBO (small bowel obstruction) (HCC)    Aspiration into airway    Acute respiratory failure with hypoxia (HCC)    Small bowel obstruction (HCC)    Diastolic heart failure (HCC)    History of atrial fibrillation    Supraventricular tachycardia ([address]    Arterial hypotension    Aspiration pneumonia ([address]    Morbid obesity with BMI of 50.0-59.9, adult ([address]    Acute delirium    EARNESTINE (obstructive sleep apnea)    Dysphagia         Patient was diagnosed with:  Low grade or Partial Bowel obstruction  Pneumonia and septic shock. Hiatal hernia  Atrial Fibrillation    Treatment while inpatient:  63years old male with medical history significant for morbid obesity, paraesophageal hernia. Patient presented to the emergency room with abdominal pain nausea vomiting and diarrhea. Patient was diagnosed with small bowel obstruction. Patient was also diagnosed with pneumonia and septic shock. Patient developed acute respiratory failure and acute renal failure. Managed on the ventilator with a prolonged course in ICU. Extubated 8/26.    ----------------------------------------------------------    SUBJECTIVE COMPLAINTS- patient was seen at bedside, discussed with patient wife, exntubated, he was moved to ICU after PEG tube placement by GILBERTO, family is at bedside    Diet: Diet NPO  ADULT TUBE FEEDING; PEG; Standard with Fiber; Continuous; 15; Yes; 15; Q 4 hours; 45; 30; Q 4 hours; Protein;  Bolus 2 proteinex daily      OBJECTIVE:   Patient Active Problem List   Diagnosis    Partial bowel obstruction (HCC)    Hiatal hernia    Acute respiratory failure with hypoxia and hypercapnia (HCC)    SBO (small bowel obstruction) (HCC)    Aspiration into airway    Acute respiratory failure with hypoxia (HCC)    Small bowel obstruction (HCC)    Diastolic heart failure (HCC)    History of atrial fibrillation    Supraventricular tachycardia (HCC)    Arterial hypotension    Aspiration pneumonia (HCC)    Morbid obesity with BMI of 50.0-59.9, adult (HCC)    Acute delirium    EARNESTINE (obstructive sleep apnea)    Dysphagia       Allergies  Codeine    Medications    Scheduled Meds:   enoxaparin  30 mg SubCUTAneous BID    [START ON 9/3/2022] torsemide  20 mg Oral Daily    chlorhexidine  15 mL Mouth/Throat BID    pantoprazole (PROTONIX) 40 mg injection  40 mg IntraVENous Daily    methylPREDNISolone  20 mg IntraVENous Daily    thiamine (VITAMIN B1) IVPB  100 mg IntraVENous H00G    folic acid  1 mg Oral Daily    aspirin  81 mg Oral Daily    lidocaine 1 % injection  5 mL IntraDERmal Once    sodium chloride flush  5-40 mL IntraVENous 2 times per day    celecoxib  200 mg Per NG tube BID    gabapentin  400 mg Oral TID    metoprolol tartrate  25 mg Oral BID    levothyroxine  200 mcg Oral Daily     Continuous Infusions:   fentaNYL      propofol Stopped (09/02/22 0814)    sodium chloride Stopped (08/31/22 1521)     PRN Meds:  fentaNYL **AND** fentaNYL, oxyCODONE, labetalol, sodium chloride flush, sodium chloride, hydrALAZINE, ipratropium-albuterol, senna, polyethylene glycol, docusate, artificial tears, acetaminophen, potassium chloride, acetaminophen, ondansetron **OR** ondansetron, diazePAM    Vitals   Vitals /wt Patient Vitals for the past 8 hrs:   BP Temp Temp src Pulse Resp SpO2 Height   09/02/22 1642 -- -- -- -- -- 94 % --   09/02/22 1612 (!) 136/92 98.2 °F (36.8 °C) Axillary 63 13 94 % --   09/02/22 1600 139/89 -- -- 67 14 -- --   09/02/22 1500 (!) 140/85 -- -- 70 15 -- --   09/02/22 1403 -- -- -- 65 15 94 % --   09/02/22 1400 132/88 -- -- 67 14 -- --   09/02/22 1300 134/88 -- -- 64 15 -- --   09/02/22 1257 -- -- -- 68 14 93 % --   09/02/22 1200 (!) 132/90 -- -- 68 13 -- --   09/02/22 1142 (!) 138/91 98.3 °F (36.8 °C) Axillary 68 14 93 % --   09/02/22 1125 -- -- -- -- -- -- 5' 7\" (1.702 m)   09/02/22 1100 136/87 -- -- 62 12 -- --   09/02/22 1037 -- -- -- 64 14 91 % --   09/02/22 1000 133/80 -- -- 59 11 -- --   09/02/22 0900 138/85 -- -- 65 13 -- --          72HR INTAKE/OUTPUT:    Intake/Output Summary (Last 24 hours) at 9/2/2022 1652  Last data filed at 9/2/2022 1612  Gross per 24 hour   Intake 1441.74 ml   Output 6621 ml   Net -5179.26 ml         Exam:  Gen: extubated, awake , alert  Eyes: PERRL. No sclera icterus. No conjunctival injection. ENT: No discharge. Pharynx clear. External appearance of ears and nose normal.  Neck: Trachea midline. No obvious mass. Resp: No accessory muscle use. No crackles. No wheezes. No rhonchi. CV: Regular rate. Regular rhythm. No murmur or rub. No edema. GI: Non-tender. Non-distended. No hernia. Skin: Warm, dry, normal texture and turgor. Lymph: No cervical LAD. No supraclavicular LAD. M/S: / Ext. No cyanosis. No clubbing. No joint deformity. Neuro: intubated and sedated    PT/INR:   No results for input(s): PROTIME, INR in the last 72 hours. APTT:   No results for input(s): APTT in the last 72 hours. CBC:   Recent Labs     08/31/22  0653 09/01/22  0429 09/02/22  0400   WBC 11.9* 9.9 7.4   HGB 11.3* 9.7* 9.8*   HCT 34.5* 30.4* 29.6*   MCV 93.9 93.9 93.0    223 220         BMP:   Recent Labs     08/31/22  0653 09/01/22  0429 09/02/22  0400    138 141   K 3.8 4.5 3.7    107 106   CO2 24 24 28   PHOS 2.6 2.8 3.0   BUN 23* 23* 27*   CREATININE 0.6* 0.7* 0.9         LIVER PROFILE:   No results for input(s): ALKPHOS, AST, ALT, ALB, BILIDIR, BILITOT, ALKPHOS in the last 72 hours. No results for input(s): AMYLASE in the last 72 hours. No results for input(s): LIPASE in the last 72 hours.     UA:  No results for input(s): Dictation software. Please disregard any translational errors.

## 2022-09-02 NOTE — PROGRESS NOTES
Comprehensive Nutrition Assessment    Type and Reason for Visit:  Reassess    Nutrition Recommendations/Plan:   Modify TF now that pt is extubated and off propofol. Recommend Jevity 1.5 with goal rate of 45 mL/hr +2 proteinex daily. Flush per provider while in ICU. Malnutrition Assessment:  Malnutrition Status: At risk for malnutrition (Comment) (08/11/22 9173)    Context:  Acute Illness     Findings of the 6 clinical characteristics of malnutrition:  Energy Intake:  50% or less of estimated energy requirements for 5 or more days  Weight Loss:  Unable to assess     Body Fat Loss:  Unable to assess     Muscle Mass Loss:  Unable to assess    Fluid Accumulation:  No significant fluid accumulation     Strength:  Not Performed    Nutrition Assessment:    Follow-up. Pt had PEG placed on 8/31 and was intubated d/t hypoxia. Trophic TF was started but propofol was on board. Pt now extubated this AM. Will adjust TF to better meet nutritional needs. Nutrition Related Findings:    trace BLE and BUE edema; BM 8/31- rectal tube; labs reviewed Wound Type: None       Current Nutrition Intake & Therapies:    Average Meal Intake: NPO  Average Supplements Intake: NPO  Diet NPO  Current Tube Feeding (TF) Recommendations:  Goal TF & Flush Orders Provides: Recommend Jevity 1.5 with goal rate of 45 mL/hr +2 proteinex daily. Flush per provider while in ICU. Regimen provides: 900 ml TV, 1558 kcal, 109 gm pro,  684 mL free water (includes 2 proteinex)    Anthropometric Measures:  Height: 5' 7\" (170.2 cm)  Ideal Body Weight (IBW): 148 lbs (67 kg)    Admission Body Weight: 340 lb (154.2 kg)  Current Body Weight: 330 lb (149.7 kg), 223 % IBW.  Weight Source: Bed Scale  Current BMI (kg/m2): 51.7        Weight Adjustment For: No Adjustment                 BMI Categories: Obese Class 3 (BMI 40.0 or greater)    Estimated Daily Nutrient Needs:  Energy Requirements Based On: Kcal/kg  Weight Used for Energy Requirements: Ideal  Energy (kcal/day): 6934-0036  kcal (22-25 kcal/kg IBW)  Weight Used for Protein Requirements: Ideal  Protein (g/day): 135 gm (2 gm/kg IBW)  Method Used for Fluid Requirements: 1 ml/kcal  Fluid (ml/day): per provider    Nutrition Diagnosis:   Inadequate oral intake related to swallowing difficulty as evidenced by NPO or clear liquid status due to medical condition    Nutrition Interventions:   Food and/or Nutrient Delivery: Start Tube Feeding  Nutrition Education/Counseling: Education not indicated  Coordination of Nutrition Care: Continue to monitor while inpatient       Goals:  Previous Goal Met: Progressing toward Goal(s)  Goals: Tolerate nutrition support at goal rate       Nutrition Monitoring and Evaluation:   Behavioral-Environmental Outcomes: None Identified  Food/Nutrient Intake Outcomes: Enteral Nutrition Intake/Tolerance, IVF Intake  Physical Signs/Symptoms Outcomes: Biochemical Data, Weight, Skin, Nutrition Focused Physical Findings    Discharge Planning:     Too soon to determine     Amanda Medina RD, LD  Contact: [phone]

## 2022-09-02 NOTE — PROGRESS NOTES
No acute events overnight. Pt remained on spontaneous mode on vent, tolerated well, SpO2>95%. Pt RASS -1, nods and follows commands appropriately, but falls asleep when not stimulated. VSS throughout shift. Denies needs at this time.

## 2022-09-02 NOTE — PROGRESS NOTES
[facility] General Surgery                                   Daily Progress Note                                                         Pt Name: Eva Schmidt  Medical Record Number: [id number]  Date of Birth 1960   Today's Date: 9/2/2022      ASSESSMENT/PLAN  Dysphagia and hiatal hernia  -POD #2 (8/31) laparoscopic reduction of hiatal hernia with gastropexy and 24F HADLEY gastrostomy tube placement  -Bowels working.   -vent weaning per ICU team  -Advance TF to goal today        Mandy Denise is intubated. Opens eyes, shook head yes when asked if pain controlled. OBJECTIVE  VITALS:  height is 5' 7\" (1.702 m) and weight is 330 lb 14.4 oz (150.1 kg) (abnormal). His axillary temperature is 98.1 °F (36.7 °C). His blood pressure is 135/85 and his pulse is 62. His respiration is 15 and oxygen saturation is 94%. INTAKE/OUTPUT:    Intake/Output Summary (Last 24 hours) at 9/2/2022 0754  Last data filed at 9/2/2022 0534  Gross per 24 hour   Intake 1230.18 ml   Output 4175 ml   Net -2944.82 ml       GENERAL: alert, cooperative, no distress  LUNGS: clear to ausculation, without wheezes, rales or rhonci  HEART: normal rate and regular rhythm  ABDOMEN: Soft, incisions c/d/I. 24F G tube present. Minimal drainage. TF infusing. EXTREMITY: no cyanosis, clubbing or edema    I/O last 3 completed shifts:   In: 1822.8 [I.V.:950.6; NG/GT:831; IV Piggyback:41.2]  Out: 5866 [Urine:4695]      LABS  Recent Labs     09/02/22  0400   WBC 7.4   HGB 9.8*   HCT 29.6*         K 3.7      CO2 28   BUN 27*   CREATININE 0.9   MG 2.00   PHOS 3.0   CALCIUM 7.8*         EDUCATION  Patient educated about Disease Process, Medications, Smoking Cessation, Oxygenation, Incentive Spirometry and Deep Breath and Cough, Diabetes, Hyperlipidemia, Smoking Cessation, Nutrition, Exercise and Hypertension    Electronically signed by JARON Joy - KARMEN on 9/2/2022 at 7:54 AM      [facility]   [phone] Office  [phone] Fax     Agree with above note. The patient was personally seen and examined. Prosperglenn Sheehans is doing OK. No overnight events. Tube feeds currently on hold for SBT.     Abd soft, NT, ND, incisions c/d/I, no erythema or induration    WBC 7.4  Cr 0.9    A/P: s/p lap reduction of hiatal hernia with gastrostomy tube placement POD #2    Ok to restart tube feeds later today if breathing stable    Cori Al MD

## 2022-09-03 LAB
ALBUMIN SERPL-MCNC: 2.4 G/DL (ref 3.4–5)
ANION GAP SERPL CALCULATED.3IONS-SCNC: 9 MMOL/L (ref 3–16)
BASOPHILS ABSOLUTE: 0.1 K/UL (ref 0–0.2)
BASOPHILS RELATIVE PERCENT: 1.2 %
BUN BLDV-MCNC: 27 MG/DL (ref 7–20)
CALCIUM SERPL-MCNC: 8.1 MG/DL (ref 8.3–10.6)
CHLORIDE BLD-SCNC: 100 MMOL/L (ref 99–110)
CO2: 29 MMOL/L (ref 21–32)
CREAT SERPL-MCNC: 0.7 MG/DL (ref 0.8–1.3)
EOSINOPHILS ABSOLUTE: 0.1 K/UL (ref 0–0.6)
EOSINOPHILS RELATIVE PERCENT: 1.8 %
GFR AFRICAN AMERICAN: >60
GFR NON-AFRICAN AMERICAN: >60
GLUCOSE BLD-MCNC: 113 MG/DL (ref 70–99)
GLUCOSE BLD-MCNC: 114 MG/DL (ref 70–99)
GLUCOSE BLD-MCNC: 118 MG/DL (ref 70–99)
GLUCOSE BLD-MCNC: 145 MG/DL (ref 70–99)
GLUCOSE BLD-MCNC: 84 MG/DL (ref 70–99)
GLUCOSE BLD-MCNC: 87 MG/DL (ref 70–99)
GLUCOSE BLD-MCNC: 97 MG/DL (ref 70–99)
HCT VFR BLD CALC: 31.7 % (ref 40.5–52.5)
HEMOGLOBIN: 10.5 G/DL (ref 13.5–17.5)
LYMPHOCYTES ABSOLUTE: 1.2 K/UL (ref 1–5.1)
LYMPHOCYTES RELATIVE PERCENT: 19.2 %
MAGNESIUM: 2 MG/DL (ref 1.8–2.4)
MCH RBC QN AUTO: 30.3 PG (ref 26–34)
MCHC RBC AUTO-ENTMCNC: 33 G/DL (ref 31–36)
MCV RBC AUTO: 91.9 FL (ref 80–100)
MONOCYTES ABSOLUTE: 0.6 K/UL (ref 0–1.3)
MONOCYTES RELATIVE PERCENT: 10 %
NEUTROPHILS ABSOLUTE: 4.4 K/UL (ref 1.7–7.7)
NEUTROPHILS RELATIVE PERCENT: 67.8 %
PDW BLD-RTO: 17.4 % (ref 12.4–15.4)
PERFORMED ON: ABNORMAL
PERFORMED ON: NORMAL
PERFORMED ON: NORMAL
PHOSPHORUS: 2.9 MG/DL (ref 2.5–4.9)
PLATELET # BLD: 231 K/UL (ref 135–450)
PMV BLD AUTO: 7.6 FL (ref 5–10.5)
POTASSIUM SERPL-SCNC: 3.2 MMOL/L (ref 3.5–5.1)
POTASSIUM SERPL-SCNC: 3.9 MMOL/L (ref 3.5–5.1)
RBC # BLD: 3.45 M/UL (ref 4.2–5.9)
SODIUM BLD-SCNC: 138 MMOL/L (ref 136–145)
WBC # BLD: 6.4 K/UL (ref 4–11)

## 2022-09-03 PROCEDURE — 97530 THERAPEUTIC ACTIVITIES: CPT

## 2022-09-03 PROCEDURE — 6370000000 HC RX 637 (ALT 250 FOR IP): Performed by: SURGERY

## 2022-09-03 PROCEDURE — 6360000002 HC RX W HCPCS: Performed by: SURGERY

## 2022-09-03 PROCEDURE — 6360000002 HC RX W HCPCS: Performed by: NURSE PRACTITIONER

## 2022-09-03 PROCEDURE — 2580000003 HC RX 258: Performed by: SURGERY

## 2022-09-03 PROCEDURE — 94660 CPAP INITIATION&MGMT: CPT

## 2022-09-03 PROCEDURE — C9113 INJ PANTOPRAZOLE SODIUM, VIA: HCPCS | Performed by: NURSE PRACTITIONER

## 2022-09-03 PROCEDURE — 94761 N-INVAS EAR/PLS OXIMETRY MLT: CPT

## 2022-09-03 PROCEDURE — 2580000003 HC RX 258: Performed by: NURSE PRACTITIONER

## 2022-09-03 PROCEDURE — 94760 N-INVAS EAR/PLS OXIMETRY 1: CPT

## 2022-09-03 PROCEDURE — 2000000000 HC ICU R&B

## 2022-09-03 PROCEDURE — 99291 CRITICAL CARE FIRST HOUR: CPT | Performed by: INTERNAL MEDICINE

## 2022-09-03 PROCEDURE — 80069 RENAL FUNCTION PANEL: CPT

## 2022-09-03 PROCEDURE — 84132 ASSAY OF SERUM POTASSIUM: CPT

## 2022-09-03 PROCEDURE — 6370000000 HC RX 637 (ALT 250 FOR IP): Performed by: INTERNAL MEDICINE

## 2022-09-03 PROCEDURE — 2580000003 HC RX 258: Performed by: INTERNAL MEDICINE

## 2022-09-03 PROCEDURE — 6360000002 HC RX W HCPCS: Performed by: INTERNAL MEDICINE

## 2022-09-03 PROCEDURE — 83735 ASSAY OF MAGNESIUM: CPT

## 2022-09-03 PROCEDURE — 85025 COMPLETE CBC W/AUTO DIFF WBC: CPT

## 2022-09-03 PROCEDURE — 2700000000 HC OXYGEN THERAPY PER DAY

## 2022-09-03 RX ORDER — PANTOPRAZOLE SODIUM 40 MG/1
40 TABLET, DELAYED RELEASE ORAL
Status: DISCONTINUED | OUTPATIENT
Start: 2022-09-04 | End: 2022-09-03 | Stop reason: SDUPTHER

## 2022-09-03 RX ORDER — METHYLPREDNISOLONE SODIUM SUCCINATE 40 MG/ML
10 INJECTION, POWDER, LYOPHILIZED, FOR SOLUTION INTRAMUSCULAR; INTRAVENOUS DAILY
Status: DISCONTINUED | OUTPATIENT
Start: 2022-09-04 | End: 2022-09-06

## 2022-09-03 RX ADMIN — METOPROLOL TARTRATE 25 MG: 25 TABLET, FILM COATED ORAL at 08:29

## 2022-09-03 RX ADMIN — GABAPENTIN 400 MG: 400 CAPSULE ORAL at 20:45

## 2022-09-03 RX ADMIN — FOLIC ACID 1 MG: 1 TABLET ORAL at 08:28

## 2022-09-03 RX ADMIN — SODIUM CHLORIDE, PRESERVATIVE FREE 10 ML: 5 INJECTION INTRAVENOUS at 08:30

## 2022-09-03 RX ADMIN — ENOXAPARIN SODIUM 30 MG: 100 INJECTION SUBCUTANEOUS at 08:29

## 2022-09-03 RX ADMIN — POTASSIUM CHLORIDE 20 MEQ: 29.8 INJECTION, SOLUTION INTRAVENOUS at 08:41

## 2022-09-03 RX ADMIN — METHYLPREDNISOLONE SODIUM SUCCINATE 20 MG: 40 INJECTION, POWDER, FOR SOLUTION INTRAMUSCULAR; INTRAVENOUS at 08:29

## 2022-09-03 RX ADMIN — CELECOXIB 200 MG: 200 CAPSULE ORAL at 20:44

## 2022-09-03 RX ADMIN — ASPIRIN 81 MG CHEWABLE TABLET 81 MG: 81 TABLET CHEWABLE at 08:28

## 2022-09-03 RX ADMIN — GABAPENTIN 400 MG: 400 CAPSULE ORAL at 08:28

## 2022-09-03 RX ADMIN — SODIUM CHLORIDE, PRESERVATIVE FREE 10 ML: 5 INJECTION INTRAVENOUS at 20:45

## 2022-09-03 RX ADMIN — POTASSIUM CHLORIDE 20 MEQ: 29.8 INJECTION, SOLUTION INTRAVENOUS at 11:06

## 2022-09-03 RX ADMIN — TORSEMIDE 20 MG: 20 TABLET ORAL at 08:28

## 2022-09-03 RX ADMIN — CELECOXIB 200 MG: 200 CAPSULE ORAL at 08:28

## 2022-09-03 RX ADMIN — ENOXAPARIN SODIUM 30 MG: 100 INJECTION SUBCUTANEOUS at 20:44

## 2022-09-03 RX ADMIN — Medication 40 MG: at 08:33

## 2022-09-03 RX ADMIN — THIAMINE HYDROCHLORIDE 100 MG: 100 INJECTION, SOLUTION INTRAMUSCULAR; INTRAVENOUS at 14:04

## 2022-09-03 RX ADMIN — LEVOTHYROXINE SODIUM 200 MCG: 0.1 TABLET ORAL at 08:28

## 2022-09-03 RX ADMIN — METOPROLOL TARTRATE 25 MG: 25 TABLET, FILM COATED ORAL at 20:45

## 2022-09-03 RX ADMIN — GABAPENTIN 400 MG: 400 CAPSULE ORAL at 14:08

## 2022-09-03 ASSESSMENT — PAIN SCALES - GENERAL
PAINLEVEL_OUTOF10: 0

## 2022-09-03 NOTE — PROGRESS NOTES
Physical Therapy  Facility/Department: [address] ICU  Daily Treatment Note - COTX  NAME: Boni Edmonds  : 1960  MRN: [id number]    Date of Service: 9/3/2022    Discharge Recommendations:  Patient would benefit from continued therapy after discharge, Subacute/Skilled Nursing Facility   PT Equipment Recommendations  Other: Defer to next level of care. Patient Diagnosis(es): The primary encounter diagnosis was Small bowel obstruction ([address] Diagnoses of Elevated TSH, Chest pain, unspecified type, Hiatal hernia, and Intestinal obstruction, unspecified cause, unspecified whether partial or complete [facility]) were also pertinent to this visit. Assessment   Assessment: Pt extubated, able to stand to stedy x 3 trials this morning. On 7 L. O2, SPO2 ranged from high 80s to low 90s. He was limited in activity tolerance d/t SOB and fatigue, and would benefit from continued therapy to gradually progress OOB activity. Anticipate need for low-moderate frequency therapy upon D/C. Other: Defer to next level of care. Miles Vanegas scored a 9/ on the AM-PAC short mobility form. Current research shows that an AM-PAC score of 17 or less is typically not associated with a discharge to the patient's home setting. Based on the patient's AM-PAC score and their current functional mobility deficits, it is recommended that the patient have 3-5 sessions per week of Physical Therapy at d/c to increase the patient's independence. Please see assessment section for further patient specific details. If patient discharges prior to next session this note will serve as a discharge summary. Please see below for the latest assessment towards goals. Plan    Plan  Plan: 3-5 times per week  Current Treatment Recommendations: Strengthening; Functional mobility training;Transfer training; Safety education & training;Patient/Caregiver education & training     Restrictions  Restrictions/Precautions  Restrictions/Precautions: Fall Risk  Position Activity Restriction  Other position/activity restrictions: O2 7L via NC.  PEG tube. Subjective    Subjective  Subjective: Pt extubated. Currently on 7 L. O2.  Agreeable to attempting standing activity. Objective   Vitals     Transfer Training  Transfer Training: Yes  Sit to Stand: Assist X2;Minimum assistance (To stedy)  Stand to Sit: Minimum assistance;Assist X2 (From stedy)     Other Specialty Interventions  Other Treatments/Modalities: Pt able to stand x 3 trials to stedy with moderate encouragement. He required Min A x 2 to stand to stedy, but was able to maintain standing balance with CGA. Pt tolerated 30-60 s. of standing in each trial. He required extended seated rest between trials. SPO2 ranged from high 80s to low 90s throughout. At end of session he was positioned for comfort, call light in reach, alarm in place, leg rest elevated. Safety Devices  Type of Devices: All fall risk precautions in place;Call light within reach;Gait belt; Chair alarm in place;Nurse notified; Left in chair  Restraints  Restraints Initially in Place: No       Goals  Short Term Goals  Time Frame for Short term goals: Upon d/c acute care setting. - all goals met on 8/30/22, new goals written  Short term goal 1: bed mobility SBA  Short term goal 2: sit<>stand to RW with min A  Short term goal 3: stand pivot with RW with min A  Short term goal 4: ambulate > 10' with RW and min A  Patient Goals   Patient goals : Wife : regain prior level of function and return home. Education: Goals of PT; technique for transfers; family education regarding goals of therapy.        Therapy Time   Individual Concurrent Group Co-treatment   Time In       0900   Time Out       0930   Minutes       30   Timed Code Treatment Minutes: 30 Minutes       Ovidio Sesay PT   Electronically signed by Ovidio Sesay PT [id number] on 9/3/2022 at 9:36 AM

## 2022-09-03 NOTE — PLAN OF CARE
Problem: Discharge Planning  Goal: Discharge to home or other facility with appropriate resources  Outcome: Progressing     Problem: Pain  Goal: Verbalizes/displays adequate comfort level or baseline comfort level  Outcome: Progressing     Problem: ABCDS Injury Assessment  Goal: Absence of physical injury  Outcome: Progressing     Problem: Safety - Adult  Goal: Free from fall injury  Outcome: Progressing     Problem: Skin/Tissue Integrity  Goal: Absence of new skin breakdown  Description: 1. Monitor for areas of redness and/or skin breakdown  2.   Assess vascular access sites hourly  Outcome: Progressing     Problem: Nutrition Deficit:  Goal: Optimize nutritional status  Outcome: Progressing     Problem: Neurosensory - Adult  Goal: Achieves stable or improved neurological status  Outcome: Progressing  Goal: Absence of seizures  Outcome: Progressing     Problem: Respiratory - Adult  Goal: Achieves optimal ventilation and oxygenation  Outcome: Progressing     Problem: Cardiovascular - Adult  Goal: Absence of cardiac dysrhythmias or at baseline  Outcome: Progressing     Problem: Skin/Tissue Integrity - Adult  Goal: Skin integrity remains intact  Outcome: Progressing  Goal: Incisions, wounds, or drain sites healing without S/S of infection  Outcome: Progressing  Goal: Oral mucous membranes remain intact  Outcome: Progressing     Problem: Musculoskeletal - Adult  Goal: Return mobility to safest level of function  Outcome: Progressing     Problem: Gastrointestinal - Adult  Goal: Maintains adequate nutritional intake  Outcome: Progressing  Goal: Establish and maintain optimal ostomy function  Outcome: Progressing

## 2022-09-03 NOTE — PROGRESS NOTES
Occupational Therapy  Facility/Department: [facility] 9Z ICU  Occupational Therapy Re-Assessment    Name: Maria Esther Carmichael  : 1960  MRN: [id number]  Date of Service: 9/3/2022    Discharge Recommendations:  Patient would benefit from continued therapy after discharge, 3-5 sessions per week  OT Equipment Recommendations  Other: defer to [place] facility     Maria Esther Carmichael scored a 12 on the AM-PAC ADL Inpatient form. Current research shows that an AM-PAC score of 17 or less is typically not associated with a discharge to the patient's home setting. Based on the patient's AM-PAC score and their current ADL deficits, it is recommended that the patient have 3-5 sessions per week of Occupational Therapy at d/c to increase the patient's independence. Please see assessment section for further patient specific details. If patient discharges prior to next session this note will serve as a discharge summary. Please see below for the latest assessment towards goals. Patient Diagnosis(es): The primary encounter diagnosis was Small bowel obstruction ([facility] Diagnoses of Elevated TSH, Chest pain, unspecified type, Hiatal hernia, and Intestinal obstruction, unspecified cause, unspecified whether partial or complete [facility]) were also pertinent to this visit. Past Medical History:  has a past medical history of Arthritis, Atrial fibrillation ([address] and Hypertension. Past Surgical History:  has a past surgical history that includes Upper gastrointestinal endoscopy (2022); Upper gastrointestinal endoscopy (2022); Upper gastrointestinal endoscopy (N/A, 2022); bronchoscopy (2022); bronchoscopy (2022); Gastric fundoplication (N/A, ); and Gastrostomy tube placement (N/A, 2022). Assessment   Performance deficits / Impairments: Decreased functional mobility ; Decreased safe awareness;Decreased balance;Decreased ADL status; Decreased cognition;Decreased ROM; Decreased endurance;Decreased Training;ADL Adaptive Strategies  Education Method: Demonstration;Verbal  Barriers to Learning: Cognition  Education Outcome: Verbalized understanding    Left Hand AROM (degrees)  Left Hand AROM: WFL  RUE AROM (degrees)  RUE AROM : WFL  Right Hand AROM (degrees)  Right Hand AROM: [facility]                                              AM-PAC Score       AM-PAC Inpatient Daily Activity Raw Score: 12 (09/03/22 0938)  AM-PAC Inpatient ADL T-Scale Score : 30.6 (09/03/22 0938)  ADL Inpatient CMS 0-100% Score: 66.57 (09/03/22 8058)  ADL Inpatient CMS G-Code Modifier : CL (09/03/22 2969)    Goals  Short Term Goals  Time Frame for Short term goals: Prior to DC: goals updated below on 8/30  Short Term Goal 1: Pt will complete bed mobility SBA  Short Term Goal 2: Pt will complete ADL transfers w/ CGA using LRAD  Short Term Goal 3: Pt will groom w/ setup  Short Term Goal 4: Pt will complete UE exercises in all planes to inc strength/endurance. Short Term Goal 5: Pt will complete UB bathing w/ SBA  Patient Goals   Patient goals : spouse: to regain PLOF and eventually return home       Therapy Time   Individual Concurrent Group Co-treatment   Time In 0900         Time Out 0930         Minutes 30         Timed Code Treatment Minutes: 30 Minutes     This note to serve as OT d/c summary if pt is d/c-ed prior to next therapy session.     Florestine Ear, OTR/L

## 2022-09-03 NOTE — PROGRESS NOTES
[facility]                                               Progress note    Summary:   Luisa Pollard is being seen by nephrology for CHARLENE and hypernatremia. Admitted with paraesophageal hernia and SBO. Had EGD 8/9 with biopsy. Still NPO     Interval History  Seen and examined at bedside. On BIPAP, comfortable, denies any complaints. , not much swelling on exam  On torsemide 20 mg reduced from 40 mg yesterday     /88  HR 61 96% sat  UO 5.5 L past day   Negative 19 L for admission. Labs reviewed. Na 138   K 3.2  Cr 0.7    Plan:   - continue reduced dose torsemide 20 mg daily   - replace K   Renal function normal.       Zaire Thomas MD  [facility]  Office: [phone]    Assessment:   Acute kidney injury  Urine Na < 20    creatinine on admission 1   next day on 08/06/2022 was 0.8  suddenly jumped up to 2.7. Due to episode of severe hypotension with blood pressure drop 78/44. also got CT with IV contrast on 08/05/2022 but most likely  this is ATN related due to hypotension and hypercapnic  respiratory failure. CT angio s normal renal arteries and normal kidneys      Sepsis/ possible septic shock. emperature was 101.1   High white cell count. '  immunocompromised due to rheumatoid arthritis and treatment. History of sleep apnea   on CPAP. Acute respiratory failure with high pCO2 in the range of 50s with pH  of 7.24    now intubated. History of abnormal stress test in 06/2022 with stress-induced  ischemia and some scarring. Ejection fraction  was  64%.     had a followup angiogram which was reported as normal as per wife. I do not have that result     History of rheumatoid arthritis   longstanding,was on methotrexate,  has taken Celebrex and now on Inflectra infusions,  so he is immunocompromised. Mariah Naylor is now helping him with his rheumatoid tremendously. he has been on steroids in the past.     Rule out adrenal insufficiency. cortisol level.  normal History of thyroidectomy in the past. .    Small bowel obstruction. Large hiatus hernia with colon and stomach in the chest.  Further management as per medical team.       ROS:   [address]. All other remaining systems are negative. Constitutional:  fever, chills, weakness, weight change, fatigue,      Skin:  rash, pruritus, hair loss, bruising, dry skin, petechiae. Head, Face, Neck   headaches, swelling,  cervical adenopathy. Respiratory: shortness of breath, cough, or wheezing  Cardiovascular: chest pain, palpitations, dizzy, edema  Gastrointestinal: nausea, vomiting, diarrhea, constipation,belly pain    Yellow skin, blood in stool  Musculoskeletal:  back pain, muscle weakness, gait problems,       joint pain or swelling. Genitourinary:  dysuria, poor urine flow, flank pain, blood in urine  Neurologic:  vertigo, TIA'S, syncope, seizures, focal weakness  Psychosocial:  insomnia, anxiety, or depression. Additional positive findings: -           PMH:   Past medical history, surgical history, social history, family history are reviewed and updated as appropriate. Reviewed current medication list.   Allergies reviewed and updated as needed. PE:   Vitals:    09/02/22 2000   BP: 135/84   Pulse: 62   Resp: 13   Temp: 98 °F (36.7 °C)   SpO2: 95%       General appearance:  in NAD, awake and alert Comfortable. HEENT: EOM intact, no icterus. Trachea is midline. Neck : No mass, appears symmetrical,   Respiratory: Respiratory effort appears normal, no wheeze, no crackles, on bipap  Cardiovascular: Ausculation- No M/R/G, no edema  Abdomen:non distended non tender  Musculoskeletal:  No clubbing,cyanosis, joints with no swelling or deformity. Skin:no rashes, ulcers, induration, no jaundice. Neuro: face symmetric, no focal deficits. Appropriate responses.  CN 2-12 grossly intact        Lab Results   Component Value Date    CREATININE 0.9 09/02/2022    BUN 27 (H) 09/02/2022     09/02/2022    K 3.7 09/02/2022     09/02/2022    CO2 28 09/02/2022      Lab Results   Component Value Date    WBC 7.4 09/02/2022    HGB 9.8 (L) 09/02/2022    HCT 29.6 (L) 09/02/2022    MCV 93.0 09/02/2022     09/02/2022     Lab Results   Component Value Date    CALCIUM 7.8 (L) 09/02/2022    CAION 1.06 (L) 08/15/2022    PHOS 3.0 09/02/2022

## 2022-09-03 NOTE — PROGRESS NOTES
Pulmonary Critical Care Progress Note     Patient's name:  Aneta Hadley Record Number: [id number]  Patient's account/billing number: [de-identified]  Patient's YOB: 1960  Age: 63 y.o. Date of Admission: 8/5/2022  1:56 PM  Date of Consult: 9/3/2022      Primary Care Physician: Curt Avila MD      Code Status: Full Code    Chief complaint: Acute respiratory failure with hypoxia    Assessment and Plan     Acute respiratory failure hypoxia status post intubation 8/31/2022 on mechanical ventilation. EARNESTINE/obesity ventilation syndrome. Recent aspiration/organizing pneumonia. Small bowel obstruction. Dysphagia status post laparoscopic reduction of hiatal hernia with PEG tube placement. Plan:  Bipap intermittently during the day and continuous at night  Aspiration precautions   Advance tube feeding to goal.  Diuresis  Wean off steroid  GI and DVT prophylaxis. Overnight:  Had to be placed back on bipap  No fever  Currently on 50%      REVIEW OF SYSTEMS:  Review of Systems -   Unable to obtain on bipap          Physical Exam:    Vitals: BP (!) 133/90   Pulse 62   Temp 98.2 °F (36.8 °C) (Axillary)   Resp 15   Ht 5' 7\" (1.702 m)   Wt (!) 327 lb 3.2 oz (148.4 kg)   SpO2 96%   BMI 51.25 kg/m²     Last Body weight:   Wt Readings from Last 3 Encounters:   09/03/22 (!) 327 lb 3.2 oz (148.4 kg)       Body Mass Index : Body mass index is 51.25 kg/m². Intake and Output summary:   Intake/Output Summary (Last 24 hours) at 9/3/2022 0849  Last data filed at 9/3/2022 0800  Gross per 24 hour   Intake 865.3 ml   Output 4928 ml   Net -4062.7 ml         Physical Examination:     Gen:  No acute distress. On bipap  Eyes: PERRL. Anicteric sclera. No conjunctival injection. ENT: No discharge. Posterior oropharynx clear. External appearance of ears and nose normal.  Neck: Trachea midline. No mass   Resp: Diminished bilaterally with scattered rhonchi  CV: Regular rate. Regular rhythm.  No murmur or rub. No edema. GI: Soft, Non-tender. Non-distended. +BS  Skin: Warm, dry, w/o erythema. Lymph: No cervical or supraclavicular LAD. M/S: No cyanosis. No clubbing. Neuro: awake and lethargic, no focal deficit         Laboratory findings:-    CBC:   Recent Labs     09/03/22  0505   WBC 6.4   HGB 10.5*          BMP:    Recent Labs     09/01/22  0429 09/02/22  0400 09/03/22  0505    141 138   K 4.5 3.7 3.2*    106 100   CO2 24 28 29   BUN 23* 27* 27*   CREATININE 0.7* 0.9 0.7*   GLUCOSE 96 87 97       S. Calcium:  Recent Labs     09/03/22  0505   CALCIUM 8.1*         S. Magnesium:  Recent Labs     09/03/22  0505   MG 2.00       S. Phosphorus:  Recent Labs     09/03/22  0505   PHOS 2.9       S. Glucose:  Recent Labs     09/02/22  2343 09/03/22  0509 09/03/22  0749   POCGLU 87 87 84             Radiology Review:  Pertinent images / reports were reviewed as a part of this visit.     CXR reviewed  Bibasilar airspace disease     Critical CAre time 35 minutes                Maggie Singh MD, M.D.            9/3/2022, 8:49 AM

## 2022-09-03 NOTE — CARE COORDINATION
Chart Reviewed. GOAL:  [facility]. Call placed to rep, [facility] [phone] who reports they did not hear from the extension as yet. She states they will need to restart prior auth.   Nestor Hawkins [place]     Case Management   [phone]    9/3/2022  11:58 AM

## 2022-09-03 NOTE — PROGRESS NOTES
Hospitalist Progress Note  9/3/2022 9:38 AM    PCP: Dimas Mathews MD    [phone]     Date of Admission: 8/5/2022    Patient demographics:  The patient  Gabriella Shook is a 63 y.o. male     Significant past medical history:   Patient Active Problem List   Diagnosis    Partial bowel obstruction ([facility] [address]    Hiatal hernia    Acute respiratory failure with hypoxia and hypercapnia (HCC)    SBO (small bowel obstruction) (HCC)    Aspiration into airway    Acute respiratory failure with hypoxia (HCC)    Small bowel obstruction (HCC)    Diastolic heart failure (HCC)    History of atrial fibrillation    Supraventricular tachycardia (HCC)    Arterial hypotension    Aspiration pneumonia ([facility] [address]    Morbid obesity with BMI of 50.0-59.9, adult ([facility] [address]    Acute delirium    EARNESTINE (obstructive sleep apnea)    Dysphagia         Patient was diagnosed with:  Low grade or Partial Bowel obstruction  Pneumonia and septic shock. Hiatal hernia  Atrial Fibrillation    Treatment while inpatient:  63years old male with medical history significant for morbid obesity, paraesophageal hernia. Patient presented to the emergency room with abdominal pain nausea vomiting and diarrhea. Patient was diagnosed with small bowel obstruction. Patient was also diagnosed with pneumonia and septic shock. Patient developed acute respiratory failure and acute renal failure. Managed on the ventilator with a prolonged course in ICU. Extubated 8/26. Intubated again after lap reduction of hiatal hernia with gastropexy and 24F HADLEY PEG tube on 8/31, extubated 9/2    ----------------------------------------------------------    SUBJECTIVE COMPLAINTS- patient was seen at bedside, feels well overall. Used BiPAP overnight. Tolerating tube feeds so far    Diet: Diet NPO  ADULT TUBE FEEDING; PEG; Standard with Fiber; Continuous; 15; Yes; 15; Q 4 hours; 45; 30; Q 4 hours; Protein;  Bolus 2 proteinex daily      OBJECTIVE:   Patient Active Problem List   Diagnosis Partial bowel obstruction (HCC)    Hiatal hernia    Acute respiratory failure with hypoxia and hypercapnia (HCC)    SBO (small bowel obstruction) (HCC)    Aspiration into airway    Acute respiratory failure with hypoxia (HCC)    Small bowel obstruction (HCC)    Diastolic heart failure (HCC)    History of atrial fibrillation    Supraventricular tachycardia (HCC)    Arterial hypotension    Aspiration pneumonia (HCC)    Morbid obesity with BMI of 50.0-59.9, adult (HCC)    Acute delirium    EARNESTINE (obstructive sleep apnea)    Dysphagia       Allergies  Codeine    Medications    Scheduled Meds:   [START ON 9/4/2022] methylPREDNISolone  10 mg IntraVENous Daily    enoxaparin  30 mg SubCUTAneous BID    torsemide  20 mg Oral Daily    pantoprazole (PROTONIX) 40 mg injection  40 mg IntraVENous Daily    thiamine (VITAMIN B1) IVPB  100 mg IntraVENous [id number]    folic acid  1 mg Oral Daily    aspirin  81 mg Oral Daily    lidocaine 1 % injection  5 mL IntraDERmal Once    sodium chloride flush  5-40 mL IntraVENous 2 times per day    celecoxib  200 mg Per NG tube BID    gabapentin  400 mg Oral TID    metoprolol tartrate  25 mg Oral BID    levothyroxine  200 mcg Oral Daily     Continuous Infusions:   sodium chloride Stopped (08/31/22 1521)     PRN Meds:  oxyCODONE, labetalol, sodium chloride flush, sodium chloride, hydrALAZINE, ipratropium-albuterol, senna, polyethylene glycol, docusate, artificial tears, acetaminophen, potassium chloride, acetaminophen, ondansetron **OR** ondansetron, diazePAM    Vitals   Vitals /wt Patient Vitals for the past 8 hrs:   BP Temp Temp src Pulse Resp SpO2 Weight   09/03/22 0840 -- -- -- -- -- 96 % --   09/03/22 0800 (!) 133/90 98.2 °F (36.8 °C) Axillary 62 15 96 % --   09/03/22 0700 131/84 -- -- 62 12 -- --   09/03/22 0600 133/88 -- -- 58 13 96 % --   09/03/22 0539 -- -- -- -- -- -- (!) 327 lb 3.2 oz (148.4 kg)   09/03/22 0500 136/87 -- -- 56 14 -- --   09/03/22 0400 138/85 98.2 °F (36.8 °C) Axillary 59 14 97 % --   09/03/22 0359 -- -- -- 58 16 96 % --   09/03/22 0300 136/85 -- -- 60 13 -- --   09/03/22 0200 136/84 -- -- 55 13 94 % --          72HR INTAKE/OUTPUT:    Intake/Output Summary (Last 24 hours) at 9/3/2022 0938  Last data filed at 9/3/2022 0800  Gross per 24 hour   Intake 865.3 ml   Output 4928 ml   Net -4062.7 ml         Exam:  Gen: awake , alert  Eyes: PERRL. No sclera icterus. No conjunctival injection. ENT: No discharge. Pharynx clear. External appearance of ears and nose normal.  Neck: Trachea midline. No obvious mass. Resp: No accessory muscle use. No crackles. No wheezes. No rhonchi. CV: Regular rate. Regular rhythm. No murmur or rub. No edema. GI: Non-tender. Non-distended. No hernia. PEG tube site c/d/i  Skin: Warm, dry, normal texture and turgor. Lymph: No cervical LAD. No supraclavicular LAD. M/S: / Ext. No cyanosis. No clubbing. No joint deformity. Neuro: intubated and sedated    PT/INR:   No results for input(s): PROTIME, INR in the last 72 hours. APTT:   No results for input(s): APTT in the last 72 hours. CBC:   Recent Labs     09/01/22 0429 09/02/22  0400 09/03/22  0505   WBC 9.9 7.4 6.4   HGB 9.7* 9.8* 10.5*   HCT 30.4* 29.6* 31.7*   MCV 93.9 93.0 91.9    220 231         BMP:   Recent Labs     09/01/22 0429 09/02/22  0400 09/03/22  0505    141 138   K 4.5 3.7 3.2*    106 100   CO2 24 28 29   PHOS 2.8 3.0 2.9   BUN 23* 27* 27*   CREATININE 0.7* 0.9 0.7*         LIVER PROFILE:   No results for input(s): ALKPHOS, AST, ALT, ALB, BILIDIR, BILITOT, ALKPHOS in the last 72 hours. No results for input(s): AMYLASE in the last 72 hours. No results for input(s): LIPASE in the last 72 hours. UA:  No results for input(s): NITRITE, LABCAST, WBCUA, RBCUA, MUCUS in the last 72 hours. TROPONIN:   No results for input(s): Cheng Pears in the last 72 hours.       Lab Results   Component Value Date/Time    URRFLXCULT Not Indicated 08/15/2022 09:16 AM No results for input(s): TSHREFLEX in the last 72 hours. No components found for: EYK4202  POC GLUCOSE:    Recent Labs     09/02/22  1603 09/02/22 2003 09/02/22  2343 09/03/22  0509 09/03/22  0749   POCGLU 115* 92 87 87 84       No results for input(s): LABA1C in the last 72 hours. No results found for: LABA1C    Echocardiogram shows normal ejection fraction  Grade 1 diastolic dysfunction      ASSESSMENT AND PLAN    Acute respiratory failure  Intubated 8/8  Extubated 8/26  Intubated 8/31  Extubated post surgery for PEG tube - 9/2  Doing well  on 3 L nasal cannula, wean as tolerated  Obesity with obstructive sleep apnea and hypoventilation syndrome  Continue BiPAP PRN with naps and overnight  Weaning off steroids per pulm    Aspiration pna  Completed course of cefepime and metronidazole  NPO for now, diet advance per general surgery    Acute on chronic diastolic CHF  Patient has grade 1 diastolic dysfunction  Lasix drip discontinued, now on PO torsemide    SVT  better  H/o atrial fibrillation  HR controled    CHARLENE  Renal function improved    Hypotention  Stable now    Hiatal hernia s/p lap reduction of hiatal hernia with gastropexy and 24F HADLEY PEG tube on 8/31  - CT chest/abd/pelv:   Large hiatal hernia containing much of the stomach which is distended  Tube feeds started     Atrial Fibrillation  Patient developed the rapid ventricular rate  IV Lopressor with paramenters  Cardiology, nephrology following    Rheumatoid arthritis  Continue Celebrex    Continue care ICU, may be able to move to PCU tomorrow if he remains stable. Code Status: Full Code    The patient and / or the family were informed of the results of any tests, a time was given to answer questions, a plan was proposed and they agreed with plan. Seth Ricardo MD    This note was transcribed using [id number] [employer]. Please disregard any translational errors.

## 2022-09-04 LAB
ALBUMIN SERPL-MCNC: 3 G/DL (ref 3.4–5)
ANION GAP SERPL CALCULATED.3IONS-SCNC: 9 MMOL/L (ref 3–16)
BASOPHILS ABSOLUTE: 0.1 K/UL (ref 0–0.2)
BASOPHILS RELATIVE PERCENT: 1 %
BUN BLDV-MCNC: 30 MG/DL (ref 7–20)
CALCIUM SERPL-MCNC: 8.3 MG/DL (ref 8.3–10.6)
CHLORIDE BLD-SCNC: 102 MMOL/L (ref 99–110)
CO2: 31 MMOL/L (ref 21–32)
CREAT SERPL-MCNC: 0.6 MG/DL (ref 0.8–1.3)
EOSINOPHILS ABSOLUTE: 0.1 K/UL (ref 0–0.6)
EOSINOPHILS RELATIVE PERCENT: 1.8 %
GFR AFRICAN AMERICAN: >60
GFR NON-AFRICAN AMERICAN: >60
GLUCOSE BLD-MCNC: 111 MG/DL (ref 70–99)
GLUCOSE BLD-MCNC: 115 MG/DL (ref 70–99)
GLUCOSE BLD-MCNC: 118 MG/DL (ref 70–99)
GLUCOSE BLD-MCNC: 125 MG/DL (ref 70–99)
GLUCOSE BLD-MCNC: 128 MG/DL (ref 70–99)
GLUCOSE BLD-MCNC: 98 MG/DL (ref 70–99)
HCT VFR BLD CALC: 33.5 % (ref 40.5–52.5)
HEMOGLOBIN: 11 G/DL (ref 13.5–17.5)
LYMPHOCYTES ABSOLUTE: 1.1 K/UL (ref 1–5.1)
LYMPHOCYTES RELATIVE PERCENT: 16.8 %
MAGNESIUM: 2 MG/DL (ref 1.8–2.4)
MCH RBC QN AUTO: 30.6 PG (ref 26–34)
MCHC RBC AUTO-ENTMCNC: 32.8 G/DL (ref 31–36)
MCV RBC AUTO: 93.3 FL (ref 80–100)
MONOCYTES ABSOLUTE: 0.6 K/UL (ref 0–1.3)
MONOCYTES RELATIVE PERCENT: 8.8 %
NEUTROPHILS ABSOLUTE: 4.8 K/UL (ref 1.7–7.7)
NEUTROPHILS RELATIVE PERCENT: 71.6 %
PDW BLD-RTO: 17.4 % (ref 12.4–15.4)
PERFORMED ON: ABNORMAL
PERFORMED ON: NORMAL
PHOSPHORUS: 2.8 MG/DL (ref 2.5–4.9)
PLATELET # BLD: 248 K/UL (ref 135–450)
PMV BLD AUTO: 7.7 FL (ref 5–10.5)
POTASSIUM SERPL-SCNC: 3.2 MMOL/L (ref 3.5–5.1)
POTASSIUM SERPL-SCNC: 3.7 MMOL/L (ref 3.5–5.1)
RBC # BLD: 3.59 M/UL (ref 4.2–5.9)
SODIUM BLD-SCNC: 142 MMOL/L (ref 136–145)
WBC # BLD: 6.8 K/UL (ref 4–11)

## 2022-09-04 PROCEDURE — 6370000000 HC RX 637 (ALT 250 FOR IP): Performed by: SURGERY

## 2022-09-04 PROCEDURE — 85025 COMPLETE CBC W/AUTO DIFF WBC: CPT

## 2022-09-04 PROCEDURE — 94761 N-INVAS EAR/PLS OXIMETRY MLT: CPT

## 2022-09-04 PROCEDURE — 80069 RENAL FUNCTION PANEL: CPT

## 2022-09-04 PROCEDURE — 94660 CPAP INITIATION&MGMT: CPT

## 2022-09-04 PROCEDURE — 6360000002 HC RX W HCPCS: Performed by: INTERNAL MEDICINE

## 2022-09-04 PROCEDURE — 6370000000 HC RX 637 (ALT 250 FOR IP): Performed by: INTERNAL MEDICINE

## 2022-09-04 PROCEDURE — 2580000003 HC RX 258: Performed by: SURGERY

## 2022-09-04 PROCEDURE — 6360000002 HC RX W HCPCS: Performed by: SURGERY

## 2022-09-04 PROCEDURE — 99291 CRITICAL CARE FIRST HOUR: CPT | Performed by: INTERNAL MEDICINE

## 2022-09-04 PROCEDURE — 2700000000 HC OXYGEN THERAPY PER DAY

## 2022-09-04 PROCEDURE — 2580000003 HC RX 258: Performed by: INTERNAL MEDICINE

## 2022-09-04 PROCEDURE — 83735 ASSAY OF MAGNESIUM: CPT

## 2022-09-04 PROCEDURE — C9113 INJ PANTOPRAZOLE SODIUM, VIA: HCPCS | Performed by: INTERNAL MEDICINE

## 2022-09-04 PROCEDURE — 2060000000 HC ICU INTERMEDIATE R&B

## 2022-09-04 PROCEDURE — 84132 ASSAY OF SERUM POTASSIUM: CPT

## 2022-09-04 RX ORDER — POTASSIUM CHLORIDE 20 MEQ/1
20 TABLET, EXTENDED RELEASE ORAL 2 TIMES DAILY WITH MEALS
Status: DISCONTINUED | OUTPATIENT
Start: 2022-09-04 | End: 2022-09-04

## 2022-09-04 RX ADMIN — POTASSIUM CHLORIDE 20 MEQ: 29.8 INJECTION, SOLUTION INTRAVENOUS at 09:38

## 2022-09-04 RX ADMIN — GABAPENTIN 400 MG: 400 CAPSULE ORAL at 08:09

## 2022-09-04 RX ADMIN — LEVOTHYROXINE SODIUM 200 MCG: 0.1 TABLET ORAL at 08:17

## 2022-09-04 RX ADMIN — GABAPENTIN 400 MG: 400 CAPSULE ORAL at 13:38

## 2022-09-04 RX ADMIN — POTASSIUM CHLORIDE 20 MEQ: 29.8 INJECTION, SOLUTION INTRAVENOUS at 08:25

## 2022-09-04 RX ADMIN — FOLIC ACID 1 MG: 1 TABLET ORAL at 08:09

## 2022-09-04 RX ADMIN — THIAMINE HYDROCHLORIDE 100 MG: 100 INJECTION, SOLUTION INTRAMUSCULAR; INTRAVENOUS at 13:39

## 2022-09-04 RX ADMIN — ENOXAPARIN SODIUM 30 MG: 100 INJECTION SUBCUTANEOUS at 21:46

## 2022-09-04 RX ADMIN — POTASSIUM BICARBONATE 20 MEQ: 782 TABLET, EFFERVESCENT ORAL at 17:32

## 2022-09-04 RX ADMIN — CELECOXIB 200 MG: 200 CAPSULE ORAL at 22:42

## 2022-09-04 RX ADMIN — METOPROLOL TARTRATE 25 MG: 25 TABLET, FILM COATED ORAL at 21:47

## 2022-09-04 RX ADMIN — ASPIRIN 81 MG CHEWABLE TABLET 81 MG: 81 TABLET CHEWABLE at 08:09

## 2022-09-04 RX ADMIN — TORSEMIDE 20 MG: 20 TABLET ORAL at 08:17

## 2022-09-04 RX ADMIN — SODIUM CHLORIDE, PRESERVATIVE FREE 10 ML: 5 INJECTION INTRAVENOUS at 21:52

## 2022-09-04 RX ADMIN — Medication 40 MG: at 08:09

## 2022-09-04 RX ADMIN — ENOXAPARIN SODIUM 30 MG: 100 INJECTION SUBCUTANEOUS at 08:08

## 2022-09-04 RX ADMIN — METHYLPREDNISOLONE SODIUM SUCCINATE 10 MG: 40 INJECTION, POWDER, FOR SOLUTION INTRAMUSCULAR; INTRAVENOUS at 08:10

## 2022-09-04 RX ADMIN — CELECOXIB 200 MG: 200 CAPSULE ORAL at 08:23

## 2022-09-04 RX ADMIN — SODIUM CHLORIDE, PRESERVATIVE FREE 10 ML: 5 INJECTION INTRAVENOUS at 08:17

## 2022-09-04 RX ADMIN — METOPROLOL TARTRATE 25 MG: 25 TABLET, FILM COATED ORAL at 08:09

## 2022-09-04 RX ADMIN — GABAPENTIN 400 MG: 400 CAPSULE ORAL at 21:47

## 2022-09-04 ASSESSMENT — PAIN SCALES - GENERAL
PAINLEVEL_OUTOF10: 0

## 2022-09-04 NOTE — PROGRESS NOTES
General Surgery    Stable  Up to the chair  Pain controlled    Tolerating tube feeds at goal    Continue speech therapy    Electronically signed by Aldo Buenrostro MD on 9/4/2022 at 1:06 PM

## 2022-09-04 NOTE — PLAN OF CARE
Problem: Discharge Planning  Goal: Discharge to home or other facility with appropriate resources  Outcome: Progressing     Problem: Pain  Goal: Verbalizes/displays adequate comfort level or baseline comfort level  Outcome: Progressing     Problem: ABCDS Injury Assessment  Goal: Absence of physical injury  Outcome: Progressing     Problem: Safety - Adult  Goal: Free from fall injury  Outcome: Progressing     Problem: Skin/Tissue Integrity  Goal: Absence of new skin breakdown  Description: 1. Monitor for areas of redness and/or skin breakdown  2.   Assess vascular access sites hourly  Outcome: Progressing     Problem: Nutrition Deficit:  Goal: Optimize nutritional status  Outcome: Progressing     Problem: Neurosensory - Adult  Goal: Remains free of injury related to seizures activity  Outcome: Progressing  Goal: Achieves maximal functionality and self care  Outcome: Progressing     Problem: Respiratory - Adult  Goal: Achieves optimal ventilation and oxygenation  Outcome: Progressing     Problem: Skin/Tissue Integrity - Adult  Goal: Skin integrity remains intact  Outcome: Progressing  Goal: Oral mucous membranes remain intact  Outcome: Progressing     Problem: Gastrointestinal - Adult  Goal: Minimal or absence of nausea and vomiting  Outcome: Progressing  Goal: Maintains adequate nutritional intake  Outcome: Progressing     Problem: Genitourinary - Adult  Goal: Absence of urinary retention  Outcome: Progressing  Goal: Urinary catheter remains patent  Outcome: Progressing

## 2022-09-04 NOTE — PROGRESS NOTES
Hospitalist Progress Note  9/4/2022 9:17 AM    PCP: Alexandrea Whitney MD    [id number]     Date of Admission: 8/5/2022    Patient demographics:  The patient  Willam Barba is a 63 y.o. male     Significant past medical history:   Patient Active Problem List   Diagnosis    Partial bowel obstruction ([facility] [address]    Hiatal hernia    Acute respiratory failure with hypoxia and hypercapnia (HCC)    SBO (small bowel obstruction) (HCC)    Aspiration into airway    Acute respiratory failure with hypoxia (HCC)    Small bowel obstruction (HCC)    Diastolic heart failure (HCC)    History of atrial fibrillation    Supraventricular tachycardia ([facility] [address]    Arterial hypotension    Aspiration pneumonia ([facility] [address]    Morbid obesity with BMI of 50.0-59.9, adult ([facility] [address]    Acute delirium    EARNESTINE (obstructive sleep apnea)    Dysphagia         Patient was diagnosed with:  Low grade or Partial Bowel obstruction  Pneumonia and septic shock. Hiatal hernia  Atrial Fibrillation    Treatment while inpatient:  63years old male with medical history significant for morbid obesity, paraesophageal hernia. Patient presented to the emergency room with abdominal pain nausea vomiting and diarrhea. Patient was diagnosed with small bowel obstruction. Patient was also diagnosed with pneumonia and septic shock. Patient developed acute respiratory failure and acute renal failure. Managed on the ventilator with a prolonged course in ICU. Extubated 8/26. Intubated again after lap reduction of hiatal hernia with gastropexy and 24F HADLEY PEG tube on 8/31, extubated 9/2    ----------------------------------------------------------    SUBJECTIVE COMPLAINTS- patient was seen at bedside, feels well overall. Tolerated BiPAP overnight. Tolerating tube feeds so far    Diet: Diet NPO  ADULT TUBE FEEDING; PEG; Standard with Fiber; Continuous; 15; Yes; 15; Q 4 hours; 45; 30; Q 4 hours; Protein;  Bolus 2 proteinex daily      OBJECTIVE:   Patient Active Problem List   Diagnosis Partial bowel obstruction (HCC)    Hiatal hernia    Acute respiratory failure with hypoxia and hypercapnia (HCC)    SBO (small bowel obstruction) (HCC)    Aspiration into airway    Acute respiratory failure with hypoxia (HCC)    Small bowel obstruction (HCC)    Diastolic heart failure (HCC)    History of atrial fibrillation    Supraventricular tachycardia (HCC)    Arterial hypotension    Aspiration pneumonia (HCC)    Morbid obesity with BMI of 50.0-59.9, adult (HCC)    Acute delirium    EARNESTINE (obstructive sleep apnea)    Dysphagia       Allergies  Codeine    Medications    Scheduled Meds:   methylPREDNISolone  10 mg IntraVENous Daily    pantoprazole (PROTONIX) 40 mg injection  40 mg IntraVENous Daily    enoxaparin  30 mg SubCUTAneous BID    torsemide  20 mg Oral Daily    thiamine (VITAMIN B1) IVPB  100 mg IntraVENous [id number]    folic acid  1 mg Oral Daily    aspirin  81 mg Oral Daily    lidocaine 1 % injection  5 mL IntraDERmal Once    sodium chloride flush  5-40 mL IntraVENous 2 times per day    celecoxib  200 mg Per NG tube BID    gabapentin  400 mg Oral TID    metoprolol tartrate  25 mg Oral BID    levothyroxine  200 mcg Oral Daily     Continuous Infusions:   sodium chloride Stopped (08/31/22 1521)     PRN Meds:  oxyCODONE, labetalol, sodium chloride flush, sodium chloride, hydrALAZINE, ipratropium-albuterol, senna, polyethylene glycol, docusate, artificial tears, acetaminophen, potassium chloride, acetaminophen, ondansetron **OR** ondansetron, diazePAM    Vitals   Vitals /wt Patient Vitals for the past 8 hrs:   BP Temp Temp src Pulse Resp SpO2 Weight   09/04/22 0847 -- -- -- 62 16 94 % --   09/04/22 0800 134/83 97.6 °F (36.4 °C) Axillary 71 15 -- --   09/04/22 0700 (!) 149/90 -- -- 70 16 -- --   09/04/22 0600 (!) 146/90 -- -- 67 12 -- --   09/04/22 0500 (!) 151/90 -- -- 65 11 -- --   09/04/22 0400 (!) 151/92 97.6 °F (36.4 °C) Axillary 65 14 -- (!) 312 lb 9.6 oz (141.8 kg)   09/04/22 0354 -- -- -- -- -- 95 % -- 09/04/22 0300 133/82 -- -- 67 14 -- --   09/04/22 0200 (!) 148/89 -- -- 64 12 -- --          72HR INTAKE/OUTPUT:    Intake/Output Summary (Last 24 hours) at 9/4/2022 0917  Last data filed at 9/4/2022 0800  Gross per 24 hour   Intake 1697.3 ml   Output 2132 ml   Net -434.7 ml         Exam:  Gen: awake , alert  Eyes: PERRL. No sclera icterus. No conjunctival injection. ENT: No discharge. Pharynx clear. External appearance of ears and nose normal.  Neck: Trachea midline. No obvious mass. Resp: No accessory muscle use. No crackles. No wheezes. No rhonchi. CV: Regular rate. Regular rhythm. No murmur or rub. No edema. GI: Non-tender. Non-distended. No hernia. PEG tube site c/d/i  Skin: Warm, dry, normal texture and turgor. Lymph: No cervical LAD. No supraclavicular LAD. M/S: / Ext. No cyanosis. No clubbing. No joint deformity. Neuro: intubated and sedated    PT/INR:   No results for input(s): PROTIME, INR in the last 72 hours. APTT:   No results for input(s): APTT in the last 72 hours. CBC:   Recent Labs     09/02/22  0400 09/03/22  0505 09/04/22  0429   WBC 7.4 6.4 6.8   HGB 9.8* 10.5* 11.0*   HCT 29.6* 31.7* 33.5*   MCV 93.0 91.9 93.3    231 248         BMP:   Recent Labs     09/02/22  0400 09/03/22  0505 09/03/22  1250 09/04/22  0429    138  --  142   K 3.7 3.2* 3.9 3.2*    100  --  102   CO2 28 29  --  31   PHOS 3.0 2.9  --  2.8   BUN 27* 27*  --  30*   CREATININE 0.9 0.7*  --  0.6*         LIVER PROFILE:   No results for input(s): ALKPHOS, AST, ALT, ALB, BILIDIR, BILITOT, ALKPHOS in the last 72 hours. No results for input(s): AMYLASE in the last 72 hours. No results for input(s): LIPASE in the last 72 hours. UA:  No results for input(s): NITRITE, LABCAST, WBCUA, RBCUA, MUCUS in the last 72 hours. TROPONIN:   No results for input(s): Ariel Shorts in the last 72 hours.       Lab Results   Component Value Date/Time    URRFLXCULT Not Indicated 08/15/2022

## 2022-09-04 NOTE — PLAN OF CARE
Problem: Discharge Planning  Goal: Discharge to home or other facility with appropriate resources  9/4/2022 1518 by John Anguiano RN  Outcome: Progressing  9/4/2022 1207 by Vannesa Wang RN  Outcome: Progressing     Problem: Pain  Goal: Verbalizes/displays adequate comfort level or baseline comfort level  9/4/2022 1518 by John Anguiano RN  Outcome: Progressing  9/4/2022 1207 by Vannesa Wang RN  Outcome: Progressing     Problem: ABCDS Injury Assessment  Goal: Absence of physical injury  9/4/2022 1518 by John Anguiano RN  Outcome: Progressing  9/4/2022 1207 by Vannesa Wang RN  Outcome: Progressing     Problem: Safety - Adult  Goal: Free from fall injury  9/4/2022 1518 by John Anguiano RN  Outcome: Progressing  9/4/2022 1207 by Vannesa Wang RN  Outcome: Progressing     Problem: Skin/Tissue Integrity  Goal: Absence of new skin breakdown  Description: 1. Monitor for areas of redness and/or skin breakdown  2.   Assess vascular access sites hourly  9/4/2022 1518 by John Anguiano RN  Outcome: Progressing  9/4/2022 1207 by Vannesa Wang RN  Outcome: Progressing     Problem: Nutrition Deficit:  Goal: Optimize nutritional status  9/4/2022 1518 by John Anguiano RN  Outcome: Progressing  9/4/2022 1207 by Vannesa Wang RN  Outcome: Progressing     Problem: Neurosensory - Adult  Goal: Achieves stable or improved neurological status  Outcome: Progressing  Goal: Absence of seizures  Outcome: Progressing  Goal: Remains free of injury related to seizures activity  9/4/2022 1518 by John Anguiano RN  Outcome: Progressing  9/4/2022 1207 by Vannesa Wang RN  Outcome: Progressing  Goal: Achieves maximal functionality and self care  9/4/2022 1518 by John Anguiano RN  Outcome: Progressing  9/4/2022 1207 by Vannesa Wang RN  Outcome: Progressing     Problem: Respiratory - Adult  Goal: Achieves optimal ventilation and oxygenation  9/4/2022 1518 by John Anguiano RN  Outcome: Progressing  9/4/2022 1207 by Vannesa Wang RN  Outcome: Progressing     Problem: Cardiovascular - Adult  Goal: Maintains optimal cardiac output and hemodynamic stability  Outcome: Progressing  Goal: Absence of cardiac dysrhythmias or at baseline  Outcome: Progressing     Problem: Skin/Tissue Integrity - Adult  Goal: Skin integrity remains intact  9/4/2022 1518 by John Anguiano RN  Outcome: Progressing  9/4/2022 1207 by Vannesa Wang RN  Outcome: Progressing  Goal: Incisions, wounds, or drain sites healing without S/S of infection  Outcome: Progressing  Goal: Oral mucous membranes remain intact  9/4/2022 1518 by John Anguiano RN  Outcome: Progressing  9/4/2022 1207 by Vannesa Wang RN  Outcome: Progressing     Problem: Musculoskeletal - Adult  Goal: Return mobility to safest level of function  Outcome: Progressing  Goal: Maintain proper alignment of affected body part  Outcome: Progressing  Goal: Return ADL status to a safe level of function  Outcome: Progressing     Problem: Gastrointestinal - Adult  Goal: Minimal or absence of nausea and vomiting  9/4/2022 1518 by John Anguiano RN  Outcome: Progressing  9/4/2022 1207 by Vannesa Wang RN  Outcome: Progressing  Goal: Maintains or returns to baseline bowel function  Outcome: Progressing  Goal: Maintains adequate nutritional intake  9/4/2022 1518 by John Anguiano RN  Outcome: Progressing  9/4/2022 1207 by Vannesa Wang RN  Outcome: Progressing  Goal: Establish and maintain optimal ostomy function  Outcome: Progressing     Problem: Genitourinary - Adult  Goal: Absence of urinary retention  9/4/2022 1518 by John Anguiano RN  Outcome: Progressing  9/4/2022 1207 by Vannesa Wang RN  Outcome: Progressing  Goal: Urinary catheter remains patent  9/4/2022 1518 by John Anguiaon RN  Outcome: Progressing  9/4/2022 1207 by Vannesa Wang RN  Outcome: Progressing     Problem: Anxiety  Goal: Will report anxiety at manageable levels  Description: INTERVENTIONS:  1. Administer medication as ordered  2. Teach and rehearse alternative coping skills  3. Provide emotional support with 1:1 interaction with staff  Outcome: Progressing     Problem: Coping  Goal: Pt/Family able to verbalize concerns and demonstrate effective coping strategies  Description: INTERVENTIONS:  1. Assist patient/family to identify coping skills, available support systems and cultural and spiritual values  2. Provide emotional support, including active listening and acknowledgement of concerns of patient and caregivers  3. Reduce environmental stimuli, as able  4. Instruct patient/family in relaxation techniques, as appropriate  5. Assess for spiritual pain/suffering and initiate Spiritual Care, Psychosocial Clinical Specialist consults as needed  Outcome: Progressing     Problem: Change in Body Image  Goal: Pt/Family communicate acceptance of loss or change in body image and feel psychological comfort and peace  Description: INTERVENTIONS:  1. Assess patient/family anxiety and grief process related to change in body image, loss of functional status, loss of sense of self, and forgiveness  2. Provide emotional and spiritual support  3. Provide information about the patient's health status with consideration of family and cultural values  4. Communicate willingness to discuss loss and facilitate grief process with patient/family as appropriate  5. Emphasize sustaining relationships within family system and community, or aurora/spiritual traditions  6. Initiate Spiritual Care, Psychosocial Clinical Specialist consult as needed  Outcome: Progressing     Problem: Decision Making  Goal: Pt/Family able to effectively weigh alternatives and participate in decision making related to treatment and care  Description: INTERVENTIONS:  1. Determine when there are differences between patient's view, family's view, and healthcare provider's view of condition  2.  Facilitate patient and family articulation of goals for care  3. Help patient and family identify pros/cons of alternative solutions  4. Provide information as requested by patient/family  5. Respect patient/family right to receive or not to receive information  6. Serve as a liaison between patient and family and health care team  7. Initiate Consults from Ethics, Palliative Care or initiate [address] [place] [address] as is appropriate  Outcome: Progressing     Problem: Behavior  Goal: Pt/Family maintain appropriate behavior and adhere to behavioral management agreement, if implemented  Description: INTERVENTIONS:  1. Assess patient/family's coping skills and  non-compliant behavior (including use of illegal substances)  2. Notify security of behavior or suspected illegal substances which indicate the need for search of the family and/or belongings  3. Encourage verbalization of thoughts and concerns in a socially appropriate manner  4. Utilize positive, consistent limit setting strategies supporting safety of patient, staff and others  5. Encourage participation in the decision making process about the behavioral management agreement  6. If a visitor's behavior poses a threat to safety call refer to organization policy.   7. Initiate consult with , Psychosocial CNS, Spiritual Care as appropriate  Outcome: Progressing

## 2022-09-04 NOTE — PROGRESS NOTES
Pt arrived to this floor from icu via facility transport, pt tolerated well, on 5 liters of o2 via high flow nasal cannula, oriented to room and call light, family at bedside, tube feed on and running at 45 ml/ hour, respiratory at bedside upon pt entering room. Pt c/o no pain at this time.     Electronically signed by Merlene Tapia RN on 9/4/2022 at 3:20 PM

## 2022-09-04 NOTE — PROGRESS NOTES
Pulmonary Critical Care Progress Note     Patient's name:  Aneta Hadley Record Number: [id number]  Patient's account/billing number: [de-identified]  Patient's YOB: 1960  Age: 63 y.o. Date of Admission: 8/5/2022  1:56 PM  Date of Consult: 9/4/2022      Primary Care Physician: Severa Arthur, MD      Code Status: Full Code    Chief complaint: Acute respiratory failure with hypoxia    Assessment and Plan     Acute respiratory failure hypoxia status post intubation 8/31/2022 on mechanical ventilation. EARNESTINE/obesity ventilation syndrome. Recent aspiration/organizing pneumonia. Small bowel obstruction. Dysphagia status post laparoscopic reduction of hiatal hernia with PEG tube placement. Plan:  Bipap intermittently during the day and continuous at night  Aspiration precautions   Continue tube feeding   Wean off steroid  GI and DVT prophylaxis. Advance activities      Overnight:  Currently on bipap  No issues with TF  No fever  Currently on 50%      REVIEW OF SYSTEMS:  Review of Systems -   Unable to obtain on bipap          Physical Exam:    Vitals: /83   Pulse 62   Temp 97.6 °F (36.4 °C) (Axillary)   Resp 16   Ht 5' 7\" (1.702 m)   Wt (!) 312 lb 9.6 oz (141.8 kg)   SpO2 94%   BMI 48.96 kg/m²     Last Body weight:   Wt Readings from Last 3 Encounters:   09/04/22 (!) 312 lb 9.6 oz (141.8 kg)       Body Mass Index : Body mass index is 48.96 kg/m². Intake and Output summary:   Intake/Output Summary (Last 24 hours) at 9/4/2022 0915  Last data filed at 9/4/2022 0800  Gross per 24 hour   Intake 1697.3 ml   Output 2132 ml   Net -434.7 ml       Physical Examination:     Gen:  No acute distress. On bipap  Eyes: PERRL. Anicteric sclera. No conjunctival injection. ENT: No discharge. Posterior oropharynx clear. External appearance of ears and nose normal.  Neck: Trachea midline. No mass   Resp: Diminished bilaterally with scattered rhonchi  CV: Regular rate.  Regular rhythm. No murmur or rub. No edema. GI: Soft, Non-tender. Non-distended. +BS  Skin: Warm, dry, w/o erythema. Lymph: No cervical or supraclavicular LAD. M/S: No cyanosis. No clubbing. Neuro: awake and lethargic, no focal deficit         Laboratory findings:-    CBC:   Recent Labs     09/04/22  0429   WBC 6.8   HGB 11.0*        BMP:    Recent Labs     09/02/22  0400 09/03/22  0505 09/03/22  1250 09/04/22  0429    138  --  142   K 3.7 3.2*   < > 3.2*    100  --  102   CO2 28 29  --  31   BUN 27* 27*  --  30*   CREATININE 0.9 0.7*  --  0.6*   GLUCOSE 87 97  --  118*    < > = values in this interval not displayed. S. Calcium:  Recent Labs     09/04/22  0429   CALCIUM 8.3       S. Magnesium:  Recent Labs     09/04/22  0429   MG 2.00     S. Phosphorus:  Recent Labs     09/04/22  0429   PHOS 2.8     S. Glucose:  Recent Labs     09/03/22  2328 09/04/22  0357 09/04/22  0803   POCGLU 113* 125* 128*           Radiology Review:  Pertinent images / reports were reviewed as a part of this visit.     CXR reviewed  Bibasilar airspace disease     Critical CAre time 31 minutes                Donald Wise MD, M.D.            9/4/2022, 9:15 AM

## 2022-09-04 NOTE — PLAN OF CARE
Problem: Discharge Planning  Goal: Discharge to home or other facility with appropriate resources  9/3/2022 2120 by Naheed Barry RN  Outcome: Progressing  Flowsheets (Taken 9/3/2022 2030)  Discharge to home or other facility with appropriate resources:   Identify barriers to discharge with patient and caregiver   Arrange for needed discharge resources and transportation as appropriate   Identify discharge learning needs (meds, wound care, etc)   Refer to discharge planning if patient needs post-hospital services based on physician order or complex needs related to functional status, cognitive ability or social support system  9/3/2022 1036 by Karo Díaz RN  Outcome: Progressing     Problem: Pain  Goal: Verbalizes/displays adequate comfort level or baseline comfort level  9/3/2022 2120 by Naheed Barry RN  Outcome: Progressing  Flowsheets (Taken 9/3/2022 2000)  Verbalizes/displays adequate comfort level or baseline comfort level:   Encourage patient to monitor pain and request assistance   Assess pain using appropriate pain scale   Administer analgesics based on type and severity of pain and evaluate response   Implement non-pharmacological measures as appropriate and evaluate response   Notify Licensed Independent Practitioner if interventions unsuccessful or patient reports new pain  9/3/2022 1036 by Karo Díaz RN  Outcome: Progressing     Problem: Safety - Adult  Goal: Free from fall injury  9/3/2022 2120 by Naheed Barry RN  Outcome: Progressing  [address] (Taken 9/3/2022 2118)  Free From Fall Injury: Instruct family/caregiver on patient safety  9/3/2022 1036 by Karo Díaz RN  Outcome: Progressing     Problem: Skin/Tissue Integrity  Goal: Absence of new skin breakdown  Description: 1. Monitor for areas of redness and/or skin breakdown  2.   Assess vascular access sites hourly  9/3/2022 2120 by Naheed Barry RN  Outcome: Progressing     Problem: Nutrition Deficit:  Goal: Optimize nutritional status  9/3/2022 2120 by Angelica Fernandez RN  Outcome: Progressing  9/3/2022 1036 by Shira Murillo RN  Outcome: Progressing     Problem: Respiratory - Adult  Goal: Achieves optimal ventilation and oxygenation  9/3/2022 2120 by Angelica Fernandez RN  Outcome: Progressing  Flowsheets (Taken 9/3/2022 2030)  Achieves optimal ventilation and oxygenation:   Assess for changes in respiratory status   Assess for changes in mentation and behavior   Position to facilitate oxygenation and minimize respiratory effort   Oxygen supplementation based on oxygen saturation or arterial blood gases   Encourage broncho-pulmonary hygiene including cough, deep breathe, incentive spirometry   Assess the need for suctioning and aspirate as needed   Assess and instruct to report shortness of breath or any respiratory difficulty   Respiratory therapy support as indicated  9/3/2022 1036 by Shira Murillo RN  Outcome: Progressing     Problem: Cardiovascular - Adult  Goal: Maintains optimal cardiac output and hemodynamic stability  Outcome: Progressing  Flowsheets (Taken 9/3/2022 2030)  Maintains optimal cardiac output and hemodynamic stability:   Monitor blood pressure and heart rate   Monitor urine output and notify Licensed Independent Practitioner for values outside of normal range   Assess for signs of decreased cardiac output   Administer fluid and/or volume expanders as ordered  Goal: Absence of cardiac dysrhythmias or at baseline  9/3/2022 2120 by Angelica Fernandez RN  Outcome: Progressing  [address] (Taken 9/3/2022 2030)  Absence of cardiac dysrhythmias or at baseline:   Monitor cardiac rate and rhythm   Assess for signs of decreased cardiac output   Administer antiarrhythmia medication and electrolyte replacement as ordered  9/3/2022 1036 by Shira Murillo RN  Outcome: Progressing     Problem: Musculoskeletal - Adult  Goal: Return mobility to safest level of function  9/3/2022 2120 by Grazyna Rodriguez RN  Outcome: Progressing  Flowsheets (Taken 9/3/2022 2030)  Return Mobility to Safest Level of Function:   Assess patient stability and activity tolerance for standing, transferring and ambulating with or without assistive devices   Assist with transfers and ambulation using safe patient handling equipment as needed   Ensure adequate protection for wounds/incisions during mobilization   Obtain physical therapy/occupational therapy consults as needed   Instruct patient/family in ordered activity level  9/3/2022 1036 by Kika Gonzalez RN  Outcome: Progressing  Goal: Maintain proper alignment of affected body part  Outcome: Progressing  Goal: Return ADL status to a safe level of function  Outcome: Progressing  Flowsheets (Taken 9/3/2022 2030)  Return ADL Status to a Safe Level of Function:   Administer medication as ordered   Assist and instruct patient to increase activity and self care as tolerated     Problem: Gastrointestinal - Adult  Goal: Minimal or absence of nausea and vomiting  Outcome: Progressing  Flowsheets (Taken 9/3/2022 2030)  Minimal or absence of nausea and vomiting:   Maintain NPO status until nausea and vomiting are resolved   Administer ordered antiemetic medications as needed   Provide nonpharmacologic comfort measures as appropriate   Advance diet as tolerated, if ordered   Nutrition consult to assist patient with adequate nutrition and appropriate food choices  Goal: Maintains or returns to baseline bowel function  Outcome: Progressing  Flowsheets (Taken 9/3/2022 2030)  Maintains or returns to baseline bowel function:   Assess bowel function   Encourage oral fluids to ensure adequate hydration   Administer IV fluids as ordered to ensure adequate hydration   Administer ordered medications as needed   Encourage mobilization and activity   Nutrition consult to assist patient with appropriate food choices  Goal: Maintains adequate nutritional intake  9/3/2022 2120 by Mickey Vázquez ANTONIA Haque  Outcome: Progressing  Flowsheets (Taken 9/3/2022 2030)  Maintains adequate nutritional intake:   Monitor percentage of each meal consumed   Identify factors contributing to decreased intake, treat as appropriate   Assist with meals as needed   Monitor intake and output, weight and lab values   Obtain nutritional consult as needed  9/3/2022 1036 by Narcisa Gallego RN  Outcome: Progressing  Goal: Establish and maintain optimal ostomy function  9/3/2022 2120 by Ciera Helton RN  Outcome: Progressing  9/3/2022 1036 by Narcisa Gallego RN  Outcome: Progressing

## 2022-09-05 LAB
BASOPHILS ABSOLUTE: 0.1 K/UL (ref 0–0.2)
BASOPHILS RELATIVE PERCENT: 1.1 %
EOSINOPHILS ABSOLUTE: 0.1 K/UL (ref 0–0.6)
EOSINOPHILS RELATIVE PERCENT: 1.7 %
GLUCOSE BLD-MCNC: 109 MG/DL (ref 70–99)
GLUCOSE BLD-MCNC: 111 MG/DL (ref 70–99)
GLUCOSE BLD-MCNC: 116 MG/DL (ref 70–99)
GLUCOSE BLD-MCNC: 130 MG/DL (ref 70–99)
GLUCOSE BLD-MCNC: 93 MG/DL (ref 70–99)
GLUCOSE BLD-MCNC: 99 MG/DL (ref 70–99)
HCT VFR BLD CALC: 33.7 % (ref 40.5–52.5)
HEMOGLOBIN: 11.1 G/DL (ref 13.5–17.5)
LYMPHOCYTES ABSOLUTE: 1.2 K/UL (ref 1–5.1)
LYMPHOCYTES RELATIVE PERCENT: 18.6 %
MCH RBC QN AUTO: 30.4 PG (ref 26–34)
MCHC RBC AUTO-ENTMCNC: 32.9 G/DL (ref 31–36)
MCV RBC AUTO: 92.5 FL (ref 80–100)
MONOCYTES ABSOLUTE: 0.5 K/UL (ref 0–1.3)
MONOCYTES RELATIVE PERCENT: 7.1 %
NEUTROPHILS ABSOLUTE: 4.7 K/UL (ref 1.7–7.7)
NEUTROPHILS RELATIVE PERCENT: 71.5 %
PDW BLD-RTO: 17.5 % (ref 12.4–15.4)
PERFORMED ON: ABNORMAL
PERFORMED ON: NORMAL
PERFORMED ON: NORMAL
PLATELET # BLD: 261 K/UL (ref 135–450)
PMV BLD AUTO: 7.8 FL (ref 5–10.5)
RBC # BLD: 3.64 M/UL (ref 4.2–5.9)
WBC # BLD: 6.6 K/UL (ref 4–11)

## 2022-09-05 PROCEDURE — C9113 INJ PANTOPRAZOLE SODIUM, VIA: HCPCS | Performed by: INTERNAL MEDICINE

## 2022-09-05 PROCEDURE — 6370000000 HC RX 637 (ALT 250 FOR IP): Performed by: SURGERY

## 2022-09-05 PROCEDURE — 2060000000 HC ICU INTERMEDIATE R&B

## 2022-09-05 PROCEDURE — 94761 N-INVAS EAR/PLS OXIMETRY MLT: CPT

## 2022-09-05 PROCEDURE — 6370000000 HC RX 637 (ALT 250 FOR IP): Performed by: INTERNAL MEDICINE

## 2022-09-05 PROCEDURE — 94669 MECHANICAL CHEST WALL OSCILL: CPT

## 2022-09-05 PROCEDURE — 85025 COMPLETE CBC W/AUTO DIFF WBC: CPT

## 2022-09-05 PROCEDURE — 99232 SBSQ HOSP IP/OBS MODERATE 35: CPT | Performed by: INTERNAL MEDICINE

## 2022-09-05 PROCEDURE — 92526 ORAL FUNCTION THERAPY: CPT

## 2022-09-05 PROCEDURE — 2580000003 HC RX 258: Performed by: SURGERY

## 2022-09-05 PROCEDURE — 6360000002 HC RX W HCPCS: Performed by: INTERNAL MEDICINE

## 2022-09-05 PROCEDURE — 2580000003 HC RX 258: Performed by: INTERNAL MEDICINE

## 2022-09-05 PROCEDURE — 2700000000 HC OXYGEN THERAPY PER DAY

## 2022-09-05 PROCEDURE — 43762 RPLC GTUBE NO REVJ TRC: CPT

## 2022-09-05 RX ORDER — AMLODIPINE BESYLATE 5 MG/1
5 TABLET ORAL DAILY
Status: DISCONTINUED | OUTPATIENT
Start: 2022-09-05 | End: 2022-09-07 | Stop reason: HOSPADM

## 2022-09-05 RX ADMIN — METOPROLOL TARTRATE 25 MG: 25 TABLET, FILM COATED ORAL at 19:58

## 2022-09-05 RX ADMIN — METOPROLOL TARTRATE 25 MG: 25 TABLET, FILM COATED ORAL at 09:44

## 2022-09-05 RX ADMIN — GABAPENTIN 400 MG: 400 CAPSULE ORAL at 20:07

## 2022-09-05 RX ADMIN — Medication 40 MG: at 09:47

## 2022-09-05 RX ADMIN — GABAPENTIN 400 MG: 400 CAPSULE ORAL at 09:43

## 2022-09-05 RX ADMIN — ENOXAPARIN SODIUM 30 MG: 100 INJECTION SUBCUTANEOUS at 09:45

## 2022-09-05 RX ADMIN — LEVOTHYROXINE SODIUM 200 MCG: 0.1 TABLET ORAL at 09:44

## 2022-09-05 RX ADMIN — CELECOXIB 200 MG: 200 CAPSULE ORAL at 09:44

## 2022-09-05 RX ADMIN — ASPIRIN 81 MG CHEWABLE TABLET 81 MG: 81 TABLET CHEWABLE at 09:44

## 2022-09-05 RX ADMIN — TORSEMIDE 20 MG: 20 TABLET ORAL at 09:43

## 2022-09-05 RX ADMIN — METHYLPREDNISOLONE SODIUM SUCCINATE 10 MG: 40 INJECTION, POWDER, FOR SOLUTION INTRAMUSCULAR; INTRAVENOUS at 09:45

## 2022-09-05 RX ADMIN — ENOXAPARIN SODIUM 30 MG: 100 INJECTION SUBCUTANEOUS at 20:07

## 2022-09-05 RX ADMIN — SODIUM CHLORIDE 25 ML: 9 INJECTION, SOLUTION INTRAVENOUS at 14:34

## 2022-09-05 RX ADMIN — GABAPENTIN 400 MG: 400 CAPSULE ORAL at 14:24

## 2022-09-05 RX ADMIN — SODIUM CHLORIDE, PRESERVATIVE FREE 10 ML: 5 INJECTION INTRAVENOUS at 19:59

## 2022-09-05 RX ADMIN — CELECOXIB 200 MG: 200 CAPSULE ORAL at 20:06

## 2022-09-05 RX ADMIN — AMLODIPINE BESYLATE 5 MG: 5 TABLET ORAL at 14:24

## 2022-09-05 RX ADMIN — THIAMINE HYDROCHLORIDE 100 MG: 100 INJECTION, SOLUTION INTRAMUSCULAR; INTRAVENOUS at 14:37

## 2022-09-05 RX ADMIN — POTASSIUM BICARBONATE 20 MEQ: 782 TABLET, EFFERVESCENT ORAL at 09:44

## 2022-09-05 RX ADMIN — SODIUM CHLORIDE, PRESERVATIVE FREE 10 ML: 5 INJECTION INTRAVENOUS at 09:47

## 2022-09-05 RX ADMIN — FOLIC ACID 1 MG: 1 TABLET ORAL at 09:43

## 2022-09-05 RX ADMIN — POTASSIUM BICARBONATE 20 MEQ: 782 TABLET, EFFERVESCENT ORAL at 17:30

## 2022-09-05 ASSESSMENT — PAIN SCALES - GENERAL: PAINLEVEL_OUTOF10: 0

## 2022-09-05 NOTE — PROGRESS NOTES
Speech Language Pathology  [facility]   Speech Therapy  Daily Dysphagia Treatment Note        Miles Vanegas  AGE: 63 y.o. GENDER: male  : 1960  [id number]  EPISODE DATE:  2022  Patient Active Problem List   Diagnosis    Partial bowel obstruction (HCC)    Hiatal hernia    Acute respiratory failure with hypoxia and hypercapnia (HCC)    SBO (small bowel obstruction) (HCC)    Aspiration into airway    Acute respiratory failure with hypoxia (HCC)    Small bowel obstruction (HCC)    Diastolic heart failure (HCC)    History of atrial fibrillation    Supraventricular tachycardia (HCC)    Arterial hypotension    Aspiration pneumonia ([address]    Morbid obesity with BMI of 50.0-59.9, adult ([address]    Acute delirium    EARNESTINE (obstructive sleep apnea)    Dysphagia     Allergies   Allergen Reactions    Codeine Nausea Only and Nausea And Vomiting     Stomach upset        Treatment Diagnosis: Dysphagia       Chart review:   Medical Diagnosis: Small bowel obstruction ([address] [K56.609]; Elevated TSH [R79.89]  Partial bowel obstruction ([address] [K56.600]; Respiratory Failure requiring intubation  Past Medical History:  has a past medical history of Arthritis, Atrial fibrillation ([address] and Hypertension. Past Surgical History:  has a past surgical history that includes Upper gastrointestinal endoscopy (2022); Upper gastrointestinal endoscopy (2022); Upper gastrointestinal endoscopy (N/A, 2022); bronchoscopy (2022); and bronchoscopy (2022). Onset: 2022     Chart Review:   H&P: 63years old male with medical history significant for morbid obesity, paraesophageal hernia. Patient presented to the emergency room with abdominal pain nausea vomiting and diarrhea. Patient was diagnosed with small bowel obstruction. Patient was also diagnosed with pneumonia and septic shock. Patient developed acute respiratory failure and acute renal failure.  Managed on the ventilator with a prolonged course in ICU.  8/08/22: intubated  8/26/22: extubated, placed on 6 LPM  8/27/22: clinical bedside swallow evaluation ordered received following extubation  Pt continues with NG tube feeding     8/28/2022: CT head  Impression   No acute intracranial abnormality. 8/24/2022 CT chest documented findings noted     8/30/2022 MBSS completed : Oropharyngeal Dysphagia with high aspiration risk. Oropharyngeal dysphagia characterized by reduced lingual coordination for bolus control and A-P oral transit; delayed initiation of the swallow; reduced laryngeal rom; reduced tongue base retraction and reduced sensation to aspiration/residue. Symptoms: Mastication was not assessed due to severity of pharyngeal phase deficits. Premature loss to pharynx ; gradual pooling to pharynx. Mildly thick and moderately thick penetration/aspiration prior to swallow; no cough response. Pooling of puree to valleculae with post swallow valleculae residue with difficulty clearing with clearance swallow and poor sensation of residue. Compensatory strategies: Inconsistent ability to achieve a volitional cough which was weak; Inconsistent ability to achieve clearance swallow which did not clear valleculae residue; Pt was unable to complete compensatory head postures; Pt did attempt at lingual hold but inconsistent; persistent premature loss of liquid. Recommendation for npo due to severity of deficits     8/31/2022: Surgical procedure : Laparoscopic reduction hiatal hernia; 24F HADLEY Gastrostomy tube placement    9/1/2022 : pt not seen as pt remains intubated from 8/31/2022 procedure  9/2/2022: pt not seen as extubated but requiring continuous BiPAP    Subjective:     Current diet   Npo; Gastrostomy tube feedings     Comments regarding tolerating Current Diet:   Reportedly tolerating tube feedings      Objective:     Pain    Denied    Cognitive/Behavior   Behavior/Cognition: Pt awake in bed; with family at his side.  Pt on O2 at 5L/min via nasal cannula. Pt oriented to self; place; family and partially to situation. Pt was verbally responsive but hoarse voice quality with episodes of hoarse whisper. Pt followed one step commands      Positioning    ~60 degrees upright in bed    PO Trials:  n/a    Dysphagia Tx:   Direct Dysphagia tx  Dysphagia ex: Introduced tongue base retraction ex; lingual rom/coordination ex; laryngeal ex targeting glottals on expiratory effort; pitch variation low to high;zenaida and 'effortful saliva swallow'; ed in potential lizzie ex  Training in compensatory strategies: review of MBSS findings; recommendations; and dysphagia treatment poc. Oral care recommended  Pt response to ex/training: pt/family voiced understanding    Goals:    Pt will participate in MBSS instrumental for assessment of pharyngeal phase of the swallow and aspiration risk: goal met 8/30/2022  Pt will demonstrate improved swallow response via lingual/laryngeal ex and sensory stimulation: initiated 9/5/2022  Pt will demonstrate improved swallow response for therapy trials of 1-2 consistencies with compensatory swallow strategies: not targeted  Assessment:   Therapy Impressions:     1. Pt awake in bed; with family at his side. Pt on O2 at 5L/min via nasal cannula. Pt oriented to self; place; family and partially to situation. Pt was verbally responsive but hoarse voice quality with episodes of hoarse whisper. Pt followed one step commands  2. Ongoing ed of MBSS findings; recommendations and dysphagia treatment poc.pt /family voiced understanding  3. Initiated lingual ex targeting lingual rom stretch; BOT ex ; and tongue base retraction ex. Fatigue noted and rest breaks/pacing of ex needed  4. Initiated laryngeal/pharyngeal ex targeting airway protection; glottals; pitch variation for laryngeal rom; zenaida; and 'saliva effortful swallow' strategy. Ongoing ed in other potential poc ex for indirect/direct dysphagia therapy reviewed.  Fatigue and SOB noted with rest breaks and pacing of ex needed  5. Pt with good response but fatigue and hoarse/strained voice at times. Ongoing pt ed. Pt family voiced understanding. Diet Recommendations:  Solid consistency: NPO (contninue TF)   Medication administration: gastrostomy tube       Strategies:   Compensatory Swallowing Strategies :  (oral care)    Education:  Consulted and agree with results and recommendations: Patient, RN; 2 family members     Patient Education Response: Needs reinforcement, No evidence of learning    Prognosis:   Good guarded medical DX; medical course of hospital admit; medical history co-morbidities; severity of deficits on testing 8/302/2022    Plan:     Continue Dysphagia Therapy:   Interventions: Therapeutic Interventions: Bolus control exercises, Pharyngeal exercises, Laryngeal exercises, Oral motor exercises, Patient/Family education, Oral care, Therapeutic PO trials with SLP, Vital Stim/NMES  Duration/Frequency of therapy while on unit:  3-5 times a week  Discharge Instructions:   Anticipate Yes__X__No__ for further skilled Speech Therapy for Dysphagia at discharge    This note serves as a D/C Summary in the event that this patient is discharged prior to the next therapy session. Coded treatment time:0  Total treatment time: 28    Electronically signed by  Luis Manuel Lechuga,MS,CCC,SLP [id number]  Speech and Language Pathologist  on 9/5/2022 at 2:24 PM

## 2022-09-05 NOTE — PROGRESS NOTES
Hospitalist Progress Note  9/5/2022 11:34 AM    PCP: Shelbi Portillo MD    [phone]     Date of Admission: 8/5/2022    Patient demographics:  The patient  Maximus Mcqueen is a 63 y.o. male     Significant past medical history:   Patient Active Problem List   Diagnosis    Partial bowel obstruction ([facility]    Hiatal hernia    Acute respiratory failure with hypoxia and hypercapnia (HCC)    SBO (small bowel obstruction) (HCC)    Aspiration into airway    Acute respiratory failure with hypoxia (HCC)    Small bowel obstruction (HCC)    Diastolic heart failure (HCC)    History of atrial fibrillation    Supraventricular tachycardia (HCC)    Arterial hypotension    Aspiration pneumonia ([facility]    Morbid obesity with BMI of 50.0-59.9, adult ([facility]    Acute delirium    EARNESTINE (obstructive sleep apnea)    Dysphagia         Patient was diagnosed with:  Low grade or Partial Bowel obstruction  Pneumonia and septic shock. Hiatal hernia  Atrial Fibrillation    Treatment while inpatient:  63years old male with medical history significant for morbid obesity, paraesophageal hernia. Patient presented to the emergency room with abdominal pain nausea vomiting and diarrhea. Patient was diagnosed with small bowel obstruction. Patient was also diagnosed with pneumonia and septic shock. Patient developed acute respiratory failure and acute renal failure. Managed on the ventilator with a prolonged course in ICU. Extubated 8/26. Intubated again after lap reduction of hiatal hernia with gastropexy and 24F HADLEY PEG tube on 8/31, extubated 9/2    ----------------------------------------------------------    SUBJECTIVE COMPLAINTS- patient was seen at bedside, feels well overall. Tolerated BiPAP overnight. Tolerating tube feeds so far    Diet: Diet NPO  ADULT TUBE FEEDING; PEG; Standard with Fiber; Continuous; 15; Yes; 15; Q 4 hours; 45; 30; Q 4 hours; Protein;  Bolus 2 proteinex daily      OBJECTIVE:   Patient Active Problem List   Diagnosis 0601  Gross per 24 hour   Intake 1170 ml   Output 2300 ml   Net -1130 ml         Exam:  Gen: awake , alert  Eyes: PERRL. No sclera icterus. No conjunctival injection. ENT: No discharge. Pharynx clear. External appearance of ears and nose normal.  Neck: Trachea midline. No obvious mass. Resp: No accessory muscle use. No crackles. No wheezes. No rhonchi. CV: Regular rate. Regular rhythm. No murmur or rub. No edema. GI: Non-tender. Non-distended. No hernia. PEG tube site c/d/i  Skin: Warm, dry, normal texture and turgor. Lymph: No cervical LAD. No supraclavicular LAD. M/S: / Ext. No cyanosis. No clubbing. No joint deformity. Neuro: intubated and sedated    PT/INR:   No results for input(s): PROTIME, INR in the last 72 hours. APTT:   No results for input(s): APTT in the last 72 hours. CBC:   Recent Labs     09/03/22  0505 09/04/22  0429 09/05/22  0500   WBC 6.4 6.8 6.6   HGB 10.5* 11.0* 11.1*   HCT 31.7* 33.5* 33.7*   MCV 91.9 93.3 92.5    248 261         BMP:   Recent Labs     09/03/22  0505 09/03/22  1250 09/04/22  0429 09/04/22  1405     --  142  --    K 3.2* 3.9 3.2* 3.7     --  102  --    CO2 29  --  31  --    PHOS 2.9  --  2.8  --    BUN 27*  --  30*  --    CREATININE 0.7*  --  0.6*  --          LIVER PROFILE:   No results for input(s): ALKPHOS, AST, ALT, ALB, BILIDIR, BILITOT, ALKPHOS in the last 72 hours. No results for input(s): AMYLASE in the last 72 hours. No results for input(s): LIPASE in the last 72 hours. UA:  No results for input(s): NITRITE, LABCAST, WBCUA, RBCUA, MUCUS in the last 72 hours. TROPONIN:   No results for input(s): Georgiana Singer in the last 72 hours. Lab Results   Component Value Date/Time    URRFLXCULT Not Indicated 08/15/2022 09:16 AM       No results for input(s): TSHREFLEX in the last 72 hours.       No components found for: [id number]  POC GLUCOSE:    Recent Labs     09/04/22  1657 09/04/22  1929 09/05/22  0202 09/05/22  0433 09/05/22  0741   POCGLU 111* 98 99 116* 109*       No results for input(s): LABA1C in the last 72 hours. No results found for: LABA1C    Echocardiogram shows normal ejection fraction  Grade 1 diastolic dysfunction      ASSESSMENT AND PLAN    Acute respiratory failure  Intubated 8/8  Extubated 8/26  Intubated 8/31  Extubated post surgery for PEG tube - 9/2  Doing well  on 5 L nasal cannula, wean as tolerated  Obesity with obstructive sleep apnea and hypoventilation syndrome  Continue BiPAP PRN with naps and overnight  Weaning off steroids per pulm    Aspiration pna  Completed course of cefepime and metronidazole  NPO for now, diet advance per general surgery    Acute on chronic diastolic CHF  Patient has grade 1 diastolic dysfunction  Lasix drip discontinued, now on PO torsemide    SVT  better  H/o atrial fibrillation  HR controled    CHARLENE  Renal function improved    Hypotention  Stable now    Hypokalemia  Replace PRN  Start scheduled 20 meq BID    Hiatal hernia s/p lap reduction of hiatal hernia with gastropexy and 24F HADLEY PEG tube on 8/31  - CT chest/abd/pelv:   Large hiatal hernia containing much of the stomach which is distended  Tube feeds started     Atrial Fibrillation  Patient developed the rapid ventricular rate  IV Lopressor with paramenters  Cardiology, nephrology following    Rheumatoid arthritis  Continue Celebrex    Continue care PCU. Code Status: Full Code    The patient and / or the family were informed of the results of any tests, a time was given to answer questions, a plan was proposed and they agreed with plan.     Tiffanie Somers MD

## 2022-09-05 NOTE — PLAN OF CARE
Problem: Pain  Goal: Verbalizes/displays adequate comfort level or baseline comfort level  Outcome: Progressing  Pt did not c/o pain this shift     Problem: Skin/Tissue Integrity  Goal: Absence of new skin breakdown  Description: 1. Monitor for areas of redness and/or skin breakdown  2. Assess vascular access sites hourly  Outcome: Progressing     Problem: Respiratory - Adult  Goal: Achieves optimal ventilation and oxygenation  Outcome: Progressing  Pt went from 5L high flow nasal cannula to 3L.

## 2022-09-05 NOTE — PLAN OF CARE
Problem: Pain  Goal: Verbalizes/displays adequate comfort level or baseline comfort level  9/5/2022 0242 by oSo Giang RN  Outcome: Progressing  9/4/2022 1518 by Gaurav Alaniz RN  Outcome: Progressing     Problem: Safety - Adult  Goal: Free from fall injury  9/5/2022 0242 by Soo Giang RN  Outcome: Progressing  9/4/2022 1518 by Gaurav Alaniz RN  Outcome: Progressing     Problem: Nutrition Deficit:  Goal: Optimize nutritional status  9/5/2022 0242 by Soo Giang RN  Outcome: Progressing  9/4/2022 1518 by Gaurav Alaniz RN  Outcome: Progressing     Problem: Respiratory - Adult  Goal: Achieves optimal ventilation and oxygenation  9/5/2022 0242 by Soo Giang RN  Outcome: Progressing  9/4/2022 1518 by Gaurav Alaniz RN  Outcome: Progressing     Problem: Genitourinary - Adult  Goal: Urinary catheter remains patent  9/5/2022 0242 by Soo Giang RN  Outcome: Progressing  9/4/2022 1518 by Gaurav Alaniz RN  Outcome: Progressing

## 2022-09-05 NOTE — PROGRESS NOTES
General Surgery    Stable  Tolerating tube feeds at goal    No new issues    Will follow along    Electronically signed by Manuela Berry MD on 9/5/2022 at 10:45 AM

## 2022-09-05 NOTE — PROGRESS NOTES
Pulmonary Critical Care Progress Note     Patient's name:  Aneta Hadley Record Number: [id number]  Patient's account/billing number: [de-identified]  Patient's YOB: 1960  Age: 63 y.o. Date of Admission: 8/5/2022  1:56 PM  Date of Consult: 9/5/2022      Primary Care Physician: Myah Huddleston MD      Code Status: Full Code    Chief complaint: Acute respiratory failure with hypoxia    Assessment and Plan     Acute respiratory failure hypoxia   EARNESTINE/obesity ventilation syndrome. Recent aspiration/organizing pneumonia. Small bowel obstruction. Dysphagia status post laparoscopic reduction of hiatal hernia with PEG tube placement. Plan:  Bipap nightly  Titrate oxygen to keep sats more than 90%  Aspiration precautions   Continue tube feeding   Wean off steroid  GI and DVT prophylaxis. Advance activities  Incentive spirometry      Overnight:  Wore BiPAP overnight. On 5 L of oxygen. REVIEW OF SYSTEMS:  Review of Systems -   Denied shortness of breath chest pain. No nausea vomiting diarrhea. Very weak          Physical Exam:    Vitals: BP (!) 158/98   Pulse 66   Temp 97.8 °F (36.6 °C) (Oral)   Resp 19   Ht 5' 7\" (1.702 m)   Wt (!) 309 lb 3.2 oz (140.3 kg)   SpO2 97%   BMI 48.43 kg/m²     Last Body weight:   Wt Readings from Last 3 Encounters:   09/05/22 (!) 309 lb 3.2 oz (140.3 kg)       Body Mass Index : Body mass index is 48.43 kg/m². Intake and Output summary:   Intake/Output Summary (Last 24 hours) at 9/5/2022 1311  Last data filed at 9/5/2022 1149  Gross per 24 hour   Intake 1170 ml   Output 2575 ml   Net -1405 ml         Physical Examination:     Gen:  No acute distress. Eyes: PERRL. Anicteric sclera. No conjunctival injection. ENT: No discharge. Posterior oropharynx clear. External appearance of ears and nose normal.  Neck: Trachea midline. No mass   Resp: Diminished bilaterally with scattered rhonchi  CV: Regular rate. Regular rhythm. No murmur or rub. No edema. GI: Soft, Non-tender. Non-distended. +BS  Skin: Warm, dry, w/o erythema. Lymph: No cervical or supraclavicular LAD. M/S: No cyanosis. No clubbing. Neuro: awake and alert, no focal deficit         Laboratory findings:-    CBC:   Recent Labs     09/05/22  0500   WBC 6.6   HGB 11.1*          BMP:    Recent Labs     09/03/22  0505 09/03/22  1250 09/04/22  0429 09/04/22  1405     --  142  --    K 3.2*   < > 3.2* 3.7     --  102  --    CO2 29  --  31  --    BUN 27*  --  30*  --    CREATININE 0.7*  --  0.6*  --    GLUCOSE 97  --  118*  --     < > = values in this interval not displayed. S. Calcium:  Recent Labs     09/04/22  0429   CALCIUM 8.3         S. Magnesium:  Recent Labs     09/04/22  0429   MG 2.00       S. Phosphorus:  Recent Labs     09/04/22  0429   PHOS 2.8       S. Glucose:  Recent Labs     09/05/22  0433 09/05/22  0741 09/05/22  1219   POCGLU 116* 109* 130*             Radiology Review:  Pertinent images / reports were reviewed as a part of this visit.     CXR reviewed  Bibasilar airspace disease                  Neel Sandra MD, M.D.            9/5/2022, 1:11 PM

## 2022-09-05 NOTE — PROGRESS NOTES
[facility]                                               Progress note    Summary:   Maki Rivera is being seen by nephrology for CHARLENE and hypernatremia. Admitted with paraesophageal hernia and SBO. Had EGD 8/9 with biopsy. Still NPO     Interval History  Seen and examined at bedside. Awake and alert in no distress  Not much edema  Good UO     /98  On 5 L   UO 2700 cc   Negative 1.5 L past day       Plan:   - continue torsemide 20 mg daily for now. - no labs since 9/4  - BP has been elevated, add amlodipine 5 mg   - I see he is on scheduled celebrex, not a good idea to do scheduled NSAIDs , risk of CHARLENE. Would prefer an alternative if possible      Ghassan Lester MD  [id number] [facility]  Office: [phone]    Assessment:   Acute kidney injury  Urine Na < 20    creatinine on admission 1   next day on 08/06/2022 was 0.8  suddenly jumped up to 2.7. Due to episode of severe hypotension with blood pressure drop 78/44. also got CT with IV contrast on 08/05/2022 but most likely  this is ATN related due to hypotension and hypercapnic  respiratory failure. CT angio s normal renal arteries and normal kidneys      Sepsis/ possible septic shock. emperature was 101.1   High white cell count. '  immunocompromised due to rheumatoid arthritis and treatment. History of sleep apnea   on CPAP. Acute respiratory failure with high pCO2 in the range of 50s with pH  of 7.24    now intubated. History of abnormal stress test in 06/2022 with stress-induced  ischemia and some scarring. Ejection fraction  was  64%.     had a followup angiogram which was reported as normal as per wife. I do not have that result     History of rheumatoid arthritis   longstanding,was on methotrexate,  has taken Celebrex and now on Inflectra infusions,  so he is immunocompromised. Jose Carlos Bee is now helping him with his rheumatoid tremendously.    he has been on steroids in the past.     Rule out adrenal insufficiency. cortisol level. normal      History of thyroidectomy in the past. .    Small bowel obstruction. Large hiatus hernia with colon and stomach in the chest.  Further management as per medical team.       ROS:   [address] All other remaining systems are negative. Constitutional:  fever, chills, weakness, weight change, fatigue,      Skin:  rash, pruritus, hair loss, bruising, dry skin, petechiae. Head, Face, Neck   headaches, swelling,  cervical adenopathy. Respiratory: shortness of breath, cough, or wheezing  Cardiovascular: chest pain, palpitations, dizzy, edema  Gastrointestinal: nausea, vomiting, diarrhea, constipation,belly pain    Yellow skin, blood in stool  Musculoskeletal:  back pain, muscle weakness, gait problems,       joint pain or swelling. Genitourinary:  dysuria, poor urine flow, flank pain, blood in urine  Neurologic:  vertigo, TIA'S, syncope, seizures, focal weakness  Psychosocial:  insomnia, anxiety, or depression. Additional positive findings: -           PMH:   Past medical history, surgical history, social history, family history are reviewed and updated as appropriate. Reviewed current medication list.   Allergies reviewed and updated as needed. PE:   Vitals:    09/05/22 1245   BP: (!) 158/98   Pulse: 66   Resp: 19   Temp: 97.8 °F (36.6 °C)   SpO2: 97%       General appearance:  in NAD, awake and alert Comfortable. HEENT: EOM intact, no icterus. Trachea is midline. Neck : No mass, appears symmetrical,   Respiratory: Respiratory effort appears normal, no wheeze, no crackles, on bipap  Cardiovascular: Ausculation- No M/R/G, no edema  Abdomen:non distended non tender  Musculoskeletal:  No clubbing,cyanosis, joints with no swelling or deformity. Skin:no rashes, ulcers, induration, no jaundice. Neuro: face symmetric, no focal deficits. Appropriate responses.  CN 2-12 grossly intact        Lab Results   Component Value Date    CREATININE 0.6 (L) 09/04/2022    BUN 30 (H) 09/04/2022     09/04/2022    K 3.7 09/04/2022     09/04/2022    CO2 31 09/04/2022      Lab Results   Component Value Date    WBC 6.6 09/05/2022    HGB 11.1 (L) 09/05/2022    HCT 33.7 (L) 09/05/2022    MCV 92.5 09/05/2022     09/05/2022     Lab Results   Component Value Date    CALCIUM 8.3 09/04/2022    CAION 1.06 (L) 08/15/2022    PHOS 2.8 09/04/2022

## 2022-09-06 LAB
ANION GAP SERPL CALCULATED.3IONS-SCNC: 9 MMOL/L (ref 3–16)
BUN BLDV-MCNC: 26 MG/DL (ref 7–20)
CALCIUM SERPL-MCNC: 8.8 MG/DL (ref 8.3–10.6)
CHLORIDE BLD-SCNC: 101 MMOL/L (ref 99–110)
CO2: 31 MMOL/L (ref 21–32)
CREAT SERPL-MCNC: 0.7 MG/DL (ref 0.8–1.3)
GFR AFRICAN AMERICAN: >60
GFR NON-AFRICAN AMERICAN: >60
GLUCOSE BLD-MCNC: 101 MG/DL (ref 70–99)
GLUCOSE BLD-MCNC: 104 MG/DL (ref 70–99)
GLUCOSE BLD-MCNC: 106 MG/DL (ref 70–99)
GLUCOSE BLD-MCNC: 111 MG/DL (ref 70–99)
GLUCOSE BLD-MCNC: 113 MG/DL (ref 70–99)
GLUCOSE BLD-MCNC: 115 MG/DL (ref 70–99)
GLUCOSE BLD-MCNC: 115 MG/DL (ref 70–99)
GLUCOSE BLD-MCNC: 118 MG/DL (ref 70–99)
HCT VFR BLD CALC: 35.7 % (ref 40.5–52.5)
HEMOGLOBIN: 12 G/DL (ref 13.5–17.5)
MCH RBC QN AUTO: 31.1 PG (ref 26–34)
MCHC RBC AUTO-ENTMCNC: 33.5 G/DL (ref 31–36)
MCV RBC AUTO: 93 FL (ref 80–100)
PDW BLD-RTO: 17.3 % (ref 12.4–15.4)
PERFORMED ON: ABNORMAL
PLATELET # BLD: 257 K/UL (ref 135–450)
PMV BLD AUTO: 8 FL (ref 5–10.5)
POTASSIUM SERPL-SCNC: 4.3 MMOL/L (ref 3.5–5.1)
RBC # BLD: 3.84 M/UL (ref 4.2–5.9)
SODIUM BLD-SCNC: 141 MMOL/L (ref 136–145)
WBC # BLD: 6.4 K/UL (ref 4–11)

## 2022-09-06 PROCEDURE — 6370000000 HC RX 637 (ALT 250 FOR IP): Performed by: SURGERY

## 2022-09-06 PROCEDURE — 6370000000 HC RX 637 (ALT 250 FOR IP): Performed by: INTERNAL MEDICINE

## 2022-09-06 PROCEDURE — 51798 US URINE CAPACITY MEASURE: CPT

## 2022-09-06 PROCEDURE — 97530 THERAPEUTIC ACTIVITIES: CPT

## 2022-09-06 PROCEDURE — 94669 MECHANICAL CHEST WALL OSCILL: CPT

## 2022-09-06 PROCEDURE — 6360000002 HC RX W HCPCS: Performed by: INTERNAL MEDICINE

## 2022-09-06 PROCEDURE — 85027 COMPLETE CBC AUTOMATED: CPT

## 2022-09-06 PROCEDURE — 94761 N-INVAS EAR/PLS OXIMETRY MLT: CPT

## 2022-09-06 PROCEDURE — 2580000003 HC RX 258: Performed by: SURGERY

## 2022-09-06 PROCEDURE — 99024 POSTOP FOLLOW-UP VISIT: CPT | Performed by: SURGERY

## 2022-09-06 PROCEDURE — 2700000000 HC OXYGEN THERAPY PER DAY

## 2022-09-06 PROCEDURE — 2580000003 HC RX 258: Performed by: INTERNAL MEDICINE

## 2022-09-06 PROCEDURE — 92526 ORAL FUNCTION THERAPY: CPT

## 2022-09-06 PROCEDURE — 94660 CPAP INITIATION&MGMT: CPT

## 2022-09-06 PROCEDURE — 99024 POSTOP FOLLOW-UP VISIT: CPT | Performed by: PHYSICIAN ASSISTANT

## 2022-09-06 PROCEDURE — C9113 INJ PANTOPRAZOLE SODIUM, VIA: HCPCS | Performed by: INTERNAL MEDICINE

## 2022-09-06 PROCEDURE — 80048 BASIC METABOLIC PNL TOTAL CA: CPT

## 2022-09-06 PROCEDURE — 2060000000 HC ICU INTERMEDIATE R&B

## 2022-09-06 PROCEDURE — 99232 SBSQ HOSP IP/OBS MODERATE 35: CPT | Performed by: INTERNAL MEDICINE

## 2022-09-06 PROCEDURE — APPNB15 APP NON BILLABLE TIME 0-15 MINS: Performed by: PHYSICIAN ASSISTANT

## 2022-09-06 RX ORDER — GAUZE BANDAGE 2" X 2"
100 BANDAGE TOPICAL DAILY
Status: DISCONTINUED | OUTPATIENT
Start: 2022-09-06 | End: 2022-09-07 | Stop reason: HOSPADM

## 2022-09-06 RX ADMIN — GABAPENTIN 400 MG: 400 CAPSULE ORAL at 09:42

## 2022-09-06 RX ADMIN — AMLODIPINE BESYLATE 5 MG: 5 TABLET ORAL at 09:42

## 2022-09-06 RX ADMIN — Medication 40 MG: at 09:42

## 2022-09-06 RX ADMIN — SODIUM CHLORIDE, PRESERVATIVE FREE 10 ML: 5 INJECTION INTRAVENOUS at 12:44

## 2022-09-06 RX ADMIN — POLYETHYLENE GLYCOL 3350 17 G: 17 POWDER, FOR SOLUTION ORAL at 20:35

## 2022-09-06 RX ADMIN — Medication 100 MG: at 12:44

## 2022-09-06 RX ADMIN — ENOXAPARIN SODIUM 30 MG: 100 INJECTION SUBCUTANEOUS at 09:41

## 2022-09-06 RX ADMIN — POTASSIUM BICARBONATE 20 MEQ: 782 TABLET, EFFERVESCENT ORAL at 09:42

## 2022-09-06 RX ADMIN — POTASSIUM BICARBONATE 20 MEQ: 782 TABLET, EFFERVESCENT ORAL at 18:14

## 2022-09-06 RX ADMIN — ENOXAPARIN SODIUM 30 MG: 100 INJECTION SUBCUTANEOUS at 20:35

## 2022-09-06 RX ADMIN — SODIUM CHLORIDE, PRESERVATIVE FREE 10 ML: 5 INJECTION INTRAVENOUS at 20:36

## 2022-09-06 RX ADMIN — FOLIC ACID 1 MG: 1 TABLET ORAL at 09:42

## 2022-09-06 RX ADMIN — TORSEMIDE 20 MG: 20 TABLET ORAL at 09:42

## 2022-09-06 RX ADMIN — METOPROLOL TARTRATE 25 MG: 25 TABLET, FILM COATED ORAL at 09:42

## 2022-09-06 RX ADMIN — METOPROLOL TARTRATE 25 MG: 25 TABLET, FILM COATED ORAL at 20:35

## 2022-09-06 RX ADMIN — GABAPENTIN 400 MG: 400 CAPSULE ORAL at 20:35

## 2022-09-06 RX ADMIN — GABAPENTIN 400 MG: 400 CAPSULE ORAL at 14:57

## 2022-09-06 RX ADMIN — ASPIRIN 81 MG CHEWABLE TABLET 81 MG: 81 TABLET CHEWABLE at 09:42

## 2022-09-06 RX ADMIN — LEVOTHYROXINE SODIUM 200 MCG: 0.1 TABLET ORAL at 09:42

## 2022-09-06 ASSESSMENT — PAIN SCALES - GENERAL
PAINLEVEL_OUTOF10: 0

## 2022-09-06 NOTE — PROGRESS NOTES
[facility]                                               Progress note    Summary:   Koki Stewart is being seen by nephrology for CHARLENE and hypernatremia. Admitted with paraesophageal hernia and SBO. Had EGD 8/9 with biopsy. Still NPO     Interval History    Patient seen examined at bedside  He is awake and alert  No complaints  On tube feeds  No edema    His blood pressure is 139/88  3 L satting 95%  He still is a Fermin catheter, urine output yesterday 1575 cc, he was even fluid balance for the day    Labs reviewed  Sodium 141 potassium 4.3 bicarb 31 BUN 26 creatinine 0.7      Plan:   -Blood pressures acceptable, added amlodipine 5 mg yesterday and doing okay with this.  -He is on torsemide 20 mg daily to maintain euvolemia, appears euvolemic on exam.  - continue torsemide 20 mg daily for now. -The Celebrex has been held    Patient's kidney function has recovered, blood pressure is acceptable no acute electrolyte abnormalities  We will follow his labs peripherally while he is here but please message me if there are acute issues or questions that arise. Rajesh Gonzalez MD  [facility]  Office: [phone]    Assessment:   Acute kidney injury  Urine Na < 20    creatinine on admission 1   next day on 08/06/2022 was 0.8  suddenly jumped up to 2.7. Due to episode of severe hypotension with blood pressure drop 78/44. also got CT with IV contrast on 08/05/2022 but most likely  this is ATN related due to hypotension and hypercapnic  respiratory failure. CT angio s normal renal arteries and normal kidneys      Sepsis/ possible septic shock. emperature was 101.1   High white cell count. '  immunocompromised due to rheumatoid arthritis and treatment. History of sleep apnea   on CPAP. Acute respiratory failure with high pCO2 in the range of 50s with pH  of 7.24    now intubated. History of abnormal stress test in 06/2022 with stress-induced  ischemia and some scarring. Ejection fraction  was  64%.     had a followup angiogram which was reported as normal as per wife. I do not have that result     History of rheumatoid arthritis   longstanding,was on methotrexate,  has taken Celebrex and now on Inflectra infusions,  so he is immunocompromised. Mariah Naylor is now helping him with his rheumatoid tremendously. he has been on steroids in the past.     Rule out adrenal insufficiency. cortisol level. normal      History of thyroidectomy in the past. .    Small bowel obstruction. Large hiatus hernia with colon and stomach in the chest.  Further management as per medical team.       ROS:   [address]. All other remaining systems are negative. Constitutional:  fever, chills, weakness, weight change, fatigue,      Skin:  rash, pruritus, hair loss, bruising, dry skin, petechiae. Head, Face, Neck   headaches, swelling,  cervical adenopathy. Respiratory: shortness of breath, cough, or wheezing  Cardiovascular: chest pain, palpitations, dizzy, edema  Gastrointestinal: nausea, vomiting, diarrhea, constipation,belly pain    Yellow skin, blood in stool  Musculoskeletal:  back pain, muscle weakness, gait problems,       joint pain or swelling. Genitourinary:  dysuria, poor urine flow, flank pain, blood in urine  Neurologic:  vertigo, TIA'S, syncope, seizures, focal weakness  Psychosocial:  insomnia, anxiety, or depression. Additional positive findings: -           PMH:   Past medical history, surgical history, social history, family history are reviewed and updated as appropriate. Reviewed current medication list.   Allergies reviewed and updated as needed. PE:   Vitals:    09/06/22 1100   BP: 139/88   Pulse:    Resp: 18   Temp: 97.7 °F (36.5 °C)   SpO2:        General appearance:  in NAD, awake and alert Comfortable. HEENT: EOM intact, no icterus. Trachea is midline.    Neck : No mass, appears symmetrical,   Respiratory: Respiratory effort appears normal, no wheeze, no crackles, on bipap  Cardiovascular: Ausculation- No M/R/G, no edema  Abdomen:non distended non tender  Musculoskeletal:  No clubbing,cyanosis, joints with no swelling or deformity. Skin:no rashes, ulcers, induration, no jaundice. Neuro: face symmetric, no focal deficits. Appropriate responses.  CN 2-12 grossly intact        Lab Results   Component Value Date    CREATININE 0.7 (L) 09/06/2022    BUN 26 (H) 09/06/2022     09/06/2022    K 4.3 09/06/2022     09/06/2022    CO2 31 09/06/2022      Lab Results   Component Value Date    WBC 6.4 09/06/2022    HGB 12.0 (L) 09/06/2022    HCT 35.7 (L) 09/06/2022    MCV 93.0 09/06/2022     09/06/2022     Lab Results   Component Value Date    CALCIUM 8.8 09/06/2022    CAION 1.06 (L) 08/15/2022    PHOS 2.8 09/04/2022

## 2022-09-06 NOTE — PROGRESS NOTES
Hospitalist Progress Note    Patient:  Ghislaine Littlejohn  Unit/Bed:C4A-1475/5123-01   YOB: 1960       MRN: [id number] Acct: [de-identified]  PCP: Mikael Martin MD    Date of Admission: 8/5/2022  --------------------------    Chief Complaint:     Abdominal pain     Hospital Course:     [address] course, patient  35 hospital days during my evaluation    Ghislaine Littlejohn is a 63 y.o. male hospitalized on 8/5/2022   for small bowel obstruction, pneumonia, septic shock patient intubation, mechanical ventilation, broad-spectrum antibiotics s intubated/extubated 8/8/2020 through 8/26/2022.      8/9/2022 EGD done for endoscopically placed NG tube placement    8/22/2022, EGD with endoscopic placement of NG tube      8/31/2022 underwent laparoscopic reduction of large hiatal hernia with gastropexy, placement of gastrostomy tube    9/2/2022, extubated after the above procedure          Assessment/plan:     Acute hypoxic respiratory failure  Currently extubated, on 3 L of O2, continue weaning off O2, BiPAP at night    Large hiatal hernia status post reduction, PEG tube placement  Tolerating tube feed    Aspiration pneumonitis  Completed antibiotic course    Acute on chronic diastolic CHF  Off Lasix drip, on p.o. torsemide    A. fib with RVR  Rate controlled, on Lopressor    CHARLENE, resolved, off celecoxib    History of rheumatoid arthritis, off celecoxib 7  The risk of recurrence of CHARLENE    Essential hypertension, continue amlodipine      Hypothyroidism, continue Synthroid    Code Status: Full Code         DVT prophylaxis: Lovenox     Disposition: Awaiting final surgery team input. ?  P.o. intake    Remove Fermin catheter    Pre-CERT available, hopefully discharge in 1 to 2 days. I discussed my thought processes at length with patient/family and patient understood.  Question and concerns  Addressed      Discussed with RN      ----------------      Subjective:     Patient seen and examined  Overnight events noted  RN and ancillary staff note reviewed    No complaint, weaned off to 3 L of oxygen now, no shortness of breath, tolerating tube feed      Diet: Diet NPO  ADULT TUBE FEEDING; PEG; Standard with Fiber; Continuous; 55; No; 135; Q 4 hours    OBJECTIVE     Exam:  /88   Pulse 78   Temp 97.7 °F (36.5 °C) (Oral)   Resp 18   Ht 5' 7\" (1.702 m)   Wt (!) 307 lb 14.4 oz (139.7 kg)   SpO2 95%   BMI 48.22 kg/m²            Gen: Chronically ill, obese  Head: Normocephalic. Atraumatic. Eyes: Conjunctivae/corneas clear. ENT: Oral mucosa moist  Neck: No JVD. No obvious thyromegaly. CVS: Nml S1S2, no murmur  , RRR  Pulmomary: Diminished air entry   gastrointestinal: Soft, BS, PEG tube placed, positive bowel sounds,  . Musculoskeletal: Trace bilateral edema. Warm  Neuro: No focal deficit. Moves extremity spontaneously. Psychiatry: Appropriate affect. Not agitated.         Medications:  Reviewed    Infusion Medications    sodium chloride 25 mL (09/05/22 1434)     Scheduled Medications    thiamine mononitrate  100 mg Oral Daily    amLODIPine  5 mg Oral Daily    potassium bicarb-citric acid  20 mEq Per G Tube BID WC    pantoprazole (PROTONIX) 40 mg injection  40 mg IntraVENous Daily    enoxaparin  30 mg SubCUTAneous BID    torsemide  20 mg Oral Daily    folic acid  1 mg Oral Daily    aspirin  81 mg Oral Daily    lidocaine 1 % injection  5 mL IntraDERmal Once    sodium chloride flush  5-40 mL IntraVENous 2 times per day    [Held by provider] celecoxib  200 mg Per NG tube BID    gabapentin  400 mg Oral TID    metoprolol tartrate  25 mg Oral BID    levothyroxine  200 mcg Oral Daily     PRN Meds: oxyCODONE, labetalol, sodium chloride flush, sodium chloride, hydrALAZINE, ipratropium-albuterol, senna, polyethylene glycol, docusate, artificial tears, acetaminophen, potassium chloride, acetaminophen, ondansetron **OR** ondansetron, diazePAM      Intake/Output Summary (Last 24 hours) at 9/6/2022 6677  Last data filed at 9/6/2022 1304  Gross per 24 hour   Intake 1597 ml   Output 2250 ml   Net -653 ml             Labs:   Recent Labs     09/04/22  0429 09/05/22  0500 09/06/22  1210   WBC 6.8 6.6 6.4   HGB 11.0* 11.1* 12.0*   HCT 33.5* 33.7* 35.7*    261 257     Recent Labs     09/04/22  0429 09/04/22  1405 09/06/22  1210     --  141   K 3.2* 3.7 4.3     --  101   CO2 31  --  31   BUN 30*  --  26*   CREATININE 0.6*  --  0.7*   CALCIUM 8.3  --  8.8   PHOS 2.8  --   --      No results for input(s): AST, ALT, BILIDIR, BILITOT, ALKPHOS in the last 72 hours. No results for input(s): INR in the last 72 hours. No results for input(s): Carmela Highman in the last 72 hours. Urinalysis:      Lab Results   Component Value Date/Time    NITRU Negative 08/15/2022 09:16 AM    WBCUA 6-9 08/15/2022 09:16 AM    BACTERIA 4+ 08/15/2022 09:16 AM    RBCUA  08/15/2022 09:16 AM    BLOODU LARGE 08/15/2022 09:16 AM    SPECGRAV 1.010 08/15/2022 09:16 AM    GLUCOSEU Negative 08/15/2022 09:16 AM       Radiology:  XR CHEST PORTABLE   Final Result   1. Endotracheal tube tip is 2 cm above the mady. Recommend retraction by   1.5 cm for optimal positioning. 2.  Patchy airspace disease in the right lung could represent edema versus   atelectasis versus pneumonia. Fluoroscopy modified barium swallow with video   Final Result   Positive for aspiration. Please see separate speech pathology report for full discussion of findings   and recommendations. XR CHEST PORTABLE   Final Result   Right PICC tip in the distal right internal jugular vein. Pulmonary edema. Findings were discussed with Sun Reynolds at 11:21 am on 8/28/2022. CT HEAD WO CONTRAST   Final Result   No acute intracranial abnormality. XR CHEST PORTABLE   Final Result   Interval extubation. Interval vascular congestion. Ground-glass opacity in the lungs is likely pulmonary edema given the fairly   rapid development. XR CHEST PORTABLE   Final Result   Overall similar appearing chest with cardiomegaly and central vascular   congestion. XR CHEST PORTABLE   Final Result   Cardiomegaly with central vascular congestion though improved infiltrates in   the lungs bilaterally. CT CHEST PULMONARY EMBOLISM W CONTRAST   Final Result   1. No CT evidence of a pulmonary embolism. 2. Findings most consistent with mild CHF, including cardiomegaly, small   bilateral pleural effusions, and mild pulmonary edema. There is new partial   loculation of the right pleural effusion along the paramediastinal aspect of   the right upper lobe. 3. Persistent partial consolidation of the bilateral lower lobes, most likely   passive atelectasis, less likely aspiration or pneumonia. 4. Persistent reticulonodular opacities within the right middle lobe and   lingula, likely reflecting either additional atelectasis or pneumonia. 5. Multiple pulmonary nodules within both lungs, the largest 10 mm within the   left upper lobe. While this likely reflects benign granulomatous disease,   further follow-up of these nodules is as advised below. 6. Mild left subpectoral lymphadenopathy, of questionable clinical   significance. 7. Interval decrease in size of the patient's small pericardial effusion. RECOMMENDATIONS:   Fleischner Society guidelines for follow-up and management of incidentally   detected pulmonary nodules:      Multiple Solid Nodules:      Nodule size greater than 8 mm   In a low-risk patient, CT at 3-6 months, then consider CT at 18-24 months. In a high-risk patient, CT at 3-6 months, then CT at 18-24 months. Radiology 2017 http://pubs. rsna.org/doi/full/10.1148/radiol. [email or web address]         XR CHEST PORTABLE   Final Result   Supportive tubing is in normal position. Bilateral pleural effusions. Disproportionate left basilar atelectasis,   increased since yesterday.   Superimposed pneumonia or aspiration pneumonitis   are considerations. XR CHEST PORTABLE   Final Result   1. Small bilateral pleural effusions with adjacent basilar opacities which   could represent atelectasis or pneumonia. 2.  Right-sided PICC line with the tip overlying the right subclavian vein. The tip appears kinked as well. CT CHEST WO CONTRAST   Final Result   Small bilateral pleural effusions with adjacent consolidation lungs, either   due to atelectasis or pneumonia. Septal thickening is seen, suggesting a   component of fluid overload. .  Pleural effusions and adjacent consolidation,   is increased compared to prior         XR CHEST PORTABLE   Final Result   Relatively stable pattern of mild edema with no detrimental change. XR CHEST PORTABLE   Final Result   1. Enteric tube tip and side-port in the stomach. 2.  Stable appearance of perihilar opacities. Small left pleural effusion         XR CHEST PORTABLE   Final Result   Relatively stable perihilar and central airspace opacities in the lungs. XR CHEST PORTABLE   Final Result   Probable right pleural effusion      Otherwise, stable chest         XR CHEST PORTABLE   Final Result   1. Endotracheal tube tip is 2 cm above the mady. Recommend retraction by   1.5 cm for optimal positioning. 2.  Stable cardiomegaly and left basilar airspace opacities which could   represent atelectasis or pneumonia. CT ABDOMEN PELVIS WO CONTRAST Additional Contrast? Oral   Final Result   1. No evidence of small-bowel obstruction. 2. Large hiatal hernia. Enteric tube is appropriately positioned beyond the   herniated portion of the stomach in the abdomen. 3. Increasing pleural effusions in the lower chest with diffuse airspace   changes in the visualized lungs. XR CHEST PORTABLE   Final Result   Interval intubation. Mild perihilar and bibasilar airspace opacities are   stable.          XR ABDOMEN (KUB) (SINGLE AP VIEW)   Final Result Persistent moderate proximal small bowel distension suggesting an ongoing   small bowel obstruction. Gaseous distension of the colon. XR CHEST PORTABLE   Final Result   No substantial interval change in bibasilar airspace disease as compared to   prior. Cardiomegaly. XR CHEST PORTABLE   Final Result   Nonvisualization of the tip of the nasogastric tube. A KUB may be helpful   for further evaluation. Cardiomegaly with pulmonary vascular congestion      Bibasilar atelectasis or infiltrate. CTA CHEST ABDOMEN PELVIS W CONTRAST   Final Result   1. No acute aortic pathology. No significant atherosclerotic disease,   aneurysm or dissection. No brachiocephalic or visceral stenosis. 2. Small bilateral pleural effusions with bilateral lower lobe atelectasis. No acute pulmonary infiltrate. 3. Gastric, duodenal and small bowel distension with gradual transition in   the mid small bowel most consistent with a low-grade or partial obstruction. Large hiatal hernia containing much of the stomach which is distended. 4. Colonic wall thickening in the mid descending colon, likely accentuated by   incomplete distension. The possibility of an underlying colitis is raised. Diverticulosis with no acute features.                      Electronically signed by Christel Mason MD on 9/6/2022 at 2:57 PM

## 2022-09-06 NOTE — PROGRESS NOTES
prolonged course in ICU.  8/08/22: intubated  8/26/22: extubated, placed on 6 LPM  8/27/22: clinical bedside swallow evaluation ordered received following extubation  Pt continues with NG tube feeding     8/28/2022: CT head  Impression   No acute intracranial abnormality. 8/24/2022 CT chest documented findings noted     8/30/2022 MBSS completed : Oropharyngeal Dysphagia with high aspiration risk. Oropharyngeal dysphagia characterized by reduced lingual coordination for bolus control and A-P oral transit; delayed initiation of the swallow; reduced laryngeal rom; reduced tongue base retraction and reduced sensation to aspiration/residue. Symptoms: Mastication was not assessed due to severity of pharyngeal phase deficits. Premature loss to pharynx ; gradual pooling to pharynx. Mildly thick and moderately thick penetration/aspiration prior to swallow; no cough response. Pooling of puree to valleculae with post swallow valleculae residue with difficulty clearing with clearance swallow and poor sensation of residue. Compensatory strategies: Inconsistent ability to achieve a volitional cough which was weak; Inconsistent ability to achieve clearance swallow which did not clear valleculae residue; Pt was unable to complete compensatory head postures; Pt did attempt at lingual hold but inconsistent; persistent premature loss of liquid. Recommendation for npo due to severity of deficits     8/31/2022: Surgical procedure : Laparoscopic reduction hiatal hernia; 24F HADLEY Gastrostomy tube placement    9/1/2022 : pt not seen as pt remains intubated from 8/31/2022 procedure  9/2/2022: pt not seen as extubated but requiring continuous BiPAP  9/5/2022: pt out of ICU; weaned to 5L/min O2 via nasal cannula.  SLP Dysphagia therapy initiated    Subjective:     Current diet   Npo; Gastrostomy tube feedings     Comments regarding tolerating Current Diet:   Reportedly tolerating tube feedings      Objective:     Pain Denied    Cognitive/Behavior   Behavior/Cognition: Pt awake in bed. Pt on O2 at 5L/min via nasal cannula. Pt oriented to self; place; this SLP from 9/5/2022 and partially to situation. Pt was verbally responsive but hoarse voice quality with episodes of hoarse whisper. Pt followed one step commands      Positioning    ~60 degrees upright in bed    PO Trials:  [place] soaked with H20: targeting oral suck swallow strategy: 5/5 with one episode of wet voice quality  Ice chips: targeting \"effortful swallow\" strategies: 4/4 with 2 episodes of voice quality changes post swallow    Dysphagia Tx:   Direct Dysphagia tx: po limited to small amounts of H20 and ice chips  Dysphagia ex:  tongue base retraction ex; lingual rom/coordination ex; laryngeal ex targeting glottals on expiratory effort; pitch variation low to high; zenaida and 'effortful swallow'; ongoing treatment poc ed and goals  Training in compensatory strategies: review of MBSS findings; recommendations; and dysphagia treatment poc. Oral care recommended  Pt response to ex/training: pt/family voiced understanding    Goals:    Pt will participate in MBSS instrumental for assessment of pharyngeal phase of the swallow and aspiration risk: goal met 8/30/2022  Pt will demonstrate improved swallow response via lingual/laryngeal ex and sensory stimulation: continue  Pt will demonstrate improved swallow response for therapy trials of 1-2 consistencies with compensatory swallow strategies: continue targeted ice chips and H20 soaked toothette  Assessment:   Therapy Impressions: Oropharyngeal Dysphagia characterized by reduced bolus control; delayed /weak swallow with residual deficit with concern for laryngeal rom and pharyngeal clearance. 1. Pt awake in bed. Pt on O2 at 5L/min via nasal cannula. Pt oriented to self; place; this SLP from 9/5/2022 and partially to situation. Pt was verbally responsive but hoarse voice quality with episodes of hoarse whisper.  Pt followed one step commands  2. Ongoing ed of MBSS findings; recommendations and dysphagia treatment poc. . pt voiced understanding  3. Pt with improvement in lingual rom ex stretch and coordination BOT and tongue base retraction ex. Also emphasized lingual seal against hard/soft palate. Alternating anterior to posterior lingual elevation with varying mandibular depression requiring increase lingual rom  (3/4 inch depression) Fatigue noted and rest breaks/pacing of ex needed  4. Pt with good effort in laryngeal/pharyngeal ex targeting airway protection; glottals; pitch variation for laryngeal rom; zenaida; and 'saliva effortful swallow' strategy. Ongoing ed in other potential poc ex for indirect/direct dysphagia therapy reviewed. Fatigue and SOB noted with rest breaks and pacing of ex needed  5. Po trials targeting oral suck swallows with toothette soaked with H20 and ice chips targeting 'effortful swallow' strategy> Good effort will continue  6. Pt with good response but fatigue and hoarse/strained voice at times. Ongoing pt ed.      Diet Recommendations:  NPO (carlineninabor TF)   Medication administration: gastrostomy tube       Strategies:   Compensatory Swallowing Strategies :  (oral care)    Education:  Consulted and agree with results and recommendations: Patient, RN;   2 family member ed on 9/5/2022     Patient Education Response: Needs reinforcement, No evidence of learning    Prognosis:   Good guarded medical DX; medical course of hospital admit; medical history co-morbidities; severity of deficits on testing 8/302/2022    Plan:     Continue Dysphagia Therapy:   Interventions: Therapeutic Interventions: Bolus control exercises, Pharyngeal exercises, Laryngeal exercises, Oral motor exercises, Patient/Family education, Oral care, Therapeutic PO trials with SLP, Vital Stim/NMES  Duration/Frequency of therapy while on unit:  3-5 times a week  Discharge Instructions:   Anticipate Yes__X__No__ for further skilled Speech Therapy for Dysphagia at discharge    This note serves as a D/C Summary in the event that this patient is discharged prior to the next therapy session. Coded treatment time:0  Total treatment time: 30    Electronically signed by  Elaine Ledbetter. MS Alexandrea,CCC,SLP [id number]  Speech and Language Pathologist  on 9/6/2022 at 12:33 PM

## 2022-09-06 NOTE — PROGRESS NOTES
Ideal  Protein (g/day): 134 (2g/67kg)  Method Used for Fluid Requirements: 1 ml/kcal  Fluid (ml/day): 1 ml/kcal    Nutrition Diagnosis:   Inadequate oral intake related to swallowing difficulty as evidenced by NPO or clear liquid status due to medical condition, nutrition support - enteral nutrition    Nutrition Interventions:   Food and/or Nutrient Delivery: Modify Tube Feeding, Continue NPO  Nutrition Education/Counseling: Education not indicated  Coordination of Nutrition Care: Continue to monitor while inpatient     Goals:  Previous Goal Met: Progressing toward Goal(s)  Goals:  Tolerate nutrition support at goal rate     Nutrition Monitoring and Evaluation:   Behavioral-Environmental Outcomes: None Identified  Food/Nutrient Intake Outcomes: Enteral Nutrition Intake/Tolerance  Physical Signs/Symptoms Outcomes: Biochemical Data, Skin, Weight, Fluid Status or Edema, Constipation    Discharge Planning:    Enteral Nutrition     Chitra Campoverde RD, LD  Contact: [phone]

## 2022-09-06 NOTE — PROGRESS NOTES
[facility] General Surgery                                   Daily Progress Note                                                         Pt Name: Aneta Hadley Record Number: [id number]  Date of Birth 1960   Today's Date: 9/6/2022      ASSESSMENT/PLAN  Dysphagia and hiatal hernia  -POD #6 (8/31) laparoscopic reduction of hiatal hernia with gastropexy and 24F HADLEY gastrostomy tube placement  -Bowels working.   -Tolerating TF at goal  Continue speech therapy  -Will sign off, please call with questions. He should follow up with me in 3-4 weeks to discuss timing of G-tube removal.      David Suazo is doing well. Denies any abdominal pain. Tolerating tube feeds at goal.  Denies any nausea or vomiting. OBJECTIVE  VITALS:  height is 5' 7\" (1.702 m) and weight is 307 lb 14.4 oz (139.7 kg) (abnormal). His oral temperature is 97.7 °F (36.5 °C). His blood pressure is 139/88 and his pulse is 78. His respiration is 18 and oxygen saturation is 95%. INTAKE/OUTPUT:    Intake/Output Summary (Last 24 hours) at 9/6/2022 1445  Last data filed at 9/6/2022 1304  Gross per 24 hour   Intake 1597 ml   Output 2250 ml   Net -653 ml       GENERAL: alert, cooperative, no distress  LUNGS: normal respiratory effort, no accessory muscle use  HEART: normal rate and regular rhythm  ABDOMEN: Soft, incisions c/d/I. 24F G tube present. . TF infusing. EXTREMITY: no cyanosis, clubbing or edema    I/O last 3 completed shifts: In: 2737 [P.O.:120; NG/GT:2617]  Out: 2275 [Urine:2275]      LABS  Recent Labs     09/04/22  0429 09/04/22  1405 09/06/22  1210   WBC 6.8   < > 6.4   HGB 11.0*   < > 12.0*   HCT 33.5*   < > 35.7*      < > 257     --  141   K 3.2*   < > 4.3     --  101   CO2 31  --  31   BUN 30*  --  26*   CREATININE 0.6*  --  0.7*   MG 2.00  --   --    PHOS 2.8  --   --    CALCIUM 8.3  --  8.8    < > = values in this interval not displayed. EDUCATION  Patient educated about Disease Process, Medications, Smoking Cessation, Oxygenation, Incentive Spirometry and Deep Breath and Cough, Diabetes, Hyperlipidemia, Smoking Cessation, Nutrition, Exercise and Hypertension    Electronically signed by Lucas Anderson MD on 9/6/2022 at 2:45 PM      [facility]   [phone] Office  [phone] Fax

## 2022-09-06 NOTE — PROGRESS NOTES
Occupational Therapy  Facility/Department: [facility] 5W PROGRESSIVE CARE  Occupational Therapy Daily Treatment Note    Name: Boni Edmonds  : 1960  MRN: [id number]  Date of Service: 2022    Discharge Recommendations:  Patient would benefit from continued therapy after discharge, 3-5 sessions per week          Patient Diagnosis(es): The primary encounter diagnosis was Small bowel obstruction ([facility] [address] Diagnoses of Elevated TSH, Chest pain, unspecified type, Hiatal hernia, and Intestinal obstruction, unspecified cause, unspecified whether partial or complete [facility]) were also pertinent to this visit. Past Medical History:  has a past medical history of Arthritis, Atrial fibrillation ([facility] [address] and Hypertension. Past Surgical History:  has a past surgical history that includes Upper gastrointestinal endoscopy (2022); Upper gastrointestinal endoscopy (2022); Upper gastrointestinal endoscopy (N/A, 2022); bronchoscopy (2022); bronchoscopy (2022); Gastric fundoplication (N/A, ); and Gastrostomy tube placement (N/A, 2022). Assessment   Performance deficits / Impairments: Decreased functional mobility ; Decreased safe awareness;Decreased balance;Decreased ADL status; Decreased cognition;Decreased ROM; Decreased endurance;Decreased high-level IADLs;Decreased strength;Decreased fine motor control  Assessment: 64 y/o male admitted 2022 with nausea/vomitting. Pt diagnosed with partial SBO, aspiration PNA, afib, and septic shock. Patient developed acute respiratory failure and acute renal failure. CT scan showed Large hiatal hernia. Pt intubated -.  s/p laparoscopic reduction of hiatal hernia with gastropexy and 24F HADLEY gastrostomy tube placement. Pt intubated -. PTA pt lived at home with spouse and independent with ADLs and functional mobility. Today - pt remains below baseline but showing good improvement compared to initial evaluation.  Pt able to complete 2 stands from chair > stedy with CGA and one stand to the RW with min assist. One  the stedy was for 1 minute and the second was for 4 inutes. Next session will attempt cotx for a stand pivot transfer using RW and maybe attempt taking steps. Pt remains unsafe to return home at d/c d/t level of assist required. Rec OT at low-moderate pace 3-5x/week to maximize IND with ADL and fxl mobility but is demonstrating increased endurance and may be able to tolerate higher frequency of therapy if continues to improve with endurance. Will continue to monitor. Prognosis: Good  Activity Tolerance  Activity Tolerance: Patient Tolerated treatment well        Plan   Plan  Times per Week: 3-5  Current Treatment Recommendations: Strengthening, Balance training, Functional mobility training, Endurance training, ROM, Gait training, Neuromuscular re-education, Cognitive reorientation, Self-Care / ADL, Safety education & training     Restrictions  Restrictions/Precautions  Restrictions/Precautions: Fall Risk  Position Activity Restriction  Other position/activity restrictions: O2 3L via NC.  PEG tube. Subjective   General  Chart Reviewed: Yes, Progress Notes  Patient assessed for rehabilitation services?: Yes  Additional Pertinent Hx: 62 y/o male admitted 8/7/2022 with nausea/vomitting. Pt diagnosed with partial SBO, aspiration PNA, afib, and septic shock. Patient developed acute respiratory failure and acute renal failure. CT scan showed Large hiatal hernia. Pt intubated 8/8-8/26. 8/31 s/p laparoscopic reduction of hiatal hernia with gastropexy and 24F HADLEY gastrostomy tube placement. Pt intubated 8/31-9/1. Family / Caregiver Present: Yes  Referring Practitioner: Beth Olivo CNP  Subjective  Subjective: Pt seen bedside and agreed to OT treatment. General Comment  Comments: Per RN ok for therapy.      Social/Functional History  Social/Functional History  Lives With: Spouse  Type of Home: House  Home Layout: Two level, Able to Live on Main level with bedroom/bathroom, Laundry in basement  Home Access: Ramped entrance  Bathroom Shower/Tub: Tub/Shower unit, Walk-in shower  Bathroom Toilet: Standard  Bathroom Accessibility: Accessible  Has the patient had two or more falls in the past year or any fall with injury in the past year?: No  Receives Help From: Family  ADL Assistance: Independent  Homemaking Assistance:  (Shared with wife.)  Ambulation Assistance: Independent (Without assist device.)  Transfer Assistance: Independent (Difficulty due to OA B Knees.)  Active : Yes  Occupation: Retired  Additional Comments: Spouse at bedside provided social information. Objective   Heart Rate: 78  Heart Rate Source: Monitor  BP: 139/88  BP Location: Right Arm  BP Method: Automatic  Patient Position: Semi fowlers  MAP (Calculated): 105  Resp: 18  SpO2: 95 %  O2 Device: High flow nasal cannula             Safety Devices  Type of Devices: Call light within reach; Left in bed;Gait belt;Nurse notified  Balance  Sitting: Intact (Sitting EOB)  Standing: With support  Transfer Training  Sit to Stand: Minimum assistance;Contact-guard assistance (CGA to the stedy. Min assist to the RW.)  Stand to Sit: Contact-guard assistance        ADL  Grooming: Other (Comment)  Grooming Skilled Clinical Factors: Pt declined oral care as he completed it earlier this date with nursing. Toileting: Dependent/Total  Toileting Skilled Clinical Factors: Fermin catheter. Bed mobility  Supine to Sit: Contact guard assistance (HOB elevated, used bed rail. Increased effort but completed without assist.)  Sit to Supine: Minimal assistance  Bed Mobility Comments: Completed two scoots with min assist toward the head of the bed when sitting EOB. Transfers  Sit to stand: Minimal assistance;Contact guard assistance  Stand to sit: Contact guard assistance  Transfer Comments: Completed multiple sit-stands in the stedy with CGA. He stood for 1 minute, 4 minutes.   He also completed one stand to a RW. Required min assist to stand to the RW from elevated bed. Pt stood in the RW for 30 seconds. A/AROM Exercises: Shoulder flexion/extension, horizontal ab/adduction, elbow flexion/extension x 10 reps                             AM-PAC Score        AM-PAC Inpatient Daily Activity Raw Score: 12 (09/06/22 1436)  AM-PAC Inpatient ADL T-Scale Score : 30.6 (09/06/22 1436)  ADL Inpatient CMS 0-100% Score: 66.57 (09/06/22 1436)  ADL Inpatient CMS G-Code Modifier : CL (09/06/22 1436)        Goals  Short Term Goals  Time Frame for Short term goals: Prior to DC: goals updated below on 8/30  Short Term Goal 1: Pt will complete bed mobility SBA  Short Term Goal 2: Pt will complete ADL transfers w/ CGA using LRAD  Short Term Goal 3: Pt will groom w/ setup  Short Term Goal 4: Pt will complete UE exercises in all planes to inc strength/endurance. Short Term Goal 5: Pt will complete UB bathing w/ SBA  Patient Goals   Patient goals : spouse: to regain PLOF and eventually return home       Therapy Time   Individual Concurrent Group Co-treatment   Time In 1350         Time Out 1430         Minutes 40             This note to serve as OT d/c summary if pt is d/c-ed from hospital prior to next OT session.       Tarah Soto, [address]

## 2022-09-06 NOTE — PROGRESS NOTES
Pulmonary Critical Care Progress Note     Patient's name:  Aneta Hadley Record Number: [id number]  Patient's account/billing number: [de-identified]  Patient's YOB: 1960  Age: 63 y.o. Date of Admission: 8/5/2022  1:56 PM  Date of Consult: 9/6/2022      Primary Care Physician: Toby Gonsalez MD      Code Status: Full Code    Chief complaint: Acute respiratory failure with hypoxia    Assessment and Plan     Acute respiratory failure hypoxia   EARNESTINE/obesity ventilation syndrome. Recent aspiration/organizing pneumonia. Small bowel obstruction. Dysphagia status post laparoscopic reduction of hiatal hernia with PEG tube placement. Plan:  Bipap nightly, we would like him to use our unit not his home cpap  Titrate oxygen to keep sats more than 90%  Aspiration precautions   Continue tube feeding   D/c steroid   Advance activities  Incentive spirometry      Overnight:  Wore BiPAP overnight. On 5 L of oxygen. REVIEW OF SYSTEMS:  Review of Systems -   Denied shortness of breath chest pain. No nausea vomiting diarrhea. Very weak          Physical Exam:    Vitals: BP (!) 145/97   Pulse 76   Temp 98.1 °F (36.7 °C) (Axillary)   Resp 18   Ht 5' 7\" (1.702 m)   Wt (!) 307 lb 14.4 oz (139.7 kg)   SpO2 92%   BMI 48.22 kg/m²     Last Body weight:   Wt Readings from Last 3 Encounters:   09/06/22 (!) 307 lb 14.4 oz (139.7 kg)       Body Mass Index : Body mass index is 48.22 kg/m². Intake and Output summary:   Intake/Output Summary (Last 24 hours) at 9/6/2022 0845  Last data filed at 9/6/2022 0500  Gross per 24 hour   Intake 1567 ml   Output 1575 ml   Net -8 ml         Physical Examination:     Gen:  No acute distress. Eyes: PERRL. Anicteric sclera. No conjunctival injection. ENT: No discharge. Posterior oropharynx clear. External appearance of ears and nose normal.  Neck: Trachea midline. No mass   Resp: Diminished bilaterally with scattered rhonchi  CV: Regular rate.

## 2022-09-06 NOTE — CARE COORDINATION
Spoke with Meaghan with [facility]. Precert obtained through 9/10. Patient able to discharge to [facility] when ready. Will need transportation.     #[phone]  Electronically signed by Ford Cooley RN on 9/6/2022 at 9:20 AM

## 2022-09-06 NOTE — PLAN OF CARE
Problem: Pain  Goal: Verbalizes/displays adequate comfort level or baseline comfort level  Outcome: Progressing  No c/o pain this shift     Problem: Nutrition Deficit:  Goal: Optimize nutritional status  9/6/2022 1602 by Dunia Colindres RN  Outcome: Progressing  Pt Jevity 1.5 increased to 55ml/hr this shift

## 2022-09-07 ENCOUNTER — APPOINTMENT (OUTPATIENT)
Dept: GENERAL RADIOLOGY | Age: 62
DRG: 853 | End: 2022-09-07
Payer: COMMERCIAL

## 2022-09-07 VITALS
HEART RATE: 86 BPM | SYSTOLIC BLOOD PRESSURE: 113 MMHG | TEMPERATURE: 97.8 F | BODY MASS INDEX: 48.58 KG/M2 | OXYGEN SATURATION: 96 % | WEIGHT: 309.53 LBS | HEIGHT: 67 IN | RESPIRATION RATE: 20 BRPM | DIASTOLIC BLOOD PRESSURE: 83 MMHG

## 2022-09-07 LAB
GLUCOSE BLD-MCNC: 101 MG/DL (ref 70–99)
GLUCOSE BLD-MCNC: 113 MG/DL (ref 70–99)
GLUCOSE BLD-MCNC: 137 MG/DL (ref 70–99)
PERFORMED ON: ABNORMAL

## 2022-09-07 PROCEDURE — 51798 US URINE CAPACITY MEASURE: CPT

## 2022-09-07 PROCEDURE — 6370000000 HC RX 637 (ALT 250 FOR IP): Performed by: INTERNAL MEDICINE

## 2022-09-07 PROCEDURE — 6370000000 HC RX 637 (ALT 250 FOR IP): Performed by: SURGERY

## 2022-09-07 PROCEDURE — 6360000002 HC RX W HCPCS: Performed by: INTERNAL MEDICINE

## 2022-09-07 PROCEDURE — C9113 INJ PANTOPRAZOLE SODIUM, VIA: HCPCS | Performed by: INTERNAL MEDICINE

## 2022-09-07 PROCEDURE — 2580000003 HC RX 258: Performed by: SURGERY

## 2022-09-07 PROCEDURE — 97530 THERAPEUTIC ACTIVITIES: CPT

## 2022-09-07 PROCEDURE — 99232 SBSQ HOSP IP/OBS MODERATE 35: CPT | Performed by: INTERNAL MEDICINE

## 2022-09-07 PROCEDURE — 71045 X-RAY EXAM CHEST 1 VIEW: CPT

## 2022-09-07 PROCEDURE — 2580000003 HC RX 258: Performed by: INTERNAL MEDICINE

## 2022-09-07 PROCEDURE — 92526 ORAL FUNCTION THERAPY: CPT

## 2022-09-07 PROCEDURE — 6370000000 HC RX 637 (ALT 250 FOR IP): Performed by: NURSE PRACTITIONER

## 2022-09-07 RX ORDER — POLYETHYLENE GLYCOL 3350 17 G/17G
17 POWDER, FOR SOLUTION ORAL DAILY PRN
Qty: 527 G | Refills: 1 | DISCHARGE
Start: 2022-09-07 | End: 2022-10-07

## 2022-09-07 RX ORDER — PANTOPRAZOLE SODIUM 40 MG/1
40 TABLET, DELAYED RELEASE ORAL DAILY
Qty: 180 TABLET | Refills: 0 | DISCHARGE
Start: 2022-09-07 | End: 2022-10-21

## 2022-09-07 RX ORDER — AMLODIPINE BESYLATE 5 MG/1
5 TABLET ORAL DAILY
Qty: 30 TABLET | Refills: 3 | Status: ON HOLD | DISCHARGE
Start: 2022-09-07 | End: 2022-09-23 | Stop reason: HOSPADM

## 2022-09-07 RX ORDER — OXYCODONE HYDROCHLORIDE 5 MG/1
10 TABLET ORAL EVERY 8 HOURS PRN
Qty: 9 TABLET | Refills: 0
Start: 2022-09-07 | End: 2022-09-10

## 2022-09-07 RX ORDER — GABAPENTIN 250 MG/5ML
400 SOLUTION ORAL 3 TIMES DAILY
Status: DISCONTINUED | OUTPATIENT
Start: 2022-09-07 | End: 2022-09-07 | Stop reason: HOSPADM

## 2022-09-07 RX ORDER — GABAPENTIN 250 MG/5ML
400 SOLUTION ORAL 3 TIMES DAILY
Qty: 72 ML | Refills: 0 | Status: ON HOLD | DISCHARGE
Start: 2022-09-07 | End: 2022-09-13

## 2022-09-07 RX ORDER — TORSEMIDE 20 MG/1
20 TABLET ORAL DAILY
Qty: 30 TABLET | Refills: 3 | DISCHARGE
Start: 2022-09-08 | End: 2022-10-21 | Stop reason: ALTCHOICE

## 2022-09-07 RX ADMIN — SODIUM CHLORIDE, PRESERVATIVE FREE 10 ML: 5 INJECTION INTRAVENOUS at 08:49

## 2022-09-07 RX ADMIN — TORSEMIDE 20 MG: 20 TABLET ORAL at 08:51

## 2022-09-07 RX ADMIN — AMLODIPINE BESYLATE 5 MG: 5 TABLET ORAL at 08:52

## 2022-09-07 RX ADMIN — FOLIC ACID 1 MG: 1 TABLET ORAL at 08:52

## 2022-09-07 RX ADMIN — METOPROLOL TARTRATE 25 MG: 25 TABLET, FILM COATED ORAL at 08:52

## 2022-09-07 RX ADMIN — GABAPENTIN 400 MG: 400 CAPSULE ORAL at 08:51

## 2022-09-07 RX ADMIN — ENOXAPARIN SODIUM 30 MG: 100 INJECTION SUBCUTANEOUS at 08:50

## 2022-09-07 RX ADMIN — Medication 40 MG: at 08:50

## 2022-09-07 RX ADMIN — ASPIRIN 81 MG CHEWABLE TABLET 81 MG: 81 TABLET CHEWABLE at 08:51

## 2022-09-07 RX ADMIN — POTASSIUM BICARBONATE 20 MEQ: 782 TABLET, EFFERVESCENT ORAL at 08:52

## 2022-09-07 RX ADMIN — Medication 100 MG: at 08:52

## 2022-09-07 RX ADMIN — GABAPENTIN 400 MG: 250 SOLUTION ORAL at 11:19

## 2022-09-07 RX ADMIN — LEVOTHYROXINE SODIUM 200 MCG: 0.1 TABLET ORAL at 11:19

## 2022-09-07 ASSESSMENT — PAIN SCALES - GENERAL
PAINLEVEL_OUTOF10: 0
PAINLEVEL_OUTOF10: 0

## 2022-09-07 NOTE — CARE COORDINATION
Discharge order noted. Patient to discharge to [facility] today. Transport with [facility] at 1:15pm. Spoke with Meaghan at [facility] to inform of transport. Informed bedside nurse of transport time. #[phone]  Electronically signed by Madelaine Mcardle, RN on 9/7/2022 at 11:02 AM    Spoke with wife regarding transfer.   Wife will contact [facility] after patient arrival.    #[phone]  Electronically signed by Madelaine Mcardle, RN on 9/7/2022 at 11:39 AM

## 2022-09-07 NOTE — DISCHARGE SUMMARY
Available consultant are in agreement with discharge planning. Patient symptoms was controlled and in agreement with discharge planning. Time Spent on discharge is 55 minutes in the examination, evaluation, counseling and review of medications and discharge plan       Chief Complaint:     Abdominal pain     Hospital Course/        Evy Graves is a 63 y.o. male hospitalized on 8/5/2022   for small bowel obstruction, pneumonia, septic shock patient intubation, mechanical ventilation, broad-spectrum antibiotics s intubated/extubated 8/8/2020 through 8/26/2022.      8/9/2022 EGD done for endoscopically placed NG tube placement    8/22/2022, EGD with endoscopic placement of NG tube      8/31/2022 underwent laparoscopic reduction of large hiatal hernia with gastropexy, placement of gastrostomy tube    9/2/2022, extubated after the above procedure    Patient tolerated tube feeding, discharged to extended-care facility, he is to follow-up with general surgery as an outpatient. Patient has extremely lengthy stays over 30 days. For details please review initial H&P, daily progress note, procedure, laboratory and diagnostic data. Discharge diagnoses    Acute hypoxic respiratory failure       Large hiatal hernia status post reduction, PEG tube placement       Aspiration pneumonitis       Acute on chronic diastolic CHF       A. fib with RVR       CHARLENE,      History of rheumatoid arthritis,     Essential hypertension      Hypothyroidism     Code Status: Full Code             ----------------      Subjective:     Patient seen and examined  Overnight events noted  RN and ancillary staff note reviewed    Patient feels good, has no complaint, eager for discharge and therapy.     Diet: Diet NPO  ADULT TUBE FEEDING; PEG; Standard with Fiber; Continuous; 55; No; 135; Q 4 hours    OBJECTIVE     Exam:  /83   Pulse 86   Temp 97.8 °F (36.6 °C) (Oral)   Resp 20   Ht 5' 7\" (1.702 m)   Wt (!) 309 lb 8.4 oz (140.4 kg)   SpO2 96%   BMI 48.48 kg/m²          Unchanged physical finding from exam 9/6/2022 as documented below. Gen: Chronically ill, obese  Head: Normocephalic. Atraumatic. Eyes: Conjunctivae/corneas clear. ENT: Oral mucosa moist  Neck: No JVD. No obvious thyromegaly. CVS: Nml S1S2, no murmur  , RRR  Pulmomary: Diminished air entry   gastrointestinal: Soft, BS, PEG tube placed, positive bowel sounds,  . Musculoskeletal: Trace bilateral edema. Warm  Neuro: No focal deficit. Moves extremity spontaneously. Psychiatry: Appropriate affect. Not agitated.         Medications:  Reviewed    Infusion Medications       Scheduled Medications    gabapentin  400 mg Oral TID    thiamine mononitrate  100 mg Oral Daily    amLODIPine  5 mg Oral Daily    potassium bicarb-citric acid  20 mEq Per G Tube BID WC    pantoprazole (PROTONIX) 40 mg injection  40 mg IntraVENous Daily    enoxaparin  30 mg SubCUTAneous BID    torsemide  20 mg Oral Daily    folic acid  1 mg Oral Daily    aspirin  81 mg Oral Daily    lidocaine 1 % injection  5 mL IntraDERmal Once    sodium chloride flush  5-40 mL IntraVENous 2 times per day    [Held by provider] celecoxib  200 mg Per NG tube BID    metoprolol tartrate  25 mg Oral BID    levothyroxine  200 mcg Oral Daily     PRN Meds: oxyCODONE, labetalol, sodium chloride flush, hydrALAZINE, ipratropium-albuterol, senna, polyethylene glycol, docusate, artificial tears, acetaminophen, potassium chloride, acetaminophen, ondansetron **OR** ondansetron, diazePAM      Intake/Output Summary (Last 24 hours) at 9/7/2022 1522  Last data filed at 9/7/2022 1306  Gross per 24 hour   Intake 1449 ml   Output 1400 ml   Net 49 ml             Labs:   Recent Labs     09/05/22  0500 09/06/22  1210   WBC 6.6 6.4   HGB 11.1* 12.0*   HCT 33.7* 35.7*    257     Recent Labs     09/06/22  1210      K 4.3      CO2 31   BUN 26*   CREATININE 0.7*   CALCIUM 8.8     No results for input(s): AST, ALT, BILIDIR, BILITOT, ALKPHOS in the last 72 hours. No results for input(s): INR in the last 72 hours. No results for input(s): Jennifer Mould in the last 72 hours. Urinalysis:      Lab Results   Component Value Date/Time    NITRU Negative 08/15/2022 09:16 AM    WBCUA 6-9 08/15/2022 09:16 AM    BACTERIA 4+ 08/15/2022 09:16 AM    RBCUA  08/15/2022 09:16 AM    BLOODU LARGE 08/15/2022 09:16 AM    SPECGRAV 1.010 08/15/2022 09:16 AM    GLUCOSEU Negative 08/15/2022 09:16 AM       Radiology:  XR CHEST PORTABLE   Final Result   Subtle changes typical of mild congestive heart failure. Suboptimal   inspiration on the exam may account for some of these findings. Cardiomegaly. Follow-up full inspiration PA and lateral chest may be useful for better   characterization of pulmonary findings. XR CHEST PORTABLE   Final Result   1. Endotracheal tube tip is 2 cm above the mady. Recommend retraction by   1.5 cm for optimal positioning. 2.  Patchy airspace disease in the right lung could represent edema versus   atelectasis versus pneumonia. Fluoroscopy modified barium swallow with video   Final Result   Positive for aspiration. Please see separate speech pathology report for full discussion of findings   and recommendations. XR CHEST PORTABLE   Final Result   Right PICC tip in the distal right internal jugular vein. Pulmonary edema. Findings were discussed with Nicolas Jones at 11:21 am on 8/28/2022. CT HEAD WO CONTRAST   Final Result   No acute intracranial abnormality. XR CHEST PORTABLE   Final Result   Interval extubation. Interval vascular congestion. Ground-glass opacity in the lungs is likely pulmonary edema given the fairly   rapid development. XR CHEST PORTABLE   Final Result   Overall similar appearing chest with cardiomegaly and central vascular   congestion.          XR CHEST PORTABLE   Final Result   Cardiomegaly with central vascular congestion though improved infiltrates in   the lungs bilaterally. CT CHEST PULMONARY EMBOLISM W CONTRAST   Final Result   1. No CT evidence of a pulmonary embolism. 2. Findings most consistent with mild CHF, including cardiomegaly, small   bilateral pleural effusions, and mild pulmonary edema. There is new partial   loculation of the right pleural effusion along the paramediastinal aspect of   the right upper lobe. 3. Persistent partial consolidation of the bilateral lower lobes, most likely   passive atelectasis, less likely aspiration or pneumonia. 4. Persistent reticulonodular opacities within the right middle lobe and   lingula, likely reflecting either additional atelectasis or pneumonia. 5. Multiple pulmonary nodules within both lungs, the largest 10 mm within the   left upper lobe. While this likely reflects benign granulomatous disease,   further follow-up of these nodules is as advised below. 6. Mild left subpectoral lymphadenopathy, of questionable clinical   significance. 7. Interval decrease in size of the patient's small pericardial effusion. RECOMMENDATIONS:   Fleischner Society guidelines for follow-up and management of incidentally   detected pulmonary nodules:      Multiple Solid Nodules:      Nodule size greater than 8 mm   In a low-risk patient, CT at 3-6 months, then consider CT at 18-24 months. In a high-risk patient, CT at 3-6 months, then CT at 18-24 months. Radiology 2017 http://pubs. rsna.org/doi/full/10.1148/radiol. 6851753086         XR CHEST PORTABLE   Final Result   Supportive tubing is in normal position. Bilateral pleural effusions. Disproportionate left basilar atelectasis,   increased since yesterday. Superimposed pneumonia or aspiration pneumonitis   are considerations. XR CHEST PORTABLE   Final Result   1. Small bilateral pleural effusions with adjacent basilar opacities which   could represent atelectasis or pneumonia.       2. Right-sided PICC line with the tip overlying the right subclavian vein. The tip appears kinked as well. CT CHEST WO CONTRAST   Final Result   Small bilateral pleural effusions with adjacent consolidation lungs, either   due to atelectasis or pneumonia. Septal thickening is seen, suggesting a   component of fluid overload. .  Pleural effusions and adjacent consolidation,   is increased compared to prior         XR CHEST PORTABLE   Final Result   Relatively stable pattern of mild edema with no detrimental change. XR CHEST PORTABLE   Final Result   1. Enteric tube tip and side-port in the stomach. 2.  Stable appearance of perihilar opacities. Small left pleural effusion         XR CHEST PORTABLE   Final Result   Relatively stable perihilar and central airspace opacities in the lungs. XR CHEST PORTABLE   Final Result   Probable right pleural effusion      Otherwise, stable chest         XR CHEST PORTABLE   Final Result   1. Endotracheal tube tip is 2 cm above the mady. Recommend retraction by   1.5 cm for optimal positioning. 2.  Stable cardiomegaly and left basilar airspace opacities which could   represent atelectasis or pneumonia. CT ABDOMEN PELVIS WO CONTRAST Additional Contrast? Oral   Final Result   1. No evidence of small-bowel obstruction. 2. Large hiatal hernia. Enteric tube is appropriately positioned beyond the   herniated portion of the stomach in the abdomen. 3. Increasing pleural effusions in the lower chest with diffuse airspace   changes in the visualized lungs. XR CHEST PORTABLE   Final Result   Interval intubation. Mild perihilar and bibasilar airspace opacities are   stable. XR ABDOMEN (KUB) (SINGLE AP VIEW)   Final Result   Persistent moderate proximal small bowel distension suggesting an ongoing   small bowel obstruction. Gaseous distension of the colon.          XR CHEST PORTABLE   Final Result   No substantial interval change in bibasilar airspace disease as compared to   prior. Cardiomegaly. XR CHEST PORTABLE   Final Result   Nonvisualization of the tip of the nasogastric tube. A KUB may be helpful   for further evaluation. Cardiomegaly with pulmonary vascular congestion      Bibasilar atelectasis or infiltrate. CTA CHEST ABDOMEN PELVIS W CONTRAST   Final Result   1. No acute aortic pathology. No significant atherosclerotic disease,   aneurysm or dissection. No brachiocephalic or visceral stenosis. 2. Small bilateral pleural effusions with bilateral lower lobe atelectasis. No acute pulmonary infiltrate. 3. Gastric, duodenal and small bowel distension with gradual transition in   the mid small bowel most consistent with a low-grade or partial obstruction. Large hiatal hernia containing much of the stomach which is distended. 4. Colonic wall thickening in the mid descending colon, likely accentuated by   incomplete distension. The possibility of an underlying colitis is raised. Diverticulosis with no acute features.                      Electronically signed by Kathleen Gasca MD on 9/7/2022 at 3:22 PM

## 2022-09-07 NOTE — PROGRESS NOTES
Speech Language Pathology  [facility]   Speech Therapy  Daily Dysphagia Treatment Note        Miles Vanegas  AGE: 63 y.o. GENDER: male  : 1960  [id number]  EPISODE DATE:  2022  Patient Active Problem List   Diagnosis    Partial bowel obstruction (HCC)    Hiatal hernia    Acute respiratory failure with hypoxia and hypercapnia (HCC)    SBO (small bowel obstruction) (HCC)    Aspiration into airway    Acute respiratory failure with hypoxia (HCC)    Small bowel obstruction (HCC)    Diastolic heart failure (HCC)    History of atrial fibrillation    Supraventricular tachycardia (HCC)    Arterial hypotension    Aspiration pneumonia ([address]    Morbid obesity with BMI of 50.0-59.9, adult ([address]    Acute delirium    EARNESTINE (obstructive sleep apnea)    Dysphagia     Allergies   Allergen Reactions    Codeine Nausea Only and Nausea And Vomiting     Stomach upset        Treatment Diagnosis: Dysphagia       Chart review:   Medical Diagnosis: Small bowel obstruction ([address] [K56.609]; Elevated TSH [R79.89]  Partial bowel obstruction ([address] [K56.600]; Respiratory Failure requiring intubation  Past Medical History:  has a past medical history of Arthritis, Atrial fibrillation ([address] and Hypertension. Past Surgical History:  has a past surgical history that includes Upper gastrointestinal endoscopy (2022); Upper gastrointestinal endoscopy (2022); Upper gastrointestinal endoscopy (N/A, 2022); bronchoscopy (2022); and bronchoscopy (2022). Onset: 2022     Chart Review:   H&P: 63years old male with medical history significant for morbid obesity, paraesophageal hernia. Patient presented to the emergency room with abdominal pain nausea vomiting and diarrhea. Patient was diagnosed with small bowel obstruction. Patient was also diagnosed with pneumonia and septic shock. Patient developed acute respiratory failure and acute renal failure.  Managed on the ventilator with a prolonged course in ICU.  8/08/22: intubated  8/26/22: extubated, placed on 6 LPM  8/27/22: clinical bedside swallow evaluation ordered received following extubation  Pt continues with NG tube feeding     8/28/2022: CT head  Impression   No acute intracranial abnormality. 8/24/2022 CT chest documented findings noted     8/30/2022 MBSS completed : Oropharyngeal Dysphagia with high aspiration risk. Oropharyngeal dysphagia characterized by reduced lingual coordination for bolus control and A-P oral transit; delayed initiation of the swallow; reduced laryngeal rom; reduced tongue base retraction and reduced sensation to aspiration/residue. Symptoms: Mastication was not assessed due to severity of pharyngeal phase deficits. Premature loss to pharynx ; gradual pooling to pharynx. Mildly thick and moderately thick penetration/aspiration prior to swallow; no cough response. Pooling of puree to valleculae with post swallow valleculae residue with difficulty clearing with clearance swallow and poor sensation of residue. Compensatory strategies: Inconsistent ability to achieve a volitional cough which was weak; Inconsistent ability to achieve clearance swallow which did not clear valleculae residue; Pt was unable to complete compensatory head postures; Pt did attempt at lingual hold but inconsistent; persistent premature loss of liquid. Recommendation for npo due to severity of deficits     8/31/2022: Surgical procedure : Laparoscopic reduction hiatal hernia; 24F HADLEY Gastrostomy tube placement    9/1/2022 : pt not seen as pt remains intubated from 8/31/2022 procedure  9/2/2022: pt not seen as extubated but requiring continuous BiPAP  9/5/2022: pt out of ICU; weaned to 5L/min O2 via nasal cannula.  SLP Dysphagia therapy initiated;TX continued 9/6/2022    Subjective:     Current diet   Npo; Gastrostomy tube feedings     Comments regarding tolerating Current Diet:   Reportedly tolerating tube feedings      Objective: Pain    Denied    Cognitive/Behavior   Behavior/Cognition: Pt in chair. Pt on O2 but weaned to  3L/min via nasal cannula. Pt oriented to self; place; this SLP from 9/5/2022 and 9/6/2022; oriented to potential d/c to [facility] and partially to situation. Pt was verbally responsive with daily improving voice quality /duration/endurance and less whisper/strained quality. Pt followed one step commands      Positioning    ~80 to 90 degrees upright in chair    PO Trials:  Toothette soaked with H20: targeting oral suck swallow strategy: 5/5 without voice quality changes or clinical s/s of penetration/aspiration  Ice chips: targeting \"lingual hold then effortful swallow\" strategies: 4/4 with 1 episodes of voice quality changes post swallow  Tsp mildly thick H20 : targeting modified supraglottic swallow: 2/3 with one episode of voice quality changes with concern for penetration/aspiration    Dysphagia Tx:   Direct Dysphagia tx: po limited to small amounts of H20 and ice chips  Dysphagia ex:  tongue base retraction ex; lingual rom/coordination ex; laryngeal ex targeting glottals on expiratory effort; pitch variation low to high; zenaida and 'effortful swallow'; training in modified supraglottic swallow; ongoing treatment poc ed and goals. Pt making continued daily progress in both success in achieving targets and in endurance  Training in compensatory strategies: review of MBSS findings; recommendations; and dysphagia treatment poc; dysphagia/aspiration s/s.  Oral care recommended  Pt response to ex/training: pt voiced understanding    Goals:    Pt will participate in MBSS instrumental for assessment of pharyngeal phase of the swallow and aspiration risk: goal met 8/30/2022  Pt will demonstrate improved swallow response via lingual/laryngeal ex and sensory stimulation: continue  Pt will demonstrate improved swallow response for therapy trials of 1-2 consistencies with compensatory swallow strategies: continue targeted ice chips and H20 soaked toothette and tsp mildly thick H20 with compensatory swallows strategies/airway protection strategies  Assessment:   Therapy Impressions: Oropharyngeal Dysphagia characterized by reduced bolus control; delayed /weak swallow with residual deficit with concern for laryngeal rom and pharyngeal clearance. 1. Pt in chair. Pt on O2 but weaned to  3L/min via nasal cannula. Pt oriented to self; place; this SLP from 9/5/2022 and 9/6/2022; oriented to potential d/c to [facility] and partially to situation. Pt was verbally responsive with daily improving voice quality /duration/endurance and less whisper/strained quality. Pt followed one step commands  2. Ongoing ed of MBSS findings; recommendations and dysphagia treatment poc. . pt voiced understanding  3. Pt with continued improvement (both in success in achieving target and endurance) in lingual rom ex stretch and coordination BOT and tongue base retraction ex. Lingual seal against hard/soft palate. Alternating anterior to posterior lingual elevation with varying mandibular depression requiring increase lingual rom  (3/4 inch depression) . 4. Pt with good effort and continued daily progress (both in success of achieving targets and in endurance) in laryngeal/pharyngeal ex targeting airway protection; glottals; pitch variation for laryngeal rom; zenaida; and 'saliva effortful swallow' strategy. Ongoing ed in other potential poc ex for indirect/direct dysphagia therapy reviewed. 5. Po trials targeting oral suck swallows with toothette soaked with H20 and ice chips targeting 'effortful swallow' strategy; and tsp mildly thick H20 with with with modified supraglottic swallow. Good effort will continue  6. Pt with good response and improving voice quality and endurance with less voice cessation and less severe hoarse/strained whisper quality. Ongoing pt ed.      Diet Recommendations:  NPO (jose HOBBS)   Medication administration: gastrostomy tube Strategies:   Compensatory Swallowing Strategies :  (oral care); occasional ice chips with staff    Education:  Consulted and agree with results and recommendations: Patient, RN;   2 family member ed on 9/5/2022     Patient Education Response: Needs reinforcement, No evidence of learning    Prognosis:   Good guarded medical DX; medical course of hospital admit; medical history co-morbidities; severity of deficits on testing 8/302/2022    Plan:     Continue Dysphagia Therapy:   Interventions: Therapeutic Interventions: Bolus control exercises, Pharyngeal exercises, Laryngeal exercises, Oral motor exercises, Patient/Family education, Oral care, Therapeutic PO trials with SLP, Vital Stim/NMES  Duration/Frequency of therapy while on unit:  3-5 times a week  Discharge Instructions:   Pt will need further skilled Speech Therapy for Dysphagia at discharge    This note serves as a D/C Summary in the event that this patient is discharged prior to the next therapy session. Coded treatment time:0  Total treatment time: 28    Electronically signed by  Mark Nieto. Alexandrea,MS,CCC,SLP [id number]  Speech and Language Pathologist  on 9/7/2022 at 10:40 AM

## 2022-09-07 NOTE — PROGRESS NOTES
Occupational Therapy  Facility/Department: [facility] PROGRESSIVE CARE  Daily Treatment Note  Should patient be discharged prior to another treatment session, this note shall serve as the discharge summary. NAME: Fabiano Cates  : 1960  MRN: [id number]    Date of Service: 2022    Discharge Recommendations:  Patient would benefit from continued therapy after discharge, 3-5 sessions per week         Patient Diagnosis(es): The primary encounter diagnosis was Small bowel obstruction ([address] Diagnoses of Elevated TSH, Chest pain, unspecified type, Hiatal hernia, and Intestinal obstruction, unspecified cause, unspecified whether partial or complete [facility]) were also pertinent to this visit. Assessment    Assessment: Pt seen in room, agreed to OT/PT and OOB. Pt moved to EOB with HOB elevated with SBA, used bed rails. Pt sat unassisted. Pt stood with RW with CGA of 2 and completed pivot to recliner. Assist to monitor lines. Activity Tolerance: Patient tolerated treatment well      Plan   Plan  Times per Week: 3-5  Current Treatment Recommendations: Strengthening;Balance training;Functional mobility training; Endurance training;ROM;Gait training;Neuromuscular re-education;Cognitive reorientation;Self-Care / ADL; Safety education & training     Restrictions  Restrictions/Precautions  Restrictions/Precautions: Fall Risk  Position Activity Restriction  Other position/activity restrictions: O2 3L via NC.  PEG tube. Subjective   Subjective  Subjective: Seen in room, cotx with PT. Pt states MD just told him that he will be transferred to [facility] today.   Pt without complaints, agreed to OOB           Objective    Vitals     Bed Mobility Training  Bed Mobility Training: Yes  Overall Level of Assistance: Stand-by assistance (HOB elevated, no assist by Pt/OT)  Supine to Sit: Stand-by assistance  Sit to Supine: Other (comment) (not assessed, left in chair at end of session)  Transfer Training  Transfer Training: Yes  Overall Level of Assistance: Contact-guard assistance;Assist X2  Sit to Stand: Contact-guard assistance;Assist X2  Stand to Sit: Contact-guard assistance;Assist X2  Stand Pivot Transfers: Contact-guard assistance;Assist X2 (completed std pivot tx to recliner with CGA of 2 and RW. First time without lift equipment so cotx maintained- also requires assist with lines including short TF line.)  Bed to Chair: Assist X2;Contact-guard assistance     ADL  LE Dressing: Maximum assistance  LE Dressing Skilled Clinical Factors: Don/doff footies by OT, lifted RLE per self to assist        Safety Devices  Type of Devices: All fall risk precautions in place;Call light within reach;Gait belt; Chair alarm in place;Nurse notified; Left in chair  Restraints  Restraints Initially in Place: No     Patient Education  Education Given To: Patient  Education Provided: Role of Therapy;Plan of Care;Transfer Training;ADL Adaptive Strategies  Education Method: Demonstration;Verbal  Barriers to Learning: None  Education Outcome: Verbalized understanding;Demonstrated understanding;Continued education needed    Goals  Short Term Goals  Time Frame for Short term goals: Prior to DC: goals updated below on 8/30  Short Term Goal 1: Pt will complete bed mobility SBA  Short Term Goal 2: Pt will complete ADL transfers w/ CGA using LRAD  Short Term Goal 3: Pt will groom w/ setup  Short Term Goal 4: Pt will complete UE exercises in all planes to inc strength/endurance.   Short Term Goal 5: Pt will complete UB bathing w/ SBA  Patient Goals   Patient goals : spouse: to regain PLOF and eventually return home       Therapy Time   Individual Concurrent Group Co-treatment   Time In 1000         Time Out 1020         Minutes 20         Timed Code Treatment Minutes: Singh [address], OT

## 2022-09-07 NOTE — PLAN OF CARE
Problem: Pain  Goal: Verbalizes/displays adequate comfort level or baseline comfort level  6/2/6825 6087 by Ashanit Wills RN  Outcome: Progressing  9/6/2022 1602 by Yvon Odell RN  Outcome: Progressing     Problem: ABCDS Injury Assessment  Goal: Absence of physical injury  Outcome: Progressing     Problem: Safety - Adult  Goal: Free from fall injury  Outcome: Progressing  Flowsheets (Taken 9/6/2022 2000)  Free From Fall Injury: Instruct family/caregiver on patient safety     Problem: Skin/Tissue Integrity  Goal: Absence of new skin breakdown  Description: 1. Monitor for areas of redness and/or skin breakdown  2.   Assess vascular access sites hourly  Outcome: Progressing     Problem: Nutrition Deficit:  Goal: Optimize nutritional status  0/5/9799 5481 by Ashanti Wills RN  Outcome: Progressing  9/6/2022 1602 by Yvon Odell RN  Outcome: Progressing  9/6/2022 1426 by Moreno Gilliland RD, LD  Outcome: Progressing

## 2022-09-07 NOTE — CARE COORDINATION
CASE MANAGEMENT DISCHARGE SUMMARY:    DISCHARGE DATE: 9/7/22     DISCHARGED TO: [facility]     Discharging to Facility/ Agency   Name: [id number]   Address:  [place]  Phone:  [phone]  Fax:  [phone]     TRANSPORTATION: [facility]             TIME: 1:15 pm     #[phone]  Electronically signed by Dat Bragg RN on 9/7/2022 at 11:04 AM

## 2022-09-07 NOTE — PROGRESS NOTES
Physical Therapy  Facility/Department: [address] [facility]  Physical Therapy Treatment Session    Name: Luisa Pollard  : 1960  MRN: [id number]  Date of Service: 2022    Discharge Recommendations:  Therapy recommended at discharge, Continue to assess pending progress   PT Equipment Recommendations  Other: Defer to next level of care. Luisa Pollard scored a 15/24 on the AM-PAC short mobility form. Current research shows that an AM-PAC score of 17 or less is typically not associated with a discharge to the patient's home setting. Based on the patient's AM-PAC score and their current functional mobility deficits, it is recommended that the patient have 3-5 sessions per week of Physical Therapy at d/c to increase the patient's independence. Please see assessment section for further patient specific details. If patient discharges prior to next session this note will serve as a discharge summary. Please see below for the latest assessment towards goals. Patient Diagnosis(es): The primary encounter diagnosis was Small bowel obstruction ([facility] Diagnoses of Elevated TSH, Chest pain, unspecified type, Hiatal hernia, and Intestinal obstruction, unspecified cause, unspecified whether partial or complete [facility]) were also pertinent to this visit. Past Medical History:  has a past medical history of Arthritis, Atrial fibrillation ([address] and Hypertension. Past Surgical History:  has a past surgical history that includes Upper gastrointestinal endoscopy (2022); Upper gastrointestinal endoscopy (2022); Upper gastrointestinal endoscopy (N/A, 2022); bronchoscopy (2022); bronchoscopy (2022); Gastric fundoplication (N/A, ); and Gastrostomy tube placement (N/A, 2022). Assessment   Body Structures, Functions, Activity Limitations Requiring Skilled Therapeutic Intervention: Decreased functional mobility ; Decreased strength;Decreased endurance;Decreased ADL status; Decreased safe awareness;Decreased cognition;Decreased balance  Assessment: 62 y/o male admit 8/7/2022 with N&V. Pt dx with Partial SBO, Aspiration Pneumonia, A-Fib and  Septic Shock. Pt developed Acute Respiratory Failure and Acute Renal Failure. Pt Intubated 8/8/22 - 8/26/22. Prior to admission, pt living with wife in home with ramp access and 1st floor bed/bath; independent daily care and functional mobility (some limit due to OA knees). Pt currently functioning below baseline, however was CGA for transfers and bed mobility. Recommend continued skilled therapy 3-5x/week to maximize independence and safety with functional mobility. Plan is for transfer to [place] [address] today. Therapy Prognosis: Fair  Decision Making: High Complexity  History: see above  Exam: See below  Clinical Presentation: unpredictable  Requires PT Follow-Up: Yes  Activity Tolerance  Activity Tolerance: Patient tolerated treatment well     Plan   Plan  Plan: 3-5 times per week  Current Treatment Recommendations: Strengthening, Functional mobility training, Transfer training, Safety education & training, Patient/Caregiver education & training  Safety Devices  Type of Devices: All fall risk precautions in place, Call light within reach, Gait belt, Chair alarm in place, Nurse notified, Left in chair (RN, [place], notified and SLP, Morgan Roman present)  Restraints  Restraints Initially in Place: No  Restraints: B UE Soft Wrist Restraints - only one tied down     Restrictions  Restrictions/Precautions  Restrictions/Precautions: Fall Risk  Position Activity Restriction  Other position/activity restrictions: O2 3L via NC.  PEG tube. Subjective   General  Chart Reviewed: Yes  Patient assessed for rehabilitation services?: Yes  Additional Pertinent Hx: 62 y/o male admit 8/7/2022 with N&V. Pt dx with Partial SBO, Aspiration Pneumonia, A-Fib and  Septic Shock. Pt developed Acute Respiratory Failure and Acute Renal Failure. 8/8-8/26/2022 Pt Intubated.   CT Abdomen : Large Hiatal Hernia. PMH OA B Knees (per family). Family / Caregiver Present: Yes  Referring Practitioner: Dr. Debra Perez  Other (Comment): Pt follows simple commands. Subjective  Subjective: Patient supine in bed with speech just arriving. He is agreeable to get up out of bed for speech session, with PT/OT. Patient in good spirits at this time. Social/Functional History  Social/Functional History  Lives With: Spouse  Type of Home: House  Home Layout: Two level, Able to Live on Main level with bedroom/bathroom, Laundry in basement  Home Access: Ramped entrance  Bathroom Shower/Tub: Tub/Shower unit, Walk-in shower  Bathroom Toilet: Standard  Bathroom Accessibility: Accessible  Has the patient had two or more falls in the past year or any fall with injury in the past year?: No  Receives Help From: Family  ADL Assistance: Independent  Homemaking Assistance:  (Shared with wife.)  Ambulation Assistance: Independent (Without assist device.)  Transfer Assistance: Independent (Difficulty due to OA B Knees.)  Active : Yes  Occupation: Retired  Additional Comments: Spouse at bedside provided social information. Objective          Bed mobility  Supine to Sit: Contact guard assistance (HOB elevated)  Sit to Supine: Unable to assess (patient left up in chair with speech)  Scooting: Supervision (to scoot forward to edge of bed)      Transfers  Sit to Stand: Contact guard assistance  Stand to sit: Contact guard assistance  Bed to Chair: Contact guard assistance (with wheeled walker.  Patient with multiple lines and required a second person to manage lines)    Ambulation  Surface: level tile  Device: Rolling Walker  Assistance: Contact guard assistance  Quality of Gait: Patient took several steps around with wheeled walker, but walking was deferred due to SLP present for treatment  More Ambulation?: No    Stairs/Curb  Stairs?: No     Balance  Posture: Fair  Sitting - Static: Good  Sitting - Dynamic: Good  Standing - Static: Fair;+  Standing - Dynamic: Fair  Comments: standing with bilateral UE support on walker        AM-PAC Score  AM-PAC Inpatient Mobility Raw Score : 15 (09/07/22 1018)  AM-PAC Inpatient T-Scale Score : 39.45 (09/07/22 1018)  Mobility Inpatient CMS 0-100% Score: 57.7 (09/07/22 1018)  Mobility Inpatient CMS G-Code Modifier : CK (09/07/22 1018)          Goals  Short Term Goals  Time Frame for Short term goals: Upon d/c acute care setting. - all goals met on 8/30/22, new goals written  Short term goal 1: bed mobility SBA  Short term goal 2: sit<>stand to RW with min A - met 9/7/2022  Short term goal 3: stand pivot with RW with min A - met 9/7/2022  Short term goal 4: ambulate > 10' with RW and min A - ongoing  Patient Goals   Patient goals : Wife : regain prior level of function and return home.        Education  Patient Education  Education Given To: Patient  Education Provided: Role of Therapy;Plan of Care  Education Method: Verbal  Barriers to Learning: Cognition  Education Outcome: Verbalized understanding;Continued education needed      Therapy Time   Individual Concurrent Group Co-treatment   Time In       1000   Time Out       1020   Minutes       [address] [facility], [id number]

## 2022-09-07 NOTE — PROGRESS NOTES
Pulmonary Critical Care Progress Note     Patient's name:  Aneta Hadley Record Number: [id number]  Patient's account/billing number: [de-identified]  Patient's YOB: 1960  Age: 63 y.o. Date of Admission: 8/5/2022  1:56 PM  Date of Consult: 9/7/2022      Primary Care Physician: Lamar Burns MD      Code Status: Full Code    Chief complaint: Acute respiratory failure with hypoxia    Assessment and Plan     Acute respiratory failure hypoxia   EARNESTINE/obesity ventilation syndrome. Recent aspiration/organizing pneumonia. Small bowel obstruction. Dysphagia status post laparoscopic reduction of hiatal hernia with PEG tube placement. Plan:  Bipap nightly,   Titrate oxygen to keep sats more than 90%  Aspiration precautions   Continue tube feeding   Advance activities  Incentive spirometry  Speech therapy  Check CXR        Overnight:  Wore BiPAP overnight. On 5 L of oxygen. REVIEW OF SYSTEMS:  Review of Systems -   Denied shortness of breath chest pain. No nausea vomiting diarrhea. Very weak          Physical Exam:    Vitals: BP (!) 143/93   Pulse 80   Temp 97.8 °F (36.6 °C) (Oral)   Resp 21   Ht 5' 7\" (1.702 m)   Wt (!) 309 lb 8.4 oz (140.4 kg)   SpO2 97%   BMI 48.48 kg/m²     Last Body weight:   Wt Readings from Last 3 Encounters:   09/07/22 (!) 309 lb 8.4 oz (140.4 kg)       Body Mass Index : Body mass index is 48.48 kg/m². Intake and Output summary:   Intake/Output Summary (Last 24 hours) at 9/7/2022 1018  Last data filed at 9/7/2022 0341  Gross per 24 hour   Intake 1279 ml   Output 1800 ml   Net -521 ml       Physical Examination:     Gen:  No acute distress. Eyes: PERRL. Anicteric sclera. No conjunctival injection. ENT: No discharge. Posterior oropharynx clear. External appearance of ears and nose normal.  Neck: Trachea midline. No mass   Resp: Diminished bilaterally with scattered rhonchi  CV: Regular rate. Regular rhythm. No murmur or rub. No edema. GI: Soft, Non-tender. Non-distended. +BS  Skin: Warm, dry, w/o erythema. Lymph: No cervical or supraclavicular LAD. M/S: No cyanosis. No clubbing. Neuro: awake and alert, no focal deficit         Laboratory findings:-    CBC:   Recent Labs     09/06/22  1210   WBC 6.4   HGB 12.0*        BMP:    Recent Labs     09/06/22  1210      K 4.3      CO2 31   BUN 26*   CREATININE 0.7*   GLUCOSE 111*     S. Calcium:  Recent Labs     09/06/22  1210   CALCIUM 8.8       S. Magnesium:  No results for input(s): MG in the last 72 hours. S. Phosphorus:  No results for input(s): PHOS in the last 72 hours. S. Glucose:  Recent Labs     09/06/22  2338 09/07/22  0340 09/07/22  0756   POCGLU 115* 113* 101*           Radiology Review:  Pertinent images / reports were reviewed as a part of this visit.     CXR reviewed  Bibasilar airspace disease                  Maged Spencer MD, M.D.            9/7/2022, 10:18 AM

## 2022-09-13 ENCOUNTER — HOSPITAL ENCOUNTER (INPATIENT)
Age: 62
LOS: 11 days | Discharge: HOME HEALTH | DRG: 091 | End: 2022-09-24
Attending: PHYSICAL MEDICINE & REHABILITATION | Admitting: PHYSICAL MEDICINE & REHABILITATION
Payer: COMMERCIAL

## 2022-09-13 PROBLEM — G72.81 CRITICAL ILLNESS MYOPATHY: Status: ACTIVE | Noted: 2022-09-13

## 2022-09-13 PROCEDURE — 2580000003 HC RX 258: Performed by: PHYSICAL MEDICINE & REHABILITATION

## 2022-09-13 PROCEDURE — 6370000000 HC RX 637 (ALT 250 FOR IP): Performed by: PHYSICAL MEDICINE & REHABILITATION

## 2022-09-13 PROCEDURE — 94660 CPAP INITIATION&MGMT: CPT

## 2022-09-13 PROCEDURE — 1280000000 HC REHAB R&B

## 2022-09-13 PROCEDURE — 6360000002 HC RX W HCPCS: Performed by: PHYSICAL MEDICINE & REHABILITATION

## 2022-09-13 RX ORDER — GABAPENTIN 400 MG/1
800 CAPSULE ORAL 3 TIMES DAILY
COMMUNITY

## 2022-09-13 RX ORDER — LINEZOLID 600 MG/1
600 TABLET, FILM COATED ORAL EVERY 12 HOURS SCHEDULED
Status: DISCONTINUED | OUTPATIENT
Start: 2022-09-13 | End: 2022-09-14

## 2022-09-13 RX ORDER — POLYETHYLENE GLYCOL 3350 17 G/17G
17 POWDER, FOR SOLUTION ORAL DAILY PRN
Status: DISCONTINUED | OUTPATIENT
Start: 2022-09-13 | End: 2022-09-24 | Stop reason: HOSPADM

## 2022-09-13 RX ORDER — GABAPENTIN 400 MG/1
400 CAPSULE ORAL 3 TIMES DAILY
Status: DISCONTINUED | OUTPATIENT
Start: 2022-09-13 | End: 2022-09-24 | Stop reason: HOSPADM

## 2022-09-13 RX ORDER — ENOXAPARIN SODIUM 100 MG/ML
30 INJECTION SUBCUTANEOUS 2 TIMES DAILY
Status: DISCONTINUED | OUTPATIENT
Start: 2022-09-13 | End: 2022-09-24 | Stop reason: HOSPADM

## 2022-09-13 RX ORDER — GAUZE BANDAGE 2" X 2"
100 BANDAGE TOPICAL DAILY
Status: DISCONTINUED | OUTPATIENT
Start: 2022-09-13 | End: 2022-09-24 | Stop reason: HOSPADM

## 2022-09-13 RX ORDER — ACETAMINOPHEN 500 MG
1000 TABLET ORAL EVERY 8 HOURS PRN
Status: DISCONTINUED | OUTPATIENT
Start: 2022-09-13 | End: 2022-09-13 | Stop reason: SDUPTHER

## 2022-09-13 RX ORDER — METOPROLOL TARTRATE 50 MG/1
50 TABLET, FILM COATED ORAL 2 TIMES DAILY
Status: DISCONTINUED | OUTPATIENT
Start: 2022-09-13 | End: 2022-09-24 | Stop reason: HOSPADM

## 2022-09-13 RX ORDER — CELECOXIB 200 MG/1
200 CAPSULE ORAL 2 TIMES DAILY
Status: DISCONTINUED | OUTPATIENT
Start: 2022-09-13 | End: 2022-09-24 | Stop reason: HOSPADM

## 2022-09-13 RX ORDER — FOLIC ACID 1 MG/1
1 TABLET ORAL DAILY
Status: DISCONTINUED | OUTPATIENT
Start: 2022-09-13 | End: 2022-09-24 | Stop reason: HOSPADM

## 2022-09-13 RX ORDER — ASPIRIN 81 MG/1
81 TABLET ORAL DAILY
Status: DISCONTINUED | OUTPATIENT
Start: 2022-09-13 | End: 2022-09-24 | Stop reason: HOSPADM

## 2022-09-13 RX ORDER — CELECOXIB 200 MG/1
200 CAPSULE ORAL 2 TIMES DAILY
COMMUNITY
Start: 2022-01-12

## 2022-09-13 RX ORDER — PANTOPRAZOLE SODIUM 40 MG/1
40 TABLET, DELAYED RELEASE ORAL DAILY
Status: DISCONTINUED | OUTPATIENT
Start: 2022-09-13 | End: 2022-09-14

## 2022-09-13 RX ORDER — TORSEMIDE 20 MG/1
20 TABLET ORAL DAILY
Status: DISCONTINUED | OUTPATIENT
Start: 2022-09-13 | End: 2022-09-24 | Stop reason: HOSPADM

## 2022-09-13 RX ORDER — ACETAMINOPHEN 325 MG/1
650 TABLET ORAL EVERY 4 HOURS PRN
Status: DISCONTINUED | OUTPATIENT
Start: 2022-09-13 | End: 2022-09-24 | Stop reason: HOSPADM

## 2022-09-13 RX ORDER — LEVOTHYROXINE SODIUM 0.1 MG/1
200 TABLET ORAL EVERY MORNING
Status: DISCONTINUED | OUTPATIENT
Start: 2022-09-14 | End: 2022-09-24 | Stop reason: HOSPADM

## 2022-09-13 RX ADMIN — METOPROLOL TARTRATE 50 MG: 50 TABLET ORAL at 21:20

## 2022-09-13 RX ADMIN — ENOXAPARIN SODIUM 30 MG: 100 INJECTION SUBCUTANEOUS at 21:18

## 2022-09-13 RX ADMIN — PIPERACILLIN AND TAZOBACTAM 4500 MG: 4; .5 INJECTION, POWDER, LYOPHILIZED, FOR SOLUTION INTRAVENOUS at 15:39

## 2022-09-13 RX ADMIN — LINEZOLID 600 MG: 600 TABLET, FILM COATED ORAL at 21:20

## 2022-09-13 RX ADMIN — GABAPENTIN 400 MG: 400 CAPSULE ORAL at 21:20

## 2022-09-13 RX ADMIN — CELECOXIB 200 MG: 200 CAPSULE ORAL at 21:19

## 2022-09-13 RX ADMIN — GABAPENTIN 400 MG: 400 CAPSULE ORAL at 15:25

## 2022-09-13 RX ADMIN — PIPERACILLIN AND TAZOBACTAM 3375 MG: 3; .375 INJECTION, POWDER, FOR SOLUTION INTRAVENOUS at 21:18

## 2022-09-13 ASSESSMENT — PAIN DESCRIPTION - PAIN TYPE: TYPE: CHRONIC PAIN

## 2022-09-13 ASSESSMENT — PAIN SCALES - GENERAL
PAINLEVEL_OUTOF10: 4
PAINLEVEL_OUTOF10: 0

## 2022-09-13 ASSESSMENT — PAIN DESCRIPTION - FREQUENCY: FREQUENCY: CONTINUOUS

## 2022-09-13 ASSESSMENT — PAIN DESCRIPTION - DESCRIPTORS: DESCRIPTORS: SHARP

## 2022-09-13 ASSESSMENT — PAIN DESCRIPTION - ONSET: ONSET: ON-GOING

## 2022-09-13 ASSESSMENT — PAIN DESCRIPTION - ORIENTATION: ORIENTATION: MID

## 2022-09-13 ASSESSMENT — PAIN DESCRIPTION - LOCATION: LOCATION: BACK;GENERALIZED

## 2022-09-13 NOTE — CONSULTS
Brief Nutrition Note    Type and Reason for Visit:   Consult for tube feeding ordering and management    **Will complete full assessment tomorrow. Nutrition Recommendations/Plan:   Continue NPO  Pivot 1.5 @ 100ml x 14 hours (5pm-7am)  Water flush 175ml every 4 hours     Current Nutrition Intake & Therapies:    Diet NPO  Current Tube Feeding (TF) Orders:  Feeding Route: PEG  Formula: Immune Enhancing  Schedule: Cyclic (x 14 hours (7PH-5US))  Feeding Regimen: Pivot 1.5 @ 100ml x 14 hours  Water Flushes: 175ml every 4 hours  Goal TF & Flush Orders Provides: Pivot 1.5 @ 100ml x 14 hours provides 1400ml TV, 2100 kcals, 131grams protein, and 1050ml free water. Anthropometric Measures:  Height: 5' 7\" (170.2 cm)  Ideal Body Weight (IBW): 148 lbs (67 kg)    Current Body Weight: 309 lb (140.2 kg) (from 9/7), 208.8 % IBW.     Current BMI (kg/m2): 48.4  BMI Categories: Obese Class 3 (BMI 40.0 or greater)    Estimated Daily Nutrient Needs:  Energy Requirements Based On: Kcal/kg  Weight Used for Energy Requirements: Current  Energy (kcal/day): 3486-0946 (11-14kcal/140kg)  Weight Used for Protein Requirements: Ideal  Protein (g/day): 134 (2g/67kg)  Method Used for Fluid Requirements: 1 ml/kcal  Fluid (ml/day): 1 ml/kcal        Chitra Campoverde RD, LD  Contact: [phone]

## 2022-09-13 NOTE — PROGRESS NOTES
Department of [facility]  Dr. Lakeshia Almanza Progress Note  9/13/2022  11:11 AM    Patient Name:   Chip Patel  YOB: 1960    Diagnosis: Critical illness myopathy      Subjective: Patient is a 63 y.o. male hospitalized on 8/5/2022   for small bowel obstruction, pneumonia, septic shock patient intubation, mechanical ventilation, broad-spectrum antibiotics s intubated/extubated 8/8/2020 through 8/26/2022. On 8/9/2022, EGD done for endoscopically placed NG tube placement. On 8/22/2022, EGD with endoscopic placement of NG tube. On 8/31/2022 underwent laparoscopic reduction of large hiatal hernia with gastropexy, placement of gastrostomy tube. On 9/2/2022, extubated after the above procedure. He had the diagnosis of critical illness myopathy, and was discharged from our hospital to an LTAC unit for further IV antibiotic therapy and treatment. Patient now has improved, and is admitted back to our rehabilitation unit to progress with his strengthening of his arms and legs and improving his endurance before discharge back home. Patient also has diagnosis of atrial fibrillation, heart failure, dysphagia, sleep apnea, HTN, and rheumatoid arthritis. Patient is now ambulating 50 feet with a rolling walker and PT with contact-guard to min assist, and needs max assist for lower body bathing and dressing. Patient lives at home with his wife in a 1 level house. Patient is allowed to eat PO with speech therapy during therapeutic feeding. There were no vitals taken for this visit. Last 24 hour lab  No results found for this or any previous visit (from the past 24 hour(s)). Plan: We will give patient admitted to our rehab unit today.       Dr. Lakeshia Almanza

## 2022-09-13 NOTE — PROGRESS NOTES
4 Eyes Skin Assessment     NAME:  Miles Vanegas  YOB: 1960  MEDICAL RECORD NUMBER:  [id number]    The patient is being assess for  Admission    I agree that 2 RN's have performed a thorough Head to Toe Skin Assessment on the patient. ALL assessment sites listed below have been assessed. Areas assessed by both nurses:    Head, Face, Ears, Shoulders, Back, Chest, Arms, Elbows, Hands, Sacrum. Buttock, Coccyx, Ischium, and Legs. Feet and Heels        Does the Patient have a Wound? No noted wound(s) noted were 4 small incisions on abdomen, scabbed and ABRAHAM healing well.        Timi Prevention initiated:  Yes   Wound Care Orders initiated:  No    Pressure Injury (Stage 3,4, Unstageable, DTI, NWPT, and Complex wounds) if present place referral/consult order under [de-identified] No    New and Established Ostomies if present place consult order under : No      Nurse 1 eSignature: Electronically signed by [place] RN on 9/13/22 at 7:04 PM EDT    **SHARE this note so that the co-signing nurse is able to place an eSignature**    Nurse 2 eSignature: Electronically signed by Shante Gomez RN on 9/13/22 at 7:04 PM EDT

## 2022-09-14 LAB
ANION GAP SERPL CALCULATED.3IONS-SCNC: 8 MMOL/L (ref 3–16)
BUN BLDV-MCNC: 17 MG/DL (ref 7–20)
CALCIUM SERPL-MCNC: 7.9 MG/DL (ref 8.3–10.6)
CHLORIDE BLD-SCNC: 99 MMOL/L (ref 99–110)
CO2: 26 MMOL/L (ref 21–32)
CREAT SERPL-MCNC: 0.8 MG/DL (ref 0.8–1.3)
GFR AFRICAN AMERICAN: >60
GFR NON-AFRICAN AMERICAN: >60
GLUCOSE BLD-MCNC: 106 MG/DL (ref 70–99)
POTASSIUM REFLEX MAGNESIUM: 4.5 MMOL/L (ref 3.5–5.1)
SODIUM BLD-SCNC: 133 MMOL/L (ref 136–145)

## 2022-09-14 PROCEDURE — 80048 BASIC METABOLIC PNL TOTAL CA: CPT

## 2022-09-14 PROCEDURE — 97530 THERAPEUTIC ACTIVITIES: CPT

## 2022-09-14 PROCEDURE — 1280000000 HC REHAB R&B

## 2022-09-14 PROCEDURE — 92610 EVALUATE SWALLOWING FUNCTION: CPT

## 2022-09-14 PROCEDURE — 2700000000 HC OXYGEN THERAPY PER DAY

## 2022-09-14 PROCEDURE — 36415 COLL VENOUS BLD VENIPUNCTURE: CPT

## 2022-09-14 PROCEDURE — 97162 PT EVAL MOD COMPLEX 30 MIN: CPT

## 2022-09-14 PROCEDURE — 6360000002 HC RX W HCPCS: Performed by: PHYSICAL MEDICINE & REHABILITATION

## 2022-09-14 PROCEDURE — 97535 SELF CARE MNGMENT TRAINING: CPT

## 2022-09-14 PROCEDURE — 94761 N-INVAS EAR/PLS OXIMETRY MLT: CPT

## 2022-09-14 PROCEDURE — 2580000003 HC RX 258: Performed by: PHYSICAL MEDICINE & REHABILITATION

## 2022-09-14 PROCEDURE — 6370000000 HC RX 637 (ALT 250 FOR IP): Performed by: PHYSICAL MEDICINE & REHABILITATION

## 2022-09-14 PROCEDURE — 97116 GAIT TRAINING THERAPY: CPT

## 2022-09-14 PROCEDURE — 92523 SPEECH SOUND LANG COMPREHEN: CPT

## 2022-09-14 PROCEDURE — 97110 THERAPEUTIC EXERCISES: CPT

## 2022-09-14 PROCEDURE — 97166 OT EVAL MOD COMPLEX 45 MIN: CPT

## 2022-09-14 RX ORDER — LINEZOLID 600 MG/1
600 TABLET, FILM COATED ORAL EVERY 12 HOURS
Status: COMPLETED | OUTPATIENT
Start: 2022-09-14 | End: 2022-09-16

## 2022-09-14 RX ORDER — LANSOPRAZOLE
30 KIT DAILY
Status: DISCONTINUED | OUTPATIENT
Start: 2022-09-14 | End: 2022-09-24 | Stop reason: HOSPADM

## 2022-09-14 RX ADMIN — Medication 1 TABLET: at 20:56

## 2022-09-14 RX ADMIN — GABAPENTIN 400 MG: 400 CAPSULE ORAL at 15:37

## 2022-09-14 RX ADMIN — CELECOXIB 200 MG: 200 CAPSULE ORAL at 20:56

## 2022-09-14 RX ADMIN — METOPROLOL TARTRATE 50 MG: 50 TABLET ORAL at 10:43

## 2022-09-14 RX ADMIN — METOPROLOL TARTRATE 50 MG: 50 TABLET ORAL at 20:56

## 2022-09-14 RX ADMIN — LINEZOLID 600 MG: 600 TABLET, FILM COATED ORAL at 10:42

## 2022-09-14 RX ADMIN — Medication 1 TABLET: at 15:37

## 2022-09-14 RX ADMIN — Medication 30 MG: at 15:37

## 2022-09-14 RX ADMIN — POTASSIUM BICARBONATE 20 MEQ: 782 TABLET, EFFERVESCENT ORAL at 10:40

## 2022-09-14 RX ADMIN — Medication 100 MG: at 10:42

## 2022-09-14 RX ADMIN — ENOXAPARIN SODIUM 30 MG: 100 INJECTION SUBCUTANEOUS at 20:55

## 2022-09-14 RX ADMIN — PIPERACILLIN AND TAZOBACTAM 3375 MG: 3; .375 INJECTION, POWDER, LYOPHILIZED, FOR SOLUTION INTRAVENOUS at 13:32

## 2022-09-14 RX ADMIN — GABAPENTIN 400 MG: 400 CAPSULE ORAL at 10:42

## 2022-09-14 RX ADMIN — FOLIC ACID 1 MG: 1 TABLET ORAL at 10:42

## 2022-09-14 RX ADMIN — GABAPENTIN 400 MG: 400 CAPSULE ORAL at 20:56

## 2022-09-14 RX ADMIN — ENOXAPARIN SODIUM 30 MG: 100 INJECTION SUBCUTANEOUS at 10:43

## 2022-09-14 RX ADMIN — CELECOXIB 200 MG: 200 CAPSULE ORAL at 10:42

## 2022-09-14 RX ADMIN — LINEZOLID 600 MG: 600 TABLET, FILM COATED ORAL at 20:56

## 2022-09-14 RX ADMIN — PIPERACILLIN AND TAZOBACTAM 3375 MG: 3; .375 INJECTION, POWDER, FOR SOLUTION INTRAVENOUS at 05:01

## 2022-09-14 RX ADMIN — LEVOTHYROXINE SODIUM 200 MCG: 0.1 TABLET ORAL at 10:41

## 2022-09-14 RX ADMIN — ASPIRIN 81 MG: 81 TABLET, COATED ORAL at 10:42

## 2022-09-14 RX ADMIN — PIPERACILLIN AND TAZOBACTAM 3375 MG: 3; .375 INJECTION, POWDER, LYOPHILIZED, FOR SOLUTION INTRAVENOUS at 20:54

## 2022-09-14 RX ADMIN — TORSEMIDE 20 MG: 20 TABLET ORAL at 10:40

## 2022-09-14 ASSESSMENT — PAIN DESCRIPTION - ONSET
ONSET: ON-GOING

## 2022-09-14 ASSESSMENT — PAIN DESCRIPTION - ORIENTATION
ORIENTATION: LEFT
ORIENTATION: LEFT

## 2022-09-14 ASSESSMENT — PAIN DESCRIPTION - LOCATION
LOCATION: SHOULDER
LOCATION: SHOULDER
LOCATION: GENERALIZED

## 2022-09-14 ASSESSMENT — PAIN SCALES - GENERAL
PAINLEVEL_OUTOF10: 0
PAINLEVEL_OUTOF10: 4
PAINLEVEL_OUTOF10: 4
PAINLEVEL_OUTOF10: 2
PAINLEVEL_OUTOF10: 2
PAINLEVEL_OUTOF10: 0

## 2022-09-14 ASSESSMENT — PAIN DESCRIPTION - FREQUENCY
FREQUENCY: CONTINUOUS
FREQUENCY: INTERMITTENT
FREQUENCY: INTERMITTENT

## 2022-09-14 ASSESSMENT — PAIN DESCRIPTION - DESCRIPTORS
DESCRIPTORS: SHARP
DESCRIPTORS: SHARP
DESCRIPTORS: DISCOMFORT

## 2022-09-14 ASSESSMENT — PAIN DESCRIPTION - PAIN TYPE
TYPE: CHRONIC PAIN

## 2022-09-14 ASSESSMENT — PAIN - FUNCTIONAL ASSESSMENT
PAIN_FUNCTIONAL_ASSESSMENT: ACTIVITIES ARE NOT PREVENTED
PAIN_FUNCTIONAL_ASSESSMENT: ACTIVITIES ARE NOT PREVENTED

## 2022-09-14 NOTE — PROGRESS NOTES
Physical Therapy  Facility/Department: [facility]  Physical Therapy Initial Assessment    Name: Maki Rivera  : 1960  MRN: [id number]  Date of Service: 2022    Discharge Recommendations:  Continue to assess pending progress, Patient would benefit from continued therapy after discharge, Home with assist PRN   PT Equipment Recommendations  Other: Continue to assess pending progress      Patient Diagnosis(es): There were no encounter diagnoses. Past Medical History:  has a past medical history of Arthritis, Atrial fibrillation ([address] and Hypertension. Past Surgical History:  has a past surgical history that includes Upper gastrointestinal endoscopy (2022); Upper gastrointestinal endoscopy (2022); Upper gastrointestinal endoscopy (N/A, 2022); bronchoscopy (2022); bronchoscopy (2022); Gastric fundoplication (N/A, 9451); and Gastrostomy tube placement (N/A, 2022). Assessment   Body Structures, Functions, Activity Limitations Requiring Skilled Therapeutic Intervention: Decreased endurance;Decreased functional mobility ; Decreased balance  Assessment: Patient is a 63 y.o. male hospitalized on 2022 for small bowel obstruction, pneumonia, septic shock patient intubation, mechanical ventilation, broad-spectrum antibiotics  intubated/extubated 2020 through 2022. On 2022, EGD done for endoscopically placed NG tube placement. On 2022, EGD with endoscopic placement of NG tube. On 2022 underwent laparoscopic reduction of large hiatal hernia with gastropexy, placement of gastrostomy tube. On 2022, extubated after the above procedure. He had the diagnosis of critical illness myopathy, and was discharged from our hospital to an LTAC unit for further IV antibiotic therapy and treatment.  Patient now has improved, and is admitted back to our rehabilitation unit to progress with his strengthening of his arms and legs and improving his endurance before discharge back home. Patient also has diagnosis of atrial fibrillation, heart failure, dysphagia, sleep apnea, HTN, and rheumatoid arthritis. Pt demonstrates good strength in LE however has decreased endurance and becomes fatigued frequently and requires a seated rest break during treatment session. Pt was supervision for all of bed mobility besides when going from supine to sit during which they required SBA because he began to slide forward on ADL bed. Pt was CGA for sit>stand and SBA for stand>sit, they had difficulty shifting weight over toes so they could rise. Pt was able to ambulated with RW for household distances but was CGA during activity and had increased fatigue and complaints of SOB. Overall pt has decreased endurance, increased fatigue, and decreased functional mobility. He is below his baseline level and would benefit from skilled intervention in order to improve his endurance decrease his fatigue, work on gait training and improve his functional mobility in order to return to his PLOF. Treatment Diagnosis: Acute hypoxemic and hypercapnic respiratory failure  Therapy Prognosis: Good  Decision Making: Moderate Complexity  History: See above  Barriers to Learning: none  Requires PT Follow-Up: Yes  Activity Tolerance  Activity Tolerance: Patient limited by endurance; Patient limited by fatigue  Activity Tolerance Comments: Pt had increased fatigue during activity and required more frequent rest breaks both seated and supine. Pt reported feeling our of breath and was exhibiting some dyspnea however when SPO2 was taking pt was at 99 and 98. Plan   Plan  Plan:  minutes of therapy at least 5 out of 7 days a week  Plan weeks: 2 weeks  Current Treatment Recommendations: Strengthening, Balance training, Functional mobility training, Transfer training, Stair training, Gait training, Therapeutic activities, Home exercise program, Endurance training, IADL training  Safety Devices  Type of Devices:  All fall risk precautions in place, Left in chair, Gait belt (left in [facility] for transport)  Restraints  Restraints Initially in Place: No     Restrictions  Restrictions/Precautions  Restrictions/Precautions: NPO, Fall Risk  Position Activity Restriction  Other position/activity restrictions: NPO, PEG tube, 3L O2 (not on O2 at home), IV L hand     Subjective   General  Chart Reviewed: Yes  Patient assessed for rehabilitation services?: Yes  Additional Pertinent Hx: Per Dr. Stormy Jones H/P \"Patient is a 63 y.o. male hospitalized on 8/5/2022   for small bowel obstruction, pneumonia, septic shock patient intubation, mechanical ventilation, broad-spectrum antibiotics s intubated/extubated 8/8/2020 through 8/26/2022. On 8/9/2022, EGD done for endoscopically placed NG tube placement. On 8/22/2022, EGD with endoscopic placement of NG tube. On 8/31/2022 underwent laparoscopic reduction of large hiatal hernia with gastropexy, placement of gastrostomy tube. On 9/2/2022, extubated after the above procedure. He had the diagnosis of critical illness myopathy, and was discharged from our hospital to an LTAC unit for further IV antibiotic therapy and treatment. Patient now has improved, and is admitted back to our rehabilitation unit to progress with his strengthening of his arms and legs and improving his endurance before discharge back home. Patient also has diagnosis of atrial fibrillation, heart failure, dysphagia, sleep apnea, HTN, and rheumatoid arthritis. Patient is now ambulating 50 feet with a rolling walker and PT with contact-guard to min assist, and needs max assist for lower body bathing and dressing. Patient lives at home with his wife in a 1 level house. Patient is allowed to eat PO with speech therapy during therapeutic feeding. \"  Response To Previous Treatment: Not applicable  Family / Caregiver Present: No  Referral Date : 09/13/22  Follows Commands: Within Functional Limits  Subjective  Subjective: Pt reports they are fatigued. They have RA and receive infusion for this however they are three weeks late on his infusion and pt reports he is having some pain from his RA. Pain rating is a 4/10 in L shoulder. Social/Functional History  Social/Functional History  Lives With: Spouse, Son (wife Vinny Astorga, works outside the home but can work from home if needed, son Khang Hanley lives with them as well, brother lives next door)  Type of Home: House  Home Layout: One level  Home Access:  (has a ramp entry)  Bathroom Shower/Tub: Walk-in shower, Tub/Shower unit (one is tub/shower, one is just shower (2 inch lip to enter), mainly uses shower)  Bathroom Toilet: Standard  Bathroom Equipment:  (none)  Bathroom Accessibility: Walker accessible  Home Equipment: [address], Electric scooter, Walker, rolling (prior to admittance to the hospital pt did no use AD. These are from a previous injury in 2016.)  Has the patient had two or more falls in the past year or any fall with injury in the past year?: No  Receives Help From: Family  ADL Assistance: [address]: Independent  Homemaking Responsibilities: Yes  Meal Prep Responsibility: Primary  Laundry Responsibility: Secondary  Bill Paying/Finance Responsibility: Primary  Ambulation Assistance: Independent  Transfer Assistance: Independent  Active : Yes (wife can drive if needed)  Mode of Transportation: Car  Education: vocational school for welding; HS  Occupation: Retired  Type of Occupation:   Leisure & Hobbies: [address] [employer], lawn work, working on equipment in shop at his house  Additional Comments: Used electric scooter in 2016 when he pulled both quads off his kneecap due to work injury.  On Celebrex and infusions every 6 weeks for rheumatoid arthritis pain  Vision/Hearing  Vision  Vision Exceptions: Wears glasses for reading  Hearing  Hearing: Within functional limits    Cognition   Orientation  Overall Orientation Status: Within Normal Limits  Orientation Level: Oriented X4  Cognition  Overall Cognitive Status: WFL     Objective     Observation/Palpation  Posture: Good  Observation: Seated in wc, fair posture, on nasal canula 3L of O2        AROM RLE (degrees)  RLE AROM: WFL  AROM LLE (degrees)  LLE AROM : WFL  AROM RUE (degrees)  RUE AROM : WFL  AROM LUE (degrees)  LUE AROM : WFL  Strength RLE  Strength RLE: Exception  R Hip Flexion: 4/5  R Knee Flexion: 4+/5  R Knee Extension: 4/5  R Ankle Dorsiflexion: 4/5  R Ankle Plantar flexion: 4+/5  Strength LLE  Strength LLE: Exception  L Hip Flexion: 4+/5  L Knee Flexion: 4+/5  L Knee Extension: 4/5  L Ankle Dorsiflexion: 4/5  L Ankle Plantar Flexion: 4+/5           Bed mobility  Bridging: Supervision  Rolling to Left: Supervision  Rolling to Right: Supervision  Supine to Sit: Stand by assistance  Sit to Supine: Supervision  Scooting: Supervision  Bed Mobility Comments: Pt had difficulty when going from supine>sit, he had difficulty rising from supine but also when transitioning to seated ended on the EOB and had to scoot back in order not to slide off bed. All bed mobility completed on the ADL bed. Transfers  Sit to Stand: Contact guard assistance (As pt became fatigued they would sit down more abruptly)  Stand to sit: Stand by assistance  Ambulation  Surface: uneven  Device: Rolling Walker  Assistance: Contact guard assistance  Quality of Gait: decreased adam with a wide base of support. Gait Deviations: Slow Adam; Increased BENTLEY  Distance: [de-identified]' on uneven surface (carpet at end of felipe) with 4 turns  Comments: Pt became fatigued quickly during activity and SOB. Checked SPO2 and it was 99.      PHYSICAL THERAPY  Progress Note   Second Session    Patient Name: Aneta Hadley Record Number: [id number]    Treatment Diagnosis: Acute hypoxemic and hypercapnic respiratory failure      Chart Reviewed: Yes   Restrictions/Precautions: NPO, Fall Risk Other position/activity restrictions: NPO, PEG tube, 3L O2 (not on O2 at home), IV L hand, CPAP at night   Additional Pertinent Hx: Per Dr. Chidi Dimas H/P \"Patient is a 63 y.o. male hospitalized on 8/5/2022   for small bowel obstruction, pneumonia, septic shock patient intubation, mechanical ventilation, broad-spectrum antibiotics s intubated/extubated 8/8/2020 through 8/26/2022. On 8/9/2022, EGD done for endoscopically placed NG tube placement. On 8/22/2022, EGD with endoscopic placement of NG tube. On 8/31/2022 underwent laparoscopic reduction of large hiatal hernia with gastropexy, placement of gastrostomy tube. On 9/2/2022, extubated after the above procedure. He had the diagnosis of critical illness myopathy, and was discharged from our hospital to an LTAC unit for further IV antibiotic therapy and treatment. Patient now has improved, and is admitted back to our rehabilitation unit to progress with his strengthening of his arms and legs and improving his endurance before discharge back home. Patient also has diagnosis of atrial fibrillation, heart failure, dysphagia, sleep apnea, HTN, and rheumatoid arthritis. Patient is now ambulating 50 feet with a rolling walker and PT with contact-guard to min assist, and needs max assist for lower body bathing and dressing. Patient lives at home with his wife in a 1 level house. Patient is allowed to eat PO with speech therapy during therapeutic feeding. \"        Subjective: Pt brought to therapy in [place] [address] for PT. Pt agreeable to therapy. Objective: Mock car transfer with SBA and use of RW. Pt able to ambulate to open car door and sit down. SPT provided one verbal cue to patient in order to reach back for seat and not use walker to lower themselves onto chair. Pt able to complete the rest of the transfer without any verbal cues. Pt transferred back to to [place] [address] from standing at [address] [place] [address] with supervision. Pt completed ramp on therapy terrace with SBA ascending but was CGA for the descent in order to help patient control momentum. Pt had a seated rest break after activity. Pt completed 6\" curb step with RW and SBA. During curb step pt used hand rail on R side even though pt had RW in front of them. On the descent pt used the R handrail again to help control the descent down the 6\" curb step even with RW present. Assessment: Pt able to complete mock car transfer with SBA and one verbal cue. Pt completed ramp and curb step with SBA except for the descent on ramp required CGA and during curb step used R hand rail both when ascending and descending. Pt was less fatigued during afternoon therapy session and had decreased bouts of dyspnea. Therapy will continue to work on gait training, improve strength and endurance in order to return to their PLOF. Safety Device - Type of devices:  [x]  All fall risk precautions in place [] Bed alarm in place  [] Call light within reach [] Chair alarm in place [] Positioning belt [x] Gait belt [] Patient at risk for falls [] Left in bed [x] Left in chair(for transport to return to room) [] Telesitter in use [] Sitter present [] Nurse notified []  None  Electronically signed by Thomas Vernon on 9/14/2022 at 2:57 PM Yue Baldwin, [id number]      Goals  Short Term Goals  Time Frame for Short term goals: 7 days  Short term goal 1: Complete ramp with RW CGA. Short term goal 2: Ambulate 150' with RW and CGA. Short term goal 3: Transfers, car and sit<>stand, with SBA. Short term goal 4: Complete all bed mobility mod I.  Long Term Goals  Time Frame for Long term goals : 10-14 days  Long term goal 1: Complete ramp with no AD mod I.  Long term goal 2: Ambulate 150' RW mod I.  Long term goal 3: Transfers, car and sit<>stand, with mod I. Patient Goals   Patient goals : Return to walking without difficulty. Education  Education Given To: Patient  Education Provided: Role of Therapy;Plan of Care;ADL Function; Safety; Mobility Training;Transfer Training  Education Provided Comments: Educated Pt on role of PT services and his plan of care during his stay on ARU. Educated on his current functional mobility and strength as well as how to complete transfers safely. Education Method: Verbal;Demonstration  Barriers to Learning: None  Education Outcome: Verbalized understanding      Therapy Time   Individual Concurrent Group Co-treatment   Time In 2854         Time Out 1200         Minutes 45         Timed Code Treatment Minutes: 45 Minutes  Second Session Therapy Time     Individual Co-treatment   Time In 1430     Time Out 1112     Minutes [address], [facility], 09/14/2022, 12:52 PM  Therapist was present, directed the patient's care, made skilled judgement, and was responsible for assessment and treatment of the patient.         Janel Ruggiero, [id number]

## 2022-09-14 NOTE — PROGRESS NOTES
Speech Language/Pathology   SPEECH LANGUAGE AND CLINICAL BEDSIDE SWALLOWING EVALUATION   Speech Therapy Department       Milesvianney Vanegas  AGE: 63 y.o. GENDER: male  : 1960  [id number]  EPISODE DATE:  2022    MEDICAL DIAGNOSIS: Critical Illness Myopathy  Recent active problems: recent partial bowel obstruction;  hiatal hernia; acute respiratory failure with hypoxia and hypercapnia; SBO; aspiration into airway; diastolic heart failure; history of atrial fibrillation; supraventricular tachycardia; arterial hypotension; aspiration pneumonia; morbid obesity with BMI of 50.0 to 59.9 adult; acute delirium; EARNESTINE needs C-PAP at night; Dysphagia; Critical illness myopathy   has a past medical history of Arthritis, Atrial fibrillation ([facility] [address] and Hypertension. Surgical history includes: multiple bronchs in ; Bronch with alveolar lavage; multiple EGDs; Gastrostomy tube insertion 1343; gastric fundoplication 7604; Laparoscopic reduction hiatal hernia 2022  Allergies: codeine  Recent history of prolonged intubation 2022 through to 2022; then from 2022 to ~2022 after a medical surgical procedure  Recent admit to [facility] 2022 and then d/c to Rehab 2022  Prior illness status: Lives with spouse; IND communication; participates in home/med management; regular diet as desired; drove; retired. Onset: 2022      Chart Review:  MD History and Physical Documentation assessment  History of Present Illness/Hospital Course:  Patient is a 63 y.o. male hospitalized on 2022   for small bowel obstruction, pneumonia, septic shock patient intubation, mechanical ventilation, broad-spectrum antibiotics s intubated/extubated 2020 through 2022. On 2022, EGD done for endoscopically placed NG tube placement. On 2022, EGD with endoscopic placement of NG tube.  On 2022 underwent laparoscopic reduction of large hiatal hernia with gastropexy, placement of gastrostomy tube. On 9/2/2022, extubated after the above procedure. He had the diagnosis of critical illness myopathy, and was discharged from our hospital to an LTAC unit for further IV antibiotic therapy and treatment. Patient now has improved, and is admitted back to our rehabilitation unit to progress with his strengthening of his arms and legs and improving his endurance before discharge back home. Patient also has diagnosis of atrial fibrillation, heart failure, dysphagia, sleep apnea, HTN, and rheumatoid arthritis. Patient is now ambulating 50 feet with a rolling walker in PT with contact-guard to min assist, and needs max assist for lower body bathing and dressing. Patient lives at home with his wife in a 1 level house. Patient is allowed to eat PO with speech therapy during therapeutic feeding. Patient just feels overall weakness due to long-term bedrest from all of his medical problems over the last month. We need to improve his endurance, arm and leg strength, and his stamina so he can return home safely. He will be limited by his obesity since he is over 300 pounds. 8/30/2022 MBSS completed : Oropharyngeal Dysphagia with high aspiration risk. Oropharyngeal dysphagia characterized by reduced lingual coordination for bolus control and A-P oral transit; delayed initiation of the swallow; reduced laryngeal rom; reduced tongue base retraction and reduced sensation to aspiration/residue. Symptoms: Mastication was not assessed due to severity of pharyngeal phase deficits. Premature loss to pharynx ; gradual pooling to pharynx. Mildly thick and moderately thick penetration/aspiration prior to swallow; no cough response. Pooling of puree to valleculae with post swallow valleculae residue with difficulty clearing with clearance swallow and poor sensation of residue. Compensatory strategies: Inconsistent ability to achieve a volitional cough which was weak;  Inconsistent Diagnosis: Oral Motor Speech /Voice characterized by hoarse / rough / raspy voice quality which can impact intelligibility of connected speech. Pt is s/p prolonged intubation and then brief intubation post a surgical procedure. Pt reporting some improvement. Will initiate functional voice ex and respiratory control ex ; and trial of ex reducing laryngeal tension and assessment improvement. If persistent deficits ; potential need for referral to ENT for assessment of VC function    4. Communication Diagnosis: Pt is demonstrating functional concrete and mild complex auditory and visual language processing and verbal expressive language skills. Will assess moderately complex as related to adv DL tasks related to higher level executive function       Recommended Diet:   Npo; PEG          Strategies:    Oral care; ice chips      Plan:    Recommendations  Requires SLP Intervention: Yes  Therapy  Requires SLP Intervention: Yes  Duration of Treatment: 5 days a week while on rehab; anticipate will  need additional therapy at d/c  Oral motor speech/voice; cognitive-communication remediation; dysphagia therapy including direct po trials ; lingual ex;tongue base retraction ex; laryngeal/pharyngeal ex. Dysphagia Goals  Dysphagia Goals: Pt goal is to eat and drink and need the tube for feeding  1.  The patient will improve oral preparation phase via bolus control/manipulation exercises to 5/5 each trial., The patient will tolerate instrumental swallowing procedure (Pt will demonstrate improved swallow response via lingual ex; tongue base retraction ex; laryngeal / pharyngeal ex for toleration of 4 ounce servings of 1-3 consistencies with compensatory swallow strategies without overt clinical s/s of aspiration)Pt will demonstrate improved swallow response via lingual ex; tongue base retraction ex; laryngeal / pharyngeal ex for toleration of 4 ounce servings of 1-3 consistencies with compensatory swallow strategies without overt clinical s/s of aspiration  2. Pt will demonstrate improved oral preparatory phase of the swallow via bolus control/manipulation ex  3. Pt will tolerate repeat instrumental swallow procedure  4. Pt will demonstrate understanding of compensatory swallow strategies; dysphagia/aspiration s/s  Speech and Language Goals   Pt's goal is to return home; eat/drink; care for self and participate in home / health responsibilities  Goal 1: Pt will participate in continued assessment of higher level cognitive-linguistic skills for adv DL situations which would require higher level executive function  Goal 2: Pt will demonstrate improved recall of new information; learned compensatory strategies or medical care procedures and new information with use of compensatory strategies; use of memory strategies with set up and <mild assist  Goal 3: Pt will demonstrate functional VPS/PS and reasoning for graded functional PS/VPS/numeric reasoning tasks with < mild assist    Reviewed all goals above: Pt voiced agreement and participation      Prognosis   Good guarded due to medical DX;medical co-morbidities; and severity of pharyngeal dysphagia and reports of esophageal dysphagia       Education   Pt ed in findings/recommendations  Patient Education Response: Verbalizes understanding, Needs reinforcement      Discharge Recommendations:  Pt will benefit from continued skilled Speech Therapy for Speech and Dysphagia services, prior to returning home.         Objective:  ASSESSMENT: Included Clinical Evaluation of Swallowing; Oral Motor Speech Evaluation and Cognitive-Linguistic Assessment  Pain: denied  Vision  Vision: Impaired (functional with reading)  Vision Exceptions: Wears glasses for reading  Hearing  Hearing: Within functional limits    Oral Motor   Labial:  (minimal left labial/buccal weakness ; volitional rom fairly symmetrical)  Lingual:  (slight deviation on protrusion/elevation; otherwise volitional rom is moderate; weakness on attempts to lateralize left against resistance)  Velum:  (slight deviation during phonation of /a/)  Gag:  (diminished but present)    Motor Speech  Intelligibility:  (Audible and intelligible connected speech. hoarse voice quality with concern for potential post intubation residual hoarse/rough/raspy voice quality.  Ongoing treatment with potential need for ENT referral if persists to assess VC function)    Auditory Comprehension  Comprehension: Within Functional Limits  Basic Questions: WFL  One Step Commands: WFL  Two Step Commands: WFL  Multi step Commands: WFL (3 step commands)  Complex/Abstract Commands: WFL (2 step sequential commands; spatial commands)  L/R Discrimination: WFL  Conversation: WFL  Reading Comprehension  Reading Status: Within functional limits  Phrases Impairment Severity: WFL  Sentence Impairment Severity: [facility]  Paragraph Impairment Severity:  (WFL for 4-7 line paragraph information; concrete and mild complex)  Effective Techniques:  (pt's reading glasses)  Expression  Primary Mode of Expression: Verbal  Verbal Expression  Verbal Expression: Within functional limits (WFL for expressing needs/wants; concept explanations (concrete and mild complex)  Written Expression  Dominant Hand: Right  Written Expression:  (TBA)     Pragmatics/Social Functioning  Pragmatics: Within functional limits     Cognition  Orientation  Overall Orientation Status: Within Functional Limits (oriented to place; date; situation)  Attention  Attention: Exceptions to [facility]  Alternating Attention: Moderate  Divided Attention: Moderate  Selective Attention: WFL  Sustained Attention: WFL  Memory  Memory: Exceptions to [facility]  Daily Routines:  (TBA)  Short-term Memory:  (0 to moderate deficits across task trials)  Working Memory:  (0 to moderate deficits across task trials)  Problem Solving  Problem Solving: Exceptions to [facility]  Simple Functional Tasks: [facility]  Executive Function Skills:  (TBA)  Numeric Reasoning  Numeric Reasoning: Exceptions to [facility]   Calculations: [facility]  Money Management:  (TBA)  Time:  (TBA)  Abstract Reasoning  Abstract Reasoning: Exceptions to [facility]  Convergent Thinking: Mild (breakdown with abstraction)  Divergent Thinking: Mild (breakdown with abstraction)  Safety/Judgment  Safety/Judgment:  (TBA)    Pre Feeding Oral Motor Mechanism Exam  Labial:  (minimal left labial/buccal weakness ; volitional rom fairly symmetrical)  Lingual:  (slight deviation on protrusion/eelvation; otherwise volitional rom is moderate; weakness on attempts to lateralize left against resistance)  Velum:  (slight deviation during phonation of /a/)  Gag:  (diminished but present)  Voice: hoarse/rough/raspy quality  Volitional swallow: delayed and potential reduced laryngeal rom  Volitional cough: weak    PO Trials  Consistency Presented: Pureed;Thin;Mildly Thick; Moderately Thick    Dysphagia Outcome Severity Scale: Level 1: Severe dysphagia- NPO. Unable to tolerate any PO safely      Oral Phase Dysfunction  Oral Phase  Oral Phase: Exceptions  Oral Phase  Oral Phase - Comment: Persistent concern for lingual coordination deficits which can impact bolus control. Mastication was not assessed due to recent history/finding of pharyngeal phase deficits. Anticipate will need repeat MBSS     Indicators of Pharyngeal Phase Dysfunction   Pharyngeal Phase   Pharyngeal Phase: Persistent concern for delayed initiation of swallow; potential residual reduced laryngeal rom and pharyngeal peristalsis. Pt with intermittent throat clearing post swallow;multiple swallows per one presentation. Anticipate will need repeat MBSS during this admit. Please accept this as Speech Therapy Discharge status, if pt is discharged prior to next therapy session.     Timed Code Treatment:  Time In: 3452  SLP Individual Minutes  Time In: 0815  Time Out: 0900  Minutes: 45  Total Treatment Time: 45 minutes for Clinical Evaluation of Swallowing and for SLP Evaluation       SIGNED:   Toan Rush GueroMS,CCC,SLP [id number]  Speech and Language Pathologist  9/14/2022 at 1120 am

## 2022-09-14 NOTE — PLAN OF CARE
Problem: Discharge Planning  Goal: Discharge to home or other facility with appropriate resources  9/14/2022 1506 by Javy Brooke RN  Outcome: Progressing  Flowsheets (Taken 9/14/2022 1506)  Discharge to home or other facility with appropriate resources:   Identify barriers to discharge with patient and caregiver   Identify discharge learning needs (meds, wound care, etc)   Refer to discharge planning if patient needs post-hospital services based on physician order or complex needs related to functional status, cognitive ability or social support system  9/14/2022 0532 by Nikki Heimlich, RN  Outcome: Progressing     Problem: Safety - Adult  Goal: Free from fall injury  9/14/2022 1506 by Javy Brooke RN  Outcome: Progressing  Flowsheets (Taken 9/14/2022 1506)  Free From Fall Injury: Instruct family/caregiver on patient safety  9/14/2022 0532 by Nikki Heimlich, RN  Outcome: Progressing  Flowsheets  Taken 9/14/2022 0532  Free From Fall Injury:   Instruct family/caregiver on patient safety   Based on caregiver fall risk screen, instruct family/caregiver to ask for assistance with transferring infant if caregiver noted to have fall risk factors  Taken 9/14/2022 0528  Free From Fall Injury: Instruct family/caregiver on patient safety  Note: Pt educated on falls precautions and safety. Call light and personal belongings within reach at all times. Non skid socks in use when up. Hourly rounding and alarms active.        Problem: ABCDS Injury Assessment  Goal: Absence of physical injury  9/14/2022 1506 by Javy Brooke RN  Outcome: Progressing  Flowsheets (Taken 9/14/2022 1506)  Absence of Physical Injury: Implement safety measures based on patient assessment  9/14/2022 0532 by Nikki Heimlich, RN  Outcome: Progressing  Flowsheets (Taken 9/14/2022 0528)  Absence of Physical Injury: Implement safety measures based on patient assessment     Problem: Pain  Goal: Verbalizes/displays adequate comfort level or baseline comfort level  Outcome: Progressing  Flowsheets (Taken 9/14/2022 1506)  Verbalizes/displays adequate comfort level or baseline comfort level:   Encourage patient to monitor pain and request assistance   Assess pain using appropriate pain scale   Administer analgesics based on type and severity of pain and evaluate response     Problem: Nutrition Deficit:  Goal: Optimize nutritional status  9/14/2022 1506 by Marcelo Gonzalez RN  Outcome: Progressing  9/14/2022 1044 by Sharonda Balderas RD, LD  Outcome: Progressing

## 2022-09-14 NOTE — PROGRESS NOTES
Patient admitted to rehab with critical illness myopathy. A/Ox4. Transfers with gait belt and walker x1 assist. Mobility restrictions: none. On NPO, nocturnal tube feed, Pivot 1.5 diet, tolerating well. Medications taken via PEG tube. On lovenox for DVT prophylaxis. Skin: lap sites on abdomen closed with surgical glue - CDI, PEG site - CDI. Oxygen: on 3L O2. LDA: PEG tube, PIV in left hand . Has been continent of bowel and continent of bladder. LBM 9/14. Chair/bed alarms in use and call light in reach. Will monitor for safety.

## 2022-09-14 NOTE — PROGRESS NOTES
Patient admitted to room 3255 per [facility]. Transferred to bed via stretcher with maxi slide. Patient was oriented to the Call Light, Phone, TV, Thermostat, Bed Controls, Bathroom and Emergency Cord. Patient verbalized and demonstrated understanding of all. Patient was also given an over view of Unit Routines for Acute Rehab, including what to wear for therapy. The patient's role in goal setting was reviewed along with an explanation of the Interdisciplinary Team meeting, the 's role in coordinating services and the Discharge Planning/Continuum of Care process. Patient Rights and Responsibilities were reviewed. Meal times were explained, including how to order food. The white board (used for communication) was pointed out emphasizing  the 3 hours/day Therapy Schedule (posted most evenings), the number (and process) for reporting grievances, and the Doctor's, Nurse's, and PCA's names. All safety precautions in place including nonskid socks on feet, bed exit alarm on, phones and call light in reach, will continue to monitor.

## 2022-09-14 NOTE — PROGRESS NOTES
Resting quietly in bed. Pt admitted with debility s/p hernia repair and respiratory failure. Is oriented x4. Transferring with a walker x1. Lungs clear but diminished. On 3L O2, not home dependent. HR regular. Abdomen non tender with active bowel sounds. PEG tube in place to LUQ. TF infusing and 100ml/hr per order, pt tolerating well. Pt is NPO , declined mouth care or mouth swabs at this time. Has been continent of urine. C/o generalzied arthritic pain and medicated per routine orders. Encouraged to call for all needs, call light remains in reach at all times.  Troy Mcknight RN

## 2022-09-14 NOTE — PLAN OF CARE
Problem: Safety - Adult  Goal: Free from fall injury  Outcome: Progressing  Flowsheets  Taken 9/14/2022 0532  Free From Fall Injury:   Instruct family/caregiver on patient safety   Based on caregiver fall risk screen, instruct family/caregiver to ask for assistance with transferring infant if caregiver noted to have fall risk factors  Taken 9/14/2022 0560  Free From Fall Injury: Instruct family/caregiver on patient safety  Note: Pt educated on falls precautions and safety. Call light and personal belongings within reach at all times. Non skid socks in use when up. Hourly rounding and alarms active.

## 2022-09-14 NOTE — H&P
Department of [facility] Marylee Chestnut History & Physical      Patient Identification:  Maki Rivera  : 1960  Admit date: 2022  Dictation date: 22   Attending provider: Charleen Rene MD        Primary care provider: Smooth Tracy MD       Chief Complaint:   Patient Active Problem List   Diagnosis    Partial bowel obstruction ([address]    Hiatal hernia    Acute respiratory failure with hypoxia and hypercapnia (HCC)    SBO (small bowel obstruction) (HCC)    Aspiration into airway    Acute respiratory failure with hypoxia ([address]    Small bowel obstruction (HCC)    Diastolic heart failure (HCC)    History of atrial fibrillation    Supraventricular tachycardia ([address]    Arterial hypotension    Aspiration pneumonia ([address]    Morbid obesity with BMI of 50.0-59.9, adult ([address]    Acute delirium    EARNESTINE (obstructive sleep apnea)    Dysphagia    Critical illness myopathy       History of Present Illness/Hospital Course:  Patient is a 63 y.o. male hospitalized on 2022   for small bowel obstruction, pneumonia, septic shock patient intubation, mechanical ventilation, broad-spectrum antibiotics s intubated/extubated 2020 through 2022. On 2022, EGD done for endoscopically placed NG tube placement. On 2022, EGD with endoscopic placement of NG tube. On 2022 underwent laparoscopic reduction of large hiatal hernia with gastropexy, placement of gastrostomy tube. On 2022, extubated after the above procedure. He had the diagnosis of critical illness myopathy, and was discharged from our hospital to an LTAC unit for further IV antibiotic therapy and treatment. Patient now has improved, and is admitted back to our rehabilitation unit to progress with his strengthening of his arms and legs and improving his endurance before discharge back home. Patient also has diagnosis of atrial fibrillation, heart failure, dysphagia, sleep apnea, HTN, and rheumatoid arthritis. Patient is now ambulating 50 feet with a rolling walker in PT with contact-guard to min assist, and needs max assist for lower body bathing and dressing. Patient lives at home with his wife in a 1 level house. Patient is allowed to eat PO with speech therapy during therapeutic feeding. Patient just feels overall weakness due to long-term bedrest from all of his medical problems over the last month. We need to improve his endurance, arm and leg strength, and his stamina so he can return home safely. He will be limited by his obesity since he is over 300 pounds. Prior Level of Function:  Independent in self care and ADLs prior to admission. Current Level of Function:  PT:  contact-guard to min assist for transfers and gait    OT:   needs mod to max assist for lower body bathing and dressing. SLP:   Patient is allowed to eat PO with speech therapy during therapeutic feeding. has a past medical history of Arthritis, Atrial fibrillation ([address] and Hypertension. reports that he has never smoked. He has never used smokeless tobacco. He reports that he does not currently use alcohol. He reports that he does not use drugs. family history is not on file. Prior to Admission medications    Medication Sig Start Date End Date Taking? Authorizing Provider   celecoxib (CELEBREX) 200 MG capsule Take 200 mg by mouth 2 times daily 1/12/22  Yes Historical Provider, MD   gabapentin (NEURONTIN) 400 MG capsule Take 400 mg by mouth 3 times daily.    Yes Historical Provider, MD   amLODIPine (NORVASC) 5 MG tablet Take 1 tablet by mouth daily 9/7/22   Kathleen Gasca MD   pantoprazole (PROTONIX) 40 MG tablet Take 1 tablet by mouth daily 9/7/22   Kathleen Gasca MD   polyethylene glycol (GLYCOLAX) 17 g packet Take 17 g by mouth daily as needed for Constipation 9/7/22 10/7/22  Kathleen Gasca MD   torsemide (DEMADEX) 20 MG tablet Take 1 tablet by mouth daily 9/8/22   Kathleen Gasca MD   folic acid (Rexene Prabhakar) 1 MG tablet Take 1 mg by mouth in the morning. 3/21/22   Historical Provider, MD   ASPIRIN LOW DOSE 81 MG EC tablet Take 81 mg by mouth daily 6/13/22   Historical Provider, MD   acetaminophen (TYLENOL) 500 MG tablet Take 1,000 mg by mouth every 8 hours as needed 8/3/22   Historical Provider, MD   levothyroxine (SYNTHROID) 200 MCG tablet Take 200 mcg by mouth every morning 6/1/22   Historical Provider, MD   loratadine (CLARITIN) 10 MG tablet Take 10 mg by mouth in the morning. Historical Provider, MD   metoprolol tartrate (LOPRESSOR) 25 MG tablet Take 25 mg by mouth in the morning and 25 mg before bedtime. 7/1/22   Historical Provider, MD   spironolactone (ALDACTONE) 50 MG tablet Take 50 mg by mouth in the morning. 5/6/22 9/7/22  Historical Provider, MD         REVIEW OF SYSTEMS:   CONSTITUTIONAL: negative for fevers, chills, diaphoresis, activity change, appetite change, fatigue, night sweats, + morbid obesity  EYES: negative for blurred vision, eye discharge, visual disturbance and icterus. HEENT: negative for hearing loss, tinnitus, ear drainage, sinus pressure, nasal congestion, epistaxis and snoring. RESPIRATORY: Negative for hemoptysis, cough, sputum production. CARDIOVASCULAR: negative for chest pain, palpitations, exertional chest pressure/discomfort, edema, syncope. + A. Fib. CHF  GASTROINTESTINAL: negative for nausea, vomiting, diarrhea, constipation, blood in stool and abdominal pain. PEG in place. GENITOURINARY: negative for frequency, dysuria, urinary incontinence, decreased urine volume, and hematuria. HEMATOLOGIC/LYMPHATIC: negative for easy bruising, bleeding and lymphadenopathy. ALLERGIC/IMMUNOLOGIC: negative for recurrent infections, angioedema, anaphylaxis and drug reactions.    ENDOCRINE: negative for weight changes and diabetic symptoms including polyuria, polydipsia and polyphagia.  + Hypothyroid  MUSCULOSKELETAL: negative for pain, joint swelling, decreased range of motion and muscle weakness. + RA  NEUROLOGICAL: negative for headaches, slurred speech, unilateral weakness. PSYCHIATRIC/BEHAVIORAL: negative for hallucinations, behavioral problems, confusion and agitation. All pertinent positives are noted in the HPI. Physical Examination:  Vitals: Patient Vitals for the past 24 hrs:   BP Temp Temp src Pulse Resp SpO2 Height Weight   09/14/22 0459 135/83 97.8 °F (36.6 °C) Oral 80 18 92 % -- (!) 312 lb 13.3 oz (141.9 kg)   09/13/22 2048 127/86 97.5 °F (36.4 °C) Oral 87 20 92 % -- --   09/13/22 1554 -- -- -- -- -- -- 5' 7\" (1.702 m) (!) 317 lb 10.9 oz (144.1 kg)   09/13/22 1409 123/77 98.4 °F (36.9 °C) Oral 82 16 96 % -- --   09/13/22 1353 -- -- -- -- -- -- 5' 7\" (1.702 m) --       Psych: Stable mood, normal judgement, normal affect   Const: No distress  Eyes: Conjunctiva noninjected, no icterus noted; pupils equal, round, and reactive to light. HENT: Atraumatic, normocephalic; Oral mucosa moist  Neck: Trachea midline, neck supple. No thyromegaly noted. CV: Regular rate and rhythm, no murmur rub or gallop noted  Resp: Lungs clear to auscultation bilaterally, no rales wheezes or ronchi, no retractions. Respirations unlabored. GI: Soft, nontender, nondistended. Normal bowel sounds. No palpable masses. Neuro: Alert, oriented, appropriate. No cranial nerve deficits appreciated. Motor examination reveals normal strength in all four limbs diffusely. Skin: Normal temperature and turgor  MSK: No joint abnormalities noted. Ext: No significant edema appreciated. No varicosities.     Lab Results   Component Value Date    WBC 6.4 09/06/2022    HGB 12.0 (L) 09/06/2022    HCT 35.7 (L) 09/06/2022    MCV 93.0 09/06/2022     09/06/2022     Lab Results   Component Value Date    INR 1.33 (H) 08/06/2022    PROTIME 16.4 (H) 08/06/2022     Lab Results   Component Value Date    CREATININE 0.8 09/14/2022    BUN 17 09/14/2022     (L) 09/14/2022    K 4.5 09/14/2022    CL 99 09/14/2022    CO2 26 09/14/2022     Lab Results   Component Value Date    ALT 23 08/06/2022    AST 22 08/06/2022    ALKPHOS 72 08/06/2022    BILITOT 0.7 08/06/2022       CT HEAD WO CONTRAST    Result Date: 8/28/2022  EXAMINATION: CT OF THE HEAD WITHOUT CONTRAST  8/28/2022 1:08 am TECHNIQUE: CT of the head was performed without the administration of intravenous contrast. Automated exposure control, iterative reconstruction, and/or weight based adjustment of the mA/kV was utilized to reduce the radiation dose to as low as reasonably achievable. COMPARISON: None. HISTORY: ORDERING SYSTEM PROVIDED HISTORY: FALL TECHNOLOGIST PROVIDED HISTORY: Reason for exam:->FALL Has a \"code stroke\" or \"stroke alert\" been called? ->No Reason for Exam: FALL FINDINGS: BRAIN/VENTRICLES: There is no acute intracranial hemorrhage, mass effect or midline shift. No abnormal extra-axial fluid collection. The gray-white differentiation is maintained without evidence of an acute infarct. There is no evidence of hydrocephalus. ORBITS: The visualized portion of the orbits demonstrate no acute abnormality. SINUSES: Ethmoid, sphenoid partial opacification with mucosal thickening. Near complete opacification left maxillary sinus, left frontal sinus. Large bilateral mastoid effusions. Partially imaged enteric tube. SOFT TISSUES/SKULL:  No acute abnormality of the visualized skull or soft tissues. No acute intracranial abnormality. CT CHEST WO CONTRAST    Result Date: 8/21/2022  EXAMINATION: CT OF THE CHEST WITHOUT CONTRAST 8/21/2022 9:43 am TECHNIQUE: CT of the chest was performed without the administration of intravenous contrast. Multiplanar reformatted images are provided for review. Automated exposure control, iterative reconstruction, and/or weight based adjustment of the mA/kV was utilized to reduce the radiation dose to as low as reasonably achievable.  COMPARISON: 08/05/2022 HISTORY: ORDERING SYSTEM PROVIDED HISTORY: worsening  hypoxemia TECHNOLOGIST PROVIDED HISTORY: Reason for exam:->worsening  hypoxemia Reason for Exam: worsening hypoxemia FINDINGS: Mediastinum: Tip of ET tube seen in midthoracic trachea Small pericardial effusion is seen. Thickness measures approximately 8 mm. Large hiatal hernia is seen. Small mediastinal and hilar nodes are noted. Tip of PICC is in the region of the proximal SVC. Lungs/pleura: Respiratory motion limits evaluation of fine pulmonary parenchymal change. Small left-sided pleural effusion is seen. There is adjacent left basilar consolidation. There is adjacent consolidation of the left upper lobe. Septal thickening is seen Small right-sided pleural effusion is seen. There is adjacent right basilar consolidation. Septal thickening is seen on the right. No obstructing endobronchial lesions are seen Upper Abdomen: Adrenal glands unremarkable. Soft Tissues/Bones: Spurring is seen in the spine. Spurring is seen in the shoulder joints. Small bilateral pleural effusions with adjacent consolidation lungs, either due to atelectasis or pneumonia. Septal thickening is seen, suggesting a component of fluid overload. .  Pleural effusions and adjacent consolidation, is increased compared to prior     XR CHEST PORTABLE    Result Date: 9/7/2022  EXAMINATION: ONE XRAY VIEW OF THE CHEST 9/7/2022 12:40 pm COMPARISON: Chest 08/31/2022 HISTORY: ORDERING SYSTEM PROVIDED HISTORY: sob, hypoxia FINDINGS: The cardiac silhouette is enlarged. The mediastinal and hilar silhouettes appear unremarkable. Mild vascular engorgement with bilateral peribronchial cuffing and perivascular haziness. Sub pulmonic effusion evident. Low lung volumes. No pneumothorax is seen. No acute osseous abnormality is identified. Subtle changes typical of mild congestive heart failure. Suboptimal inspiration on the exam may account for some of these findings. Cardiomegaly.  Follow-up full inspiration PA and lateral chest may be useful for better characterization of pulmonary findings. XR CHEST PORTABLE    Result Date: 8/31/2022  EXAMINATION: ONE XRAY VIEW OF THE CHEST 8/31/2022 5:04 pm COMPARISON: None. HISTORY: ORDERING SYSTEM PROVIDED HISTORY: Intubation, ETT placement verification TECHNOLOGIST PROVIDED HISTORY: Reason for exam:->Intubation, ETT placement verification Reason for Exam: Intubation, ETT placement verification FINDINGS: There is an endotracheal tube with the tip 2 cm above the mady. There is a left-sided PICC line catheter with the tip at the junction of the left brachiocephalic vein and superior vena cava. Patchy airspace disease in the right lung. No pneumothorax or pleural effusion. Mild cardiomegaly. 1.  Endotracheal tube tip is 2 cm above the mady. Recommend retraction by 1.5 cm for optimal positioning. 2.  Patchy airspace disease in the right lung could represent edema versus atelectasis versus pneumonia. XR CHEST PORTABLE    Result Date: 8/28/2022  EXAMINATION: ONE XRAY VIEW OF THE CHEST 8/28/2022 9:34 am COMPARISON: Chest x-ray dated 08/27/2022 HISTORY: ORDERING SYSTEM PROVIDED HISTORY: respiratory failure TECHNOLOGIST PROVIDED HISTORY: Reason for exam:->respiratory failure Reason for Exam: respiratory failure;PICC FINDINGS: Enteric tube coursing through the chest below level of diaphragm; tip is not visualized. Right PICC tip is in the distal right internal jugular vein. Cardiomegaly. Pulmonary edema. No pneumothorax. Visualized osseous structures are unremarkable. Right PICC tip in the distal right internal jugular vein. Pulmonary edema. Findings were discussed with Jay Jay Traore at 11:21 am on 8/28/2022.      XR CHEST PORTABLE    Result Date: 8/27/2022  EXAMINATION: ONE XRAY VIEW OF THE CHEST 8/27/2022 3:29 am COMPARISON: 08/26/2022 HISTORY: ORDERING SYSTEM PROVIDED HISTORY: respiratory failure TECHNOLOGIST PROVIDED HISTORY: Reason for exam:->respiratory failure Reason for Exam: respiratory failure No new infiltrate is identified. Cardiomegaly with central vascular congestion though improved infiltrates in the lungs bilaterally. XR CHEST PORTABLE    Result Date: 8/24/2022  EXAMINATION: ONE XRAY VIEW OF THE CHEST 8/24/2022 5:15 am COMPARISON: 18 hours ago HISTORY: ORDERING SYSTEM PROVIDED HISTORY: respiratory failure TECHNOLOGIST PROVIDED HISTORY: Reason for exam:->respiratory failure Reason for Exam: respiratory failure FINDINGS: Endotracheal tube terminates 4 cm cephalad to the mady. Nasogastric tube has normal course, distal side port inferior to GE junction. Cardiac silhouette is globally prominent. There is left basilar homogeneous opacity, with indistinct cardiophrenic angle. There is diffuse hazy opacity over the right lung. Supportive tubing is in normal position. Bilateral pleural effusions. Disproportionate left basilar atelectasis, increased since yesterday. Superimposed pneumonia or aspiration pneumonitis are considerations. XR CHEST PORTABLE    Result Date: 8/23/2022  EXAMINATION: ONE XRAY VIEW OF THE CHEST 8/23/2022 10:47 am COMPARISON: Chest x-ray dated 19 August 2022 HISTORY: ORDERING SYSTEM PROVIDED HISTORY: respiratory failure TECHNOLOGIST PROVIDED HISTORY: Reason for exam:->respiratory failure Reason for Exam: respiratory failure FINDINGS: There is an endotracheal tube with the tip 4 cm above the mady. There is and enteric tube with tip and side-port in the stomach. There is a right-sided PICC line with the tip overlying the right subclavian vein. No pneumothorax. Stable cardiomediastinal silhouette. Small bilateral pleural effusions with adjacent basilar opacities. 1.  Small bilateral pleural effusions with adjacent basilar opacities which could represent atelectasis or pneumonia. 2.  Right-sided PICC line with the tip overlying the right subclavian vein. The tip appears kinked as well.      XR CHEST PORTABLE    Result Date: 8/19/2022  EXAMINATION: ONE XRAY VIEW OF THE CHEST 8/19/2022 9:43 am COMPARISON: 08/15/2022 radiograph HISTORY: ORDERING SYSTEM PROVIDED HISTORY: hypoxemia TECHNOLOGIST PROVIDED HISTORY: Reason for exam:->hypoxemia Reason for Exam: hypoxemia FINDINGS: Supportive devices are stable. The heart is enlarged. Mediastinum is normal.  Mild perihilar opacities persist centrally and there are mild bibasilar ground-glass opacities. Probable trace left pleural effusion. No significant skeletal finding. Relatively stable pattern of mild edema with no detrimental change. XR CHEST PORTABLE    Result Date: 8/15/2022  EXAMINATION: ONE XRAY VIEW OF THE CHEST 8/15/2022 11:13 am COMPARISON: Chest x-ray dated 14 August 2022 HISTORY: ORDERING SYSTEM PROVIDED HISTORY: OG placement TECHNOLOGIST PROVIDED HISTORY: Reason for exam:->OG placement Reason for Exam: og place FINDINGS: Orogastric tube with tip and side-port in the stomach. Endotracheal tube with the tip above the mady. Mild cardiomegaly. Stable appearance of perihilar opacities. Small left pleural effusion     1. Enteric tube tip and side-port in the stomach. 2.  Stable appearance of perihilar opacities. Small left pleural effusion     CT CHEST PULMONARY EMBOLISM W CONTRAST    Result Date: 8/24/2022  EXAMINATION: CTA OF THE CHEST 8/24/2022 11:02 am TECHNIQUE: CTA of the chest was performed after the administration of intravenous contrast.  Multiplanar reformatted images are provided for review. MIP images are provided for review. Automated exposure control, iterative reconstruction, and/or weight based adjustment of the mA/kV was utilized to reduce the radiation dose to as low as reasonably achievable. COMPARISON: 08/21/2022, 08/05/2022 HISTORY: ORDERING SYSTEM PROVIDED HISTORY: hypoxia, possible PE TECHNOLOGIST PROVIDED HISTORY: Reason for exam:->hypoxia, possible PE FINDINGS: Pulmonary Arteries: Pulmonary arteries are adequately opacified for evaluation.   No evidence of intraluminal filling defect to suggest pulmonary embolism. Main pulmonary artery is normal in caliber. Mediastinum: There is stable mild left subpectoral lymphadenopathy, similar to 08/05/2022, with the largest lymph node measuring approximately 10 mm in short axis. There is no pathologic axillary lymphadenopathy. There is no pathologic mediastinal or hilar lymphadenopathy. The intrathoracic aorta is unremarkable, without evidence of aneurysm or dissection. There has been interval decrease in size of the patient's small pericardial effusion in comparison with 08/21/2022. There is four-chamber cardiac enlargement. There is an enteric tube coursing through the esophagus. There is a moderate hiatal hernia. Lungs/pleura: There is an endotracheal tube in place with tip terminating approximately 3 cm above the mady. There is mild mucous plugging within the left lower lobe. The central airways are otherwise patent. There remain small bilateral pleural effusions, similar in comparison with the prior exam of 08/21/2022. However, there is a new element of mild loculation of pleural fluid along the paramediastinal aspect of the right upper lobe. There is persistent partial consolidation of both lower lobes, most likely passive atelectasis, less likely aspiration or pneumonia. There are reticulonodular foci of consolidative opacity within the right middle lobe and lingula, similar to prior exam, which could represent either atelectasis or additional foci of pneumonia. There is mild scattered ground-glass opacity throughout both lungs, likely an element of mild pulmonary edema. There is a stable 10 mm triangular nodule within the left upper lobe, image 158 of series 2, with an adjacent calcification along its medial margin. This is similar to the study of 08/05/2022. There is a stable small 3 mm nodule within the subpleural right middle lobe, image 40 of series 2. This is also unchanged from 08/05/2022.   No new suspicious pulmonary nodule or mass is identified. Upper Abdomen: Limited images of the upper abdomen are unremarkable. Soft Tissues/Bones: There is mild degenerative change throughout the thoracic spine. No osteolytic or osteoblastic lesion is seen. There is bilateral gynecomastia. 1. No CT evidence of a pulmonary embolism. 2. Findings most consistent with mild CHF, including cardiomegaly, small bilateral pleural effusions, and mild pulmonary edema. There is new partial loculation of the right pleural effusion along the paramediastinal aspect of the right upper lobe. 3. Persistent partial consolidation of the bilateral lower lobes, most likely passive atelectasis, less likely aspiration or pneumonia. 4. Persistent reticulonodular opacities within the right middle lobe and lingula, likely reflecting either additional atelectasis or pneumonia. 5. Multiple pulmonary nodules within both lungs, the largest 10 mm within the left upper lobe. While this likely reflects benign granulomatous disease, further follow-up of these nodules is as advised below. 6. Mild left subpectoral lymphadenopathy, of questionable clinical significance. 7. Interval decrease in size of the patient's small pericardial effusion. RECOMMENDATIONS: Fleischner Society guidelines for follow-up and management of incidentally detected pulmonary nodules: Multiple Solid Nodules: Nodule size greater than 8 mm In a low-risk patient, CT at 3-6 months, then consider CT at 18-24 months. In a high-risk patient, CT at 3-6 months, then CT at 18-24 months. Radiology 2017 http://pubs. rsna.org/doi/full/10.1148/radiol. [email or web address]     Fluoroscopy modified barium swallow with video    Result Date: 8/30/2022  EXAMINATION: MODIFIED BARIUM SWALLOW WAS PERFORMED IN CONJUNCTION WITH SPEECH PATHOLOGY SERVICES TECHNIQUE: Fluoroscopic evaluation of the swallowing mechanism was performed using cineradiography with multiple consistency of barium product in conjunction with speech pathology services. FLUOROSCOPY DOSE AND TYPE OR TIME AND EXPOSURES: 36.5 seconds of fluoroscopy was utilized. 1 fluoroscopic spot image was obtained. COMPARISON: None HISTORY: ORDERING SYSTEM PROVIDED HISTORY: dysphagia TECHNOLOGIST PROVIDED HISTORY: Reason for exam:->dysphagia Reason for Exam: dysphagia FINDINGS: Oral phase of swallowing was grossly within normal limits. There is sai laryngeal penetration and aspiration with both thin and honey consistencies. The exam was terminated due to the degree of aspiration. Positive for aspiration. Please see separate speech pathology report for full discussion of findings and recommendations. The above labs and diagnostic studies have been reviewed by myself upon admission to inpatient rehabilitation. Barriers to Discharge: Patient  has a past medical history of Arthritis, Atrial fibrillation ([address] and Hypertension. Pt lives at home with wife in a 1 level house. POST ADMISSION PHYSICIAN EVALUATION  The patient has agreed to being admitted to our comprehensive inpatient  rehabilitation facility consisting of at least 180 minutes of therapy a day,  5 out of 7 days a week. The patient/family has a good understanding of our discharge process. The  patient has potential to make improvement and is in need of at least two of  the following multidisciplinary therapies including but not limited to  physical, occupational, respiratory, and speech, nutritional services, wound care,   and prosthetics and orthotics. Given the patients complex condition  and risk of further medical complications, rehabilitation services cannot be  safely provided at a lower level of care such as a skilled nursing facility. I have compared the patients medical and functional status at the time of the  preadmission screening and the same on this date, and there are no significant changes.     By signing this document, I acknowledge that I have personally performed a  full physical examination on this patient within 24 hours of admission to  this inpatient rehabilitation facility and have determined the patient to be  able to tolerate the above course of treatment at an intensive level for a  reasonable period of time. I will be completing a detailed individualized  Plan of Care for this patient by day four of the patients stay based upon the  Preadmission Screen, this Post-Admission Evaluation, and the therapy  evaluations. Assessment and Plan:      Critical illness myopathy-  due to aspiration pneumonia, hiatal hernia repair, IV AB    atrial fibrillation, heart failure-  Demadex, ASA    Morbid Obesity, sleep apnea- over 300 lbs    HTN- Lopressor    rheumatoid arthritis- Celebrex    Hypothyroid- synthroid    Bowels: Schedule Miralax + Senna S. Follow bowel movements. Enema or suppository if needed. Bladder: Check PVR x 3. [facility] if PVR > 200ml or if any volume is > 500 ml. Pain: Tylenol is ordered prn.        Shamir Rosales MD, 9/14/2022, 10:38 AM

## 2022-09-14 NOTE — PROGRESS NOTES
Occupational Therapy  Facility/Department: [facility]  Rehabilitation Occupational Therapy Evaluation       Date: 22  Patient Name: Nelly Williamson       Room: A5T-0907/0222-03  MRN: [id number]  Account: [de-identified]   : 1960  (63 y.o.) Gender: male     Referring Practitioner: Dr. Erick David  Diagnosis: Critical illness myopathy  Additional Pertinent Hx: Small bowel obstruction, pneumonia, septic shock, afib, heart failure, HTN, rheumatoid athritis    Restrictions  Restrictions/Precautions: NPO;Fall Risk  Other position/activity restrictions: NPO, PEG tube, 3L O2 (not on O2 at home), IV L hand, CPAP at night    Subjective  Subjective: Met in Pt's room, he is agreeable to complete OT evaluation. No pain, \"some soreness\"          Vitals  Temp: 97.8 °F (36.6 °C)  Heart Rate: 98  Resp: 18  BP: 130/89  Height: 5' 7\" (170.2 cm)  Weight: (!) 312 lb 13.3 oz (141.9 kg)  BMI (Calculated): 49.1  Oxygen Therapy  SpO2: 92 %  Pulse Oximeter Device Mode: Intermittent  Pulse Oximeter Device Location: Finger  O2 Device: PAP (positive airway pressure)  O2 Flow Rate (L/min): 3 L/min  Level of Consciousness: Alert (0)    Objective  Vision - Basic Assessment  Prior Vision: Wears glasses only for reading  Visual History: No significant visual history  Patient Visual Report: No visual complaint reported.   Cognition  Overall Cognitive Status: WFL  Orientation  Overall Orientation Status: Within Normal Limits  Orientation Level: Oriented X4   Perception  Overall Perceptual Status: WFL  Sensation  Overall Sensation Status: WFL   ROM  LUE AROM (degrees)  LUE AROM : Exceptions  L Shoulder Flexion 0-180: limited flexion due to pain from RA, @100 degrees  RUE AROM (degrees)  RUE AROM : Exceptions  R Shoulder Flexion 0-180: limited flexion due to pain from RA, @110 degrees  LUE Strength  L Hand General: 3/5  LUE Strength Comment: increased pain with squeezing and making fist  RUE Strength  R Hand General: 4/5  Fine Motor Skills  Coordination  Movements Are Fluid And Coordinated: Yes  Coordination and Movement Description: Decreased speed       Hand Assessment  Hand Dominance  Hand Dominance: Right  Hand Assessment Comment  Hand Assessment Comment: bilateral hand weakness, increased pain in  L hand and shoulder due to RA  Functional Mobility  Balance  Sitting Balance: Independent  Standing Balance: Stand by assistance  Standing Balance  Time: @1 min  Activity: at RW prior to ambulating into bathroom  Comment: SBA for mild unsteadiness, poor endurance gets SOB easily  Transfers  Stand Step Transfers: Contact guard assistance  Sit to stand: Stand by assistance  Stand to sit: Stand by assistance  Shower Transfers  Shower - Transfer From: Brandon Stair - Transfer Type: To and From  Shower - Transfer To: Shower seat with back  Shower - Technique: Ambulating (using RW)  Shower Transfers: Contact Guard  Shower Transfers Comments: ambulated into bathroom using RW CGA, cues for holding GB and sitting on SC  Social/Functional History  Lives With: Spouse;Son (wife Jenelle Johnson, works outside the home but can work from home if needed, son Chastity Hernández lives with them as well, brother lives next door)  Type of Home: House  Home Layout: One level  Home Access:  (has a ramp entry)  Bathroom Shower/Tub: Walk-in shower; Tub/Shower unit (one is tub/shower, one is just shower (2 inch lip to enter), mainly uses shower)  Bathroom Toilet: Standard  Bathroom Equipment:  (none)  Bathroom Accessibility: Walker accessible  Home Equipment: Wheelchair-manual;Electric scooter;Walker, rolling  Has the patient had two or more falls in the past year or any fall with injury in the past year?: No  Receives Help From: Family  ADL Assistance: [facility]: Independent  Homemaking Responsibilities: Yes  Meal Prep Responsibility: Primary  Laundry Responsibility: Secondary  Bill Paying/Finance Responsibility: Primary  Ambulation Assistance: Independent  Active : Yes (wife can drive if needed)  Mode of Transportation: Car  Occupation: Retired  Type of Occupation:   Leisure & Hobbies: [address], lawn work  Additional Comments: Used electric scooter in 2016 when he pulled both quads off his kneecap due to work injury. On Celebrex and infusions every 6 weeks for rheumatoid arthritis pain  ADL  Equipment Provided: Reacher;Sock aid  Feeding: NPO  Feeding Skilled Clinical Factors: NPO, has PEG tube  Grooming: Modified independent   Grooming Skilled Clinical Factors: Seated at sink to complete grooming and oral care, required rest breaks between tasks due to SOB and poor endurance, able to open all caps and containers  UE Bathing: Minimal assistance  UE Bathing Skilled Clinical Factors: Seated on shower chair, S/OT setup [address] [place] [address], Pt able to bathe all areas however difficulty with L shoulder pain, assist to dry back  LE Bathing: Moderate assistance  LE Bathing Skilled Clinical Factors: Seated on shower chair, S/OT setup [address] [place] [address], educated on use of LHS, stood holding GB with min A to wash buttocks, A to dry BLE's  UE Dressing: Setup;Stand by assistance  UE Dressing Skilled Clinical Factors: Setup with tshirt after shower, Pt able to don and pull down in all areas, cues for management of oxygen cord through tshirt  LE Dressing: Minimal assistance  LE Dressing Skilled Clinical Factors: Pt stood at sink counter to don underwear and shorts, attempted to stand and place feet into underwear however Pt become SOB and dizzy in standing, educated on use of reacher, seated in wheelchair used reacher to thread underwear and shorts, stood min A holding sink counter to manage clothing fully up. Donned non skid socks using wide sock aid SBA  Toileting:  Moderate assistance  Toileting Skilled Clinical Factors: Completed toileting with nursing prior to session, reports he was unable to complete posterior hygiene but able to manage his hospital gown    Goals  Patient Goals Patient goals : \"Be able to eat normally, get up and moving to get stronger\"  Short Term Goals  Time Frame for Short term goals: Within 7 days the Pt will  Short Term Goal 1: Toileting with SBA using GB's prn  Short Term Goal 2: LB dressing with SBA using AE prn  Short Term Goal 3: Functional transfers using LRAD with SBA  Short Term Goal 4: Bathing seated on shower chair with CGA  Short Term Goal 5: Improved endurance to complete standing balance task for 3min SBA in order to complete ADL and IADL tasks in standing  Long Term Goals  Time Frame for Long term goals : Within 10-14 days  Long Term Goal 1: Toileting mod I using GB's prn  Long Term Goal 2: LB dressing mod I using AE prn  Long Term Goal 3: Functional transfers using LRAD mod I  Long Term Goal 4: Bathing seated on shower chair mod I  Long Term Goal 5: Improved endurance to complete standing balance task for 5min mod I in order to complete ADL and IADL tasks in standing  Additional Goals?: Yes  Long Term Goal 6: Determine DME needs and complete family education prior to discharge    Assessment  Performance deficits / Impairments: Decreased functional mobility ; Decreased balance;Decreased ADL status; Decreased ROM; Decreased endurance;Decreased strength;Decreased high-level IADLs  Assessment: Bong Holman is a 63 y.o. male who was hospitalized on 8/5 for a small bowel obstruction, pneumonia, and septic shock. On 8/22 he had NG tube placement, 8/31 he had hiatial hernia repair and gastrostomy tube, and was extubated on 9/2. He was admitted to [facility] for further IV antibiotic therapy and has now improved and is admitted to ARU to improve his strength and endurance. PTA he was independent in all ADL's and ambulated without device. He is now functioning well below his baseline requiring up to mod A with ADL's and ambulating with RW. He would benefit from skilled therapy services x10-14 days to improve his ADL's and endurance prior to being discharged.   Treatment Diagnosis: Decreased endurance and ADL status due to critical illness myopathy  Prognosis: Good  Decision Making: Medium Complexity  History: Patient is a 63 y.o. male hospitalized on 8/5/2022   for small bowel obstruction, pneumonia, septic shock patient intubation, mechanical ventilation, broad-spectrum antibiotics s intubated/extubated 8/8/2020 through 8/26/2022. On 8/9/2022, EGD done for endoscopically placed NG tube placement. On 8/22/2022, EGD with endoscopic placement of NG tube. On 8/31/2022 underwent laparoscopic reduction of large hiatal hernia with gastropexy, placement of gastrostomy tube. On 9/2/2022, extubated after the above procedure. He had the diagnosis of critical illness myopathy, and was discharged from our hospital to an LTAC unit for further IV antibiotic therapy and treatment. Patient now has improved, and is admitted back to our rehabilitation unit to progress with his strengthening of his arms and legs and improving his endurance before discharge back home. Patient also has diagnosis of atrial fibrillation, heart failure, dysphagia, sleep apnea, HTN, and rheumatoid arthritis. Discharge Recommendations: Continue to assess pending progress; Patient would benefit from continued therapy after discharge  Plan  Times per Week: 5-7  Times per Day: Twice a day  Plan Weeks: x10-14 days  Specific Instructions for Next Treatment: standing balance, assess hand strength and fine motor skills  Current Treatment Recommendations: Strengthening;ROM; Functional mobility training; Endurance training;Patient/Caregiver education & training;Equipment evaluation, education, & procurement;Self-Care / ADL; Coordination training       Therapy Time   Individual Concurrent Group Co-treatment   Time In 0915         Time Out 0430         Minutes 90         Timed Code Treatment Minutes: 90 Minutes       [address] S/OT Nick Pi, OTR/L #[id number] provided direct supervision to student and concur with PN

## 2022-09-14 NOTE — PLAN OF CARE
[facility] PATIENT TREATMENT PLAN  [facility] [address]  [phone]    Maximus Mcqueen    : 1960  Acct #: [de-identified]  MRN: [id number]   PHYSICIAN:  Kade Ferguson MD  Primary Problem    Patient Active Problem List   Diagnosis    Partial bowel obstruction ([address]    Hiatal hernia    Acute respiratory failure with hypoxia and hypercapnia (HCC)    SBO (small bowel obstruction) (HCC)    Aspiration into airway    Acute respiratory failure with hypoxia (HCC)    Small bowel obstruction (HCC)    Diastolic heart failure (HCC)    History of atrial fibrillation    Supraventricular tachycardia (HCC)    Arterial hypotension    Aspiration pneumonia ([address]    Morbid obesity with BMI of 50.0-59.9, adult ([facility]    Acute delirium    EARNESTINE (obstructive sleep apnea)    Dysphagia    Critical illness myopathy       Rehabilitation Diagnosis:     Acute and chronic respiratory failure with hypercapnia [J96.22]  Critical illness myopathy [G72.81]       ADMIT DATE:2022    Patient Goals:  \"Be able to eat normally, get up and moving to get stronger\"    Admitting Impairments: Critical illness myopathy-  due to aspiration pneumonia, hiatal hernia repair, IV AB     atrial fibrillation, heart failure-  Demadex, ASA     Morbid Obesity, sleep apnea- over 300 lbs     HTN- Lopressor     rheumatoid arthritis- Celebrex     Hypothyroid- synthroid     Barriers: weakness, lives with wife not home 24 hours, Rheumatoid arthritis  Participation: good     CARE PLAN     NURSING:  Maximus Mcqueen while on this unit will:     [x] Be continent of bowel and bladder     [x] Have an adequate number of bowel movements  [x] Urinate with no urinary retention >300ml in bladder  [] Complete bladder protocol with quiroga removal  [x] Maintain O2 SATs at 92___%  [x] Have pain managed while on ARU       [] Be pain free by discharge   [x] Have no skin breakdown while on ARU  [] Have improved skin integrity via wound measurements  [x] Have no signs/symptoms of infection at the wound site  [x] Be free from injury during hospitalization   [x] Complete education with patient/family with understanding demonstrated for: Tube Feeds  [x] Adjustment   [x] Other:   Nursing interventions may include bowel/bladder training, education for medical assistive devices, medication education, O2 saturation management, energy conservation, stress management techniques, fall prevention, alarms protocol, seating and positioning, skin/wound care, pressure relief instruction,dressing changes,  infection protection, DVT prophylaxis, and/or assistance with in room safety with transfers to bed, toilet, wheelchair, shower as well as bathroom activities and hygiene. Patient/caregiver education for:   [x] Disease/sustained injury/management      [x] Medication Use   [] Surgical intervention   [x] Safety   [x] Body mechanics and or joint protection   [x] Health maintenance         PHYSICAL THERAPY:  Goals:                  Short Term Goals  Time Frame for Short term goals: 7 days  Short term goal 1: Complete ramp with RW CGA. Short term goal 2: Ambulate 150' with RW and CGA. Short term goal 3: Transfers, car and sit<>stand, with SBA. Short term goal 4: Complete all bed mobility mod I.            Long Term Goals  Time Frame for Long term goals : 10-14 days  Long term goal 1: Complete ramp with no AD mod I.  Long term goal 2: Ambulate 150' RW mod I.  Long term goal 3: Transfers, car and sit<>stand, with mod I. These goals were reviewed with this patient at the time of assessment and Maximus Mcqueen is in agreement. Plan of Care: Pt to be seen 90  mins per day for 5 day/week 2 weeks.                    Current Treatment Recommendations: Strengthening, Balance training, Functional mobility training, Transfer training, Stair training, Gait training, Therapeutic activities, Home exercise program, Endurance training, IADL training      OCCUPATIONAL THERAPY:  Goals:             Short Term Goals  Time Frame for Short term goals: Within 7 days the Pt will  Short Term Goal 1: Toileting with SBA using GB's prn  Short Term Goal 2: LB dressing with SBA using AE prn  Short Term Goal 3: Functional transfers using LRAD with SBA  Short Term Goal 4: Bathing seated on shower chair with CGA  Short Term Goal 5: Improved endurance to complete standing balance task for 3min SBA in order to complete ADL and IADL tasks in standing :  Long Term Goals  Time Frame for Long term goals : Within 10-14 days  Long Term Goal 1: Toileting mod I using GB's prn  Long Term Goal 2: LB dressing mod I using AE prn  Long Term Goal 3: Functional transfers using LRAD mod I  Long Term Goal 4: Bathing seated on shower chair mod I  Long Term Goal 5: Improved endurance to complete standing balance task for 5min mod I in order to complete ADL and IADL tasks in standing  Additional Goals?: Yes  Long Term Goal 6: Determine DME needs and complete family education prior to discharge : These goals were reviewed with this patient at the time of assessment and Gabriella Shook is in agreement    Plan of Care:  Pt to be seen 90   mins per day for 5-7 day/week x10-14 days. SPEECH THERAPY: Goals will be left blank if speech is not following this patient. Goals:                            Dysphagia Goals: Pt goal is to eat and drink and need the tube for feeding  1.  The patient will improve oral preparation phase via bolus control/manipulation exercises to 5/5 each trial., The patient will tolerate instrumental swallowing procedure (Pt will demonstrate improved swallow response via lingual ex; tongue base retraction ex; laryngeal / pharyngeal ex for toleration of 4 ounce servings of 1-3 consistencies with compensatory swallow strategies without overt clinical s/s of aspiration)Pt will demonstrate improved swallow response via lingual ex; tongue base retraction ex; laryngeal / pharyngeal ex for toleration of 4 ounce servings of 1-3 consistencies with compensatory swallow strategies without overt clinical s/s of aspiration  2. Pt will demonstrate improved oral preparatory phase of the swallow via bolus control/manipulation ex  3. Pt will tolerate repeat instrumental swallow procedure  4. Pt will demonstrate understanding of compensatory swallow strategies; dysphagia/aspiration s/s            Cognitive; Oral Motor Speech/Voice Goals  Goal 1: Pt will participate in continued assessment of higher level cognitive-lingujistic skills for adv DL situatios which would require higher level executive function  Goal 2: Pt will demosntrate improved recall of new information; learned compensatory strategies or medical care procedures and new information with use of compensatory strategies; use of memory strategies with set up and <mild assist  Goal 3: Pt will demosntrate functional VPS/PS and reasoning for graded functional PS/VPS/numeric reasoning tasks with < mild assist               These goals were reviewed with this patient at the time of assessment and Ary Farooq is in agreement    Plan of Care: Pt to be seen 45-60   mins per day for 5 day/week 2 weeks. CASE MANAGEMENT:  Goals:   Assist patient/family with discharge planning, patient/family counseling,   and coordination with insurance during ARU stay. Admission Period/Goal QIM CODES   QIM  Admit/Goal Score    Eating     Oral Hygiene     Toileting Hygiene     Shower/Bathe Self     Upper Body Dressing     Lower Body Dressing     Putting on/Taking off Footwear     Roll Left and Right     Sit to Lying     Lying to Sitting on Side of Bed     Sit to Stand     Chair/Bed to Chair Transfer     Toilet Transfer     Car Transfer     Walk 10 feet     Walk 50 feet with 2 Turns     Walk 150 feet with 2 turns     Walk 10 feet on Uneven Surfaces     1 Step     4 Steps     12 Steps     Picking up Object     Wheel 50 feet with 2 Turns   Type?      Wheel 150 feet with 2 Turns   Type? Bridgett Walker will be seen a minimum of 3 hours of therapy per day, a minimum of 5 out of 7 days per week. [] In this rare instance due to the nature of this patient's medical involvement, this patient will be seen 15 hours per week (900 minutes within a 7 day period). Treatments may include therapeutic exercises, gait training, neuromuscular re-ed, transfer training, community reintegration, bed mobility, w/c mobility and training, self care, home mgmt, cognitive training, energy conservation,dysphagia tx, speech/language/communication therapy, group therapy, and patient/family education. In addition, dietician/nutritionist may monitor calorie count as well as intake and collaboratively work with SLP on dietary upgrades. Neuropsychology/Psychology may evaluate and provide necessary support. Medical issues being managed closely and that require 24 hour availability of a physician:   [x] Swallowing Precautions  [] Bowel/Bladder Fx  [] Weight bearing precautions   [x] Wound Care    [x] Pain Mgmt   [x] Infection Protection   [x] DVT Prophylaxis   [x] Fall Precautions  [x] Fluid/Electrolyte/Nutrition Balance   [] Voice Protection   [] Respiratory  [] Other:    Medical Prognosis: [] Good  [x] Fair    [] Guarded   Total expected IRF days 11 days  Anticipated discharge destination:    [] Home Independently   [x] Home Modified Independent  [] Home with supervision    []SNF     [] Other                                           Physician anticipated functional outcomes:  Favian for functional mobility and ADLs     IPOC brief synthesis: Patient is a 63 y.o. male hospitalized on 8/5/2022   for small bowel obstruction, pneumonia, septic shock patient intubation, mechanical ventilation, broad-spectrum antibiotics s intubated/extubated 8/8/2020 through 8/26/2022. On 8/9/2022, EGD done for endoscopically placed NG tube placement. On 8/22/2022, EGD with endoscopic placement of NG tube.  On 8/31/2022 underwent laparoscopic reduction of large hiatal hernia with gastropexy, placement of gastrostomy tube. On 9/2/2022, extubated after the above procedure. He had the diagnosis of critical illness myopathy, and was discharged from our hospital to an LTAC unit for further IV antibiotic therapy and treatment. Patient now has improved, and is admitted back to our rehabilitation unit to progress with his strengthening of his arms and legs and improving his endurance before discharge back home. Patient also has diagnosis of atrial fibrillation, heart failure, dysphagia, sleep apnea, HTN, and rheumatoid arthritis. Patient is now ambulating 50 feet with a rolling walker in PT with contact-guard to min assist, and needs max assist for lower body bathing and dressing. Patient lives at home with his wife in a 1 level house. Patient is allowed to eat PO with speech therapy during therapeutic feeding. Patient just feels overall weakness due to long-term bedrest from all of his medical problems over the last month. We need to improve his endurance, arm and leg strength, and his stamina so he can return home safely. He will be limited by his obesity since he is over 300 pounds. I have reviewed this initial plan of care and agree with its contents:    Title   Name    Date    Time    Physician: Dr. Venkata Donovan, 9/14/22, 1 pm    [facility]     Case Management   [id number]    9/15/2022  2:57 PM      OT: Roberto Carlos HARDY/Gene LOUIS S/OT    PT:Electronically signed by Williams Arredondo on 9/14/22 at 1:38 PM EDT/Electronically signed by Wilber Aranda PT on 9/14/22 at 1:41 PM EDT     RN: PARESH Supervisor: Xiomara Paredes RN,CRRN    ST:  Naveen Hobbs. Flum,MS,CCC,SLP 1515 Speech and Language Pathologist signed 9/14/2022 at 1130 am          Other:

## 2022-09-14 NOTE — CONSULTS
Comprehensive Nutrition Assessment    Type and Reason for Visit:  Initial, Consult    Nutrition Recommendations/Plan:   Continue NPO  Pivot 1.5 at 100ml x 14 hours (5pm-7am)   Water flush 175ml every 4 hours     Malnutrition Assessment:  Malnutrition Status: At risk for malnutrition (Comment) (09/14/22 1036)    Context:  Acute Illness       Nutrition Assessment:    RD consult for new admission to rehab. s/p hernia repair. Admitted to rehab for debility. Wt hx fluctuates. Some wt loss suspected although difficult to assess d/t fluid shifts. Gtube in place for nutrition. Was receiving Jevity 1.5 previously although was not meeting protein needs. Switched TF formula to Pivot 1.5 to meet needs. EN x 14 hours (5pm-7am) for therapy during the day. Will monitor for tolerance to EN at goal rate. Nutrition Related Findings:    +BM 9/14. Wound Type: None       Current Nutrition Intake & Therapies:    Average Meal Intake: NPO  Average Supplements Intake: NPO  Diet NPO  ADULT TUBE FEEDING; PEG; Immune Enhancing; Cyclic; 304; 4:24 PM; 5:24 AM; 175; Q 4 hours  Current Tube Feeding (TF) Orders:  Feeding Route: PEG  Formula: Immune Enhancing  Schedule: Cyclic (x 14 hours (8IZ-4GD))  Feeding Regimen: Pivot 1.5 @ 100ml x 14 hours  Water Flushes: 175ml every 4 hours  Goal TF & Flush Orders Provides: Pivot 1.5 @ 100ml x 14 hours provides 1400ml TV, 2100 kcals, 131grams protein, and 1050ml free water. Anthropometric Measures:  Height: 5' 7\" (170.2 cm)  Ideal Body Weight (IBW): 148 lbs (67 kg)    Current Body Weight: 312 lb (141.5 kg), 210.8 % IBW.  Weight Source: Bed Scale  Current BMI (kg/m2): 48.9  Weight Adjustment For: No Adjustment  BMI Categories: Obese Class 3 (BMI 40.0 or greater)    Estimated Daily Nutrient Needs:  Energy Requirements Based On: Kcal/kg  Weight Used for Energy Requirements: Current  Energy (kcal/day): 5415-1398 (11-14kcal/140kg)  Weight Used for Protein Requirements: Ideal  Protein (g/day): 134 (2g/67kg)  Method Used for Fluid Requirements: 1 ml/kcal  Fluid (ml/day): 1 ml/kcal    Nutrition Diagnosis:   Inadequate oral intake related to inadequate protein-energy intake as evidenced by NPO or clear liquid status due to medical condition, nutrition support - enteral nutrition    Nutrition Interventions:   Food and/or Nutrient Delivery: Continue NPO, Continue Current Tube Feeding  Nutrition Education/Counseling: Education not indicated  Coordination of Nutrition Care: Continue to monitor while inpatient     Goals:  Goals:  Tolerate nutrition support at goal rate    Nutrition Monitoring and Evaluation:   Behavioral-Environmental Outcomes: None Identified  Food/Nutrient Intake Outcomes: Enteral Nutrition Intake/Tolerance  Physical Signs/Symptoms Outcomes: Biochemical Data, GI Status, Fluid Status or Edema, Meal Time Behavior, Skin, Weight    Discharge Planning:    Enteral Nutrition     Chitra Campoverde RD, LD  Contact: [phone]

## 2022-09-15 LAB
HCT VFR BLD CALC: 29.8 % (ref 40.5–52.5)
HEMOGLOBIN: 9.9 G/DL (ref 13.5–17.5)
MAGNESIUM: 1.9 MG/DL (ref 1.8–2.4)
MCH RBC QN AUTO: 30.4 PG (ref 26–34)
MCHC RBC AUTO-ENTMCNC: 33.3 G/DL (ref 31–36)
MCV RBC AUTO: 91.3 FL (ref 80–100)
PDW BLD-RTO: 17.3 % (ref 12.4–15.4)
PLATELET # BLD: 318 K/UL (ref 135–450)
PMV BLD AUTO: 7.6 FL (ref 5–10.5)
RBC # BLD: 3.27 M/UL (ref 4.2–5.9)
WBC # BLD: 4.6 K/UL (ref 4–11)

## 2022-09-15 PROCEDURE — 83735 ASSAY OF MAGNESIUM: CPT

## 2022-09-15 PROCEDURE — 6360000002 HC RX W HCPCS: Performed by: PHYSICAL MEDICINE & REHABILITATION

## 2022-09-15 PROCEDURE — 97530 THERAPEUTIC ACTIVITIES: CPT

## 2022-09-15 PROCEDURE — 36415 COLL VENOUS BLD VENIPUNCTURE: CPT

## 2022-09-15 PROCEDURE — 94761 N-INVAS EAR/PLS OXIMETRY MLT: CPT

## 2022-09-15 PROCEDURE — 6370000000 HC RX 637 (ALT 250 FOR IP): Performed by: PHYSICAL MEDICINE & REHABILITATION

## 2022-09-15 PROCEDURE — 94660 CPAP INITIATION&MGMT: CPT

## 2022-09-15 PROCEDURE — 97535 SELF CARE MNGMENT TRAINING: CPT

## 2022-09-15 PROCEDURE — 2700000000 HC OXYGEN THERAPY PER DAY

## 2022-09-15 PROCEDURE — 92507 TX SP LANG VOICE COMM INDIV: CPT

## 2022-09-15 PROCEDURE — 97110 THERAPEUTIC EXERCISES: CPT

## 2022-09-15 PROCEDURE — 92526 ORAL FUNCTION THERAPY: CPT

## 2022-09-15 PROCEDURE — 97129 THER IVNTJ 1ST 15 MIN: CPT

## 2022-09-15 PROCEDURE — 97116 GAIT TRAINING THERAPY: CPT

## 2022-09-15 PROCEDURE — 85027 COMPLETE CBC AUTOMATED: CPT

## 2022-09-15 PROCEDURE — 1280000000 HC REHAB R&B

## 2022-09-15 PROCEDURE — 2580000003 HC RX 258: Performed by: PHYSICAL MEDICINE & REHABILITATION

## 2022-09-15 RX ADMIN — LINEZOLID 600 MG: 600 TABLET, FILM COATED ORAL at 21:31

## 2022-09-15 RX ADMIN — METOPROLOL TARTRATE 50 MG: 50 TABLET ORAL at 21:31

## 2022-09-15 RX ADMIN — ASPIRIN 81 MG: 81 TABLET, COATED ORAL at 10:38

## 2022-09-15 RX ADMIN — PIPERACILLIN AND TAZOBACTAM 3375 MG: 3; .375 INJECTION, POWDER, LYOPHILIZED, FOR SOLUTION INTRAVENOUS at 21:42

## 2022-09-15 RX ADMIN — GABAPENTIN 400 MG: 400 CAPSULE ORAL at 16:39

## 2022-09-15 RX ADMIN — CELECOXIB 200 MG: 200 CAPSULE ORAL at 21:31

## 2022-09-15 RX ADMIN — Medication 1 TABLET: at 10:37

## 2022-09-15 RX ADMIN — LINEZOLID 600 MG: 600 TABLET, FILM COATED ORAL at 10:39

## 2022-09-15 RX ADMIN — ENOXAPARIN SODIUM 30 MG: 100 INJECTION SUBCUTANEOUS at 21:30

## 2022-09-15 RX ADMIN — TORSEMIDE 20 MG: 20 TABLET ORAL at 10:38

## 2022-09-15 RX ADMIN — GABAPENTIN 400 MG: 400 CAPSULE ORAL at 21:31

## 2022-09-15 RX ADMIN — LEVOTHYROXINE SODIUM 200 MCG: 0.1 TABLET ORAL at 10:37

## 2022-09-15 RX ADMIN — PIPERACILLIN AND TAZOBACTAM 3375 MG: 3; .375 INJECTION, POWDER, LYOPHILIZED, FOR SOLUTION INTRAVENOUS at 13:07

## 2022-09-15 RX ADMIN — GABAPENTIN 400 MG: 400 CAPSULE ORAL at 10:37

## 2022-09-15 RX ADMIN — Medication 100 MG: at 10:39

## 2022-09-15 RX ADMIN — POTASSIUM BICARBONATE 20 MEQ: 782 TABLET, EFFERVESCENT ORAL at 10:40

## 2022-09-15 RX ADMIN — Medication 30 MG: at 10:50

## 2022-09-15 RX ADMIN — METOPROLOL TARTRATE 50 MG: 50 TABLET ORAL at 10:39

## 2022-09-15 RX ADMIN — FOLIC ACID 1 MG: 1 TABLET ORAL at 10:38

## 2022-09-15 RX ADMIN — PIPERACILLIN AND TAZOBACTAM 3375 MG: 3; .375 INJECTION, POWDER, LYOPHILIZED, FOR SOLUTION INTRAVENOUS at 04:19

## 2022-09-15 RX ADMIN — ENOXAPARIN SODIUM 30 MG: 100 INJECTION SUBCUTANEOUS at 10:37

## 2022-09-15 RX ADMIN — CELECOXIB 200 MG: 200 CAPSULE ORAL at 10:38

## 2022-09-15 RX ADMIN — Medication 1 TABLET: at 21:30

## 2022-09-15 ASSESSMENT — PAIN SCALES - GENERAL
PAINLEVEL_OUTOF10: 4
PAINLEVEL_OUTOF10: 1

## 2022-09-15 ASSESSMENT — PAIN DESCRIPTION - DESCRIPTORS: DESCRIPTORS: SHARP

## 2022-09-15 ASSESSMENT — PAIN - FUNCTIONAL ASSESSMENT: PAIN_FUNCTIONAL_ASSESSMENT: ACTIVITIES ARE NOT PREVENTED

## 2022-09-15 ASSESSMENT — PAIN DESCRIPTION - PAIN TYPE: TYPE: CHRONIC PAIN

## 2022-09-15 ASSESSMENT — PAIN DESCRIPTION - LOCATION: LOCATION: HAND

## 2022-09-15 ASSESSMENT — PAIN DESCRIPTION - ORIENTATION: ORIENTATION: LEFT

## 2022-09-15 ASSESSMENT — PAIN DESCRIPTION - FREQUENCY: FREQUENCY: INTERMITTENT

## 2022-09-15 ASSESSMENT — PAIN DESCRIPTION - ONSET: ONSET: ON-GOING

## 2022-09-15 NOTE — PLAN OF CARE
Problem: Safety - Adult  Goal: Free from fall injury  9/15/2022 0012 by Silva Lemos RN  Outcome: Progressing  Flowsheets (Taken 9/15/2022 0012)  Free From Fall Injury:   Instruct family/caregiver on patient safety   Based on caregiver fall risk screen, instruct family/caregiver to ask for assistance with transferring infant if caregiver noted to have fall risk factors  Note: Pt educated on falls precautions and safety. Call light and personal belongings within reach at all times. Non skid socks in use when up. Hourly rounding and alarms active. Problem: Pain  Goal: Verbalizes/displays adequate comfort level or baseline comfort level  9/15/2022 0012 by Silva Lemos RN  Outcome: Progressing  Flowsheets (Taken 9/15/2022 0012)  Verbalizes/displays adequate comfort level or baseline comfort level:   Administer analgesics based on type and severity of pain and evaluate response   Encourage patient to monitor pain and request assistance   Consider cultural and social influences on pain and pain management  Note: Able to rate pain using a 1-10 scale, medicated per prn orders, see MAR.  Able to verbalize a reduction in pain and/or able to fall asleep and remain asleep without any s/s of pain

## 2022-09-15 NOTE — PROGRESS NOTES
Resting quietly in bed. Pt admitted s/p complicated hospital course including respiratory failure, SBO, hernia repair, and PEG tube placement. Pt is oriented x4. Ambulating with a walker x1, tolerating well. Lungs clear but diminished throughout. On 3L O2, bipap at HS. HR regular. Abdomen non tender with active bowel sounds. Has been continent of urine. C/o generalized discomfort and medicated per routine orders. Pt is NPO with PEG tube in place. TF infusing at 100ml/hr per order. Declines mouth care. Encouraged to call for all needs. Call light remains in reach at all times.  Jose Juan Brown RN

## 2022-09-15 NOTE — PROGRESS NOTES
Occupational Therapy  OCCUPATIONAL THERAPY  Progress Note   Additional Second Session (due to making up time from medical variance)    Patient Name: Aneta Hadley Record Number: [id number]    Treatment Diagnosis: Acute hypoxemic and hypercapnic respiratory failure    General  Chart Reviewed: Yes  Patient assessed for rehabilitation services?: Yes  Additional Pertinent Hx: Small bowel obstruction, pneumonia, septic shock, afib, heart failure, HTN, rheumatoid athritis  Referring Practitioner: Dr. Sofia Alves  Diagnosis: Critical illness myopathy     Restrictions/Precautions  Restrictions/Precautions: NPO, Fall Risk        Position Activity Restriction  Other position/activity restrictions: NPO, PEG tube, 2L O2 (not on O2 at home), IV L hand, CPAP at night    Subjective: Met in therapy gym. No pain reported, some fatigue from physical therapy session but agreeable to complete. Objective: Pt demonstrated tub transfer using TTB, cues for sequencing of task and visual demonstration given, Pt able to lift his legs over tub ledge while seated on bench, SBA throughout, S/OT managed oxygen and IV pole. He ambulated into ADL suite using RW SBA and gathered button up shirt from closet, he transferred stand<>sit in wide green chair SBA with cues for reaching back with hands prior to sitting, seated he demonstrated buttoning shirt using BUE's, able to coordinate bilateral movements and facilitate fine motor skills to complete all buttons, no difficulty reported but some numbness in fingertips. Assessment: Pt was able to demonstrate a tub transfer using TTB, SBA throughout with S/OT managing oxygen and IV pole. He ambulated using RW SBA and gathered clothing from closet to demonstrate buttoning dress shirt, he was able to finish all buttons using BUE's without difficulty, some numbness reported during task. He continues to progress in his ability to complete self care tasks and functional mobility.  Continue w/ POC.    Safety Device - Type of devices:  []  All fall risk precautions in place [] Bed alarm in place  [] Call light within reach [] Chair alarm in place [] Positioning belt [x] Gait belt [x] Patient at risk for falls [] Left in bed [] Left in chair [] Telesitter in use [] Sitter present [] Nurse notified []  None      Therapy Time   Individual Co-treatment   Time In 1430     Time Out 1500     Minutes 30       Electronically signed by Laurie Poon on 9/15/2022 at 3:00 PM Lluvia Manzo OTR/L #[id number] provided direct supervision to student and concur with PN

## 2022-09-15 NOTE — CARE COORDINATION
Chart Reviewed. Met with patient to introduce  role. Completed ACP. SOCIAL WORK ASSESSMENT      GOAL:     To return home with wife and son      HOME SITUATION:  Pt , wife and son  live in a house with ramp entry from garage into house. It is a first floor set up with laundry in basement that he does not do. He does share in cooking, shopping, yard work and he oversees his own medications. He is active with pcp, Dr Mary Gonsalez. He does have mail in with [facility] or uses [facility] in [email or web address] for short script needs. PRIOR LEVEL OF FUNCTIONING:       PERSONAL CARE:   totally independent                                                                        DRIVES: yes                                                                     FINANCES:                                                                   MEALS:     does cooking for family                            GROCERY SHOPS: does grocery shopping      DME CURRENTLY AT HOME: cpap, wh walker, wheeclchair, two scooters. CURRENT HOME CARE/SERVICES:  none currently. Informed him of possible post acute services such as home care or out patient serv ices upon his discharge to continue his progress. Provided him with CMS star rated list of agencies for his review. PREFERRED HOME CARE:  To b e determined. TEAM CONFERENCE DAY:   Tuesdays. Informed him of weekly Team Conferences where Team will review his progress weekly, DME recommendations, DC date. This worker will return to give this update and plan for DC needs. LSW informed patient of preferred  time on date of discharge which is between 10 - 12 noon. LSW informed patient of recommendation for PCP visit within 7 days post discharge.       Nestor [place], [place]     Case Management   [phone]    9/15/2022  3:44 PM

## 2022-09-15 NOTE — PROGRESS NOTES
Speech Language Pathology  ACUTE REHAB UNIT  SPEECH/LANGUAGE PATHOLOGY      [x] Daily  [] Weekly Care Conference Note  [] Discharge    Patient:Miles Franz      [facility]:[id number]/45/[id number]  [facility]:[id number]  Rehab Dx/Hx: Acute and chronic respiratory failure with hypercapnia [J96.22]  Critical illness myopathy [G72.81]    Precautions: Fall risk; Dysphagia; PEG Tube feeding; Cognitive/Memory  Home situation: Lives with spouse  ST Dx: [] Aphasia  [] Dysarthria  [] Apraxia   [x] Oropharyngeal dysphagia [x] Cognitive   Impairment  [x] Other: Voice therapy post prolonged intubation and then re-intubation for surgical procedure  Initial Speech Therapy Assessment Diagnosis:   Speech Therapy Diagnosis  1. Cognitive Diagnosis: Pt demonstrated functional temporal and spatial orientation; concrete attention and VPS/PS and reasoning and math language. Pt with deficits in memory (working memory and STM ) and higher level attention (ie alternating and divided attention) on cogntive-linguistic testing. Will continue evaluation of higher level cognitive-linguistic skills as pt does participate in home/med management. Current deficits may impact. 2.Speech Diagnosis: Oral Motor Speech /Voice characterized by hoarse / Robel Compton / raspy voice quality which can impact intelligibility of connected speech. Pt is s/p prolonged intubation and then brief intubation post a surgical procedure. Pt reporting some improvement. Will initiate functional voice ex and respiratory control ex ; and trial of ex reducing laryngeal tension and assessment improvement. If persistent deficits ; potential need for referral to ENT for assessment of VC function  3. Communication Diagnosis: Pt is demonstrating functional concrete and mild complex auditory and visual language processing and verbal expressive language skills. Will assess moderately complex as related to adv DL tasks related to higher level executive function  4. Dysphagia diagnosis: Persistent concern for lingual coordination deficits which can impact bolus control (Mastication was not assessed due to recent history/finding of pharyngeal phase deficits); persistent concern for delayed initiation of swallow; potential residual reduced laryngeal rom and pharyngeal peristalsis. Pt with intermittent throat clearing post swallow;multiple swallows per one presentation. Anticipate will need repeat MBSS during this admit. Recommend : continue SLP therapy for Oropharyngeal Dysphagia; date TBD for repeat MBSS; ice chips :occasional /isolated; oral care. Date of Admit: 9/13/2022  Room #: I3U-7604/3255-01  Date: 9/15/2022       Current functional status (updated daily):         Current Diet Order:Diet NPO  ADULT TUBE FEEDING; PEG; Immune Enhancing; Cyclic; 642; 8:25 PM; 8:65 AM; 175; Q 4 hours   Behavior: [] Alert  [] Cooperative  []  Pleasant  [] Confused  [] Agitated  [] Uncooperative  [] Distractible [] Motivated  [] Self-Limiting [] Anxious  [] Other:  Endurance:  [] Adequate for participation in SLP sessions  [] Reduced overall  [] Lethargic  [] Other:  Safety: [] No concerns at this time  [] Reduced insight into deficits  []  Reduced safety awareness [] Not following call light procedures  [] Unable to Assess  [] Other:  Swallowing Precautions: PEG tube feeding restricitoins; oral care; ice chips  Barriers toward progress: good guarded; medical DX and course of medical co-mrobidities.  Has caregiver support/pt is motivated though           Date: 9/15/2022      Tx session 1 Tx session 2   Total Timed Code Min   SLP Individual Minutes  Time In: 0815  Time Out: 0900  Minutes: 39  Coded treatment time  12   N/a   Group Treatment Minutes 0 0   Co-Treat Minutes 0 0   Variance/Reason:  N/a    Pain Pt reports usual rheumatoid arthritis    Pain Intervention [] RN notified  [] Repositioned  [] Intervention offered and patient declined  [] N/A  [] Other: [] RN notified  [] Repositioned  [] Intervention offered and patient declined  [] N/A  [] Other:   Subjective     Pt seen in treatment office  Pt with good effort    Objective:  Goals         Dysphagia goals: Pt goals is to eat and drink and get rid of feeing tube    Therapy working toward pt goal emphasizing lingual/laryngeal/pharyngeal ex and therapy trials with compensatory swallow strategy training  N/a   The patient will improve oral preparation phase via bolus control/manipulation exercises to 5/5 each trial.,  Lingual rom stretch for protrusion/retraction; elevation; anterior to posterior lingual elevation for lingual/palatal seal: good effort with above ex . Pt achieving moderate rom but rate is reduced; fatigue as number of consecutive trials increases     N/a   The patient will tolerate instrumental swallowing procedure  TBD      Pt will demosntrate imrpoved swallow response via lingual ex; tongue base retraction ex; laryngeal / pharyngeal ex for toleration of 4 ounce servings of 1-3 consistencies with compensatory swallow strategies without overt clinical s/s of aspiration   Tongue base retraction ex    Laryngeal ex targeting  Glottals on expiratory effort: 3-5 resp on expiratory effort  Pitch swing low to high with falsetto hold: variable cessation of voice. Gradual improvement  Lorri: extra time      PO trials:   Ice chips x 5 targeting lingual hold/effortful swallow  Tsp thin H20 with lingual hold and modified supraglottic swallow: x 5 reps  Mildly/nectar thick via tsp with lingual hold and modified supraglottic swallow: x 5 reps  Moderately thick/honey thick via tsp with lingual hold and modified supraglottic swallow: x 5 reps  Puree via tsp with modified supraglottic swallow: x 5 reps    Pt declined problems.  Pt with 3 episodes of voice change and/or increase throat clearing N/a   Voice/cognitive-linguistic Goals: Pt's goal is tcare for self and participate in home / health responsibilities Working toward pt goal N/A     Goal 1: Pt will participate in continued assessment of higher level cognitive-lingujistic skills for adv DL situatios which would require higher level executive function     Targeted indirectly with working memory and recall of daily events N/A   Goal 2: Pt will demosntrate improved recall of new information; learned compensatory strategies or medical care procedures and new information with use of compensatory strategies; use of memory strategies with set up and <mild assist Working memory for rule application for 8-35 task trials: set up; conditiuoning and intermittent reminders    Working memory for alternating attention for 2-3 part rule application for 7-89 task trials: set up; conditioning to task and re-direct every 5 task trials    Functional recall of rehab events; therapies:   Recall of room number: extra time but IND  Recall of daily therapies 2/3 by label and/or by general activity  Recall of learned walker safety: via FC: >90%    Memory strategies for new information targeted using chunking/associaiton N/a   Goal 3: Pt will demosntrate functional VPS/PS and reasoning for graded functional PS/VPS/numeric reasoning tasks with < mild assist   Not targeted N/a   Goal 4: Pt will demonstrate improved voice quality and endurance for functional voice ex for 8 seconds and improved sustained voice quality at 4-8 syllable utterance level Targeted frontal focus and frontal phonemes for pitch swing low to high to low and for CVCV level: good response breath support (diaphragm/inhale through nose); frontal focus to reduced laryngeal tension; expiratory muscle engaggemtn for varied loudness or sustained duration of phonation N/a   Other areas targeted:     Education:   Ongoing pt ed    Safety Devices: [] Call light within reach  [] Chair alarm activated  [] Bed alarm activated  [x] Other: to therapy gym [] Call light within reach  [] Chair alarm activated  [] Bed alarm activated  [] Other:    Progress Assessment: 9/15/2022: CONTINUE ALL GOAL   Plan: Continue as per plan of care. Continued Tx Upon Discharge: ?    [x] Yes [] No [] TBD based on progress while on ARU [] Vital Stim indicated [] Other:   Estimated discharge date: TBD   Discharge recommendations:   [] Home independently  [x] Home with assistance []  24 hour supervision  [] ECF [] Other:     Additional information:     Interventions used during Rehab Stay:  [] Speech/Language Treatment  [] Instruction in HEP [] Group [x] Dysphagia Treatment [x] Cognitive Treatment   [x] Other:Voice TX          Electronically Signed by    Pooja Newberry. Alexandrea,MS,CCC,SLP [id number]  Speech and Language Pathologist

## 2022-09-15 NOTE — PROGRESS NOTES
Staciaa Files  9/15/2022  [id number]    Chief Complaint: Critical illness myopathy    Subjective: Patient seen this AM.  Patient getting started therapies, and knows he is very weak with low endurance. We need to improve his arm and leg strength, and his stamina. Repeat labs yesterday look good. ROS: No N/V, SOB, chest pain, C/F    Objective:  Patient Vitals for the past 24 hrs:   BP Temp Temp src Pulse Resp SpO2 Height Weight   09/15/22 0413 (!) 152/77 97.4 °F (36.3 °C) Oral 82 17 96 % -- (!) 314 lb 2.5 oz (142.5 kg)   09/14/22 2047 128/83 98.5 °F (36.9 °C) Oral (!) 105 17 95 % -- --   09/14/22 1748 123/85 97.5 °F (36.4 °C) Oral 81 16 94 % -- --   09/14/22 1042 130/89 -- -- 98 -- -- -- --   09/14/22 1037 -- -- -- -- -- -- 5' 7\" (1.702 m) --       Gen: No distress, pleasant. HEENT: Normocephalic, atraumatic. CV: Regular rate and rhythm. Resp: No respiratory distress. Abd: Soft, nontender, obese, PEG tube in place  Ext: No edema. Neuro: Alert, oriented, appropriately interactive.      Wt Readings from Last 3 Encounters:   09/15/22 (!) 314 lb 2.5 oz (142.5 kg)   09/07/22 (!) 309 lb 8.4 oz (140.4 kg)       Laboratory data:   Lab Results   Component Value Date    WBC 6.4 09/06/2022    HGB 12.0 (L) 09/06/2022    HCT 35.7 (L) 09/06/2022    MCV 93.0 09/06/2022     09/06/2022       Lab Results   Component Value Date/Time     09/14/2022 07:34 AM    K 4.5 09/14/2022 07:34 AM    CL 99 09/14/2022 07:34 AM    CO2 26 09/14/2022 07:34 AM    BUN 17 09/14/2022 07:34 AM    CREATININE 0.8 09/14/2022 07:34 AM    GLUCOSE 106 09/14/2022 07:34 AM    CALCIUM 7.9 09/14/2022 07:34 AM        Therapy progress:  PT  Position Activity Restriction  Other position/activity restrictions: NPO, PEG tube, 3L O2 (not on O2 at home), IV L hand, CPAP at night  Objective     Sit to Stand: Contact guard assistance (As pt became fatigued they would sit down more abruptly)  Stand to sit: Stand by assistance  Device: Rolling Walker  Assistance: Contact guard assistance  Distance: [de-identified]' on uneven surface (carpet at end of felipe) with 4 turns  OT  PT Equipment Recommendations  Other: Continue to assess pending progress          SLP  Current Diet :  (PEG; no oral po)   1. Dysphagia diagnosis: Persistent concern for lingual coordination deficits which can impact bolus control (Mastication was not assessed due to recent history/finding of pharyngeal phase deficits); persistent concern for delayed initiation of swallow; potential residual reduced laryngeal rom and pharyngeal peristalsis. Pt with intermittent throat clearing post swallow;multiple swallows per one presentation. Anticipate will need repeat MBSS during this admit. Jennifer Short Recommend : continue SLP therapy for Oropharyngeal Dysphagia; date TBD for repeat MBSS; ice chips :occasional /isolated; oral care. 2.  Cognitive Diagnosis: Pt demonstrated functional temporal and spatial orientation; concrete attention and VPS/PS and reasoning and math language. Pt with deficits in memory (working memory and STM ) and higher level attention (ie alternating and divided attention) on cognitive-linguistic testing. Will continue evaluation of higher level cognitive-linguistic skills as pt does participate in home/med management. Current deficits may impact. 3.  Speech Diagnosis: Oral Motor Speech /Voice characterized by hoarse / rough / raspy voice quality which can impact intelligibility of connected speech. Pt is s/p prolonged intubation and then brief intubation post a surgical procedure. Pt reporting some improvement. Will initiate functional voice ex and respiratory control ex ; and trial of ex reducing laryngeal tension and assessment improvement. If persistent deficits ; potential need for referral to ENT for assessment of VC function           Body mass index is 49.2 kg/m².        Assessment and Plan:        Critical illness myopathy-  due to aspiration pneumonia, hiatal hernia repair, IV AB     atrial fibrillation, heart failure-  Demadex, ASA     Morbid Obesity, sleep apnea- over 300 lbs     HTN- Lopressor     rheumatoid arthritis- Celebrex     Hypothyroid- synthroid     Bowels: Schedule Miralax + Senna S. Follow bowel movements. Enema or suppository if needed. Bladder: Check PVR x 3. [facility] if PVR > 200ml or if any volume is > 500 ml. Pain: Tylenol is ordered prn.          Shamir Rosales MD 9/15/2022, 7:21 AM

## 2022-09-15 NOTE — PROGRESS NOTES
Physical Therapy  Facility/Department: [address] [facility]HAB  Rehabilitation Physical Therapy Treatment Note    NAME: Koki Stewart  : 1960 (63 y.o.)  MRN: [id number]  CODE STATUS: Full Code    Date of Service: 9/15/22       Restrictions:  Restrictions/Precautions: NPO;Fall Risk  Position Activity Restriction  Other position/activity restrictions: NPO, PEG tube, 3L O2 (not on O2 at home), IV L hand, CPAP at night     SUBJECTIVE  Subjective  Subjective: Pt reports mild increased fatigue this date but feeling alright. Pt agreeable to PT treatment session. SPO2% 99% on 2 L, HR 86 bpm  Pain: Pt reports 4/10 left hand        Post Treatment Pain Screening     Social/Functional History  Lives With: Spouse, Son (wife Zelda Lin 83Micaela, works outside the home but can work from home if needed, son William Bell lives with them as well, brother lives next door)  Type of Home: [facility]: One level  Home Access:  (has a ramp entry)  Bathroom Shower/Tub: Walk-in shower, Tub/Shower unit (one is tub/shower, one is just shower (2 inch lip to enter), mainly uses shower)  Bathroom Toilet: Standard  Bathroom Equipment:  (none)  Bathroom Accessibility: Walker accessible  Home Equipment: Margaretann Solum, Electric scooter, Walker, rolling (prior to admittance to the hospital pt did no use AD.  These are from a previous injury in 2016.)  Has the patient had two or more falls in the past year or any fall with injury in the past year?: No  Receives Help From: Family  ADL Assistance: Independent  Homemaking Assistance: Independent  Homemaking Responsibilities: Yes  Meal Prep Responsibility: Primary  Laundry Responsibility: Secondary  Bill Paying/Finance Responsibility: Primary  Ambulation Assistance: Independent  Transfer Assistance: Independent  Active : Yes (wife can drive if needed)  Mode of Transportation: Car  Education: vocational school for welding; HS  Occupation: Retired  Type of Occupation:   Leisure & Hobbies: wood shop, lawn work, working on equipment in shop at his house  Additional Comments: Used electric scooter in 2016 when he pulled both quads off his kneecap due to work injury. On Celebrex and infusions every 6 weeks for rheumatoid arthritis pain      OBJECTIVE  Cognition  Overall Cognitive Status: WFL  Orientation  Overall Orientation Status: Within Normal Limits  Orientation Level: Oriented X4    Functional Mobility  Balance  Sitting Balance: Modified independent   Standing Balance: Stand by assistance  Transfers  Surface: To chair with arms;From chair with arms  Additional Factors: Verbal cues; Increased time to complete  Device: Walker  Sit to Stand  Assistance Level: Stand by assist  Skilled Clinical Factors: increased effort and time multiple surfaces (wheelchair with arm rests, hallway bench without railing)  Stand to Sit  Assistance Level: Stand by assist  Skilled Clinical Factors: cues for hand placement      Environmental Mobility  Ambulation  Surface: Level surface  Device: Rolling walker  Distance: 90' X 2 trials with turns  Activity: Within Room; Within Unit  Activity Comments: JADE with increased time to complete and recover, slight flexed posture  Additional Factors: Verbal cues; Increased time to complete  Assistance Level: Stand by assist  Gait Deviations: Slow adam; Wide base of support  Skilled Clinical Factors: SPO2% 92% initially recovers 97% on 2 L with rest, HR 91 bpm returning 88 bpm with rest.  Curb  Curb Height: 6''  Device: Rolling walker  Number of Curbs: 1  Additional Factors: Verbal cues; Increased time to complete  Assistance Level: Stand by assist  Skilled Clinical Factors: slight impulsive with performance needing intermittent cues for safety but increased participation with RW, SPO2% 93%, HR 91 bpm. JADE with increased time to recover.              PT Exercises  Exercise Treatment: Standing at RW with SBA for balance BLEs X 10 each including B heel raises, marching alteranting (seated rest break), HS curls alternating, mini squats (seated rest break). Pt with mild JADE with each standing attempt, tolerated well overall. ASSESSMENT/PROGRESS TOWARDS GOALS       Assessment  Assessment: This date 9/15/22: Pt was SBA for sit>stand and SBA for stand>sit, they had difficulty shifting weight over toes so they could rise. Pt was able to ambulated with RW for household distances but was SBA-CGA during activity and had increased fatigue and complaints of SOB. Overall pt has decreased endurance, increased fatigue, and decreased functional mobility. He is below his baseline level and would benefit from skilled intervention in order to improve his endurance decrease his fatigue, work on gait training and improve his functional mobility in order to return to his PLOF. Activity Tolerance: Patient limited by endurance; Patient limited by fatigue  Discharge Recommendations: Continue to assess pending progress; Patient would benefit from continued therapy after discharge;Home with assist PRN  PT Equipment Recommendations  Equipment Needed: No  Other: Continue to assess pending progress     PM session:   S: Pt supported sitting in wheelchair following OT treatment session. Denies any changes and without complaints this afternoon. Pt agreeable to PT treatment session. O: Supported sitting in wheelchair BLEs X 20 each including heel/toe raises, LAQ, glut sets, marching, hip adduction sets knees flexed at 90, hip abduction knees flexed at 90 green t-band, and HS curls green t-band. Pt tolerated well without complaints. Sit <-> stand SBA with increased time to perform throughout session. Pt ambulated 80' X 2 trials with turns RW support SBA, PT managing O2 line and IV pole throughout. Increased tolerance compared to morning session with fast recovery time. Mild JADE but recovers well.    Standing without device 4' tapping balloon with partner with SBA for increased activity tolerance/progression, stopped due to JADE with increased time to recover. Pt ambulated with RW 48' turns, SBA continues to progress well with activity. Positioned in wheelchair at completion of session, care transition to [facility].   A: Pt with increased tolerance this afternoon, progressing well towards goals. Overall improving ambulation tolerance with each therapy session. Safety Device - Type of devices:  [x]  All fall risk precautions in place [] Bed alarm in place  [] Call light within reach [] Chair alarm in place [] Positioning belt [x] Gait belt [] Patient at risk for falls [] Left in bed [x] Left in chair [] Telesitter in use [] Sitter present [] Nurse notified []  None     Goals  Patient Goals   Patient goals : Return to walking without difficulty. Short Term Goals  Time Frame for Short term goals: 7 days  Short term goal 1: Complete ramp with RW CGA. Short term goal 2: Ambulate 150' with RW and CGA. Short term goal 3: Transfers, car and sit<>stand, with SBA. Short term goal 4: Complete all bed mobility mod I.  Long Term Goals  Time Frame for Long term goals : 10-14 days  Long term goal 1: Complete ramp with no AD mod I.  Long term goal 2: Ambulate 150' RW mod I.  Long term goal 3: Transfers, car and sit<>stand, with mod I. PLAN OF CARE/SAFETY  Plan  Plan:  minutes of therapy at least 5 out of 7 days a week  Plan weeks: 2 weeks  Current Treatment Recommendations: Strengthening;Balance training;Functional mobility training;Transfer training;Stair training;Gait training; Therapeutic activities; Home exercise program;Endurance training;IADL training  Safety Devices  Type of Devices: All fall risk precautions in place; Left in chair;Gait belt (positioned in wheelchair for safety, return to room with  transportation)  Restraints  Restraints Initially in Place: No    EDUCATION  Education  Education Given To: Patient  Education Provided: Role of Therapy;Plan of Care;ADL Function; Safety; Mobility Training;Transfer Training;Energy Conservation  Education Provided Comments: pursed lip breathing, safety with mobility tasks  Education Method: Verbal;Demonstration  Barriers to Learning: None  Education Outcome: Verbalized understanding        Therapy Time   Individual Concurrent Group Co-treatment   Time In 0900         Time Out 0945         Minutes 45           Timed Code Treatment Minutes: [address] [facility] Time     Individual Co-treatment   Time In 4304     Time Out [address], 09/15/22 at 9:47 AM         Alvarez Conde PT, DPT [id number] 09/15/22 9:47 AM

## 2022-09-15 NOTE — ACP (ADVANCE CARE PLANNING)
Advance Care Planning     Advance Care Planning Activator (Inpatient)  Conversation Note      Date of ACP Conversation: 9/15/2022     Conversation Conducted with:  Zayda Hester with Decision Making Capacity    ACP Activator: [address] Decision Maker:     Stacie Epps, Wife   [phone]  Current Designated Health Care Decision Maker:     Click here to complete Healthcare Decision Makers including section of the Healthcare Decision Maker Relationship (ie \"Primary\")      Care Preferences    Ventilation: \"If you were in your present state of health and suddenly became very ill and were unable to breathe on your own, what would your preference be about the use of a ventilator (breathing machine) if it were available to you? \"      Would the patient desire the use of ventilator (breathing machine)?: yes    \"If your health worsens and it becomes clear that your chance of recovery is unlikely, what would your preference be about the use of a ventilator (breathing machine) if it were available to you? \"     Would the patient desire the use of ventilator (breathing machine)?: No      Resuscitation  \"CPR works best to restart the heart when there is a sudden event, like a heart attack, in someone who is otherwise healthy. Unfortunately, CPR does not typically restart the heart for people who have serious health conditions or who are very sick. \"    \"In the event your heart stopped as a result of an underlying serious health condition, would you want attempts to be made to restart your heart (answer \"yes\" for attempt to resuscitate) or would you prefer a natural death (answer \"no\" for do not attempt to resuscitate)? \" yes       [] Yes   [] No   Educated Patient / Yuriy Velasco regarding differences between Advance Directives and portable DNR orders.     Length of ACP Conversation in minutes:  1.5 minutes    Conversation Outcomes:  [x] ACP discussion completed  [] Existing advance directive reviewed with patient; no changes to patient's previously recorded wishes  [] New Advance Directive completed  [] Portable Do Not Rescitate prepared for Provider review and signature  [] POLST/POST/MOLST/MOST prepared for Provider review and signature      Follow-up plan:    [] Schedule follow-up conversation to continue planning  [] Referred individual to Provider for additional questions/concerns   [] Advised patient/agent/surrogate to review completed ACP document and update if needed with changes in condition, patient preferences or care setting    [x] This note routed to one or more involved healthcare providers    {    Routed to Dr Wendy Beltran.       [place], [place]     Case Management   [phone]    9/15/2022  3:39 PM

## 2022-09-15 NOTE — PROGRESS NOTES
Occupational Therapy  Facility/Department: [facility]  Rehabilitation Occupational Therapy Daily Treatment Note  AM and PM session    Date: 9/15/22  Patient Name: Dori Johnston       Room: L5Y-7835/3056-93  MRN: [id number]  Account: [de-identified]   : 1960  (63 y.o.) Gender: male         Past Medical History:  has a past medical history of Arthritis, Atrial fibrillation ([address] and Hypertension. Past Surgical History:   has a past surgical history that includes Upper gastrointestinal endoscopy (2022); Upper gastrointestinal endoscopy (2022); Upper gastrointestinal endoscopy (N/A, 2022); bronchoscopy (2022); bronchoscopy (2022); Gastric fundoplication (N/A, 2748); and Gastrostomy tube placement (N/A, 2022). Restrictions  Restrictions/Precautions: NPO;Fall Risk  Other position/activity restrictions: NPO, PEG tube, 2L O2 (not on O2 at home), IV L hand, CPAP at night    PM session-  Subjective- Began session in Pt's room then transported to therapy gym. Pt is agreeable to treatment session. No pain reported, mild SOB and fatigue. Objective- Pt completed bed mobility transferring supine<>sit EOB SBA with handrail then sit<>stand at RW SBA. He ambulated into bathroom with RW SBA and completed toileting SBA in standing. Ambulated out of bathroom using RW SBA, stand<>sit into wheelchair SBA. Pt demonstrated dry shower transfer stepping over 2inch ledge into \"shower stall\", used RW to ledge SBA, stepped over ledge CGA. Pt ambulated into kitchen using RW SBA, made PB&J sandwich, he gathered all ingredients from fridge and cabinet and took items in walker basket to counter top, he made a sandwich without any cues for sequencing task, stood 3min SBA, fatigued at end of task, S/OT managed oxygen and IV pole. Completed hand exercises using 1.5lb digi flex, tolerated well, reported L hand felt good getting increased circulation, completed x20 reps both hands.      Assessment- He continues to make gradual gains in his participation and activity tolerance. This session he completed toileting in stance SBA, ambulating with RW SBA, demonstrating a dry shower transfer stepping over 2inch lip CGA, and completing simple meal prep in the kitchen making a PB&J sandwich standing at counter @3min SBA. Education on energy conservation sitting in the shower at this point but as he progresses he may be able to complete bathing in standing, he currently has a TTB with a swivel seat at home for his tub/shower unit. His balance was improved and he was able to tolerate standing SBA for 3min to complete a kitchen task, mild fatigue and SOB noted but remained on 2L O2 throughout, SpO2 95% at end of session. Continue w/ POC.     Treatment time: 45 min  Jason Crenshaw S/OT Greta Joyce OTR/L #[id number] provided direct supervision to student and concur with PN       AM session-  Subjective  Subjective: Met in therapy gym, Pt is agreeable to complete session, 4/10 pain in L hand due to RA reported  Restrictions/Precautions: NPO;Fall Risk     Objective     Cognition  Overall Cognitive Status: WFL  Orientation  Overall Orientation Status: Within Normal Limits  Orientation Level: Oriented X4         ADL           OT Exercises  A/AROM Exercises: Completed BUE strength using 2lb weight x15 reps each exercise, only used weight on R hand, L hand very painful due to RA today so completed using body weight  Motor Control/Coordination: Completed hand assessments, dynamometer L hand 20 lbs (unable to complete 3 trials due to pain from RA), R hand 24, 9 hole peg test L hand 31 seconds, R hand 34 seconds  Exercise Equipment: Seated completed removing/placing colored beads on pegs, using L hand only, able to remove and replace all beads but required 1 rest break due to fatigue during activity, reports feeling tired and fatigue in forearm     Assessment  Assessment  Assessment: Pt has made progress in his ability to participate in therapy sessions but remains limited by his fatigue and poor activity tolerance. This session was time limited due to nursing giving medication via PEG tube taking increased time to complete, will make-up time this afternoon. Pt was able to complete hand assessments using dynamometer and 9 hole peg test, dynamometer L hand 20lbs (but only able to complete 1 trial due to increased pain due to RA in L hand), R hand 24lbs, 9 hole peg L hand 34 seconds, R hand 31 seconds, both are below average but L is weaker due to pain from RA. He completed BUE strengthening using 2lb weight on RUE, no weight on LUE due to increased pain this date, tolerated well without weight on L side. Continue w/ POC. Activity Tolerance: Patient tolerated treatment well;Patient limited by fatigue  Discharge Recommendations: Continue to assess pending progress; Patient would benefit from continued therapy after discharge  OT Equipment Recommendations  Other: TBD  Safety Devices  Safety Devices in place: Yes  Type of devices: Gait belt;Patient at risk for falls    Patient Education  Education  Education Given To: Patient  Education Provided: Energy Conservation; Safety  Education Provided Comments: Educated Pt on energy conservation completing tasks while in sitting and taking rest breaks when needed due to poor activity tolerance and fatigue  Education Method: Verbal  Barriers to Learning: None  Education Outcome: Verbalized understanding    Plan  Plan  Times per Week: 5-7  Times per Day: Twice a day  Plan Weeks: x10-14 days  Current Treatment Recommendations: Strengthening;ROM; Functional mobility training; Endurance training;Patient/Caregiver education & training;Equipment evaluation, education, & procurement;Self-Care / ADL; Coordination training    Goals  Patient Goals   Patient goals : \"Be able to eat normally, get up and moving to get stronger\"  Short Term Goals  Time Frame for Short term goals:  Within 7 days the Pt will  Short Term Goal 1: Toileting with SBA using GB's prn  Short Term Goal 2: LB dressing with SBA using AE prn  Short Term Goal 3: Functional transfers using LRAD with SBA  Short Term Goal 4: Bathing seated on shower chair with CGA  Short Term Goal 5: Improved endurance to complete standing balance task for 3min SBA in order to complete ADL and IADL tasks in standing  Long Term Goals  Time Frame for Long term goals :  Within 10-14 days  Long Term Goal 1: Toileting mod I using GB's prn  Long Term Goal 2: LB dressing mod I using AE prn  Long Term Goal 3: Functional transfers using LRAD mod I  Long Term Goal 4: Bathing seated on shower chair mod I  Long Term Goal 5: Improved endurance to complete standing balance task for 5min mod I in order to complete ADL and IADL tasks in standing  Additional Goals?: Yes  Long Term Goal 6: Determine DME needs and complete family education prior to discharge        Therapy Time   Individual Concurrent Group PM-treatment   Time In 1100      1300   Time Out 1115      1345   Minutes 15      45   Timed Code Treatment Minutes: 15 Minutes  Variance: 30  Reason: Patient care needs (nursing giving medications, have to be crushed for placement through tube feed)    Candelario Amaya S/OT Yeison Dural, OTR/L #[id number] provided direct supervision to student and concur with PN

## 2022-09-16 LAB
EKG DIAGNOSIS: NORMAL
EKG Q-T INTERVAL: 312 MS
EKG QRS DURATION: 94 MS
EKG QTC CALCULATION (BAZETT): 502 MS
EKG R AXIS: 48 DEGREES
EKG T AXIS: -50 DEGREES
EKG VENTRICULAR RATE: 156 BPM

## 2022-09-16 PROCEDURE — 2700000000 HC OXYGEN THERAPY PER DAY

## 2022-09-16 PROCEDURE — 97110 THERAPEUTIC EXERCISES: CPT

## 2022-09-16 PROCEDURE — 99222 1ST HOSP IP/OBS MODERATE 55: CPT | Performed by: INTERNAL MEDICINE

## 2022-09-16 PROCEDURE — 97116 GAIT TRAINING THERAPY: CPT

## 2022-09-16 PROCEDURE — 1280000000 HC REHAB R&B

## 2022-09-16 PROCEDURE — 6370000000 HC RX 637 (ALT 250 FOR IP): Performed by: PHYSICAL MEDICINE & REHABILITATION

## 2022-09-16 PROCEDURE — 92526 ORAL FUNCTION THERAPY: CPT

## 2022-09-16 PROCEDURE — 93005 ELECTROCARDIOGRAM TRACING: CPT | Performed by: PHYSICAL MEDICINE & REHABILITATION

## 2022-09-16 PROCEDURE — 94660 CPAP INITIATION&MGMT: CPT

## 2022-09-16 PROCEDURE — 93010 ELECTROCARDIOGRAM REPORT: CPT | Performed by: INTERNAL MEDICINE

## 2022-09-16 PROCEDURE — 94761 N-INVAS EAR/PLS OXIMETRY MLT: CPT

## 2022-09-16 PROCEDURE — 92507 TX SP LANG VOICE COMM INDIV: CPT

## 2022-09-16 PROCEDURE — 97530 THERAPEUTIC ACTIVITIES: CPT

## 2022-09-16 PROCEDURE — 2580000003 HC RX 258: Performed by: PHYSICAL MEDICINE & REHABILITATION

## 2022-09-16 PROCEDURE — 97535 SELF CARE MNGMENT TRAINING: CPT

## 2022-09-16 PROCEDURE — 6360000002 HC RX W HCPCS: Performed by: PHYSICAL MEDICINE & REHABILITATION

## 2022-09-16 RX ORDER — SODIUM CHLORIDE 9 MG/ML
INJECTION, SOLUTION INTRAVENOUS PRN
Status: DISCONTINUED | OUTPATIENT
Start: 2022-09-16 | End: 2022-09-24 | Stop reason: HOSPADM

## 2022-09-16 RX ORDER — SODIUM CHLORIDE 0.9 % (FLUSH) 0.9 %
5-40 SYRINGE (ML) INJECTION EVERY 12 HOURS SCHEDULED
Status: DISCONTINUED | OUTPATIENT
Start: 2022-09-16 | End: 2022-09-21

## 2022-09-16 RX ORDER — SODIUM CHLORIDE 0.9 % (FLUSH) 0.9 %
5-40 SYRINGE (ML) INJECTION PRN
Status: DISCONTINUED | OUTPATIENT
Start: 2022-09-16 | End: 2022-09-24 | Stop reason: HOSPADM

## 2022-09-16 RX ADMIN — ENOXAPARIN SODIUM 30 MG: 100 INJECTION SUBCUTANEOUS at 22:45

## 2022-09-16 RX ADMIN — FOLIC ACID 1 MG: 1 TABLET ORAL at 07:33

## 2022-09-16 RX ADMIN — ENOXAPARIN SODIUM 30 MG: 100 INJECTION SUBCUTANEOUS at 07:43

## 2022-09-16 RX ADMIN — CELECOXIB 200 MG: 200 CAPSULE ORAL at 07:35

## 2022-09-16 RX ADMIN — METOPROLOL TARTRATE 50 MG: 50 TABLET ORAL at 06:23

## 2022-09-16 RX ADMIN — Medication 1 TABLET: at 07:36

## 2022-09-16 RX ADMIN — TORSEMIDE 20 MG: 20 TABLET ORAL at 11:26

## 2022-09-16 RX ADMIN — METOPROLOL TARTRATE 50 MG: 50 TABLET ORAL at 22:45

## 2022-09-16 RX ADMIN — LEVOTHYROXINE SODIUM 200 MCG: 0.1 TABLET ORAL at 07:29

## 2022-09-16 RX ADMIN — Medication 1 TABLET: at 22:44

## 2022-09-16 RX ADMIN — SODIUM CHLORIDE, PRESERVATIVE FREE 5 ML: 5 INJECTION INTRAVENOUS at 22:30

## 2022-09-16 RX ADMIN — PIPERACILLIN AND TAZOBACTAM 3375 MG: 3; .375 INJECTION, POWDER, LYOPHILIZED, FOR SOLUTION INTRAVENOUS at 05:40

## 2022-09-16 RX ADMIN — Medication 100 MG: at 07:32

## 2022-09-16 RX ADMIN — GABAPENTIN 400 MG: 400 CAPSULE ORAL at 22:44

## 2022-09-16 RX ADMIN — ASPIRIN 81 MG: 81 TABLET, COATED ORAL at 07:33

## 2022-09-16 RX ADMIN — Medication 30 MG: at 07:56

## 2022-09-16 RX ADMIN — PIPERACILLIN AND TAZOBACTAM 3375 MG: 3; .375 INJECTION, POWDER, LYOPHILIZED, FOR SOLUTION INTRAVENOUS at 14:43

## 2022-09-16 RX ADMIN — GABAPENTIN 400 MG: 400 CAPSULE ORAL at 07:35

## 2022-09-16 RX ADMIN — LINEZOLID 600 MG: 600 TABLET, FILM COATED ORAL at 09:53

## 2022-09-16 RX ADMIN — GABAPENTIN 400 MG: 400 CAPSULE ORAL at 14:54

## 2022-09-16 RX ADMIN — CELECOXIB 200 MG: 200 CAPSULE ORAL at 22:44

## 2022-09-16 RX ADMIN — POTASSIUM BICARBONATE 20 MEQ: 782 TABLET, EFFERVESCENT ORAL at 07:35

## 2022-09-16 ASSESSMENT — PAIN DESCRIPTION - PAIN TYPE: TYPE: CHRONIC PAIN

## 2022-09-16 ASSESSMENT — PAIN DESCRIPTION - DESCRIPTORS: DESCRIPTORS: ACHING

## 2022-09-16 ASSESSMENT — PAIN DESCRIPTION - ORIENTATION: ORIENTATION: LEFT

## 2022-09-16 ASSESSMENT — PAIN SCALES - GENERAL
PAINLEVEL_OUTOF10: 0
PAINLEVEL_OUTOF10: 1
PAINLEVEL_OUTOF10: 0

## 2022-09-16 ASSESSMENT — PAIN DESCRIPTION - FREQUENCY: FREQUENCY: INTERMITTENT

## 2022-09-16 ASSESSMENT — PAIN - FUNCTIONAL ASSESSMENT: PAIN_FUNCTIONAL_ASSESSMENT: ACTIVITIES ARE NOT PREVENTED

## 2022-09-16 ASSESSMENT — PAIN DESCRIPTION - ONSET: ONSET: ON-GOING

## 2022-09-16 ASSESSMENT — PAIN DESCRIPTION - LOCATION: LOCATION: BACK

## 2022-09-16 ASSESSMENT — PAIN SCALES - WONG BAKER: WONGBAKER_NUMERICALRESPONSE: 0

## 2022-09-16 NOTE — PROGRESS NOTES
Occupational Therapy  [facility]  Rehabilitation Occupational Therapy Daily Treatment Note  AM and PM sessions    Date: 22  Patient Name: Carla Cunningham       Room: T3Z-1350/4376-37  MRN: [id number]  Account: [de-identified]   : 1960  (63 y.o.) Gender: male         Past Medical History:  has a past medical history of Arthritis, Atrial fibrillation ([address] and Hypertension. Past Surgical History:   has a past surgical history that includes Upper gastrointestinal endoscopy (2022); Upper gastrointestinal endoscopy (2022); Upper gastrointestinal endoscopy (N/A, 2022); bronchoscopy (2022); bronchoscopy (2022); Gastric fundoplication (N/A, 3/73/9817); and Gastrostomy tube placement (N/A, 2022). Restrictions  Restrictions/Precautions: NPO;Fall Risk  Other position/activity restrictions: NPO, PEG tube, 2L O2 (not on O2 at home), IV L hand, CPAP at night, tele    PM session-  Subjective- Began in Pt's room, he is agreeable to OT treatment session. No pain reported, some fatigue. His wife is present to observe. Issued & instructed w/ use of incentive spirometer--completed 2 inhalations w/ cues (below midline marker). Objective- Pt completed sit<>stand out of recliner SBA, ambulated with RW into bathroom SBA, completed toileting standing with SBA, ambulated to wheelchair with RW SBA, stand<>sit SBA with cues to reach back for arm rests prior to sitting. In therapy gym he demonstrated standing balance playing connect 4 at raised tabletop, stood 4min SBA, reported fatigue at end of task, seated rest break given. Seated to complete fine motor perfection game, able to place all pieces correctly, no cues needed, completed quickly, minimal RA pain in hands this session.  He ambulated with RW into ADL suite SBA, demonstrated bed mobility stand<>sit EOB and sit<>supine mod I, difficulty with supine<>sit but able to complete SBA (his bed at home elevates at head of bed). Desat to 80-85 after activity, seated in wheelchair and breathing exercises practiced, rebounded back up to 95%. Nursing notified. Assessment- Pt reports feeling better this afternoon just mild fatigue. He was able to ambulate with RW SBA, complete toileting in stance SBA, and bed mobility mod I-SBA. He completed standing balance task 4min SBA, mild fatigue at end of standing. After ambulating with RW and completing bed mobility SpO2 desat 80-85% however with seated rest break and breathing exercises rebounded quickly to 95% within 1-2 min of PLB,  nursing notified. He remained on 2L nasal cannula throughout session. Continue w/ POC. Treatment time: 39 min  Laurie Poon S/OT [employer], OTR/L #[id number] provided direct supervision to student and concur with PN         AM session-  Subjective  Subjective: Met in Pt's room. He is resting in bed, he had episode of SVT and tachycardia earlier this AM, reports now feeling fatigued and weak. Restrictions/Precautions: NPO;Fall Risk     Objective     Cognition  Overall Cognitive Status: WFL  Orientation  Overall Orientation Status: Within Normal Limits  Orientation Level: Oriented X4         ADL  Grooming/Oral Hygiene  Assistance Level: Stand by assist  Skilled Clinical Factors: Seated EOB, able to wash his face and comb his hair  Upper Extremity Bathing  Assistance Level: Stand by assist  Skilled Clinical Factors: Used bathing wipes on underarms and abdomen while seated EOB  Lower Extremity Bathing  Assistance Level:  Moderate assistance  Skilled Clinical Factors: Used bathing wipes on upper legs while seated EOB, A to use wipes on lower legs and feet  Upper Extremity Dressing  Skilled Clinical Factors: unable to change tshirt due to IV placement  Lower Extremity Dressing  Equipment Provided: Reachers  Assistance Level: Contact guard assist  Skilled Clinical Factors: Pt doffed and donned underwear and shorts while seated EOB using reacher, stood at RW CGA to manage over buttocks          Functional Mobility  Device: Rolling walker  Assistance Level: Contact guard assist  Skilled Clinical Factors: Stood at RW next to bed to complete dressing this date due to fatigue and cardiac consult ordered  Bed Mobility  Overall Assistance Level: Stand By Assist  Additional Factors: With handrails  Roll Left  Assistance Level: Stand by assist  Skilled Clinical Factors: using bed rails  Roll Right  Assistance Level: Stand by assist  Skilled Clinical Factors: using bed rails  Sit to Supine  Assistance Level: Stand by assist  Skilled Clinical Factors: increased effort to swing BLE's into bed  Supine to Sit  Assistance Level: Contact guard assist  Skilled Clinical Factors: CGA at trunk, used bed rails to complete  Scooting  Assistance Level: Stand by assist  Skilled Clinical Factors: able to scoot to EOB  Sit to Stand  Assistance Level: Contact guard assist  Stand to Sit  Assistance Level: Stand by assist   OT Exercises  A/AROM Exercises: Seated in bed with HOB raised, completed AROM using 1lb weight, tolerated well x15 reps each side. Used 1lb digi flex x20 reps each hand, no pain reported. Assessment  Assessment  Assessment: Pt had episode of SVT and tachycardia earlier this AM and was limited to in room activity due to his fatigue and low BP, cardiology consulted. Vitals monitored throughout session, no dizziness or chest pains reports, mild SOB with activity, all vitals recorded in flowsheet and reported to nursing. His wife was present in room during session. He completed bathing using bathing wipes seated EOB, SBA for upper body, mod A for lower body. He completed lower body dressing seated EOB/standing next to bed holding RW, CGA while in standing, used reacher to thread underwear and shorts while seated. Seated EOB he completed grooming SBA.  He completed BUE strengthening using 1lb free weight and 1lb digi flex, tolerated low quantity reps well, rest break given between exercises. Discussion with Pt and his wife about discharge planning and DME needs, he reports they have a TTB and [address] in one bathroom that he will use upon discharge, his mother had a RW that he can borrow, educated that insurance will likely cover RW if needed, his wife can work from home and give 24hr assist as needed, discussed that team conference will be held on Tuesday and discharge date will be determined then and social work will discuss his DME and homecare needs. Continue w/ POC. Activity Tolerance: Treatment limited secondary to medical complications; Patient limited by fatigue  Discharge Recommendations: Continue to assess pending progress; Patient would benefit from continued therapy after discharge  OT Equipment Recommendations  Other: TTB (Pt thinks they have at home), RW, walker basket, reacher  Safety Devices  Safety Devices in place: Yes  Type of devices: Gait belt;Patient at risk for falls    Patient Education  Education  Education Given To: Patient; Family  Education Provided: Role of Therapy;Plan of Care;ADL Function; Energy Conservation  Education Provided Comments: Education given to Pt and his wife on energy conservation completing tasks while seated, role of OT services, his DME and discharge needs, team conference held on Tuesday to determine his plan on care and discharge date  Education Method: Verbal  Barriers to Learning: None  Education Outcome: Verbalized understanding    Plan  Plan  Times per Week: 5-7  Times per Day: Twice a day  Plan Weeks: x10-14 days  Current Treatment Recommendations: Strengthening;ROM; Functional mobility training; Endurance training;Patient/Caregiver education & training;Equipment evaluation, education, & procurement;Self-Care / ADL; Coordination training    Goals  Patient Goals   Patient goals : \"Be able to eat normally, get up and moving to get stronger\"  Short Term Goals  Time Frame for Short term goals:  Within 7 days the Pt will  Short Term Goal 1: Toileting with SBA using GB's prn  Short Term Goal 2: LB dressing with SBA using AE prn  Short Term Goal 3: Functional transfers using LRAD with SBA  Short Term Goal 4: Bathing seated on shower chair with CGA  Short Term Goal 5: Improved endurance to complete standing balance task for 3min SBA in order to complete ADL and IADL tasks in standing  Long Term Goals  Time Frame for Long term goals :  Within 10-14 days  Long Term Goal 1: Toileting mod I using GB's prn  Long Term Goal 2: LB dressing mod I using AE prn  Long Term Goal 3: Functional transfers using LRAD mod I  Long Term Goal 4: Bathing seated on shower chair mod I  Long Term Goal 5: Improved endurance to complete standing balance task for 5min mod I in order to complete ADL and IADL tasks in standing  Additional Goals?: Yes  Long Term Goal 6: Determine DME needs and complete family education prior to discharge         Therapy Time   Individual Concurrent Group PM-treatment   Time In 0815      1300   Time Out 0873      0670   Minutes 55      45   Timed Code Treatment Minutes: 173 [facility] S/OT Sri Bowser OTR/HERIBERTO #[id number] provided direct supervision to student and concur with PN

## 2022-09-16 NOTE — PROGRESS NOTES
Comprehensive Nutrition Assessment    Type and Reason for Visit:  Reassess    Nutrition Recommendations/Plan:   Continue NPO  Pivot 1.5 @ 100ml x 14 hours (5pm-7am)  Water flush 175ml every 4 hours      Malnutrition Assessment:  Malnutrition Status: At risk for malnutrition (Comment) (09/14/22 1036)    Context:  Acute Illness       Nutrition Assessment:    Follow-up. SLP following. Currently NPO with EN meeting 100% of nutrition needs. Tolerating Pivot 1.5 at 100ml x 14 hours. Will continue current nutrition interventions and monitor. Nutrition Related Findings:    +4 liters. Na 133. +BM 9/15. No edema. Wound Type: None       Current Nutrition Intake & Therapies:    Average Meal Intake: NPO  Average Supplements Intake: NPO  Diet NPO  ADULT TUBE FEEDING; PEG; Immune Enhancing; Cyclic; 125; 1:78 PM; 5:97 AM; 175; Q 4 hours  Current Tube Feeding (TF) Orders:  Feeding Route: PEG  Formula: Immune Enhancing  Schedule: Cyclic (x 14 hours (8GR-0VH))  Feeding Regimen: Pivot 1.5 @ 100ml x 14 hours  Water Flushes: 175ml every 4 hours  Goal TF & Flush Orders Provides: Pivot 1.5 @ 100ml x 14 hours provides 1400ml TV, 2100 kcals, 131grams protein, and 1050ml free water. Anthropometric Measures:  Height: 5' 7\" (170.2 cm)  Ideal Body Weight (IBW): 148 lbs (67 kg)    Admission Body Weight: 317 lb (143.8 kg)  Current Body Weight: 314 lb (142.4 kg), 212.2 % IBW.  Weight Source: Bed Scale  Current BMI (kg/m2): 49.2  Weight Adjustment For: No Adjustment  BMI Categories: Obese Class 3 (BMI 40.0 or greater)    Estimated Daily Nutrient Needs:  Energy Requirements Based On: Kcal/kg  Weight Used for Energy Requirements: Current  Energy (kcal/day): 3811-6954 (11-14kcal/140kg)  Weight Used for Protein Requirements: Ideal  Protein (g/day): 134 (2g/67kg)  Method Used for Fluid Requirements: 1 ml/kcal  Fluid (ml/day): 1 ml/kcal    Nutrition Diagnosis:   Inadequate oral intake related to inadequate protein-energy intake as evidenced by NPO or clear liquid status due to medical condition, nutrition support - enteral nutrition    Nutrition Interventions:   Food and/or Nutrient Delivery: Continue NPO, Continue Current Tube Feeding  Nutrition Education/Counseling: Education not indicated  Coordination of Nutrition Care: Continue to monitor while inpatient     Goals:  Previous Goal Met: Progressing toward Goal(s)  Goals:  Tolerate nutrition support at goal rate     Nutrition Monitoring and Evaluation:   Behavioral-Environmental Outcomes: None Identified  Food/Nutrient Intake Outcomes: Enteral Nutrition Intake/Tolerance  Physical Signs/Symptoms Outcomes: Biochemical Data, GI Status, Fluid Status or Edema, Meal Time Behavior, Skin, Weight    Discharge Planning:    Enteral Nutrition     Chitra Campoverde RD, LD  Contact: [phone]

## 2022-09-16 NOTE — CONSULTS
Cardiac Electrophysiology Consultation   Date: 9/16/2022  Admit Date:  9/13/2022  Reason for Consultation: SVT, ? Atrial fibrillation, ?need for DOAC  Consult Requesting Physician: Riky Eric MD     No chief complaint on file. HPI: Willam Barba is a 63 y.o. M h/o HTN, ?PAF (May 2022 first diagnosis at [facility]) presents with abd pain and diagnosed with small bowel obstruction c/b aspiration pneumonia, septic shock. During admission and also in rehab, noted to have episodes of PSVT with v-rates 150's bpm, terminated after given metoprolol. EP consulted. The patient mentions having an episode for the first time of atrial fibrillation at [facility] in May 2022 which required DCCV. Was started on DOAC (Xarelto) afterwards.  Coronary angiography subsequently revealed no stenosis and he was taken off DOAC by the interventional cardiologist.      Past Medical History:   Diagnosis Date    Arthritis     Atrial fibrillation ([address]     Hypertension         Past Surgical History:   Procedure Laterality Date    BRONCHOSCOPY  8/22/2022    BRONCHOSCOPY ALVEOLAR LAVAGE performed by Gillermina Saint., DO at [address]  8/22/2022    BRONCHOSCOPY performed by Franklin Lerma MD at [address] [place] [address] N/A 2/85/6733    LAPAROSCOPIC REDUCTION HIATEL HERNIA, performed by Chance Estrada MD at [facility] 8/31/2022    GASTROSTOMY TUBE PLACEMENT performed by Chance Estrada MD at [place] [address]  8/9/2022    EGD BIOPSY performed by Amanda Lara MD at [address] [place] [address]  8/9/2022    EGD ESOPHAGOGASTRODUODENOSCOPY TUBE INSERTION performed by Amanda Lara MD at [address] [place] [address] N/A 8/22/2022    EGD ESOPHAGOGASTRODUODENOSCOPY ORAL-GASTRIC TUBE INSERTION performed by Gillermina Saint., DO at [place] [address]   Allergen Reactions Codeine Nausea Only and Nausea And Vomiting     Stomach upset          Social History:  Reviewed. reports that he has never smoked. He has never used smokeless tobacco. He reports that he does not currently use alcohol. He reports that he does not use drugs. Family History:  Reviewed. family history is not on file. No premature CAD. Review of System:  All other systems reviewed except for that noted above. Pertinent negatives and positives are:     General: negative for fever, chills   Ophthalmic ROS: negative for - eye pain or loss of vision  ENT ROS: negative for - headaches, sore throat   Respiratory: negative for - cough, sputum  Cardiovascular: Reviewed in HPI  Gastrointestinal: negative for - abdominal pain, diarrhea, N/V  Hematology: negative for - bleeding, blood clots, bruising or jaundice  Genito-Urinary:  negative for - Dysuria or incontinence  Musculoskeletal: negative for - Joint swelling, muscle pain  Neurological: negative for - confusion, dizziness, headaches   Psychiatric: No anxiety, no depression. Dermatological: negative for - rash    Physical Examination:  Vitals:    22 0951   BP: 117/78   Pulse: 71   Resp: 18   Temp: 97.5 °F (36.4 °C)   SpO2: 95%        Intake/Output Summary (Last 24 hours) at 2022 1111  Last data filed at 2022 1001  Gross per 24 hour   Intake 225 ml   Output --   Net 225 ml     In: 557 [NG/GT:557]  Out: -    Wt Readings from Last 3 Encounters:   22 (!) 314 lb 2.5 oz (142.5 kg)   22 (!) 309 lb 8.4 oz (140.4 kg)     Temp  Av.1 °F (36.7 °C)  Min: 97.5 °F (36.4 °C)  Max: 98.6 °F (37 °C)  Pulse  Av.8  Min: 71  Max: 87  BP  Min: 88/60  Max: 126/84  SpO2  Av.6 %  Min: 95 %  Max: 96 %    Telemetry: Sinus rhythm with v-rates 70's bpm  Constitutional: Alert. Oriented to person, place, and time. No distress. Head: Normocephalic and atraumatic.    Mouth/Throat: Lips appear moist. Oropharynx is clear and moist.  Eyes: Conjunctivae normal. EOM are normal.   Neck: Neck supple. No lymphadenopathy. No rigidity. No JVD present. Cardiovascular: Normal rate, regular rhythm. Normal S1&S2. Carotid pulse 2+ bilaterally. Pulmonary/Chest: Bilateral respiratory sounds present. No respiratory accessory muscle use. No wheezes, No rhonchi. No bibasilar rales. Abdominal: Soft. Normal bowel sounds present. No distension, No tenderness. No splenomegaly. No hernia. Musculoskeletal: No tenderness. Full range of motion in bilateral upper and lower extremities. No pitting BLE edema . Lymphadenopathy: Has no cervical adenopathy. Neurological: Alert and oriented. Cranial nerve II-XII grossly intact, No gross deficit to touch. Skin: Skin is warm and dry. No rash, lesions, ulcerations noted. Psychiatric: No anxiety nor agitation. Labs:  Reviewed. Recent Labs     09/14/22  0734   *   K 4.5   CL 99   CO2 26   BUN 17   CREATININE 0.8     Recent Labs     09/15/22  0650   WBC 4.6   HGB 9.9*   HCT 29.8*   MCV 91.3        Lab Results   Component Value Date/Time    TROPONINI 0.22 08/07/2022 08:30 AM     No results found for: BNP  Lab Results   Component Value Date/Time    PROTIME 16.4 08/06/2022 04:45 AM    INR 1.33 08/06/2022 04:45 AM     Lab Results   Component Value Date/Time    TRIG 156 08/24/2022 05:22 AM       Diagnostic and imaging results personally reviewed and interpreted. ECG: SVT with v-rates 156 bpm, narrow complex, very short RP  Echo: Summary   Technically difficult examination. Ejection fraction is visually estimated to be 60-65%. Grade II diastolic dysfunction   Right ventricle is poorly visualized, normal systolic function, appears   dilated  Cath: n/a    I independently reviewed and interpreted the ECG, telemetry, serology, echocardiographic results.     Scheduled Meds:   calcium-cholecalciferol  1 tablet Oral BID    piperacillin-tazobactam  3,375 mg IntraVENous Q8H    lansoprazole  30 mg Per G Tube Daily    aspirin  81 mg Oral Daily    folic acid  1 mg Oral Daily    levothyroxine  200 mcg Oral QAM    metoprolol tartrate  50 mg Oral BID    enoxaparin  30 mg SubCUTAneous BID    gabapentin  400 mg Oral TID    torsemide  20 mg Oral Daily    celecoxib  200 mg Oral BID    thiamine mononitrate  100 mg Oral Daily    potassium bicarb-citric acid  20 mEq Oral Daily     Continuous Infusions:  PRN Meds:.polyethylene glycol, acetaminophen, magnesium hydroxide     Assessment:   Patient Active Problem List    Diagnosis Date Noted    Critical illness myopathy 09/13/2022    Dysphagia 08/31/2022    Acute delirium 08/28/2022    EARNESTINE (obstructive sleep apnea) 08/28/2022    Aspiration pneumonia ([address] 08/22/2022    Morbid obesity with BMI of 50.0-59.9, adult ([address] 08/22/2022    Arterial hypotension 56/66/1173    Diastolic heart failure ([address] 08/14/2022    History of atrial fibrillation 08/14/2022    Supraventricular tachycardia ([address] 08/14/2022    Small bowel obstruction ([address] 08/09/2022    Acute respiratory failure with hypoxia ([address] 08/08/2022    Acute respiratory failure with hypoxia and hypercapnia ([address] 08/07/2022    SBO (small bowel obstruction) ([address] 08/07/2022    Aspiration into airway 08/07/2022    Partial bowel obstruction ([address] 08/05/2022    Hiatal hernia 08/05/2022      Active Hospital Problems    Diagnosis Date Noted    Critical illness myopathy [G72.81] 09/13/2022     Priority: Medium         Recommendation(s):    PSVT  -narrow complex, very short RP, regular interval.  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 The definitive treatment is ablation, for which he and his wife will follow-up in my clinic to discuss ablation.  -difficult to up-titrate metoprolol given hypotension. PAF? -patient mentions being diagnosed with new-onset afib this May 2022. No tracings to corroborate.  But he underwent DCCV. Not showing any evidence of atrial fibrillation in the EKGs in our records.   -will try to obtain EKG from [facility] to corroborate his recollection. -will then discuss treatment options for afib, including afib ablation as our most definitive treatment option.  -given [id number] score of 1, ASA 81mg daily or DOAC or neither of these is what the ACC/AHA/HRS guidelines would recommend. Since he is on ASA right now, would continue with such. Will sign off. Thank you for allowing me to participate in the care of [place] . If you have any questions/comments, please do not hesitate to contact us.       Patsy Tavera MD, MS, [facility]  Cardiac Electrophysiology  [address]  [address] [facility], [address] [place] [address]  [phone]

## 2022-09-16 NOTE — PROGRESS NOTES
bars:  A/P weight shift x 10, Lateral weight shift x 10, Semi-tandem stance RLE leading x 30 s., LLE leading x 30 s., all unsupported, CGA-SBA. Pt did require seated rest between exercises. SPO2 stable. Pt also performed supported (B) heel raise x 10, squat x 10, march in place 2 x 10. SPO2 stable throughout. Pt ended session on NuStep, level 3 x 10 min. SPO2 stable throughout. Assessment: Pt able to tolerate several bouts of ambulation in felipe, and standing exercises in // bars. Activity tolerance limited d/t fatigue and SOB, though his sats were stable during session. He would benefit from continued therapy to maximize his strength, balance, endurance, and independence ambulating.        Safety Device - Type of devices:  [x]  All fall risk precautions in place [] Bed alarm in place  [] Call light within reach [] Chair alarm in place [] Positioning belt [x] Gait belt [] Patient at risk for falls [] Left in bed [] Left in chair [] Telesitter in use [] Sitter present [] Nurse notified []  None      Therapy Time   Individual Co-treatment   Time In 1345     Time Out 1430     Minutes 45         Electronically signed by Aparna Rodriguez, [place] [id number] on 9/16/2022 at 2:47 PM

## 2022-09-16 NOTE — PROGRESS NOTES
Encourage to use IS, request IV antibiotic after therapy to promote I, will be last IV. Will have low o2 reading on cardiac monitor, no issues to our machine.

## 2022-09-16 NOTE — PLAN OF CARE
Problem: Discharge Planning  Goal: Discharge to home or other facility with appropriate resources  9/16/2022 1104 by Indiana Dale RN  Outcome: Progressing  Flowsheets  Taken 9/16/2022 1104  Discharge to home or other facility with appropriate resources:   Identify barriers to discharge with patient and caregiver   Arrange for needed discharge resources and transportation as appropriate   Identify discharge learning needs (meds, wound care, etc)  Taken 9/16/2022 0750  Discharge to home or other facility with appropriate resources: Identify barriers to discharge with patient and caregiver  Note: Wife here and supportive, aware of rehab plan. 9/16/2022 0009 by Melinda Barron RN  Outcome: Progressing  Flowsheets (Taken 9/15/2022 2115)  Discharge to home or other facility with appropriate resources: Identify barriers to discharge with patient and caregiver     Problem: Safety - Adult  Goal: Free from fall injury  9/16/2022 1104 by Indiana Dale RN  Outcome: Progressing  Flowsheets  Taken 9/16/2022 1104  Free From Fall Injury: Instruct family/caregiver on patient safety  Taken 9/16/2022 1048  Free From Fall Injury: Instruct family/caregiver on patient safety  Note: Calls for needs, no issues noted. 9/16/2022 0009 by Melinda Barron RN  Outcome: Progressing  Note: Fall risk band on patient. Orange light on outside of room. Non skid footwear in place. Alarms used appropriately. Patient instructed to call and wait for staff before getting up. Rounding done to anticipate needs. Appropriate safety devices used for transfers. Problem: ABCDS Injury Assessment  Goal: Absence of physical injury  9/16/2022 1104 by Indiana Dale RN  Outcome: Progressing  Flowsheets (Taken 9/16/2022 1048)  Absence of Physical Injury: Implement safety measures based on patient assessment  Note: Calls for needs, monitor safety.   9/16/2022 0009 by Melinda Barron RN  Outcome: Progressing     Problem: Pain  Goal: Verbalizes/displays adequate comfort level or baseline comfort level  9/16/2022 1104 by Lucía Wilson RN  Outcome: Progressing  Flowsheets (Taken 9/16/2022 1104)  Verbalizes/displays adequate comfort level or baseline comfort level: Encourage patient to monitor pain and request assistance  Note: Denies pain thus far today. 9/16/2022 0009 by Alissa Kennedy RN  Outcome: Progressing     Problem: Nutrition Deficit:  Goal: Optimize nutritional status  9/16/2022 1104 by Lucía Wilson RN  Outcome: Progressing  Note: On tube feeding, speech therapy working with patient.   9/16/2022 0959 by Ketty Pat RD, LD  Outcome: Progressing  9/16/2022 0009 by Alissa Kennedy RN  Outcome: Progressing

## 2022-09-16 NOTE — PROGRESS NOTES
Willam Barba  9/16/2022  [id number]    Chief Complaint: Critical illness myopathy    Subjective: Patient seen this AM.  Patient had episode of SVT this morning for about 10 to 15 minutes, then his heart rate reverted back to 85 from 150. Patient given Metroprolol dose. He does have a history of A. fib. Will place patient on cardiac monitor. Patient seen by cardiology last month, will get them to see him again about starting oral anticoagulation for his paroxysmal A. fib. Therapies continue to work with him on improving his endurance and leg strength. Patient knows he is very weak with low endurance. Speech is working with the patient this morning, and he is improving with his swallowing, and we will do a video evaluation next Tuesday. ROS: No N/V, SOB, chest pain, C/F    Objective:  Patient Vitals for the past 24 hrs:   BP Temp Temp src Pulse Resp SpO2 Weight   09/16/22 0623 113/77 -- -- 87 -- -- --   09/16/22 0426 125/75 98.1 °F (36.7 °C) Oral 74 17 96 % (!) 314 lb 2.5 oz (142.5 kg)   09/15/22 2340 -- -- -- -- 17 -- --   09/15/22 2131 113/75 -- -- 82 -- -- --   09/15/22 1530 126/84 98.2 °F (36.8 °C) Oral 76 17 95 % --   09/15/22 0710 -- -- -- -- -- 96 % --       Gen: No distress, pleasant. HEENT: Normocephalic, atraumatic. CV: Regular rate and rhythm. Resp: No respiratory distress. Abd: Soft, nontender, obese, PEG tube in place  Ext: No edema. Neuro: Alert, oriented, appropriately interactive.      Wt Readings from Last 3 Encounters:   09/16/22 (!) 314 lb 2.5 oz (142.5 kg)   09/07/22 (!) 309 lb 8.4 oz (140.4 kg)       Laboratory data:   Lab Results   Component Value Date    WBC 4.6 09/15/2022    HGB 9.9 (L) 09/15/2022    HCT 29.8 (L) 09/15/2022    MCV 91.3 09/15/2022     09/15/2022       Lab Results   Component Value Date/Time     09/14/2022 07:34 AM    K 4.5 09/14/2022 07:34 AM    CL 99 09/14/2022 07:34 AM    CO2 26 09/14/2022 07:34 AM    BUN 17 09/14/2022 07:34 AM    CREATININE 0.8 09/14/2022 07:34 AM    GLUCOSE 106 09/14/2022 07:34 AM    CALCIUM 7.9 09/14/2022 07:34 AM        Therapy progress:  PT  Position Activity Restriction  Other position/activity restrictions: NPO, PEG tube, 2L O2 (not on O2 at home), IV L hand, CPAP at night  Objective     Sit to Stand: Contact guard assistance (As pt became fatigued they would sit down more abruptly)  Stand to sit: Stand by assistance  Device: Rolling Walker  Assistance: Contact guard assistance  Distance: 80' on uneven surface (carpet at end of felipe) with 4 turns  OT  PT Equipment Recommendations  Equipment Needed: No  Other: Continue to assess pending progress          SLP  Current Diet :  (PEG; no oral po)   1. Dysphagia diagnosis: Persistent concern for lingual coordination deficits which can impact bolus control (Mastication was not assessed due to recent history/finding of pharyngeal phase deficits); persistent concern for delayed initiation of swallow; potential residual reduced laryngeal rom and pharyngeal peristalsis. Pt with intermittent throat clearing post swallow;multiple swallows per one presentation. Anticipate will need repeat MBSS during this admit. Justine Westbrook Recommend : continue SLP therapy for Oropharyngeal Dysphagia; date TBD for repeat MBSS; ice chips :occasional /isolated; oral care. 2.  Cognitive Diagnosis: Pt demonstrated functional temporal and spatial orientation; concrete attention and VPS/PS and reasoning and math language. Pt with deficits in memory (working memory and STM ) and higher level attention (ie alternating and divided attention) on cognitive-linguistic testing. Will continue evaluation of higher level cognitive-linguistic skills as pt does participate in home/med management. Current deficits may impact. 3.  Speech Diagnosis: Oral Motor Speech /Voice characterized by hoarse / rough / raspy voice quality which can impact intelligibility of connected speech.  Pt is s/p prolonged intubation and then brief intubation post a surgical procedure. Pt reporting some improvement. Will initiate functional voice ex and respiratory control ex ; and trial of ex reducing laryngeal tension and assessment improvement. If persistent deficits ; potential need for referral to ENT for assessment of VC function           Body mass index is 49.2 kg/m². Assessment and Plan:        Critical illness myopathy-  due to aspiration pneumonia, hiatal hernia repair, IV AB     atrial fibrillation, heart failure-  Demadex, ASA     Morbid Obesity, sleep apnea- over 300 lbs     HTN- Lopressor     rheumatoid arthritis- Celebrex     Hypothyroid- synthroid     Bowels: Schedule Miralax + Senna S. Follow bowel movements. Enema or suppository if needed. Bladder: Check PVR x 3. [address] if PVR > 200ml or if any volume is > 500 ml. Pain: Tylenol is ordered prn.          Sondra Porras MD 9/16/2022, 7:04 AM

## 2022-09-16 NOTE — PLAN OF CARE
Problem: Safety - Adult  Goal: Free from fall injury  9/16/2022 0009 by Bita Frank RN  Outcome: Progressing  Note: Fall risk band on patient. Orange light on outside of room. Non skid footwear in place. Alarms used appropriately. Patient instructed to call and wait for staff before getting up. Rounding done to anticipate needs. Appropriate safety devices used for transfers.

## 2022-09-16 NOTE — PROGRESS NOTES
Patient admitted to rehab with Critical Illness Myopathy. A/Ox4. Transfers with walker x1. Mobility restrictions: none. On NPO diet, with nocturnal tube feedings tolerating well. Medications taken crushed through PEG Tube. On Lovenox for DVT prophylaxis. Skin: lap sites closed with surgical glue - CDI. Oxygen: 2L O@ daytime, Bipap at HS. LDA: left AC. Has been continent of bowel and bladder. LBM 9/15/2022. Chair/bed alarms in use and call light in reach. Will monitor for safety.

## 2022-09-16 NOTE — PROGRESS NOTES
Patient admitted to rehab with critical illness myopathy. A/Ox4. Transfers with walker x 1. Mobility restrictions: WBAT. On NPO diet. Nocturnal tube feed (Pivot 1.5 @ 100 mL/hr, tolerating well. Medications taken via PEG tube. On lovenox for DVT prophylaxis. Skin: lap sites (x4) surgical glue. Oxygen: 2L NC during day, Bipap at HS. LDA: PIV LAC. Has been continent of bowel and continent of bladder. LBM 9/15. Chair/bed alarms in use and call light in reach. Will monitor for safety.

## 2022-09-16 NOTE — PROGRESS NOTES
Patient admitted to rehab with critical illness myopathy. A/Ox4. Transfers with walker. NPO, Medications per g tube. On Lovenox for DVT prophylaxis. Skin: g tube site and surgical glue . Oxygen: 2 litters. Has been continent of bowel and of bladder. LBM today. Chair/bed alarms in use and call light in reach. Will monitor for safety. Wife has been here most of day. Patient denies pain. On tele, no issues with heart rate but will de sat. Md aware.

## 2022-09-16 NOTE — PROGRESS NOTES
Speech Language Pathology  ACUTE REHAB UNIT  SPEECH/LANGUAGE PATHOLOGY      [x] Daily  [] Weekly Care Conference Note  [] Discharge    Patient:Miles Nelson      FIT:[birth year]  formerly [facility]:[id number]  Rehab Dx/Hx: Acute and chronic respiratory failure with hypercapnia [J96.22]  Critical illness myopathy [G72.81]    Precautions: Fall risk; Dysphagia; PEG Tube feeding; Cognitive/Memory  Home situation: Lives with spouse  ST Dx: [] Aphasia  [] Dysarthria  [] Apraxia   [x] Oropharyngeal dysphagia [x] Cognitive   Impairment  [x] Other: Voice therapy post prolonged intubation and then re-intubation for surgical procedure  Initial Speech Therapy Assessment Diagnosis:   Speech Therapy Diagnosis  1. Cognitive Diagnosis: Pt demonstrated functional temporal and spatial orientation; concrete attention and VPS/PS and reasoning and math language. Pt with deficits in memory (working memory and STM ) and higher level attention (ie alternating and divided attention) on cogntive-linguistic testing. Will continue evaluation of higher level cognitive-linguistic skills as pt does participate in home/med management. Current deficits may impact. 2.Speech Diagnosis: Oral Motor Speech /Voice characterized by hoarse / Tanya Courtney / raspy voice quality which can impact intelligibility of connected speech. Pt is s/p prolonged intubation and then brief intubation post a surgical procedure. Pt reporting some improvement. Will initiate functional voice ex and respiratory control ex ; and trial of ex reducing laryngeal tension and assessment improvement. If persistent deficits ; potential need for referral to ENT for assessment of VC function  3. Communication Diagnosis: Pt is demonstrating functional concrete and mild complex auditory and visual language processing and verbal expressive language skills. Will assess moderately complex as related to adv DL tasks related to higher level executive function  4. Dysphagia diagnosis: Persistent concern for lingual coordination deficits which can impact bolus control (Mastication was not assessed due to recent history/finding of pharyngeal phase deficits); persistent concern for delayed initiation of swallow; potential residual reduced laryngeal rom and pharyngeal peristalsis. Pt with intermittent throat clearing post swallow;multiple swallows per one presentation. Anticipate will need repeat MBSS during this admit. Recommend : continue SLP therapy for Oropharyngeal Dysphagia; date TBD for repeat MBSS; ice chips :occasional /isolated; oral care. Date of Admit: 9/13/2022  Room #: O4H-6105/3255-01  Date: 9/16/2022       Current functional status (updated daily):         Current Diet Order:Diet NPO  ADULT TUBE FEEDING; PEG; Immune Enhancing; Cyclic; 964; 6:39 PM; 4:46 AM; 175; Q 4 hours   Behavior: [] Alert  [] Cooperative  []  Pleasant  [] Confused  [] Agitated  [] Uncooperative  [] Distractible [] Motivated  [] Self-Limiting [] Anxious  [] Other:  Endurance:  [] Adequate for participation in SLP sessions  [] Reduced overall  [] Lethargic  [] Other:  Safety: [] No concerns at this time  [] Reduced insight into deficits  []  Reduced safety awareness [] Not following call light procedures  [] Unable to Assess  [] Other:  Swallowing Precautions: PEG tube feeding restricitoins; oral care; ice chips  Barriers toward progress: good guarded; medical DX and course of medical co-mrobidities.  Has caregiver support/pt is motivated though           Date: 9/16/2022      Tx session 1 Tx session 2   Total Timed Code Min   SLP Individual Minutes  Time In: 0730  Time Out: 0800  Minutes: 30  Coded treatment time  0   N/a   Group Treatment Minutes 0 0   Co-Treat Minutes 0 0   Variance/Reason:  N/a    Pain Pt reports usual rheumatoid arthritis    Pain Intervention [] RN notified  [] Repositioned  [] Intervention offered and patient declined  [] N/A  [] Other: [] RN notified  [] Repositioned  [] Intervention offered and patient declined  [] N/A  [] Other:   Subjective     Pt seen bedside  RN at pt 's side as well as pt's dtr. Pt reportedly had some cardiac issues early am  RN reports cardiology consult  Pt with good effort    Objective:  Goals         Dysphagia goals: Pt goals is to eat and drink and get rid of feeing tube    Therapy working toward pt goal emphasizing lingual/laryngeal/pharyngeal ex and therapy trials with compensatory swallow strategy training  N/a   The patient will improve oral preparation phase via bolus control/manipulation exercises to 5/5 each trial.,  Lingual rom stretch continued; targeted lingual shaping for lingual seal against palate for lingual hold and posterior lingual elevation rom stretch with ~3/4 inch mandible depressed    Pt/family ed in rationale of lingual rom stretch/coordination and purpose of lingual hold with seal of drink against hard palate prior to swallow N/a   The patient will tolerate instrumental swallowing procedure  Discussed with MD/pt and family. l Plan on repeat MBSS for ~9/20/2922      Pt will demosntrate imrpoved swallow response via lingual ex; tongue base retraction ex; laryngeal / pharyngeal ex for toleration of 4 ounce servings of 1-3 consistencies with compensatory swallow strategies without overt clinical s/s of aspiration   Tongue base retraction ex: wearm up    Laryngeal ex targeting  Glottals on expiratory effort: continued 3-5 resp on expiratory effort  Pitch swing low to high with falsetto hold: Gradual improvement and gradual increase in pitch elevation stretch  Lorri: extra time      PO trials:   Ice chips : warm up targeting lingual hold/effortful swallow  Tsp thin H20 with lingual hold and modified supraglottic swallow: tsp warm up (<4 tsps)  Mildly/nectar thick via tsp with lingual hold and modified supraglottic swallow: x 7 reps  Moderately thick/honey thick via tsp with lingual hold and modified supraglottic swallow: x 5 reps  Puree via tsp with modified supraglottic swallow: x 5 reps    Pt declined problems. Pt with 2 episodes of voice change and/or increase throat clearing    Ongoing fmaily /pt ed in rationale of varied ex ; compensatory swallow strategies of lingual hold and modified supraglottic swallows.  All voiced understanding N/a   Voice/cognitive-linguistic Goals: Pt's goal is tcare for self and participate in home / health responsibilities Working toward pt goal N/A     Goal 1: Pt will participate in continued assessment of higher level cognitive-lingujistic skills for adv DL situatios which would require higher level executive function     Pt/family ed in testing findings and rationale of emphasis on higher level attention and recall for new learning strategies N/A   Goal 2: Pt will demosntrate improved recall of new information; learned compensatory strategies or medical care procedures and new information with use of compensatory strategies; use of memory strategies with set up and <mild assist Working memory for rule application for 9-57 task trials: set up; conditiuoning and intermittent reminders    Working memory for alternating attention for 2-3 part rule application for 8-86 task trials: set up; conditioning to task and re-direct every 7-8 task trials    Functional recall of rehab events; therapies:   Not targeted with exception of review of anticipated repeat MBSS next Tuesday  Pt was able to verbalize PEG tube and positioning precautions    Memory strategies for new information targeted using chunking/association    Pt/family ed in rationale of above memory strategies N/a   Goal 3: Pt will demosntrate functional VPS/PS and reasoning for graded functional PS/VPS/numeric reasoning tasks with < mild assist   Pt/family ed in rationale for incorporating new learning /precautions for VPS/PS N/a   Goal 4: Pt will demonstrate improved voice quality and endurance for functional voice ex for 8 seconds and improved sustained voice quality at 4-8 syllable utterance level Pt/family ed in role of easy onset; frontal focus; breath support and breath strategies (targeting diaphragm) for varied structured functional voice ex:   frontal phonemes for pitch swing low to high for CVCV level: 0% voice cessation and achievement of pitch variation  Pitch swing low to high to low: CVCVCVCV using frontal phonemes (/w/; /b/; /m/; \"oo\"; /v/: <15% voice cessation or strained quality  Pitch swing \"whoop\" low to high: <25% voice cessation  Pitch swing \"boom\" high to low: <5% voice cessation  Phonation of \"oo\" low to highto low more optimal with <10% voice cessationa as compared to \"ee\" and \"ah\" pitch swing low to high to low. Phonatoin of \"boom\" and \"vroom \" >4 in consecutiv with good phonation without voce cessation  Generalization into less structured tasks-hoarse/strained raspy voice increases  Family reporting continued improvement from a week ago. Family stated , during structured tasks above\" voice getting closer to baseline    N/a   Other areas targeted:     Education:   Ongoing pt ed  Family ed    Safety Devices: [x] Call light within reach  [x] Chair alarm activated  [] Bed alarm activated  [x] Other: family at his side [] Call light within reach  [] Chair alarm activated  [] Bed alarm activated  [] Other:    Progress Assessment: 9/15/2022: CONTINUE ALL GOAL  9/16/2022: RN reporting some cardiac issues early am;MD aware and MD wrote a cardiology consult. . Family ed with pt's dtr   Plan: Continue as per plan of care.    Continued Tx Upon Discharge: ?    [x] Yes [] No [] TBD based on progress while on ARU [] Vital Stim indicated [] Other:   Estimated discharge date: TBD   Discharge recommendations:   [] Home independently  [x] Home with assistance []  24 hour supervision  [] ECF [] Other:     Additional information:     Interventions used during Rehab Stay:  [] Speech/Language Treatment  [] Instruction in HEP [] Group [x] Dysphagia Treatment [x] Cognitive Treatment   [x] Other:Voice TX          Electronically Signed by    Luba Holland. MS Alexandrea,CCC,SLP [id number]  Speech and Language Pathologist

## 2022-09-16 NOTE — PROGRESS NOTES
Physical Therapy  Facility/Department: [address] REHAB  Rehabilitation Physical Therapy Treatment Note    NAME: Arden Fisher  : 1960 (63 y.o.)  MRN: [id number]  CODE STATUS: Full Code    Date of Service: 22       Restrictions:  Restrictions/Precautions: NPO;Fall Risk  Position Activity Restriction  Other position/activity restrictions: NPO, PEG tube, 2L O2 (not on O2 at home), IV L hand, CPAP at night, tele   Pertinent medical information:  Additional Pertinent Hx: Per Dr. Youngblood Ax H/P \"Patient is a 63 y.o. male hospitalized on 2022   for small bowel obstruction, pneumonia, septic shock patient intubation, mechanical ventilation, broad-spectrum antibiotics s intubated/extubated 2020 through 2022. On 2022, EGD done for endoscopically placed NG tube placement. On 2022, EGD with endoscopic placement of NG tube. On 2022 underwent laparoscopic reduction of large hiatal hernia with gastropexy, placement of gastrostomy tube. On 2022, extubated after the above procedure. He had the diagnosis of critical illness myopathy, and was discharged from our hospital to an LTAC unit for further IV antibiotic therapy and treatment. Patient now has improved, and is admitted back to our rehabilitation unit to progress with his strengthening of his arms and legs and improving his endurance before discharge back home. Patient also has diagnosis of atrial fibrillation, heart failure, dysphagia, sleep apnea, HTN, and rheumatoid arthritis. Patient is now ambulating 50 feet with a rolling walker and PT with contact-guard to min assist, and needs max assist for lower body bathing and dressing. Patient lives at home with his wife in a 1 level house. Patient is allowed to eat PO with speech therapy during therapeutic feeding. \"   SUBJECTIVE  Subjective  Subjective: Pt reports feeling good even after having episodes of SVT last night. Pt BP ws 117/78 in supine at start of session.  When PT and SPT arrived pt was supine in hospital with RN Servando Bell who was taking vitals and despening medication. Pain: Pt reports 4/10 pain in B ankles. OBJECTIVE  Cognition  Overall Cognitive Status: WFL  Orientation  Overall Orientation Status: Within Normal Limits  Orientation Level: Oriented X4    Functional Mobility  Bed Mobility  Overall Assistance Level: Modified Independent  Additional Factors: Head of bed raised; With handrails  Roll Right  Assistance Level: Modified independent  Skilled Clinical Factors: required increased time to complete and use of R handrail where he put B UE on bed rail inorder to rise to seated. HOB was also aised during activity in order for the patient to complete. Supine to Sit  Assistance Level: Supervision  Skilled Clinical Factors: required increased time to complete and use of R handrail where he put B UE on bed rail inorder to rise to seated. HOB was also aised during activity in order for the patient to complete. Balance  Sitting Balance: Modified independent   Standing Balance: Modified independent   Standing Balance  Time: 5 minutes  Activity: Pt standing with RW while SPT and PT got room set up to ambulate to . Comments: Pt able to complete with no LOB and no swaying during standing. Sit to Stand  Assistance Level: Stand by assist  Skilled Clinical Factors: from raised hospital bed to RW. Pt required increased effot to complete. Stand to Sit  Assistance Level: Stand by assist  Skilled Clinical Factors: cues for hand placement      Environmental Mobility  Ambulation  Surface: Level surface  Device: Rolling walker  Distance: 8' with 2 turns, 186' X 2 turns  Activity: Within Unit  Activity Comments: increased fatigue during activity and required seated rest break following activity. Additional Factors: Increased time to complete  Assistance Level: Stand by assist  Gait Deviations: Slow adam; Wide base of support  Skilled Clinical Factors: BP following second ambulation trial was 126/75. PT Exercises  Exercise Treatment: Seated in WC due to increased fatigue following ambulation:B AP X 15, B LAQ X 15, B marches X 15, ADductor squeezes X 15 with 3 second holds. Pt required a rest break following the marches due to JADE. ASSESSMENT/PROGRESS TOWARDS GOALS  Vital Signs  Comment: Vitals were documented in flowsheet. supine: 117/78, seated 125/82 HR 70, standing 162/93 HR 90, post ambulation 126/75. Assessment  Assessment: Pt had epsidoes of SVT last night and is having issues with regulating his BP, these factors limited his ability to practicipate in therapy today. Performed screening of his orthostatic vitals at the start of session which are documented in the flowsheet and vitals section. They were within a range where we could continue our treatment session. Pt completed ambulation with RW and SBA BP was taken following activity and it was 126/75. Performed some seated exercises following ambulation due to increased fatigue of patient which he tolerated well. Overall pt has decreased endurance, increased fatigue, and decreased functional mobility. He is below his baseline level and would benefit from skilled intervention in order to improve his endurance decrease his fatigue, work on gait training and improve his functional mobility in order to return to his PLOF. Activity Tolerance: Patient limited by endurance; Patient limited by fatigue  Discharge Recommendations: Continue to assess pending progress; Patient would benefit from continued therapy after discharge;Home with assist PRN  PT Equipment Recommendations  Equipment Needed: No  Other: Continue to assess pending progress    Goals  Patient Goals   Patient goals : Return to walking without difficulty. Short Term Goals  Time Frame for Short term goals: 7 days  Short term goal 1: Complete ramp with RW CGA. Short term goal 2: Ambulate 150' with RW and CGA. Short term goal 3: Transfers, car and sit<>stand, with SBA.   Short term goal 4: Complete all bed mobility mod I.  Long Term Goals  Time Frame for Long term goals : 10-14 days  Long term goal 1: Complete ramp with no AD mod I.  Long term goal 2: Ambulate 150' RW mod I.  Long term goal 3: Transfers, car and sit<>stand, with mod I. PLAN OF CARE/SAFETY  Plan  Plan:  minutes of therapy at least 5 out of 7 days a week  Plan weeks: 2 weeks  Current Treatment Recommendations: Strengthening;Balance training;Functional mobility training;Transfer training;Stair training;Gait training; Therapeutic activities; Home exercise program;Endurance training;IADL training  Safety Devices  Type of Devices: All fall risk precautions in place;Gait belt;Left in chair (left in [facility] for transport)  Restraints  Restraints Initially in Place: No    EDUCATION  Education  Education Given To: Patient  Education Provided: Role of Therapy;Plan of Care;ADL Function; Safety; Mobility Training;Transfer Training;Energy Conservation  Education Provided Comments: safety with mobility tasks  Education Method: Verbal;Demonstration  Barriers to Learning: None  Education Outcome: Verbalized understanding        Therapy Time   Individual Concurrent Group Co-treatment   Time In 0945         Time Out 1030         Minutes 45           Timed Code Treatment Minutes: [facility], 09/16/22 at 11:48 AM  Therapist was present, directed the patient's care, made skilled judgement, and was responsible for assessment and treatment of the patient.        Primo Urena, [id number]

## 2022-09-17 PROCEDURE — 97530 THERAPEUTIC ACTIVITIES: CPT | Performed by: PHYSICAL THERAPIST

## 2022-09-17 PROCEDURE — 2580000003 HC RX 258: Performed by: PHYSICAL MEDICINE & REHABILITATION

## 2022-09-17 PROCEDURE — 97530 THERAPEUTIC ACTIVITIES: CPT

## 2022-09-17 PROCEDURE — 92526 ORAL FUNCTION THERAPY: CPT

## 2022-09-17 PROCEDURE — 1280000000 HC REHAB R&B

## 2022-09-17 PROCEDURE — 6360000002 HC RX W HCPCS: Performed by: PHYSICAL MEDICINE & REHABILITATION

## 2022-09-17 PROCEDURE — 6370000000 HC RX 637 (ALT 250 FOR IP): Performed by: PHYSICAL MEDICINE & REHABILITATION

## 2022-09-17 PROCEDURE — 2700000000 HC OXYGEN THERAPY PER DAY

## 2022-09-17 PROCEDURE — 97110 THERAPEUTIC EXERCISES: CPT

## 2022-09-17 PROCEDURE — 97116 GAIT TRAINING THERAPY: CPT | Performed by: PHYSICAL THERAPIST

## 2022-09-17 PROCEDURE — 97116 GAIT TRAINING THERAPY: CPT

## 2022-09-17 PROCEDURE — 97535 SELF CARE MNGMENT TRAINING: CPT

## 2022-09-17 PROCEDURE — 97129 THER IVNTJ 1ST 15 MIN: CPT

## 2022-09-17 PROCEDURE — 94761 N-INVAS EAR/PLS OXIMETRY MLT: CPT

## 2022-09-17 RX ADMIN — Medication 1 TABLET: at 09:23

## 2022-09-17 RX ADMIN — LEVOTHYROXINE SODIUM 200 MCG: 0.1 TABLET ORAL at 09:23

## 2022-09-17 RX ADMIN — CELECOXIB 200 MG: 200 CAPSULE ORAL at 09:22

## 2022-09-17 RX ADMIN — SODIUM CHLORIDE, PRESERVATIVE FREE 10 ML: 5 INJECTION INTRAVENOUS at 21:03

## 2022-09-17 RX ADMIN — GABAPENTIN 400 MG: 400 CAPSULE ORAL at 15:00

## 2022-09-17 RX ADMIN — METOPROLOL TARTRATE 50 MG: 50 TABLET ORAL at 09:23

## 2022-09-17 RX ADMIN — POTASSIUM BICARBONATE 20 MEQ: 782 TABLET, EFFERVESCENT ORAL at 09:22

## 2022-09-17 RX ADMIN — GABAPENTIN 400 MG: 400 CAPSULE ORAL at 21:02

## 2022-09-17 RX ADMIN — ENOXAPARIN SODIUM 30 MG: 100 INJECTION SUBCUTANEOUS at 09:22

## 2022-09-17 RX ADMIN — ENOXAPARIN SODIUM 30 MG: 100 INJECTION SUBCUTANEOUS at 21:02

## 2022-09-17 RX ADMIN — SODIUM CHLORIDE, PRESERVATIVE FREE 10 ML: 5 INJECTION INTRAVENOUS at 09:27

## 2022-09-17 RX ADMIN — Medication 30 MG: at 09:27

## 2022-09-17 RX ADMIN — METOPROLOL TARTRATE 50 MG: 50 TABLET ORAL at 21:02

## 2022-09-17 RX ADMIN — ASPIRIN 81 MG: 81 TABLET, COATED ORAL at 09:23

## 2022-09-17 RX ADMIN — Medication 1 TABLET: at 21:02

## 2022-09-17 RX ADMIN — FOLIC ACID 1 MG: 1 TABLET ORAL at 09:23

## 2022-09-17 RX ADMIN — CELECOXIB 200 MG: 200 CAPSULE ORAL at 21:02

## 2022-09-17 RX ADMIN — GABAPENTIN 400 MG: 400 CAPSULE ORAL at 09:22

## 2022-09-17 RX ADMIN — TORSEMIDE 20 MG: 20 TABLET ORAL at 09:23

## 2022-09-17 RX ADMIN — Medication 100 MG: at 09:23

## 2022-09-17 ASSESSMENT — PAIN DESCRIPTION - FREQUENCY
FREQUENCY: INTERMITTENT
FREQUENCY: INTERMITTENT

## 2022-09-17 ASSESSMENT — PAIN DESCRIPTION - DESCRIPTORS
DESCRIPTORS: ACHING
DESCRIPTORS: ACHING

## 2022-09-17 ASSESSMENT — PAIN - FUNCTIONAL ASSESSMENT: PAIN_FUNCTIONAL_ASSESSMENT: ACTIVITIES ARE NOT PREVENTED

## 2022-09-17 ASSESSMENT — PAIN SCALES - WONG BAKER
WONGBAKER_NUMERICALRESPONSE: 0

## 2022-09-17 ASSESSMENT — PAIN DESCRIPTION - PAIN TYPE
TYPE: CHRONIC PAIN
TYPE: CHRONIC PAIN

## 2022-09-17 ASSESSMENT — PAIN SCALES - GENERAL
PAINLEVEL_OUTOF10: 3
PAINLEVEL_OUTOF10: 3
PAINLEVEL_OUTOF10: 1
PAINLEVEL_OUTOF10: 0

## 2022-09-17 ASSESSMENT — PAIN DESCRIPTION - ONSET
ONSET: ON-GOING
ONSET: ON-GOING

## 2022-09-17 ASSESSMENT — PAIN DESCRIPTION - LOCATION
LOCATION: BACK;GENERALIZED
LOCATION: GENERALIZED

## 2022-09-17 ASSESSMENT — PAIN DESCRIPTION - ORIENTATION: ORIENTATION: LEFT;MID

## 2022-09-17 NOTE — PROGRESS NOTES
Speech Language Pathology  ACUTE REHAB UNIT  SPEECH/LANGUAGE PATHOLOGY      [x] Daily  [] Weekly Care Conference Note  [] Discharge    Patient:Miles Cook      AFV:[birth year]  ZTX:[id number]  Rehab Dx/Hx: Acute and chronic respiratory failure with hypercapnia [J96.22]  Critical illness myopathy [G72.81]    Precautions: Fall risk; Dysphagia; PEG Tube feeding; Cognitive/Memory  Home situation: Lives with spouse  ST Dx: [] Aphasia  [] Dysarthria  [] Apraxia   [x] Oropharyngeal dysphagia [x] Cognitive   Impairment  [x] Other: Voice therapy post prolonged intubation and then re-intubation for surgical procedure  Initial Speech Therapy Assessment Diagnosis:   Speech Therapy Diagnosis  1. Cognitive Diagnosis: Pt demonstrated functional temporal and spatial orientation; concrete attention and VPS/PS and reasoning and math language. Pt with deficits in memory (working memory and STM ) and higher level attention (ie alternating and divided attention) on cogntive-linguistic testing. Will continue evaluation of higher level cognitive-linguistic skills as pt does participate in home/med management. Current deficits may impact. 2.Speech Diagnosis: Oral Motor Speech /Voice characterized by hoarse / Ronna Shallow / raspy voice quality which can impact intelligibility of connected speech. Pt is s/p prolonged intubation and then brief intubation post a surgical procedure. Pt reporting some improvement. Will initiate functional voice ex and respiratory control ex ; and trial of ex reducing laryngeal tension and assessment improvement. If persistent deficits ; potential need for referral to ENT for assessment of VC function  3. Communication Diagnosis: Pt is demonstrating functional concrete and mild complex auditory and visual language processing and verbal expressive language skills. Will assess moderately complex as related to adv DL tasks related to higher level executive function  4. Dysphagia diagnosis: Persistent concern for lingual coordination deficits which can impact bolus control (Mastication was not assessed due to recent history/finding of pharyngeal phase deficits); persistent concern for delayed initiation of swallow; potential residual reduced laryngeal rom and pharyngeal peristalsis. Pt with intermittent throat clearing post swallow;multiple swallows per one presentation. Anticipate will need repeat MBSS during this admit. Recommend : continue SLP therapy for Oropharyngeal Dysphagia; date TBD for repeat MBSS; ice chips :occasional /isolated; oral care. Date of Admit: 9/13/2022  Room #: Y5W-8023/3255-01  Date: 9/17/2022       Current functional status (updated daily):         Current Diet Order:Diet NPO  ADULT TUBE FEEDING; PEG; Immune Enhancing; Cyclic; 330; 6:45 PM; 4:03 AM; 175; Q 4 hours   Behavior: [x] Alert  [x] Cooperative  [x]  Pleasant  [] Confused  [] Agitated  [] Uncooperative  [] Distractible [] Motivated  [] Self-Limiting [] Anxious  [] Other:  Endurance:  [x] Adequate for participation in SLP sessions  [] Reduced overall  [] Lethargic  [] Other:  Safety: [x] No concerns at this time  [] Reduced insight into deficits  []  Reduced safety awareness [] Not following call light procedures  [] Unable to Assess  [] Other:  Swallowing Precautions: PEG tube feeding restricitoins; oral care; ice chips  Barriers toward progress: good guarded; medical DX and course of medical co-mrobidities.  Has caregiver support/pt is motivated though           Date: 9/17/2022      Tx session 1 Tx session 2   Total Timed Code Min   SLP Individual Minutes  Time In: 1030  Time Out: 1110  Minutes: 40  Coded treatment time  15   N/a   Group Treatment Minutes 0 0   Co-Treat Minutes 0 0   Variance/Reason:  N/a    Pain Pt reports usual rheumatoid arthritis    Pain Intervention [] RN notified  [] Repositioned  [] Intervention offered and patient declined  [x] N/A  [] Other: [] RN notified  [] Repositioned  [] Intervention offered and patient declined  [] N/A  [] Other:   Subjective     Pt seen bedside  Wife present    Objective:  Goals         Dysphagia goals: Pt goals is to eat and drink and get rid of feeing tube    Therapy working toward pt goal emphasizing lingual/laryngeal/pharyngeal ex and therapy trials with compensatory swallow strategy training  N/a   The patient will improve oral preparation phase via bolus control/manipulation exercises to 5/5 each trial.,  Review of oral exercises with pt describing. No direct tx: cued to continue with written prompt. N/a   The patient will tolerate instrumental swallowing procedure  Plan on repeat MBSS for ~9/20/2922 reviewed with pt and wife. Pt will demosntrate imrpoved swallow response via lingual ex; tongue base retraction ex; laryngeal / pharyngeal ex for toleration of 4 ounce servings of 1-3 consistencies with compensatory swallow strategies without overt clinical s/s of aspiration   Tongue base retraction ex: Not addressed this date. Laryngeal ex targeting  Glottals on expiratory effort: continued 3-5 respson expiratory effort  Pitch swing low to high with falsetto hold: Gradual improvement and gradual increase in pitch elevation stretch  Lorri: Not addressed this date. Effortful swallow of secretions d/w pt for several trials t/o his day. Written prompt for exercises given. Pt generally able to monitor number of repetitions accurately during exercsises. PO trials: given in order of thickest to thin. Ice chips : NT  Tsp thin H20 with lingual hold and modified supraglottic swallow: tx5. Pt with independent throat clear x1 when inhalation step skipped. Mildly/nectar thick via tsp with lingual hold and modified supraglottic swallow: x 7 reps  Moderately thick/honey thick via tsp with lingual hold and modified supraglottic swallow: x 5 reps  Puree via tsp with modified supraglottic swallow: x 5 reps    Pt declined problems.    Effortful swallow encouraged during PO trials. 100% cueing for sequencing on thin liquids due to intermittent pt errors with sequence on nectar thick trials. Ongoing fmaily /pt ed in rationale of varied ex ; compensatory swallow strategies of lingual hold and modified supraglottic swallows. All voiced understanding  Swallowing exercises written and reviewed. N/a   Voice/cognitive-linguistic Goals: Pt's goal is tcare for self and participate in home / health responsibilities Working toward pt goal N/A     Goal 1: Pt will participate in continued assessment of higher level cognitive-lingujistic skills for adv DL situatios which would require higher level executive function     Review of orientation information due to prolonged hospitals stay with multiple room changes. Pt agreeable to review, but states he did not typically know the day/date since his nursing home with wife confirming this. N/A   Goal 2: Pt will demosntrate improved recall of new information; learned compensatory strategies or medical care procedures and new information with use of compensatory strategies; use of memory strategies with set up and <mild assist Working memory for rule application for 2-03 task trials: Not addressed this date. Working memory for alternating attention for 2-3 part rule application for 8-33 task trials: set up; Not addressed this date. Functional recall of rehab events; therapies:   Review of basic therapies and plan for MBS on 9/20/2022    Memory strategies: Not addressed this date. N/a   Goal 3: Pt will demosntrate functional VPS/PS and reasoning for graded functional PS/VPS/numeric reasoning tasks with < mild assist   Not addressed this date.    N/a   Goal 4: Pt will demonstrate improved voice quality and endurance for functional voice ex for 8 seconds and improved sustained voice quality at 4-8 syllable utterance level Pt/family ed in role of easy onset; frontal focus; breath support and breath strategies (targeting diaphragm) for varied structured functional voice ex:   Voice exercises not addressed   N/a   Other areas targeted:     Education:   Ongoing pt ed  Family ed    Safety Devices: [x] Call light within reach  [x] Chair alarm activated  [] Bed alarm activated  [x] Other: family at his side [] Call light within reach  [] Chair alarm activated  [] Bed alarm activated  [] Other:    Progress Assessment: 9/15/2022: CONTINUE ALL GOAL  9/16/2022: RN reporting some cardiac issues early am;MD lia and MD wrote a cardiology consult. . Family ed with pt's dtr   Plan: Continue as per plan of care.    Continued Tx Upon Discharge: ?    [x] Yes [] No [] TBD based on progress while on ARU [] Vital Stim indicated [] Other:   Estimated discharge date: TBD   Discharge recommendations:   [] Home independently  [x] Home with assistance []  24 hour supervision  [] ECF [] Other:     Additional information:     Interventions used during Rehab Stay:  [] Speech/Language Treatment  [] Instruction in HEP [] Group [x] Dysphagia Treatment [x] Cognitive Treatment   [x] Other:Voice TX          Electronically Signed by    Madan Dawson MS CCC/SLP [id number]  Speech Language Pathologist  09/17/22  12:18 PM

## 2022-09-17 NOTE — PROGRESS NOTES
Occupational Therapy  Facility/Department: [facility]  REH[facility]  Rehabilitation Occupational Therapy Daily Treatment Note    Date: 22  Patient Name: Chip Patel       Room: W5Z-3894/0486-12  MRN: [id number]  Account: [de-identified]   : 1960  (63 y.o.) Gender: male                    Past Medical History:  has a past medical history of Arthritis, Atrial fibrillation ([address] and Hypertension. Past Surgical History:   has a past surgical history that includes Upper gastrointestinal endoscopy (2022); Upper gastrointestinal endoscopy (2022); Upper gastrointestinal endoscopy (N/A, 2022); bronchoscopy (2022); bronchoscopy (2022); Gastric fundoplication (N/A, ); and Gastrostomy tube placement (N/A, 2022). Restrictions  Restrictions/Precautions: NPO;Fall Risk  Other position/activity restrictions: NPO, PEG tube, 2L O2 (not on O2 at home), IV L UE, CPAP at night, tele    Subjective  Subjective: Patient supine in bed upon arrival to room. Agreeable to shower. HR 97, o2 sats 2L 96%. Restrictions/Precautions: NPO;Fall Risk             Objective     Cognition  Overall Cognitive Status: WFL  Orientation  Overall Orientation Status: Within Normal Limits         ADL  Feeding  Skilled Clinical Factors: NPO  Grooming/Oral Hygiene  Assistance Level: Stand by assist  Skilled Clinical Factors: Stood in front of sink to brush hair and complete oral care  Upper Extremity Bathing  Assistance Level: Set-up; Supervision  Skilled Clinical Factors: seated on shower chair, able to turn on/off water and manage [facility]  Lower Extremity Bathing  Assistance Level: Stand by assist;Contact guard assist  Skilled Clinical Factors: Stood using grab bars, use of long sponge to wash LE's  Upper Extremity Dressing  Assistance Level: Set-up  Skilled Clinical Factors: doff/don t-shirt, able to remove and replace o2 without assist  Lower Extremity Dressing  Equipment Provided: Reachers  Assistance Level: Stand by assist;Contact guard assist  Skilled Clinical Factors: seated in chair to don underclothing and shorts without reacher, SBA/CGA for balance  Putting On/Taking Off Footwear  Equipment Provided: Reachers;Sock aid  Assistance Level: Supervision  Skilled Clinical Factors: doffed/donned non skid socks  Toileting  Skilled Clinical Factors: Not completed this date  Toilet Transfers  Skilled Clinical Factors: Not completed this date  Tub/Shower Transfers  Type: Shower  Transfer From: Dudley Brennan  Transfer To: Shower chair with back  Assistance Level: Stand by assist;Contact guard assist  Skilled Clinical Factors: use of grab bars for in/out of shower          Functional Mobility  Device: Rolling walker  Activity: To/From bathroom  Assistance Level: Stand by assist;Contact guard assist  Skilled Clinical Factors: Functional mobility with RW with SBA/CGA, slow steady gait with no overt LOB noted  Supine to Sit  Assistance Level: Stand by assist  Skilled Clinical Factors: completed in hospital bed with HOB elevated and side rail  Sit to Stand  Assistance Level: Contact guard assist;Stand by assist  Stand to Sit  Assistance Level: Stand by assist;Contact guard assist         Assessment  Assessment  Assessment: Patient tolerated session well. Completed supine to sit with HOB elevated and side rail with SBA. SBA/CGA for sti<>stand from EOB to RW to shower chair to chair with arms to recliner chair. Functional mobility with RW with sBA/CGA, slow steady gait with no overt LOB noted. Showered overall with SBA/CGA for balance. Dressed lower body with CGA/SBA for balance. Stood for grooming tasks. Patient is making great progress towards goals. Cont with POC.   Activity Tolerance: Patient tolerated treatment well  Discharge Recommendations: Patient would benefit from continued therapy after discharge  OT Equipment Recommendations  Other: TTB (Pt thinks they have at home), RW, walker basket, reacher  Safety Devices  Safety Devices in place: Yes  Type of devices: Call light within reach; Chair alarm in place;Gait belt;Left in chair;Nurse notified    Patient Education  Education  Education Given To: Patient  Education Provided: Safety;ADL Function;Mobility Training;Transfer Training;Energy Conservation  Education Provided Comments: o2 sats on 2L of o2 at end of session 95%    Plan  Plan  Times per Week: 5-7  Times per Day: Twice a day    Goals  Patient Goals   Patient goals : \"Be able to eat normally, get up and moving to get stronger\"  Short Term Goals  Time Frame for Short term goals: Within 7 days the Pt will  Short Term Goal 1: Toileting with SBA using GB's prn  Short Term Goal 2: LB dressing with SBA using AE prn  Short Term Goal 3: Functional transfers using LRAD with SBA  Short Term Goal 4: Bathing seated on shower chair with CGA  Short Term Goal 5: Improved endurance to complete standing balance task for 3min SBA in order to complete ADL and IADL tasks in standing  Long Term Goals  Time Frame for Long term goals :  Within 10-14 days  Long Term Goal 1: Toileting mod I using GB's prn  Long Term Goal 2: LB dressing mod I using AE prn  Long Term Goal 3: Functional transfers using LRAD mod I  Long Term Goal 4: Bathing seated on shower chair mod I  Long Term Goal 5: Improved endurance to complete standing balance task for 5min mod I in order to complete ADL and IADL tasks in standing  Additional Goals?: Yes  Long Term Goal 6: Determine DME needs and complete family education prior to discharge          Therapy Time   Individual Concurrent Group Co-treatment   Time In 0715         Time Out 0845         Minutes 90               Electronically signed by Leeann Hill [id number] on 9/17/2022 at 8:53 AM

## 2022-09-17 NOTE — PROGRESS NOTES
PHYSICAL THERAPY  Progress Note   Second Session    Patient Name: Dori Johnston  Medical Record Number: [id number]    Treatment Diagnosis: Acute hypoxemic and hypercapnic respiratory failure      Chart Reviewed: Yes   Restrictions/Precautions: NPO, Fall Risk Other position/activity restrictions: NPO, PEG tube, 2L O2 (not on O2 at home), IV L UE, CPAP at night, tele   Additional Pertinent Hx: Per Dr. Santana Hammans H/P \"Patient is a 63 y.o. male hospitalized on 8/5/2022   for small bowel obstruction, pneumonia, septic shock patient intubation, mechanical ventilation, broad-spectrum antibiotics s intubated/extubated 8/8/2020 through 8/26/2022. On 8/9/2022, EGD done for endoscopically placed NG tube placement. On 8/22/2022, EGD with endoscopic placement of NG tube. On 8/31/2022 underwent laparoscopic reduction of large hiatal hernia with gastropexy, placement of gastrostomy tube. On 9/2/2022, extubated after the above procedure. He had the diagnosis of critical illness myopathy, and was discharged from our hospital to an LTAC unit for further IV antibiotic therapy and treatment. Patient now has improved, and is admitted back to our rehabilitation unit to progress with his strengthening of his arms and legs and improving his endurance before discharge back home. Patient also has diagnosis of atrial fibrillation, heart failure, dysphagia, sleep apnea, HTN, and rheumatoid arthritis. Patient is now ambulating 50 feet with a rolling walker and PT with contact-guard to min assist, and needs max assist for lower body bathing and dressing. Patient lives at home with his wife in a 1 level house. Patient is allowed to eat PO with speech therapy during therapeutic feeding. \"        Subjective: Pt denies pain, reports \"hands are a little sore. \"     Objective  Baseline O2 sats 94% at rest.   Sit to stand with SBA. Ambulated 190' with wh walker and SBA.  One cardiac monitor lead came off and Demario Gilbert, sought out patient with PT since

## 2022-09-17 NOTE — PROGRESS NOTES
Physical Therapy  Facility/Department: [address] [facility]  Rehabilitation Physical Therapy Treatment Note    NAME: Kim Frazier  : 1960 (63 y.o.)  MRN: [id number]  CODE STATUS: Full Code    Date of Service: 22      SUBJECTIVE  Subjective  Subjective: Pt reports feeling good , slept well   OBJECTIVE  Cognition  Overall Cognitive Status: WFL  Orientation  Overall Orientation Status: Within Normal Limits    Functional Mobility  Transfers  Surface: To chair with arms;From chair with arms  Device: Walker  Sit to Stand  Assistance Level: Stand by assist  Skilled Clinical Factors: recalled technique    Environmental Mobility  Ambulation  Surface: Level surface  Device: Rolling walker  Distance: (190' with 3 turns and 2 surface transitions) x 2  Activity: Within Unit  Activity Comments: HR = 85 and O2 = 98% before walking on 2L O2, after walking HR = 95 and O2 = 89 quickly increased to 93% within 30 seconds  Assistance Level: Stand by assist  Gait Deviations: Wide base of support  PT Exercises  A/AROM Exercises: Sitting : Ankle pumps x 15, TKE x 15, B UE tricep extension green theraband x 15, bicep curls green theraband x 15, hamstring curls green theraband x 15, seated marching green therabnd x 15  ASSESSMENT/PROGRESS TOWARDS GOALS   Assessment  Assessment: Pt reports feeling good this morning. Pt completed ambulation with RW and SBA with O2 going briefly down to 89% and increasing to 93% within 30 seconds. . Performed some seated exercises with green theraband which he tolerated well. Overall pt has better endurance today. He remains below his baseline level and would benefit from skilled intervention in order to improve his endurance decrease his fatigue, work on gait training and improve his functional mobility in order to return to his PLOF. Activity Tolerance: Patient tolerated treatment well  Discharge Recommendations: Continue to assess pending progress; Patient would benefit from continued therapy after discharge;Home with assist PRN  PT Equipment Recommendations  Equipment Needed: No  Other: Continue to assess pending progress    Goals  Patient Goals   Patient goals : Return to walking without difficulty. Short Term Goals  Time Frame for Short term goals: 7 days  Short term goal 1: Complete ramp with RW CGA. Short term goal 2: Ambulate 150' with RW and CGA. Short term goal 3: Transfers, car and sit<>stand, with SBA. Short term goal 4: Complete all bed mobility mod I.  Long Term Goals  Time Frame for Long term goals : 10-14 days  Long term goal 1: Complete ramp with no AD mod I.  Long term goal 2: Ambulate 150' RW mod I.  Long term goal 3: Transfers, car and sit<>stand, with mod I. PLAN OF CARE/SAFETY  Safety Devices  Type of Devices:  All fall risk precautions in place;Gait belt;Left in chair;Chair alarm in place  Therapy Time   Individual Concurrent Group Co-treatment   Time In 0923         Time Out 1014         Minutes 51           Timed Code Treatment Minutes: Kristen  [place], 09/17/22 at 10:15 AM

## 2022-09-17 NOTE — PLAN OF CARE
Problem: Discharge Planning  Goal: Discharge to home or other facility with appropriate resources  Outcome: Progressing     Problem: Safety - Adult  Goal: Free from fall injury  Outcome: Progressing     Problem: ABCDS Injury Assessment  Goal: Absence of physical injury  Outcome: Progressing     Problem: Pain  Goal: Verbalizes/displays adequate comfort level or baseline comfort level  Outcome: Progressing     Problem: Nutrition Deficit:  Goal: Optimize nutritional status  Outcome: Progressing

## 2022-09-18 PROCEDURE — 1280000000 HC REHAB R&B

## 2022-09-18 PROCEDURE — 2580000003 HC RX 258: Performed by: PHYSICAL MEDICINE & REHABILITATION

## 2022-09-18 PROCEDURE — 94761 N-INVAS EAR/PLS OXIMETRY MLT: CPT

## 2022-09-18 PROCEDURE — 94660 CPAP INITIATION&MGMT: CPT

## 2022-09-18 PROCEDURE — 6370000000 HC RX 637 (ALT 250 FOR IP): Performed by: PHYSICAL MEDICINE & REHABILITATION

## 2022-09-18 PROCEDURE — 6360000002 HC RX W HCPCS: Performed by: PHYSICAL MEDICINE & REHABILITATION

## 2022-09-18 PROCEDURE — 2700000000 HC OXYGEN THERAPY PER DAY

## 2022-09-18 RX ADMIN — GABAPENTIN 400 MG: 400 CAPSULE ORAL at 14:12

## 2022-09-18 RX ADMIN — Medication 1 TABLET: at 08:46

## 2022-09-18 RX ADMIN — Medication 100 MG: at 08:46

## 2022-09-18 RX ADMIN — FOLIC ACID 1 MG: 1 TABLET ORAL at 08:46

## 2022-09-18 RX ADMIN — ASPIRIN 81 MG: 81 TABLET, COATED ORAL at 08:46

## 2022-09-18 RX ADMIN — CELECOXIB 200 MG: 200 CAPSULE ORAL at 08:45

## 2022-09-18 RX ADMIN — ENOXAPARIN SODIUM 30 MG: 100 INJECTION SUBCUTANEOUS at 20:40

## 2022-09-18 RX ADMIN — GABAPENTIN 400 MG: 400 CAPSULE ORAL at 20:41

## 2022-09-18 RX ADMIN — METHOTREXATE 15 MG: 2.5 TABLET ORAL at 14:12

## 2022-09-18 RX ADMIN — POTASSIUM BICARBONATE 20 MEQ: 782 TABLET, EFFERVESCENT ORAL at 08:44

## 2022-09-18 RX ADMIN — SODIUM CHLORIDE, PRESERVATIVE FREE 10 ML: 5 INJECTION INTRAVENOUS at 08:59

## 2022-09-18 RX ADMIN — Medication 1 TABLET: at 20:41

## 2022-09-18 RX ADMIN — METOPROLOL TARTRATE 50 MG: 50 TABLET ORAL at 08:46

## 2022-09-18 RX ADMIN — TORSEMIDE 20 MG: 20 TABLET ORAL at 08:46

## 2022-09-18 RX ADMIN — LEVOTHYROXINE SODIUM 200 MCG: 0.1 TABLET ORAL at 08:46

## 2022-09-18 RX ADMIN — METOPROLOL TARTRATE 50 MG: 50 TABLET ORAL at 20:41

## 2022-09-18 RX ADMIN — Medication 30 MG: at 08:59

## 2022-09-18 RX ADMIN — GABAPENTIN 400 MG: 400 CAPSULE ORAL at 08:50

## 2022-09-18 RX ADMIN — SODIUM CHLORIDE, PRESERVATIVE FREE 10 ML: 5 INJECTION INTRAVENOUS at 20:41

## 2022-09-18 RX ADMIN — ENOXAPARIN SODIUM 30 MG: 100 INJECTION SUBCUTANEOUS at 08:50

## 2022-09-18 RX ADMIN — CELECOXIB 200 MG: 200 CAPSULE ORAL at 20:41

## 2022-09-18 ASSESSMENT — PAIN SCALES - GENERAL
PAINLEVEL_OUTOF10: 3
PAINLEVEL_OUTOF10: 3
PAINLEVEL_OUTOF10: 0
PAINLEVEL_OUTOF10: 1

## 2022-09-18 ASSESSMENT — PAIN DESCRIPTION - ONSET
ONSET: ON-GOING
ONSET: ON-GOING

## 2022-09-18 ASSESSMENT — PAIN DESCRIPTION - FREQUENCY
FREQUENCY: INTERMITTENT
FREQUENCY: INTERMITTENT

## 2022-09-18 ASSESSMENT — PAIN DESCRIPTION - PAIN TYPE
TYPE: CHRONIC PAIN
TYPE: CHRONIC PAIN

## 2022-09-18 ASSESSMENT — PAIN DESCRIPTION - DESCRIPTORS
DESCRIPTORS: ACHING
DESCRIPTORS: ACHING

## 2022-09-18 ASSESSMENT — PAIN - FUNCTIONAL ASSESSMENT: PAIN_FUNCTIONAL_ASSESSMENT: ACTIVITIES ARE NOT PREVENTED

## 2022-09-18 ASSESSMENT — PAIN DESCRIPTION - ORIENTATION: ORIENTATION: LEFT;MID

## 2022-09-18 ASSESSMENT — PAIN DESCRIPTION - LOCATION
LOCATION: BACK
LOCATION: BACK;GENERALIZED

## 2022-09-18 NOTE — PROGRESS NOTES
Amanda Daugherty  9/18/2022  [id number]    Chief Complaint: Critical illness myopathy    Subjective: Patient seen this AM. No issues overnight. Watching videos on his phone this morning. ROS: No N/V, SOB, chest pain, C/F    Objective:  Patient Vitals for the past 24 hrs:   BP Temp Temp src Pulse Resp SpO2 Weight   09/18/22 0845 (!) 149/90 98.2 °F (36.8 °C) Oral 75 18 94 % --   09/18/22 0817 -- -- -- -- 18 93 % --   09/18/22 0450 (!) 147/69 97.5 °F (36.4 °C) Oral 74 18 98 % (!) 313 lb 0.9 oz (142 kg)   09/18/22 0026 124/80 97.6 °F (36.4 °C) Oral 66 18 96 % --   09/18/22 0005 -- -- -- -- 18 -- --   09/17/22 2102 123/77 -- -- 80 -- -- --   09/17/22 1858 122/78 97.9 °F (36.6 °C) Oral 73 18 95 % --   09/17/22 1637 139/83 97.3 °F (36.3 °C) Axillary 73 16 96 % --   09/17/22 1130 129/86 97.4 °F (36.3 °C) Oral 73 16 95 % --       Gen: No distress, pleasant. HEENT: Normocephalic, atraumatic. CV: Regular rate and rhythm. Resp: No respiratory distress. Abd: Soft, nontender, obese, PEG tube in place  Ext: No edema. Neuro: Alert, oriented, appropriately interactive.      Wt Readings from Last 3 Encounters:   09/18/22 (!) 313 lb 0.9 oz (142 kg)   09/07/22 (!) 309 lb 8.4 oz (140.4 kg)       Laboratory data:   Lab Results   Component Value Date    WBC 4.6 09/15/2022    HGB 9.9 (L) 09/15/2022    HCT 29.8 (L) 09/15/2022    MCV 91.3 09/15/2022     09/15/2022       Lab Results   Component Value Date/Time     09/14/2022 07:34 AM    K 4.5 09/14/2022 07:34 AM    CL 99 09/14/2022 07:34 AM    CO2 26 09/14/2022 07:34 AM    BUN 17 09/14/2022 07:34 AM    CREATININE 0.8 09/14/2022 07:34 AM    GLUCOSE 106 09/14/2022 07:34 AM    CALCIUM 7.9 09/14/2022 07:34 AM        Therapy progress:  PT  Position Activity Restriction  Other position/activity restrictions: NPO, PEG tube, 2L O2 (not on O2 at home), IV L UE, CPAP at night, tele  Objective     Sit to Stand: Contact guard assistance (As pt became fatigued they would sit down more abruptly)  Stand to sit: Stand by assistance  Device: Rolling Walker  Assistance: Contact guard assistance  Distance: 80' on uneven surface (carpet at end of felipe) with 4 turns  OT  PT Equipment Recommendations  Equipment Needed: No  Other: Continue to assess pending progress          SLP  Current Diet :  (PEG; no oral po)   1. Dysphagia diagnosis: Persistent concern for lingual coordination deficits which can impact bolus control (Mastication was not assessed due to recent history/finding of pharyngeal phase deficits); persistent concern for delayed initiation of swallow; potential residual reduced laryngeal rom and pharyngeal peristalsis. Pt with intermittent throat clearing post swallow;multiple swallows per one presentation. Anticipate will need repeat MBSS during this admit. Sylvia Samson Recommend : continue SLP therapy for Oropharyngeal Dysphagia; date TBD for repeat MBSS; ice chips :occasional /isolated; oral care. 2.  Cognitive Diagnosis: Pt demonstrated functional temporal and spatial orientation; concrete attention and VPS/PS and reasoning and math language. Pt with deficits in memory (working memory and STM ) and higher level attention (ie alternating and divided attention) on cognitive-linguistic testing. Will continue evaluation of higher level cognitive-linguistic skills as pt does participate in home/med management. Current deficits may impact. 3.  Speech Diagnosis: Oral Motor Speech /Voice characterized by hoarse / rough / raspy voice quality which can impact intelligibility of connected speech. Pt is s/p prolonged intubation and then brief intubation post a surgical procedure. Pt reporting some improvement. Will initiate functional voice ex and respiratory control ex ; and trial of ex reducing laryngeal tension and assessment improvement. If persistent deficits ; potential need for referral to ENT for assessment of VC function           Body mass index is 49.03 kg/m².        Assessment and Plan:        Critical illness myopathy-  due to aspiration pneumonia, hiatal hernia repair, IV AB     atrial fibrillation, heart failure-  Demadex, ASA     Morbid Obesity, sleep apnea- over 300 lbs     HTN- Lopressor     rheumatoid arthritis- Celebrex     Hypothyroid- synthroid     Bowels: Schedule Miralax + Senna S. Follow bowel movements. Enema or suppository if needed. Bladder: Check PVR x 3. [address] if PVR > 200ml or if any volume is > 500 ml. Pain: Tylenol is ordered prn.          Yanelis Prado MD  9/18/2022, 11:09 AM

## 2022-09-18 NOTE — PLAN OF CARE
Problem: Pain  Goal: Verbalizes/displays adequate comfort level or baseline comfort level  9/17/2022 2359 by Welton Burkitt, RN  Outcome: Progressing  Flowsheets (Taken 9/17/2022 2359)  Verbalizes/displays adequate comfort level or baseline comfort level:   Administer analgesics based on type and severity of pain and evaluate response   Consider cultural and social influences on pain and pain management   Encourage patient to monitor pain and request assistance  Note: Able to rate pain using a 1-10 scale, medicated per prn orders, see MAR.  Able to verbalize a reduction in pain and/or able to fall asleep and remain asleep without any s/s of pain       Problem: ABCDS Injury Assessment  Goal: Absence of physical injury  9/17/2022 2359 by Welton Burkitt, RN  Outcome: Progressing  Flowsheets (Taken 9/17/2022 2359)  Absence of Physical Injury: Implement safety measures based on patient assessment  9/17/2022 1220 by Gregorio Wilson RN  Outcome: Progressing

## 2022-09-18 NOTE — PROGRESS NOTES
Patient admitted to rehab with Critical Illness Myopathy. A/Ox4. Transfers with walker x1. Mobility restrictions: none. On NPO diet, with nocturnal tube feedings tolerating well. Medications taken crushed through PEG Tube. On Lovenox for DVT prophylaxis. Skin: lap sites closed with surgical glue - CDI. Oxygen: 2L O2 @ daytime, Bipap @ HS. LDA: left AC. Has been continent of bowel and bladder. LBM 9/18/2022. Chair/bed alarms in use and call light in reach. Will monitor for safety.  Electronically signed by Bryan Donald RN on 9/18/2022 at 4:04 PM

## 2022-09-18 NOTE — PROGRESS NOTES
Order for methotrexate obtained via phone verbal order. 2.5 mg x 6 tabs every Sunday. Pt took today.  Electronically signed by Raul Edgar RN on 9/18/2022 at 4:06 PM

## 2022-09-18 NOTE — PROGRESS NOTES
Resting quietly in bed. Pt admitted s/p a complicated hospital course including SBO, respiratory failure, and dysphagia requiring PEG placement. Pt is oriented x4. Ambulating with a walker x1, CGA. Lungs clear but diminished. On 2L O2, not home dependent. HR regular. On telemetry showing NSR. Abdomen rotund with active bowel sounds. TF infusing through PEG per order without issue. Has been continent of urine. Pt c/o chronic pain and medicated per routine orders. Encouraged to call for all needs. Call light remains in reach at all times.  Alberto Bradshaw RN

## 2022-09-18 NOTE — PLAN OF CARE
Problem: Discharge Planning  Goal: Discharge to home or other facility with appropriate resources  9/18/2022 1208 by Fernando Hester RN  Outcome: Progressing     Problem: Safety - Adult  Goal: Free from fall injury  9/18/2022 1208 by Fernando Hester RN  Outcome: Progressing     Problem: ABCDS Injury Assessment  Goal: Absence of physical injury  9/18/2022 1208 by Fernando Hester RN  Outcome: Progressing     Problem: Pain  Goal: Verbalizes/displays adequate comfort level or baseline comfort level  9/18/2022 1208 by Fernando Hester RN  Outcome: Progressing     Problem: Nutrition Deficit:  Goal: Optimize nutritional status  9/18/2022 1208 by Fernando Hester RN  Outcome: Progressing

## 2022-09-19 PROCEDURE — 92526 ORAL FUNCTION THERAPY: CPT

## 2022-09-19 PROCEDURE — 97129 THER IVNTJ 1ST 15 MIN: CPT

## 2022-09-19 PROCEDURE — 97530 THERAPEUTIC ACTIVITIES: CPT

## 2022-09-19 PROCEDURE — 2700000000 HC OXYGEN THERAPY PER DAY

## 2022-09-19 PROCEDURE — 1280000000 HC REHAB R&B

## 2022-09-19 PROCEDURE — 94660 CPAP INITIATION&MGMT: CPT

## 2022-09-19 PROCEDURE — 97535 SELF CARE MNGMENT TRAINING: CPT

## 2022-09-19 PROCEDURE — 97110 THERAPEUTIC EXERCISES: CPT

## 2022-09-19 PROCEDURE — 94761 N-INVAS EAR/PLS OXIMETRY MLT: CPT

## 2022-09-19 PROCEDURE — 92507 TX SP LANG VOICE COMM INDIV: CPT

## 2022-09-19 PROCEDURE — 2580000003 HC RX 258: Performed by: PHYSICAL MEDICINE & REHABILITATION

## 2022-09-19 PROCEDURE — 6370000000 HC RX 637 (ALT 250 FOR IP): Performed by: PHYSICAL MEDICINE & REHABILITATION

## 2022-09-19 PROCEDURE — 6360000002 HC RX W HCPCS: Performed by: PHYSICAL MEDICINE & REHABILITATION

## 2022-09-19 PROCEDURE — 97168 OT RE-EVAL EST PLAN CARE: CPT

## 2022-09-19 PROCEDURE — 97112 NEUROMUSCULAR REEDUCATION: CPT

## 2022-09-19 PROCEDURE — 97116 GAIT TRAINING THERAPY: CPT

## 2022-09-19 RX ADMIN — SODIUM CHLORIDE, PRESERVATIVE FREE 10 ML: 5 INJECTION INTRAVENOUS at 20:43

## 2022-09-19 RX ADMIN — Medication 100 MG: at 08:45

## 2022-09-19 RX ADMIN — FOLIC ACID 1 MG: 1 TABLET ORAL at 08:44

## 2022-09-19 RX ADMIN — GABAPENTIN 400 MG: 400 CAPSULE ORAL at 20:43

## 2022-09-19 RX ADMIN — ENOXAPARIN SODIUM 30 MG: 100 INJECTION SUBCUTANEOUS at 08:45

## 2022-09-19 RX ADMIN — POTASSIUM BICARBONATE 20 MEQ: 782 TABLET, EFFERVESCENT ORAL at 08:47

## 2022-09-19 RX ADMIN — GABAPENTIN 400 MG: 400 CAPSULE ORAL at 08:45

## 2022-09-19 RX ADMIN — Medication 1 TABLET: at 20:43

## 2022-09-19 RX ADMIN — ENOXAPARIN SODIUM 30 MG: 100 INJECTION SUBCUTANEOUS at 20:43

## 2022-09-19 RX ADMIN — SODIUM CHLORIDE, PRESERVATIVE FREE 10 ML: 5 INJECTION INTRAVENOUS at 08:58

## 2022-09-19 RX ADMIN — TORSEMIDE 20 MG: 20 TABLET ORAL at 08:44

## 2022-09-19 RX ADMIN — Medication 1 TABLET: at 08:44

## 2022-09-19 RX ADMIN — CELECOXIB 200 MG: 200 CAPSULE ORAL at 08:45

## 2022-09-19 RX ADMIN — GABAPENTIN 400 MG: 400 CAPSULE ORAL at 14:15

## 2022-09-19 RX ADMIN — METOPROLOL TARTRATE 50 MG: 50 TABLET ORAL at 08:45

## 2022-09-19 RX ADMIN — METOPROLOL TARTRATE 50 MG: 50 TABLET ORAL at 20:43

## 2022-09-19 RX ADMIN — Medication 30 MG: at 08:57

## 2022-09-19 RX ADMIN — CELECOXIB 200 MG: 200 CAPSULE ORAL at 20:43

## 2022-09-19 RX ADMIN — LEVOTHYROXINE SODIUM 200 MCG: 0.1 TABLET ORAL at 08:44

## 2022-09-19 RX ADMIN — ASPIRIN 81 MG: 81 TABLET, COATED ORAL at 08:47

## 2022-09-19 ASSESSMENT — PAIN DESCRIPTION - LOCATION
LOCATION: GENERALIZED
LOCATION: HAND

## 2022-09-19 ASSESSMENT — PAIN - FUNCTIONAL ASSESSMENT: PAIN_FUNCTIONAL_ASSESSMENT: ACTIVITIES ARE NOT PREVENTED

## 2022-09-19 ASSESSMENT — PAIN SCALES - GENERAL
PAINLEVEL_OUTOF10: 3
PAINLEVEL_OUTOF10: 4
PAINLEVEL_OUTOF10: 3
PAINLEVEL_OUTOF10: 4
PAINLEVEL_OUTOF10: 0

## 2022-09-19 ASSESSMENT — PAIN DESCRIPTION - FREQUENCY
FREQUENCY: INTERMITTENT
FREQUENCY: INTERMITTENT

## 2022-09-19 ASSESSMENT — PAIN DESCRIPTION - ORIENTATION: ORIENTATION: RIGHT;LEFT

## 2022-09-19 ASSESSMENT — PAIN DESCRIPTION - PAIN TYPE
TYPE: CHRONIC PAIN
TYPE: CHRONIC PAIN

## 2022-09-19 ASSESSMENT — PAIN DESCRIPTION - DESCRIPTORS
DESCRIPTORS: DISCOMFORT
DESCRIPTORS: SHARP

## 2022-09-19 ASSESSMENT — PAIN DESCRIPTION - ONSET
ONSET: ON-GOING
ONSET: ON-GOING

## 2022-09-19 NOTE — PROGRESS NOTES
Patient admitted to rehab with critical illness myopathy. A/Ox4. Transfers with one assist using gait belt and walker for ambulation. Mobility restrictions: none. NPO diet, nocturnal tube feedings tolerating well. Medications taken crushed through PEG tube. On Lovenox for DVT prophylaxis. Skin: lap sites closed with surgical glue. Oxygen: 2 LPM per nasal canula Bipap @ HS. LDA: left AC . Has been continent of bowel and  of bladder. LBM 9/18. Chair/bed alarms in use and call light in reach. Will monitor for safety.

## 2022-09-19 NOTE — PROGRESS NOTES
Resting quietly in bed. Pt admitted with debility s/p complicated hospital course including respiratory failure, dysphagia and PEG placement. Pt is oriented x4. Transferring with a walker x1. Lungs clear but diminished. On 2L O2, not home dependent. HR regular. On telemetry running sinus rhythm with 1st degree block. Abdomen non tender with active bowel sounds. Has been continent of urine. PEG tube in place with tube feed infusing per order. Pt tolerating well. Denies pain, Encouraged to call for all needs, call light in reach at all times.  Brady Og RN

## 2022-09-19 NOTE — PROGRESS NOTES
Bridgett Credit  9/19/2022  [id number]    Chief Complaint: Critical illness myopathy    Subjective: Patient seen this AM.  Patient did well over the weekend, with no problems noted. Patient seen by cardiology, and his metoprolol dose was increased and patient will follow-up for possible ablation for his A. fib after discharge from the rehab unit. Patient discussed with speech therapy this morning, and we will set him up for a video swallowing evaluation tomorrow. He continues on his cardiac monitor. Therapies continue to work with him on improving his endurance and leg strength. Patient knows he is very weak with low endurance. We will recheck his labs tomorrow morning. Patient will be discussed in rehab conference tomorrow morning with the entire rehab team.      ROS: No N/V, SOB, chest pain, C/F    Objective:  Patient Vitals for the past 24 hrs:   BP Temp Temp src Pulse Resp SpO2   09/19/22 0402 127/79 97.5 °F (36.4 °C) Oral 75 18 93 %   09/18/22 2347 123/73 97.3 °F (36.3 °C) Oral 71 18 94 %   09/18/22 2039 125/75 -- -- 79 -- --   09/18/22 1914 (!) 143/82 97.8 °F (36.6 °C) Oral 76 18 94 %   09/18/22 1820 131/82 98.5 °F (36.9 °C) Oral 73 18 94 %   09/18/22 1430 114/71 97.8 °F (36.6 °C) Oral 73 18 95 %   09/18/22 0845 (!) 149/90 98.2 °F (36.8 °C) Oral 75 18 94 %       Gen: No distress, pleasant. HEENT: Normocephalic, atraumatic. CV: Regular rate and rhythm. Resp: No respiratory distress. Abd: Soft, nontender, obese, PEG tube in place  Ext: No edema. Neuro: Alert, oriented, appropriately interactive.      Wt Readings from Last 3 Encounters:   09/18/22 (!) 313 lb 0.9 oz (142 kg)   09/07/22 (!) 309 lb 8.4 oz (140.4 kg)       Laboratory data:   Lab Results   Component Value Date    WBC 4.6 09/15/2022    HGB 9.9 (L) 09/15/2022    HCT 29.8 (L) 09/15/2022    MCV 91.3 09/15/2022     09/15/2022       Lab Results   Component Value Date/Time     09/14/2022 07:34 AM    K 4.5 09/14/2022 07:34 AM    CL 99 09/14/2022 07:34 AM    CO2 26 09/14/2022 07:34 AM    BUN 17 09/14/2022 07:34 AM    CREATININE 0.8 09/14/2022 07:34 AM    GLUCOSE 106 09/14/2022 07:34 AM    CALCIUM 7.9 09/14/2022 07:34 AM        Therapy progress:  PT  Position Activity Restriction  Other position/activity restrictions: NPO, PEG tube, 2L O2 (not on O2 at home), IV L UE, CPAP at night, tele  Objective     Sit to Stand: Contact guard assistance (As pt became fatigued they would sit down more abruptly)  Stand to sit: Stand by assistance  Device: Rolling Walker  Assistance: Contact guard assistance  Distance: 80' on uneven surface (carpet at end of felipe) with 4 turns  OT  PT Equipment Recommendations  Equipment Needed: No  Other: Continue to assess pending progress          SLP  Current Diet :  (PEG; no oral po)   1. Dysphagia diagnosis: Persistent concern for lingual coordination deficits which can impact bolus control (Mastication was not assessed due to recent history/finding of pharyngeal phase deficits); persistent concern for delayed initiation of swallow; potential residual reduced laryngeal rom and pharyngeal peristalsis. Pt with intermittent throat clearing post swallow;multiple swallows per one presentation. Anticipate will need repeat MBSS during this admit. Edgard Andres Recommend : continue SLP therapy for Oropharyngeal Dysphagia; date TBD for repeat MBSS; ice chips :occasional /isolated; oral care. 2.  Cognitive Diagnosis: Pt demonstrated functional temporal and spatial orientation; concrete attention and VPS/PS and reasoning and math language. Pt with deficits in memory (working memory and STM ) and higher level attention (ie alternating and divided attention) on cognitive-linguistic testing. Will continue evaluation of higher level cognitive-linguistic skills as pt does participate in home/med management. Current deficits may impact.      3.  Speech Diagnosis: Oral Motor Speech /Voice characterized by hoarse / rough / raspy voice quality

## 2022-09-19 NOTE — PROGRESS NOTES
for lingual coordination deficits which can impact bolus control (Mastication was not assessed due to recent history/finding of pharyngeal phase deficits); persistent concern for delayed initiation of swallow; potential residual reduced laryngeal rom and pharyngeal peristalsis. Pt with intermittent throat clearing post swallow;multiple swallows per one presentation. Anticipate will need repeat MBSS during this admit. Recommend : continue SLP therapy for Oropharyngeal Dysphagia; date TBD for repeat MBSS; ice chips :occasional /isolated; oral care. Date of Admit: 9/13/2022  Room #: V4O-1708/3255-01  Date: 9/19/2022       Current functional status (updated daily):         Current Diet Order:Diet NPO  ADULT TUBE FEEDING; PEG; Immune Enhancing; Cyclic; 128; 1:70 PM; 2:10 AM; 175; Q 4 hours   Behavior: [x] Alert  [x] Cooperative  [x]  Pleasant  [] Confused  [] Agitated  [] Uncooperative  [] Distractible [] Motivated  [] Self-Limiting [] Anxious  [] Other:  Endurance:  [x] Adequate for participation in SLP sessions  [] Reduced overall  [] Lethargic  [] Other:  Safety: [x] No concerns at this time  [] Reduced insight into deficits  []  Reduced safety awareness [] Not following call light procedures  [] Unable to Assess  [] Other:  Swallowing Precautions: PEG tube feeding restricitoins; oral care; ice chips  Barriers toward progress: good guarded; medical DX and course of medical co-mrobidities.  Has caregiver support/pt is motivated though           Date: 9/19/2022      Tx session 1 Tx session 2   Total Timed Code Min   SLP Individual Minutes  Time In: 0730  Time Out: 0815  Minutes: 45  Coded treatment time  15   N/a   Group Treatment Minutes 0 0   Co-Treat Minutes 0 0   Variance/Reason:  N/a    Pain denied    Pain Intervention [] RN notified  [] Repositioned  [] Intervention offered and patient declined  [x] N/A  [] Other: [] RN notified  [] Repositioned  [] Intervention offered and patient declined  [] N/A  [] Other: Subjective     Pt seen bedside  Pt was awake recliner  Pt was anticipating therapy   Pt with good effort    Objective:  Goals         Dysphagia goals: Pt goals is to eat and drink and get rid of feeing tube    Therapy working toward pt goal emphasizing lingual/laryngeal/pharyngeal ex and therapy trials with compensatory swallow strategy training  N/a   The patient will improve oral preparation phase via bolus control/manipulation exercises to 5/5 each trial.,  Targeted lingual rom stretch warm up:  Lingual elevation; alternating anterior to posterior elevation; lingual seal against roof of mouth    PO trials targeting lingual hold prior to swallow: across po consistencies N/a   The patient will tolerate instrumental swallowing procedure  Request for MD orders for repeat MBSS for 9/20/2922    Verbal order received; followed by written order in Epic. Scheduled 9/20/2022 at 1100 am       Pt will demosntrate imrpoved swallow response via lingual ex; tongue base retraction ex; laryngeal / pharyngeal ex for toleration of 4 ounce servings of 1-3 consistencies with compensatory swallow strategies without overt clinical s/s of aspiration   Tongue base retraction ex: x 10 reps for 4 sets    Laryngeal ex targeting  Glottals on expiratory effort: continued 5-7 response expiratory effort  Pitch swing low to high with falsetto hold: models for most optimal  BOT with pitch variation to low to high with falsetto hold: models for most optimal  Lorri: able to achieve but needs extra time  Effortful swallow  across po trials  Modified supraglottic swallow with po trials: across thin and thick liquid trials.         PO trials:   Ice chips : warm up  Tsp thin H20 with lingual hold and modified supraglottic swallow: x 4   Mildly/nectar thick via tsp with lingual hold and modified supraglottic swallow: x 8  tsp and last 3 via cup  Moderately thick/honey thick via tsp with lingual hold and modified supraglottic swallow: x 8 via tsp and task items   N/a   Goal 4: Pt will demonstrate improved voice quality and endurance for functional voice ex for 8 seconds and improved sustained voice quality at 4-8 syllable utterance level Ongoing focus in use of easy onset; frontal focus; breath support and breath strategies (targeting diaphragm) for varied structured functional voice ex:   Pitch variation low to high to low targeting frontal vowels:<25% voice cessation  Frontal focus for CVCVCV combinations: No voice cessation  Sustained phonation of frontal to mid vowels: 6 to 7.5 second duration without voice cessation   N/a   Other areas targeted:     Education:   Ongoing pt ed      Safety Devices: [x] Call light within reach  [x] Chair alarm activated  [] Bed alarm activated  [] Other:  [] Call light within reach  [] Chair alarm activated  [] Bed alarm activated  [] Other:    Progress Assessment: 9/15/2022: CONTINUE ALL GOAL  9/16/2022: RN reporting some cardiac issues early am;MD aware and MD wrote a cardiology consult. . Family ed with pt's dtr  9/19/2022: SLP spoke to MD and requested MBSs orders for 9/20/2022. Pt appears to be making progress with repeat instrumental for more objective assessment of progress and po diet candidacy   Plan: Continue as per plan of care. Continued Tx Upon Discharge: ?    [x] Yes [] No [] TBD based on progress while on ARU [] Vital Stim indicated [] Other:   Estimated discharge date: TBD   Discharge recommendations:   [] Home independently  [x] Home with assistance []  24 hour supervision  [] ECF [] Other:     Additional information:     Interventions used during Rehab Stay:  [] Speech/Language Treatment  [] Instruction in HEP [] Group [x] Dysphagia Treatment [x] Cognitive Treatment   [x] Other:Voice TX          Electronically Signed by    Jayden Bingham. Alexandrea,MS,CCC,SLP [id number]  Speech and Language Pathologist

## 2022-09-19 NOTE — PATIENT CARE CONFERENCE
[facility]  Inpatient Rehabilitation  Weekly Team Conference Note      Date: 2022  Patient Name:  Wayne Hill    MRN: [id number]  : 1960  Gender:   Physician:   Diagnosis: Acute and chronic respiratory failure with hypercapnia [J96.22]  Critical illness myopathy [G72.81]    CASE MANAGEMENT  Assessment: Goal is return home; agreeable to home care services. PHYSICAL THERAPY    Bed Mobility:  Overall Assistance Level: Modified Independent  Additional Factors: Head of bed raised, Without handrails (therapy mat with wedge and one pillow)  Sit>supine:  Assistance Level: Modified independent  Supine>sit:  Assistance Level: Modified independent  Skilled Clinical Factors: required increased time to complete and use of R handrail where he put B UE on bed rail inorder to rise to seated. HOB was also aised during activity in order for the patient to complete. Transfers:  Surface: To chair with arms, From chair with arms  Additional Factors: Increased time to complete  Device: Walker  Sit>stand:  Assistance Level: Supervision  Skilled Clinical Factors: recalled technique  Stand>sit:  Assistance Level: Supervision  Skilled Clinical Factors: cues for hand placement  Bed<>chair  Technique: Stand step  Assistance Level: Supervision  Skilled Clinical Factors: with wheeled walker  Car transfer:  Assistance Level: Supervision  Skilled Clinical Factors: mock car transfer done with use of wheeled walker for approach. PT managed O2 line. Patient was Supervision for balance and able to lift bilateral LE in and out of the car with increased time. Ambulation:  Surface: Level surface  Device: Rolling walker  Distance: 200' with 4 turns, short distances in therapy gym, 120' on therapy terrace and ramp  Activity: Within Unit  Activity Comments: Patient with increased BENTLEY and slow adam. He was able to ascned and descend ramp on therapy terrace without LOB and with good control of the walker. patient was able to manage over thresholds and varyi ngsurfaces of therapy terrace without LOB  Additional Factors: Increased time to complete (PT managed O2 tank)  Assistance Level: Supervision  Gait Deviations: Wide base of support  Skilled Clinical Factors: SpO2 86% after ambulation, but quickly improves to 92% with cough and deep breathing. Second ambulation outdoors, he was 91% after ambulation. Curb:  Curb Height: 6''  Device: Rolling walker  Number of Curbs: 1  Additional Factors: Verbal cues, Increased time to complete  Assistance Level: Supervision  Skilled Clinical Factors: Patient was able to manage the walker and his balance as he ascends and descends the curb step. He had no LOB but PT managed O2 tank. Assessment:  Assessment: Mr. Halle Gaspar continues to improve with endurance. He is tolerating much more activity in one 45 minutes session, though he does desaturate into the mid 80's with long distance ambulation on 2L. Patient is ambulate more than 180' with heavy duty wheeled walker with Supervision and PT managing O2 line. He is now able to manage the walker and his balance on the curb step with heavy duty wheeled walker with supervision while PT manages O2 line. Patient continues to be below baseline and will benefit from continued skilled therapy for strengthening, gait training, and endurance training to allow for return to independence. patient may need pulmonary rehab to improve lung function back to normal.  Activity Tolerance: Patient tolerated treatment well  Discharge Recommendations: Continue to assess pending progress, Patient would benefit from continued therapy after discharge, Home with assist PRN        SPEECH THERAPY (intentionally left blank if not actively being seen by this service):  Assessment/Progress: Pt appears to be making progress in both oropharyngeal phases of the swallow and oral motor speech/voice quality and agility/endurance.  Therapy is targeting laryngeal ex; lingual coordination ex and tongue base retraction and BOT ex in addition to functional voice ex and therapy po trials of ice chips; thin H20 and mildly thick, moderately thick liquids and puree. Compensatory swallow strategies include lingual hold prior to swallow; 'effortful swallow ' technique and 'modified supraglottic swallow'. Pt with good effort. Pt is demonstrating improved consistency in temporal orientation and improved recall of new learning; daily events and working memory with carryover of learned strategies with compensatory strategies. Will continue Oropharyngeal dysphagia therapy; Functional voice ex/oral motor speech ex; and Cognitive-linguistic- memory remediation. Order for repeat MBSS received and is scheduled 9/20/2022 at 11:00 am.   Signed  [facility] COLTEN LechugaMS,CCC,SLP [id number]  Speech and Language Pathologist      OCCUPATIONAL THERAPY  ADLs:  Feeding  Skilled Clinical Factors: NPO  Grooming/Oral Hygiene  Assistance Level: Stand by assist  Skilled Clinical Factors: Stood in front of sink to brush hair and complete oral care  UE Bathing  Assistance Level: Set-up, Supervision  Skilled Clinical Factors: seated on shower chair, able to turn on/off water and manage [facility]  LE Bathing  Assistance Level: Stand by assist, Contact guard assist  Skilled Clinical Factors: Stood using grab bars, use of long sponge to wash LE's  UE Dressing  Assistance Level: Set-up  Skilled Clinical Factors: doff/don t-shirt, able to remove and replace o2 without assist  LE Dressing  Equipment Provided: Reachers  Assistance Level: Stand by assist, Contact guard assist  Skilled Clinical Factors: seated in chair to don underclothing and shorts without reacher, SBA/CGA for balance  Putting On/Taking Off Footwear  Equipment Provided: Reachers, Sock aid  Assistance Level: Supervision  Skilled Clinical Factors: doffed/donned non skid socks  Toileting  Skilled Clinical Factors: Not completed this date    Transfers:   Toilet Transfers  Skilled Clinical Factors: Not completed this date  Tub/Shower Transfers  Type: Shower  Transfer From: [facility]  Transfer To: Shower chair with back  Assistance Level: Stand by assist, Contact guard assist  Skilled Clinical Factors: use of grab bars for in/out of shower    IADLs:  Kitchen task:  Pt ambulated into kitchen using RW SBA, he gathered all ingredients from fridge and cabinet and took items in walker basket to counter top, stood 3min SBA, fatigued at end of task, S/OT managed oxygen and IV pole  Money Management     [place] Management       UE function:  Has RA, difficulty w/ full fist, limited shld flexion    Assessment:  Assessment: pt making steady gains w/ OT intervention. He used HD RW to/from bathrm & completed toilet t/f w/ SB/CGA (OT managed O2 cord). Pt shown TSF & used it well but he doesn't feel he will need to use it at home. Discussed DME: he owns HD RW, TTB and pulse ox. OT recommends reacher to grab items off floor to reduce fall risk & conserve energy. He is currently on 2L O2 (was not using at home), will attempt to wean as able. He is having MBS tomorrow @ 11:00am. Cont w/ POC at least thru end of wk, recommend home OT services & then transition to pulmonary rehab. Activity Tolerance: Patient tolerated treatment well  Discharge Recommendations: Home with assist PRN, S Level 1, Home with Home health OT     NUTRITION  Most recent weightWeight: (!) 313 lb 0.9 oz (142 kg)  BSA (Calculated - sq m): 2.61 sq meters  BMI (Calculated): 49.1  Diet Order: Diet NPO  ADULT TUBE FEEDING; PEG; Standard with Fiber; Cyclic; 90; 9:27 PM; 5:32 AM; 150; Q 4 hours; Protein; Two Proteinex daily  Please see nutrition note for details. NURSING  Pt is continent of bowel and bladder, Monitor and maintain skin integrity, incision care.   Family Education: Patient Education:Medications, skin care and prevention, diet, pain control as needed, safety and fall prevention. MEDICAL      TEAM SUMMARY AND DISCHARGE PLAN  Estimated Length of CRVJ:5/78/9398  Destination: home health  Anticipated Services at Discharge:    [x] OT  [x] PT   [x] SLP    [x] RN   [] Home Health aide []   Community Resources: _______________________________  Equipment recommendations:  [] Hospital bed [] Tub bench  [] Shower chair [] Hand held shower  [] Raised toilet seat [] Toilet safety frame [] Bedside commode   [] W/C: _____  [x] Rolling Walker (heavy duty) [] Standard walker [] Gait belt [] cane: _________  [] Sliding board [] Alternate seating/furniture [x] O2 [] Hip Kit: _______  [] Life Line [x] Other: __reacher_____  Factors facilitating achievement of predicted outcomes: Family support  Barriers to the achievement of predicted outcomes/Interventions: On 2L O2, fatigue & SOB      Interdisciplinary Individualized Plan of Care Review:    Continue Current Plan of Care: Yes    Modifications:_____________________________    Special Needs in the Upcoming Week :    [] Family/Caregiver Education  [] Home visit  []Therapeutic Pass   [] Consults:_______    [] Other;_______    Patient Rehab Team Goals for the Upcoming Week:  1. Functional mobility in room with wheeled walker with modified independence  2. Pt will tolerate 8-12 ounces per serving of modified po trials without overt clinical s/s of aspiration  3. Pt will maintain audible/ intelligible connected speech to convey needs/wants  4. Pt will demonstrate improved use of memory strategies for recall of daily events; new learning information and carryover of compensatory strategies with intermittent assist  5. Complete toileting & LB ADLs w/ MI        Team Members Present at Conference:  Physician:Dr. Tiburcio BIRD  : [place], [place]    Occupational Therapist: Lyly Alford, OTR/L  Physical Therapist: Ivy Frost, [id number]  Speech Therapist: Joshua Zamora. Flum,MS,CCC,SLP [id number]  Speech and Language Pathologist    Nurse:PARESH Buckner 99 RN, CRRN  Dietitian: Emilie Cullen RD, LD  Psychologist:  Other:      I led this team conference and I approve the established interdisciplinary plan of care as documented within the medical record of [facility].     MD: Dr. Louise Davis

## 2022-09-19 NOTE — PROGRESS NOTES
Occupational Therapy  Facility/Department: [facility]  REHAB  Rehabilitation Occupational Therapy Daily Treatment Note    Date: 22  Patient Name: Evy Graves       Room: J6O-7392/9421-61  MRN: [id number]  Account: [de-identified]   : 1960  (63 y.o.) Gender: male         PM session: met in therapy dept, he is having B hand pain d/t RA--is overdue for infusion (@ every 6 wks). Pt's pulse ox @ 98% on 2L, decreased to .5 L in attempt to wean. While seated, He was issued green theraputty for hand strengthening exercises. He is able to complete 1 set of 5 of the exercises, allowed rest breaks in between. Pulse ox dropped @ 87-89% on .5 L, HR 80. Increased to 1L while standing x 3 minutes, pulse ox fluctuated between 93-97%. OT notified PT he was bumped down to 1L & to monitor during session. Tx time: 45 min, cont w/ POC Juanis Almanza, OTR/L #[id number]            Past Medical History:  has a past medical history of Arthritis, Atrial fibrillation ([facility] [address] and Hypertension. Past Surgical History:   has a past surgical history that includes Upper gastrointestinal endoscopy (2022); Upper gastrointestinal endoscopy (2022); Upper gastrointestinal endoscopy (N/A, 2022); bronchoscopy (2022); bronchoscopy (2022); Gastric fundoplication (N/A, ); and Gastrostomy tube placement (N/A, 2022).     Restrictions  Restrictions/Precautions: NPO;Fall Risk  Other position/activity restrictions: NPO, PEG tube, 2L O2 (not on O2 at home), IV L UE, CPAP at night, teley    Subjective  Subjective: met pt in therapy gym, he just finished PT session; pt on 2L O2, PT reports pulse ox dropped to 86% after long walk but quickly recovered within 1 minute above 90%  Restrictions/Precautions: NPO;Fall Risk             Objective     Cognition  Overall Cognitive Status: WFL  Orientation  Overall Orientation Status: Within Normal Limits  Orientation Level: Oriented X4         ADL             Functional Mobility  Device: Rolling walker  Activity: To/From bathroom  Assistance Level: Stand by assist;Contact guard assist  Skilled Clinical Factors: Functional mobility with RW with SBA, slow steady gait with no overt LOB noted, OT managed O2 cord when enter/exiting bathrm  Transfers  Surface: Wheelchair;Standard toilet  Sit to Stand  Assistance Level: Stand by assist  Skilled Clinical Factors: RW  Stand to Sit  Assistance Level: Stand by assist  Skilled Clinical Factors: good recall of safe hand placement         Assessment  Assessment  Assessment: pt making steady gains w/ OT intervention. He used HD RW to/from bathrm & completed toilet t/f w/ SB/CGA (OT managed O2 cord). Pt shown TSF & used it well but he doesn't feel he will need to use it at home. Discussed DME: he owns HD RW, TTB and pulse ox. OT recommends reacher to grab items off floor to reduce fall risk & conserve energy. He is currently on 2L O2 (was not using at home), will attempt to wean as able. He is having MBS tomorrow @ 11:00am. Cont w/ POC at least thru end of wk, recommend home OT services & then transition to pulmonary rehab.   Activity Tolerance: Patient tolerated treatment well  Discharge Recommendations: Home with assist PRN;S Level 1;Home with Home health OT  OT Equipment Recommendations  Other: pt owns TTB, HD RW, pulse ox; may benefit from reacher & TSF however he declines the need--will need home O2  Safety Devices  Safety Devices in place: Yes  Type of devices: Gait belt    Patient Education  Education  Education Given To: Patient  Education Provided: DME/Home Modifications  Education Provided Comments: owns TTB, HD RW; shown TSF but doesn't feel he will need it; OT recommends reacher to retrieve items off floor  Education Method: Verbal  Barriers to Learning: None  Education Outcome: Verbalized understanding    Plan  Plan  Times per Week: 5-7  Times per Day: Twice a day  Plan Weeks: x10-14 days  Current Treatment Recommendations: Strengthening;ROM; Functional mobility training; Endurance training;Patient/Caregiver education & training;Equipment evaluation, education, & procurement;Self-Care / ADL; Coordination training    Goals  Patient Goals   Patient goals : \"Be able to eat normally, get up and moving to get stronger\"  Short Term Goals  Time Frame for Short term goals: Within 7 days the Pt will  Short Term Goal 1: Toileting with SBA using GB's prn  Short Term Goal 2: LB dressing with SBA using AE prn  Short Term Goal 3: Functional transfers using LRAD with SBA  Short Term Goal 4: Bathing seated on shower chair with CGA  Short Term Goal 5: Improved endurance to complete standing balance task for 3min SBA in order to complete ADL and IADL tasks in standing  Long Term Goals  Time Frame for Long term goals :  Within 10-14 days  Long Term Goal 1: Toileting mod I using GB's prn  Long Term Goal 2: LB dressing mod I using AE prn  Long Term Goal 3: Functional transfers using LRAD mod I  Long Term Goal 4: Bathing seated on shower chair mod I  Long Term Goal 5: Improved endurance to complete standing balance task for 5min mod I in order to complete ADL and IADL tasks in standing  Additional Goals?: Yes  Long Term Goal 6: Determine DME needs and complete family education prior to discharge        Therapy Time   Individual Concurrent Group PM tx   Time In 1115      1430   Time Out 1200      1515   Minutes 45      45   Timed Code Treatment Minutes: [address] [place] [facility], OTR/L #[id number]

## 2022-09-19 NOTE — PLAN OF CARE
Problem: Pain  Goal: Verbalizes/displays adequate comfort level or baseline comfort level  9/18/2022 2334 by Jovita Lambert RN  Outcome: Progressing  Flowsheets (Taken 9/18/2022 2334)  Verbalizes/displays adequate comfort level or baseline comfort level:   Encourage patient to monitor pain and request assistance   Administer analgesics based on type and severity of pain and evaluate response   Consider cultural and social influences on pain and pain management  Note: Able to rate pain using a 1-10 scale, medicated per prn orders, see MAR.  Able to verbalize a reduction in pain and/or able to fall asleep and remain asleep without any s/s of pain

## 2022-09-19 NOTE — PROGRESS NOTES
Brief Nutrition Note    Pivot 1.5 not in stock at this time. Will modify EN regimen. Nutrition Recommendations/Plan:   Continue NPO  Jevity 1.5 @ 90ml x 14 hours (5pm-7am)  Administer 2 proteinex daily to meet protein needs  Water flush 150ml every 4 hours to meet fluid needs       Current Nutrition Intake & Therapies:    Average Meal Intake: NPO  Average Supplements Intake: NPO  Diet NPO  ADULT TUBE FEEDING; PEG; Immune Enhancing; Cyclic; 321; 9:27 PM; 4:51 AM; 175; Q 4 hours  Current Tube Feeding (TF) Orders:  Feeding Route: PEJ  Formula: Standard with Fiber  Schedule: Cyclic  Feeding Regimen: Jevity 1.5 @ 90ml x 14 hours  Additives/Modulars: Protein (Twice daily)  Water Flushes: 150ml every 4 hours   Goal TF & Flush Orders Provides: Jevity 1.5 @ 90ml x 14 hours + two proteinex daily to provide 1410ml TV, 2098 kcals, 132 grams protein, and 958ml free water. Anthropometric Measures:  Height: 5' 7\" (170.2 cm)  Ideal Body Weight (IBW): 148 lbs (67 kg)    Admission Body Weight: 317 lb (143.8 kg)  Current Body Weight: 314 lb (142.4 kg), 212.2 % IBW.  Weight Source: Bed Scale  Current BMI (kg/m2): 49.2  Weight Adjustment For: No Adjustment  BMI Categories: Obese Class 3 (BMI 40.0 or greater)    Estimated Daily Nutrient Needs:  Energy Requirements Based On: Kcal/kg  Weight Used for Energy Requirements: Current  Energy (kcal/day): 4500-4313 (11-14kcal/140kg)  Weight Used for Protein Requirements: Ideal  Protein (g/day): 134 (2g/67kg)  Method Used for Fluid Requirements: 1 ml/kcal  Fluid (ml/day): 1 ml/kcal     Chitra Campoverde RD, LD  Contact: [phone]

## 2022-09-19 NOTE — PROGRESS NOTES
Physical Therapy  Facility/Department: [address] [facility]  Rehabilitation Physical Therapy Treatment Note    NAME: Ge Lugo  : 1960 (63 y.o.)  MRN: [id number]  CODE STATUS: Full Code    Date of Service: 22       Restrictions:  Restrictions/Precautions: NPO;Fall Risk  Position Activity Restriction  Other position/activity restrictions: NPO, PEG tube, 2L O2 (not on O2 at home), IV L UE, CPAP at night, tele     Pertinent medical information:  Additional Pertinent Hx: Per Dr. Sushil Brown H/P \"Patient is a 63 y.o. male hospitalized on 2022   for small bowel obstruction, pneumonia, septic shock patient intubation, mechanical ventilation, broad-spectrum antibiotics s intubated/extubated 2020 through 2022. On 2022, EGD done for endoscopically placed NG tube placement. On 2022, EGD with endoscopic placement of NG tube. On 2022 underwent laparoscopic reduction of large hiatal hernia with gastropexy, placement of gastrostomy tube. On 2022, extubated after the above procedure. He had the diagnosis of critical illness myopathy, and was discharged from our hospital to an LTAC unit for further IV antibiotic therapy and treatment. Patient now has improved, and is admitted back to our rehabilitation unit to progress with his strengthening of his arms and legs and improving his endurance before discharge back home. Patient also has diagnosis of atrial fibrillation, heart failure, dysphagia, sleep apnea, HTN, and rheumatoid arthritis. Patient is now ambulating 50 feet with a rolling walker and PT with contact-guard to min assist, and needs max assist for lower body bathing and dressing. Patient lives at home with his wife in a 1 level house. Patient is allowed to eat PO with speech therapy during therapeutic feeding. \"    SUBJECTIVE  Subjective  Subjective: patient arrived in wheelchair. He reports his hands are very sore this AM.  He is unable to make a full  at this time.  Patient on 2L O2 at this time and SpO2 was 96-98% upon arrival.  Pain: Rates the pain as a 4/10 in bilateral hands and states they are more stiff and sore than anything else. Social/Functional History  Lives With: Spouse, Son (wife Torrey Sidhu, works outside the home but can work from home if needed, son Cherelle Ortiz lives with them as well, brother lives next door)  Type of Home: House  Home Layout: One level  Home Access:  (has a ramp entry)  Bathroom Shower/Tub: Walk-in shower, Tub/Shower unit (one is tub/shower, one is just shower (2 inch lip to enter), mainly uses shower)  Bathroom Toilet: Standard  Bathroom Equipment:  (none)  Bathroom Accessibility: Walker accessible  Home Equipment: [address], Electric scooter, Walker, rolling (prior to admittance to the hospital pt did no use AD. These are from a previous injury in 2016.)  Has the patient had two or more falls in the past year or any fall with injury in the past year?: No  Receives Help From: Family  ADL Assistance: [address]: Independent  Homemaking Responsibilities: Yes  Meal Prep Responsibility: Primary  Laundry Responsibility: Secondary  Bill Paying/Finance Responsibility: Primary  Ambulation Assistance: Independent  Transfer Assistance: Independent  Active : Yes (wife can drive if needed)  Mode of Transportation: Car  Education: vocational school for welding; HS  Occupation: Retired  Type of Occupation:   Leisure & Hobbies: [address] Known, lawn work, working on equipment in shop at his house  Additional Comments: Used electric scooter in 2016 when he pulled both quads off his kneecap due to work injury. On Celebrex and infusions every 6 weeks for rheumatoid arthritis pain      OBJECTIVE       Functional Mobility  Bed Mobility  Overall Assistance Level: Modified Independent  Additional Factors: Head of bed raised; Without handrails (therapy mat with wedge and one pillow)  Sit to Supine  Assistance Level: Modified independent  Supine to Sit  Assistance Level: Modified independent  Scooting  Assistance Level: Modified independent    Balance  Sitting Balance: Modified independent   Standing Balance: Modified independent     Transfers  Surface: To chair with arms;From chair with arms  Additional Factors: Increased time to complete  Device: Walker  Sit to Stand  Assistance Level: Supervision  Stand to Sit  Assistance Level: Supervision  Bed To/From Chair  Technique: Stand step  Assistance Level: Supervision  Skilled Clinical Factors: with wheeled walker  Car Transfer  Assistance Level: Supervision  Skilled Clinical Factors: mock car transfer done with use of wheeled walker for approach. PT managed O2 line. Patient was Supervision for balance and able to lift bilateral LE in and out of the car with increased time. Environmental Mobility  Ambulation  Surface: Level surface  Device: Rolling walker  Distance: 200' with 4 turns, short distances in therapy gym, 120' on therapy terrace and ramp  Activity: Within Unit  Activity Comments: Patient with increased BENTLEY and slow adam. He was able to ascend and descend ramp on therapy terrace without LOB and with good control of the walker. patient was able to manage over thresholds and varying surfaces of therapy terrace without LOB  Additional Factors: Increased time to complete (PT managed O2 tank)  Assistance Level: Supervision  Gait Deviations: Wide base of support  Skilled Clinical Factors: SpO2 86% after ambulation, but quickly improves to 92% with cough and deep breathing. Second ambulation outdoors, he was 91% after ambulation. Curb  Curb Height: 6''  Device: Rolling walker  Number of Curbs: 1  Additional Factors: Verbal cues; Increased time to complete  Assistance Level: Supervision  Skilled Clinical Factors: Patient was able to manage the walker and his balance as he ascends and descends the curb step. He had no LOB but PT managed O2 tank. ASSESSMENT/PROGRESS TOWARDS GOALS       Assessment  Assessment: Mr. Richie Foster continues to improve with endurance. He is tolerating much more activity in one 45 minutes session, though he does desaturate into the mid 80's with long distance ambulation on 2L. Patient is ambulate more than 180' with heavy duty wheeled walker with Supervision and PT managing O2 line. He is now able to manage the walker and his balance on the curb step with heavy duty wheeled walker with supervision while PT manages O2 line. Patient continues to be below baseline and will benefit from continued skilled therapy for strengthening, gait training, and endurance training to allow for return to independence. patient may need pulmonary rehab to improve lung function back to normal.  Activity Tolerance: Patient tolerated treatment well  Discharge Recommendations: Continue to assess pending progress; Patient would benefit from continued therapy after discharge;Home with assist PRN  PT Equipment Recommendations  Equipment Needed: No  Other: Continue to assess pending progress      PM Session  Patient arrived following OT session. He reports his hands continue to bother him this PM.  OT reports patient was taken down to 1L and sats remained between 92-98%. Finger oximeter on telemetry unit tends to read lower than a regular finger pulse oximeter. Sit<>stand with supervision. Ambulated 220' x2 with heavy duty wheeled walker with supervision with PT managing O2 line. SpO2 dropped to 88% with community distance, but improves to 92% in less than one minute on 1L. Patient performed 15 reps bilateral LE ther ex while seated in chair: heel/toe raises, alternating LAQ, hip abd/add, and marching. Patient was able to complete sit<>stand from couch in waiting area with supervision but required increased time. Sit<>stand with Supervision  Performed step over stick on ground, alternating between right and left LE.   Patient completed 10 reps on 1L with CGA. Patient became SOB and required seated rest break. SpO2 was 95% after activity, with seated rest break. After rest break, patient performed 10 more reps. Patient completed a second set and again required seated rest break secondary to SOB. Ambulated 48' without a device with CGA-SBA. Patient has increased lateral sway and increased BENTLEY. No complaints of SOB. SpO2 was 99% on 1L. Patient tolerating treatment well and endurance is improving. Patient tolerated weaning to 1L O2. Safety Device - Type of devices:  [x]  All fall risk precautions in place [] Bed alarm in place  [] Call light within reach [] Chair alarm in place [] Positioning belt [x] Gait belt [] Patient at risk for falls [] Left in bed [x] Left in chair (in wheelchair to return to room with transportation) [] Telesitter in use [] Sitter present [] Nurse notified []  None    Goals  Patient Goals   Patient goals : Return to walking without difficulty. Short Term Goals  Time Frame for Short term goals: 7 days  Short term goal 1: Complete ramp with RW CGA. - met 9/19/2022  Short term goal 2: Ambulate 150' with RW and CGA. - met 9/19/2022  Short term goal 3: Transfers, car and sit<>stand, with SBA. - met 9/19/2022  Short term goal 4: Complete all bed mobility mod I. - met 9/19/2022  Long Term Goals  Time Frame for Long term goals : 10-14 days  Long term goal 1: Complete ramp with no AD mod I.  Long term goal 2: Ambulate 150' RW mod I.  Long term goal 3: Transfers, car and sit<>stand, with mod I. PLAN OF CARE/SAFETY  Plan  Plan:  minutes of therapy at least 5 out of 7 days a week  Plan weeks: 2 weeks  Current Treatment Recommendations: Strengthening;Balance training;Functional mobility training;Transfer training;Stair training;Gait training; Therapeutic activities; Home exercise program;Endurance training;IADL training  Safety Devices  Type of Devices:  All fall risk precautions in place;Gait belt;Left in chair (in wheelchair with care transitioned to OT, [address])  Restraints  Restraints Initially in Place: No    EDUCATION  Education  Education Given To: Patient  Education Provided: Role of Therapy;Plan of Care;ADL Function; Safety; Mobility Training;Transfer Training;Energy Conservation  Education Provided Comments: safety with mobility tasks  Education Method: Verbal;Demonstration  Barriers to Learning: None  Education Outcome: Verbalized understanding        Therapy Time   Individual Concurrent Group Co-treatment   Time In [facility]         Time Out 1115         Minutes 45              Second Session Therapy Time     Individual Co-treatment   Time In [address] [place] [address]     Time Out 1600     Minutes [address] [place] [address] Kriss Dhillon, [id number], 09/19/22 at 11:16 AM

## 2022-09-19 NOTE — PLAN OF CARE
Problem: Discharge Planning  Goal: Discharge to home or other facility with appropriate resources  9/19/2022 0929 by Isaac Lindquist RN  Outcome: Progressing  Flowsheets (Taken 9/19/2022 7252)  Discharge to home or other facility with appropriate resources:   Identify barriers to discharge with patient and caregiver   Identify discharge learning needs (meds, wound care, etc)     Problem: Safety - Adult  Goal: Free from fall injury  9/19/2022 0929 by Isaac Lindquist RN  Outcome: Progressing  Note: Pt is at risk for falls. Call light in reach. Bed in low position. Alarm on. Nonskid footwear on. Possessions in reach.   Gait belt with walker for ambulation     Problem: ABCDS Injury Assessment  Goal: Absence of physical injury  9/19/2022 0929 by Isaac Lindquist RN  Outcome: Progressing  Flowsheets  Taken 9/19/2022 0929  Absence of Physical Injury: Implement safety measures based on patient assessment  Taken 9/19/2022 0926  Absence of Physical Injury: Implement safety measures based on patient assessment     Problem: Pain  Goal: Verbalizes/displays adequate comfort level or baseline comfort level  9/19/2022 0929 by Isaac Lindquist RN  Outcome: Progressing  Flowsheets (Taken 9/19/2022 0929)  Verbalizes/displays adequate comfort level or baseline comfort level:   Encourage patient to monitor pain and request assistance   Assess pain using appropriate pain scale   Administer analgesics based on type and severity of pain and evaluate response   Implement non-pharmacological measures as appropriate and evaluate response   Consider cultural and social influences on pain and pain management     Problem: Nutrition Deficit:  Goal: Optimize nutritional status  9/19/2022 0929 by Isaac Lindquist RN  Outcome: Progressing

## 2022-09-20 ENCOUNTER — APPOINTMENT (OUTPATIENT)
Dept: GENERAL RADIOLOGY | Age: 62
DRG: 091 | End: 2022-09-20
Attending: PHYSICAL MEDICINE & REHABILITATION
Payer: COMMERCIAL

## 2022-09-20 LAB
ANION GAP SERPL CALCULATED.3IONS-SCNC: 14 MMOL/L (ref 3–16)
BUN BLDV-MCNC: 27 MG/DL (ref 7–20)
CALCIUM SERPL-MCNC: 9 MG/DL (ref 8.3–10.6)
CHLORIDE BLD-SCNC: 99 MMOL/L (ref 99–110)
CO2: 26 MMOL/L (ref 21–32)
CREAT SERPL-MCNC: 0.7 MG/DL (ref 0.8–1.3)
GFR AFRICAN AMERICAN: >60
GFR NON-AFRICAN AMERICAN: >60
GLUCOSE BLD-MCNC: 139 MG/DL (ref 70–99)
HCT VFR BLD CALC: 29.8 % (ref 40.5–52.5)
HEMOGLOBIN: 10 G/DL (ref 13.5–17.5)
MAGNESIUM: 2.1 MG/DL (ref 1.8–2.4)
MCH RBC QN AUTO: 30.2 PG (ref 26–34)
MCHC RBC AUTO-ENTMCNC: 33.4 G/DL (ref 31–36)
MCV RBC AUTO: 90.4 FL (ref 80–100)
PDW BLD-RTO: 17.6 % (ref 12.4–15.4)
PLATELET # BLD: 429 K/UL (ref 135–450)
PMV BLD AUTO: 6.8 FL (ref 5–10.5)
POTASSIUM SERPL-SCNC: 4 MMOL/L (ref 3.5–5.1)
RBC # BLD: 3.3 M/UL (ref 4.2–5.9)
SODIUM BLD-SCNC: 139 MMOL/L (ref 136–145)
WBC # BLD: 4.2 K/UL (ref 4–11)

## 2022-09-20 PROCEDURE — 94760 N-INVAS EAR/PLS OXIMETRY 1: CPT

## 2022-09-20 PROCEDURE — 83735 ASSAY OF MAGNESIUM: CPT

## 2022-09-20 PROCEDURE — 94660 CPAP INITIATION&MGMT: CPT

## 2022-09-20 PROCEDURE — 6360000002 HC RX W HCPCS: Performed by: PHYSICAL MEDICINE & REHABILITATION

## 2022-09-20 PROCEDURE — 74230 X-RAY XM SWLNG FUNCJ C+: CPT

## 2022-09-20 PROCEDURE — 6370000000 HC RX 637 (ALT 250 FOR IP): Performed by: PHYSICAL MEDICINE & REHABILITATION

## 2022-09-20 PROCEDURE — 97110 THERAPEUTIC EXERCISES: CPT

## 2022-09-20 PROCEDURE — 97535 SELF CARE MNGMENT TRAINING: CPT

## 2022-09-20 PROCEDURE — 92526 ORAL FUNCTION THERAPY: CPT

## 2022-09-20 PROCEDURE — 1280000000 HC REHAB R&B

## 2022-09-20 PROCEDURE — 36415 COLL VENOUS BLD VENIPUNCTURE: CPT

## 2022-09-20 PROCEDURE — 2700000000 HC OXYGEN THERAPY PER DAY

## 2022-09-20 PROCEDURE — 97530 THERAPEUTIC ACTIVITIES: CPT

## 2022-09-20 PROCEDURE — 92611 MOTION FLUOROSCOPY/SWALLOW: CPT

## 2022-09-20 PROCEDURE — 85027 COMPLETE CBC AUTOMATED: CPT

## 2022-09-20 PROCEDURE — 80048 BASIC METABOLIC PNL TOTAL CA: CPT

## 2022-09-20 RX ADMIN — LEVOTHYROXINE SODIUM 200 MCG: 0.1 TABLET ORAL at 07:48

## 2022-09-20 RX ADMIN — FOLIC ACID 1 MG: 1 TABLET ORAL at 07:48

## 2022-09-20 RX ADMIN — GABAPENTIN 400 MG: 400 CAPSULE ORAL at 21:02

## 2022-09-20 RX ADMIN — METOPROLOL TARTRATE 50 MG: 50 TABLET ORAL at 07:48

## 2022-09-20 RX ADMIN — Medication 30 MG: at 07:57

## 2022-09-20 RX ADMIN — ENOXAPARIN SODIUM 30 MG: 100 INJECTION SUBCUTANEOUS at 21:02

## 2022-09-20 RX ADMIN — Medication 100 MG: at 07:48

## 2022-09-20 RX ADMIN — POTASSIUM BICARBONATE 20 MEQ: 782 TABLET, EFFERVESCENT ORAL at 07:48

## 2022-09-20 RX ADMIN — ACETAMINOPHEN 650 MG: 325 TABLET ORAL at 07:47

## 2022-09-20 RX ADMIN — ASPIRIN 81 MG: 81 TABLET, COATED ORAL at 07:48

## 2022-09-20 RX ADMIN — CELECOXIB 200 MG: 200 CAPSULE ORAL at 07:48

## 2022-09-20 RX ADMIN — GABAPENTIN 400 MG: 400 CAPSULE ORAL at 07:48

## 2022-09-20 RX ADMIN — METOPROLOL TARTRATE 50 MG: 50 TABLET ORAL at 21:03

## 2022-09-20 RX ADMIN — Medication 1 TABLET: at 21:03

## 2022-09-20 RX ADMIN — TORSEMIDE 20 MG: 20 TABLET ORAL at 07:48

## 2022-09-20 RX ADMIN — Medication 1 TABLET: at 07:48

## 2022-09-20 RX ADMIN — CELECOXIB 200 MG: 200 CAPSULE ORAL at 21:02

## 2022-09-20 RX ADMIN — ENOXAPARIN SODIUM 30 MG: 100 INJECTION SUBCUTANEOUS at 07:47

## 2022-09-20 RX ADMIN — GABAPENTIN 400 MG: 400 CAPSULE ORAL at 13:29

## 2022-09-20 ASSESSMENT — PAIN - FUNCTIONAL ASSESSMENT: PAIN_FUNCTIONAL_ASSESSMENT: PREVENTS OR INTERFERES SOME ACTIVE ACTIVITIES AND ADLS

## 2022-09-20 ASSESSMENT — PAIN SCALES - GENERAL
PAINLEVEL_OUTOF10: 0
PAINLEVEL_OUTOF10: 4
PAINLEVEL_OUTOF10: 5

## 2022-09-20 ASSESSMENT — PAIN DESCRIPTION - ONSET: ONSET: ON-GOING

## 2022-09-20 ASSESSMENT — PAIN DESCRIPTION - FREQUENCY: FREQUENCY: INTERMITTENT

## 2022-09-20 ASSESSMENT — PAIN DESCRIPTION - DESCRIPTORS: DESCRIPTORS: SHARP

## 2022-09-20 ASSESSMENT — PAIN DESCRIPTION - LOCATION: LOCATION: HAND

## 2022-09-20 ASSESSMENT — PAIN DESCRIPTION - PAIN TYPE: TYPE: CHRONIC PAIN

## 2022-09-20 ASSESSMENT — PAIN DESCRIPTION - ORIENTATION: ORIENTATION: RIGHT;LEFT

## 2022-09-20 NOTE — PROCEDURES
INSTRUMENTAL SWALLOW REPORT  MODIFIED BARIUM SWALLOW    NAME: Yas Burt   : 1960  MRN: [id number]       Date of Eval: 2022     Ordering Physician: Dr. Saadia Dey  Radiologist: Dr. Annamaria Nelson     Referring Diagnosis(es):   Oropharyngeal Dysphagia  Type of Study: Repeat MBS  Date of Prior Study: 2022 with incomplete test due to severity of deficits with penetration/aspiration before during swallow without pt sensation. MEDICAL DIAGNOSIS: Critical Illness Myopathy  Recent active problems: recent partial bowel obstruction;  hiatal hernia; acute respiratory failure with hypoxia and hypercapnia; SBO; aspiration into airway; diastolic heart failure; history of atrial fibrillation; supraventricular tachycardia; arterial hypotension; aspiration pneumonia; morbid obesity with BMI of 50.0 to 59.9 adult; acute delirium; EARNESTINE needs C-PAP at night; Dysphagia; Critical illness myopathy  Past Medical History:  has a past medical history of Arthritis, Atrial fibrillation ([address] and Hypertension. Past Surgical History:  has a past surgical history that includes Upper gastrointestinal endoscopy (2022); Upper gastrointestinal endoscopy (2022); Upper gastrointestinal endoscopy (N/A, 2022); bronchoscopy (2022); bronchoscopy (2022); Gastric fundoplication (N/A, ); and Gastrostomy tube placement (N/A, 2022). Allergies: codeine  Recent history of prolonged intubation 2022 through to 2022; then from 2022 to ~2022 after a medical surgical procedure  Recent admit to [facility] 2022 and then d/c to Rehab 2022  Prior illness status: Lives with spouse; IND communication; participates in home/med management; regular diet as desired; drove; retired.    Onset: 2022      Chart Review:  MD History and Physical Documentation assessment  History of Present Illness/Hospital Course:  Patient is a 63 y.o. male hospitalized on 2022   for small bowel obstruction, pneumonia, septic shock patient intubation, mechanical ventilation, broad-spectrum antibiotics s intubated/extubated 8/8/2020 through 8/26/2022. On 8/9/2022, EGD done for endoscopically placed NG tube placement. On 8/22/2022, EGD with endoscopic placement of NG tube. On 8/31/2022 underwent laparoscopic reduction of large hiatal hernia with gastropexy, placement of gastrostomy tube. On 9/2/2022, extubated after the above procedure. He had the diagnosis of critical illness myopathy, and was discharged from our hospital to an LTAC unit for further IV antibiotic therapy and treatment. Patient now has improved, and is admitted back to our rehabilitation unit to progress with his strengthening of his arms and legs and improving his endurance before discharge back home. Patient also has diagnosis of atrial fibrillation, heart failure, dysphagia, sleep apnea, HTN, and rheumatoid arthritis. Patient is now ambulating 50 feet with a rolling walker in PT with contact-guard to min assist, and needs max assist for lower body bathing and dressing. Patient lives at home with his wife in a 1 level house. Patient is allowed to eat PO with speech therapy during therapeutic feeding. Patient just feels overall weakness due to long-term bedrest from all of his medical problems over the last month. We need to improve his endurance, arm and leg strength, and his stamina so he can return home safely. He will be limited by his obesity since he is over 300 pounds. 8/30/2022 MBSS completed : Oropharyngeal Dysphagia with high aspiration risk. Oropharyngeal dysphagia characterized by reduced lingual coordination for bolus control and A-P oral transit; delayed initiation of the swallow; reduced laryngeal rom; reduced tongue base retraction and reduced sensation to aspiration/residue. Symptoms: Mastication was not assessed due to severity of pharyngeal phase deficits. Premature loss to pharynx ; gradual pooling to pharynx.  Mildly thick and moderately thick penetration/aspiration prior to swallow; no cough response. Pooling of puree to valleculae with post swallow valleculae residue with difficulty clearing with clearance swallow and poor sensation of residue. Compensatory strategies: Inconsistent ability to achieve a volitional cough which was weak; Inconsistent ability to achieve clearance swallow which did not clear valleculae residue; Pt was unable to complete compensatory head postures; Pt did attempt at lingual hold but inconsistent; persistent premature loss of liquid. Recommendation for npo due to severity of deficits      Recent CXR/CT of Chest: no recent test for review    Patient Complaints/Reason for Referral:  Carol Alejandre was referred for a MBS to assess the efficiency of his/her swallow function, assess for aspiration, and to make recommendations regarding safe dietary consistencies, effective compensatory strategies, and safe eating environment. Patient complaints: pt wants to eat/drink. Pt has been receiving Oropharyngeal Dysphagia therapy targeting tongue base; laryngeal/pharyngeal ex; compensatory swallow strategies targeting airway        MBSS Assessment Impressions:  Behavior/Cognition/Vision/Hearing:  Alert; Cooperative;Pleasant mood. Pt is verbally responsive; able to follow commands including compensatory swallow commands  Vision Exceptions: Wears glasses for reading / Hearing: Within functional limits   Pain:denied  Pt was able to complete a stand pivot transfer  Lateral Plane view; upright in videofluoroscopic Chair  Consistencies Administered: Regular;Easy to Chew;Soft & Bite Sized; Minced and Moist;Pureed; Thin straw; Moderately Thick teaspoon;Mildly Thick teaspoon;Mildly Thick straw (isolated sips and 3 sips in consecutive succession)     2.   Pt with improved oropharyngeal swallow when compared to 8/29/2022 MBSS in bolus control; rate of swallow initiation and laryngeal rom and pharyngeal clearance with improved airway protection and no aspiration and reduced frequency of penetration. Residual deficits in lingual coordination for bolus formation/cohesion and A-P oral transit of soft and regular solid foods; variable delayed initiation of swallow; reduced epiglottic distention but improved laryngeal elevation; reduced pharyngeal clearance of food consistencies. Narrow UES opening and PES . Prognosis appears good for continued improvement. Occasional shallow penetration of thin liquids which clears with swallow  Post swallow food consistency oral residue minimal to mild +. Pt can clear with clearance swallow  Post swallow food consistency pharyngeal residue valleculae; inconsistent lining pharyngeal wall; and lining PES with some retrograde flow. Incomplete clearance with clearance swallow  Recommend:   Request dysphagia minced and moist food consistency with thin liquid diet  SLP to continue oropharyngeal dysphagia therapy with hopes of gradual upgrade to dysphagia soft/bite size food consistencies  Dietary consult for calorie counts  Meds with puree  GERD precautions  Discussed with RN, pt, referring MD and verbal orders received. Dysphagia Outcome Severity Scale: Level 4: Mild moderate dysphagia- Intermittent supervision/cueing. One - two diet consistencies restricted  Penetration-Aspiration Scale (PAS): 2 - Material enters the airway, remains above the vocal folds, and is ejected from the airway (occasional with thin liquids)    Recommended Diet:  Solid consistency: Minced and Moist  Liquid consistency: Thin  Medication administration: Meds in puree    Safe Swallow Protocol:  Compensatory Swallowing Strategies : Upright as possible for all oral intake;Small bites/sips;Swallow 2 times per bite/sip; Remain upright for 30-45 minutes after meals (lingual hold prior to swallow of thin liquids;meds with puree; GERD precautions.  Upright in chair all meals and ~40 minutes post; HOB 40 degrees at all times)      Recommendations/Treatment  Requires SLP Intervention: Yes  Frequency and Duration: 5 times a week while on rehab unit; anticipate need for additional at d/c    Recommended Exercises:    Therapeutic Interventions: Bolus control exercises; Pharyngeal exercises;Diet tolerance monitoring; Therapeutic PO trials with SLP;Laryngeal exercises; Patient/Family education;Oral care;Oral motor exercises      Education: Images and recommendations were reviewed with pt; pt's [facility] staff RN; referring MD Selma Lancaster following this exam.   Patient Education Response: Verbalizes understanding;Needs reinforcement    Safety Devices  Safety Devices in place: Yes  Type of devices: All fall risk precautions in place  Restraints Initially in Place: No    Oral Preparation / Oral Phase   Improved rate of bolus prep/cohesion with less premature loss; improved bolus control with less premature loss. Iproved ease of clearance of soft/regular solid food oral residue. Occasional premature loss to pharynx to level of valleculae    Pharyngeal Phase   Delayed initiation of swallow was quite variable ; educed epiglottic distention but improved laryngeal elevation/rom; post swallow pharyngeal food residue valleculae/PES and PW (trace to mild). concern for potential CP dysfunction as UES opening and PES were narrow with barium lining PES with some retrograde flow. Esophageal Phase  Esophageal Screen: Impaired . Observation with concern for potential CP dysfunction with narrow UES opening and narrow PES (barium lines PES with some retrograde flow). pt with diagnosis history of GERD/hiatal hernia . Therapy Time:   Individual   Time In 1035   Time Out 1115   Minutes 36           Signed  My Lechuga,MS,CCC,SLP [id number]  Speech and Language Pathologist   9/20/2022, 11:18 AM

## 2022-09-20 NOTE — PROGRESS NOTES
Comprehensive Nutrition Assessment    Type and Reason for Visit:  Reassess    Nutrition Recommendations/Plan:   Continue dysphagia minced and moist diet   Add Ensure HP TID  Modify EN to Jevity 1.5 @ 60ml x 14 hours (5pm-7am)  Water flush 30ml every 4 hours as pt is on oral diet  Begin Calorie Count     Malnutrition Assessment:  Malnutrition Status: At risk for malnutrition (Comment) (09/14/22 1036)    Context:  Acute Illness       Nutrition Assessment:    Follow-up. Tolerating Jevity 1.5 @ 90ml x 14 hours + two proteinex daily to meet 100% of needs. MBS today. Diet advanced to Dysphagia minced and moist diet with thin liquids. Will decrease nocturnal feeds to 60ml x 14 hours to meet 82% of calorie needs and 40% of protein needs. Will also add Ensure HP at each meal. Calorie count in place to assess oral intake. Nutrition Related Findings:    +12 liters. Labs reviewed. +BM 9/20. No edema. Wound Type: None       Current Nutrition Intake & Therapies:    Average Meal Intake: Unable to assess  Average Supplements Intake: Unable to assess  ADULT TUBE FEEDING; PEG; Standard with Fiber; Cyclic; 90; 7:15 PM; 1:02 AM; 150; Q 4 hours; Protein; Two Proteinex daily  ADULT DIET; Dysphagia - Minced and Moist  Current Tube Feeding (TF) Orders:  Feeding Route: PEG  Formula: Standard with Fiber  Schedule: Cyclic  Feeding Regimen: Jevity 1.5 @ 60ml x 14 hours  Additives/Modulars: Protein (Twice daily)  Water Flushes: 30ml every 4 hours as pt is on oral diet  Goal TF & Flush Orders Provides: Jevity 1.5 @ 60ml x 14 hours to provide 840 ml TV, 1260 kcals, 54 grams protein, and 639ml free water. Anthropometric Measures:  Height: 5' 7\" (170.2 cm)  Ideal Body Weight (IBW): 148 lbs (67 kg)    Admission Body Weight: 317 lb (143.8 kg)  Current Body Weight: 308 lb (139.7 kg), 208.1 % IBW.  Weight Source: Standing Scale  Current BMI (kg/m2): 48.2  Weight Adjustment For: No Adjustment  BMI Categories: Obese Class 3 (BMI 40.0 or greater)    Estimated Daily Nutrient Needs:  Energy Requirements Based On: Kcal/kg  Weight Used for Energy Requirements: Current  Energy (kcal/day): 2000-3798 (11-14kcal/140kg)  Weight Used for Protein Requirements: Ideal  Protein (g/day): 134 (2g/67kg)  Method Used for Fluid Requirements: 1 ml/kcal  Fluid (ml/day): 1 ml/kcal    Nutrition Diagnosis:   Inadequate oral intake related to inadequate protein-energy intake as evidenced by NPO or clear liquid status due to medical condition, nutrition support - enteral nutrition    Nutrition Interventions:   Food and/or Nutrient Delivery: Continue Current Diet, Start Oral Nutrition Supplement, Modify Tube Feeding, Start Calorie Count  Nutrition Education/Counseling: Education not indicated  Coordination of Nutrition Care: Continue to monitor while inpatient     Goals:  Previous Goal Met:  (New goal)  Goals: PO intake 50% or greater    Nutrition Monitoring and Evaluation:   Behavioral-Environmental Outcomes: None Identified  Food/Nutrient Intake Outcomes: Diet Advancement/Tolerance, Food and Nutrient Intake, Supplement Intake, Enteral Nutrition Intake/Tolerance  Physical Signs/Symptoms Outcomes: Biochemical Data, Chewing or Swallowing, GI Status, Nausea or Vomiting, Fluid Status or Edema, Meal Time Behavior, Skin, Weight    Discharge Planning:     Too soon to determine     Emilie Score, [address], [place]  Contact: [phone]

## 2022-09-20 NOTE — PROGRESS NOTES
PHYSICAL THERAPY  Progress Note   Second Session    Patient Name: Fabiano Cates  Medical Record Number: [id number]    Treatment Diagnosis: Acute hypoxemic and hypercapnic respiratory failure      Chart Reviewed: Yes   Restrictions/Precautions: Modified Diet, Fall Risk Other position/activity restrictions: on minced & moist diet w/ thins; has  PEG tube; on 2L O2 (not on O2 at home), IV L UE, CPAP at night, teley   Additional Pertinent Hx: Per Dr. Ary Moseley H/P \"Patient is a 63 y.o. male hospitalized on 8/5/2022   for small bowel obstruction, pneumonia, septic shock patient intubation, mechanical ventilation, broad-spectrum antibiotics s intubated/extubated 8/8/2020 through 8/26/2022. On 8/9/2022, EGD done for endoscopically placed NG tube placement. On 8/22/2022, EGD with endoscopic placement of NG tube. On 8/31/2022 underwent laparoscopic reduction of large hiatal hernia with gastropexy, placement of gastrostomy tube. On 9/2/2022, extubated after the above procedure. He had the diagnosis of critical illness myopathy, and was discharged from our hospital to an LTAC unit for further IV antibiotic therapy and treatment. Patient now has improved, and is admitted back to our rehabilitation unit to progress with his strengthening of his arms and legs and improving his endurance before discharge back home. Patient also has diagnosis of atrial fibrillation, heart failure, dysphagia, sleep apnea, HTN, and rheumatoid arthritis. Patient is now ambulating 50 feet with a rolling walker and PT with contact-guard to min assist, and needs max assist for lower body bathing and dressing. Patient lives at home with his wife in a 1 level house. Patient is allowed to eat PO with speech therapy during therapeutic feeding. \"        Subjective: Pt on room air. Passed his feeding trial.  Feels well. Objective    Pt able to stand with SBA, then ambulate 20' to commode with SBA, no device.   Pt stood for several minutes while urinating, then moved to the sink and washed his hands without assist.  Pt was transported to gym, then ambulated 220' x 2 without device, SBA, on room air. SPO2 briefly decreased to 89%, but quickly rebounded with seated rest.  Pt able to complete 5 min. Unsupported standing while doing ball toss, SBA. Pt's SPO2 was stable throughout. Pt able to ascend/descend 4 steps with (B) HR support, mild SOB. Pt also completed RLE/LLE step-up 2 x 5, single-hand support. SPO2 stable throughout. Pt ended session with static stand x 30 s., supervision, no LOB. Assessment: Pt's SPO2 remained stable on room air, and he was able to complete several bouts of extended, unsupported standing exercise. He would benefit from continued therapy to further improve his strength, endurance, and balance.        Safety Device - Type of devices:  [x]  All fall risk precautions in place [] Bed alarm in place  [] Call light within reach [] Chair alarm in place [] Positioning belt [x] Gait belt [] Patient at risk for falls [] Left in bed [] Left in chair [] Telesitter in use [] Sitter present [] Nurse notified []  None      Therapy Time   Individual Co-treatment   Time In 1345     Time Out 1430     Minutes 45         Electronically signed by Angela Curiel, COLLEEN [id number] on 9/20/2022 at 2:05 PM

## 2022-09-20 NOTE — PROGRESS NOTES
Physical Therapy  Facility/Department: [address] REHAB  Rehabilitation Physical Therapy Treatment Note    NAME: Fabiano Cates  : 1960 (63 y.o.)  MRN: [id number]  CODE STATUS: Full Code    Date of Service: 22       Restrictions:  Restrictions/Precautions: NPO;Fall Risk  Position Activity Restriction  Other position/activity restrictions: NPO, PEG tube, 3L O2 (not on O2 at home), IV L UE, CPAP at night, teley     SUBJECTIVE  Subjective  Subjective: Pt agreeable to activity. Post Treatment Pain Screening         OBJECTIVE       Functional Mobility  Standing Balance  Time: 5 min. static stand, unsupported, on room air. Pt able to maintain balance with SBA. SPO2 stable at 92-93%. Additional Activity: Unsupported ambulation 80' on 3 L. O2, repeated on 2 L. O2, then repeated on 1 L. O2, and finally on room air. Throughout, pt able to maintain balance with CGA-SBA, and SPO2 remained stable in 90s. Transfers  Surface: To chair with arms;From chair with arms  Sit to Stand  Assistance Level: Supervision  Stand to Sit  Assistance Level: Supervision    Environmental Mobility  Ambulation  Surface: Level surface  Distance: 160' unsupported, completing L/R turns, on room air, SBA, no LOB. SPO2 90% initially after ambulation, but increased to mid 90s with seated rest.    PT Exercises  Exercise Treatment: Standing with LUE support, on room air: mini-squat 2 x 10, (B) heel raise 2 x 10, march 2 x 10. SPO2 stable throughout. NuStep level 3 x 5 min., room air, SPO2 stable throughout. ASSESSMENT/PROGRESS TOWARDS GOALS     Assessment  Assessment: Pt able to complete multiple ambulation trials unsupported, with CGA-SBA. Pt tolerated over half of session on room air, maintaining SPO2 in low-mid 90s throughout. He would benefit from continued therapy to further improve his endurance, balance, and independence ambulating. Discharge Recommendations: Continue to assess pending progress; Patient would benefit from continued therapy after discharge;Home with assist PRN    Goals  Patient Goals   Patient goals : Return to walking without difficulty. Short Term Goals  Time Frame for Short term goals: 7 days  Short term goal 1: Complete ramp with RW CGA. - met 9/19/2022  Short term goal 2: Ambulate 150' with RW and CGA. - met 9/19/2022  Short term goal 3: Transfers, car and sit<>stand, with SBA. - met 9/19/2022  Short term goal 4: Complete all bed mobility mod I. - met 9/19/2022  Long Term Goals  Time Frame for Long term goals : 10-14 days  Long term goal 1: Complete ramp with no AD mod I.  Long term goal 2: Ambulate 150' RW mod I.  Long term goal 3: Transfers, car and sit<>stand, with mod I. PLAN OF CARE/SAFETY  Plan  Plan:  minutes of therapy at least 5 out of 7 days a week  Plan weeks: 2 weeks  Current Treatment Recommendations: Strengthening;Balance training;Functional mobility training;Transfer training;Stair training;Gait training; Therapeutic activities; Home exercise program;Endurance training;IADL training  Safety Devices  Type of Devices: All fall risk precautions in place;Gait belt  Restraints  Restraints Initially in Place: No    EDUCATION  Education  Education Given To: Patient  Education Provided: Role of Therapy;Plan of Care;ADL Function; Safety; Mobility Training;Transfer Training;Energy Conservation  Education Provided Comments: safety with mobility tasks  Education Method: Verbal;Demonstration  Education Outcome: Verbalized understanding        Therapy Time   Individual Concurrent Group Co-treatment   Time In 0815         Time Out 0900         Minutes 45           Timed Code Treatment Minutes: [id number], [place], 09/20/22 at 10:09 AM   Electronically signed by Pietro Riley, PT  [id number] on 9/20/2022 at 10:10 AM

## 2022-09-20 NOTE — PROGRESS NOTES
Insurance approval received through 9/26 with an update to be faxed to [phone] hunter Martell on 9/27/22.

## 2022-09-20 NOTE — CARE COORDINATION
Scooter Miller care manager - Erinn Rounds - ph:  [address], [address]  Administrative Assist, Case Management  [phone]  Electronically signed by Cecelia Spear on 9/20/2022 at 2:07 PM

## 2022-09-20 NOTE — PROGRESS NOTES
Occupational Therapy  Facility/Department: [facility]  Rehabilitation Occupational Therapy Daily Treatment Note    Date: 22  Patient Name: Koki Stewart       Room: [address]  MRN: [id number]  Account: [de-identified]   : 1960  (63 y.o.) Gender: male                    Past Medical History:  has a past medical history of Arthritis, Atrial fibrillation ([address] and Hypertension. Past Surgical History:   has a past surgical history that includes Upper gastrointestinal endoscopy (2022); Upper gastrointestinal endoscopy (2022); Upper gastrointestinal endoscopy (N/A, 2022); bronchoscopy (2022); bronchoscopy (2022); Gastric fundoplication (N/A, ); and Gastrostomy tube placement (N/A, 2022). Restrictions  Restrictions/Precautions: Modified Diet; Fall Risk  Other position/activity restrictions: on minced & moist diet w/ thins; has  PEG tube; attempting to wean off O2,  IV L UE, CPAP at night, teley    Subjective  Subjective: met in room, he is resting quietly in recliner; his MBS went well, currently on minced & moist diet w/ thin liquids  Restrictions/Precautions: Modified Diet; Fall Risk             Objective     Cognition  Overall Cognitive Status: WFL  Orientation  Overall Orientation Status: Within Normal Limits  Orientation Level: Oriented X4         ADL  Feeding  Assistance Level: Set-up  Skilled Clinical Factors: now on thin liquids w/ minced & moist diet (supplementing w/ tube feed per dietary)  Grooming/Oral Hygiene  Assistance Level: Independent  Skilled Clinical Factors: seated for hair & oral care tasks to conserve energy  Upper Extremity Bathing  Assistance Level: Set-up; Verbal cues  Skilled Clinical Factors: seated on shower chair, able to turn on/off water and manage [address], needed cues to wipe sludge @ Peg tube & to dry it  Lower Extremity Bathing  Assistance Level: Supervision;Set-up  Skilled Clinical Factors: distant supervision to bathe while seated on shower chair, used LH sponge to reach feet,  stood to wash, rinse & dry jimmy areas, alternated hands on GBs, easily fatigues but no LOB  Upper Extremity Dressing  Assistance Level: Independent  Skilled Clinical Factors: able to doff/don shirt while seated  Lower Extremity Dressing  Assistance Level: Stand by assist;Supervision  Skilled Clinical Factors: able to doff underwear, shorts & socks w/ extra time; stood w/ distant supervision to manage clothing over hips, easily fatigues, encouraged PLB  Putting On/Taking Off Footwear  Assistance Level: Set-up  Skilled Clinical Factors: doffed/donned non skid socks while seated on EOB, brings 1 leg to side of bed to reach  Toileting  Assistance Level: Supervision  Skilled Clinical Factors: stood to urinate  Toilet Transfers  Equipment: Grab bars;Standard toilet  Assistance Level: Supervision  Skilled Clinical Factors: stood to urinate  Tub/Shower Transfers  Type: Shower  Transfer To: Shower chair with back  Additional Factors: Verbal cues  Assistance Level: Stand by assist  Skilled Clinical Factors: ambulated to/from shower stall w/ close SBA, mild limping; good use of grab bars for in/out of shower & on/off shower chair          Functional Mobility  Activity: Transport items; Retrieve items; To/From bathroom  Assistance Level: Stand by assist  Skilled Clinical Factors: CLOSE SBA to ambulate @ his room, no AD; mild limping noted; he went to closet to gather clothes & transported to bathrm; able to use GB when enter/exiting shower chair & to complete shower chair t/f. Now on RA however SOB noted  Transfers  Surface: From chair with arms; To chair with arms;Standard toilet  Sit to Stand  Assistance Level: Stand by assist  Skilled Clinical Factors: no AD  Stand to Sit  Assistance Level: Stand by assist  Skilled Clinical Factors: good recall of safe hand placement   OT Exercises  Exercise Treatment: issued yellow resistance band for strengthening HEP--instructed w/ each eat normally, get up and moving to get stronger\"  Short Term Goals  Time Frame for Short term goals: Within 7 days the Pt will  Short Term Goal 1: Toileting with SBA using GB's prn--MET  Short Term Goal 2: LB dressing with SBA using AE prn--MET  Short Term Goal 3: Functional transfers using LRAD with SBA--MET  Short Term Goal 4: Bathing seated on shower chair with CGA--MET  Short Term Goal 5: Improved endurance to complete standing balance task for 3min SBA in order to complete ADL and IADL tasks in standing--MET  Long Term Goals  Time Frame for Long term goals :  Within 10-14 days  Long Term Goal 1: Toileting mod I using GB's prn  Long Term Goal 2: LB dressing mod I using AE prn  Long Term Goal 3: Functional transfers using LRAD mod I  Long Term Goal 4: Bathing seated on shower chair mod I  Long Term Goal 5: Improved endurance to complete standing balance task for 5min mod I in order to complete ADL and IADL tasks in standing  Additional Goals?: Yes  Long Term Goal 6: Determine DME needs and complete family education prior to discharge        Therapy Time   Individual Concurrent Group Co-treatment   Time In 1515         Time Out 1630         Minutes 75         Timed Code Treatment Minutes: 55 Donato Acosta OTR/L #[id number]

## 2022-09-20 NOTE — CARE COORDINATION
Team conference held today. Team reviewed progress, goals, DME needs and DC date. Pt to have MBS today at 11 am to determine if diet can be advanced. Team is working on Oxygen weaning if possible. DME recs:   reacher. DC on Saturday 9- to home with home care orders for sn/pt/ot/sp. Met with patient to review. He states he ate pureed foods today after his swallow test.  He is now off oxygen and he states he walked with therapy without it today. He states he even walked without the walker. His goal is home. Discussion held regarding home care orders. Showed him the CMS star rated list of agencies for his review and reviewed star ratings for the agencies. He reports he already has a reacher at home. He will work with his wife when she comes in tonight about selection of home care agency.   [facility]     Case Management   [phone]    9/20/2022  4:53 PM

## 2022-09-20 NOTE — PLAN OF CARE
Problem: Discharge Planning  Goal: Discharge to home or other facility with appropriate resources  9/20/2022 1129 by Corine Perales RN  Outcome: Progressing  Flowsheets (Taken 9/20/2022 1129)  Discharge to home or other facility with appropriate resources:   Identify barriers to discharge with patient and caregiver   Identify discharge learning needs (meds, wound care, etc)     Problem: Nutrition Deficit:  Goal: Optimize nutritional status  9/20/2022 1129 by Corine Perales RN  Outcome: Progressing  Flowsheets (Taken 9/20/2022 1129)  Nutrient intake appropriate for improving, restoring, or maintaining nutritional needs: Monitor oral intake, labs, and treatment plans

## 2022-09-20 NOTE — PLAN OF CARE
Problem: Pain  Goal: Verbalizes/displays adequate comfort level or baseline comfort level  Outcome: Progressing  Flowsheets (Taken 9/20/2022 0311)  Verbalizes/displays adequate comfort level or baseline comfort level:   Encourage patient to monitor pain and request assistance   Administer analgesics based on type and severity of pain and evaluate response   Consider cultural and social influences on pain and pain management   Notify Licensed Independent Practitioner if interventions unsuccessful or patient reports new pain   Implement non-pharmacological measures as appropriate and evaluate response   Assess pain using appropriate pain scale  Note: Able to rate pain using a 1-10 scale, medicated per prn orders, see MAR.  Able to verbalize a reduction in pain and/or able to fall asleep and remain asleep without any s/s of pain

## 2022-09-20 NOTE — PROGRESS NOTES
Vigiglobea Files  9/20/2022  [id number]    Chief Complaint: Critical illness myopathy    Subjective:  Patient seen this AM. Patient will be discussed in rehab unit conference today with entire rehab team of PT, OT, Speech, Nursing, Social service, and rehab unit manager to determine how much progress the patient has made in therapies, and when patient will be ready for D/C to home safely. We think patient will be ready for D/C to home this coming Saturday. A modified barium swallow will be done today and hopefully we can increase his diet so that he will be able to eat by mouth at discharge. He is now ambulating 200 feet with supervision with no device, and just needs standby assist for lower body bathing and dressing. We will plan on discharge to home continue therapies to include PT, OT, and speech therapy. He continues on his cardiac monitor. Repeat labs today look good and are stable. ROS: No N/V, SOB, chest pain, C/F    Objective:  Patient Vitals for the past 24 hrs:   BP Temp Temp src Pulse Resp SpO2 Weight   09/20/22 0349 125/84 98.1 °F (36.7 °C) Axillary 72 18 92 % (!) 308 lb 10.3 oz (140 kg)   09/20/22 0030 119/78 98 °F (36.7 °C) Axillary 70 18 93 % --   09/19/22 2036 125/81 98.9 °F (37.2 °C) Axillary 88 19 92 % --   09/19/22 1954 -- -- -- -- 16 -- --   09/19/22 1701 (!) 148/83 97.9 °F (36.6 °C) Oral 80 18 95 % --   09/19/22 1246 139/86 97.5 °F (36.4 °C) Oral 79 18 96 % --   09/19/22 1208 -- -- -- 78 -- -- --   09/19/22 0915 -- -- -- -- -- 96 % --   09/19/22 0833 126/82 98.4 °F (36.9 °C) Oral 82 18 96 % --       Gen: No distress, pleasant. HEENT: Normocephalic, atraumatic. CV: Regular rate and rhythm. Resp: No respiratory distress. Abd: Soft, nontender, obese, PEG tube in place  Ext: No edema. Neuro: Alert, oriented, appropriately interactive.      Wt Readings from Last 3 Encounters:   09/20/22 (!) 308 lb 10.3 oz (140 kg)   09/07/22 (!) 309 lb 8.4 oz (140.4 kg)       Laboratory data:   Lab

## 2022-09-20 NOTE — PROGRESS NOTES
Resting quietly in bed. Pt admitted with debility s/p respiratory failure. Is oriented x4. Ambulating with gait belt no device, tolerating well. Lungs clear but diminished, on 2L O2, not home dependent. HR regular. On telemetry showing NSR. Abdomen rotund with active bowel sounds. PEG tube in place to LUQ, tube feed infusing per order. Pt tolerating without any issue. Denies pain. Encouraged to call for all needs. Call light in reach at all times.  Edwige Kerr RN

## 2022-09-21 PROCEDURE — 92507 TX SP LANG VOICE COMM INDIV: CPT

## 2022-09-21 PROCEDURE — 97110 THERAPEUTIC EXERCISES: CPT

## 2022-09-21 PROCEDURE — 97116 GAIT TRAINING THERAPY: CPT

## 2022-09-21 PROCEDURE — 6370000000 HC RX 637 (ALT 250 FOR IP): Performed by: PHYSICAL MEDICINE & REHABILITATION

## 2022-09-21 PROCEDURE — 97530 THERAPEUTIC ACTIVITIES: CPT

## 2022-09-21 PROCEDURE — 94760 N-INVAS EAR/PLS OXIMETRY 1: CPT

## 2022-09-21 PROCEDURE — 97112 NEUROMUSCULAR REEDUCATION: CPT

## 2022-09-21 PROCEDURE — 97129 THER IVNTJ 1ST 15 MIN: CPT

## 2022-09-21 PROCEDURE — 94660 CPAP INITIATION&MGMT: CPT

## 2022-09-21 PROCEDURE — 1280000000 HC REHAB R&B

## 2022-09-21 PROCEDURE — 6360000002 HC RX W HCPCS: Performed by: PHYSICAL MEDICINE & REHABILITATION

## 2022-09-21 PROCEDURE — 92526 ORAL FUNCTION THERAPY: CPT

## 2022-09-21 RX ADMIN — ENOXAPARIN SODIUM 30 MG: 100 INJECTION SUBCUTANEOUS at 20:11

## 2022-09-21 RX ADMIN — GABAPENTIN 400 MG: 400 CAPSULE ORAL at 20:12

## 2022-09-21 RX ADMIN — CELECOXIB 200 MG: 200 CAPSULE ORAL at 08:39

## 2022-09-21 RX ADMIN — TORSEMIDE 20 MG: 20 TABLET ORAL at 08:40

## 2022-09-21 RX ADMIN — Medication 1 TABLET: at 08:39

## 2022-09-21 RX ADMIN — Medication 30 MG: at 08:47

## 2022-09-21 RX ADMIN — GABAPENTIN 400 MG: 400 CAPSULE ORAL at 08:40

## 2022-09-21 RX ADMIN — METOPROLOL TARTRATE 50 MG: 50 TABLET ORAL at 08:40

## 2022-09-21 RX ADMIN — Medication 1 TABLET: at 20:12

## 2022-09-21 RX ADMIN — CELECOXIB 200 MG: 200 CAPSULE ORAL at 20:12

## 2022-09-21 RX ADMIN — LEVOTHYROXINE SODIUM 200 MCG: 0.1 TABLET ORAL at 08:47

## 2022-09-21 RX ADMIN — FOLIC ACID 1 MG: 1 TABLET ORAL at 08:39

## 2022-09-21 RX ADMIN — POTASSIUM BICARBONATE 20 MEQ: 782 TABLET, EFFERVESCENT ORAL at 08:39

## 2022-09-21 RX ADMIN — ENOXAPARIN SODIUM 30 MG: 100 INJECTION SUBCUTANEOUS at 08:45

## 2022-09-21 RX ADMIN — METOPROLOL TARTRATE 50 MG: 50 TABLET ORAL at 20:12

## 2022-09-21 RX ADMIN — GABAPENTIN 400 MG: 400 CAPSULE ORAL at 14:56

## 2022-09-21 RX ADMIN — Medication 100 MG: at 08:40

## 2022-09-21 RX ADMIN — ASPIRIN 81 MG: 81 TABLET, COATED ORAL at 08:40

## 2022-09-21 ASSESSMENT — PAIN SCALES - GENERAL
PAINLEVEL_OUTOF10: 2
PAINLEVEL_OUTOF10: 4
PAINLEVEL_OUTOF10: 0

## 2022-09-21 ASSESSMENT — PAIN DESCRIPTION - ORIENTATION: ORIENTATION: MID

## 2022-09-21 ASSESSMENT — PAIN - FUNCTIONAL ASSESSMENT: PAIN_FUNCTIONAL_ASSESSMENT: ACTIVITIES ARE NOT PREVENTED

## 2022-09-21 ASSESSMENT — PAIN DESCRIPTION - LOCATION: LOCATION: BACK;GENERALIZED

## 2022-09-21 ASSESSMENT — PAIN DESCRIPTION - DESCRIPTORS: DESCRIPTORS: ACHING;DISCOMFORT

## 2022-09-21 ASSESSMENT — PAIN DESCRIPTION - FREQUENCY: FREQUENCY: INTERMITTENT

## 2022-09-21 ASSESSMENT — PAIN DESCRIPTION - ONSET: ONSET: ON-GOING

## 2022-09-21 ASSESSMENT — PAIN DESCRIPTION - PAIN TYPE: TYPE: CHRONIC PAIN

## 2022-09-21 NOTE — PROGRESS NOTES
Ghislaine Littlejohn  9/21/2022  [id number]    Chief Complaint: Critical illness myopathy    Subjective:  Patient seen this AM. Patient was discussed yesterday in rehab unit conference with entire rehab team , and we think the patient will be ready for D/C to home this coming Saturday. A modified barium swallow was done yesterday, showed improvement, so his diet was increased and he is now allowed to drink thin liquids. He is now ambulating 200 feet with supervision with no device, and just needs standby assist for lower body bathing and dressing. We will plan on discharge to home continue therapies to include PT, OT, and speech therapy. ROS: No N/V, SOB, chest pain, C/F    Objective:  Patient Vitals for the past 24 hrs:   BP Temp Temp src Pulse Resp SpO2 Height Weight   09/21/22 0455 123/71 98.3 °F (36.8 °C) Axillary 76 17 91 % -- (!) 309 lb 15.5 oz (140.6 kg)   09/21/22 0017 114/71 97.8 °F (36.6 °C) Axillary 73 18 91 % -- --   09/20/22 2051 119/80 98.1 °F (36.7 °C) Axillary 94 16 92 % -- --   09/20/22 1750 133/86 97.7 °F (36.5 °C) Oral 97 18 92 % -- --   09/20/22 1216 -- -- -- -- -- -- 5' 7\" (1.702 m) --   09/20/22 1116 -- -- -- 68 -- -- -- --   09/20/22 1104 -- -- -- -- -- 95 % -- --       Gen: No distress, pleasant. HEENT: Normocephalic, atraumatic. CV: Regular rate and rhythm. Resp: No respiratory distress. Abd: Soft, nontender, obese, PEG tube in place  Ext: No edema. Neuro: Alert, oriented, appropriately interactive.      Wt Readings from Last 3 Encounters:   09/21/22 (!) 309 lb 15.5 oz (140.6 kg)   09/07/22 (!) 309 lb 8.4 oz (140.4 kg)       Laboratory data:   Lab Results   Component Value Date    WBC 4.2 09/20/2022    HGB 10.0 (L) 09/20/2022    HCT 29.8 (L) 09/20/2022    MCV 90.4 09/20/2022     09/20/2022       Lab Results   Component Value Date/Time     09/20/2022 05:26 AM    K 4.0 09/20/2022 05:26 AM    K 4.5 09/14/2022 07:34 AM    CL 99 09/20/2022 05:26 AM    CO2 26 09/20/2022 05:26 AM    BUN 27 09/20/2022 05:26 AM    CREATININE 0.7 09/20/2022 05:26 AM    GLUCOSE 139 09/20/2022 05:26 AM    CALCIUM 9.0 09/20/2022 05:26 AM        Therapy progress:  PT  Position Activity Restriction  Other position/activity restrictions: on minced & moist diet w/ thins; has  PEG tube; attempting to wean off O2,  IV L UE, CPAP at night, teley  Objective     Sit to Stand: Contact guard assistance (As pt became fatigued they would sit down more abruptly)  Stand to sit: Stand by assistance  Device: Rolling Walker  Assistance: Contact guard assistance  Distance: 80' on uneven surface (carpet at end of felipe) with 4 turns  OT  PT Equipment Recommendations  Equipment Needed: No  Other: Continue to assess pending progress          SLP  Current Diet :  (PEG; no oral po)   1. Dysphagia diagnosis: Persistent concern for lingual coordination deficits which can impact bolus control (Mastication was not assessed due to recent history/finding of pharyngeal phase deficits); persistent concern for delayed initiation of swallow; potential residual reduced laryngeal rom and pharyngeal peristalsis. Pt with intermittent throat clearing post swallow;multiple swallows per one presentation. Anticipate will need repeat MBSS during this admit. Khushboo Mercado Recommend : continue SLP therapy for Oropharyngeal Dysphagia; date TBD for repeat MBSS; ice chips :occasional /isolated; oral care. 2.  Cognitive Diagnosis: Pt demonstrated functional temporal and spatial orientation; concrete attention and VPS/PS and reasoning and math language. Pt with deficits in memory (working memory and STM ) and higher level attention (ie alternating and divided attention) on cognitive-linguistic testing. Will continue evaluation of higher level cognitive-linguistic skills as pt does participate in home/med management. Current deficits may impact.      3.  Speech Diagnosis: Oral Motor Speech /Voice characterized by hoarse / rough / raspy voice quality which can

## 2022-09-21 NOTE — PROGRESS NOTES
Calorie Count Note    Type and Reason for Visit: Reassess    Nutrition Assessment: Follow-up. Tolerating Jevity 1.5 @ 90ml x 14 hours + two proteinex daily to meet 100% of needs. MBS today. Diet advanced to Dysphagia minced and moist diet with thin liquids. Will decrease nocturnal feeds to 60ml x 14 hours to meet 82% of calorie needs and 40% of protein needs. Will also add Ensure HP at each meal. Calorie count in place to assess oral intake. Follow-up. Nurse reports that pt stated he would have eaten more for breakfast, however he was too full. Decided to continue with 14 hrs but adjust the time to 3 pm to 5 am to allow more time off of feeding prior to breakfast.  Nurse to have therapy schedule adjusted so pt back in room by 3 pm. Pt is planning for diet advancement tomorrow and possible dc Sat. Current diet and supplement order:    ADULT DIET; Dysphagia - Minced and Moist  ADULT ORAL NUTRITION SUPPLEMENT; Breakfast, Lunch, Dinner; Low Calorie/High Protein Oral Supplement  ADULT TUBE FEEDING; PEG; Standard with Fiber; Cyclic; 60; 1:07 PM; 8:95 AM; 30; Q 4 hours    Comparative Standards (Estimated Nutrition Needs):   Estimated Daily Total Kcal: 3140-1602 (11-14kcal/140kg)  Estimated Daily Protein (g): 134 (2g/67kg)    Day 1 September 20,2022  Meal Calories Protein Comments   Breakfast      Lunch 440 23    Dinner 200 13    Snacks/Supplements 320 32    Total 960 68     % Kcal needs met    55 % Protein needs met  51      Day 2   Meal Calories Protein Comments   Breakfast 182 10    Lunch      Dinner      Snacks/Supplements 160 16    Total       % Kcal needs met % Protein needs met      Day 3   Meal Calories Protein Comments   Breakfast      Lunch      Dinner      Snacks/Supplements      Total       % Kcal needs met % Protein needs met      Intervention & Recommendations:   1. Continue Calorie Count  2.  Adjusted TF schedule to be 3pm to 5 am so pt may be able to eat more at breakfast.      Electronically signed by Laura Michaels RD, LD on 9/21/22 at 11:24 AM EDT    Contact Number: [phone]

## 2022-09-21 NOTE — PROGRESS NOTES
PHYSICAL THERAPY  Progress Note   Second Session    Patient Name: Jeremias Casey  Medical Record Number: [id number]    Treatment Diagnosis: Acute hypoxemic and hypercapnic respiratory failure      Chart Reviewed: Yes   Restrictions/Precautions: Modified Diet, Fall Risk Other position/activity restrictions: on minced & moist diet w/ thins; has  PEG tube; attempting to wean off O2,  IV L UE, CPAP at night, teley   Additional Pertinent Hx: Per Dr. Va Lacy H/P \"Patient is a 63 y.o. male hospitalized on 8/5/2022   for small bowel obstruction, pneumonia, septic shock patient intubation, mechanical ventilation, broad-spectrum antibiotics s intubated/extubated 8/8/2020 through 8/26/2022. On 8/9/2022, EGD done for endoscopically placed NG tube placement. On 8/22/2022, EGD with endoscopic placement of NG tube. On 8/31/2022 underwent laparoscopic reduction of large hiatal hernia with gastropexy, placement of gastrostomy tube. On 9/2/2022, extubated after the above procedure. He had the diagnosis of critical illness myopathy, and was discharged from our hospital to an LTAC unit for further IV antibiotic therapy and treatment. Patient now has improved, and is admitted back to our rehabilitation unit to progress with his strengthening of his arms and legs and improving his endurance before discharge back home. Patient also has diagnosis of atrial fibrillation, heart failure, dysphagia, sleep apnea, HTN, and rheumatoid arthritis. Patient is now ambulating 50 feet with a rolling walker and PT with contact-guard to min assist, and needs max assist for lower body bathing and dressing. Patient lives at home with his wife in a 1 level house. Patient is allowed to eat PO with speech therapy during therapeutic feeding. \"        Subjective: Pt reports they are feeling good this afternoon. They are having some pain in their hands and report a 4/10 pain.      Objective: Ambulate 163' with ramp on therapy terrace included with no AD supervision. Following ambulation pt had a seated rest break, stand>sit mod I. SPO2 was checked following ambulation and was 90%. Sit>stand was mod I required increased time to complete. Standing dynamic balance with reaching hitting balloon back and forth with PT while SPT is guarding. Pt used RUE to hit balloon and had no UE support but was SBA. Pt completed 3 minutes of this exercise. Pt became SOB following activity and SPO2 was checked reading 92%. Seated rest break following activity. Stand>sit was mod I.    Ambulated 28' no AD with supervision from [facility] to floor transfer set up. Floor transfer on mat by therapy mat this activity was CGA. Pt used B UE support from the therapy mat in order to get down onto floor in half kneeling position and B UE support from therapy to rise from floor. Pt ambulated 28' back from floor transfer set up to [facility] with no AD and supervision. Pt completed stand>sit with mod I. After all those activities patient was SOB and required seated rest break. Cone pick ups off of ground picking up 6 cones and returning to [facility]. Pt was supervision for ambulating between cones but when bending down to retrieve cones pt was CGA. Pt had no LOB episodes and was able to bend down and reach without any difficulties. Performed two trials of this activity with seated rest break between trials. Pt was SOB following activity however they the SPO2 was 95%. Lateral walking 60' with SBA and no AD. Pt became fatigued and had seated rest break following activity. Squatting down to  2lb ball and standing back up then squatting down to set ball down and coming back up 5 times 2 trials with seated rest break between trials and patient became fatigued and SOB. Pt was CGA during activity. Assessment: Pt demonstrated decreased endurance and increased fatigue during activities today.  When patient became SOB following activity they were able to take a rest break and their SPO2 would increase back up to normal ranges. Pt is able to ambulate with supervision and no AD and complete floor transfer with CGA as well as higher level balance activities with CGA. Pt continues to be below based and will benefit from continued therapy for gait training, balance training, improving endurance and returning to PLOF. Safety Device - Type of devices:  []  All fall risk precautions in place [] Bed alarm in place  [] Call light within reach [] Chair alarm in place [] Positioning belt [x] Gait belt [] Patient at risk for falls [] Left in bed [x] Left in chair  (in wc for transport) [] Telesitter in use [] Sitter present [] Nurse notified []  None      Therapy Time  Second Session Therapy Time     Individual Co-treatment   Time In [id number]     Time Out 1430     Minutes 45           Electronically signed by MILTON Rueda, on 9/21/2022 at 02:58 PM  Therapist was present, directed the patient's care, made skilled judgement, and was responsible for assessment and treatment of the patient.       Jean Beasley, [id number]

## 2022-09-21 NOTE — CARE COORDINATION
Rec'd call from wife, Marge Herr, who reports they have chosen [facility] for first choice and then [facility] or [facility]. Referral initiated.   Edilson Cohen [place]     Case Management   [phone]    9/21/2022  10:30 AM

## 2022-09-21 NOTE — PROGRESS NOTES
Occupational Therapy  Facility/Department: [facility]  Rehabilitation Occupational Therapy Daily Treatment Note    Date: 22  Patient Name: Ge Lugo       Room: O7E-9987/2523-74  MRN: [id number]  Account: [de-identified]   : 1960  (63 y.o.) Gender: male         PM session: met in pt's room, he is seated in recliner watching TV. Denies pain or dizziness; agreeable for household t/f, fxl mobility. He is IND w/ ambulating from recliner to w/c x 15 ft, no AD. [facility]able to ambulate IND'ly x 30 ft from w/c<>recliner. He was able to use reacher to grab 5 items off floor and hand items to OT IND'ly. He is able to stand at elevated table x 6 minutes IND'ly while completing card matching activity. Pulse ox fluctuated between 94-99% on RA, HR @ 90. Pt meeting IND level w/ his transfers & fxl mobility without using AD. Tx time: 45 min, cont w/ POC. Cassie Vu, OTR/L #[id number]            Past Medical History:  has a past medical history of Arthritis, Atrial fibrillation ([address] and Hypertension. Past Surgical History:   has a past surgical history that includes Upper gastrointestinal endoscopy (2022); Upper gastrointestinal endoscopy (2022); Upper gastrointestinal endoscopy (N/A, 2022); bronchoscopy (2022); bronchoscopy (2022); Gastric fundoplication (N/A, ); and Gastrostomy tube placement (N/A, 2022). Restrictions  Restrictions/Precautions: Modified Diet; Fall Risk  Other position/activity restrictions: on minced & moist diet w/ thins; has  PEG tube; attempting to wean off O2,  IV L UE, CPAP at night, teley    Subjective  Subjective: met in room, his RN is present to give medication via peg tube  Restrictions/Precautions: Modified Diet; Fall Risk             Objective     Cognition  Overall Cognitive Status: WFL  Orientation  Overall Orientation Status: Within Normal Limits  Orientation Level: Oriented X4         ADL Functional Mobility  Assistance Level: Stand by assist  Skilled Clinical Factors: needed Sup/SBA no AD to ambulate from recliner to his w/c, mild limping (stated premorbid from TKR--may have leg length discrepancy) no LOB  Transfers  Surface: Wheelchair;From chair with arms  Sit to Stand  Assistance Level: Stand by assist  Skilled Clinical Factors: no AD w/ close Sup/SBA, mild limping  Stand to Sit  Assistance Level: Stand by assist  Skilled Clinical Factors: good recall of safe hand placement  Car Transfer  Assistance Level: Independent  Skilled Clinical Factors: no AD used to enter & exit car; good recall of safe technique   OT Exercises  Exercise Treatment: used UBE x 5 minutes on minimal resistance for strengthening; his pulse ox @ 95%, HR 87 after exertion  Breathing Techniques: encouraged use of incentive spirometer and PLB for improving lung capacity  Exercise Equipment: used warm blanket to B hands prior to  strengthening to reduce stiffness--used 3 lb gripper for 2 sets of 20     Assessment  Assessment  Assessment: pt making excellent progress w/ OT intervention. He is able to ambulate @ his room no AD w/ Sup/SBA. He completed car t/f IND'ly w/ good recall of safe technique. He tolerated UBE x 5min & pulse ox @ 94%, HR 87. OT used warm compress to B hands prior to exercise to reduce stiffness. He completed 3 lb gripper for 2 sets of 15, able to complete isolated finger tapping & thumb ROM without difficulty.  Pt on target for DC to home on Sat w/ wife's support & HH services  Activity Tolerance: Patient tolerated treatment well  Discharge Recommendations: Home with assist PRN;S Level 1;Home with Home health OT  OT Equipment Recommendations  Other: pt owns TTB, HD RW, pulse ox; may benefit from [address] [place] [address] Blvd in place: Yes  Type of devices: Gait belt    Patient Education  Education  Education Given To: Patient  Education Provided: Energy Conservation  Education Provided Comments: encouraged use of UBE, incentive spirometer & PLB during exertion  Education Method: Verbal  Barriers to Learning: None  Education Outcome: Verbalized understanding    Plan  Plan  Times per Week: 5-7  Times per Day: Twice a day  Plan Weeks: DC to home Sat w/ home OT services  Specific Instructions for Next Treatment: kitchen tasks  Current Treatment Recommendations: Strengthening;ROM; Functional mobility training; Endurance training;Patient/Caregiver education & training;Equipment evaluation, education, & procurement;Self-Care / ADL; Coordination training    Goals  Patient Goals   Patient goals : \"Be able to eat normally, get up and moving to get stronger\"  Short Term Goals  Time Frame for Short term goals: Within 7 days the Pt will  Short Term Goal 1: Toileting with SBA using GB's prn--MET  Short Term Goal 2: LB dressing with SBA using AE prn--MET  Short Term Goal 3: Functional transfers using LRAD with SBA--MET  Short Term Goal 4: Bathing seated on shower chair with CGA--MET  Short Term Goal 5: Improved endurance to complete standing balance task for 3min SBA in order to complete ADL and IADL tasks in standing--MET  Long Term Goals  Time Frame for Long term goals :  Within 10-14 days  Long Term Goal 1: Toileting mod I using GB's prn  Long Term Goal 2: LB dressing mod I using AE prn  Long Term Goal 3: Functional transfers using LRAD mod I  Long Term Goal 4: Bathing seated on shower chair mod I  Long Term Goal 5: Improved endurance to complete standing balance task for 5min mod I in order to complete ADL and IADL tasks in standing  Additional Goals?: Yes  Long Term Goal 6: Determine DME needs and complete family education prior to discharge          Therapy Time   Individual Concurrent Group PM tx   Time In 0900      1300   Time Out 0945      1345   Minutes 45      45   Timed Code Treatment Minutes: [address] [facility], OTR/L #[id number]

## 2022-09-21 NOTE — PROGRESS NOTES
Patient admitted to rehab with Critical Illness Myopathy. A/Ox4. Transfers with no device. Mobility restrictions: none. On minced and moist diet, with nocturnal tube feedings tolerating well. Medications taken crushed through PEG Tube. On Lovenox for DVT prophylaxis. Skin: lap sites closed with surgical glue - CDI. Oxygen:RA daytime, Bipap @ HS. LDA: none. Has been continent of bowel and bladder. LBM 9/21/2022. Chair/bed alarms in use and call light in reach. Will monitor for safety.  Electronically signed by Daphne De La Torre RN on 9/21/2022 at 7:02 PM

## 2022-09-21 NOTE — PROGRESS NOTES
Pt awake and AAO sitting up in bed watching TV. Pt denies pain. Pt on rehab foloowing critical illness myopathy from SBO, PEG placement, and ex/lap of hiatal hernia. Lungs clear and decreased. No sob or cough. On CPAP at hs. Belly round and soft with active BS> LBM today. Voids urine without difficulty. NO edema noted. Pt does have PEG with Jevity 1.5 @ 60ml/hr infusing and 30cc H2O q4. Pt is tolerating well. Pt also takes po minced and moist and is on calorie counts. Abd with surgical lap sites x4 well-approximated and Isaac. On telemetry. Call light in reach. Bed alarm on.

## 2022-09-21 NOTE — PROGRESS NOTES
Pt stated at 0930 that he was too full to eat 100% of breakfast, dietician adjusted tube feed to 1500 - 0500 to increase appetite. Electronically signed by Bryan Donald RN on 9/21/2022 at 1:56 PM    Message sent to pharmacy to adjust synthroid and lansoprazole for 0600.  Electronically signed by Bryan Donald RN on 9/21/2022 at 2:00 PM

## 2022-09-21 NOTE — PROGRESS NOTES
Physical Therapy  Fac[facility]: [facility]  Rehabilitation Physical Therapy Treatment Note    NAME: Carol Alejandre  : 1960 (63 y.o.)  MRN: [id number]  CODE STATUS: Full Code    Date of Service: 22       Restrictions:  Restrictions/Precautions: Modified Diet; Fall Risk  Position Activity Restriction  Other position/activity restrictions: on minced & moist diet w/ thins; has  PEG tube; attempting to wean off O2,  IV L UE, CPAP at night, teley     Pertinent medical information:  Additional Pertinent Hx: Per Dr. Joann Christensen H/P \"Patient is a 63 y.o. male hospitalized on 2022   for small bowel obstruction, pneumonia, septic shock patient intubation, mechanical ventilation, broad-spectrum antibiotics s intubated/extubated 2020 through 2022. On 2022, EGD done for endoscopically placed NG tube placement. On 2022, EGD with endoscopic placement of NG tube. On 2022 underwent laparoscopic reduction of large hiatal hernia with gastropexy, placement of gastrostomy tube. On 2022, extubated after the above procedure. He had the diagnosis of critical illness myopathy, and was discharged from our hospital to an LTAC unit for further IV antibiotic therapy and treatment. Patient now has improved, and is admitted back to our rehabilitation unit to progress with his strengthening of his arms and legs and improving his endurance before discharge back home. Patient also has diagnosis of atrial fibrillation, heart failure, dysphagia, sleep apnea, HTN, and rheumatoid arthritis. Patient is now ambulating 50 feet with a rolling walker and PT with contact-guard to min assist, and needs max assist for lower body bathing and dressing. Patient lives at home with his wife in a 1 level house. Patient is allowed to eat PO with speech therapy during therapeutic feeding. \"    SUBJECTIVE  Subjective  Subjective: Patient just completed therapy with OT.  He states he slept well and he is still off oxygen. Patient states he had no troubles over night with his breathing . Pain: Patient states pain in bilateral hands and bilateral ankles that he rates at a 4/10    Social/Functional History  Lives With: Spouse, Son (wife Arnulfo Ortiz, works outside the home but can work from home if needed, son Esteban Packer lives with them as well, brother lives next door)  Type of Home: House  Home Layout: One level  Home Access:  (has a ramp entry)  Bathroom Shower/Tub: Walk-in shower, Tub/Shower unit (one is tub/shower, one is just shower (2 inch lip to enter), mainly uses shower)  Bathroom Toilet: Standard  Bathroom Equipment:  (none)  Bathroom Accessibility: Walker accessible  Home Equipment: [address], Electric scooter, Walker, rolling (prior to admittance to the hospital pt did no use AD. These are from a previous injury in 2016.)  Has the patient had two or more falls in the past year or any fall with injury in the past year?: No  Receives Help From: Family  ADL Assistance: [facility] [address]: Independent  Homemaking Responsibilities: Yes  Meal Prep Responsibility: Primary  Laundry Responsibility: Secondary  Bill Paying/Finance Responsibility: Primary  Ambulation Assistance: Independent  Transfer Assistance: Independent  Active : Yes (wife can drive if needed)  Mode of Transportation: Car  Education: vocational school for welding;   Occupation: Retired  Type of Occupation:   Leisure & Hobbies: [employer], lawn work, working on equipment in shop at his house  Additional Comments: Used electric scooter in 2016 when he pulled both quads off his kneecap due to work injury.  On Celebrex and infusions every 6 weeks for rheumatoid arthritis pain        OBJECTIVE  Cognition  Overall Cognitive Status: WFL  Orientation  Overall Orientation Status: Within Normal Limits  Orientation Level: Oriented X4    Functional Mobility  Balance  Sitting Balance: Modified independent   Standing Balance: supervision. increased SOB with this activity, but SpO2 was 98% after activity. ASSESSMENT/PROGRESS TOWARDS GOALS       Assessment  Assessment: Mr. Halle Gaspar continues to improve and was able to maintain saturations of 93% or higher on room air despite SOB with increased activity. His balance is improving and he is consistently ambulating community distances without a wheeled walker with supervision. He does have some SOB with this, but does not desaturate. Patient's BENTLEY is also improving and lateral sway is decreasing. Patient continues to be below baseline and will benefit from continued skilled therapy for strengthening, gait training, and functional mobility training to allow for safe return home. Goal is discharge to home on Saturday, September 24th. Activity Tolerance: Patient tolerated treatment well  Discharge Recommendations: Home with assist PRN;Home with Home health PT  PT Equipment Recommendations  Equipment Needed: No  Other: Continue to assess pending progress    Goals  Patient Goals   Patient goals : Return to walking without difficulty. Short Term Goals  Time Frame for Short term goals: 7 days  Short term goal 1: Complete ramp with RW CGA. - met 9/19/2022  Short term goal 2: Ambulate 150' with RW and CGA. - met 9/19/2022  Short term goal 3: Transfers, car and sit<>stand, with SBA. - met 9/19/2022  Short term goal 4: Complete all bed mobility mod I. - met 9/19/2022  Long Term Goals  Time Frame for Long term goals : 10-14 days  Long term goal 1: Complete ramp with no AD mod I.  Long term goal 2: Ambulate 150' RW mod I.  Long term goal 3: Transfers, car and sit<>stand, with mod I. PLAN OF CARE/SAFETY  Plan  Plan:  minutes of therapy at least 5 out of 7 days a week  Plan weeks: 2 weeks  Current Treatment Recommendations: Strengthening;Balance training;Functional mobility training;Transfer training;Stair training;Gait training; Therapeutic activities; Home exercise program;Endurance training;IADL training  Safety Devices  Type of Devices: All fall risk precautions in place;Gait belt;Left in chair (in wheelchair to return to room with transportation)  Restraints  Restraints Initially in Place: No    EDUCATION  Education  Education Given To: Patient  Education Provided: Role of Therapy;Plan of Care;ADL Function; Safety; Mobility Training;Transfer Training;Energy Conservation  Education Provided Comments: progress mobility  Education Method: Verbal;Demonstration  Education Outcome: Verbalized understanding        Therapy Time   Individual Concurrent Group Co-treatment   Time In 0945         Time Out 1030         Minutes [address], [id number], 09/21/22 at 10:29 AM

## 2022-09-22 PROCEDURE — 6360000002 HC RX W HCPCS: Performed by: PHYSICAL MEDICINE & REHABILITATION

## 2022-09-22 PROCEDURE — 97530 THERAPEUTIC ACTIVITIES: CPT

## 2022-09-22 PROCEDURE — 6370000000 HC RX 637 (ALT 250 FOR IP): Performed by: PHYSICAL MEDICINE & REHABILITATION

## 2022-09-22 PROCEDURE — 97110 THERAPEUTIC EXERCISES: CPT

## 2022-09-22 PROCEDURE — 92507 TX SP LANG VOICE COMM INDIV: CPT

## 2022-09-22 PROCEDURE — 97116 GAIT TRAINING THERAPY: CPT | Performed by: PHYSICAL THERAPIST

## 2022-09-22 PROCEDURE — 97535 SELF CARE MNGMENT TRAINING: CPT

## 2022-09-22 PROCEDURE — 1280000000 HC REHAB R&B

## 2022-09-22 PROCEDURE — 94660 CPAP INITIATION&MGMT: CPT

## 2022-09-22 PROCEDURE — 92526 ORAL FUNCTION THERAPY: CPT

## 2022-09-22 PROCEDURE — 94760 N-INVAS EAR/PLS OXIMETRY 1: CPT

## 2022-09-22 PROCEDURE — 97530 THERAPEUTIC ACTIVITIES: CPT | Performed by: PHYSICAL THERAPIST

## 2022-09-22 PROCEDURE — 97129 THER IVNTJ 1ST 15 MIN: CPT

## 2022-09-22 RX ADMIN — Medication 100 MG: at 09:01

## 2022-09-22 RX ADMIN — ENOXAPARIN SODIUM 30 MG: 100 INJECTION SUBCUTANEOUS at 22:10

## 2022-09-22 RX ADMIN — GABAPENTIN 400 MG: 400 CAPSULE ORAL at 22:09

## 2022-09-22 RX ADMIN — POTASSIUM BICARBONATE 20 MEQ: 782 TABLET, EFFERVESCENT ORAL at 09:01

## 2022-09-22 RX ADMIN — LEVOTHYROXINE SODIUM 200 MCG: 0.1 TABLET ORAL at 05:15

## 2022-09-22 RX ADMIN — GABAPENTIN 400 MG: 400 CAPSULE ORAL at 15:08

## 2022-09-22 RX ADMIN — METOPROLOL TARTRATE 50 MG: 50 TABLET ORAL at 22:10

## 2022-09-22 RX ADMIN — GABAPENTIN 400 MG: 400 CAPSULE ORAL at 09:01

## 2022-09-22 RX ADMIN — ASPIRIN 81 MG: 81 TABLET, COATED ORAL at 09:01

## 2022-09-22 RX ADMIN — ENOXAPARIN SODIUM 30 MG: 100 INJECTION SUBCUTANEOUS at 09:03

## 2022-09-22 RX ADMIN — TORSEMIDE 20 MG: 20 TABLET ORAL at 09:01

## 2022-09-22 RX ADMIN — Medication 1 TABLET: at 09:01

## 2022-09-22 RX ADMIN — CELECOXIB 200 MG: 200 CAPSULE ORAL at 09:01

## 2022-09-22 RX ADMIN — CELECOXIB 200 MG: 200 CAPSULE ORAL at 22:09

## 2022-09-22 RX ADMIN — METOPROLOL TARTRATE 50 MG: 50 TABLET ORAL at 09:01

## 2022-09-22 RX ADMIN — Medication 1 TABLET: at 22:09

## 2022-09-22 RX ADMIN — FOLIC ACID 1 MG: 1 TABLET ORAL at 09:01

## 2022-09-22 RX ADMIN — Medication 30 MG: at 05:15

## 2022-09-22 ASSESSMENT — PAIN DESCRIPTION - FREQUENCY
FREQUENCY: INTERMITTENT
FREQUENCY: INTERMITTENT

## 2022-09-22 ASSESSMENT — PAIN DESCRIPTION - DESCRIPTORS
DESCRIPTORS: ACHING;DISCOMFORT
DESCRIPTORS: ACHING;DISCOMFORT

## 2022-09-22 ASSESSMENT — PAIN DESCRIPTION - ORIENTATION
ORIENTATION: MID
ORIENTATION: MID

## 2022-09-22 ASSESSMENT — PAIN DESCRIPTION - LOCATION
LOCATION: BACK;GENERALIZED
LOCATION: BACK;GENERALIZED

## 2022-09-22 ASSESSMENT — PAIN DESCRIPTION - PAIN TYPE
TYPE: CHRONIC PAIN
TYPE: CHRONIC PAIN

## 2022-09-22 ASSESSMENT — PAIN DESCRIPTION - ONSET
ONSET: ON-GOING
ONSET: ON-GOING

## 2022-09-22 ASSESSMENT — PAIN SCALES - GENERAL
PAINLEVEL_OUTOF10: 2
PAINLEVEL_OUTOF10: 4
PAINLEVEL_OUTOF10: 4

## 2022-09-22 NOTE — PROGRESS NOTES
Pt asleep in bed at this time. Arouses to name easily. AAO. Pt admitted to rehab after critical illness myopathy and hernia repair and PEG tube placement. Pt denies pain. Lungs clear. No sob or cough. On RA. Does wear bipap at hs. Belly round and soft with active BS. LBM today. Continent of urine. Ex/lap sites x4 on abd well-approximated with surgical glue and ABRAHAM> PEG tube in place and site care given, site slightly red. Jevity 1.5 @ 60ml/hr with 30ml q 4 infusing without difficulty. HOB at 30 degrees. Pt also is on minced and moist diet. No edema noted. Sunny light in reach. Bed alarm on.

## 2022-09-22 NOTE — PROGRESS NOTES
Calorie Count Note    Current diet and supplement order:    ADULT DIET; Dysphagia - Minced and Moist  ADULT ORAL NUTRITION SUPPLEMENT; Breakfast, Lunch, Dinner; Low Calorie/High Protein Oral Supplement  ADULT TUBE FEEDING; PEG; Standard with Fiber; Cyclic; 60; 9:55 PM; 3:96 AM; 30; Q 4 hours    Comparative Standards (Estimated Nutrition Needs):   Estimated Daily Total Kcal: 5914-3717 (11-14kcal/140kg)  Estimated Daily Protein (g): 134 (2g/67kg)    Day 1 : 9/21  Meal Calories Protein Comments   Breakfast 158 9    Lunch 550 8    Dinner 247 26    Snacks/Supplements 480 48    Total 1435 91     93% Kcal needs met 68% Protein needs met      Day 2 : 9/22  Meal Calories Protein Comments   Breakfast 190 9 Dinner not recorded yet   Lunch 372 14    Dinner      Snacks/Supplements 320 32    Total       % Kcal needs met % Protein needs met      Day 3   Meal Calories Protein Comments   Breakfast      Lunch      Dinner      Snacks/Supplements      Total       % Kcal needs met % Protein needs met      Intervention & Recommendations:   1. Discontinue Calorie Count  2. Decrease EN rate to promote oral intake    **See full note for details.      Electronically signed by Ravi Flynn RD, LD on 9/22/22 at 1:16 PM EDT    Contact Number: [phone]

## 2022-09-22 NOTE — CARE COORDINATION
SOCIAL WORK DISCHARGE SUMMARY        DATE OF DISCHARGE:  Saturday 9-      LOCATION:    Home      Stay [facility] [address]  [phone]         TIME:   10-12        PHARMACY:        DME:      None    [facility]   [phone]    9/22/2022  4:29 PM

## 2022-09-22 NOTE — PROGRESS NOTES
Physical Therapy  Facility/Department: [facility]  Rehabilitation Physical Therapy Treatment Note    NAME: Jade Maher  : 1960 (63 y.o.)  MRN: [id number]  CODE STATUS: Full Code    Date of Service: 22       Restrictions:  Restrictions/Precautions: Modified Diet; Fall Risk  Position Activity Restriction  Other position/activity restrictions: on minced & moist diet w/ thins; has  PEG tube; attempting to wean off O2,  IV L UE, CPAP at night, teley   Pertinent medical information:  Additional Pertinent Hx: Per Dr. Aleena Jensen H/P \"Patient is a 63 y.o. male hospitalized on 2022   for small bowel obstruction, pneumonia, septic shock patient intubation, mechanical ventilation, broad-spectrum antibiotics s intubated/extubated 2020 through 2022. On 2022, EGD done for endoscopically placed NG tube placement. On 2022, EGD with endoscopic placement of NG tube. On 2022 underwent laparoscopic reduction of large hiatal hernia with gastropexy, placement of gastrostomy tube. On 2022, extubated after the above procedure. He had the diagnosis of critical illness myopathy, and was discharged from our hospital to an LTAC unit for further IV antibiotic therapy and treatment. Patient now has improved, and is admitted back to our rehabilitation unit to progress with his strengthening of his arms and legs and improving his endurance before discharge back home. Patient also has diagnosis of atrial fibrillation, heart failure, dysphagia, sleep apnea, HTN, and rheumatoid arthritis. Patient is now ambulating 50 feet with a rolling walker and PT with contact-guard to min assist, and needs max assist for lower body bathing and dressing. Patient lives at home with his wife in a 1 level house. Patient is allowed to eat PO with speech therapy during therapeutic feeding. \"   SUBJECTIVE  Subjective  Subjective: Pt arrived in [facility] to therapy gym.  Pt reports he felt his heart racing last night and is having some increased fatigue today. He is having 4/10 pain in his hands today. Pain: Bilateral hand pain 4/10. OBJECTIVE  Functional Mobility  Sit to Stand  Assistance Level: Modified independent  Skilled Clinical Factors: pt able to complete transfer with increased time and remembers to reach back with UE for hand rails. Stand to Sit  Assistance Level: Modified independent  Skilled Clinical Factors: pt able to complete transfer with increased time and remembers to reach back with UE for hand rails. Environmental Mobility  Ambulation  Surface: Level surface; Uneven surface  Device:  (no AD)  Distance: 210' X 1 turn (10' on uneven surface of carpet), 210' X 1 turn (10' on uneven surface of carpet), 22' X 1 turn  Activity: Within Unit  Activity Comments: Pt with increased BENTLEY and slow adam during ambulation. Pt had mild shuffling during ambulation but was able to manage the thresholds and transition on and off the carpet without difficulty. When completing the second trial of ambulation pt demonstrated some lateral sway as well as shuffling and decreased gait speed. Additional Factors: Increased time to complete  Assistance Level: Supervision  Gait Deviations: Wide base of support; Slow adam  Skilled Clinical Factors: Pt SPO2 was 91% after first ambulation and required a seated rest break following activity. Once completing ambulation pt required seated rest break and their SPO2 was 92%. PT Exercises  Exercise Treatment: Standing stepping onto 1\" block  2 trials X 30. Pt became fatigued during exercise and SOB. Pt had seated rest break after first trial and their SPO2 reading was 96%. Once completing second trial patient immediately ambulated to [facility] 22' without taking a seated rest break. Completed 8 minutes on the [facility] with load 1 completing 386 steps.   Dynamic Standing Balance Exercises: Pt completed standing dynamic balance 2 trials 3 minutes each trial with hitting balloon back and forth with PT while SPT guarding. Pt was able to use both UEs to hit balloon and reach outside of their BENTLEY. However had limited mobility on his LUE due to his SPO2 monitor. ASSESSMENT/PROGRESS TOWARDS GOALS       Assessment  Assessment: Mr. Iza Evangelista continues to improve and was able to maintain saturations of 91%-96% or higher on room air despite SOB with increased activity. His balance is improving and he is consistently ambulating community distances without an assistive device with supervision. He does have some SOB with this, but does not desaturate. Pt requires additional rest breaks due to his SOB however is still able to tolerate therapy well. Patient's BENTLEY is also improving and lateral sway is decreasing. Patient continues to be below baseline and will benefit from continued skilled therapy for strengthening, gait training, and functional mobility training to allow for safe return home. Goal is discharge to home on Saturday, September 24th. Activity Tolerance: Patient tolerated treatment well  Discharge Recommendations: Home with assist PRN;Home with Home health PT  PT Equipment Recommendations  Equipment Needed: No  Other: Continue to assess pending progress  PM Session:  Subjective:PT arrived in room when pt was returning from bathroom. Pt agreeable to therapy and had PT transport them. Patient continued to report fatigue and 4/10 pain in his hands. Objective: Ambulated 80' with no AD and supervision. Ambulated from therapy gym out onto therapy terrace and on WC ramp and back to [facility] for seated rest break. Pt used UE support of hand rail on the R side during first attempt on ramp and UE support of hand rail on the L side during second attempt on the ramp. Pt was able to navigate the thresholds of the doors and transition from level surface of therapy gym to uneven surface of the therapy terrace. During ambulation pt became SOB, had lateral sway and shuffling gait.  Pt SPO2 was 94% after this ambulation and they required a seated rest break. [place] hole with reaching to grab bags outside of BENTLEY on L side. Once tossing bags for 3 minutes patient went up to corn hole board and retrieved bags off of ground with CGA assist. Pt went straight into second trial of tossing bags for 3 minutes however this time the bags were on the patients R side instead of L. Pt did not require seated rest break between trials however had a seated rest break following both trials. Standing balance on [employer] pad. 2 trials for 2 minutes each, one minute with eyes open (EO) and one minute with EC. Pt had a seated rest break between trials. Following second trial of the standing balance patient ambulated 25' with no AD and supervision to play wii bowling working on balance and standing tolerance. Pt played one game of wii bowling for 5 minutes. Following this pt had a seated rest break and then played another game of wii bowling for 5 minutes. Seated rest break following second trial.   Ambulated 25' with no AD and supervision back to [address]. Assessment: Pt continues to exhibit SOB and fatigue during exercises and has decreased endurance and is below his previous baseline. He continues to require supervision during ambulation. His balance continues to improve but he requires CGA when completing higher level dynamic balance exercises. Pt will continue to benefit from skilled intervention in order to work on gait training, balance training, strengthening and endurance in order to return to his PLOF. Safety Device - Type of devices:  []  All fall risk precautions in place [] Bed alarm in place  [] Call light within reach [] Chair alarm in place [] Positioning belt [x] Gait belt [] Patient at risk for falls [] Left in bed [x] Left in chair (in therapy gym for OT)[] Telesitter in use [] Sitter present [] Nurse notified []  None   Goals  Patient Goals   Patient goals : Return to walking without difficulty.   Short Term Goals  Time Frame at 4:11 PM

## 2022-09-22 NOTE — PROGRESS NOTES
Seth Gil  9/22/2022  [id number]    Chief Complaint: Critical illness myopathy    Subjective:  Patient seen this AM. Patient is doing well in his therapies, is making progress with his endurance and leg strength. We think the patient will be ready for D/C to home this coming Saturday. The patient had his diet increased and he is now allowed to drink thin liquids. He seems to be tolerating oral diet well, and we are doing a calorie count to see if we can discontinue his tube feeds. He did well with eating yesterday, but was not that hungry due to his tube feeds at night. We will discontinue his tube feeds and increase his diet. We will plan on discharge to home with continued therapies to include PT, OT, and speech therapy. ROS: No N/V, SOB, chest pain, C/F    Objective:  Patient Vitals for the past 24 hrs:   BP Temp Temp src Pulse Resp SpO2 Weight   09/22/22 0337 118/76 97.7 °F (36.5 °C) Oral 83 18 93 % --   09/22/22 0327 -- -- -- -- -- -- (!) 311 lb 15.2 oz (141.5 kg)   09/22/22 0207 -- -- -- 79 -- 92 % --   09/22/22 0205 111/71 -- -- (!) 154 -- -- --   09/22/22 0035 126/82 97.9 °F (36.6 °C) Axillary 71 18 91 % --   09/22/22 0020 -- -- -- -- 16 -- --   09/21/22 2003 138/84 98.4 °F (36.9 °C) Oral 87 16 92 % --   09/21/22 1618 124/68 98 °F (36.7 °C) Oral 81 16 91 % --   09/21/22 1229 118/79 98 °F (36.7 °C) Oral 83 18 93 % --   09/21/22 0839 128/85 97.8 °F (36.6 °C) Oral 89 18 -- --   09/21/22 0810 -- -- -- -- -- 93 % --       Gen: No distress, pleasant. HEENT: Normocephalic, atraumatic. CV: Regular rate and rhythm. Resp: No respiratory distress. Abd: Soft, nontender, obese, PEG tube in place  Ext: No edema. Neuro: Alert, oriented, appropriately interactive.      Wt Readings from Last 3 Encounters:   09/22/22 (!) 311 lb 15.2 oz (141.5 kg)   09/07/22 (!) 309 lb 8.4 oz (140.4 kg)       Laboratory data:   Lab Results   Component Value Date    WBC 4.2 09/20/2022    HGB 10.0 (L) 09/20/2022    HCT 29.8 (L) 09/20/2022    MCV 90.4 09/20/2022     09/20/2022       Lab Results   Component Value Date/Time     09/20/2022 05:26 AM    K 4.0 09/20/2022 05:26 AM    K 4.5 09/14/2022 07:34 AM    CL 99 09/20/2022 05:26 AM    CO2 26 09/20/2022 05:26 AM    BUN 27 09/20/2022 05:26 AM    CREATININE 0.7 09/20/2022 05:26 AM    GLUCOSE 139 09/20/2022 05:26 AM    CALCIUM 9.0 09/20/2022 05:26 AM        Therapy progress:  PT  Position Activity Restriction  Other position/activity restrictions: on minced & moist diet w/ thins; has  PEG tube; attempting to wean off O2,  IV L UE, CPAP at night, teley  Objective     Sit to Stand: Contact guard assistance (As pt became fatigued they would sit down more abruptly)  Stand to sit: Stand by assistance  Device: Rolling Walker  Assistance: Contact guard assistance  Distance: 80' on uneven surface (carpet at end of felipe) with 4 turns  OT  PT Equipment Recommendations  Equipment Needed: No  Other: Continue to assess pending progress          SLP  Current Diet :  (PEG; no oral po)   1. Dysphagia diagnosis: Persistent concern for lingual coordination deficits which can impact bolus control (Mastication was not assessed due to recent history/finding of pharyngeal phase deficits); persistent concern for delayed initiation of swallow; potential residual reduced laryngeal rom and pharyngeal peristalsis. Pt with intermittent throat clearing post swallow;multiple swallows per one presentation. Anticipate will need repeat MBSS during this admit. Jonna Cast Recommend : continue SLP therapy for Oropharyngeal Dysphagia; date TBD for repeat MBSS; ice chips :occasional /isolated; oral care. 2.  Cognitive Diagnosis: Pt demonstrated functional temporal and spatial orientation; concrete attention and VPS/PS and reasoning and math language. Pt with deficits in memory (working memory and STM ) and higher level attention (ie alternating and divided attention) on cognitive-linguistic testing.   Will continue evaluation of higher level cognitive-linguistic skills as pt does participate in home/med management. Current deficits may impact. 3.  Speech Diagnosis: Oral Motor Speech /Voice characterized by hoarse / rough / raspy voice quality which can impact intelligibility of connected speech. Pt is s/p prolonged intubation and then brief intubation post a surgical procedure. Pt reporting some improvement. Will initiate functional voice ex and respiratory control ex ; and trial of ex reducing laryngeal tension and assessment improvement. If persistent deficits ; potential need for referral to ENT for assessment of VC function           Body mass index is 48.86 kg/m². Assessment and Plan:        Critical illness myopathy-  due to aspiration pneumonia, hiatal hernia repair, IV AB     atrial fibrillation, heart failure-  Demadex, ASA     Morbid Obesity, sleep apnea- over 300 lbs     HTN- Lopressor     rheumatoid arthritis- Celebrex     Hypothyroid- synthroid     Bowels: Schedule Miralax + Senna S. Follow bowel movements. Enema or suppository if needed. Bladder: Check PVR x 3. [address] if PVR > 200ml or if any volume is > 500 ml. Pain: Tylenol is ordered prn.          Rashel Pichardo MD 9/22/2022, 7:29 AM

## 2022-09-22 NOTE — PROGRESS NOTES
Occupational Therapy  [facility]  Rehabilitation Occupational Therapy Daily Treatment Note    Date: 22  Patient Name: Seth Cordero       Room: T6H-1140/9887-29  MRN: [id number]  Account: [de-identified]   : 1960  (63 y.o.) Gender: male            PM session: met in therapy dept, he just finished PT session; reporting chronic B hand pain d/t RA. He is agreeable for medication management & using pill organizer--he is IND w/ opening containers and sorting medications into wkly slots. He was shown items on  [address] for ease of gripping to open containers,lids and built up angled utensils & hair brush for days his RA flares up & has increased difficulty w/ hand  & shld flexion. Issued a small piece of dycem to take home for increased  to open jars & lids or hold onto utensils. Pt is IND w/ ambulating thru therapy gym<>ADL suite and completed bed mobility IND'ly without AD. His pulse ox dropped brief to 89% but recovered within 15 seconds to 92%; OT allowed him to pull mask down & focus on PLB. HR @ 90 after bed mobility. Pt taken back to his room, he ambulated w/o AD from w/c to his bed x 10 ft, able to get into bed IND'ly after he repositioned pillows & call light; RN present in room to give medications. Tx time: 45 min, cont w/ POC Lillian Castillo, OTR/L #[id number]         Past Medical History:  has a past medical history of Arthritis, Atrial fibrillation ([address] and Hypertension. Past Surgical History:   has a past surgical history that includes Upper gastrointestinal endoscopy (2022); Upper gastrointestinal endoscopy (2022); Upper gastrointestinal endoscopy (N/A, 2022); bronchoscopy (2022); bronchoscopy (2022); Gastric fundoplication (N/A, ); and Gastrostomy tube placement (N/A, 2022). Restrictions  Restrictions/Precautions: Modified Diet; Fall Risk  Other position/activity restrictions: on minced & moist diet w/ thins; has  PEG tube; attempting to wean off O2,  IV L UE, CPAP at night, teley    Subjective  Subjective: met in therapy gym, he just finished PT session (aware of high HR overnight @ 154)  Restrictions/Precautions: Modified Diet; Fall Risk             Objective     Cognition  Overall Cognitive Status: WFL  Orientation  Overall Orientation Status: Within Normal Limits  Orientation Level: Oriented X4         ADL       Instrumental ADL's  Instrumental ADLs: Yes  Meal Prep  Meal Prep Level of Assistance: Independent  Meal Preparation: ambulated to/from kitchen, able to gather bread & butter from fridge & transport over to counter to use toaster; good balance but mild fatigue noted; able to stand @ 6 minutes without difficulty (has a chair in kitchen when he needs to sit for meal prep) Pulse ox remains above 93%, HR @ 90. Functional Mobility  Activity: Transport items; Retrieve items; To/From bathroom  Assistance Level: Independent  Skilled Clinical Factors: ambulated to/from therapy gym<>kitchen no AD, moves cautiously, no LOB. Sit to Stand  Assistance Level: Independent  Stand to Sit  Assistance Level: Independent  Skilled Clinical Factors: good recall of safe hand placement  Car Transfer  Assistance Level: Independent  Skilled Clinical Factors: no AD used to enter & exit car; good recall of safe technique (completed on 9/21/22)   OT Exercises  A/AROM Exercises: Completed BUE strength using 2lb weight x15 reps each exercise, only used weight on R hand, L hand very painful due to RA today so completed using body weight  Breathing Techniques: encouraged use of incentive spirometer and PLB for improving lung capacity  Exercise Equipment: used 3 lb gripper for 2 sets of 20 to improve ; has RA which impacts his ability to make a full fist     Assessment  Assessment  Assessment: pt is able to ambulate thru therapy gym<>kitchen area IND'ly without AD; he moves slow/cautiously but no LOB.  Pt is able to retrieve items from fridge & transport over to counter to use toaster--he was able to stand @ 6 minutes IND'ly w/ mild fatigue noted. Pulse ox remained above 93% on room air. Discussed ways to reduce high HR including relaxation techniques, deep breathing and vagal method. OT highly recommends he f/u w/ cardiologist as soon as possible to tx tachycardia (his HR went to 154 during the night). Pt on target for DC to home on Sat w/ wife's support & HH services  Activity Tolerance: Patient tolerated treatment well  Discharge Recommendations: Home with assist PRN;S Level 1;Home with Home health OT  OT Equipment Recommendations  Other: pt owns TTB, HD RW, pulse ox; may benefit from [address] in place: Yes  Type of devices: Gait belt    Patient Education  Education  Education Given To: Patient  Education Provided: Precautions  Education Provided Comments: discussed ways to lower HR including deep breathing, vasal technique, engaging in relaxation activities. It is recommended he f/u immediately w/ cardiology (pt educated on purpose of Beta Blockers including Coreg and Inderal)  Education Method: Verbal  Barriers to Learning: None  Education Outcome: Verbalized understanding    Plan  Plan  Times per Week: 5-7  Times per Day: Twice a day  Plan Weeks: DC to home Sat w/ home OT services  Specific Instructions for Next Treatment: kitchen tasks  Current Treatment Recommendations: Strengthening;ROM; Functional mobility training; Endurance training;Patient/Caregiver education & training;Equipment evaluation, education, & procurement;Self-Care / ADL; Coordination training    Goals  Patient Goals   Patient goals : \"Be able to eat normally, get up and moving to get stronger\"  Short Term Goals  Time Frame for Short term goals:  Within 7 days the Pt will  Short Term Goal 1: Toileting with SBA using GB's prn--MET  Short Term Goal 2: LB dressing with SBA using AE prn--MET  Short Term Goal 3: Functional transfers using LRAD with SBA--MET  Short Term Goal 4: Bathing seated on shower chair with CGA--MET  Short Term Goal 5: Improved endurance to complete standing balance task for 3min SBA in order to complete ADL and IADL tasks in standing--MET  Long Term Goals  Time Frame for Long term goals :  Within 10-14 days  Long Term Goal 1: Toileting mod I using GB's prn  Long Term Goal 2: LB dressing mod I using AE prn  Long Term Goal 3: Functional transfers using LRAD mod I--MET  Long Term Goal 4: Bathing seated on shower chair mod I  Long Term Goal 5: Improved endurance to complete standing balance task for 5min mod I in order to complete ADL and IADL tasks in standing--MET  Additional Goals?: Yes  Long Term Goal 6: Determine DME needs and complete family education prior to discharge        Therapy Time   Individual Concurrent Group PM tx   Time In 1030      1430   Time Out 1115      1515   Minutes 45      45   Timed Code Treatment Minutes: [facility], OTR/L #[id number]

## 2022-09-22 NOTE — DISCHARGE INSTR - COC
Continuity of Care Form    Patient Name: Zander Bergeron   :  1960  MRN:  [id number]    Admit date:  2022  Discharge date:  22    Code Status Order: Full Code   Advance Directives:     Admitting Physician:  Shakir Allan MD  PCP: Shaggy Vang MD    Discharging Nurse: [facility] Unit/Room#: V6L-6713/1569-69  Discharging Unit Phone Number: [phone]    Emergency Contact:   Extended Emergency Contact Information  Primary Emergency Contact: kaela lanza  Mobile Phone: [phone]  Relation: Spouse   needed?  No    Past Surgical History:  Past Surgical History:   Procedure Laterality Date    BRONCHOSCOPY  2022    BRONCHOSCOPY ALVEOLAR LAVAGE performed by Ginny Chavez DO at [address]  2022    BRONCHOSCOPY performed by Enrique Rush MD at [facility] N/A 3/36/9232    LAPAROSCOPIC REDUCTION HIATEL HERNIA, performed by Yuliana Nguyen MD at [address] [facility] 2022    GASTROSTOMY TUBE PLACEMENT performed by Yuliana Nguyen MD at [address] [facility]  2022    EGD BIOPSY performed by Day Cheema MD at [place] [address]  2022    EGD ESOPHAGOGASTRODUODENOSCOPY TUBE INSERTION performed by Day Cheema MD at [place] [address] N/A 2022    EGD ESOPHAGOGASTRODUODENOSCOPY ORAL-GASTRIC TUBE INSERTION performed by Ginny Chavez DO at [facility] ENDOSCOPY       Immunization History:   Immunization History   Administered Date(s) Administered    COVID-19, MODERNA BLUE border, Primary or Immunocompromised, (age 12y+), IM, 100 mcg/0.5mL 2021, 2021       Active Problems:  Patient Active Problem List   Diagnosis Code    Partial bowel obstruction ([address] K56.600    Hiatal hernia K44.9    Acute respiratory failure with hypoxia and hypercapnia ([address] J96.01, J96.02    SBO (small bowel obstruction) ([facility]) K56.609    Aspiration into airway T17.908A    Acute respiratory failure with hypoxia ([facility]) J96.01    Small bowel obstruction ([facility]) M65.553    Diastolic heart failure ([facility]) I50.30    History of atrial fibrillation Z86.79    Supraventricular tachycardia ([facility]) I47.1    Arterial hypotension I95.9    Aspiration pneumonia ([facility]) J69.0    Morbid obesity with BMI of 50.0-59.9, adult ([facility]) E66.01, Z68.43    Acute delirium R41.0    EARNESTINE (obstructive sleep apnea) G47.33    Dysphagia R13.10    Critical illness myopathy G72.81       Isolation/Infection:   Isolation            No Isolation          Patient Infection Status       Infection Onset Added Last Indicated Last Indicated By Review Planned Expiration Resolved Resolved By    None active    Resolved    COVID-19 (Rule Out) 08/15/22 08/15/22 08/15/22 Respiratory Panel, Molecular, with COVID-19 (Restricted: peds pts or suitable admitted adults) (Ordered)   08/15/22 Rule-Out Test Resulted    COVID-19 (Rule Out) 08/05/22 08/05/22 08/05/22 COVID-19, Rapid (Ordered)   08/05/22 Rule-Out Test Resulted            Nurse Assessment:  Last Vital Signs: /79   Pulse 80   Temp 97.9 °F (36.6 °C) (Oral)   Resp 17   Ht 5' 7\" (1.702 m)   Wt (!) 311 lb 15.2 oz (141.5 kg)   SpO2 91%   BMI 48.86 kg/m²     Last documented pain score (0-10 scale): Pain Level: 4  Last Weight:   Wt Readings from Last 1 Encounters:   09/22/22 (!) 311 lb 15.2 oz (141.5 kg)     Mental Status:  oriented and alert    IV Access:  - None    Nursing Mobility/ADLs:  Walking   Assisted  Transfer  Assisted  Bathing  Assisted  Dressing  Independent  Toileting  Independent  Feeding  Independent  Med Admin  Assisted  Med Delivery   whole and thin liquids    Wound Care Documentation and Therapy:  Incision 08/31/22 Abdomen Medial (Active)   Dressing Status Clean;Dry; Intact 09/20/22 2056   Incision Cleansed Not Cleansed 09/20/22 2056   Dressing/Treatment Surgical glue 09/22/22 0836   Closure Open to air 09/22/22 0836   Margins Approximated 09/22/22 0836   Drainage Amount None 09/22/22 0836   Odor None 09/22/22 0836   Margie-incision Assessment Intact 09/22/22 0836   Number of days: 22        Elimination:  Continence: Bowel: Yes  Bladder: Yes  Urinary Catheter: None   Colostomy/Ileostomy/Ileal Conduit: No       Date of Last BM: 9/23/2022    Intake/Output Summary (Last 24 hours) at 9/22/2022 1513  Last data filed at 9/22/2022 1244  Gross per 24 hour   Intake 1413 ml   Output --   Net 1413 ml     I/O last 3 completed shifts: In: 7756 [P.O.:480; NG/GT:1240]  Out: -     Safety Concerns: At Risk for Falls    Impairments/Disabilities:      Vision    Nutrition Therapy:  Current Nutrition Therapy:   - Oral Diet:  Cardiac and Low Sodium (3-4gm)  - Tube Feedings:  30 ml flush with water only two times per day    Routes of Feeding: Oral  Liquids: Thin Liquids  Daily Fluid Restriction: no  Last Modified Barium Swallow with Video (Video Swallowing Test): done on 09/20/2022    Treatments at the Time of Hospital Discharge:   Respiratory Treatments: none  Oxygen Therapy:  is not on home oxygen therapy. Ventilator:    - BiPAP    , CPAP/EPAP:  (Home Setting) only when sleeping and device from home    Rehab Therapies: Physical Therapy, Occupational Therapy, and Speech/Language Therapy  Weight Bearing Status/Restrictions: No weight bearing restrictions  Other Medical Equipment (for information only, NOT a DME order):  none  Other Treatments: PEG tube care, twice a day, clean with normal saline and 3x3 gauze pad.     Patient's personal belongings (please select all that are sent with patient):  Glasses, cell phone and , Bipap machine    RN SIGNATURE:  Electronically signed by Viraj Russell RN on 9/24/22 at 11:00 AM EDT    CASE MANAGEMENT/SOCIAL WORK SECTION    Inpatient Status Date: 9/13/22    Readmission Risk Assessment Score:  Readmission Risk              Risk of Unplanned Readmission:  14           Discharging to Facility/ Agency   Name: [facility]  Address:  Phone:  Fax:    Dialysis Facility (if applicable)   Name:  Address:  Dialysis Schedule:  Phone:  Fax:    / signature: Electronically signed by Gonzalo Schilling on 9/24/22 at 8:57 AM EDT    PHYSICIAN SECTION    Prognosis: Good    Condition at Discharge: Stable    Rehab Potential (if transferring to Rehab): Good    Recommended Labs or Other Treatments After Discharge: PT,OT,Speech, VN    Physician Certification: I certify the above information and transfer of [place]  is necessary for the continuing treatment of the diagnosis listed and that he requires [address] for less 30 days.      Update Admission H&P: No change in H&P    PHYSICIAN SIGNATURE:  Electronically signed by Misbah Alarcon MD on 9/22/22 at 3:14 PM EDT

## 2022-09-22 NOTE — PROGRESS NOTES
Call in to Dr. Cameron Lemus to take pills whole in applesauce per Dr. Ford Veras, per speech therapy note 9/22/2022. [facility]. Nursing Order placed.  Electronically signed by Kirit Livnigston RN on 9/22/2022 at 4:57 PM

## 2022-09-22 NOTE — PROGRESS NOTES
Speech Language Pathology  ACUTE REHAB UNIT  SPEECH/LANGUAGE PATHOLOGY      [x] Daily  [] Weekly Care Conference Note  [] Discharge    Patient:Miles Sewell      HST:[birth year]  [facility]:[id number]  Rehab Dx/Hx: Acute and chronic respiratory failure with hypercapnia [J96.22]  Critical illness myopathy [G72.81]    Precautions: Fall risk; Dysphagia; PEG Tube feeding; Cognitive/Memory  Home situation: Lives with spouse  ST Dx: [] Aphasia  [] Dysarthria  [] Apraxia   [x] Oropharyngeal dysphagia [x] Cognitive   Impairment  [x] Other: Voice therapy post prolonged intubation and then re-intubation for surgical procedure  Initial Speech Therapy Assessment Diagnosis:   Speech Therapy Diagnosis  1. Cognitive Diagnosis: Pt demonstrated functional temporal and spatial orientation; concrete attention and VPS/PS and reasoning and math language. Pt with deficits in memory (working memory and STM ) and higher level attention (ie alternating and divided attention) on cogntive-linguistic testing. Will continue evaluation of higher level cognitive-linguistic skills as pt does participate in home/med management. Current deficits may impact. 2.Speech Diagnosis: Oral Motor Speech /Voice characterized by hoarse / Eura Thu / raspy voice quality which can impact intelligibility of connected speech. Pt is s/p prolonged intubation and then brief intubation post a surgical procedure. Pt reporting some improvement. Will initiate functional voice ex and respiratory control ex ; and trial of ex reducing laryngeal tension and assessment improvement. If persistent deficits ; potential need for referral to ENT for assessment of VC function  3. Communication Diagnosis: Pt is demonstrating functional concrete and mild complex auditory and visual language processing and verbal expressive language skills. Will assess moderately complex as related to adv DL tasks related to higher level executive function  4. Dysphagia diagnosis: Persistent concern for lingual coordination deficits which can impact bolus control (Mastication was not assessed due to recent history/finding of pharyngeal phase deficits); persistent concern for delayed initiation of swallow; potential residual reduced laryngeal rom and pharyngeal peristalsis. Pt with intermittent throat clearing post swallow;multiple swallows per one presentation. Anticipate will need repeat MBSS during this admit. Recommend : continue SLP therapy for Oropharyngeal Dysphagia; date TBD for repeat MBSS; ice chips :occasional /isolated; oral care. Date of Admit: 9/13/2022  Room #: J5M-5237/3255-01  Date: 9/22/2022       Current functional status (updated daily):         Current Diet Order:ADULT DIET; Dysphagia - Minced and Moist  ADULT ORAL NUTRITION SUPPLEMENT; Breakfast, Lunch, Dinner; Low Calorie/High Protein Oral Supplement  ADULT TUBE FEEDING; PEG; Standard with Fiber; Cyclic; 60; 4:93 PM; 7:41 AM; 30; Q 4 hours   Behavior: [x] Alert  [x] Cooperative  [x]  Pleasant  [] Confused  [] Agitated  [] Uncooperative  [] Distractible [] Motivated  [] Self-Limiting [] Anxious  [] Other:  Endurance:  [x] Adequate for participation in SLP sessions  [] Reduced overall  [] Lethargic  [] Other:  Safety: [x] No concerns at this time  [] Reduced insight into deficits  []  Reduced safety awareness [] Not following call light procedures  [] Unable to Assess  [] Other:  Swallowing Precautions: PEG tube feeding restricitoins; oral care; ice chips  Barriers toward progress: good guarded; medical DX and course of medical co-mrobidities.  Has caregiver support/pt is motivated though           Date: 9/22/2022      Tx session 1 Tx session 2   Total Timed Code Min   SLP Individual Minutes  Time In: 1115  Time Out: 1200  Minutes: 39  Coded treatment time  15   N/a   Group Treatment Minutes 0 0   Co-Treat Minutes 0 0   Variance/Reason:  N/a    Pain denied    Pain Intervention [] RN notified  [] Repositioned  [] Intervention offered and patient declined  [x] N/A  [] Other: [] RN notified  [] Repositioned  [] Intervention offered and patient declined  [] N/A  [] Other:   Subjective     Pt seen bedside  Pt in w/c  Pt was anticipating therapy   Pt with good effort    Objective:  Goals         Dysphagia goals: Pt goals is to eat and drink and get rid of feeing tube    Therapy working toward pt goal emphasizing with initiation of  N/a   New goal  The patient will improve oral preparation phase via bolus control/manipulation exercises for soft/bite size and easy to chew food consistencies to 10/10 each trial.,  Continued emphasis on bolus formation/cohesion and A-P oral transit of :   Soft bite size moist and soft bite size: pt demonstrated functional mastication; multiple swallows per one bite which appeared clinically comparable to MBSS . No post swallow clinical ss/ and no residual oral pocketing. 13/13 tolerated  Will request diet upgrade N/a   The patient will tolerate instrumental swallowing procedure  Completed 9/20/2022    New Goal  Pt will tolerate dysphagia minced and moist food consistencies with thin lqiud consitency diet without ovet lcinical s/s of aspiration   Tongue base retraction ex: warm up    Laryngeal ex targeting  Glottals on expiratory effort: 8-10 reps on expiratory effort maintained  Pitch swing low to high with falsetto hold: warm up and intermittently during tx  BOT with pitch variation to low to high with falsetto hold: warm up and intermittently during tx  Lorri: not targeted  Effortful swallow  across po trials        PO trials:   Cup /straw thin liquids:  with lingual hold and modified supraglottic swallow: >4 ounces  Slightly thick via tsp with lingual hold : >4 ounces  Puree via tsp with modified supraglottic swallow: 100% of 4 ounce serving  Minced and Moist: 100% of serving size on noon tray  Soft/bite size moist and soft/bite size foods: No overt clnical s/s of aspiration. No complaints.        Ongoing pt ed in rationale of varied ex ; compensatory swallow strategies of lingual hold and clearance swallows targeting bolus control/airway protection. REinforcement of GERD precautions. Pt did have some belching and then complained of getting full. Discontinued.     Will request diet upgrade to dysphagia soft/bite size N/a   Voice/cognitive-linguistic Goals: Pt's goal is to care for self and participate in home / health responsibilities Working toward pt goal N/A     Goal 1: Pt will participate in continued assessment of higher level cognitive-lingujistic skills for adv DL situatios which would require higher level executive function     Not targeted N/A   Goal 2: Pt will demosntrate improved recall of new information; learned compensatory strategies or medical care procedures and new information with use of compensatory strategies; use of memory strategies with set up and <mild assist Working memory for rule application for 25 task trials: set up and intermittent assist      Working memory for   Temporal orientation and anticipation of daily therapy schedule: set up and intermittent need for use of written cues      Functional recall :   Tentative d/c date: IND  Daily therapy schedule : use of written schedule  PEG tube schedule: IND and confirmed with RN    Memory strategies: reinforcing use of written notes; association and contextual cues       N/a   Goal 3: Pt will demosntrate functional VPS/PS and reasoning for graded functional PS/VPS/numeric reasoning tasks with < mild assist   PS and math language:   Goal met    PS/reasoning  Making inferences/reasoning/completing analogies : 0 to mild assist or external prompting emphasizing key words/phrases  Pt participating in note taking for calorie counts  PS and incorporating new learning for d/c planning: good participation in brainstorming activity   N/a   Goal 4: Pt will demonstrate improved voice quality and endurance for functional voice ex for 8 seconds and improved sustained voice quality at 4-8 syllable utterance level Ongoing focus in use of easy onset; frontal focus; breath support and breath strategies (targeting diaphragm) for varied structured functional voice ex:   Pitch variation low to high to low targeting frontal vowels: warm up  Frontal focus for CVCVCV combinations: warm up  Sustained phonation of frontal to mid vowels: maintaining average of 8.5 second duration without voice cessation  Generalization into conversational exchange: continued daily improvement and decrease in voice cessation and less hoarse quality   N/a   Other areas targeted:     Education:   Ongoing pt ed      Safety Devices: [x] Call light within reach  [x] Chair alarm activated  [] Bed alarm activated  [] Other:  [] Call light within reach  [] Chair alarm activated  [] Bed alarm activated  [] Other:    Progress Assessment: 9/15/2022: CONTINUE ALL GOAL  9/16/2022: RN reporting some cardiac issues early am;MD aware and MD wrote a cardiology consult. . Family ed with pt's dtr  9/19/2022: SLP spoke to MD and requested MBSs orders for 9/20/2022. Pt appears to be making progress with repeat instrumental for more objective assessment of progress and po diet candidacy  9/20/2022: MBSS completed and po diet initiated (dysphagia minced and moist food consistencies with thin liquids) as well as calorie count  9/22/2022: Will request diet upgrade to dysphagia soft/bite size food consistencies   Plan: Continue as per plan of care.    Continued Tx Upon Discharge: ?    [x] Yes [] No [] TBD based on progress while on ARU [] Vital Stim indicated [] Other:   Estimated discharge date: TBD   Discharge recommendations:   [] Home independently  [x] Home with assistance []  24 hour supervision  [] ECF [] Other:     Additional information:     Interventions used during Rehab Stay:  [] Speech/Language Treatment  [] Instruction in HEP [] Group [x] Dysphagia Treatment [x] Cognitive Treatment   [x] Other:Voice TX          Electronically Signed by    Flo David. MS Alexandrea,CCC,SLP [id number]  Speech and Language Pathologist

## 2022-09-22 NOTE — PROGRESS NOTES
Comprehensive Nutrition Assessment    Type and Reason for Visit:  Reassess    Nutrition Recommendations/Plan:   Continue minced and moist diet per SLP reccs  Continue Ensure HP TID (chocolate flavor preferred)  Jevity 1.5 @ 60ml x 8 hours (7pm-3am)  Water flush 30ml every 4 hours  Continue calorie count  OK to dc TF this weekend if intakes remain >50%     Malnutrition Assessment:  Malnutrition Status: At risk for malnutrition (Comment) (09/14/22 1036)    Context:  Acute Illness       Nutrition Assessment:    Follow-up. Tolerating Jevity 1.5 @ 60ml/hr x 14 hours. Calorie count collected daily. Yesterday pt ate 93% of calorie needs and 68% of protein needs. Ensure HP onboard TID with 100% intakes. Pt states he is not hungry at breakfast or dinner d/t feedings. Will adjust time of feedings and decrease rate to meet 25% of energy and protein needs. This will in turn, promote oral intake. Monitor PO intakes tomorrow with total volume of TF decrease. If intakes remain >50%, RD to recommend dc TF prior to discharge. Encourage ONS intake at home to meet energy and protein needs orally. Nutrition Related Findings:    +15 liters. Labs reviewed. +BM 9/22. Trace sacral edema. Wound Type:  (PEG site)       Current Nutrition Intake & Therapies:    Average Meal Intake: % (Calorie count)  Average Supplements Intake: 51-75% (Calorie count)  ADULT DIET; Dysphagia - Minced and Moist  ADULT ORAL NUTRITION SUPPLEMENT; Breakfast, Lunch, Dinner;  Low Calorie/High Protein Oral Supplement  ADULT TUBE FEEDING; PEG; Standard with Fiber; Cyclic; 60; 8:87 PM; 0:07 AM; 30; Q 4 hours  Current Tube Feeding (TF) Orders:  Feeding Route: PEG  Formula: Standard with Fiber  Schedule: Cyclic  Feeding Regimen: Jevity 1.5 @ 60ml x 8 hours  Additives/Modulars: None  Water Flushes: 30ml every 4 hours as pt is on oral diet  Current TF & Flush Orders Provides: Jevity 1.5 @ 60ml x 14 hours to provide 840 ml TV, 1260 kcals, 54 grams protein, and 639ml free water. Goal TF & Flush Orders Provides: Jevity 1.5 @ 60ml x 8 hours (7pm-3am) to provide 480ml TV, 720 kcals, 31 grams protein, and 365ml free water. Anthropometric Measures:  Height: 5' 7\" (170.2 cm)  Ideal Body Weight (IBW): 148 lbs (67 kg)    Admission Body Weight: 317 lb (143.8 kg)  Current Body Weight: 311 lb (141.1 kg), 210.1 % IBW. Weight Source: Standing Scale  Current BMI (kg/m2): 48.7  Weight Adjustment For: No Adjustment  BMI Categories: Obese Class 3 (BMI 40.0 or greater)    Estimated Daily Nutrient Needs:  Energy Requirements Based On: Kcal/kg  Weight Used for Energy Requirements: Current  Energy (kcal/day): 3565-0303 (11-14kcal/140kg)  Weight Used for Protein Requirements: Ideal  Protein (g/day): 134 (2g/67kg)  Method Used for Fluid Requirements: 1 ml/kcal  Fluid (ml/day): 1 ml/kcal    Nutrition Diagnosis:   Inadequate oral intake (Improving) related to inadequate protein-energy intake as evidenced by nutrition support - enteral nutrition    Nutrition Interventions:   Food and/or Nutrient Delivery: Continue Current Diet, Continue Oral Nutrition Supplement, Modify Tube Feeding, Continue Calorie Count  Nutrition Education/Counseling: Education not indicated  Coordination of Nutrition Care: Continue to monitor while inpatient     Goals:  Previous Goal Met: Progressing toward Goal(s)  Goals: PO intake 50% or greater    Nutrition Monitoring and Evaluation:   Behavioral-Environmental Outcomes: None Identified  Food/Nutrient Intake Outcomes: Diet Advancement/Tolerance, Food and Nutrient Intake, Supplement Intake, Enteral Nutrition Intake/Tolerance  Physical Signs/Symptoms Outcomes: Biochemical Data, Chewing or Swallowing, GI Status, Nausea or Vomiting, Fluid Status or Edema, Meal Time Behavior, Skin, Weight    Discharge Planning:     Too soon to determine     Priscilla Chilel, [address],   Contact: [phone]

## 2022-09-22 NOTE — CARE COORDINATION
Per bedside RN, pt will not need any type of tube feed. Called this to Jose M at [facility]. Called this to Hien Robles at [facility] to inform.   Ligia Falcon [place]     Case Management   [phone]    9/22/2022  4:21 PM

## 2022-09-22 NOTE — CARE COORDINATION
Per chart, Pt continues with Jevity 1.5 Tube Feeds. Referral made to [facility] for insurance check if he will still need Tube feeds upon discharge on Saturday.   [place], [place]     Case Management   [phone]    9/22/2022  10:51 AM

## 2022-09-22 NOTE — PROGRESS NOTES
Pt awake and \"heart racing\" Pt in SVT with . Pt encouraged to perform vagal maneuver. HR did come down to 79 after bearing down. Will monitor.

## 2022-09-23 LAB
ANION GAP SERPL CALCULATED.3IONS-SCNC: 10 MMOL/L (ref 3–16)
BUN BLDV-MCNC: 20 MG/DL (ref 7–20)
CALCIUM SERPL-MCNC: 9 MG/DL (ref 8.3–10.6)
CHLORIDE BLD-SCNC: 91 MMOL/L (ref 99–110)
CO2: 27 MMOL/L (ref 21–32)
CREAT SERPL-MCNC: 0.8 MG/DL (ref 0.8–1.3)
GFR AFRICAN AMERICAN: >60
GFR NON-AFRICAN AMERICAN: >60
GLUCOSE BLD-MCNC: 83 MG/DL (ref 70–99)
HCT VFR BLD CALC: 29.6 % (ref 40.5–52.5)
HEMOGLOBIN: 9.9 G/DL (ref 13.5–17.5)
MCH RBC QN AUTO: 29.9 PG (ref 26–34)
MCHC RBC AUTO-ENTMCNC: 33.5 G/DL (ref 31–36)
MCV RBC AUTO: 89.5 FL (ref 80–100)
PDW BLD-RTO: 17.2 % (ref 12.4–15.4)
PLATELET # BLD: 432 K/UL (ref 135–450)
PMV BLD AUTO: 7.1 FL (ref 5–10.5)
POTASSIUM SERPL-SCNC: 3.7 MMOL/L (ref 3.5–5.1)
RBC # BLD: 3.31 M/UL (ref 4.2–5.9)
SODIUM BLD-SCNC: 128 MMOL/L (ref 136–145)
WBC # BLD: 5.3 K/UL (ref 4–11)

## 2022-09-23 PROCEDURE — 85027 COMPLETE CBC AUTOMATED: CPT

## 2022-09-23 PROCEDURE — 97530 THERAPEUTIC ACTIVITIES: CPT

## 2022-09-23 PROCEDURE — 6370000000 HC RX 637 (ALT 250 FOR IP): Performed by: PHYSICAL MEDICINE & REHABILITATION

## 2022-09-23 PROCEDURE — 94660 CPAP INITIATION&MGMT: CPT

## 2022-09-23 PROCEDURE — 97116 GAIT TRAINING THERAPY: CPT

## 2022-09-23 PROCEDURE — 92526 ORAL FUNCTION THERAPY: CPT

## 2022-09-23 PROCEDURE — 36415 COLL VENOUS BLD VENIPUNCTURE: CPT

## 2022-09-23 PROCEDURE — 1280000000 HC REHAB R&B

## 2022-09-23 PROCEDURE — 97129 THER IVNTJ 1ST 15 MIN: CPT

## 2022-09-23 PROCEDURE — 97110 THERAPEUTIC EXERCISES: CPT

## 2022-09-23 PROCEDURE — 94760 N-INVAS EAR/PLS OXIMETRY 1: CPT

## 2022-09-23 PROCEDURE — 97535 SELF CARE MNGMENT TRAINING: CPT

## 2022-09-23 PROCEDURE — 6360000002 HC RX W HCPCS: Performed by: PHYSICAL MEDICINE & REHABILITATION

## 2022-09-23 PROCEDURE — 80048 BASIC METABOLIC PNL TOTAL CA: CPT

## 2022-09-23 PROCEDURE — 92507 TX SP LANG VOICE COMM INDIV: CPT

## 2022-09-23 RX ORDER — POTASSIUM CHLORIDE 20 MEQ/1
20 TABLET, EXTENDED RELEASE ORAL DAILY
Qty: 90 TABLET | Refills: 1 | Status: SHIPPED | OUTPATIENT
Start: 2022-09-23 | End: 2022-10-21 | Stop reason: ALTCHOICE

## 2022-09-23 RX ORDER — THIAMINE MONONITRATE (VIT B1) 100 MG
100 TABLET ORAL DAILY
Qty: 30 TABLET | Refills: 1 | Status: SHIPPED | OUTPATIENT
Start: 2022-09-24

## 2022-09-23 RX ORDER — METOPROLOL TARTRATE 50 MG/1
50 TABLET, FILM COATED ORAL 2 TIMES DAILY
Qty: 60 TABLET | Refills: 3 | Status: SHIPPED | OUTPATIENT
Start: 2022-09-23

## 2022-09-23 RX ADMIN — Medication 100 MG: at 07:29

## 2022-09-23 RX ADMIN — ENOXAPARIN SODIUM 30 MG: 100 INJECTION SUBCUTANEOUS at 09:06

## 2022-09-23 RX ADMIN — GABAPENTIN 400 MG: 400 CAPSULE ORAL at 09:06

## 2022-09-23 RX ADMIN — GABAPENTIN 400 MG: 400 CAPSULE ORAL at 22:06

## 2022-09-23 RX ADMIN — METOPROLOL TARTRATE 50 MG: 50 TABLET ORAL at 07:30

## 2022-09-23 RX ADMIN — LEVOTHYROXINE SODIUM 200 MCG: 0.1 TABLET ORAL at 07:05

## 2022-09-23 RX ADMIN — METOPROLOL TARTRATE 50 MG: 50 TABLET ORAL at 22:06

## 2022-09-23 RX ADMIN — CELECOXIB 200 MG: 200 CAPSULE ORAL at 07:29

## 2022-09-23 RX ADMIN — FOLIC ACID 1 MG: 1 TABLET ORAL at 07:30

## 2022-09-23 RX ADMIN — GABAPENTIN 400 MG: 400 CAPSULE ORAL at 13:19

## 2022-09-23 RX ADMIN — Medication 1 TABLET: at 07:29

## 2022-09-23 RX ADMIN — Medication 30 MG: at 07:05

## 2022-09-23 RX ADMIN — CELECOXIB 200 MG: 200 CAPSULE ORAL at 22:06

## 2022-09-23 RX ADMIN — POTASSIUM BICARBONATE 20 MEQ: 782 TABLET, EFFERVESCENT ORAL at 07:30

## 2022-09-23 RX ADMIN — ASPIRIN 81 MG: 81 TABLET, COATED ORAL at 07:29

## 2022-09-23 RX ADMIN — Medication 1 TABLET: at 22:06

## 2022-09-23 RX ADMIN — TORSEMIDE 20 MG: 20 TABLET ORAL at 07:30

## 2022-09-23 RX ADMIN — ENOXAPARIN SODIUM 30 MG: 100 INJECTION SUBCUTANEOUS at 22:06

## 2022-09-23 ASSESSMENT — PAIN DESCRIPTION - DIRECTION: RADIATING_TOWARDS: GENERALIZED

## 2022-09-23 ASSESSMENT — PAIN DESCRIPTION - PAIN TYPE: TYPE: CHRONIC PAIN

## 2022-09-23 ASSESSMENT — PAIN DESCRIPTION - ONSET: ONSET: ON-GOING

## 2022-09-23 ASSESSMENT — PAIN - FUNCTIONAL ASSESSMENT: PAIN_FUNCTIONAL_ASSESSMENT: ACTIVITIES ARE NOT PREVENTED

## 2022-09-23 ASSESSMENT — PAIN SCALES - GENERAL
PAINLEVEL_OUTOF10: 3
PAINLEVEL_OUTOF10: 0
PAINLEVEL_OUTOF10: 0

## 2022-09-23 ASSESSMENT — PAIN DESCRIPTION - DESCRIPTORS: DESCRIPTORS: DISCOMFORT;SORE

## 2022-09-23 ASSESSMENT — PAIN DESCRIPTION - LOCATION: LOCATION: GENERALIZED

## 2022-09-23 ASSESSMENT — PAIN DESCRIPTION - FREQUENCY: FREQUENCY: INTERMITTENT

## 2022-09-23 NOTE — PROGRESS NOTES
Orientation Status: Within Normal Limits  Orientation Level: Oriented X4         ADL  Feeding  Assistance Level: Independent  Skilled Clinical Factors: now on thin liquids w/ soft & bite sized diet (has peg tube)  Grooming/Oral Hygiene  Assistance Level: Independent  Skilled Clinical Factors: seated for hair & oral care tasks to conserve energy  Upper Extremity Bathing  Assistance Level: Independent  Skilled Clinical Factors: seated on shower chair, able to turn on/off water and manage [address], able to wipe sludge @ Peg tube &  dry it  Lower Extremity Bathing  Assistance Level: Independent  Skilled Clinical Factors: he is able to turn water on/off,used [address] while seated on shower chair, stood using GB; no LOB; mild fatigue noted  Upper Extremity Dressing  Assistance Level: Independent  Skilled Clinical Factors: able to doff/don shirt while seated  Lower Extremity Dressing  Assistance Level: Independent  Skilled Clinical Factors: able to doff underwear, shorts & socks w/ extra time; stood at sink to manage clothing over hips, easily fatigues, used PLB when SOB, sits to conserve energy  Putting On/Taking Off Footwear  Assistance Level: Independent  Skilled Clinical Factors: doffed/donned non skid socks while seated on EOB, brings 1 leg to side of bed to reach  Toileting  Assistance Level: Independent  Skilled Clinical Factors: stood to urinate  Toilet Transfers  Equipment: Grab bars;Standard toilet  Assistance Level: Independent  Skilled Clinical Factors: stood to urinate  Tub/Shower Transfers  Type: Shower  Transfer To:  Shower chair with back  Assistance Level: Modified independent  Skilled Clinical Factors: ambulated to/from shower stall w/o AD, mild swaying/limping; good use of grab bars for in/out of shower & on/off shower chair    Instrumental ADL's  Instrumental ADLs: Yes  Meal Prep  Meal Prep Level:  (taken on 9/22/22--no AD used)  Meal Prep Level of Assistance: Independent  Meal Preparation: ambulated to/from kitchen, able to gather bread & butter from fridge & transport over to counter to use toaster; good balance but mild fatigue noted; able to stand @ 6 minutes without difficulty (has a chair in kitchen when he needs to sit for meal prep) Pulse ox remains above 93%, HR @ 90. Health Management  Health Management Level of Assistance: Independent  Health Management: IND w/ medication management including opening containers & manipulating small pills into organizer; shown dycem & grippers to open different sized containers     Functional Mobility  Activity: Transport items; Retrieve items; To/From bathroom  Assistance Level: Independent  Skilled Clinical Factors: ambulated @ his room, gathered clothes from closet & transported to bathrm, no AD used; mild lateral sway, has RA in joints, hx of TKR and B quad tears  Bed Mobility  Overall Assistance Level: Modified Independent  Additional Factors: With handrails  Roll Left  Assistance Level: Modified independent  Skilled Clinical Factors: using bed rails  Roll Right  Assistance Level: Modified independent  Skilled Clinical Factors: using bed rails  Sit to Supine  Assistance Level: Modified independent  Skilled Clinical Factors: increased effort, mild SOB d/t fatigue  Supine to Sit  Assistance Level: Modified independent  Skilled Clinical Factors: completed in [facility] and his flat hospital bed using rail  Transfers  Surface: To bed; Wheelchair;From bed (reg chair & recliner)  Sit to Stand  Assistance Level: Independent  Skilled Clinical Factors: no AD, mild limping/swaying  Stand to Sit  Assistance Level: Independent  Skilled Clinical Factors: good recall of safe hand placement  Bed To/From Chair  Assistance Level: Independent  Car Transfer  Assistance Level: Independent  Skilled Clinical Factors: no AD used to enter & exit car; good recall of safe technique (completed on 9/21/22)         Assessment  Assessment  Assessment: Pt has met all goals as established on OT POC. He achieved an IND/MI level w/ household mobility & transfers without using AD; mild lateral sway/limping (premorbid from h/o B quad tears & having RA). He is able to gather clothing from closet & transport into bathrm, he is IND w/ shower level bathing using shower chair & GB, mild SOB after exertion but able to sit at sink for grooming tasks. He is IND w/ UB & LB dressing w/ extra time while seated; can sit on EOB to don non skid socks. On 9/22/22, pt was able to ambulate thru therapy gym<>kitchen area IND'ly without AD; he moves slow/cautiously but no LOB. Pt is able to retrieve items from fridge & transport over to counter to use toaster--he was able to stand @ 6 minutes IND'ly w/ mild fatigue noted. Pulse ox remained above 93% on room air. Discussed ways to reduce high HR including relaxation techniques, deep breathing and vagal method. OT highly recommends he f/u w/ cardiologist as soon as possible to tx tachycardia (his HR went to 154 during the night on 9/21/22). He was provided w/ incentive spirometer and owns pulse oximeter at home; OT issued putty & resistance band for UE HEP, gave suggestions to alternate warm & cold water soaks to help w/ swelling & stiffness in B hands d/t RA. No DME needs indicated, recommend purchasing a reacher to grab items off floor to reduce fall risk. Pt to be  DC to home on Sat w/ wife's support & HH services  Activity Tolerance: Patient tolerated treatment well  Discharge Recommendations: Home with assist PRN;S Level 1;Home with Home health OT  OT Equipment Recommendations  Other: pt owns TTB, HD RW, pulse ox; may benefit from [address] in place: Yes  Type of devices: Gait belt;Call light within reach; Chair alarm in place; Left in chair    Patient Education  Education  Education Given To: Patient  Education Provided: Precautions; Energy Conservation  Education Provided Comments: discussed ways to lower HR including deep breathing, vasal technique, engaging in relaxation activities. It is recommended he f/u immediately w/ cardiology (pt educated on purpose of Beta Blockers including Coreg and Inderal). Reviewed need for incentive spirometer & PLB to reduce SOB  Education Method: Demonstration;Verbal  Barriers to Learning: None  Education Outcome: Verbalized understanding;Demonstrated understanding    Plan  Plan  Times per Week: 5-7  Times per Day: Twice a day  Plan Weeks: DC to home Sat w/ home OT services  Specific Instructions for Next Treatment: kitchen tasks  Current Treatment Recommendations: Strengthening;ROM; Functional mobility training; Endurance training;Patient/Caregiver education & training;Equipment evaluation, education, & procurement;Self-Care / ADL; Coordination training    Goals  Patient Goals   Patient goals : \"Be able to eat normally, get up and moving to get stronger\"  Short Term Goals  Time Frame for Short term goals: Within 7 days the Pt will  Short Term Goal 1: Toileting with SBA using GB's prn--MET  Short Term Goal 2: LB dressing with SBA using AE prn--MET  Short Term Goal 3: Functional transfers using LRAD with SBA--MET  Short Term Goal 4: Bathing seated on shower chair with CGA--MET  Short Term Goal 5: Improved endurance to complete standing balance task for 3min SBA in order to complete ADL and IADL tasks in standing--MET  Long Term Goals  Time Frame for Long term goals :  Within 10-14 days  Long Term Goal 1: Toileting mod I using GB's prn--MET  Long Term Goal 2: LB dressing mod I using AE prn--MET  Long Term Goal 3: Functional transfers using LRAD mod I--MET  Long Term Goal 4: Bathing seated on shower chair mod I--MET  Long Term Goal 5: Improved endurance to complete standing balance task for 5min mod I in order to complete ADL and IADL tasks in standing--MET  Additional Goals?: Yes  Long Term Goal 6: Determine DME needs and complete family education prior to discharge--MET, pt's wife present during 1 OT session, made recommendations for [facility] OT services & f/u w/ pulmonary rehab.         Therapy Time   Individual Concurrent Group PM tx   Time In 9274      7504   Time Out 4291      2637   Minutes 65      45   Timed Code Treatment Minutes: [address], [place] #[id number]

## 2022-09-23 NOTE — PROGRESS NOTES
Physical Therapy  Facility/Department: [facility]  Discharge Note    NAME: Stefanie Miller  : 1960 (63 y.o.)  MRN: [id number]  CODE STATUS: Full Code    Date of Service: 22       Restrictions:  Restrictions/Precautions: Modified Diet; Fall Risk  Position Activity Restriction  Other position/activity restrictions: Soft & bite sized diet w/ thins; has  PEG tube; attempting to wean off O2, CPAP at night, teley     Pertinent medical information:  Additional Pertinent Hx: Per Dr. Na Nieves H/P \"Patient is a 63 y.o. male hospitalized on 2022   for small bowel obstruction, pneumonia, septic shock patient intubation, mechanical ventilation, broad-spectrum antibiotics s intubated/extubated 2020 through 2022. On 2022, EGD done for endoscopically placed NG tube placement. On 2022, EGD with endoscopic placement of NG tube. On 2022 underwent laparoscopic reduction of large hiatal hernia with gastropexy, placement of gastrostomy tube. On 2022, extubated after the above procedure. He had the diagnosis of critical illness myopathy, and was discharged from our hospital to an LTAC unit for further IV antibiotic therapy and treatment. Patient now has improved, and is admitted back to our rehabilitation unit to progress with his strengthening of his arms and legs and improving his endurance before discharge back home. Patient also has diagnosis of atrial fibrillation, heart failure, dysphagia, sleep apnea, HTN, and rheumatoid arthritis. Patient is now ambulating 50 feet with a rolling walker and PT with contact-guard to min assist, and needs max assist for lower body bathing and dressing. Patient lives at home with his wife in a 1 level house. Patient is allowed to eat PO with speech therapy during therapeutic feeding. \"     SUBJECTIVE  Subjective  Subjective: Pt arrived in [facility] to therapy gym. Pt reports that he had a good nights sleep and is feeling less fatigued today.  He has 4/10 pain in his hands and ankles due to his RA. Pain: Bilateral hand and ankle pain 4/10. OBJECTIVE  Functional Mobility  Bed Mobility  Overall Assistance Level: Modified Independent  Additional Factors: Head of bed raised; Without handrails  Bridging  Assistance Level: Modified independent  Skilled Clinical Factors: increased time to complete and only able to extend hips 3\" off of bed. This was completed on ADL bed. Roll Left  Assistance Level: Modified independent  Skilled Clinical Factors: required increased time to complete and HOB was raised with a wedge and one pillow. This was completed on ADL bed. Roll Right  Assistance Level: Modified independent  Skilled Clinical Factors: required increased time to complete and HOB was raised with a wedge and one pillow. This was completed on ADL bed. Sit to Supine  Assistance Level: Modified independent  Skilled Clinical Factors: increased time. Completed one with no wedge underneath and one with wedge underneath. Supine to Sit  Assistance Level: Modified independent  Skilled Clinical Factors: increased time to complete. Completed one with no wedge underneath and one with wedge underneath. Scooting  Assistance Level: Modified independent  Balance  Sitting Balance: Modified independent  (sitting EOB with B UE support for 3 minutes)  Sit to Stand  Assistance Level: Modified independent  Skilled Clinical Factors: pt able to complete transfer with increased time and remembers to reach back with UE for hand rails. Stand to Sit  Assistance Level: Modified independent  Skilled Clinical Factors: pt able to complete transfer with increased time and remembers to reach back with UE for hand rails. Bed To/From Chair  Technique: Stand step  Assistance Level: Modified independent  Skilled Clinical Factors: without assistive device  Car Transfer  Assistance Level: Modified independent  Skilled Clinical Factors: mock car transfer done with no AD.  Pt was mod I however there was one safety concern due to pt using car door to stand from car. Environmental Mobility  Ambulation  Surface: Level surface; Uneven surface  Device:  (no AD)  Distance: 210' X 1 turn (10' on uneven surface of carpet), 280' X 5 turns (80' on uneven surface of carpet), 48' X 1 turn, 50' X 1 turn, 25' X 1 turn, 25' X 1 turn, 22' X 1 turn, 22' X 1 turn. Activity: Within Unit  Activity Comments: Pt with increased BENTLEY and slow adam during ambulation. Pt had mild shuffling during ambulation but was able to manage the thresholds and transition on and off the carpet without difficulty. When completing the second trial of ambulation pt demonstrated some lateral sway as well as shuffling and decreased gait speed. Pt had increased SOB on first and second trial of ambulation. Pt picked up item off the floor during the second ambulation trial.  Additional Factors: Increased time to complete  Assistance Level: Modified independent  Gait Deviations: Wide base of support; Slow adam  Skilled Clinical Factors: Pt SPO2 was 95% after first ambulation and required seated rest break following all ambulation attempts in order to decrease their dysnea. Stairs  Stair Height: 6''  Device: Bilateral handrails (no AD)  Number of Stairs: 12  Additional Factors: Non-reciprocal going up;Non-reciprocal going down  Assistance Level: Supervision  Skilled Clinical Factors: Pt was non-reiprocal going up leading with LLE and non-reciprocal going down leading with RLE. When descending pt was turning laterally towards the R to descend. Curb  Curb Height: 6''  Device:  (no AD)  Number of Curbs: 1  Additional Factors: Increased time to complete  Assistance Level: Supervision  Skilled Clinical Factors: Patient angles his body as he ascends and descends. He also pauses after ascending or descending.   He was able to complete without LOB    ASSESSMENT/PROGRESS TOWARDS GOALS       Assessment  Assessment: Mr. Zainab Rene continues to improve and was able to maintain saturations of 95% or higher on room air despite SOB with increased activity. His balance is improving and he is consistently ambulating community distances without a wheeled walker with mod I. He does have some SOB with this, but does not desaturate. Pt requires additional rest breaks due to his SOB however is still able to tolerate therapy well. Patient's BENTLEY is also improving and lateral sway is decreasing. Patient continues to be below baseline and will benefit from continued skilled therapy for strengthening, gait training, and functional mobility training to allow for safe return home. Pt is planned for discharge tomorrow Saturday, September 24th. Activity Tolerance: Patient tolerated treatment well  Discharge Recommendations: Home with assist PRN;Home with Home health PT  PT Equipment Recommendations  Equipment Needed: No    Goals  Patient Goals   Patient goals : Return to walking without difficulty. Short Term Goals  Time Frame for Short term goals: 7 days  Short term goal 1: Complete ramp with RW CGA. - met 9/19/2022  Short term goal 2: Ambulate 150' with RW and CGA. - met 9/19/2022  Short term goal 3: Transfers, car and sit<>stand, with SBA. - met 9/19/2022  Short term goal 4: Complete all bed mobility mod I. - met 9/19/2022  Long Term Goals  Time Frame for Long term goals : 10-14 days  Long term goal 1: Complete ramp with no AD mod I.  Long term goal 2: Ambulate 150' RW mod I. met 09/23/2022  Long term goal 3: Transfers, car and sit<>stand, with mod I. met 09/23/2022    PLAN OF CARE/SAFETY  Plan  Plan:  minutes of therapy at least 5 out of 7 days a week  Plan weeks: 2 weeks  Current Treatment Recommendations: Strengthening;Balance training;Functional mobility training;Transfer training;Stair training;Gait training; Therapeutic activities; Home exercise program;Endurance training;IADL training  Safety Devices  Type of Devices: Gait belt;Left in chair (left in [facility] for transport)  Restraints  Restraints Initially in Place: No    EDUCATION  Education  Education Given To: Patient  Education Provided: Role of Therapy;Plan of Care;ADL Function; Safety; Mobility Training;Transfer Training;Energy Conservation  Education Provided Comments: progress mobility  Education Method: Verbal;Demonstration  Education Outcome: Verbalized understanding        Therapy Time   Individual Concurrent Group Co-treatment   Time In 0815         Time Out 0900         Minutes 45           Timed Code Treatment Minutes: MILTON Pace, 09/23/22 at 10:10 AM   Therapist was present, directed the patient's care, made skilled judgement, and was responsible for assessment and treatment of the patient.          Neville Chawla, [id number]

## 2022-09-23 NOTE — PROGRESS NOTES
Patient admitted to ARU on 9/13 s/p critical illness myopathy. Patient is AAO x 3, pleasant and cooperative with care. Lungs Clear and diminished to lower lobes. Patient denies SOB, CP, cough, or difficulty breathing. He did complain of arthritic pain to mid back and generalized soreness. Rated pain 4 on 1-10 pain scale and denied the need for any treatment at this time. Abdomen round with positive bowel sounds. Last BM 9/22, patient stated it was loose and refused evening dose of senokot tonight. Noted with generalized trace edema. Up in room ambulating with supervision and no device, patient tolerates well with steady gait. Abdominal puncture sites well approximated with glue, no s/s of infection. Remains on telemetry, normal sinus rhythm. Patient is looking forward to going home on Saturday. Fall precautions remain in place. Call light and personal belongings are within reach. Will continue to monitor and assist as needed.

## 2022-09-23 NOTE — PROGRESS NOTES
Speech Language Pathology  ACUTE REHAB UNIT  SPEECH/LANGUAGE PATHOLOGY      [x] Daily  [] Weekly Care Conference Note  [x] Discharge    Patient:Miles Mehta      :1960  [facility]:[id number]  Rehab Dx/Hx: Acute and chronic respiratory failure with hypercapnia [J96.22]  Critical illness myopathy [G72.81]    Precautions: Fall risk; Dysphagia; PEG Tube feeding; Cognitive/Memory  Home situation: Lives with spouse  ST Dx: [] Aphasia  [] Dysarthria  [] Apraxia   [x] Oropharyngeal dysphagia [x] Cognitive   Impairment  [x] Other: Voice therapy post prolonged intubation and then re-intubation for surgical procedure  Initial Speech Therapy Assessment Diagnosis:   Speech Therapy Diagnosis  1. Cognitive Diagnosis: Pt demonstrated functional temporal and spatial orientation; concrete attention and VPS/PS and reasoning and math language. Pt with deficits in memory (working memory and STM ) and higher level attention (ie alternating and divided attention) on cogntive-linguistic testing. Will continue evaluation of higher level cognitive-linguistic skills as pt does participate in home/med management. Current deficits may impact. 2.Speech Diagnosis: Oral Motor Speech /Voice characterized by hoarse / Wren  / raspy voice quality which can impact intelligibility of connected speech. Pt is s/p prolonged intubation and then brief intubation post a surgical procedure. Pt reporting some improvement. Will initiate functional voice ex and respiratory control ex ; and trial of ex reducing laryngeal tension and assessment improvement. If persistent deficits ; potential need for referral to ENT for assessment of VC function  3. Communication Diagnosis: Pt is demonstrating functional concrete and mild complex auditory and visual language processing and verbal expressive language skills. Will assess moderately complex as related to adv DL tasks related to higher level executive function  4. Dysphagia diagnosis: Persistent concern offered and patient declined  [] N/A  [] Other:   Subjective     Pt seen bedside  Pt in w/c  Pt was anticipating therapy   Pt with good effort    Objective:  Goals         Dysphagia goals: Pt goals is to eat and drink and get rid of feeing tube    Therapy worked toward pt goal emphasizing with initiation of po diet. Pt progressing will . Will need therapy at d/c with goal of continued gradual upgrade to regular diet  N/a   New goal  The patient will improve oral preparation phase via bolus control/manipulation exercises for soft/bite size and easy to chew food consistencies to 10/10 each trial.,  Continued emphasis on bolus formation/cohesion and A-P oral transit of :   Soft bite size moist and soft bite size: pt demonstrated functional mastication; multiple swallows per one bite which appeared clinically comparable to MBSS . No post swallow clinical ss/ and no residual oral pocketing. 15/15 trials monitored/tolerated   N/a   The patient will tolerate instrumental swallowing procedure  Completed 9/20/2022    New Goal  Pt will tolerate dysphagia soft /bite size food consistencies with thin lqiud consitency diet without ovet lcinical s/s of aspiration   Tongue base retraction ex: warm up    Laryngeal ex targeting  Glottals on expiratory effort: 8-10 reps on expiratory effort maintained  Pitch swing low to high with falsetto hold: x 10 with gradually improving pitch stretch low to high with falsetto hold  BOT with pitch variation to low to high with falsetto hold: x 10 with gradual improvement in pitch stretch low to high with falsetto hold  Lorri: not targeted  Effortful swallow  across po trials        PO trials:   Cup /straw thin liquids:  with lingual hold and modified supraglottic swallow: >4 ounces  Slightly thick via tsp with lingual hold : >4 ounces  Minced and Moist: >50% of serving size on noon tray  Soft/bite size moist and soft/bite size foods: 15 po trials of assorted soft items from tray.  No overt clnical s/s of aspiration. No complaints. Ongoing pt ed in rationale of varied ex ; compensatory swallow strategies of lingual hold and clearance swallows targeting bolus control/airway protection. Pt is voicing understanding    REinforcement of GERD precautions. Pt did have some belching and then complained of getting full. Pt is voicing understanding     N/a   Voice/cognitive-linguistic Goals: Pt's goal is to care for self and participate in home / health responsibilities Worked toward pt goal N/A     Goal 1: Pt will participate in continued assessment of higher level cognitive-lingujistic skills for adv DL situatios which would require higher level executive function     Goal met N/A   Goal 2: Pt will demosntrate improved recall of new information; learned compensatory strategies or medical care procedures and new information with use of compensatory strategies; use of memory strategies with set up and <mild assist Working memory for rule application for 25 task trials: set up and intermittent assist      Working memory for   Temporal orientation and anticipation of daily therapy schedule: goal met/exceeded with compensatory strategy PRN      Functional recall :   Tentative d/c date: IND  Daily therapy schedule : use of written schedule  Compensatory swallow strategies and dysphagia s/s: intermittent prompts    Memory strategies: reinforcing use of written notes; association and contextual cues       N/a   Goal 3: Pt will demosntrate functional VPS/PS and reasoning for graded functional PS/VPS/numeric reasoning tasks with < mild assist   PS and math language:   Goal met    PS/reasoning  Making inferences/reasoning/completing analogies : 0 to mild assist .   Goal appears met  PS and incorporating new learning for d/c planning: good participation in brainstorming activity.  Pt with appropriate responses   N/a   Goal 4: Pt will demonstrate improved voice quality and endurance for functional voice ex for 8 seconds and improved sustained voice quality at 4-8 syllable utterance level Ongoing focus in use of easy onset; frontal focus; breath support and breath strategies (targeting diaphragm) for varied structured functional voice ex:   Pitch variation low to high to low targeting vowels \"oo\"; \"ee\" and \"ah\": <10% voice cessation. Improved pitch stretch and duration  Frontal focus for CVCVCV combinations: 8 to 18 syllables on expiratory effort across varied combinations  Sustained phonation of frontal to mid vowels: maintaining average of 8.5  to 9.5 second duration without voice cessation  Generalization into conversational exchange: continued daily improvement in ease of phonation; improved voice quality/endurance and voice prosody; and less hoarse quality   N/a   Other areas targeted:     Education:   Ongoing pt ed      Safety Devices: [x] Call light within reach  [x] Chair alarm activated  [] Bed alarm activated  [] Other:  [] Call light within reach  [] Chair alarm activated  [] Bed alarm activated  [] Other:    Progress Assessment: 9/15/2022: CONTINUE ALL GOAL  9/16/2022: RN reporting some cardiac issues early am;MD aware and MD wrote a cardiology consult. . Family ed with pt's dtr  9/19/2022: SLP spoke to MD and requested MBSs orders for 9/20/2022. Pt appears to be making progress with repeat instrumental for more objective assessment of progress and po diet candidacy  9/20/2022: MBSS completed and po diet initiated (dysphagia minced and moist food consistencies with thin liquids) as well as calorie count  9/22/2022:  Will request diet upgrade to dysphagia soft/bite size food consistencies  9/23/2022: soft/bite size with thin liquids; d/c planned 9/24/2022 with [facility] SLP for Dysphagia /voice and f/u with cogn/memory as pt transitions home   Plan: [facility] SLP   Continued Tx Upon Discharge: ?    [x] Yes [] No [] TBD based on progress while on ARU [] Vital Stim indicated [] Other:   Estimated discharge date: 9/24/2022 Discharge recommendations:   [] Home independently  [x] Home with assistance []  24 hour supervision  [] ECF [] Other:     Additional information:     Interventions used during Rehab Stay:  [] Speech/Language Treatment  [] Instruction in HEP [] Group [x] Dysphagia Treatment [x] Cognitive Treatment   [x] Other:Voice TX          Electronically Signed by    Luis Manuel Lechuga,MS,CCC,SLP [id number]  Speech and Language Pathologist

## 2022-09-23 NOTE — PROGRESS NOTES
Patient admitted to ARU on 9/13 s/p critical illness myopathy. Patient is AAO x 3, pleasant and cooperative with care. Lungs Clear and diminished to lower lobes. Patient denies SOB, CP, cough, or difficulty breathing. He did complain of arthritic pain to mid back and generalized soreness. Rated pain 0 on 1-10 pain scale Abdomen round with positive bowel sounds. Last BM 9/22, Abdominal puncture sites well approximated with glue, no s/s of infection. Remains on telemetry, normal sinus rhythm. Patient is looking forward to going home on Saturday. Fall precautions remain in place. Call light and personal belongings are within reach.  Electronically signed by Aubrey Taylor RN on 9/23/2022 at 7:42 AM

## 2022-09-23 NOTE — PROGRESS NOTES
ascended/descended 4 steps with single HR support, non-reciprocal pattern, no LOB. Pt completed alternating static stand x 10 s., then toe-tap RLE/LLE x 10 s. For a total of 5 trials. No LOB. Pt took 2 min. Seated rest, then returned to standing activity. Pt completed lateral lunge x 10, followed by static stand x 30 s., 3x total (2.5 min of standing). Pt ended session standing and dribbling a swiss ball while walking through the gym x 2 min. At end of session, he ambulated back to his room 150', then positioned himself for comfort in his chair, call light in reach, alarm in place. Assessment: Pt is completing mobility tasks independently without device. He is excited to return home tomorrow. He would benefit from continued therapy to maximize his endurance, balance, and independence with high-level mobility tasks. Anticipate return home with home PT, assist from family.        Safety Device - Type of devices:  [x]  All fall risk precautions in place [] Bed alarm in place  [] Call light within reach [x] Chair alarm in place [] Positioning belt [x] Gait belt [] Patient at risk for falls [] Left in bed [x] Left in chair [] Telesitter in use [] Sitter present [] Nurse notified []  None      Therapy Time   Individual Co-treatment   Time In 1510     Time Out 1545     Minutes 35         Electronically signed by Alyson Yao, PT [id number] on 9/23/2022 at 3:18 PM

## 2022-09-23 NOTE — PLAN OF CARE
Problem: Discharge Planning  Goal: Discharge to home or other facility with appropriate resources  9/23/2022 0740 by Latoya Suazo RN  Outcome: Progressing  9/23/2022 0131 by Ree Davis RN  Outcome: Progressing  Flowsheets (Taken 9/22/2022 2200)  Discharge to home or other facility with appropriate resources: Identify discharge learning needs (meds, wound care, etc)     Problem: Safety - Adult  Goal: Free from fall injury  9/23/2022 0740 by Latoya Suazo RN  Outcome: Progressing  9/23/2022 0131 by Ree Davis RN  Outcome: Progressing  Note: Patient free from falls this shift. Fall precautions in place at all times. Bed in lowest position with two side rails up and wheels locked. Call light within reach. Patient able and agreeable to contact for safety appropriately. Patient is able to call for assist to inform staff of needs. Up in room with no device, patient noted with a steady gait. Problem: ABCDS Injury Assessment  Goal: Absence of physical injury  9/23/2022 0740 by Latoya Suazo RN  Outcome: Progressing  9/23/2022 0131 by Ree Davis RN  Outcome: Progressing  Flowsheets (Taken 9/23/2022 0055)  Absence of Physical Injury: Implement safety measures based on patient assessment     Problem: Pain  Goal: Verbalizes/displays adequate comfort level or baseline comfort level  9/23/2022 0740 by Latoya Suazo RN  Outcome: Progressing  9/23/2022 0131 by Ree Davis RN  Outcome: Progressing  Note: Patient with complaints of generalized pain and mid back pain related to arthritis. Rated pain 4 on 1-10 pain scale. Patient denied the need for any interventions at this time.      Problem: Nutrition Deficit:  Goal: Optimize nutritional status  9/23/2022 0740 by Latoya Suazo RN  Outcome: Progressing  9/23/2022 0131 by Ree Davis RN  Outcome: Progressing

## 2022-09-23 NOTE — PROGRESS NOTES
Ghislaine Littlejohn  9/23/2022  [id number]    Chief Complaint: Critical illness myopathy    Subjective:  Patient seen this AM. Patient is doing well in his therapies, and is now eating much better with no swallowing problems with small bite-size food. We have discontinued his tube feeds and he is getting adequate calories with his  PO intake. We think the patient will be ready for D/C to home tomorrow. We will plan on discharge to home with continued therapies to include PT, OT, and speech therapy. I will fill out his SOWMYA and meds for his discharge tomorrow. ROS: No N/V, SOB, chest pain, C/F    Objective:  Patient Vitals for the past 24 hrs:   BP Temp Temp src Pulse Resp SpO2 Height Weight   09/23/22 0727 (!) 153/116 97.4 °F (36.3 °C) Oral 82 18 94 % -- --   09/23/22 0504 (!) 93/57 98 °F (36.7 °C) Oral 75 18 91 % -- (!) 315 lb 11.2 oz (143.2 kg)   09/23/22 0257 -- -- -- 76 -- -- -- --   09/23/22 0001 103/64 97.8 °F (36.6 °C) Oral 75 18 92 % -- --   09/22/22 2126 -- -- -- -- 18 -- -- --   09/22/22 2045 120/77 98.1 °F (36.7 °C) Axillary 89 17 94 % -- --   09/22/22 1709 119/74 98.8 °F (37.1 °C) Oral 77 18 92 % -- --   09/22/22 1319 -- -- -- -- -- -- 5' 7\" (1.702 m) --   09/22/22 1200 118/79 97.9 °F (36.6 °C) Oral 80 17 91 % -- --       Gen: No distress, pleasant. HEENT: Normocephalic, atraumatic. CV: Regular rate and rhythm. Resp: No respiratory distress. Abd: Soft, nontender, obese, PEG tube in place  Ext: No edema. Neuro: Alert, oriented, appropriately interactive.      Wt Readings from Last 3 Encounters:   09/23/22 (!) 315 lb 11.2 oz (143.2 kg)   09/07/22 (!) 309 lb 8.4 oz (140.4 kg)       Laboratory data:   Lab Results   Component Value Date    WBC 5.3 09/23/2022    HGB 9.9 (L) 09/23/2022    HCT 29.6 (L) 09/23/2022    MCV 89.5 09/23/2022     09/23/2022       Lab Results   Component Value Date/Time     09/23/2022 05:40 AM    K 3.7 09/23/2022 05:40 AM    K 4.5 09/14/2022 07:34 AM    CL 91 / rough / raspy voice quality which can impact intelligibility of connected speech. Pt is s/p prolonged intubation and then brief intubation post a surgical procedure. Pt reporting some improvement. Will initiate functional voice ex and respiratory control ex ; and trial of ex reducing laryngeal tension and assessment improvement. If persistent deficits ; potential need for referral to ENT for assessment of VC function           Body mass index is 49.45 kg/m². Assessment and Plan:        Critical illness myopathy-  due to aspiration pneumonia, hiatal hernia repair, IV AB     atrial fibrillation, heart failure-  Demadex, ASA     Morbid Obesity, sleep apnea- over 300 lbs     HTN- Lopressor     rheumatoid arthritis- Celebrex     Hypothyroid- synthroid     Bowels: Schedule Miralax + Senna S. Follow bowel movements. Enema or suppository if needed. Bladder: Check PVR x 3. [facility] if PVR > 200ml or if any volume is > 500 ml. Pain: Tylenol is ordered prn.          Juan Manuel Chirinos MD 9/23/2022, 8:46 AM

## 2022-09-23 NOTE — PLAN OF CARE
Problem: Discharge Planning  Goal: Discharge to home or other facility with appropriate resources  Outcome: Progressing  Flowsheets (Taken 9/22/2022 2200)  Discharge to home or other facility with appropriate resources: Identify discharge learning needs (meds, wound care, etc)     Problem: Safety - Adult  Goal: Free from fall injury  Outcome: Progressing  Note: Patient free from falls this shift. Fall precautions in place at all times. Bed in lowest position with two side rails up and wheels locked. Call light within reach. Patient able and agreeable to contact for safety appropriately. Patient is able to call for assist to inform staff of needs. Up in room with no device, patient noted with a steady gait. Problem: ABCDS Injury Assessment  Goal: Absence of physical injury  Outcome: Progressing  Flowsheets (Taken 9/23/2022 0055)  Absence of Physical Injury: Implement safety measures based on patient assessment     Problem: Pain  Goal: Verbalizes/displays adequate comfort level or baseline comfort level  Outcome: Progressing  Note: Patient with complaints of generalized pain and mid back pain related to arthritis. Rated pain 4 on 1-10 pain scale. Patient denied the need for any interventions at this time.

## 2022-09-24 VITALS
RESPIRATION RATE: 18 BRPM | HEIGHT: 67 IN | OXYGEN SATURATION: 94 % | SYSTOLIC BLOOD PRESSURE: 139 MMHG | TEMPERATURE: 97.8 F | WEIGHT: 309.75 LBS | DIASTOLIC BLOOD PRESSURE: 90 MMHG | BODY MASS INDEX: 48.62 KG/M2 | HEART RATE: 94 BPM

## 2022-09-24 PROCEDURE — 94660 CPAP INITIATION&MGMT: CPT

## 2022-09-24 PROCEDURE — 6370000000 HC RX 637 (ALT 250 FOR IP): Performed by: PHYSICAL MEDICINE & REHABILITATION

## 2022-09-24 PROCEDURE — 94760 N-INVAS EAR/PLS OXIMETRY 1: CPT

## 2022-09-24 RX ADMIN — Medication 100 MG: at 09:26

## 2022-09-24 RX ADMIN — ASPIRIN 81 MG: 81 TABLET, COATED ORAL at 09:24

## 2022-09-24 RX ADMIN — CELECOXIB 200 MG: 200 CAPSULE ORAL at 09:24

## 2022-09-24 RX ADMIN — TORSEMIDE 20 MG: 20 TABLET ORAL at 09:25

## 2022-09-24 RX ADMIN — METOPROLOL TARTRATE 50 MG: 50 TABLET ORAL at 09:26

## 2022-09-24 RX ADMIN — Medication 30 MG: at 07:26

## 2022-09-24 RX ADMIN — POTASSIUM BICARBONATE 20 MEQ: 782 TABLET, EFFERVESCENT ORAL at 09:24

## 2022-09-24 RX ADMIN — FOLIC ACID 1 MG: 1 TABLET ORAL at 09:25

## 2022-09-24 RX ADMIN — LEVOTHYROXINE SODIUM 200 MCG: 0.1 TABLET ORAL at 07:26

## 2022-09-24 RX ADMIN — GABAPENTIN 400 MG: 400 CAPSULE ORAL at 09:25

## 2022-09-24 RX ADMIN — Medication 1 TABLET: at 09:25

## 2022-09-24 ASSESSMENT — PAIN - FUNCTIONAL ASSESSMENT: PAIN_FUNCTIONAL_ASSESSMENT: ACTIVITIES ARE NOT PREVENTED

## 2022-09-24 ASSESSMENT — PAIN DESCRIPTION - LOCATION: LOCATION: BACK;GENERALIZED

## 2022-09-24 ASSESSMENT — PAIN SCALES - GENERAL
PAINLEVEL_OUTOF10: 4
PAINLEVEL_OUTOF10: 2

## 2022-09-24 ASSESSMENT — PAIN DESCRIPTION - ORIENTATION: ORIENTATION: MID

## 2022-09-24 ASSESSMENT — PAIN DESCRIPTION - PAIN TYPE: TYPE: CHRONIC PAIN

## 2022-09-24 ASSESSMENT — PAIN DESCRIPTION - FREQUENCY: FREQUENCY: CONTINUOUS

## 2022-09-24 ASSESSMENT — PAIN DESCRIPTION - ONSET: ONSET: ON-GOING

## 2022-09-24 ASSESSMENT — PAIN DESCRIPTION - DESCRIPTORS: DESCRIPTORS: ACHING;DISCOMFORT

## 2022-09-24 NOTE — PROGRESS NOTES
Call in to MD on call, permission to call in script for Protonix as ordered. All scripts called in to patient preferred pharmacy as listed on SOWMYA.  Electronically signed by Taras Darling RN on 9/24/2022 at 11:52 AM

## 2022-09-24 NOTE — DISCHARGE SUMMARY
Department of [facility]  Dr. Santana Hammans Discharge Summary     Patient Identification:  Dori Johnston  : 1960  Admit date: 2022  Discharge date: 22   Attending provider: Bolivar Moon MD        Primary care provider: Laura Monroy MD     Discharge Diagnoses:   Patient Active Problem List   Diagnosis    Partial bowel obstruction ([address]    Hiatal hernia    Acute respiratory failure with hypoxia and hypercapnia (HCC)    SBO (small bowel obstruction) (HCC)    Aspiration into airway    Acute respiratory failure with hypoxia ([address]    Small bowel obstruction (HCC)    Diastolic heart failure (HCC)    History of atrial fibrillation    Supraventricular tachycardia ([address]    Arterial hypotension    Aspiration pneumonia ([address]    Morbid obesity with BMI of 50.0-59.9, adult ([address]    Acute delirium    EARNESTINE (obstructive sleep apnea)    Dysphagia    Critical illness myopathy         Discharge Functional Status:    Physical therapy:  Bed Mobility: Scooting: Supervision  Transfers: Sit to Stand: Contact guard assistance (As pt became fatigued they would sit down more abruptly)  Stand to sit: Stand by assistance, Ambulation  Surface: uneven  Device: Rolling Walker  Assistance: Contact guard assistance  Quality of Gait: decreased nargis with a wide base of support. Gait Deviations: Slow Nargis, Increased BENTLEY  Distance: 80' on uneven surface (carpet at end of felipe) with 4 turns  Comments: Pt became fatigued quickly during activity and SOB. Checked SPO2 and it was 99.,    Mobility:  , PT Equipment Recommendations  Equipment Needed: No  Other: Continue to assess pending progress, Assessment: Patient is a 63 y.o. male hospitalized on 2022 for small bowel obstruction, pneumonia, septic shock patient intubation, mechanical ventilation, broad-spectrum antibiotics  intubated/extubated 2020 through 2022. On 2022, EGD done for endoscopically placed NG tube placement.  On 2022, EGD with endoscopic placement of NG tube. On 8/31/2022 underwent laparoscopic reduction of large hiatal hernia with gastropexy, placement of gastrostomy tube. On 9/2/2022, extubated after the above procedure. He had the diagnosis of critical illness myopathy, and was discharged from our hospital to an LTAC unit for further IV antibiotic therapy and treatment. Patient now has improved, and is admitted back to our rehabilitation unit to progress with his strengthening of his arms and legs and improving his endurance before discharge back home. Patient also has diagnosis of atrial fibrillation, heart failure, dysphagia, sleep apnea, HTN, and rheumatoid arthritis. Pt demonstrates good strength in LE however has decreased endurance and becomes fatigued frequently and requires a seated rest break during treatment session. Pt was supervision for all of bed mobility besides when going from supine to sit during which they required SBA because he began to slide forward on ADL bed. Pt was CGA for sit>stand and SBA for stand>sit, they had difficulty shifting weight over toes so they could rise. Pt was able to ambulated with RW for household distances but was CGA during activity and had increased fatigue and complaints of SOB. Overall pt has decreased endurance, increased fatigue, and decreased functional mobility. He is below his baseline level and would benefit from skilled intervention in order to improve his endurance decrease his fatigue, work on gait training and improve his functional mobility in order to return to his PLOF. Occupational therapy:  ,  , Assessment: Bong Holman is a 63 y.o. male who was hospitalized on 8/5 for a small bowel obstruction, pneumonia, and septic shock. On 8/22 he had NG tube placement, 8/31 he had hiatial hernia repair and gastrostomy tube, and was extubated on 9/2.  He was admitted to [facility] for further IV antibiotic therapy and has now improved and is admitted to ARU to improve his strength and endurance. PTA he was independent in all ADL's and ambulated without device. He is now functioning well below his baseline requiring up to mod A with ADL's and ambulating with RW. He would benefit from skilled therapy services x10-14 days to improve his ADL's and endurance prior to being discharged. Speech therapy:     9/16/2022: RN reporting some cardiac issues early am;MD fitzgerald and MD wrote a cardiology consult. . Family ed with pt's dtr  9/19/2022: SLP spoke to MD and requested MBSs orders for 9/20/2022. Pt appears to be making progress with repeat instrumental for more objective assessment of progress and po diet candidacy  9/20/2022: MBSS completed and po diet initiated (dysphagia minced and moist food consistencies with thin liquids) as well as calorie count  9/22/2022: Will request diet upgrade to dysphagia soft/bite size food consistencies    Inpatient Rehabilitation Course:   Chip Patel is a 63 y.o. male admitted to inpatient rehabilitation on 9/13/2022 for s/p Critical illness myopathy. The patient participated in an aggressive multidisciplinary inpatient rehabilitation program involving 3 hours per day, 5 days per week of rehabilitation. 63 y.o. male hospitalized on 8/5/2022   for small bowel obstruction, pneumonia, septic shock patient intubation, mechanical ventilation, broad-spectrum antibiotics s intubated/extubated 8/8/2020 through 8/26/2022. On 8/9/2022, EGD done for endoscopically placed NG tube placement. On 8/22/2022, EGD with endoscopic placement of NG tube. On 8/31/2022 underwent laparoscopic reduction of large hiatal hernia with gastropexy, placement of gastrostomy tube. On 9/2/2022, he was extubated after the above procedure. He had the diagnosis of critical illness myopathy, and was discharged from our hospital to an LTAC unit for further IV antibiotic therapy and treatment.   Patient now has improved, and is admitted back to our rehabilitation unit to progress with his strengthening of his arms and legs and improving his endurance before discharge back home. Patient also has diagnosis of atrial fibrillation, heart failure, dysphagia, sleep apnea, HTN, and rheumatoid arthritis. Patient is now ambulating 50 feet with a rolling walker in PT with contact-guard to min assist, and needs max assist for lower body bathing and dressing. Patient lives at home with his wife in a 1 level house. Patient is allowed to eat PO with speech therapy during therapeutic feeding. Patient just feels overall weakness due to long-term bedrest from all of his medical problems over the last month. We need to improve his endurance, arm and leg strength, and his stamina so he can return home safely. He will be limited by his obesity since he is over 300 pounds. After admission to the Rehab Unit, he made steady progress in his therapies as listed above under each therapy section. His upper and lower extremity coordination and strength did improve in therapy, and he was starting to improve with his endurance and functional status as he participated in his rehab therapies. His swallowing, tube feeds, balance, labs, blood sugars and blood pressure were monitored while on the rehabilitation unit. Patient seen this AM. Patient is doing well in his therapies, and is now eating much better with no swallowing problems with small bite-size food. We have discontinued his tube feeds and he is getting adequate calories with his  PO intake. We think the patient will be ready for D/C to home. We will plan on discharge to home with continued therapies to include PT, OT, and speech therapy. I have filled out his SOWMYA and meds for his discharge. He will follow-up with his family doctor after discharge.       Condition at Discharge: Good    Significant Diagnostics:   Lab Results   Component Value Date     (L) 09/23/2022    K 3.7 09/23/2022    CL 91 (L) 09/23/2022    BUN 20 09/23/2022    CREATININE 0.8 09/23/2022    GLUCOSE 83 09/23/2022    CALCIUM 9.0 09/23/2022     Lab Results   Component Value Date    WBC 5.3 09/23/2022    RBC 3.31 (L) 09/23/2022    HGB 9.9 (L) 09/23/2022    HCT 29.6 (L) 09/23/2022    MCV 89.5 09/23/2022    MCH 29.9 09/23/2022    MCHC 33.5 09/23/2022    RDW 17.2 (H) 09/23/2022     09/23/2022    MPV 7.1 09/23/2022     Lab Results   Component Value Date    PROTIME 16.4 (H) 08/06/2022    INR 1.33 (H) 08/06/2022     Lab Results   Component Value Date    APTT 83.3 (H) 08/24/2022     Lab Results   Component Value Date    COLORU Straw 08/15/2022    CLARITYU SL CLOUDY (A) 08/15/2022    GLUCOSEU Negative 08/15/2022    BILIRUBINUR Negative 08/15/2022    KETUA Negative 08/15/2022    SPECGRAV 1.010 08/15/2022    BLOODU LARGE (A) 08/15/2022    PHUR 5.5 08/15/2022    PROTEINU Negative 08/15/2022    UROBILINOGEN 0.2 08/15/2022    NITRU Negative 08/15/2022    LEUKOCYTESUR MODERATE (A) 08/15/2022    LABMICR YES 08/15/2022    URRFLXCULT Not Indicated 08/15/2022    URINETYPE NotGiven 08/15/2022     Lab Results   Component Value Date    MUCUS Rare (A) 08/15/2022    WBCUA 6-9 (A) 08/15/2022    EPIU 0-1 08/15/2022    BACTERIA 4+ (A) 08/15/2022     Lab Results   Component Value Date    ORG Candida glabrata (A) 08/22/2022       CT HEAD WO CONTRAST    Result Date: 8/28/2022  EXAMINATION: CT OF THE HEAD WITHOUT CONTRAST  8/28/2022 1:08 am TECHNIQUE: CT of the head was performed without the administration of intravenous contrast. Automated exposure control, iterative reconstruction, and/or weight based adjustment of the mA/kV was utilized to reduce the radiation dose to as low as reasonably achievable. COMPARISON: None. HISTORY: ORDERING SYSTEM PROVIDED HISTORY: FALL TECHNOLOGIST PROVIDED HISTORY: Reason for exam:->FALL Has a \"code stroke\" or \"stroke alert\" been called? ->No Reason for Exam: FALL FINDINGS: BRAIN/VENTRICLES: There is no acute intracranial hemorrhage, mass effect or midline shift.   No abnormal extra-axial fluid collection. The gray-white differentiation is maintained without evidence of an acute infarct. There is no evidence of hydrocephalus. ORBITS: The visualized portion of the orbits demonstrate no acute abnormality. SINUSES: Ethmoid, sphenoid partial opacification with mucosal thickening. Near complete opacification left maxillary sinus, left frontal sinus. Large bilateral mastoid effusions. Partially imaged enteric tube. SOFT TISSUES/SKULL:  No acute abnormality of the visualized skull or soft tissues. No acute intracranial abnormality. XR CHEST PORTABLE    Result Date: 9/7/2022  EXAMINATION: ONE XRAY VIEW OF THE CHEST 9/7/2022 12:40 pm COMPARISON: Chest 08/31/2022 HISTORY: ORDERING SYSTEM PROVIDED HISTORY: sob, hypoxia FINDINGS: The cardiac silhouette is enlarged. The mediastinal and hilar silhouettes appear unremarkable. Mild vascular engorgement with bilateral peribronchial cuffing and perivascular haziness. Sub pulmonic effusion evident. Low lung volumes. No pneumothorax is seen. No acute osseous abnormality is identified. Subtle changes typical of mild congestive heart failure. Suboptimal inspiration on the exam may account for some of these findings. Cardiomegaly. Follow-up full inspiration PA and lateral chest may be useful for better characterization of pulmonary findings. XR CHEST PORTABLE    Result Date: 8/31/2022  EXAMINATION: ONE XRAY VIEW OF THE CHEST 8/31/2022 5:04 pm COMPARISON: None. HISTORY: ORDERING SYSTEM PROVIDED HISTORY: Intubation, ETT placement verification TECHNOLOGIST PROVIDED HISTORY: Reason for exam:->Intubation, ETT placement verification Reason for Exam: Intubation, ETT placement verification FINDINGS: There is an endotracheal tube with the tip 2 cm above the mady. There is a left-sided PICC line catheter with the tip at the junction of the left brachiocephalic vein and superior vena cava. Patchy airspace disease in the right lung.   No pneumothorax or pleural effusion. Mild cardiomegaly. 1.  Endotracheal tube tip is 2 cm above the mady. Recommend retraction by 1.5 cm for optimal positioning. 2.  Patchy airspace disease in the right lung could represent edema versus atelectasis versus pneumonia. XR CHEST PORTABLE    Result Date: 8/28/2022  EXAMINATION: ONE XRAY VIEW OF THE CHEST 8/28/2022 9:34 am COMPARISON: Chest x-ray dated 08/27/2022 HISTORY: ORDERING SYSTEM PROVIDED HISTORY: respiratory failure TECHNOLOGIST PROVIDED HISTORY: Reason for exam:->respiratory failure Reason for Exam: respiratory failure;PICC FINDINGS: Enteric tube coursing through the chest below level of diaphragm; tip is not visualized. Right PICC tip is in the distal right internal jugular vein. Cardiomegaly. Pulmonary edema. No pneumothorax. Visualized osseous structures are unremarkable. Right PICC tip in the distal right internal jugular vein. Pulmonary edema. Findings were discussed with Sameul Ahumada at 11:21 am on 8/28/2022. XR CHEST PORTABLE    Result Date: 8/27/2022  EXAMINATION: ONE XRAY VIEW OF THE CHEST 8/27/2022 3:29 am COMPARISON: 08/26/2022 HISTORY: ORDERING SYSTEM PROVIDED HISTORY: respiratory failure TECHNOLOGIST PROVIDED HISTORY: Reason for exam:->respiratory failure Reason for Exam: respiratory failure FINDINGS: Endotracheal tube is been removed. Nasogastric tube is not well visualized, extending below the diaphragm. The cardiac silhouette is enlarged. Pulmonary vessels have become congested in the interval.  There is hazy opacity in the lungs. Right costophrenic angle is omitted. No pneumothorax is identified. Interval extubation. Interval vascular congestion. Ground-glass opacity in the lungs is likely pulmonary edema given the fairly rapid development.      XR CHEST PORTABLE    Result Date: 8/26/2022  EXAMINATION: ONE XRAY VIEW OF THE CHEST 8/26/2022 5:14 am COMPARISON: 08/25/2022 HISTORY: ORDERING SYSTEM PROVIDED HISTORY: respiratory failure TECHNOLOGIST PROVIDED HISTORY: Reason for exam:->respiratory failure Reason for Exam: respiratory failure FINDINGS: Endotracheal tube measures approximately 2.5 cm above the mady. The enteric catheter is seen to the the level of approximately the mid gastric body. Cardiac size is enlarged. Vascular congestion is seen, overall similar in appearance to the previous examination. A right-sided PICC is identified, though the tip is difficult to see. Surgical clips are seen within the lower neck. Overall similar appearing chest with cardiomegaly and central vascular congestion. XR CHEST PORTABLE    Result Date: 8/25/2022  EXAMINATION: ONE XRAY VIEW OF THE CHEST 8/25/2022 3:11 am COMPARISON: 08/24/2022 HISTORY: ORDERING SYSTEM PROVIDED HISTORY: respiratory failure TECHNOLOGIST PROVIDED HISTORY: Reason for exam:->respiratory failure Reason for Exam: respiratory failure FINDINGS: The distal aspect of the endotracheal tube is faintly seen, though felt to be approximately 2.7 cm above the mady and similar position to the previous examination. The enteric catheter courses below the level the film, though is at least in the mid gastric body or distal.  Cardiac size is enlarged. Central vascular congestion is identified with improved aeration seen bilaterally. No new infiltrate is identified. Cardiomegaly with central vascular congestion though improved infiltrates in the lungs bilaterally. CT CHEST PULMONARY EMBOLISM W CONTRAST    Result Date: 8/24/2022  EXAMINATION: CTA OF THE CHEST 8/24/2022 11:02 am TECHNIQUE: CTA of the chest was performed after the administration of intravenous contrast.  Multiplanar reformatted images are provided for review. MIP images are provided for review. Automated exposure control, iterative reconstruction, and/or weight based adjustment of the mA/kV was utilized to reduce the radiation dose to as low as reasonably achievable.  COMPARISON: 08/21/2022, 08/05/2022 HISTORY: ORDERING SYSTEM PROVIDED HISTORY: hypoxia, possible PE TECHNOLOGIST PROVIDED HISTORY: Reason for exam:->hypoxia, possible PE FINDINGS: Pulmonary Arteries: Pulmonary arteries are adequately opacified for evaluation. No evidence of intraluminal filling defect to suggest pulmonary embolism. Main pulmonary artery is normal in caliber. Mediastinum: There is stable mild left subpectoral lymphadenopathy, similar to 08/05/2022, with the largest lymph node measuring approximately 10 mm in short axis. There is no pathologic axillary lymphadenopathy. There is no pathologic mediastinal or hilar lymphadenopathy. The intrathoracic aorta is unremarkable, without evidence of aneurysm or dissection. There has been interval decrease in size of the patient's small pericardial effusion in comparison with 08/21/2022. There is four-chamber cardiac enlargement. There is an enteric tube coursing through the esophagus. There is a moderate hiatal hernia. Lungs/pleura: There is an endotracheal tube in place with tip terminating approximately 3 cm above the mady. There is mild mucous plugging within the left lower lobe. The central airways are otherwise patent. There remain small bilateral pleural effusions, similar in comparison with the prior exam of 08/21/2022. However, there is a new element of mild loculation of pleural fluid along the paramediastinal aspect of the right upper lobe. There is persistent partial consolidation of both lower lobes, most likely passive atelectasis, less likely aspiration or pneumonia. There are reticulonodular foci of consolidative opacity within the right middle lobe and lingula, similar to prior exam, which could represent either atelectasis or additional foci of pneumonia. There is mild scattered ground-glass opacity throughout both lungs, likely an element of mild pulmonary edema.   There is a stable 10 mm triangular nodule within the left upper lobe, image 158 of series 2, with an adjacent calcification along its medial margin. This is similar to the study of 08/05/2022. There is a stable small 3 mm nodule within the subpleural right middle lobe, image 40 of series 2. This is also unchanged from 08/05/2022. No new suspicious pulmonary nodule or mass is identified. Upper Abdomen: Limited images of the upper abdomen are unremarkable. Soft Tissues/Bones: There is mild degenerative change throughout the thoracic spine. No osteolytic or osteoblastic lesion is seen. There is bilateral gynecomastia. 1. No CT evidence of a pulmonary embolism. 2. Findings most consistent with mild CHF, including cardiomegaly, small bilateral pleural effusions, and mild pulmonary edema. There is new partial loculation of the right pleural effusion along the paramediastinal aspect of the right upper lobe. 3. Persistent partial consolidation of the bilateral lower lobes, most likely passive atelectasis, less likely aspiration or pneumonia. 4. Persistent reticulonodular opacities within the right middle lobe and lingula, likely reflecting either additional atelectasis or pneumonia. 5. Multiple pulmonary nodules within both lungs, the largest 10 mm within the left upper lobe. While this likely reflects benign granulomatous disease, further follow-up of these nodules is as advised below. 6. Mild left subpectoral lymphadenopathy, of questionable clinical significance. 7. Interval decrease in size of the patient's small pericardial effusion. RECOMMENDATIONS: Fleischner Society guidelines for follow-up and management of incidentally detected pulmonary nodules: Multiple Solid Nodules: Nodule size greater than 8 mm In a low-risk patient, CT at 3-6 months, then consider CT at 18-24 months. In a high-risk patient, CT at 3-6 months, then CT at 18-24 months. Radiology 2017 http://pubs. rsna.org/doi/full/10.1148/radiol. [email or web address]     Fluoroscopy modified barium swallow with video    Result Date: 9/20/2022  EXAMINATION: MODIFIED BARIUM SWALLOW WAS PERFORMED IN CONJUNCTION WITH SPEECH PATHOLOGY SERVICES TECHNIQUE: Fluoroscopic evaluation of the swallowing mechanism was performed using cineradiography with multiple consistency of barium product in conjunction with speech pathology services. FLUOROSCOPY DOSE AND TYPE OR TIME AND EXPOSURES: Total fluoro time 2.4 minutes. DAP = 16.03 mGy. COMPARISON: None HISTORY: ORDERING SYSTEM PROVIDED HISTORY: dysphagia TECHNOLOGIST PROVIDED HISTORY: Reason for exam:->dysphagia FINDINGS: Oral phase of swallowing was grossly within normal limits. There was laryngeal penetration but no evidence of aspiration. Swallowing mechanism grossly within normal limits without evidence of aspiration. Please see separate speech pathology report for full discussion of findings and recommendations. Fluoroscopy modified barium swallow with video    Result Date: 8/30/2022  EXAMINATION: MODIFIED BARIUM SWALLOW WAS PERFORMED IN CONJUNCTION WITH SPEECH PATHOLOGY SERVICES TECHNIQUE: Fluoroscopic evaluation of the swallowing mechanism was performed using cineradiography with multiple consistency of barium product in conjunction with speech pathology services. FLUOROSCOPY DOSE AND TYPE OR TIME AND EXPOSURES: 36.5 seconds of fluoroscopy was utilized. 1 fluoroscopic spot image was obtained. COMPARISON: None HISTORY: ORDERING SYSTEM PROVIDED HISTORY: dysphagia TECHNOLOGIST PROVIDED HISTORY: Reason for exam:->dysphagia Reason for Exam: dysphagia FINDINGS: Oral phase of swallowing was grossly within normal limits. There is sai laryngeal penetration and aspiration with both thin and honey consistencies. The exam was terminated due to the degree of aspiration. Positive for aspiration. Please see separate speech pathology report for full discussion of findings and recommendations. Patient Instructions:     Follow-up visits: He will follow-up with his family doctor after medications  calcium-cholecalciferol 500-200 MG-UNIT per tablet  methotrexate 15 MG chemo tablet  metoprolol tartrate 50 MG tablet  potassium chloride 20 MEQ extended release tablet  vitamin B-1 100 MG tablet            I spent over 35 minutes on this discharge encounter between counseling, coordination of care, and medication reconciliation.          To comply with [facility] lis [id number]:   Discharge order placed in advance to facilitate patients discharge needs      Ernesta Romberg, MD, 9/24/2022, 9: 21 PM

## 2022-09-24 NOTE — PROGRESS NOTES
Speech Language Pathology      Speech Therapy note regarding d/c recommendations and f/u with family:   ***    Signed  Suly Lechuga,MS,CCC,SLP [id number]  Speech and Language Pathologist  9/24/2022 *** late entry for 9/24/2022 at 0940 am

## 2022-09-24 NOTE — PROGRESS NOTES
Call in to [facility] in [facility]. Scripts dictated to pharmacist with return verbal acknowledgement received.  Electronically signed by Amy Nix RN on 9/24/2022 at 12:21 PM

## 2022-09-24 NOTE — PLAN OF CARE
Problem: Discharge Planning  Goal: Discharge to home or other facility with appropriate resources  9/24/2022 1154 by Henna Bay RN  Outcome: Completed     Problem: Safety - Adult  Goal: Free from fall injury  9/24/2022 1154 by Henna Bay RN  Outcome: Completed     Problem: ABCDS Injury Assessment  Goal: Absence of physical injury  9/24/2022 1154 by Henna Bay RN  Outcome: Completed     Problem: Pain  Goal: Verbalizes/displays adequate comfort level or baseline comfort level  9/24/2022 1154 by Henna Bay RN  Outcome: Completed     Problem: Nutrition Deficit:  Goal: Optimize nutritional status  9/24/2022 1154 by Henna Bay RN  Outcome: Completed

## 2022-09-24 NOTE — PLAN OF CARE
Problem: Discharge Planning  Goal: Discharge to home or other facility with appropriate resources  Outcome: Progressing  Discharge to home or other facility with appropriate resources: Identify discharge learning needs (meds, wound care, etc)     Problem: Safety - Adult  Goal: Free from fall injury  Outcome: Progressing  Note: Patient free from falls this shift. Fall precautions in place at all times. Bed in lowest position with two side rails up and wheels locked. Call light within reach. Patient able and agreeable to contact for safety appropriately. Patient is able to call for assist to inform staff of needs. Up in room with no device, patient noted with a steady gait. Problem: ABCDS Injury Assessment  Goal: Absence of physical injury  Outcome: Progressing  Flowsheets (Taken 9/23/2022 0055)  Absence of Physical Injury: Implement safety measures based on patient assessment     Problem: Pain  Goal: Verbalizes/displays adequate comfort level or baseline comfort level  Outcome: Progressing  Note: Patient with complaints of generalized pain and mid back pain related to arthritis. Rated pain 3 on 1-10 pain scale. Patient denied the need for any interventions at this time.

## 2022-09-24 NOTE — PROGRESS NOTES
Patient agreed to discharge. Patient and spouse in room for discharge instructions, questions answered with verbal acknowledgement received, papers signed and copies placed in soft chart, pt agreed to have prescriptions called in to [facility] in [place] as listed in [address] Extensive teaching preformed on PEG tube site care and flushes BID, wife preformed site care as a return demonstration from verbal teaching in room at bedside in [facility]. Supplies sent home with pt sufficient to get past the home care visit. Verbal response received as to all needs met. Staff transported patient via wheel chair to [facility] and discharged home with all documented belongings.   Electronically signed by Nik Hanson RN on 9/24/2022 at 12:11 PM

## 2022-09-25 LAB
FUNGUS (MYCOLOGY) CULTURE: ABNORMAL
FUNGUS STAIN: ABNORMAL
ORGANISM: ABNORMAL

## 2022-09-26 LAB — CULTURE, FUNGUS BLOOD: NORMAL

## 2022-09-29 ENCOUNTER — OFFICE VISIT (OUTPATIENT)
Dept: SURGERY | Age: 62
End: 2022-09-29

## 2022-09-29 VITALS — SYSTOLIC BLOOD PRESSURE: 130 MMHG | DIASTOLIC BLOOD PRESSURE: 80 MMHG

## 2022-09-29 DIAGNOSIS — R13.10 DYSPHAGIA, UNSPECIFIED TYPE: Primary | ICD-10-CM

## 2022-09-29 DIAGNOSIS — K44.9 HIATAL HERNIA: ICD-10-CM

## 2022-09-29 PROCEDURE — 99024 POSTOP FOLLOW-UP VISIT: CPT | Performed by: SURGERY

## 2022-09-29 NOTE — PROGRESS NOTES
Post Operative Visit    [facility] - [facility]  [facility]   Romana Height, MD    [address], [address]  [place] [address]  Phone: [phone]  Fax: [phone]    Jonas Hodge   YOB: 1960    Date of Visit:  9/29/2022    No ref. provider found  Chelsea Simon MD    Subjective:     Jonas Hodge presents for a four  week follow-up s/p laparoscopic reduction of hiatal hernia with gastropexy and G-tube placement. Overall he has been doing well since his discharge from the hospital.  He is back to eating and drinking normally. He is no longer using the G-tube for tube feeds. There has been complete resolution of his pain. He denies any fevers or chills. Allergies   Allergen Reactions    Codeine Nausea Only and Nausea And Vomiting     Stomach upset        Outpatient Medications Marked as Taking for the 9/29/22 encounter (Office Visit) with Bernice Hollingsworth MD   Medication Sig Dispense Refill    calcium-cholecalciferol 500-200 MG-UNIT per tablet Take 1 tablet by mouth 2 times daily 60 tablet 3    thiamine mononitrate (THIAMINE) 100 MG tablet Take 1 tablet by mouth daily 30 tablet 1    methotrexate (RHEUMATREX) 15 MG chemo tablet Take 1 tablet by mouth once a week 4 tablet 3    potassium chloride (KLOR-CON M) 20 MEQ extended release tablet Take 1 tablet by mouth daily 90 tablet 1    celecoxib (CELEBREX) 200 MG capsule Take 200 mg by mouth 2 times daily      gabapentin (NEURONTIN) 400 MG capsule Take 400 mg by mouth 3 times daily. pantoprazole (PROTONIX) 40 MG tablet Take 1 tablet by mouth daily 180 tablet 0    polyethylene glycol (GLYCOLAX) 17 g packet Take 17 g by mouth daily as needed for Constipation 527 g 1    torsemide (DEMADEX) 20 MG tablet Take 1 tablet by mouth daily 30 tablet 3    folic acid (FOLVITE) 1 MG tablet Take 1 mg by mouth in the morning.       ASPIRIN LOW DOSE 81 MG EC tablet Take 81 mg by mouth daily acetaminophen (TYLENOL) 500 MG tablet Take 1,000 mg by mouth every 8 hours as needed      levothyroxine (SYNTHROID) 200 MCG tablet Take 200 mcg by mouth every morning      loratadine (CLARITIN) 10 MG tablet Take 10 mg by mouth in the morning. Vitals:    09/29/22 1411   BP: 130/80     There is no height or weight on file to calculate BMI. Wt Readings from Last 3 Encounters:   09/24/22 (!) 309 lb 11.9 oz (140.5 kg)   09/07/22 (!) 309 lb 8.4 oz (140.4 kg)     BP Readings from Last 3 Encounters:   09/29/22 130/80   09/24/22 (!) 139/90   09/07/22 113/83          Objective:    CONSTITUTIONAL:  awake, alert, no apparent distress    LUNGS:  Resp easy and unlabored  ABDOMEN:  incisions c/d/I, no erythema or induration, G-tube in place, soft, non-distended, non-tender, voluntary guarding absent,  and hernia absent  MUSCULOSKELETAL: No edema  NEUROLOGIC:  Mental Status Exam:  Level of Alertness:   awake  Orientation:   person, place, time  SKIN: as above      ASSESSMENT:     Diagnosis Orders   1. Dysphagia, unspecified type        2. Hiatal hernia            PLAN:    Richard Ness is doing well s/p laparoscopic reduction of hiatal hernia with gastropexy with HADLEY gastrostomy tube. Incisions healing well. Patient now eating and drinking and not getting tube feeds. Will plan to remove G-tube in 4 weeks.     Electronically signed by Leti Thorpe MD on 9/29/2022

## 2022-10-21 ENCOUNTER — OFFICE VISIT (OUTPATIENT)
Dept: CARDIOLOGY CLINIC | Age: 62
End: 2022-10-21
Payer: COMMERCIAL

## 2022-10-21 VITALS
DIASTOLIC BLOOD PRESSURE: 76 MMHG | WEIGHT: 315 LBS | BODY MASS INDEX: 49.44 KG/M2 | SYSTOLIC BLOOD PRESSURE: 130 MMHG | HEART RATE: 72 BPM | OXYGEN SATURATION: 96 % | HEIGHT: 67 IN

## 2022-10-21 DIAGNOSIS — E66.01 CLASS 3 SEVERE OBESITY WITH SERIOUS COMORBIDITY AND BODY MASS INDEX (BMI) OF 45.0 TO 49.9 IN ADULT, UNSPECIFIED OBESITY TYPE (HCC): ICD-10-CM

## 2022-10-21 DIAGNOSIS — G47.30 SLEEP APNEA, UNSPECIFIED TYPE: ICD-10-CM

## 2022-10-21 DIAGNOSIS — I47.1 SUPRAVENTRICULAR TACHYCARDIA (HCC): ICD-10-CM

## 2022-10-21 DIAGNOSIS — I51.89 GRADE II DIASTOLIC DYSFUNCTION: ICD-10-CM

## 2022-10-21 DIAGNOSIS — Z86.79 HISTORY OF ATRIAL FIBRILLATION: Primary | ICD-10-CM

## 2022-10-21 PROCEDURE — 93000 ELECTROCARDIOGRAM COMPLETE: CPT | Performed by: INTERNAL MEDICINE

## 2022-10-21 PROCEDURE — 99214 OFFICE O/P EST MOD 30 MIN: CPT | Performed by: INTERNAL MEDICINE

## 2022-10-21 NOTE — PROGRESS NOTES
[address]      Patient Name:  Richard Ness  Requesting Physician: No admitting provider for patient encounter. Primary Care Physician: Opal Au MD    Reason for Consultation/Chief Complaint: Preventive cardiac evaluation    History of Present Illness:    Richard Ness is a 63 y.o. patient presenting today for preventive care. PMH significant for HTN grade II DD with elevated LV filling pressures, (?)possible AFIB diagnosed 5/2022 at [facility] requiring DCCV (no tracings to corroborate), started on Xarelto, coronary angipgraphy revealed no stenosis and taken off DOAC per interventional cardiology, presented 9/13-24/22 to [facility] with abdominal pain, diagnosed with SBO c/b aspiration PNA, septic shock. On ventilator for almost 4 weeks, s/p laparoscopic reduction hiatal hernia with gastropexy and G-tube placement with f/u Dr. Desmond Sheridan. Noted to have PSVT with v-rates 150's, terminated after given metoprolol. Narrow complex, very short RP, regular interval-per Dr. 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 Treatment options discussed. No evidence of AFIB during admission, requested EKG records from [address] [place] [address] with possible AFIB ablation is identified. [id number], discussed aspirin versus [facility]. Continue aspirin therapy for now with plans for follow up with Dr. Kay Buckner.  9/24/22 discharged from inpatient rehab. Patient presents today accompanied by his family. They report off/on episodes of tachycardia randomly over years but never sought evaluation. He denies any recurrent palpitations, heart racing, fluttering, L/D, near syncope or syncope. He does report JADE with walking for extended periods of time or bending over. Denies changes in BLE edema due to RA. HHN comes to home for infusions. No DM, + HTN. No smoking, rare ETOH use. 12/ cup coffee daily otherwise caffeine free. Reviewed PSH, allergies. Mother had AFIB, passed away age 80years old.  He uses CPAP nightly with no current complaints. Patient denies exertional chest pain/pressure, dyspnea at rest, PND, orthopnea, palpitations, lightheadedness, weight changes, changes in LE edema, and syncope. He admits to medical therapy compliance and tolerating. The patient's risk factors for cardiac disease include the following:  Hypertension  Obesity    The patient endorses highest level of activity as slowly working to increase walking program.      Past Medical History:   has a past medical history of Arthritis, Atrial fibrillation ([address] and Hypertension. Surgical History:   has a past surgical history that includes Upper gastrointestinal endoscopy (8/9/2022); Upper gastrointestinal endoscopy (8/9/2022); Upper gastrointestinal endoscopy (N/A, 8/22/2022); bronchoscopy (8/22/2022); bronchoscopy (8/22/2022); Gastric fundoplication (N/A, 3/29/6439); and Gastrostomy tube placement (N/A, 8/31/2022). Social History:   reports that he has never smoked. He has never used smokeless tobacco. He reports that he does not currently use alcohol. He reports that he does not use drugs. Family History:  family history is not on file. Current Medications:  Current Outpatient Medications on File Prior to Visit   Medication Sig Dispense Refill    metoprolol tartrate (LOPRESSOR) 50 MG tablet Take 1 tablet by mouth 2 times daily 60 tablet 3    calcium-cholecalciferol 500-200 MG-UNIT per tablet Take 1 tablet by mouth 2 times daily 60 tablet 3    thiamine mononitrate (THIAMINE) 100 MG tablet Take 1 tablet by mouth daily 30 tablet 1    methotrexate (RHEUMATREX) 15 MG chemo tablet Take 1 tablet by mouth once a week 4 tablet 3    celecoxib (CELEBREX) 200 MG capsule Take 200 mg by mouth 2 times daily      gabapentin (NEURONTIN) 400 MG capsule Take 800 mg by mouth 3 times daily. folic acid (FOLVITE) 1 MG tablet Take 1 mg by mouth in the morning.       ASPIRIN LOW DOSE 81 MG EC tablet Take 81 mg by mouth daily acetaminophen (TYLENOL) 500 MG tablet Take 1,000 mg by mouth every 8 hours as needed      levothyroxine (SYNTHROID) 200 MCG tablet Take 200 mcg by mouth every morning      loratadine (CLARITIN) 10 MG tablet Take 10 mg by mouth in the morning. [DISCONTINUED] amLODIPine (NORVASC) 5 MG tablet Take 1 tablet by mouth daily 30 tablet 3    [DISCONTINUED] spironolactone (ALDACTONE) 50 MG tablet Take 50 mg by mouth in the morning. No current facility-administered medications on file prior to visit. Allergies:  Codeine     Review of Systems:  Constitutional: no unanticipated weight loss. There's been no change in energy level, sleep pattern, or activity level. No fevers, chills. Eyes: No visual changes or diplopia. No scleral icterus. ENT: No Headaches, hearing loss or vertigo. No mouth sores or sore throat. Cardiovascular: as reviewed in HPI  Respiratory: No cough or wheezing, no sputum production. No hematemesis. Gastrointestinal: No abdominal pain, appetite loss, blood in stools. No change in bowel or bladder habits. Genitourinary: No dysuria, trouble voiding, or hematuria. Musculoskeletal:  No gait disturbance, no joint complaints. Integumentary: No rash or pruritis. Neurological: No headache, diplopia, change in muscle strength, numbness or tingling. Psychiatric: No anxiety or depression. Endocrine: No temperature intolerance. No excessive thirst, fluid intake, or urination. No tremor. Hematologic/Lymphatic: No abnormal bruising or bleeding, blood clots or swollen lymph nodes. Allergic/Immunologic: No nasal congestion or hives. Objective:  Vitals:    10/21/22 1623   BP: 130/76   Pulse: 72   SpO2: 96%    Weight: (!) 317 lb (143.8 kg)  There were no vitals filed for this visit.      PHYSICAL EXAM:        General:  Alert, cooperative, no distress, appears stated age   Head:  Normocephalic, atraumatic   Eyes:  Conjunctiva/corneas clear, anicteric sclerae    Nose: Nares normal, no drainage or sinus tenderness   Throat: No abnormalities of the lips, oral mucosa or tongue. Neck: Trachea midline. Neck supple with no lymphadenopathy, thyroid not enlarged, symmetric, no tenderness/mass/nodules, no Jugular venous pressure elevation    Lungs:   Clear to auscultation bilaterally, no wheezes, no rales, no respiratory distress   Chest Wall:  No deformity or tenderness to palpation   Heart:  Regular rate and rhythm, normal S1, normal S2, no murmur, no rub, no S3/S4, PMI non-displaced. Abdomen:   Soft, non-tender, with normoactive bowel sounds. No masses, no hepatosplenomegaly   Extremities: No cyanosis, clubbing, 2+ LLE, 1+ RLE non pitting edema (RA with infusions every 6 weeks.)    Vascular: 2+ radial, brachial, femoral, dorsalis pedis and posterior tibial pulses bilaterally. Brisk carotid upstrokes without carotid bruit. Skin: Skin color, texture, turgor are normal with no rashes or ulceration. Psych: Euthymic mood, appropriate affect   Neurologic: Oriented to person, place and time. No slurred speech or facial asymmetry. No motor or sensory deficits on gross examination.         Labs:  Lab Results   Component Value Date/Time    WBC 5.3 09/23/2022 05:40 AM    RBC 3.31 09/23/2022 05:40 AM    HGB 9.9 09/23/2022 05:40 AM    HCT 29.6 09/23/2022 05:40 AM    MCV 89.5 09/23/2022 05:40 AM    RDW 17.2 09/23/2022 05:40 AM     09/23/2022 05:40 AM     Lab Results   Component Value Date/Time     09/23/2022 05:40 AM    K 3.7 09/23/2022 05:40 AM    K 4.5 09/14/2022 07:34 AM    CL 91 09/23/2022 05:40 AM    CO2 27 09/23/2022 05:40 AM    BUN 20 09/23/2022 05:40 AM    CREATININE 0.8 09/23/2022 05:40 AM    GFRAA >60 09/23/2022 05:40 AM    AGRATIO 0.8 08/06/2022 04:45 AM    LABGLOM >60 09/23/2022 05:40 AM    GLUCOSE 83 09/23/2022 05:40 AM    PROT 6.3 08/06/2022 04:45 AM    CALCIUM 9.0 09/23/2022 05:40 AM    BILITOT 0.7 08/06/2022 04:45 AM    ALKPHOS 72 08/06/2022 04:45 AM    AST 22 08/06/2022 04:45 AM    ALT 23 08/06/2022 04:45 AM     No results found for: PTINR  No results found for: LABA1C  No results found for: CHOLFAST, TRIGLYCFAST, HDL, LDLCALC, LABVLDL  Lab Results   Component Value Date/Time    TROPONINI 0.22 08/07/2022 08:30 AM      No results found for: CRP    Cardiovascular Data:  EKG: Personally reviewed with my interpretation: 10/21/22: Sinus  Rhythm Nonspecific QRS widening. ~72 bpm.       CTA pulmonary: 8/24/22  1. No CT evidence of a pulmonary embolism. 2. Findings most consistent with mild CHF, including cardiomegaly, small   bilateral pleural effusions, and mild pulmonary edema. There is new partial   loculation of the right pleural effusion along the paramediastinal aspect of   the right upper lobe. 3. Persistent partial consolidation of the bilateral lower lobes, most likely   passive atelectasis, less likely aspiration or pneumonia. 4. Persistent reticulonodular opacities within the right middle lobe and   lingula, likely reflecting either additional atelectasis or pneumonia. 5. Multiple pulmonary nodules within both lungs, the largest 10 mm within the   left upper lobe. While this likely reflects benign granulomatous disease,   further follow-up of these nodules is as advised below. 6. Mild left subpectoral lymphadenopathy, of questionable clinical   significance. 7. Interval decrease in size of the patient's small pericardial effusion. Echo: 8/5/22   Technically difficult examination. Ejection fraction is visually estimated to be 60-65%. Grade II diastolic dysfunction with elevated LV filling pressures. No evidence of LV thrombus   Right ventricle is poorly visualized, normal systolic function, appears   dilated    Coronary calcium score:No results found for this or any previous visit. Ankle-brachial index:No results found for this or any previous visit. Carotid ultrasound screening:No results found for this or any previous visit.     Abdominal aortic aneurysm screening: No results found for this or any previous visit. The ASCVD Risk score (Kareen GARCÍA, et al., 2019) failed to calculate for the following reasons:    Cannot find a previous HDL lab    Cannot find a previous total cholesterol lab    Unable to determine if patient is Non-      In adults at borderline risk (5% to <7.5% 10-year ASCVD risk) or intermediate risk (7.5% to <20% 10-  year ASCVD risk), the following risk-enhancing factors and/or testing has been reviewed:        Kareen et al. Circulation 2019     High-Intensity Moderate-Intensity    Percent LDL-C Reduction ³50% ³30-49%   Primary statins Atorvastatin 40-80 mg Atorvastatin 10-20 mg    Rosuvastatin 20-40 mg Rosuvastatin 5-10 mg   Secondary statins   Pravastatin 40-80 mg     Simvastatin 20-40 mg     Impression and Plan:     Preventive cardiac evaluation   History of atrial fibrillation  Supraventricular tachycardia (HCC)  -     EKG 12 lead  Grade II diastolic dysfunction  Sleep apnea, unspecified type  Class 3 severe obesity with serious comorbidity and body mass index (BMI) of 45.0 to 49.9 in adult, unspecified obesity type ([address]     PLAN:        We encouraged modest weight loss through implementing appropriate dietary measures as well as initiation of a graded exercise program with the ultimate goal of 150 minutes of aerobic exercise weekly. I will address the patient's cardiac risk factors and adjusted pharmacologic treatment as needed. In addition, I have reinforced the need for patient directed risk factor modification. All questions and concerns were addressed to the patient/family. Alternatives to my treatment were discussed. Patient verbalized understanding. I have told him to continue his current dose of BB, Increase activity as tolerated. We will plan for him to follow up with Dr. Lakhwinder Hyde in 3-4 months or contact office if heart racing returns.        Thank you very much for allowing me to participate in the care of your patient. Please do not hesitate to contact me if you have any questions. Sincerely,  Matthew Andino MD      [address] [place] [address]  Ph: [phone]  Fax: [phone]      This note was scribed in the presence of Dr. Melissa Steel MD by Zully Garsia RN.

## 2022-11-03 ENCOUNTER — OFFICE VISIT (OUTPATIENT)
Dept: SURGERY | Age: 62
End: 2022-11-03

## 2022-11-03 VITALS
WEIGHT: 314.8 LBS | HEART RATE: 65 BPM | BODY MASS INDEX: 49.3 KG/M2 | SYSTOLIC BLOOD PRESSURE: 108 MMHG | DIASTOLIC BLOOD PRESSURE: 68 MMHG

## 2022-11-03 DIAGNOSIS — K44.9 HIATAL HERNIA: ICD-10-CM

## 2022-11-03 DIAGNOSIS — R13.10 DYSPHAGIA, UNSPECIFIED TYPE: Primary | ICD-10-CM

## 2022-11-03 PROCEDURE — 99024 POSTOP FOLLOW-UP VISIT: CPT | Performed by: SURGERY

## 2022-11-03 RX ORDER — FERROUS SULFATE 325(65) MG
TABLET ORAL
COMMUNITY
Start: 2019-08-01

## 2022-11-03 RX ORDER — POTASSIUM CHLORIDE 750 MG/1
TABLET, FILM COATED, EXTENDED RELEASE ORAL
COMMUNITY
Start: 2022-11-01

## 2022-11-03 NOTE — PROGRESS NOTES
Established Patient Visit    [facility] - [facility]  [facility]   Elisabeth Gilbert MD    [address] [facility], [address] [place] [address] Drive  [place]   Phone: [phone]  Fax: [phone]    Nelly Johnson   YOB: 1960    Date of Visit:  11/3/2022    No ref. provider found  John Maradiaga MD    Subjective:     Nelly Johnson presents for a follow-up of his laparoscopic reduction of hiatal hernia with G-tube placement. Overall he has been doing well since his last visit. He has been eating/drinking without difficulty. He has not had any issues with his G-tube site. He denies any fevers or chills. Allergies   Allergen Reactions    Codeine Nausea Only and Nausea And Vomiting     Stomach upset        Outpatient Medications Marked as Taking for the 11/3/22 encounter (Office Visit) with Valencia Marinelli MD   Medication Sig Dispense Refill    sodium chloride 0.9 % SOLN with inFLIXimab 100 MG SOLR Infuse intravenously      ferrous sulfate (IRON 325) 325 (65 Fe) MG tablet Take by mouth Daily with supper      metoprolol tartrate (LOPRESSOR) 50 MG tablet Take 1 tablet by mouth 2 times daily 60 tablet 3    calcium-cholecalciferol 500-200 MG-UNIT per tablet Take 1 tablet by mouth 2 times daily 60 tablet 3    thiamine mononitrate (THIAMINE) 100 MG tablet Take 1 tablet by mouth daily 30 tablet 1    celecoxib (CELEBREX) 200 MG capsule Take 200 mg by mouth 2 times daily      gabapentin (NEURONTIN) 400 MG capsule Take 800 mg by mouth 3 times daily. folic acid (FOLVITE) 1 MG tablet Take 1 mg by mouth in the morning. ASPIRIN LOW DOSE 81 MG EC tablet Take 81 mg by mouth daily      acetaminophen (TYLENOL) 500 MG tablet Take 1,000 mg by mouth every 8 hours as needed      levothyroxine (SYNTHROID) 200 MCG tablet Take 200 mcg by mouth every morning      loratadine (CLARITIN) 10 MG tablet Take 10 mg by mouth in the morning.          Vitals:    11/03/22 0909   Weight: Melanie Burkett 314 lb 12.8 oz (142.8 kg)     Body mass index is 49.3 kg/m². Wt Readings from Last 3 Encounters:   11/03/22 (!) 314 lb 12.8 oz (142.8 kg)   10/21/22 (!) 317 lb (143.8 kg)   09/24/22 (!) 309 lb 11.9 oz (140.5 kg)     BP Readings from Last 3 Encounters:   10/21/22 130/76   09/29/22 130/80   09/24/22 (!) 139/90          Objective:    CONSTITUTIONAL:  awake, alert, no apparent distress    LUNGS:  Resp easy and unlabored  ABDOMEN:  incisions well healed, soft, non-distended, non-tender, voluntary guarding absent, no masses palpated and hernia absent, G-tube in place, no erythema or induration  MUSCULOSKELETAL: No edema  NEUROLOGIC:  Mental Status Exam:  Level of Alertness:   awake  Orientation:   person, place, time  SKIN: as above        ASSESSMENT:     Diagnosis Orders   1. Dysphagia, unspecified type        2. Hiatal hernia              PLAN:    Faby Lozano is doing well. G-tube removed at bedside without issue. Silver nitrate used on granulation tissue. Discussed wound care. Will plan on having him follow up prn.       Electronically signed by Minor Schofield MD on 11/3/2022 at 9:13 AM

## 2023-01-19 ENCOUNTER — OFFICE VISIT (OUTPATIENT)
Dept: SURGERY | Age: 63
End: 2023-01-19

## 2023-01-19 VITALS — HEIGHT: 67 IN | SYSTOLIC BLOOD PRESSURE: 140 MMHG | DIASTOLIC BLOOD PRESSURE: 80 MMHG | BODY MASS INDEX: 49.3 KG/M2

## 2023-01-19 DIAGNOSIS — R10.12 LUQ PAIN: Primary | ICD-10-CM

## 2023-01-19 PROCEDURE — 99024 POSTOP FOLLOW-UP VISIT: CPT | Performed by: SURGERY

## 2023-01-19 NOTE — PROGRESS NOTES
Meenu Petty (:  1960) is a 63 y.o. male,, here for evaluation of the following chief complaint(s):  No chief complaint on file. ASSESSMENT/PLAN:  1. LUQ pain  -     CT ABDOMEN PELVIS W IV CONTRAST Additional Contrast? Oral; Future    Follow up after CT         Subjective   SUBJECTIVE/OBJECTIVE:  HPI  Known from previous admission for SBO and hiatal hernia. That was repaired by Dr. Yahir Cervantes with gastropexy last year. Was doing well until two weeks ago when he noted LUQ pain. No significant other GI symptoms. Will check CT as nothing on exam to explain his pain. Possible recurrent hiatal hernia, gastric volvulus, SBO, abdominal wall hernia as well as intraluminal etiologies. Will rule out obvious mechanical explanations then refer to GI if negative. Follow up after CT. Review of Systems       Objective   Physical Exam       Electronically signed by Kip Pacheco MD on 2023 at 9:12 AM        An electronic signature was used to authenticate this note.     --Kip Pacheco MD

## 2023-01-20 NOTE — PROGRESS NOTES
Cardiac Electrophysiology Consultation   Date: 1/23/2023  Reason for Consultation: PSVT / ? Paroxysmal atrial fibrillation  Consult Requesting Physician: Beverly Wagner MD  Primary Care Physician: Luis Laura MD    Chief Complaint:   Chief Complaint   Patient presents with    Follow-up        HPI: Rukhsana Nixon is a 63 y.o. patient with a history of hypertesnsion, ? paroxysmal atrial fibrillation, (May 2022 first diagnosis at [facility]) presents with abd pain and diagnosed with small bowel obstruction c/b aspiration pneumonia, septic shock. During admission and also in rehab, noted to have episodes of PSVT with v-rates 150's bpm, terminated after given metoprolol. EP consulted. The patient mentions having an episode for the first time of atrial fibrillation at [facility] in May 2022 which required DCCV. Was started on DOAC (Xarelto) afterwards. Coronary angiography subsequently revealed no stenosis and he was taken off DOAC by the interventional cardiologist.    Interval History: Today, he presents to office accompanied by his wife for hospital follow up for evaluation on SVT. EKG today shows SR. Reports atrial fibrillation was first diagnosed in 05/2022 at [facility]. Reports compliance with medications and tolerating them well. Denies chest pain/pressure, tightness, edema, shortness of breath, heart racing, palpitations, lightheadedness, dizziness, syncope, presyncope,  PND or orthopnea. He states that he has a CT of ABD scheduled for tomorrow and feel like he may be getting a blocked bowel again.      Past Medical History:   Diagnosis Date    Arthritis     Atrial fibrillation [facility])     Hypertension         Past Surgical History:   Procedure Laterality Date    BRONCHOSCOPY  8/22/2022    BRONCHOSCOPY ALVEOLAR LAVAGE performed by Henrique Brown.DO at [address]  8/22/2022    BRONCHOSCOPY performed by Ken Motley MD at [address] [facility] N/A 8/31/2022    LAPAROSCOPIC REDUCTION HIATEL HERNIA, performed by Chance Estrada MD at [address] [place] [address] N/A 8/31/2022    GASTROSTOMY TUBE PLACEMENT performed by Chance Estrada MD at [facility]  8/9/2022    EGD BIOPSY performed by Amanda Lara MD at [place] [address]  8/9/2022    EGD ESOPHAGOGASTRODUODENOSCOPY TUBE INSERTION performed by Amanda Lara MD at [place] [address] N/A 8/22/2022    EGD ESOPHAGOGASTRODUODENOSCOPY ORAL-GASTRIC TUBE INSERTION performed by Gillermina Saint., DO at [facility]: Allergies   Allergen Reactions    Codeine Nausea Only and Nausea And Vomiting     Stomach upset          Medication:   Prior to Admission medications    Medication Sig Start Date End Date Taking? Authorizing Provider   methotrexate (RHEUMATREX) 2.5 MG chemo tablet Take by mouth once a week Take once a week   Yes Historical Provider, MD   metoprolol tartrate (LOPRESSOR) 50 MG tablet Take 1 tablet by mouth 2 times daily 9/23/22  Yes William Dey MD   celecoxib (CELEBREX) 200 MG capsule Take 200 mg by mouth 2 times daily 1/12/22  Yes Historical Provider, MD   gabapentin (NEURONTIN) 400 MG capsule Take 800 mg by mouth 4 times daily. Yes Historical Provider, MD   folic acid (FOLVITE) 1 MG tablet Take 1 mg by mouth in the morning. 3/21/22  Yes Historical Provider, MD   ASPIRIN LOW DOSE 81 MG EC tablet Take 81 mg by mouth daily 6/13/22  Yes Historical Provider, MD   acetaminophen (TYLENOL) 500 MG tablet Take 1,000 mg by mouth every 8 hours as needed 8/3/22  Yes Historical Provider, MD   levothyroxine (SYNTHROID) 200 MCG tablet Take 200 mcg by mouth every morning 6/1/22  Yes Historical Provider, MD   loratadine (CLARITIN) 10 MG tablet Take 10 mg by mouth in the morning.    Yes Historical Provider, MD   amLODIPine (NORVASC) 5 MG tablet Take 1 tablet by mouth daily 9/7/22 9/23/22  Daphne Cochran MD   spironolactone (ALDACTONE) 50 MG tablet Take 50 mg by mouth in the morning. 5/6/22 9/7/22  Historical Provider, MD       Social History:   reports that he has never smoked. He has never used smokeless tobacco. He reports that he does not currently use alcohol. He reports that he does not use drugs. Family History:  family history is not on file. Reviewed. Denies family history of sudden cardiac death, arrhythmia, premature CAD    Review of System:    General ROS: negative for - chills, fever   Psychological ROS: negative for - anxiety or depression  Ophthalmic ROS: negative for - eye pain or loss of vision  ENT ROS: negative for - epistaxis, headaches, nasal discharge, sore throat   Allergy and Immunology ROS: negative for - hives, nasal congestion   Hematological and Lymphatic ROS: negative for - bleeding problems, blood clots, bruising or jaundice  Endocrine ROS: negative for - skin changes, temperature intolerance or unexpected weight changes  Respiratory ROS: negative for - cough, hemoptysis, pleuritic pain, SOB, sputum changes or wheezing  Cardiovascular ROS: Per HPI. Gastrointestinal ROS: negative for - abdominal pain, blood in stools, diarrhea, hematemesis, melena, nausea/vomiting or swallowing difficulty/pain  Genito-Urinary ROS: negative for - dysuria or incontinence  Musculoskeletal ROS: negative for - joint swelling or muscle pain  Neurological ROS: negative for - confusion, dizziness, gait disturbance, headaches, numbness/tingling, seizures, speech problems, tremors, visual changes or weakness  Dermatological ROS: negative for - rash    Physical Examination:  Vitals:    01/23/23 0826   BP: 100/70   Pulse: 71   SpO2: 90%       Constitutional: Oriented. No distress. Head: Normocephalic and atraumatic. Mouth/Throat: Oropharynx is clear and moist.   Eyes: Conjunctivae normal. EOM are normal.   Neck: Normal range of motion. Neck supple.  No rigidity. No JVD present. Cardiovascular: Normal rate, regular rhythm, S1&S2 and intact distal pulses. Pulmonary/Chest: Bilateral respiratory sounds. No wheezes. No rhonchi. Abdominal: Soft. Bowel sounds present. No distension, No tenderness. Musculoskeletal: No tenderness. No edema    Lymphadenopathy: Has no cervical adenopathy. Neurological: Alert and oriented. Cranial nerve appears intact, No Gross deficit   Skin: Skin is warm and dry. No rash noted. Psychiatric: Has a normal mood, affect and behavior     Labs:  Reviewed. ECG: Sinus  rhythm with v-rate of 71 bpm with QRS duration 106 ms. No pathologic Q waves, ventricular pre-excitation, or QT prolongation. Studies:   1. Event monitor:  n/a      2. Echo: 08/11/2022  Summary  Technically difficult examination. Ejection fraction is visually estimated to be 60-65%. Grade II diastolic dysfunction  Right ventricle is poorly visualized, normal systolic function, appears dilated  LA volume/Index: 76.6 ml /29 ml/m2    3. Stress Test:  06/08/2022  FINAL INTERPRETATION   Abnormal study with evidence of mixed ischemia and scar. There is no left ventricular enlargement with normal global left ventricular systolic function. Overall study quality is good. Scan significance indicates moderate cardiac risk. Test sensitivity is reduced on anti-anginal drugs. 4. Cath: 06/13/2022  Findings:    This is a right dominant coronary arterial system  Left Main coronary artery: minimal luminal irregularities   Left anterior descending coronary artery: minimal luminal irregularities   Left circumflex coronary artery: minimal luminal irregularities   Right coronary artery: minimal luminal irregularities   Left ventriculogram: normal wall motion, LVEF = 65%   LVEDP: 14 mmHg   Aortic pressure: 90/60 mmHg     Impression:   No significant CAD   Normal LV function   Normal LVEDP   Normal systemic pressures     Plan:   Continue medical therapy for cardiovascular risk factor reduction. I independently reviewed and interpreted the ECG, MCOT, echocardiogram, stress test, and coronary angiography/PCI results and used them for my plan of care. Procedures:  1. Successful DC cardioversion to NSR 05/09/2022 ([facility])    Assessment/Plan:     Paroxysmal supraventricular tachycardia (PSVT)  -narrow complex, very short RP, regular interval.  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 -difficult to up-titrate metoprolol given hypotension. We educated the patient that this PSVT is commonly found to be a worsening and progressive disease, with more frequent episodes that will ensue. Subsequent episodes usually become more sustained, as the patient is already experiencing. We discussed different management options for PSVT including their risks and benefits. These options include use of AV ricardo blocking agents such as beta blockers and calcium channel blockers. Although these medications may be immediately, and for the short term, effective, our clinical experience is that the tachydysrhythmia will eventually recur even in the midst of these medications. Of course, adenosine intravenously would still be an option for emergency cases, but that is impractical on a day-to-day basis. Anti-arrhythmic medications also provide a very effective therapy. However, for both AV ircardo blocking agents and anti-arrhythmic medications, the realistic and probable side effects of these medications make it difficult for younger, more active, individuals to bear - fatigue, dyspnea with exertion. Finally, EP study with PSVT ablation is a curative therapy with a very high success rate after a first time procedure and would afford the patient the complete eradication of the PSVT without the need for any medications.      The risks and benefits of an EP study +/- PSVT ablation were discussed at length, with the risks including, but not limited to, bleeding, infection, radiation exposure, injury to vascular, cardiac and surrounding structures (including pneumothorax), stroke, cardiac perforation, tamponade, need for emergent open heart surgery, need for pacemaker implantation, injury to the phrenic nerve, injury to the esophagus, myocardial infarction and death. The patient was also counseled at length about the risks of arlene Covid-19 in the jimmy-operative and post-operative states including the recovery window of their procedure. The patient was made aware that arlene Covid-19 after a surgical procedure may worsen their prognosis for recovering from the virus and lend to a higher morbidity and or mortality risk. The patient was given the option of postponing their procedure. The patient was also presented reasonable alternatives to the proposed care, treatment, and services. The discussion I have had with the patient encompassed risks, benefits, and side effects related to the alternatives and the risks related to not receiving the proposed care, treatment and services. I spent 40 minutes face to face with the patient, with greater than 50% of that time spent in counseling on the above. The patient wishes to proceed with the EP study +/- PSVT ablation. We will hold Metoprolol for 3 days prior to the procedure. We will obtain BMP, CBC  prior to the ablation. We will perform the SVT ablation as same time as atrial fibrillation ablation if atrial fibrillation diagnosis is confirmed. ? Paroxysmal atrial fibrillation  -patient mentions being diagnosed with new-onset afib this May 2022. No tracings to corroborate. But he underwent DCCV. Not showing any evidence of atrial fibrillation in the EKGs in our records.   -will try to obtain EKG from [facility] to corroborate his recollection.   -will then discuss treatment options for afib, including afib ablation as our most definitive treatment option.  -Given [id number] score of 1, ASA 81mg daily or DOAC or neither of these is what the ACC/AHA/HRS guidelines would recommend. Since he is on ASA right now, would continue with such. However, if the patient wishes to pursue ablation, [address] will need to be prescribed for the next 3 months following the ablation. We educated the patient that atrial fibrillation is a worsening and progressive disease, with more frequent episodes that will ensue. Subsequent episodes usually become more sustained to the extent that many individuals would then develop persistent atrial fibrillation. Once persistence is reached, permanent atrial fibrillation is inevitable. We also discussed the fact that atrial fibrillation is associated with stroke, including life-threatening stroke, and therefore oral anticoagulation is warranted depending on the patient's [id number] score. We discussed different management options for atrial fibrillation including their risks and benefits. These options include use of cardioversion (mainly for persisting atrial fibrillation or atrial flutter) which provides an effective immediate therapy with success rates of 75% or higher, but it provides no short nor long term efficacy. Anti-arrhythmic medications provide a very effective short term therapy, but even with our most potent anti-arrhythmic medication there is limited long term efficacy (clinical studies have shown that 40% of patients remain atrial fibrillation-free after 4 years of follow-up after starting one of the more powerful anti-arrhythmic medication (amiodarone), and, if extrapolated, may have further diminishing success as time goes on). Atrial fibrillation ablation is a potentially curative therapy with very reasonable success rate after a first time procedure and with improving success rates with subsequent procedures.      The risks, benefits and alternatives of the atrial fibrillation ablation procedure were discussed with the patient. The risks including, but not limited to, bleeding, infection, radiation exposure, injury to vascular, cardiac and surrounding structures (including pneumothorax), stroke, cardiac perforation, tamponade, need for emergent open heart surgery, need for pacemaker implantation, injury to the phrenic nerve, injury to the esophagus, myocardial infarction and death were discussed in detail. The patient was also counseled at length about the risks of arlene Covid-19 in the jimmy-operative and post-operative states including the recovery window of their procedure. The patient was made aware that arlene Covid-19 after a surgical procedure may worsen their prognosis for recovering from the virus and lend to a higher morbidity and or mortality risk. The patient was given the option of postponing their procedure. The patient was also presented reasonable alternatives to the proposed care, treatment, and services. The discussion I have had with the patient encompassed risks, benefits, and side effects related to the alternatives and the risks related to not receiving the proposed care, treatment and services. The patient opted to proceed with the atrial fibrillation ablation. However before we can proceed with scheduling we will need to get tracing from [facility] confirm diagnosis of atrial fibrillation. Once tracing are received we will schedule for a radiofrequency ablation with Carto Navigation system with a BILLIE procedure immediately prior to this ablation. A cardiac CTA will be ordered for pulmonary vein mapping prior to this procedure. We will order BMP, CBC and Type & Screen prior to the procedure. -  Much time was spent counseling the patient on healthier lifestyle, following a low sodium diet <2,400 mg of sodium a day and dramatically decreasing the intake of processed sugar < 24 grams for female and 36 grams male.     - Patient educated to eat a diet which includes organic fruits, vegetables and meats.    - Encouraged to watch \"That Sugar Film\" which can be found on [employer] and other [employer] services, which discusses the negative impact of processed sugar has on the body. Follow up: Follow up three months after procedure with Keri Underwood CNP. Thank you for allowing me to participate in the care of [place]. All questions and concerns were addressed to the patient/family. Alternatives to my treatment were discussed. This note was scribed in the presence of Dr. Lashae Truong MD by Davion Hawkins RN. The scribe's documentation has been prepared under my direction and personally reviewed by me in its entirety. I confirm that the note above accurately reflects all work, physical examination, the discussion of treatments and procedures, and medical decision making performed by me.     Lashae Truong MD, MS, [facility], [facility]  Cardiac Electrophysiology  [address]  [address] [facility], [address] [place] [address]  [facility] [address]  [phone]

## 2023-01-23 ENCOUNTER — OFFICE VISIT (OUTPATIENT)
Dept: CARDIOLOGY CLINIC | Age: 63
End: 2023-01-23
Payer: COMMERCIAL

## 2023-01-23 ENCOUNTER — TELEPHONE (OUTPATIENT)
Dept: CARDIOLOGY CLINIC | Age: 63
End: 2023-01-23

## 2023-01-23 VITALS
WEIGHT: 315 LBS | DIASTOLIC BLOOD PRESSURE: 70 MMHG | OXYGEN SATURATION: 90 % | HEIGHT: 68 IN | BODY MASS INDEX: 47.74 KG/M2 | HEART RATE: 71 BPM | SYSTOLIC BLOOD PRESSURE: 100 MMHG

## 2023-01-23 DIAGNOSIS — Z86.79 HISTORY OF ATRIAL FIBRILLATION: ICD-10-CM

## 2023-01-23 DIAGNOSIS — I47.1 PSVT (PAROXYSMAL SUPRAVENTRICULAR TACHYCARDIA) (HCC): Primary | ICD-10-CM

## 2023-01-23 PROCEDURE — 99215 OFFICE O/P EST HI 40 MIN: CPT | Performed by: INTERNAL MEDICINE

## 2023-01-23 PROCEDURE — 93000 ELECTROCARDIOGRAM COMPLETE: CPT | Performed by: INTERNAL MEDICINE

## 2023-01-23 NOTE — TELEPHONE ENCOUNTER
Please contact [facility] records to obtain tracing for EKG'S.    05/08/22 @ 18:39  06/13/2022 @  08:00  06/12/22  @ 20:02  06/11/22  @ 20:08  06/10/22 @ 20:00  09/10/2022 @ 09:07:13

## 2023-01-23 NOTE — PATIENT INSTRUCTIONS
If you have any questions regarding your procedure please call Nurse Britt at [phone]    Supraventricular Tachycardia  Ablation Pre procedure Instructions    Date:______________________________    Arrive at:__________________________    Procedure time:____________________    The morning of your procedure you will park in the hospital parking lot and report directly to the cath lab to check in. At the information desk stay right and go all the way to the end of the felipe, this will take you directly to your check in desk for the cath lab. Pre-Procedure Instructions   You will need to fast (nothing to eat or drink) after midnight the day of your procedure. Do NOT chew gum or eat mints the day of your procedure. You will need to hold your Metoprolol for 3 days prior to the procedure. Do not use any lotions, creams or perfume the morning of procedure. You will need to complete pre-procedure lab work 5-7 days prior to your procedure. Please have a responsible adult to drive you home after procedure, you should go home same day, but there is always a possibility of an overnight stay. Cath lab will provide you with all post procedure instructions  A 3 month follow up will be scheduled with Dr. Phuc Gtz Nurse practitioner Lacie Avelar CNP post procedure                                        [address] [place] [address]/[facility]  [address] [place] [address] Drive. [address] [place] [address]  Phone: [phone]  The hours are Mon -Fri. 6:30 am - 4:00 pm   Saturday 8:00 am - noon  No appointment necessary         ------------------------------------------------------------------------------------------------------------------------------------------------  We will first need to get tracing from EKG in [facility] prior to proceeding with scheduling of the ablation.     If you have any question regarding your ablation or would like to proceed with scheduling please contact Dr. Phuc Ramirez Britt at [phone]. CTA Pre procedure instructions      As part of your pre procedure work up you will need to get a CT scan of your heart. This scan is completed in order to give Dr. Gasper Hopkins a map of the atrium (chamber of your heart) where he will be doing the ablation. Date:_________________________________    Time:_________________________________    Lucia Eliezer 20 minutes prior to scheduled testing time  -Nothing to eat or drink for at least 4 hours prior to test    Atrial Fibrillation / SVT Ablation Pre procedure Instructions    Date:______________________________    Ab Lemus at:__________________________    Procedure time:____________________    The morning of your procedure you will park in the hospital parking lot and report directly to the cath lab to check in. At the information desk stay right and go all the way to the end of the felipe, this will take you directly to your check in desk for the cath lab. Pre-Procedure Instructions   You will need to fast (nothing to eat or drink) after midnight the day of your procedure. Do NOT chew gum or eat mints the day of your procedure. You will need to hold your Metoprolol for 3 days prior to the procedure. You will need to hold your Aspirin for 7 days prior to the procedure. Do not use any lotions, creams or perfume the morning of procedure. You will need to complete pre-procedure lab work 5-7 days prior to your procedure. Please have a responsible adult to drive you home after procedure, you should go home same day, but there is always a possibility of an overnight stay. Cath lab will provide you with all post procedure instructions  A 3 month follow up will be scheduled with Dr. Christiano Guaman Nurse practitioner Andrew Brennan CNP post procedure                                        [address] [place] [address]/[facility] [address], [address] [place] [address]  Phone: [phone]  The hours are Mon -Fri.   6:30 am - 4:00 pm   Saturday 8:00 am - noon  No appointment necessary

## 2023-01-24 ENCOUNTER — HOSPITAL ENCOUNTER (OUTPATIENT)
Dept: CT IMAGING | Age: 63
Discharge: HOME | End: 2023-01-24
Payer: COMMERCIAL

## 2023-01-24 DIAGNOSIS — R10.12 LUQ PAIN: ICD-10-CM

## 2023-01-24 LAB
CREAT SERPL-MCNC: 0.8 MG/DL (ref 0.8–1.3)
GFR SERPL CREATININE-BSD FRML MDRD: >60 ML/MIN/{1.73_M2}

## 2023-01-24 PROCEDURE — 36415 COLL VENOUS BLD VENIPUNCTURE: CPT

## 2023-01-24 PROCEDURE — 82565 ASSAY OF CREATININE: CPT

## 2023-01-24 PROCEDURE — 6360000004 HC RX CONTRAST MEDICATION: Performed by: SURGERY

## 2023-01-24 PROCEDURE — 74177 CT ABD & PELVIS W/CONTRAST: CPT

## 2023-01-24 RX ADMIN — IOPAMIDOL 50 ML: 510 INJECTION, SOLUTION INTRAVASCULAR at 08:47

## 2023-01-24 RX ADMIN — IOPAMIDOL 100 ML: 755 INJECTION, SOLUTION INTRAVENOUS at 08:48

## 2023-01-26 ENCOUNTER — TELEPHONE (OUTPATIENT)
Dept: SURGERY | Age: 63
End: 2023-01-26

## 2023-01-26 ENCOUNTER — TELEPHONE (OUTPATIENT)
Dept: CARDIOLOGY CLINIC | Age: 63
End: 2023-01-26

## 2023-01-26 DIAGNOSIS — R10.12 LUQ PAIN: Primary | ICD-10-CM

## 2023-01-26 NOTE — TELEPHONE ENCOUNTER
Please reach out to patient and schedule CTA for pulmonary vein mapping / BILLIE atrial fibrillation ablation and SVT ablation with Dr. Alexander Sultana. Anesthesia is needed   Echo tech is NOT needed for BILLIE. Include Carto Rep in email [email or web address]      CTA Pre procedure instructions       As part of your pre procedure work up you will need to get a CT scan of your heart. This scan is completed in order to give Dr. Alexander Sultana a map of the atrium (chamber of your heart) where he will be doing the ablation. Date:_________________________________     Time:_________________________________     Carlos Huge 20 minutes prior to scheduled testing time  -Nothing to eat or drink for at least 4 hours prior to test     Atrial Fibrillation / SVT Ablation Pre procedure Instructions     Date:______________________________     D[place] Mustache at:__________________________     Procedure time:____________________     The morning of your procedure you will park in the hospital parking lot and report directly to the cath lab to check in. At the information desk stay right and go all the way to the end of the felipe, this will take you directly to your check in desk for the cath lab. Pre-Procedure Instructions   You will need to fast (nothing to eat or drink) after midnight the day of your procedure. Do NOT chew gum or eat mints the day of your procedure. You will need to hold your Metoprolol for 3 days prior to the procedure. You will need to hold your Aspirin for 7 days prior to the procedure. Do not use any lotions, creams or perfume the morning of procedure. You will need to complete pre-procedure lab work 5-7 days prior to your procedure. Please have a responsible adult to drive you home after procedure, you should go home same day, but there is always a possibility of an overnight stay.   Cath lab will provide you with all post procedure instructions  A 3 month follow up will be scheduled with Dr. Jairo Miner Nurse practitioner Dm Sommer KARMEN Tavarez post procedure                                          [address] [facility] [address] [place] [address]  Phone: [phone]  The hours are Mon -Fri.   6:30 am - 4:00 pm   Saturday 8:00 am - noon  No appointment necessary

## 2023-01-26 NOTE — TELEPHONE ENCOUNTER
EKG tracing received reviewed by Dr. Eva Laughlin. atrial fibrillation confirmed. Spoke with patient advised tracing receiving showing atrial fibrillation and Jayshree will be in contact to schedule his procedure.

## 2023-01-26 NOTE — TELEPHONE ENCOUNTER
Miles called wanting to know if the office had received his EKG's from [address] [place] [address]?    Please call Laron Chain: [phone]

## 2023-01-30 NOTE — TELEPHONE ENCOUNTER
Spoke with the patient and got him scheduled for his procedure. We went over the instructions below and he verbalized understanding. CTA Pre procedure instructions       As part of your pre procedure work up you will need to get a CT scan of your heart. This scan is completed in order to give Dr. Hernan Alaniz a map of the atrium (chamber of your heart) where he will be doing the ablation. Date: 2/15/2023     Time: 12:40 pm, arrive by 12:20 pm     -Arrive 20 minutes prior to scheduled testing time  -Nothing to eat or drink for at least 4 hours prior to test     Atrial Fibrillation / SVT Ablation Pre procedure Instructions     Date: 2/22/2023     Arrive at: 10:00 am   Procedure time: 11:30 am    The morning of your procedure you will park in the hospital parking lot and report directly to the cath lab to check in. At the information desk stay right and go all the way to the end of the felipe, this will take you directly to your check in desk for the cath lab. Pre-Procedure Instructions   You will need to fast (nothing to eat or drink) after midnight the day of your procedure. Do NOT chew gum or eat mints the day of your procedure. You will need to hold your Metoprolol for 3 days prior to the procedure. You will need to hold your Aspirin for 7 days prior to the procedure. Do not use any lotions, creams or perfume the morning of procedure. You will need to complete pre-procedure lab work 5-7 days prior to your procedure. Please have a responsible adult to drive you home after procedure, you should go home same day, but there is always a possibility of an overnight stay. Cath lab will provide you with all post procedure instructions  A 3 month follow up will be scheduled with Dr. Alexander Ramos Nurse practitioner Sri Underwood CNP post procedure                                          [address] [facility] [address]   [address] [address]  Phone: [phone]  The hours are Mon -Fri.   6:30 am - 4:00 pm   Saturday 8:00 am - noon  No appointment necessary               Teams update / emailed cath lab / [facility] w/central scheduling

## 2023-02-01 ENCOUNTER — TELEPHONE (OUTPATIENT)
Dept: CARDIOLOGY CLINIC | Age: 63
End: 2023-02-01

## 2023-02-01 NOTE — TELEPHONE ENCOUNTER
Spoke with the patient and had to cancel his procedure on 2/22 due to doctor family emergency. I asked him to not do any blood work or anything until I get him rescheduled. He will continue as he is now with no changes. I will call in the coming days to rescheduled. He verbalized understanding.

## 2023-02-02 NOTE — TELEPHONE ENCOUNTER
Spoke with the patient and got him rescheduled for his procedure that was canceled due to dr family emergency. He still has all procedure instructions and verbalized understanding.      Atrial Fibrillation / SVT Ablation Pre procedure Instructions     Date: 3/9/2023     Arrive at: 11:30 am   Procedure time: 1:00 pm    Teams updated / emailed cath lab

## 2023-02-15 ENCOUNTER — HOSPITAL ENCOUNTER (OUTPATIENT)
Dept: CT IMAGING | Age: 63
Discharge: HOME | End: 2023-02-15
Payer: COMMERCIAL

## 2023-02-15 DIAGNOSIS — Z86.79 HISTORY OF ATRIAL FIBRILLATION: ICD-10-CM

## 2023-02-15 DIAGNOSIS — I47.1 PSVT (PAROXYSMAL SUPRAVENTRICULAR TACHYCARDIA) (HCC): ICD-10-CM

## 2023-02-15 LAB
GFR SERPL CREATININE-BSD FRML MDRD: >60 ML/MIN/{1.73_M2}
PERFORMED ON: NORMAL
POC CREATININE: 0.9 MG/DL (ref 0.8–1.3)
POC SAMPLE TYPE: NORMAL

## 2023-02-15 PROCEDURE — 75574 CT ANGIO HRT W/3D IMAGE: CPT

## 2023-02-15 PROCEDURE — 6360000004 HC RX CONTRAST MEDICATION: Performed by: INTERNAL MEDICINE

## 2023-02-15 PROCEDURE — 82565 ASSAY OF CREATININE: CPT

## 2023-02-15 RX ADMIN — IOPAMIDOL 75 ML: 755 INJECTION, SOLUTION INTRAVENOUS at 12:14

## 2023-02-28 ENCOUNTER — TELEPHONE (OUTPATIENT)
Dept: CARDIOLOGY CLINIC | Age: 63
End: 2023-02-28

## 2023-02-28 NOTE — TELEPHONE ENCOUNTER
Homer López called in stating that he is able to move up his procedure to tomorrow. He is aware that Jayshree will need to speak with the doctor in regards to his medication.

## 2023-02-28 NOTE — TELEPHONE ENCOUNTER
Per Dr Fiona Sawyer okay to move procedure to 3/1. Stop taking metoprolol and aspirin as of today for procedure tomorrow.      Atrial Fibrillation / SVT Ablation Pre procedure Instructions     Date: 3/1/2023     Arrive at: 10:00 am   Procedure time: 11:30 am    Qgenda update / emailed cath lab Mecca Rooney / Cali Mojica alerted

## 2023-02-28 NOTE — TELEPHONE ENCOUNTER
Spoke with the patient to see if he might be able to come in tomorrow for his procedure. He is going to check with his wife and get back to me. I also need to check with RN/DR because he has to stop medications prior to procedure. He will be calling me back with a yes or no.

## 2023-03-01 ENCOUNTER — HOSPITAL ENCOUNTER (OUTPATIENT)
Dept: CARDIAC CATH/INVASIVE PROCEDURES | Age: 63
Discharge: HOME | End: 2023-03-01
Payer: COMMERCIAL

## 2023-03-01 ENCOUNTER — ANESTHESIA (OUTPATIENT)
Dept: CARDIAC CATH/INVASIVE PROCEDURES | Age: 63
End: 2023-03-01

## 2023-03-01 ENCOUNTER — ANESTHESIA EVENT (OUTPATIENT)
Dept: CARDIAC CATH/INVASIVE PROCEDURES | Age: 63
End: 2023-03-01

## 2023-03-01 VITALS
RESPIRATION RATE: 18 BRPM | DIASTOLIC BLOOD PRESSURE: 89 MMHG | HEIGHT: 68 IN | BODY MASS INDEX: 47.74 KG/M2 | HEART RATE: 80 BPM | WEIGHT: 315 LBS | TEMPERATURE: 97.8 F | OXYGEN SATURATION: 94 % | SYSTOLIC BLOOD PRESSURE: 132 MMHG

## 2023-03-01 DIAGNOSIS — I48.92 LEFT ATRIAL FLUTTER BY ELECTROCARDIOGRAM (HCC): ICD-10-CM

## 2023-03-01 DIAGNOSIS — I48.0 PAF (PAROXYSMAL ATRIAL FIBRILLATION) (HCC): ICD-10-CM

## 2023-03-01 LAB
ABO/RH: NORMAL
ACTIVATED CLOTTING TIME: 202 SEC (ref 99–130)
ANION GAP SERPL CALCULATED.3IONS-SCNC: 10 MMOL/L (ref 3–16)
ANTIBODY SCREEN: NORMAL
BUN BLDV-MCNC: 11 MG/DL (ref 7–20)
CALCIUM SERPL-MCNC: 8.9 MG/DL (ref 8.3–10.6)
CHLORIDE BLD-SCNC: 103 MMOL/L (ref 99–110)
CO2: 24 MMOL/L (ref 21–32)
CREAT SERPL-MCNC: 0.9 MG/DL (ref 0.8–1.3)
EKG ATRIAL RATE: 82 BPM
EKG DIAGNOSIS: NORMAL
EKG P AXIS: 38 DEGREES
EKG P-R INTERVAL: 178 MS
EKG Q-T INTERVAL: 426 MS
EKG QRS DURATION: 112 MS
EKG QTC CALCULATION (BAZETT): 497 MS
EKG R AXIS: 37 DEGREES
EKG T AXIS: 23 DEGREES
EKG VENTRICULAR RATE: 82 BPM
GFR SERPL CREATININE-BSD FRML MDRD: >60 ML/MIN/{1.73_M2}
GLUCOSE BLD-MCNC: 77 MG/DL (ref 70–99)
HCT VFR BLD CALC: 33.8 % (ref 40.5–52.5)
HEMOGLOBIN: 10.1 G/DL (ref 13.5–17.5)
MCH RBC QN AUTO: 25.9 PG (ref 26–34)
MCHC RBC AUTO-ENTMCNC: 30 G/DL (ref 31–36)
MCV RBC AUTO: 86.5 FL (ref 80–100)
PDW BLD-RTO: 35.1 % (ref 12.4–15.4)
PLATELET # BLD: 398 K/UL (ref 135–450)
PMV BLD AUTO: 7 FL (ref 5–10.5)
POC ACT LR: 232 SEC
POC ACT LR: 391 SEC
POC ACT LR: 396 SEC
POTASSIUM SERPL-SCNC: 4.1 MMOL/L (ref 3.5–5.1)
RBC # BLD: 3.91 M/UL (ref 4.2–5.9)
SODIUM BLD-SCNC: 137 MMOL/L (ref 136–145)
WBC # BLD: 3.1 K/UL (ref 4–11)

## 2023-03-01 PROCEDURE — 93622 COMP EP EVAL L VENTR PAC&REC: CPT | Performed by: INTERNAL MEDICINE

## 2023-03-01 PROCEDURE — 2500000003 HC RX 250 WO HCPCS

## 2023-03-01 PROCEDURE — 2580000003 HC RX 258

## 2023-03-01 PROCEDURE — 93623 PRGRMD STIMJ&PACG IV RX NFS: CPT | Performed by: INTERNAL MEDICINE

## 2023-03-01 PROCEDURE — C1893 INTRO/SHEATH, FIXED,NON-PEEL: HCPCS

## 2023-03-01 PROCEDURE — 7100000001 HC PACU RECOVERY - ADDTL 15 MIN

## 2023-03-01 PROCEDURE — 93656 COMPRE EP EVAL ABLTJ ATR FIB: CPT | Performed by: INTERNAL MEDICINE

## 2023-03-01 PROCEDURE — 85347 COAGULATION TIME ACTIVATED: CPT

## 2023-03-01 PROCEDURE — A4216 STERILE WATER/SALINE, 10 ML: HCPCS

## 2023-03-01 PROCEDURE — C1732 CATH, EP, DIAG/ABL, 3D/VECT: HCPCS

## 2023-03-01 PROCEDURE — 86900 BLOOD TYPING SEROLOGIC ABO: CPT

## 2023-03-01 PROCEDURE — 2580000003 HC RX 258: Performed by: INTERNAL MEDICINE

## 2023-03-01 PROCEDURE — 6360000002 HC RX W HCPCS: Performed by: ANESTHESIOLOGY

## 2023-03-01 PROCEDURE — 6360000002 HC RX W HCPCS

## 2023-03-01 PROCEDURE — C1759 CATH, INTRA ECHOCARDIOGRAPHY: HCPCS

## 2023-03-01 PROCEDURE — 86850 RBC ANTIBODY SCREEN: CPT

## 2023-03-01 PROCEDURE — 93655 ICAR CATH ABLTJ DSCRT ARRHYT: CPT | Performed by: INTERNAL MEDICINE

## 2023-03-01 PROCEDURE — 7100000000 HC PACU RECOVERY - FIRST 15 MIN

## 2023-03-01 PROCEDURE — 85027 COMPLETE CBC AUTOMATED: CPT

## 2023-03-01 PROCEDURE — 3700000001 HC ADD 15 MINUTES (ANESTHESIA)

## 2023-03-01 PROCEDURE — 2709999900 HC NON-CHARGEABLE SUPPLY

## 2023-03-01 PROCEDURE — 93655 ICAR CATH ABLTJ DSCRT ARRHYT: CPT

## 2023-03-01 PROCEDURE — 86901 BLOOD TYPING SEROLOGIC RH(D): CPT

## 2023-03-01 PROCEDURE — 3700000000 HC ANESTHESIA ATTENDED CARE

## 2023-03-01 PROCEDURE — 80048 BASIC METABOLIC PNL TOTAL CA: CPT

## 2023-03-01 PROCEDURE — 93657 TX L/R ATRIAL FIB ADDL: CPT | Performed by: INTERNAL MEDICINE

## 2023-03-01 PROCEDURE — 93650 ICAR CATH ABLTJ AV NODE FUNC: CPT | Performed by: INTERNAL MEDICINE

## 2023-03-01 PROCEDURE — C1894 INTRO/SHEATH, NON-LASER: HCPCS

## 2023-03-01 PROCEDURE — C1730 CATH, EP, 19 OR FEW ELECT: HCPCS

## 2023-03-01 PROCEDURE — 93657 TX L/R ATRIAL FIB ADDL: CPT

## 2023-03-01 PROCEDURE — 93622 COMP EP EVAL L VENTR PAC&REC: CPT

## 2023-03-01 PROCEDURE — 93623 PRGRMD STIMJ&PACG IV RX NFS: CPT

## 2023-03-01 PROCEDURE — 93656 COMPRE EP EVAL ABLTJ ATR FIB: CPT

## 2023-03-01 RX ORDER — SODIUM CHLORIDE 0.9 % (FLUSH) 0.9 %
5-40 SYRINGE (ML) INJECTION EVERY 12 HOURS SCHEDULED
Status: DISCONTINUED | OUTPATIENT
Start: 2023-03-01 | End: 2023-03-02 | Stop reason: HOSPADM

## 2023-03-01 RX ORDER — MIDAZOLAM HYDROCHLORIDE 1 MG/ML
INJECTION INTRAMUSCULAR; INTRAVENOUS PRN
Status: DISCONTINUED | OUTPATIENT
Start: 2023-03-01 | End: 2023-03-01 | Stop reason: SDUPTHER

## 2023-03-01 RX ORDER — GABAPENTIN 400 MG/1
800 CAPSULE ORAL 4 TIMES DAILY
Status: DISCONTINUED | OUTPATIENT
Start: 2023-03-01 | End: 2023-03-02 | Stop reason: HOSPADM

## 2023-03-01 RX ORDER — FENTANYL CITRATE 50 UG/ML
25 INJECTION, SOLUTION INTRAMUSCULAR; INTRAVENOUS EVERY 5 MIN PRN
Status: DISCONTINUED | OUTPATIENT
Start: 2023-03-01 | End: 2023-03-02 | Stop reason: HOSPADM

## 2023-03-01 RX ORDER — SODIUM CHLORIDE 9 MG/ML
INJECTION, SOLUTION INTRAVENOUS CONTINUOUS PRN
Status: DISCONTINUED | OUTPATIENT
Start: 2023-03-01 | End: 2023-03-01 | Stop reason: SDUPTHER

## 2023-03-01 RX ORDER — PROTAMINE SULFATE 10 MG/ML
30 INJECTION, SOLUTION INTRAVENOUS
Status: DISCONTINUED | OUTPATIENT
Start: 2023-03-01 | End: 2023-03-02 | Stop reason: HOSPADM

## 2023-03-01 RX ORDER — FLECAINIDE ACETATE 50 MG/1
50 TABLET ORAL 2 TIMES DAILY
Qty: 60 TABLET | Refills: 3 | Status: SHIPPED | OUTPATIENT
Start: 2023-03-01

## 2023-03-01 RX ORDER — ONDANSETRON 2 MG/ML
4 INJECTION INTRAMUSCULAR; INTRAVENOUS
Status: COMPLETED | OUTPATIENT
Start: 2023-03-01 | End: 2023-03-01

## 2023-03-01 RX ORDER — DOPAMINE HYDROCHLORIDE 160 MG/100ML
INJECTION, SOLUTION INTRAVENOUS CONTINUOUS PRN
Status: DISCONTINUED | OUTPATIENT
Start: 2023-03-01 | End: 2023-03-01 | Stop reason: SDUPTHER

## 2023-03-01 RX ORDER — SODIUM CHLORIDE 9 MG/ML
INJECTION, SOLUTION INTRAVENOUS PRN
Status: DISCONTINUED | OUTPATIENT
Start: 2023-03-01 | End: 2023-03-02 | Stop reason: HOSPADM

## 2023-03-01 RX ORDER — SODIUM CHLORIDE 0.9 % (FLUSH) 0.9 %
5-40 SYRINGE (ML) INJECTION PRN
Status: DISCONTINUED | OUTPATIENT
Start: 2023-03-01 | End: 2023-03-02 | Stop reason: HOSPADM

## 2023-03-01 RX ORDER — 0.9 % SODIUM CHLORIDE 0.9 %
1000 INTRAVENOUS SOLUTION INTRAVENOUS
Status: DISCONTINUED | OUTPATIENT
Start: 2023-03-01 | End: 2023-03-02 | Stop reason: HOSPADM

## 2023-03-01 RX ORDER — FLECAINIDE ACETATE 100 MG/1
50 TABLET ORAL 2 TIMES DAILY
Status: DISCONTINUED | OUTPATIENT
Start: 2023-03-01 | End: 2023-03-02 | Stop reason: HOSPADM

## 2023-03-01 RX ORDER — SODIUM CHLORIDE 9 MG/ML
INJECTION INTRAVENOUS PRN
Status: DISCONTINUED | OUTPATIENT
Start: 2023-03-01 | End: 2023-03-01 | Stop reason: SDUPTHER

## 2023-03-01 RX ORDER — CETIRIZINE HYDROCHLORIDE 10 MG/1
10 TABLET ORAL DAILY
Status: DISCONTINUED | OUTPATIENT
Start: 2023-03-01 | End: 2023-03-02 | Stop reason: HOSPADM

## 2023-03-01 RX ORDER — VECURONIUM BROMIDE 1 MG/ML
INJECTION, POWDER, LYOPHILIZED, FOR SOLUTION INTRAVENOUS PRN
Status: DISCONTINUED | OUTPATIENT
Start: 2023-03-01 | End: 2023-03-01 | Stop reason: SDUPTHER

## 2023-03-01 RX ORDER — SUCCINYLCHOLINE/SOD CL,ISO/PF 200MG/10ML
SYRINGE (ML) INTRAVENOUS PRN
Status: DISCONTINUED | OUTPATIENT
Start: 2023-03-01 | End: 2023-03-01 | Stop reason: SDUPTHER

## 2023-03-01 RX ORDER — LIDOCAINE HYDROCHLORIDE AND EPINEPHRINE BITARTRATE 20; .01 MG/ML; MG/ML
15 INJECTION, SOLUTION SUBCUTANEOUS SEE ADMIN INSTRUCTIONS
Status: DISCONTINUED | OUTPATIENT
Start: 2023-03-01 | End: 2023-03-02 | Stop reason: HOSPADM

## 2023-03-01 RX ORDER — ONDANSETRON 2 MG/ML
INJECTION INTRAMUSCULAR; INTRAVENOUS PRN
Status: DISCONTINUED | OUTPATIENT
Start: 2023-03-01 | End: 2023-03-01 | Stop reason: SDUPTHER

## 2023-03-01 RX ORDER — PROTAMINE SULFATE 10 MG/ML
INJECTION, SOLUTION INTRAVENOUS PRN
Status: DISCONTINUED | OUTPATIENT
Start: 2023-03-01 | End: 2023-03-01 | Stop reason: SDUPTHER

## 2023-03-01 RX ORDER — GLYCOPYRROLATE 0.2 MG/ML
INJECTION INTRAMUSCULAR; INTRAVENOUS PRN
Status: DISCONTINUED | OUTPATIENT
Start: 2023-03-01 | End: 2023-03-01 | Stop reason: SDUPTHER

## 2023-03-01 RX ORDER — HEPARIN SODIUM 10000 [USP'U]/100ML
INJECTION, SOLUTION INTRAVENOUS CONTINUOUS PRN
Status: DISCONTINUED | OUTPATIENT
Start: 2023-03-01 | End: 2023-03-01 | Stop reason: SDUPTHER

## 2023-03-01 RX ORDER — FUROSEMIDE 10 MG/ML
INJECTION INTRAMUSCULAR; INTRAVENOUS PRN
Status: DISCONTINUED | OUTPATIENT
Start: 2023-03-01 | End: 2023-03-01 | Stop reason: SDUPTHER

## 2023-03-01 RX ORDER — PHENYLEPHRINE HCL IN 0.9% NACL 1 MG/10 ML
SYRINGE (ML) INTRAVENOUS PRN
Status: DISCONTINUED | OUTPATIENT
Start: 2023-03-01 | End: 2023-03-01 | Stop reason: SDUPTHER

## 2023-03-01 RX ORDER — HEPARIN SODIUM 1000 [USP'U]/ML
INJECTION, SOLUTION INTRAVENOUS; SUBCUTANEOUS PRN
Status: DISCONTINUED | OUTPATIENT
Start: 2023-03-01 | End: 2023-03-01 | Stop reason: SDUPTHER

## 2023-03-01 RX ORDER — ACETAMINOPHEN 325 MG/1
650 TABLET ORAL EVERY 4 HOURS PRN
Status: DISCONTINUED | OUTPATIENT
Start: 2023-03-01 | End: 2023-03-02 | Stop reason: HOSPADM

## 2023-03-01 RX ORDER — PROPOFOL 10 MG/ML
INJECTION, EMULSION INTRAVENOUS PRN
Status: DISCONTINUED | OUTPATIENT
Start: 2023-03-01 | End: 2023-03-01 | Stop reason: SDUPTHER

## 2023-03-01 RX ORDER — DEXAMETHASONE SODIUM PHOSPHATE 4 MG/ML
INJECTION, SOLUTION INTRA-ARTICULAR; INTRALESIONAL; INTRAMUSCULAR; INTRAVENOUS; SOFT TISSUE PRN
Status: DISCONTINUED | OUTPATIENT
Start: 2023-03-01 | End: 2023-03-01 | Stop reason: SDUPTHER

## 2023-03-01 RX ORDER — LEVOTHYROXINE SODIUM 0.1 MG/1
200 TABLET ORAL
Status: DISCONTINUED | OUTPATIENT
Start: 2023-03-02 | End: 2023-03-02 | Stop reason: HOSPADM

## 2023-03-01 RX ORDER — FENTANYL CITRATE 50 UG/ML
INJECTION, SOLUTION INTRAMUSCULAR; INTRAVENOUS PRN
Status: DISCONTINUED | OUTPATIENT
Start: 2023-03-01 | End: 2023-03-01 | Stop reason: SDUPTHER

## 2023-03-01 RX ADMIN — VECURONIUM BROMIDE 10 MG: 1 INJECTION, POWDER, LYOPHILIZED, FOR SOLUTION INTRAVENOUS at 11:48

## 2023-03-01 RX ADMIN — FUROSEMIDE 60 MG: 10 INJECTION, SOLUTION INTRAMUSCULAR; INTRAVENOUS at 14:10

## 2023-03-01 RX ADMIN — VECURONIUM BROMIDE 5 MG: 1 INJECTION, POWDER, LYOPHILIZED, FOR SOLUTION INTRAVENOUS at 13:15

## 2023-03-01 RX ADMIN — FENTANYL CITRATE 50 MCG: 50 INJECTION INTRAMUSCULAR; INTRAVENOUS at 11:40

## 2023-03-01 RX ADMIN — SUGAMMADEX 500 MG: 100 INJECTION, SOLUTION INTRAVENOUS at 14:10

## 2023-03-01 RX ADMIN — Medication 100 MCG: at 12:03

## 2023-03-01 RX ADMIN — Medication 160 MG: at 11:42

## 2023-03-01 RX ADMIN — DOPAMINE HYDROCHLORIDE IN DEXTROSE 10 MCG/KG/MIN: 1.6 INJECTION, SOLUTION INTRAVENOUS at 13:50

## 2023-03-01 RX ADMIN — HEPARIN SODIUM 2000 UNITS: 1000 INJECTION INTRAVENOUS; SUBCUTANEOUS at 13:02

## 2023-03-01 RX ADMIN — DEXAMETHASONE SODIUM PHOSPHATE 8 MG: 4 INJECTION, SOLUTION INTRAMUSCULAR; INTRAVENOUS at 11:50

## 2023-03-01 RX ADMIN — FENTANYL CITRATE 50 MCG: 50 INJECTION INTRAMUSCULAR; INTRAVENOUS at 14:10

## 2023-03-01 RX ADMIN — SODIUM CHLORIDE: 9 INJECTION, SOLUTION INTRAVENOUS at 11:35

## 2023-03-01 RX ADMIN — SODIUM CHLORIDE: 9 INJECTION, SOLUTION INTRAVENOUS at 11:25

## 2023-03-01 RX ADMIN — HEPARIN SODIUM 14000 UNITS/HR: 10000 INJECTION, SOLUTION INTRAVENOUS at 12:47

## 2023-03-01 RX ADMIN — HEPARIN SODIUM 7000 UNITS: 1000 INJECTION INTRAVENOUS; SUBCUTANEOUS at 12:16

## 2023-03-01 RX ADMIN — HEPARIN SODIUM 2000 UNITS: 1000 INJECTION INTRAVENOUS; SUBCUTANEOUS at 12:41

## 2023-03-01 RX ADMIN — ONDANSETRON 4 MG: 2 INJECTION INTRAMUSCULAR; INTRAVENOUS at 11:50

## 2023-03-01 RX ADMIN — MIDAZOLAM 2 MG: 1 INJECTION INTRAMUSCULAR; INTRAVENOUS at 11:35

## 2023-03-01 RX ADMIN — Medication 100 MCG: at 12:30

## 2023-03-01 RX ADMIN — PROTAMINE SULFATE 150 MG: 10 INJECTION, SOLUTION INTRAVENOUS at 14:12

## 2023-03-01 RX ADMIN — PROPOFOL 170 MG: 10 INJECTION, EMULSION INTRAVENOUS at 11:42

## 2023-03-01 RX ADMIN — GLYCOPYRROLATE 0.2 MG: 0.2 INJECTION, SOLUTION INTRAMUSCULAR; INTRAVENOUS at 11:50

## 2023-03-01 RX ADMIN — SODIUM CHLORIDE 10 ML: 9 INJECTION INTRAMUSCULAR; INTRAVENOUS; SUBCUTANEOUS at 11:48

## 2023-03-01 RX ADMIN — HEPARIN SODIUM 7500 UNITS: 1000 INJECTION INTRAVENOUS; SUBCUTANEOUS at 12:19

## 2023-03-01 RX ADMIN — VECURONIUM BROMIDE 5 MG: 1 INJECTION, POWDER, LYOPHILIZED, FOR SOLUTION INTRAVENOUS at 12:34

## 2023-03-01 RX ADMIN — ONDANSETRON 4 MG: 2 INJECTION INTRAMUSCULAR; INTRAVENOUS at 14:43

## 2023-03-01 NOTE — LETTER
[facility]  [address] [place] [address]  Dept: [phone]  Loc: [address] [facility] [address]           RETURN TO WORK STATUS  3/1/2023      Diagnosis (optional) ***    These are your [employer] Instructions. It may contain personal and confidential  information about your health. It is up to you to share  these instructions as necessary with your employer(s) as required by their policies for you to return to work.       WORK STATUS: {HSP  [facility][id number]}    (PLEASE NOTE: If modified work is not available, this patient is then unable to work for this period of time.)          Sylvain David RN

## 2023-03-01 NOTE — ANESTHESIA PRE PROCEDURE
[facility] Department of Anesthesiology  Pre-Anesthesia Evaluation/Consultation       Name:  Brijesh Cleveland  : 1960  Age:  63 y.o.                                            MRN:  [id number]  Date: 3/1/2023           Surgeon: Thelma Lynn    Procedure: Afib ablation     Allergies   Allergen Reactions    Codeine Nausea Only and Nausea And Vomiting     Stomach upset        Patient Active Problem List   Diagnosis    Partial bowel obstruction ([facility]    Hiatal hernia    Acute respiratory failure with hypoxia and hypercapnia (HCC)    SBO (small bowel obstruction) (HCC)    Aspiration into airway    Acute respiratory failure with hypoxia (HCC)    Small bowel obstruction (HCC)    Diastolic heart failure (HCC)    History of atrial fibrillation    Supraventricular tachycardia (HCC)    Arterial hypotension    Aspiration pneumonia (HCC)    Morbid obesity with BMI of 50.0-59.9, adult (HCC)    Acute delirium    EARNESTINE (obstructive sleep apnea)    Dysphagia    Critical illness myopathy     Past Medical History:   Diagnosis Date    Arthritis     Atrial fibrillation ([facility]     Hypertension      Past Surgical History:   Procedure Laterality Date    BRONCHOSCOPY  2022    BRONCHOSCOPY ALVEOLAR LAVAGE performed by Lu Henderson DO at [address] [place] [address]  2022    BRONCHOSCOPY performed by Apolonia Patel MD at [address] [place] [address] N/A     LAPAROSCOPIC REDUCTION HIATEL HERNIA, performed by Pauly Sequeira MD at [place] [address] N/A 2022    GASTROSTOMY TUBE PLACEMENT performed by Pauly Sequeira MD at [address] [place] [address]  2022    EGD BIOPSY performed by Tomeka Cannon MD at [address] [place] [address]  2022    EGD ESOPHAGOGASTRODUODENOSCOPY TUBE INSERTION performed by Tomeka Cannon MD at [address] [place] [address] 8/22/2022    EGD ESOPHAGOGASTRODUODENOSCOPY ORAL-GASTRIC TUBE INSERTION performed by Sari Gifford DO at [facility] History     Tobacco Use    Smoking status: Never    Smokeless tobacco: Never   Substance Use Topics    Alcohol use: Not Currently    Drug use: Never     Medications  Current Outpatient Medications on File Prior to Encounter   Medication Sig Dispense Refill    methotrexate (RHEUMATREX) 2.5 MG chemo tablet Take by mouth once a week Take once a week      metoprolol tartrate (LOPRESSOR) 50 MG tablet Take 1 tablet by mouth 2 times daily 60 tablet 3    celecoxib (CELEBREX) 200 MG capsule Take 200 mg by mouth 2 times daily      gabapentin (NEURONTIN) 400 MG capsule Take 800 mg by mouth 4 times daily.  [DISCONTINUED] amLODIPine (NORVASC) 5 MG tablet Take 1 tablet by mouth daily 30 tablet 3    folic acid (FOLVITE) 1 MG tablet Take 1 mg by mouth in the morning.  ASPIRIN LOW DOSE 81 MG EC tablet Take 81 mg by mouth daily      acetaminophen (TYLENOL) 500 MG tablet Take 1,000 mg by mouth every 8 hours as needed      levothyroxine (SYNTHROID) 200 MCG tablet Take 200 mcg by mouth every morning      loratadine (CLARITIN) 10 MG tablet Take 10 mg by mouth in the morning.  [DISCONTINUED] spironolactone (ALDACTONE) 50 MG tablet Take 50 mg by mouth in the morning. No current facility-administered medications on file prior to encounter. Current Outpatient Medications   Medication Sig Dispense Refill    methotrexate (RHEUMATREX) 2.5 MG chemo tablet Take by mouth once a week Take once a week      metoprolol tartrate (LOPRESSOR) 50 MG tablet Take 1 tablet by mouth 2 times daily 60 tablet 3    celecoxib (CELEBREX) 200 MG capsule Take 200 mg by mouth 2 times daily      gabapentin (NEURONTIN) 400 MG capsule Take 800 mg by mouth 4 times daily.  folic acid (FOLVITE) 1 MG tablet Take 1 mg by mouth in the morning.       ASPIRIN LOW DOSE 81 MG EC tablet Take 81 mg by mouth daily      acetaminophen (TYLENOL) 500 MG tablet Take 1,000 mg by mouth every 8 hours as needed      levothyroxine (SYNTHROID) 200 MCG tablet Take 200 mcg by mouth every morning      loratadine (CLARITIN) 10 MG tablet Take 10 mg by mouth in the morning.        Current Facility-Administered Medications   Medication Dose Route Frequency Provider Last Rate Last Admin    gabapentin (NEURONTIN) capsule 800 mg  800 mg Oral 4x Daily Landon Benoit MD        levothyroxine (SYNTHROID) tablet 200 mcg  200 mcg Oral QAM Landon Benoit MD        cetirizine (ZYRTEC) tablet 10 mg  10 mg Oral Daily Landon Benoit MD        metoprolol tartrate (LOPRESSOR) tablet 12.5 mg  12.5 mg Oral BID Landon Benoit MD        flecainide [facility]) tablet 50 mg  50 mg Oral BID Landon Benoit MD        apixaban Deloris Maya) tablet 5 mg  5 mg Oral BID Landon Benoit MD        lidocaine-EPINEPHrine 2 percent-1:[id number] injection 15 mL  15 mL IntraDERmal See Admin Instructions Landon Benoit MD        protamine injection 30 mg  30 mg IntraVENous Once PRN Landon Benoit MD        phenylephrine (REHANA-SYNEPHRINE) 50 mg in sodium chloride 0.9 % 250 mL infusion  100 mcg/min IntraVENous Once PRN Landon Benoit MD        0.9 % sodium chloride bolus  1,000 mL IntraVENous Once PRN Landon Benoit MD        sodium chloride flush 0.9 % injection 5-40 mL  5-40 mL IntraVENous 2 times per day Landon Benoit MD        sodium chloride flush 0.9 % injection 5-40 mL  5-40 mL IntraVENous PRN Landon Benoit MD        0.9 % sodium chloride infusion   IntraVENous PRN Landon Benoit MD        acetaminophen (TYLENOL) tablet 650 mg  650 mg Oral Q4H PRN Landon Benoit MD        sodium chloride flush 0.9 % injection 5-40 mL  5-40 mL IntraVENous 2 times per day Landon Benoit MD        sodium chloride flush 0.9 % injection 5-40 mL  5-40 mL IntraVENous PRN Landon Benoit MD        0.9 % sodium chloride infusion   IntraVENous PRN Landon Benoit MD Vital Signs (Current) There were no vitals filed for this visit. Vital Signs Statistics (for past 48 hrs)     No data recorded    BP Readings from Last 3 Encounters:   01/23/23 100/70   01/19/23 (!) 140/80   11/03/22 108/68     BMI  There is no height or weight on file to calculate BMI. Estimated body mass index is 49.26 kg/m² as calculated from the following:    Height as of 1/23/23: 5' 8\" (1.727 m). Weight as of 1/23/23: 324 lb (147 kg). CBC   Lab Results   Component Value Date/Time    WBC 5.3 09/23/2022 05:40 AM    RBC 3.31 09/23/2022 05:40 AM    HGB 9.9 09/23/2022 05:40 AM    HCT 29.6 09/23/2022 05:40 AM    MCV 89.5 09/23/2022 05:40 AM    RDW 17.2 09/23/2022 05:40 AM     09/23/2022 05:40 AM     CMP    Lab Results   Component Value Date/Time     09/23/2022 05:40 AM    K 3.7 09/23/2022 05:40 AM    K 4.5 09/14/2022 07:34 AM    CL 91 09/23/2022 05:40 AM    CO2 27 09/23/2022 05:40 AM    BUN 20 09/23/2022 05:40 AM    CREATININE 0.9 02/15/2023 12:12 PM    CREATININE 0.8 01/24/2023 08:10 AM    GFRAA >60 09/23/2022 05:40 AM    AGRATIO 0.8 08/06/2022 04:45 AM    LABGLOM >60 02/15/2023 12:12 PM    GLUCOSE 83 09/23/2022 05:40 AM    PROT 6.3 08/06/2022 04:45 AM    CALCIUM 9.0 09/23/2022 05:40 AM    BILITOT 0.7 08/06/2022 04:45 AM    ALKPHOS 72 08/06/2022 04:45 AM    AST 22 08/06/2022 04:45 AM    ALT 23 08/06/2022 04:45 AM     BMP    Lab Results   Component Value Date/Time     09/23/2022 05:40 AM    K 3.7 09/23/2022 05:40 AM    K 4.5 09/14/2022 07:34 AM    CL 91 09/23/2022 05:40 AM    CO2 27 09/23/2022 05:40 AM    BUN 20 09/23/2022 05:40 AM    CREATININE 0.9 02/15/2023 12:12 PM    CREATININE 0.8 01/24/2023 08:10 AM    CALCIUM 9.0 09/23/2022 05:40 AM    GFRAA >60 09/23/2022 05:40 AM    LABGLOM >60 02/15/2023 12:12 PM    GLUCOSE 83 09/23/2022 05:40 AM     POCGlucose  No results for input(s): GLUCOSE in the last 72 hours.    Coags    Lab Results   Component Value Date/Time    PROTIME 16.4 08/06/2022 04:45 AM    INR 1.33 08/06/2022 04:45 AM    APTT 83.3 08/24/2022 03:17 AM     HCG (If Applicable) No results found for: PREGTESTUR, PREGSERUM, HCG, HCGQUANT   ABGs   Lab Results   Component Value Date/Time    PHART 7.476 09/02/2022 09:38 AM    PO2ART 69.7 09/02/2022 09:38 AM    VSA6LTG 39.4 09/02/2022 09:38 AM    TMR8NOD 29.0 09/02/2022 09:38 AM    BEART 5.1 09/02/2022 09:38 AM    A2YFQKWV 94.7 09/02/2022 09:38 AM      Type & Screen (If Applicable)  No results found for: LABABO, LABRH                         BMI: Wt Readings from Last 3 Encounters:       NPO Status: 8 HOURS                          Anesthesia Evaluation  Patient summary reviewed no history of anesthetic complications:   Airway: Mallampati: III  TM distance: >3 FB   Neck ROM: full  Mouth opening: > = 3 FB   Dental: normal exam         Pulmonary:normal exam    (+) pneumonia (Hx asp pna, resolved):  sleep apnea: on CPAP,                             Cardiovascular:  Exercise tolerance: good (>4 METS),   (+) hypertension:, dysrhythmias: atrial fibrillation and SVT, CHF (EF 60):,       ECG reviewed  Rhythm: regular  Rate: normal  Echocardiogram reviewed         Beta Blocker:  Dose within 24 Hrs         Neuro/Psych:   (+) neuromuscular disease:, psychiatric history:            GI/Hepatic/Renal:   (+) hiatal hernia, GERD: well controlled, morbid obesity     (-) liver disease and no renal disease       Endo/Other:    (+) hypothyroidism::., .    (-) blood dyscrasia               Abdominal:   (+) obese,           Vascular: negative vascular ROS. Other Findings:           Anesthesia Plan      general     ASA 3       Induction: intravenous. MIPS: Postoperative opioids intended and Prophylactic antiemetics administered. Anesthetic plan and risks discussed with patient and spouse. Plan discussed with CRNA.                 This pre-anesthesia assessment may be used as a history and physical.    DOS STAFF ADDENDUM:    Pt seen and examined, chart reviewed (including anesthesia, drug and allergy history). No interval changes to history and physical examination. Anesthetic plan, risks, benefits, alternatives, and personnel involved discussed with patient. Questions and concerns addressed. Patient(family) verbalized an understanding and agrees to proceed.       Lyndsey Crockett MD  March 1, 2023  10:35 AM

## 2023-03-01 NOTE — ANESTHESIA POSTPROCEDURE EVALUATION
Department of Anesthesiology  Postprocedure Note    Patient: Kiarra Mendoza  MRN: [id number]  YOB: 1960  Date of evaluation: 3/1/2023      Procedure Summary     Date: 03/01/23 Room / Location: [facility] Cath Lab    Anesthesia Start: 7687 Anesthesia Stop: 3016    Procedure: WST BILLIE AFIB ABLATION W ANES Diagnosis:     Scheduled Providers:  Responsible Provider: Florentino Aldrich MD    Anesthesia Type: general ASA Status: 3          Anesthesia Type: No value filed.     Olamide Phase I: Olamide Score: 8    Olamide Phase II:        Anesthesia Post Evaluation    Patient location during evaluation: PACU  Patient participation: complete - patient participated  Level of consciousness: awake and alert  Pain score: 0  Airway patency: patent  Nausea & Vomiting: no nausea and no vomiting  Complications: no  Cardiovascular status: blood pressure returned to baseline  Respiratory status: acceptable  Hydration status: euvolemic

## 2023-03-01 NOTE — PROCEDURES
[facility]     Electrophysiology Procedure Note       Date of Procedure: 3/1/2023  Patient's Name: Miles Vanegas  YOB: 1960   Medical Record Number: [id number]  Referring Physician: JOAQUIN PETERSEN MD  Procedure Performed by: Jaime Dye MD    Procedure performed:  Electrophysiology study with radiofrequency ablation of atrial fibrillation and pulmonary vein isolation   Ablation of roof-dependent left atrial flutter with  ms with straight up-down activation  Ablation of anterior wall dependent left atrial flutter with  ms with proximal-distal activation  Ablation of mitral isthmus dependent left atrial flutter with  ms with distal-proximal activation  Additional ablation with creation of a roof line (for roof-dependent left atrial flutter), anterior line from mitral annulus to the roof (for anterior wall dependent left atrial flutter) and mitral isthmus (for mitral isthmus dependent left atrial flutter)  Additional ablation of complex fractionated atrial electrograms (for atrial fibrillation) on the LA septal wall  Additional ablation of complex fractionated atrial electrograms (for atrial fibrillation) on the LA anterior wall but inferiorly  3-D electroanatomical mapping of the left atrium    Transseptal blunt puncture through a patent foramen ovale x 2 under intracardiac ultrasound guidance without fluoroscopic guidance   Intracardiac echocardiography  Left ventricular pacing and recording  Drug infusion with an attempt to induce atrial tachydysrhythmia  Anesthesia: General anesthesia provided by the Anesthesia service    Indications for procedure:    Miles Vanegas is a 62 y.o. male who has a history of paroxysmal atrial fibrillation and PSVT who is symptomatic with symptoms of dyspnea with minimal exertion and fatigue who has failed antiarrhythmic therapy in the past is now here for an ablation for paroxysmal atrial fibrillation and PSVT.     Details of  Procedure: The risks, benefits and alternatives of the ablation procedure were discussed with the patient. The risks including, but not limited to, the risks of bleeding, infection, radiation exposure, injury to vascular, cardiac and surrounding structures (including pneumothorax), stroke, cardiac perforation, tamponade, need for emergent open heart surgery, need for pacemaker implantation, esophageal injury and fistula, myocardial infarction and death were discussed in detail. The patient was also counseled at length about the risks of arlene Covid-19 in the jimmy-operative and post-operative states including the recovery window of their procedure. The patient was made aware that arlene Covid-19 after a surgical procedure may worsen their prognosis for recovering from the virus and lend to a higher morbidity and or mortality risk. The patient was given the option of postponing their procedure. The patient was also presented reasonable alternatives to the proposed care, treatment, and services. The discussion I have had with the patient encompassed risks, benefits, and side effects related to the alternatives and the risks related to not receiving the proposed care, treatment and services. The patient opted to proceed with the ablation. Written informed consent was signed and placed in the chart. Patient was brought to the EP lab in a fasting non-sedate state. Patient underwent general anesthesia by anesthesia team. The patient was monitored continuously with ECG, pulse oximetry, blood pressure monitoring, and direct observation. No urinary quiroga was placed in order to prevent any hematuria or urinary tract infection. Both groins were prepped in a sterile fashion. We gained access in the right femoral vein. One 8 Turkmen short sheath for ICE and subsequently for CS catheters were placed in the right femoral vein using modified seldinger technique.  Two 8.5F SLO sheaths were introduced into the right femoral vein using modified Seldinger technique. Then a CS cathter was placed inside the coronary sinus without fluoroscopy but with 3-D electroanatomic mapping for recording and mapping of the left atrium. Using ICE we delineated the left pulmonary vein, left atrial appendage,  mitral valve, and right superior and right inferior pulmonary veins, and the trivial amount of pericardial effusion prior to ablation. Two transeptal punctures were attempted but we found a patent foramen ovale through which, with blunt perforation, performed under intracardiac echocardiogram, pressure monitoring, and without fluoroscopic guidance, we entered the LA. Patient received a bolus of heparin shortly after each transseptal puncture followed by continuous monitoring of the ACT every 15-30 minutes, and additional boluses of heparin during the procedure to keep the ACT between 300-400 sec. We placed both SLO sheaths inside the left atrium. Also an esophageal temperature probe (HandMinder Temperature Probe 12Fr) that was tied with sutures to an accompanying [employer] quadripolar 6 Pashto 5-5-5 electrode spacing catheter was advanced into the esophagus for real-time mapping of the esophagus and careful monitoring of the esophageal temperature during ablation. Using the Penta ray cathter and Carto navigation system a three dimensional electro anatomical mapping of the left atrium, in addition to right and left sided pulmonary vein anatomy was created. During sinus rhythm, we found that all four pulmonary veins (left superior, left inferior, right superior, and right inferior) had PV fascicles, the triggers for the atrial fibrillation. With rapid atrial pacing at 200 ms, we were able to easily induce atrial fibrillation.  The atrial fibrillation vacillated with three different types of left atrial flutter: 1) left atrial flutter with  ms with straight up-down activation, most likely utilizing the roof of the LA; 2) left atrial flutter with  ms with proximal-distal activation, most likely utilizing the anterior wall of the LA; and 3) left atrial flutter with  ms with distal-proximal activation, most likely utilizing the mitral isthmus. We then proceeded with a pulmonary vein isolation via ablation. Wide area circumferential ablations for the right sided pulmonary veins along with mady ablation were performed which resulted in electrical isolation of these pulmonary veins and eradication of the PV fascicles. As we completed the R antrum, the patient's atrial fibrillation organized into a left atrial flutter with TCL 270ms with proximal-distal activation, most likely the anterior wall dependent left atrial flutter seen above. Similarly, wide area circumferential ablations for the left sided pulmonary veins were performed which resulted in electrical isolation of these pulmonary veins and eradication of the PV fascicles. As we completed the left antral ablation, the patient's left atrial flutter terminated to sinus rhythm for the remainder of the procedure. After isolating the pulmonary veins,  given that the patient manifested a roof-dependent left atrial flutter, we ablated a linear line along the roof of the left atrium. Given that the patient manifested anterior wall dependent left atrial flutter, we ablated a linear ablation on the anterior wall from the mitral annulus to the roof to target the anterior wall dependent left atrial flutter. Given that the patient manifested mitral isthmus dependent left atrial flutter, we ablated a linear ablation on the mitral isthmus connecting the mitral annulus to the L posterolateral antrum lesions. We then evaluated the left atrium for complex fractionated atrial electrogram, a separate mechanism that would initiate and/or perpetuate atrial fibrillation apart from the pulmonary vein fascicles and antral ablations.  We found 1 site(s) on the septal aspect of the LA and ablated these foci. We then evaluated the anterior but inferior LA wall for complex fractionated atrial electrogram, a seprate mechanism that would initiate and/or perpetuate atrial fibrillation apart from the pulmonary vein fascicles and antral ablations. We found 1 site(s) on the anterior but inferior wall of the LA and ablated these foci. After ablation was complete, catheters were placed in the left and right atrium, His-position, right ventricle, and left ventricle for pacing and recording. Arrhythmia was attempted to be induced by rapid atrial and ventricular pacing, and there was no induction of atrial tachydysrhythmia. Maximum output (20mA) pacing in the L antrum and R antrum were also performed and showed dissociation from the rest of the left atrium. This was checked circumferentially around the antrums and verified many times. We evaluated the roof line and found that there was complete, bidirectional block. The anterior wall ablated line was evaluated and showed complete bidirectional block, as evidenced by a longer transit time on the medial aspect of the ablated line when compared to the lateral aspect of the ablated line when pacing from the LA appendage. The mitral isthmus was evaluated and showed incomplete, bidirectional block, as evidenced by a transit time of distal-proximal activation when pacing from the LA appendage. Adenosine bolus of 18mg was also given for each of the 4 pulmonary veins to assess for acute re-connection and to attempt to induce atrial fibrillation. We had adequate adenosine effect (AV blocks of > 3 seconds) and there were no pulmonary fascicles seen in any of the veins nor were there any atrial tachydysrhythmia induced.  We then administered dopamine infusion up to 10 mcg/kg/min in order to achieve at least a 20% increase in heart rate from the basal heart rate to induce any atrial tachydysrhythmia, and there were no atrial tachydysrhythmia induced. We then turned our attention to the patient's known PSVT. Without fluoroscopy but using a 3-D electroanatomic map that we created using the [employer], we advanced two quadripolar catheters sequentially to the high right atrium and right ventricular apex, while the [employer] SmartTouch-Surround Flow catheter was placed in the HIS position. We also used the Smart-Touch-Surround Flow catheter to enter the coronary sinus without fluoroscopy but utilizing the 3-D electroanatomic map so that the distal poles were in the coronary sinus for left atrial recording and mapping. After that, we did baseline measurements by pacing in both atria (R atrium, coronary sinus (for L atrial pacing/recording)) and right ventricle and programmed stimulation. . Sinus cycle length was 1044 msec  . ND interval: 153 msec  . QRS duration: 100 msec  . QT interval: 466 msec  . A-H interval of 90 msec  . H-V interval of 44 msec  . 1:1 antegrade conduction over AV block (AV ricardo wenckebach cycle length) was 360 msec   . Fast pathway ERP of 500/240 msec   . Slow pathway ERP of 500/210 msec  . 1:1 retrograde conduction over AV node (VA wenckebach cycle lenght) was attempted but showed VA dissociation    Arrhythmia Induction:  Patient was started on dobutamine up to 10 mcg/kg/min. A supraventricular tachydysrhythmia was not able to be induced with programmed stimulation. 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 At one point, during A1A2 stimulation, we found a non-sustained (5-6 beats) of AVNRT. Mapping and Ablation: Then three-dimensional mapping system (Carto navigation system) was used and a 3D electroanatomical map of the right atrium including His bundle and CS location was created.  The ablation catheter was advanced into position along the septal aspect of the tricuspid valve under 3D mapping guidance. Electrophysiologic mapping was performed to localize an area on the anterior lip of the coronary sinus ostium where an atrial-ventricular ratio of more than 1:10 could be obtained. Also, we performed a voltage map of the slow pathway region, targeting areas of transition from no-voltage to low-voltage below the His region. Following identification of this area, radiofrequency lesions were delivered (27 W, 30 seconds) with demonstration of accelerated junctional rhythm with maintenance of retrograde conduction. We made a linear lesion from the region of the slow pathway to the os of the coronary sinus. This resulted in successful ablation of slow pathway which was later confirmed by lack of AH jump. We also tried to induce the tachycardia by atrial and ventricular extra-stimuli which showed the disappearance of AH jump, and no re-entrant tachycardia was induced. Following ablation, and appropriate waiting time, programmed stimulation was performed off and on dopamine (up to 10 mcg/min). No atrial tachydysrhythmias or double AV ricardo echo beats were noted post ablation. There was no presence of the slow pathway as manifested by the absence of an AH jump. Following ablation, programmed stimulation was performed, pacing in both the R atrium, coronary sinus (for L atrial pacing/recording), and right ventricle:    . Sinus cycle length was 875 msec  . AL interval: 149 msec  . QRS duration: 88 msec  . QT interval: 406 msec  . A-H interval of 99 msec  . H-V interval of 48 msec  . 1:1 antegrade conduction over AV block (AV ricardo wenckebach cycle length) was 360 msec   . Fast pathway ERP of 500/200 msec   . 1:1 retrograde conduction over AV node (VA wenckebach cycle lenght) was attempted but showed VA dissociation     There was no presence of the slow pathway (no AH jump) nor AV ricardo echo.     Then both the ablation, Prince , and CS catheters were removed from the body, and all 3 sheaths were removed from the right femoral vein with a figure-of-eight suture that was placed to provide hemostasis while awaiting the downtrending of the ACT. Protamine 150mg IV x 1 was administered to partially reverse the IV heparin that was administered during the atrial fibrillation ablation procedure. Under intracardiac ultrasound guidance, we evaluated for pericardial effusion, and there was no evidence of such. Specimen collected: none    Estimated blood loss: < 50 cc    The patient tolerated the procedure well and there were no complications. Patient was extubated and transferred to the recovery area in stable condition. Conclusion:     - Pre- and post-procedure diagnoses were paroxymal atrial fibrillation, roof-dependent left atrial flutter with  ms with straight up-down activation, anterior wall dependent left atrial flutter with  ms with proximal-distal activation, and mitral isthmus dependent left atrial flutter with  ms with distal-proximal activation, and AVNRT of slow-fast variant  - Pulmonary vein isolations using wide area circumferential radiofrequency ablation   - Claire ablations on the R antrum  - Additional ablation with creation of roof line (for roof-dependent left atrial flutter), anterior line from the mitral annulus to the roof (for anterior wall dependent left atrial flutter), and mitral isthmus (for mitral isthmus dependent left atrial flutter), and slow pathway region of the right atrium  - Ablation of complex fractionated atrial electrogram in the left atrial septal wall (for atrial fibrillation)  - Ablation of complex fractionated atrial electrogram in the left atrial anterior but inferior wall (for atrial fibrillation)  - Successful ablation of AV ricardo re-entrant tachycardia, slow-fast variant     Plan:   The patient will be monitored and receive the usual post ablation care.  If there are no complications, the patient will be discharged from the hospital either later today or tomorrow with a new prescription for Flecainide 50mg po BID, pre-admission Metoprolol but decreased from 50mg po BID to now 12.5mg po BID, and a new prescription for Eliquis 5mg po BID. The patient will follow-up with the EP Nurse Practitioner in 3 month's time. The ASA will be stopped because there is no indication. Thank you for allowing us to participate in the care of your patient. If you have any questions or concerns, please do not hesitate to contact me.     Juliana Ortega MD, MS, [facility]  Cardiac Electrophysiology  [address]  [address] [facility], [address] [place] [address] Mk Mendez [facility] [address]  [phone]

## 2023-03-01 NOTE — DISCHARGE INSTRUCTIONS
CARDIAC ABLATION    Care of your puncture site:  Remove bandage 24 hours after the procedure. May shower in 24 hours but do not sit in a bathtub/pool of water for 5 days or until the wound is healed. Gently clean groin using soap and water. Dry thoroughly and apply a Band-Aid that covers the entire site. Use Band-Aid until skin heals over in about 3-5 days. Do not apply powder or lotion. Limit walking and stair climbing today. Normal Observations:  Soreness or tenderness which may last one week. Mild oozing from the incision site. Possible bruising that could last 2 weeks. (Atrial Fib Ablations Only)  You may experience chest burning that will peak in 2 days of the procedure. The burning will begin to subside the following days. You may take Tylenol for the discomfort    Activity:  You may resume driving 24 hours following the procedure. Do not make important / legal decisions within 24 hours after procedure. Do not drink alcoholic beverages or take any sleeping pills for 24 hours. You may resume normal activity in 3 days or after the wound heals. Avoid lifting more than 10 pounds for 3 days or until the wound heals. Avoid strenuous exercise or activity for 1 week. You may return to work in 3 day(s), if applicable. Nutrition:  Regular diet     Call your doctor immediately if your condition worsens, for any other concerns, for a follow-up appointment or if you experience any of the following:  Increased swelling on the groin or leg. Unusual pain, numbness, or tingling of the groin or down the leg. Any signs of infection such as: redness, yellow drainage at the site, swelling or pain.     IF GROIN STARTS BLEEDING SIGNIFICANTLY:   LAY FLAT, HOLD FIRM DIRECT PRESSURE, AND CALL [phone]      Electronically signed by Brien Severe, RN on 3/1/2023 at 10:44 AM

## 2023-03-01 NOTE — PROGRESS NOTES
PT received from cath lab. pt placed on monitor, pt presents with NS rythm. pt on 4L of oxygen via [place] WEANED TO 2L. pt juan diego score is 9/10 at the time of handoff. handoff preformed with [facility]MAIN RN.

## 2023-03-01 NOTE — H&P
Cardiac Electrophysiology Consultation   Date: 3/1/2023  Reason for Consultation: PSVT / ? Paroxysmal atrial fibrillation  Consult Requesting Physician: Olinda Red MD  Primary Care Physician: Karlee Pickard MD     Chief Complaint:       Chief Complaint   Patient presents with    Follow-up         HPI: Pricilla Shanks is a 63 y.o. patient with a history of hypertesnsion, ? paroxysmal atrial fibrillation, (May 2022 first diagnosis at [facility] [place] [facility]) presents with abd pain and diagnosed with small bowel obstruction c/b aspiration pneumonia, septic shock. During admission and also in rehab, noted to have episodes of PSVT with v-rates 150's bpm, terminated after given metoprolol. EP consulted. The patient mentions having an episode for the first time of atrial fibrillation at [facility] [place] [facility] in May 2022 which required DCCV. Was started on DOAC (Xarelto) afterwards. Coronary angiography subsequently revealed no stenosis and he was taken off DOAC by the interventional cardiologist.     Interval History: Today, he presents to office accompanied by his wife for hospital follow up for evaluation on SVT. EKG today shows SR. Reports atrial fibrillation was first diagnosed in 05/2022 at [facility]. Reports compliance with medications and tolerating them well. Denies chest pain/pressure, tightness, edema, shortness of breath, heart racing, palpitations, lightheadedness, dizziness, syncope, presyncope,  PND or orthopnea. He states that he has a CT of ABD scheduled for tomorrow and feel like he may be getting a blocked bowel again.       Past Medical History        Past Medical History:   Diagnosis Date    Arthritis      Atrial fibrillation [facility])      Hypertension              Past Surgical History         Past Surgical History:   Procedure Laterality Date    BRONCHOSCOPY   8/22/2022     BRONCHOSCOPY ALVEOLAR LAVAGE performed by Arely Rivera., DO at [address]   8/22/2022     BRONCHOSCOPY performed by Sinai Pedro MD at [address] N/A 8/09/0621     LAPAROSCOPIC REDUCTION HIATEL HERNIA, performed by Clinton Dexter MD at [address] [facility] N/A 8/31/2022     GASTROSTOMY TUBE PLACEMENT performed by Clinton Dexter MD at [address]   8/9/2022     EGD BIOPSY performed by Lidia Story MD at [address] [place] [address]   8/9/2022     EGD ESOPHAGOGASTRODUODENOSCOPY TUBE INSERTION performed by Lidia Story MD at [address] [place] [address] N/A 8/22/2022     EGD ESOPHAGOGASTRODUODENOSCOPY ORAL-GASTRIC TUBE INSERTION performed by Miracle Telles., DO at [address] [facility]: Allergies   Allergen Reactions    Codeine Nausea Only and Nausea And Vomiting       Stomach upset             Medication:   Home Medications           Prior to Admission medications    Medication Sig Start Date End Date Taking? Authorizing Provider   methotrexate (RHEUMATREX) 2.5 MG chemo tablet Take by mouth once a week Take once a week     Yes Historical Provider, MD   metoprolol tartrate (LOPRESSOR) 50 MG tablet Take 1 tablet by mouth 2 times daily 9/23/22   Yes Ashlyn Dey MD   celecoxib (CELEBREX) 200 MG capsule Take 200 mg by mouth 2 times daily 1/12/22   Yes Historical Provider, MD   gabapentin (NEURONTIN) 400 MG capsule Take 800 mg by mouth 4 times daily.      Yes Historical Provider, MD   folic acid (FOLVITE) 1 MG tablet Take 1 mg by mouth in the morning. 3/21/22   Yes Historical Provider, MD   ASPIRIN LOW DOSE 81 MG EC tablet Take 81 mg by mouth daily 6/13/22   Yes Historical Provider, MD   acetaminophen (TYLENOL) 500 MG tablet Take 1,000 mg by mouth every 8 hours as needed 8/3/22   Yes Historical Provider, MD   levothyroxine (SYNTHROID) 200 MCG tablet Take 200 mcg by mouth every morning 6/1/22   Yes Historical Provider, MD   loratadine (CLARITIN) 10 MG tablet Take 10 mg by mouth in the morning. Yes Historical Provider, MD   amLODIPine (NORVASC) 5 MG tablet Take 1 tablet by mouth daily 9/7/22 9/23/22   Venessa Stewart MD   spironolactone (ALDACTONE) 50 MG tablet Take 50 mg by mouth in the morning. 5/6/22 9/7/22   Historical Provider, MD            Social History:   reports that he has never smoked. He has never used smokeless tobacco. He reports that he does not currently use alcohol. He reports that he does not use drugs. Family History:  family history is not on file. Reviewed. Denies family history of sudden cardiac death, arrhythmia, premature CAD     Review of System:     General ROS: negative for - chills, fever   Psychological ROS: negative for - anxiety or depression  Ophthalmic ROS: negative for - eye pain or loss of vision  ENT ROS: negative for - epistaxis, headaches, nasal discharge, sore throat   Allergy and Immunology ROS: negative for - hives, nasal congestion   Hematological and Lymphatic ROS: negative for - bleeding problems, blood clots, bruising or jaundice  Endocrine ROS: negative for - skin changes, temperature intolerance or unexpected weight changes  Respiratory ROS: negative for - cough, hemoptysis, pleuritic pain, SOB, sputum changes or wheezing  Cardiovascular ROS: Per HPI. Gastrointestinal ROS: negative for - abdominal pain, blood in stools, diarrhea, hematemesis, melena, nausea/vomiting or swallowing difficulty/pain  Genito-Urinary ROS: negative for - dysuria or incontinence  Musculoskeletal ROS: negative for - joint swelling or muscle pain  Neurological ROS: negative for - confusion, dizziness, gait disturbance, headaches, numbness/tingling, seizures, speech problems, tremors, visual changes or weakness  Dermatological ROS: negative for - rash     Physical Examination:      Vitals:     01/23/23 0826   BP: 100/70   Pulse: 71   SpO2: 90%         Constitutional: Oriented. No distress.    Head: Normocephalic and atraumatic. Mouth/Throat: Oropharynx is clear and moist.   Eyes: Conjunctivae normal. EOM are normal.   Neck: Normal range of motion. Neck supple. No rigidity. No JVD present. Cardiovascular: Normal rate, regular rhythm, S1&S2 and intact distal pulses. Pulmonary/Chest: Bilateral respiratory sounds. No wheezes. No rhonchi. Abdominal: Soft. Bowel sounds present. No distension, No tenderness. Musculoskeletal: No tenderness. No edema    Lymphadenopathy: Has no cervical adenopathy. Neurological: Alert and oriented. Cranial nerve appears intact, No Gross deficit   Skin: Skin is warm and dry. No rash noted. Psychiatric: Has a normal mood, affect and behavior      Labs:  Reviewed. ECG: Sinus  rhythm with v-rate of 71 bpm with QRS duration 106 ms. No pathologic Q waves, ventricular pre-excitation, or QT prolongation. Studies:   1. Event monitor:  n/a        2. Echo: 08/11/2022  Summary  Technically difficult examination. Ejection fraction is visually estimated to be 60-65%. Grade II diastolic dysfunction  Right ventricle is poorly visualized, normal systolic function, appears dilated  LA volume/Index: 76.6 ml /29 ml/m2     3. Stress Test:  06/08/2022  FINAL INTERPRETATION   Abnormal study with evidence of mixed ischemia and scar. There is no left ventricular enlargement with normal global left ventricular systolic function. Overall study quality is good. Scan significance indicates moderate cardiac risk. Test sensitivity is reduced on anti-anginal drugs. 4. Cath: 06/13/2022  Findings:    This is a right dominant coronary arterial system  Left Main coronary artery: minimal luminal irregularities   Left anterior descending coronary artery: minimal luminal irregularities   Left circumflex coronary artery: minimal luminal irregularities   Right coronary artery: minimal luminal irregularities   Left ventriculogram: normal wall motion, LVEF = 65%   LVEDP: 14 mmHg   Aortic pressure: 90/60 mmHg Impression:   No significant CAD   Normal LV function   Normal LVEDP   Normal systemic pressures     Plan:   Continue medical therapy for cardiovascular risk factor reduction. I independently reviewed and interpreted the ECG, MCOT, echocardiogram, stress test, and coronary angiography/PCI results and used them for my plan of care. Procedures:  1. Successful DC cardioversion to NSR 05/09/2022 ([facility])     Assessment/Plan:      Paroxysmal supraventricular tachycardia (PSVT)  -narrow complex, very short RP, regular interval.  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 -difficult to up-titrate metoprolol given hypotension. We educated the patient that this PSVT is commonly found to be a worsening and progressive disease, with more frequent episodes that will ensue. Subsequent episodes usually become more sustained, as the patient is already experiencing. We discussed different management options for PSVT including their risks and benefits. These options include use of AV ricardo blocking agents such as beta blockers and calcium channel blockers. Although these medications may be immediately, and for the short term, effective, our clinical experience is that the tachydysrhythmia will eventually recur even in the midst of these medications. Of course, adenosine intravenously would still be an option for emergency cases, but that is impractical on a day-to-day basis. Anti-arrhythmic medications also provide a very effective therapy. However, for both AV ricardo blocking agents and anti-arrhythmic medications, the realistic and probable side effects of these medications make it difficult for younger, more active, individuals to bear - fatigue, dyspnea with exertion.  Finally, EP study with PSVT ablation is a curative therapy with a very high success rate after a first time procedure and would afford the patient the complete eradication of the PSVT without the need for any medications. The risks and benefits of an EP study +/- PSVT ablation were discussed at length, with the risks including, but not limited to, bleeding, infection, radiation exposure, injury to vascular, cardiac and surrounding structures (including pneumothorax), stroke, cardiac perforation, tamponade, need for emergent open heart surgery, need for pacemaker implantation, injury to the phrenic nerve, injury to the esophagus, myocardial infarction and death. The patient was also counseled at length about the risks of arlene Covid-19 in the jimmy-operative and post-operative states including the recovery window of their procedure. The patient was made aware that arlene Covid-19 after a surgical procedure may worsen their prognosis for recovering from the virus and lend to a higher morbidity and or mortality risk. The patient was given the option of postponing their procedure. The patient was also presented reasonable alternatives to the proposed care, treatment, and services. The discussion I have had with the patient encompassed risks, benefits, and side effects related to the alternatives and the risks related to not receiving the proposed care, treatment and services. I spent 40 minutes face to face with the patient, with greater than 50% of that time spent in counseling on the above. The patient wishes to proceed with the EP study +/- PSVT ablation. We will hold Metoprolol for 3 days prior to the procedure. We will obtain BMP, CBC  prior to the ablation. We will perform the SVT ablation as same time as atrial fibrillation ablation if atrial fibrillation diagnosis is confirmed. ? Paroxysmal atrial fibrillation  -patient mentions being diagnosed with new-onset afib this May 2022. No tracings to corroborate. But he underwent DCCV.  Not showing any evidence of atrial fibrillation in the EKGs in our records.   -will try to obtain EKG from [facility] to corroborate his recollection. -will then discuss treatment options for afib, including afib ablation as our most definitive treatment option.  -Given [id number] score of 1, ASA 81mg daily or DOAC or neither of these is what the ACC/AHA/HRS guidelines would recommend. Since he is on ASA right now, would continue with such. However, if the patient wishes to pursue ablation, [address] will need to be prescribed for the next 3 months following the ablation. We educated the patient that atrial fibrillation is a worsening and progressive disease, with more frequent episodes that will ensue. Subsequent episodes usually become more sustained to the extent that many individuals would then develop persistent atrial fibrillation. Once persistence is reached, permanent atrial fibrillation is inevitable. We also discussed the fact that atrial fibrillation is associated with stroke, including life-threatening stroke, and therefore oral anticoagulation is warranted depending on the patient's [id number] score. We discussed different management options for atrial fibrillation including their risks and benefits. These options include use of cardioversion (mainly for persisting atrial fibrillation or atrial flutter) which provides an effective immediate therapy with success rates of 75% or higher, but it provides no short nor long term efficacy. Anti-arrhythmic medications provide a very effective short term therapy, but even with our most potent anti-arrhythmic medication there is limited long term efficacy (clinical studies have shown that 40% of patients remain atrial fibrillation-free after 4 years of follow-up after starting one of the more powerful anti-arrhythmic medication (amiodarone), and, if extrapolated, may have further diminishing success as time goes on).  Atrial fibrillation ablation is a potentially curative therapy with very reasonable success rate after a first time procedure and with improving success rates with subsequent procedures. The risks, benefits and alternatives of the atrial fibrillation ablation procedure were discussed with the patient. The risks including, but not limited to, bleeding, infection, radiation exposure, injury to vascular, cardiac and surrounding structures (including pneumothorax), stroke, cardiac perforation, tamponade, need for emergent open heart surgery, need for pacemaker implantation, injury to the phrenic nerve, injury to the esophagus, myocardial infarction and death were discussed in detail. The patient was also counseled at length about the risks of arlene Covid-19 in the jimmy-operative and post-operative states including the recovery window of their procedure. The patient was made aware that arlene Covid-19 after a surgical procedure may worsen their prognosis for recovering from the virus and lend to a higher morbidity and or mortality risk. The patient was given the option of postponing their procedure. The patient was also presented reasonable alternatives to the proposed care, treatment, and services. The discussion I have had with the patient encompassed risks, benefits, and side effects related to the alternatives and the risks related to not receiving the proposed care, treatment and services. The patient opted to proceed with the atrial fibrillation ablation. However before we can proceed with scheduling we will need to get tracing from [facility] confirm diagnosis of atrial fibrillation. Once tracing are received we will schedule for a radiofrequency ablation with [employer] system with a BILLIE procedure immediately prior to this ablation. A cardiac CTA will be ordered for pulmonary vein mapping prior to this procedure. We will order BMP, CBC and Type & Screen prior to the procedure.        -  Much time was spent counseling the patient on healthier lifestyle, following a low sodium diet <2,400 mg of sodium a day and dramatically decreasing the intake of processed sugar < 24 grams for female and 36 grams male. - Patient educated to eat a diet which includes organic fruits, vegetables and meats.     - Encouraged to watch \"That Sugar Film\" which can be found on [employer] and other [employer] services, which discusses the negative impact of processed sugar has on the body. Follow up: Follow up three months after procedure with Harsh Lovett CNP. Thank you for allowing me to participate in the care of [place]. All questions and concerns were addressed to the patient/family. Alternatives to my treatment were discussed. I have reviewed the history and physical and examined the patient and find no relevant changes. I have reviewed with the patient and/or family the risks and benefits to the proposed procedure. The patient was presented with the option of postponing the proposed procedure. The patient was also presented reasonable alternatives to the proposed care, treatment, and services. The discussion I have had with the patient encompassed risks, benefits, and side effects related to the alternatives and the risks related to not receiving the proposed care, treatment and services.       Alex Vee MD, MS, [facility]  Cardiac Electrophysiology  [address]  [address] [place], [facility] [address]  [phone]

## 2023-03-04 ENCOUNTER — HOSPITAL ENCOUNTER (OUTPATIENT)
Age: 63
Setting detail: OBSERVATION
Discharge: HOME | End: 2023-03-05
Attending: EMERGENCY MEDICINE | Admitting: INTERNAL MEDICINE
Payer: COMMERCIAL

## 2023-03-04 ENCOUNTER — APPOINTMENT (OUTPATIENT)
Dept: CT IMAGING | Age: 63
End: 2023-03-04
Payer: COMMERCIAL

## 2023-03-04 ENCOUNTER — APPOINTMENT (OUTPATIENT)
Dept: GENERAL RADIOLOGY | Age: 63
End: 2023-03-04
Payer: COMMERCIAL

## 2023-03-04 DIAGNOSIS — R07.9 CHEST PAIN, UNSPECIFIED TYPE: Primary | ICD-10-CM

## 2023-03-04 LAB
ANION GAP SERPL CALCULATED.3IONS-SCNC: 12 MMOL/L (ref 3–16)
BASOPHILS ABSOLUTE: 0 K/UL (ref 0–0.2)
BASOPHILS RELATIVE PERCENT: 0.4 %
BUN BLDV-MCNC: 13 MG/DL (ref 7–20)
CALCIUM SERPL-MCNC: 8.5 MG/DL (ref 8.3–10.6)
CHLORIDE BLD-SCNC: 99 MMOL/L (ref 99–110)
CO2: 21 MMOL/L (ref 21–32)
CREAT SERPL-MCNC: 0.9 MG/DL (ref 0.8–1.3)
EOSINOPHILS ABSOLUTE: 0 K/UL (ref 0–0.6)
EOSINOPHILS RELATIVE PERCENT: 0.6 %
GFR SERPL CREATININE-BSD FRML MDRD: >60 ML/MIN/{1.73_M2}
GLUCOSE BLD-MCNC: 163 MG/DL (ref 70–99)
HCT VFR BLD CALC: 33 % (ref 40.5–52.5)
HEMOGLOBIN: 10.1 G/DL (ref 13.5–17.5)
LYMPHOCYTES ABSOLUTE: 0.8 K/UL (ref 1–5.1)
LYMPHOCYTES RELATIVE PERCENT: 9.8 %
MCH RBC QN AUTO: 26.8 PG (ref 26–34)
MCHC RBC AUTO-ENTMCNC: 30.6 G/DL (ref 31–36)
MCV RBC AUTO: 87.6 FL (ref 80–100)
MONOCYTES ABSOLUTE: 0.8 K/UL (ref 0–1.3)
MONOCYTES RELATIVE PERCENT: 9.4 %
NEUTROPHILS ABSOLUTE: 6.5 K/UL (ref 1.7–7.7)
NEUTROPHILS RELATIVE PERCENT: 79.8 %
PDW BLD-RTO: 34.6 % (ref 12.4–15.4)
PLATELET # BLD: 345 K/UL (ref 135–450)
PMV BLD AUTO: 6.8 FL (ref 5–10.5)
POTASSIUM REFLEX MAGNESIUM: 3.8 MMOL/L (ref 3.5–5.1)
PRO-BNP: 314 PG/ML (ref 0–124)
RBC # BLD: 3.77 M/UL (ref 4.2–5.9)
SODIUM BLD-SCNC: 132 MMOL/L (ref 136–145)
TROPONIN: 0.26 NG/ML
WBC # BLD: 8.2 K/UL (ref 4–11)

## 2023-03-04 PROCEDURE — 71046 X-RAY EXAM CHEST 2 VIEWS: CPT

## 2023-03-04 PROCEDURE — 80048 BASIC METABOLIC PNL TOTAL CA: CPT

## 2023-03-04 PROCEDURE — 84484 ASSAY OF TROPONIN QUANT: CPT

## 2023-03-04 PROCEDURE — 99285 EMERGENCY DEPT VISIT HI MDM: CPT

## 2023-03-04 PROCEDURE — 6370000000 HC RX 637 (ALT 250 FOR IP): Performed by: NURSE PRACTITIONER

## 2023-03-04 PROCEDURE — 93005 ELECTROCARDIOGRAM TRACING: CPT | Performed by: EMERGENCY MEDICINE

## 2023-03-04 PROCEDURE — 83880 ASSAY OF NATRIURETIC PEPTIDE: CPT

## 2023-03-04 PROCEDURE — 71260 CT THORAX DX C+: CPT | Performed by: NURSE PRACTITIONER

## 2023-03-04 PROCEDURE — 85025 COMPLETE CBC W/AUTO DIFF WBC: CPT

## 2023-03-04 PROCEDURE — 6360000004 HC RX CONTRAST MEDICATION: Performed by: NURSE PRACTITIONER

## 2023-03-04 RX ORDER — ASPIRIN 81 MG/1
324 TABLET, CHEWABLE ORAL ONCE
Status: COMPLETED | OUTPATIENT
Start: 2023-03-04 | End: 2023-03-04

## 2023-03-04 RX ORDER — ACETAMINOPHEN 500 MG
1000 TABLET ORAL ONCE
Status: COMPLETED | OUTPATIENT
Start: 2023-03-04 | End: 2023-03-04

## 2023-03-04 RX ORDER — NITROGLYCERIN 0.4 MG/1
0.4 TABLET SUBLINGUAL EVERY 5 MIN PRN
Status: DISCONTINUED | OUTPATIENT
Start: 2023-03-04 | End: 2023-03-05 | Stop reason: SDUPTHER

## 2023-03-04 RX ADMIN — ASPIRIN 81 MG CHEWABLE TABLET 324 MG: 81 TABLET CHEWABLE at 22:59

## 2023-03-04 RX ADMIN — IOPAMIDOL 75 ML: 755 INJECTION, SOLUTION INTRAVENOUS at 22:48

## 2023-03-04 RX ADMIN — ACETAMINOPHEN 1000 MG: 500 TABLET ORAL at 23:01

## 2023-03-04 RX ADMIN — NITROGLYCERIN 0.4 MG: 0.4 TABLET, ORALLY DISINTEGRATING SUBLINGUAL at 23:05

## 2023-03-04 ASSESSMENT — PAIN DESCRIPTION - LOCATION
LOCATION: CHEST
LOCATION: CHEST

## 2023-03-04 ASSESSMENT — HEART SCORE: ECG: 0

## 2023-03-04 ASSESSMENT — PAIN SCALES - GENERAL
PAINLEVEL_OUTOF10: 4
PAINLEVEL_OUTOF10: 6

## 2023-03-04 ASSESSMENT — PAIN DESCRIPTION - DESCRIPTORS
DESCRIPTORS: DULL
DESCRIPTORS: SHARP

## 2023-03-04 ASSESSMENT — PAIN DESCRIPTION - ORIENTATION: ORIENTATION: LEFT

## 2023-03-05 VITALS
DIASTOLIC BLOOD PRESSURE: 88 MMHG | WEIGHT: 315 LBS | OXYGEN SATURATION: 95 % | BODY MASS INDEX: 49.44 KG/M2 | HEIGHT: 67 IN | HEART RATE: 68 BPM | SYSTOLIC BLOOD PRESSURE: 153 MMHG | TEMPERATURE: 98.1 F | RESPIRATION RATE: 16 BRPM

## 2023-03-05 PROBLEM — R07.9 CHEST PAIN: Status: ACTIVE | Noted: 2023-03-05

## 2023-03-05 LAB
EKG ATRIAL RATE: 90 BPM
EKG DIAGNOSIS: NORMAL
EKG P AXIS: 34 DEGREES
EKG P-R INTERVAL: 182 MS
EKG Q-T INTERVAL: 380 MS
EKG QRS DURATION: 116 MS
EKG QTC CALCULATION (BAZETT): 464 MS
EKG R AXIS: 18 DEGREES
EKG T AXIS: 32 DEGREES
EKG VENTRICULAR RATE: 90 BPM
TROPONIN: 0.26 NG/ML
TROPONIN: 0.32 NG/ML

## 2023-03-05 PROCEDURE — 99204 OFFICE O/P NEW MOD 45 MIN: CPT | Performed by: INTERNAL MEDICINE

## 2023-03-05 PROCEDURE — G0378 HOSPITAL OBSERVATION PER HR: HCPCS

## 2023-03-05 PROCEDURE — 93010 ELECTROCARDIOGRAM REPORT: CPT | Performed by: INTERNAL MEDICINE

## 2023-03-05 PROCEDURE — 84484 ASSAY OF TROPONIN QUANT: CPT

## 2023-03-05 PROCEDURE — 6370000000 HC RX 637 (ALT 250 FOR IP): Performed by: NURSE PRACTITIONER

## 2023-03-05 PROCEDURE — 36415 COLL VENOUS BLD VENIPUNCTURE: CPT

## 2023-03-05 RX ORDER — SODIUM CHLORIDE 9 MG/ML
INJECTION, SOLUTION INTRAVENOUS PRN
Status: DISCONTINUED | OUTPATIENT
Start: 2023-03-05 | End: 2023-03-05 | Stop reason: HOSPADM

## 2023-03-05 RX ORDER — NITROGLYCERIN 0.4 MG/1
0.4 TABLET SUBLINGUAL EVERY 5 MIN PRN
Status: DISCONTINUED | OUTPATIENT
Start: 2023-03-05 | End: 2023-03-05 | Stop reason: HOSPADM

## 2023-03-05 RX ORDER — ACETAMINOPHEN 650 MG/1
650 SUPPOSITORY RECTAL EVERY 6 HOURS PRN
Status: DISCONTINUED | OUTPATIENT
Start: 2023-03-05 | End: 2023-03-05 | Stop reason: HOSPADM

## 2023-03-05 RX ORDER — FLECAINIDE ACETATE 100 MG/1
50 TABLET ORAL 2 TIMES DAILY
Status: DISCONTINUED | OUTPATIENT
Start: 2023-03-05 | End: 2023-03-05 | Stop reason: HOSPADM

## 2023-03-05 RX ORDER — ONDANSETRON 2 MG/ML
4 INJECTION INTRAMUSCULAR; INTRAVENOUS EVERY 6 HOURS PRN
Status: DISCONTINUED | OUTPATIENT
Start: 2023-03-05 | End: 2023-03-05 | Stop reason: HOSPADM

## 2023-03-05 RX ORDER — SODIUM CHLORIDE 0.9 % (FLUSH) 0.9 %
5-40 SYRINGE (ML) INJECTION PRN
Status: DISCONTINUED | OUTPATIENT
Start: 2023-03-05 | End: 2023-03-05 | Stop reason: HOSPADM

## 2023-03-05 RX ORDER — ASPIRIN 81 MG/1
81 TABLET, CHEWABLE ORAL DAILY
Status: DISCONTINUED | OUTPATIENT
Start: 2023-03-06 | End: 2023-03-05 | Stop reason: HOSPADM

## 2023-03-05 RX ORDER — POLYETHYLENE GLYCOL 3350 17 G/17G
17 POWDER, FOR SOLUTION ORAL DAILY PRN
Status: DISCONTINUED | OUTPATIENT
Start: 2023-03-05 | End: 2023-03-05 | Stop reason: HOSPADM

## 2023-03-05 RX ORDER — ONDANSETRON 4 MG/1
4 TABLET, ORALLY DISINTEGRATING ORAL EVERY 8 HOURS PRN
Status: DISCONTINUED | OUTPATIENT
Start: 2023-03-05 | End: 2023-03-05 | Stop reason: HOSPADM

## 2023-03-05 RX ORDER — LEVOTHYROXINE SODIUM 0.1 MG/1
200 TABLET ORAL
Status: DISCONTINUED | OUTPATIENT
Start: 2023-03-05 | End: 2023-03-05 | Stop reason: HOSPADM

## 2023-03-05 RX ORDER — SODIUM CHLORIDE 0.9 % (FLUSH) 0.9 %
5-40 SYRINGE (ML) INJECTION EVERY 12 HOURS SCHEDULED
Status: DISCONTINUED | OUTPATIENT
Start: 2023-03-05 | End: 2023-03-05 | Stop reason: HOSPADM

## 2023-03-05 RX ORDER — ATORVASTATIN CALCIUM 40 MG/1
40 TABLET, FILM COATED ORAL NIGHTLY
Status: DISCONTINUED | OUTPATIENT
Start: 2023-03-05 | End: 2023-03-05 | Stop reason: HOSPADM

## 2023-03-05 RX ORDER — ACETAMINOPHEN 325 MG/1
650 TABLET ORAL EVERY 6 HOURS PRN
Status: DISCONTINUED | OUTPATIENT
Start: 2023-03-05 | End: 2023-03-05 | Stop reason: HOSPADM

## 2023-03-05 RX ADMIN — METOPROLOL TARTRATE 12.5 MG: 25 TABLET, FILM COATED ORAL at 08:42

## 2023-03-05 RX ADMIN — FLECAINIDE ACETATE 50 MG: 100 TABLET ORAL at 08:41

## 2023-03-05 RX ADMIN — LEVOTHYROXINE SODIUM 200 MCG: 0.1 TABLET ORAL at 07:03

## 2023-03-05 RX ADMIN — APIXABAN 5 MG: 5 TABLET, FILM COATED ORAL at 08:41

## 2023-03-05 ASSESSMENT — PAIN SCALES - GENERAL: PAINLEVEL_OUTOF10: 0

## 2023-03-05 NOTE — PLAN OF CARE
Problem: Discharge Planning  Goal: Discharge to home or other facility with appropriate resources  3/5/2023 1240 by Arden Mckay RN  Outcome: Completed  3/5/2023 0305 by Sylvie Reeder RN  Outcome: Progressing     Problem: Pain  Goal: Verbalizes/displays adequate comfort level or baseline comfort level  3/5/2023 1240 by Arden Mckay RN  Outcome: Completed  3/5/2023 0305 by Sylvie Reeder RN  Outcome: Progressing     Problem: Safety - Adult  Goal: Free from fall injury  3/5/2023 1240 by Arden Mckay RN  Outcome: Completed  3/5/2023 0305 by Sylvie Reeder RN  Outcome: Progressing     Problem: ABCDS Injury Assessment  Goal: Absence of physical injury  3/5/2023 1240 by Arden Mckay RN  Outcome: Completed  3/5/2023 0305 by Sylvie Reeder RN  Outcome: Progressing

## 2023-03-05 NOTE — CONSULTS
Cardiology Consultation   Date: 3/5/2023  Admit Date:  3/4/2023  Reason for Consultation: chest discomfort  Consult Requesting Physician: Rickie Baez MD     Chief Complaint   Patient presents with    Chest Pain     Pt presents to ED via self with a c/o chest pain and tachycardia that started around 1500 today. Pt states he had an ablasion on Wednesday here at [address] states he has hx of afib and SVT. Pt rates chest pain a 6/10 and describes it as a sharp pain. Pt denies SOB. Pt denies COPD. Pt denies wearing O2 at home but reports using a CPAP for EARNESTINE. Pt denies taking any OTC medications for chest pain prior to arrival.      HPI: Yas Burt is a 63 y.o. M h/o HTN, and paroxysmal atrial fibrillation s/p afib ablation 3/1/2023 by myself presents with chest discomfort that is focal with 1 finger, substernal, dull in nature, graded 6/10, exacerbated by deep breathing, and relieved with exhalation. Occurred at 2p yesterday and lasted until 11pm yesterday. Wanting to be careful, he presented to the [facility] ED. Upon workup, EKG showed sinus 90's bpm without ST-T wave changes in the midst of the chest discomfort. Cristin was 0.26, 0.32, 0.26 with Cr 0.9. CT PE negative. CXR unremarkable.     Past Medical History:   Diagnosis Date    Arthritis     Atrial fibrillation ([address]     Hypertension         Past Surgical History:   Procedure Laterality Date    BRONCHOSCOPY  8/22/2022    BRONCHOSCOPY ALVEOLAR LAVAGE performed by Korina Reynolds DO at [address]  8/22/2022    BRONCHOSCOPY performed by Thea Kingston MD at [facility] N/A 4/57/0327    LAPAROSCOPIC REDUCTION HIATEL HERNIA, performed by Miguel Ángel Patel MD at [address] [facility] N/A 8/31/2022    GASTROSTOMY TUBE PLACEMENT performed by Miguel Ángel Patel MD at [address]  8/9/2022    EGD BIOPSY performed by Ebony Florian MD at [facility] ENDOSCOPY    UPPER GASTROINTESTINAL ENDOSCOPY  2022    EGD ESOPHAGOGASTRODUODENOSCOPY TUBE INSERTION performed by Cecilio Mireles MD at [address] [place] [address] N/A 2022    EGD ESOPHAGOGASTRODUODENOSCOPY ORAL-GASTRIC TUBE INSERTION performed by Mendez Mcgowan DO at [facility] ENDOSCOPY       Allergies   Allergen Reactions    Codeine Nausea Only and Nausea And Vomiting     Stomach upset          Social History:  Reviewed. reports that he has never smoked. He has never used smokeless tobacco. He reports that he does not currently use alcohol. He reports that he does not use drugs. Family History:  Reviewed. family history is not on file. No premature CAD. Review of System:  All other systems reviewed except for that noted above. Pertinent negatives and positives are:     General: negative for fever, chills   Ophthalmic ROS: negative for - eye pain or loss of vision  ENT ROS: negative for - headaches, sore throat   Respiratory: negative for - cough, sputum  Cardiovascular: Reviewed in HPI  Gastrointestinal: negative for - abdominal pain, diarrhea, N/V  Hematology: negative for - bleeding, blood clots, bruising or jaundice  Genito-Urinary:  negative for - Dysuria or incontinence  Musculoskeletal: negative for - Joint swelling, muscle pain  Neurological: negative for - confusion, dizziness, headaches   Psychiatric: No anxiety, no depression.   Dermatological: negative for - rash    Physical Examination:  Vitals:    23 0838   BP: 126/75   Pulse: 65   Resp: 18   Temp: 98.1 °F (36.7 °C)   SpO2: 95%        Intake/Output Summary (Last 24 hours) at 3/5/2023 0935  Last data filed at 3/5/2023 0115  Gross per 24 hour   Intake 300 ml   Output --   Net 300 ml     In: 300 [P.O.:300]  Out: -    Wt Readings from Last 3 Encounters:   23 (!) 322 lb 14.4 oz (146.5 kg)   23 (!) 325 lb (147.4 kg)   23 (!) 324 lb (147 kg)     Temp  Av.4 °F (36.9 °C)  Min: 98.1 °F (36.7 °C)  Max: 99.2 °F (37.3 °C)  Pulse  Av.8  Min: 65  Max: 87  BP  Min: 93/60  Max: 149/84  SpO2  Av.4 %  Min: 91 %  Max: 96 %    Telemetry: Sinus rhythm with v-rates 70-80's bpm  Constitutional: Alert. Oriented to person, place, and time. No distress. Head: Normocephalic and atraumatic. Mouth/Throat: Lips appear moist. Oropharynx is clear and moist.  Eyes: Conjunctivae normal. EOM are normal.   Neck: Neck supple. No lymphadenopathy. No rigidity. No JVD present. Cardiovascular: Normal rate, regular rhythm. Normal S1&S2. Carotid pulse 2+ bilaterally. Pulmonary/Chest: Bilateral respiratory sounds present. No respiratory accessory muscle use. No wheezes, No rhonchi. No rales. Abdominal: Soft. Normal bowel sounds present. No distension, No tenderness. No splenomegaly. No hernia. Musculoskeletal: No tenderness. Full range of motion in bilateral upper and lower extremities. No pitting BLE edema    Lymphadenopathy: Has no cervical adenopathy. Neurological: Alert and oriented. Cranial nerve II-XII grossly intact, No gross deficit to touch. Skin: Skin is warm and dry. No rash, lesions, ulcerations noted. Psychiatric: No anxiety nor agitation. Labs:  Reviewed. Recent Labs     23  2143   *   K 3.8   CL 99   CO2 21   BUN 13   CREATININE 0.9     Recent Labs     23  2143   WBC 8.2   HGB 10.1*   HCT 33.0*   MCV 87.6        Lab Results   Component Value Date/Time    TROPONINI 0.26 2023 07:39 AM     No results found for: BNP  Lab Results   Component Value Date/Time    PROTIME 16.4 2022 04:45 AM    INR 1.33 2022 04:45 AM     Lab Results   Component Value Date/Time    TRIG 156 2022 05:22 AM       Diagnostic and imaging results reviewed. ECG: Sinus rhythm with v-rates 80-90's bpm; QTC 460ms  Echo: 22  Summary   Technically difficult examination. Ejection fraction is visually estimated to be 60-65%.    Grade II diastolic dysfunction   Right ventricle is poorly visualized, normal systolic function, appears   dilated  Cath: n/a    I independently reviewed and interpreted the ECG, telemetry, serology, echocardiographic results.     Scheduled Meds:   sodium chloride flush  5-40 mL IntraVENous 2 times per day    [START ON 3/6/2023] aspirin  81 mg Oral Daily    atorvastatin  40 mg Oral Nightly    apixaban  5 mg Oral BID    flecainide  50 mg Oral BID    levothyroxine  200 mcg Oral QAM AC    metoprolol tartrate  12.5 mg Oral BID     Continuous Infusions:   sodium chloride       PRN Meds:.sodium chloride flush, sodium chloride, ondansetron **OR** ondansetron, acetaminophen **OR** acetaminophen, polyethylene glycol, nitroGLYCERIN     Assessment:   Patient Active Problem List    Diagnosis Date Noted    Chest pain 03/05/2023    PAF (paroxysmal atrial fibrillation) ([facility] 03/01/2023    Left atrial flutter by electrocardiogram ([facility] 03/01/2023    Critical illness myopathy 09/13/2022    Dysphagia 08/31/2022    Acute delirium 08/28/2022    EARNESTINE (obstructive sleep apnea) 08/28/2022    Aspiration pneumonia ([address] 08/22/2022    Morbid obesity with BMI of 50.0-59.9, adult ([address] 08/22/2022    Arterial hypotension 83/42/8701    Diastolic heart failure ([address] 08/14/2022    History of atrial fibrillation 08/14/2022    Supraventricular tachycardia ([address] 08/14/2022    Small bowel obstruction ([address] 08/09/2022    Acute respiratory failure with hypoxia ([address] 08/08/2022    Acute respiratory failure with hypoxia and hypercapnia ([address] 08/07/2022    SBO (small bowel obstruction) ([address] 08/07/2022    Aspiration into airway 08/07/2022    Partial bowel obstruction ([address] 08/05/2022    Hiatal hernia 08/05/2022      Active Hospital Problems    Diagnosis Date Noted    Chest pain [R07.9] 03/05/2023     Priority: Medium         Recommendation(s):    Chest discomfort  -non-anginal  -musculoskeletal in that it is reproducible by palpation and also exacerbated by deep breathing  -Cristin is elevated as a consequence of cardiac ablation  -EKG does not show any dynamic ST-T wave changes, and this was obtained in the midst of chest discomfort.  -no further coronary workup at this time. Will sign off. Thank you for allowing me to participate in the care of [place] . If you have any questions/comments, please do not hesitate to contact us.       Richard Vieyra MD, MS, [address] [facility]  Cardiac Electrophysiology  [address]  [address] [place], [facility]  [address] [place] [address]  [phone]

## 2023-03-05 NOTE — ED PROVIDER NOTES
[address]  [address] [address]  Dept: [phone]  Loc: [address] ENCOUNTER        This patient was seen and evaluated per myself in conjunction with ED attending Dr. Travis Feng    Chief Complaint   Patient presents with    Chest Pain     Pt presents to ED via self with a c/o chest pain and tachycardia that started around 1500 today. Pt states he had an ablasion on Wednesday here at [address] states he has hx of afib and SVT. Pt rates chest pain a 6/10 and describes it as a sharp pain. Pt denies SOB. Pt denies COPD. Pt denies wearing O2 at home but reports using a CPAP for EARNESTINE. Pt denies taking any OTC medications for chest pain prior to arrival.        HPI    Boni Edmonds is a 63 y.o. male who presents with chest pain. The onset was around 2 to 3 PM this afternoon. .  The duration has been constant since the onset. The quality of the pain is pressure, with a severity of 4/10. The pain is localized in the substernally. The context is that the symptoms started spontaneously at rest.. The patient complains of associated shortness of breath. Symptoms worsen with exertion. Somewhat alleviated with rest. The patient denies any associated radiation of the pain, vomiting, diaphoresis. Just had an ablation last week with Dr. Freddy Vinson. REVIEW OF SYSTEMS    Cardiac: see HPI, no syncope  Respiratory: + shortness of breath, no cough, no hemoptysis  GI: No vomiting or diarrhea  : No dysuria or hematuria  General: No fever or chills  All other systems reviewed and are negative.     PAST MEDICAL & SURGICAL HISTORY    Past Medical History:   Diagnosis Date    Arthritis     Atrial fibrillation [facility])     Hypertension      Past Surgical History:   Procedure Laterality Date    BRONCHOSCOPY  8/22/2022    BRONCHOSCOPY ALVEOLAR LAVAGE performed by Bentley Anderson.,  at [facility] BRONCHOSCOPY  8/22/2022    BRONCHOSCOPY performed by Denise Martin MD at [address] N/A 2/28/9632    LAPAROSCOPIC REDUCTION HIATEL HERNIA, performed by Stephy Brink MD at [address] [facility] N/A 8/31/2022    GASTROSTOMY TUBE PLACEMENT performed by Stephy Brink MD at [address]  8/9/2022    EGD BIOPSY performed by Bonnie Tamayo MD at [address] [place] [address]  8/9/2022    EGD ESOPHAGOGASTRODUODENOSCOPY TUBE INSERTION performed by Bonnie Tamayo MD at [address] [place] [address] N/A 8/22/2022    EGD ESOPHAGOGASTRODUODENOSCOPY ORAL-GASTRIC TUBE INSERTION performed [facility] Alpa [facility]ccullough DO [facility] [address] [facility] [facility]  (may include discharge medications prescribed in the ED)  REM      ALLERGIES    Allergies   Allergen Reactions    Codeine Nausea Only and Nausea And Vomiting     Stomach upset          SOCIAL & FAMILY HISTORY    Social History     Socioeconomic History    Marital status:      Spouse name: None    Number of children: None    Years of education: None    Highest education level: None   Tobacco Use    Smoking status: Never    Smokeless tobacco: Never   Vaping Use    Vaping Use: Never used   Substance and Sexual Activity    Alcohol use: Not Currently    Drug use: Never    Sexual activity: Not Currently     History reviewed. No pertinent family history.     PHYSICAL EXAM    VITAL SIGNS: BP (!) 96/58   Pulse 71   Temp 98.1 °F (36.7 °C) (Oral)   Resp 18   Ht 5' 7\" (1.702 m)   Wt (!) 322 lb 12.1 oz (146.4 kg)   SpO2 95%   BMI 50.55 kg/m²    Constitutional:  Well developed, well nourished, no acute distress   HENT:  Atraumatic, moist mucus membranes  Neck: supple, no JVD   Respiratory:  Lungs clear to auscultation bilaterally, no retractions   Cardiovascular:  regular rate, no murmurs  Vascular: Radial and DP pulses 2+ and equal bilaterally  GI:  Soft, nontender, normal bowel sounds  Musculoskeletal:  no lower extremity edema, no lower extremity asymmetry, no calf tenderness, no thigh tenderness, no acute deformities  Integument:  Skin warm and dry, no petechiae   Neurologic:  Alert & oriented, no slurred speech  Psych: Pleasant affect, no hallucinations    EKG    Please see the physician note for EKG interpretation. LABS  Results for orders placed or performed during the hospital encounter of 03/04/23   Troponin   Result Value Ref Range    Troponin 0.26 (H) <0.01 ng/mL   CBC with Auto Differential   Result Value Ref Range    WBC 8.2 4.0 - 11.0 K/uL    RBC 3.77 (L) 4.20 - 5.90 M/uL    Hemoglobin 10.1 (L) 13.5 - 17.5 g/dL    Hematocrit 33.0 (L) 40.5 - 52.5 %    MCV 87.6 80.0 - 100.0 fL    MCH 26.8 26.0 - 34.0 pg    MCHC 30.6 (L) 31.0 - 36.0 g/dL    RDW 34.6 (H) 12.4 - 15.4 %    Platelets 192 669 - 372 K/uL    MPV 6.8 5.0 - 10.5 fL    Neutrophils % 79.8 %    Lymphocytes % 9.8 %    Monocytes % 9.4 %    Eosinophils % 0.6 %    Basophils % 0.4 %    Neutrophils Absolute 6.5 1.7 - 7.7 K/uL    Lymphocytes Absolute 0.8 (L) 1.0 - 5.1 K/uL    Monocytes Absolute 0.8 0.0 - 1.3 K/uL    Eosinophils Absolute 0.0 0.0 - 0.6 K/uL    Basophils Absolute 0.0 0.0 - 0.2 K/uL   Basic Metabolic Panel w/ Reflex to MG   Result Value Ref Range    Sodium 132 (L) 136 - 145 mmol/L    Potassium reflex Magnesium 3.8 3.5 - 5.1 mmol/L    Chloride 99 99 - 110 mmol/L    CO2 21 21 - 32 mmol/L    Anion Gap 12 3 - 16    Glucose 163 (H) 70 - 99 mg/dL    BUN 13 7 - 20 mg/dL    Creatinine 0.9 0.8 - 1.3 mg/dL    Est, Glom Filt Rate >60 >60    Calcium 8.5 8.3 - 10.6 mg/dL   BNP   Result Value Ref Range    Pro- (H) 0 - 124 pg/mL         RADIOLOGY/PROCEDURES    CT CHEST PULMONARY EMBOLISM W CONTRAST   Preliminary Result   Limited exam demonstrates no pulmonary embolism.   Correlate with V/Q scan or   repeat study if there is high clinical suspicion for pulmonary embolism. Cardiomegaly. Large hiatal hernia/intrathoracic stomach. XR CHEST (2 VW)   Final Result      Lungs are clear. Probable hiatus hernia which has a fluid level within it. ED COURSE & MEDICAL DECISION MAKING    Pertinent Labs & Imaging studies reviewed and interpreted. (See chart for details)  The patient was immediately placed on the cardiac monitor. IV access obtained. ASA was ordered. See chart for details of medications given during the ED stay. Vitals:    03/04/23 2330 03/05/23 0000 03/05/23 0030 03/05/23 0106   BP: 93/60 103/68 108/65 (!) 96/58   Pulse: 74 70 67 71   Resp: 24 24 23 18   Temp:    98.1 °F (36.7 °C)   TempSrc:    Oral   SpO2: 96% 95% 94% 95%   Weight:       Height:               History from : Patient    Limitations to history : None    Chronic Conditions:   Past Medical History:   Diagnosis Date    Arthritis     Atrial fibrillation ([facility]     Hypertension        CONSULTS: (Who and What was discussed)  IP CONSULT TO CARDIOLOGY    Discussion with Other Profesionals : Admitting Team hospitalist via PS    Social Determinants : None    Records Reviewed : None    CC/HPI Summary, DDx, ED Course, and Reassessment: See HPI and above for full presentation physical exam.    Patient is a very pleasant 79-year-old male who presents to the ED today with complaints of substernal chest pressure. States it started around 2 to 3 PM this afternoon. Started at rest.  Worsens with exertion. Associated shortness of breath. No diaphoresis nausea or vomiting. Nonradiating. States that he felt great after an ablation done on Wednesday with . Symptoms started spontaneously and therefore came to the ED. On arrival he is afebrile. Hemodynamically stable. Nontoxic in appearance. Cardiac RRR. Lung sounds clear. Abdomen is soft, benign and nontender. Bowel sounds present. No reproducible tenderness on palpation of patient's chest wall.   No crepitus ecchymosis or deformities. No lower extremity edema or calf pain. We will obtain blood work, including a CT to rule out PE. We will give nitro sublingual and aspirin p.o. Differential Diagnosis: Acute Coronary Syndrome, Congestive Heart Failure, Thoracic Dissection, Pericarditis, Pericardial Effusion, Pulmonary Embolism, Pneumonia, Pneumothorax, Ischemic Bowel, Bowel Obstruction, PUD, GERD, Acute Cholecystitis, Pancreatitis, Hepatitis, Colitis, other    CRITICAL CARE NOTE:  There was a high probability of clinically significant life-threatening deterioration of the patient's condition requiring my urgent intervention. Total critical care time was at least 10 minutes. This includes vital sign monitoring, pulse oximetry monitoring, telemetry monitoring, clinical response to the IV medications, reviewing the nursing notes, consultation time, dictation/documentation time, and interpretation of the labwork. This excludes any separately billable procedures performed. Patient is afebrile and nontoxic in appearance. Labs reveal no leukocytosis, stable anemia. Metabolic panel unremarkable. CXR findings as above, per my interpretation no focal consolidation consistent with pneumonia . verified via the radiologist.  See full radiology read as above    EKG interpreted by physician. Troponin 0.26. BNP not significantly elevated. CT is read by the radiologist as above. Patient's HEART score is 5    We will need likely cardiac rule out given his high heart score. Could be secondary to his ablation but troponins will need to be trended and he would benefit by evaluation by cardiology. I therefore consulted the hospitalist for admission via perfect serve. FINAL IMPRESSION    1.  Chest pain, unspecified type        PLAN  Admission to the hospital    (Please note that this note was completed with a voice recognition program.  Every attempt was made to edit the dictations, but inevitably there remain words that are mis-transcribed.)      Amanda Edgar, APRN - CNP  03/05/23 1724

## 2023-03-05 NOTE — ED NOTES
Report given to 4N floor, RN. SBAR discussed. All questions answered. RN verbalized understanding.      Pallavi Duran RN  03/05/23 0054

## 2023-03-05 NOTE — ED TRIAGE NOTES
Pt presents to ED via self with a c/o chest pain and tachycardia that started around 1500 today. Pt states he had an ablasion on Wednesday here at [facility]. Pt states he has hx of afib and SVT. Pt rates chest pain a 6/10 and describes it as a sharp pain. Pt denies SOB. Pt denies COPD. Pt denies wearing O2 at home but reports using a CPAP for EARNESTINE. Pt denies taking any OTC medications for chest pain prior to arrival. Pt denies N/V/D. Pt is in no acute distress at this moment. Respirations non-labored.

## 2023-03-05 NOTE — H&P
Hospital Medicine History & Physical      PCP: Grupo Iglesias MD    Date of Admission: 3/4/2023    Date of Service: Pt seen/examined on 3/4/2023 and Placed in Observation. Chief Complaint:  Chest pain      History Of Present Illness:      63 y.o. male with PMHx of HTN and A-fib s/p ablation 3/1/23 by Dr Sammy Soler presented to [facility] ED with left side chest pain. Pt reports pain began this afternoon around 2pm.  No nausea or diaphoresis. No shortness of breath. He thought his heart was racing today - hr 113. Reports feeling good since ablation until this afternoon. Pain has resolved at this time. Past Medical History:          Diagnosis Date    Arthritis     Atrial fibrillation ([address]     Hypertension        Past Surgical History:          Procedure Laterality Date    BRONCHOSCOPY  8/22/2022    BRONCHOSCOPY ALVEOLAR LAVAGE performed by Frandy Barber DO at [address]  8/22/2022    BRONCHOSCOPY performed by Ryne Andrade MD at [address] [facility] N/A 4/72/9515    LAPAROSCOPIC REDUCTION HIATEL HERNIA, performed by Marcello Oconnor MD at [address] [facility] 8/31/2022    GASTROSTOMY TUBE PLACEMENT performed by Marcello Oconnor MD at [address] [facility]  8/9/2022    EGD BIOPSY performed by Stefan Live MD at [address] [place] [address]  8/9/2022    EGD ESOPHAGOGASTRODUODENOSCOPY TUBE INSERTION performed by Stefan Live MD at [address] [place] [address] N/A 8/22/2022    EGD ESOPHAGOGASTRODUODENOSCOPY ORAL-GASTRIC TUBE INSERTION performed by Frandy Barber DO at [address] [facility]       Medications Prior to Admission:      Prior to Admission medications    Medication Sig Start Date End Date Taking?  Authorizing Provider   flecainide (TAMBOCOR) 50 MG tablet Take 1 tablet by mouth 2 times daily 3/1/23   JARON Lozano - CNP   apixaban (ELIQUIS) 5 MG TABS tablet Take 1 tablet by mouth 2 times daily 3/1/23   JARON Cochran - CNP   metoprolol tartrate (LOPRESSOR) 25 MG tablet Take 0.5 tablets by mouth 2 times daily 3/1/23   JARON Cochran - CNP   methotrexate (RHEUMATREX) 2.5 MG chemo tablet Take by mouth once a week Take once a week    Historical Provider, MD   celecoxib (CELEBREX) 200 MG capsule Take 200 mg by mouth 2 times daily 1/12/22   Historical Provider, MD   gabapentin (NEURONTIN) 400 MG capsule Take 800 mg by mouth 4 times daily. Historical Provider, MD   amLODIPine (NORVASC) 5 MG tablet Take 1 tablet by mouth daily 9/7/22 9/23/22  Hasmukh Weinberg MD   folic acid (FOLVITE) 1 MG tablet Take 1 mg by mouth in the morning. 3/21/22   Historical Provider, MD   acetaminophen (TYLENOL) 500 MG tablet Take 1,000 mg by mouth every 8 hours as needed 8/3/22   Historical Provider, MD   levothyroxine (SYNTHROID) 200 MCG tablet Take 200 mcg by mouth every morning 6/1/22   Historical Provider, MD   loratadine (CLARITIN) 10 MG tablet Take 10 mg by mouth in the morning. Historical Provider, MD   spironolactone (ALDACTONE) 50 MG tablet Take 50 mg by mouth in the morning. 5/6/22 9/7/22  Historical Provider, MD       Allergies:  Codeine    Social History:      The patient currently lives home with family    TOBACCO:   reports that he has never smoked. He has never used smokeless tobacco.  ETOH:   reports that he does not currently use alcohol. Family History:      Reviewed in detail positive as follows:    History reviewed. No pertinent family history. REVIEW OF SYSTEMS:   Pertinent positives as noted in the HPI. All other systems reviewed and negative.     PHYSICAL EXAM PERFORMED:    /87   Pulse 71   Temp 98.1 °F (36.7 °C) (Oral)   Resp 18   Ht 5' 7\" (1.702 m)   Wt (!) 322 lb 12.1 oz (146.4 kg)   SpO2 95%   BMI 50.55 kg/m²     General appearance:  Well developed, well nourished,  male lying on ED cart in no apparent distress, appears stated age and cooperative. HEENT:  Normal cephalic, atraumatic without obvious deformity. Pupils equal, round, and reactive to light. Conjunctivae/corneas clear. Neck: Supple, with full range of motion. No jugular venous distention. Trachea midline. Respiratory:  Normal respiratory effort. Clear to auscultation, bilaterally without accessory muscle use. Cardiovascular:  Regular rate and rhythm without murmurs, no lower extremity edema. Abdomen: Soft, obese abdomen, non-tender, non-distended, without rebound or guarding. Normal bowel sounds. Musculoskeletal:  Moves all extremities equally. Full range of motion without deformity. Skin: Skin warm, dry and intact. No rashes or lesions. Neurologic:  Neurovascularly intact without any focal sensory/motor deficits. Cranial nerves: II-XII intact, grossly non-focal.  Psychiatric:  Alert and oriented, thought content appropriate, normal insight  Capillary Refill: Brisk,< 3 seconds   Peripheral Pulses: +2 palpable, equal bilaterally       Labs:     Recent Labs     03/04/23 2143   WBC 8.2   HGB 10.1*   HCT 33.0*        Recent Labs     03/04/23 2143   *   K 3.8   CL 99   CO2 21   BUN 13   CREATININE 0.9   CALCIUM 8.5     No results for input(s): AST, ALT, BILIDIR, BILITOT, ALKPHOS in the last 72 hours. No results for input(s): INR in the last 72 hours. Recent Labs     03/04/23 2143   TROPONINI 0.26*       Urinalysis:      Lab Results   Component Value Date/Time    NITRU Negative 08/15/2022 09:16 AM    WBCUA 6-9 08/15/2022 09:16 AM    BACTERIA 4+ 08/15/2022 09:16 AM    RBCUA  08/15/2022 09:16 AM    BLOODU LARGE 08/15/2022 09:16 AM    SPECGRAV 1.010 08/15/2022 09:16 AM    GLUCOSEU Negative 08/15/2022 09:16 AM       Radiology:     EKG:  I have reviewed the EKG with the following interpretation: normal sinus rhythm, rate 90.  No acute ST elevation    CT CHEST PULMONARY EMBOLISM W CONTRAST   Preliminary Result   Limited exam demonstrates no pulmonary embolism. Correlate with V/Q scan or   repeat study if there is high clinical suspicion for pulmonary embolism. Cardiomegaly. Large hiatal hernia/intrathoracic stomach. XR CHEST (2 VW)   Final Result      Lungs are clear. Probable hiatus hernia which has a fluid level within it. ASSESSMENT:    Active Hospital Problems    Diagnosis Date Noted    Chest pain [R07.9] 03/05/2023     Priority: Medium         PLAN:    Chest pain  - ECG shows no ST/T abnormalities  - troponin 0.26 ( ablation 2 days ago), will trend x 2   - ASA, statin  - NTG for chest pain PRN  - will obtain ECG if chest pain recurrs  - consult cardiology - Dr Freddy Vinson performed ablation 3/1    HTN  - stable  - cont home meds    Atrial fibrillation s/p ablation  - currently sinus rhythm  - continue xarelto, flecainide and metoprolol    DVT Prophylaxis: Xarelto  Diet: ADULT DIET; Regular; No Caffeine  Code Status: Full Code    Dispo - Inpatient       [address], APRN - CNP    Thank you Uri Ruiz MD for the opportunity to be involved in this patient's care. If you have any questions or concerns please feel free to contact me at [phone].

## 2023-03-05 NOTE — PROGRESS NOTES
Patient transferred from ED, VSS, no complaints of chest pain at this time A&ox4, bed locked in lowest position, call light in reach, will continue to monitor.

## 2023-03-05 NOTE — ED PROVIDER NOTES
[address] [place] [address]      Pt Name: Yas Burt  MRN: [id number]  Juan [place] 1960  Date of evaluation: 3/4/2023  Provider: Jennifer Martinez, [address] [place] [address]  Chief Complaint   Patient presents with    Chest Pain     Pt presents to ED via self with a c/o chest pain and tachycardia that started around 1500 today. Pt states he had an ablasion on Wednesday here at [address] states he has hx of afib and SVT. Pt rates chest pain a 6/10 and describes it as a sharp pain. Pt denies SOB. Pt denies COPD. Pt denies wearing O2 at home but reports using a CPAP for EARNESTINE. Pt denies taking any OTC medications for chest pain prior to arrival.        I have fully participated in the care of Yas Burt and have had a face-to-face evaluation. I have reviewed and agree with all pertinent clinical information, and midlevel provider's history, and physical exam. I have also reviewed the labs, EKG, and imaging studies and treatment plan. I have also reviewed and agree with the medications, allergies and past medical history section for this Yas Burt. I agree with the diagnosis, and I concur. This patient is at risk for a communicable infection. Therefore, personal protection equipment consisting of a mask was worn for the exam.    Past Medical History:   Diagnosis Date    Arthritis     Atrial fibrillation ([address]     Hypertension        MDM:  Yas Burt is a 63 y.o. male who presents with chest pain that started 2 to 3 PM today. He was watching TV at the time. He had a recent ablation Thursday for his 3 days ago. This was by Dr. Minor Wan. He has had no problems since that time. He was feeling good afterwards. He does not have an irregular heartbeat. Today he felt that his heartbeat was fast at 113. He states he is still having some chest pain but it is improved. He denies any shortness of breath. Denies any nausea vomiting.   Nothing makes it better or worse. Physical exam shows heart to be regular rate and rhythm without murmurs clicks or rubs. His blood pressure is low at 93/60 and has been as low as 74/40 in the emergency department. Troponin was elevated at 0.26 sodium was 132. Glucose was 163. His BNP was 314. Chest x-ray was negative. CT scan of his chest was negative. However, with his low blood pressures ongoing chest pain I feel patient needs admitted to the hospital for further evaluation and treatment. Hospitalist was notified by the nurse practitioner.     Vitals:    03/04/23 2330   BP: 93/60   Pulse: 74   Resp: 24   Temp:    SpO2: 96%       Lab results  Labs Reviewed   TROPONIN - Abnormal; Notable for the following components:       Result Value    Troponin 0.26 (*)     All other components within normal limits   CBC WITH AUTO DIFFERENTIAL - Abnormal; Notable for the following components:    RBC 3.77 (*)     Hemoglobin 10.1 (*)     Hematocrit 33.0 (*)     MCHC 30.6 (*)     RDW 34.6 (*)     Lymphocytes Absolute 0.8 (*)     All other components within normal limits   BASIC METABOLIC PANEL W/ REFLEX TO MG FOR LOW K - Abnormal; Notable for the following components:    Sodium 132 (*)     Glucose 163 (*)     All other components within normal limits   BRAIN NATRIURETIC PEPTIDE - Abnormal; Notable for the following components:    Pro- (*)     All other components within normal limits       EKG Results  EKG Interpretation    Interpreted by emergency department physician  Time performed: 2133  Time read: 2134    Rhythm: Sinus  Ventricular Rate: 90  QRS Axis: 18  Ectopy: None  Conduction: Normal sinus rhythm with LVH by voltage with early repolarization abnormalities  ST Segments: Consistent with early repolarization abnormalities  T Waves: Consistent with early repolarization abnormalities  Q Waves: None noted    Other findings: Motion artifact making it difficult to read the EKG    Compared to EKG on: 1/23/2023 and appears unchanged    Clinical Impression: Normal sinus rhythm with LVH by voltage with early polarization abnormalities. There is motion artifact but EKG is readable. This is compared to an EKG on 1/23/2023 and appears unchanged. Yuan Stagepb, DO    Radiology results  CT CHEST PULMONARY EMBOLISM W CONTRAST   Preliminary Result   Limited exam demonstrates no pulmonary embolism. Correlate with V/Q scan or   repeat study if there is high clinical suspicion for pulmonary embolism. Cardiomegaly. Large hiatal hernia/intrathoracic stomach. XR CHEST (2 VW)   Final Result      Lungs are clear. Probable hiatus hernia which has a fluid level within it. Diagnostic considerations include but are not limited to:  myocardial infarction, pulmonary embolus, pneumothorax, pneumonia, aortic dissection, empyema, musculoskeletal chest pain, pulmonary contusion, pericardial effusion, pericarditis, GERD, pancreatitis, stomach ulcer, and referred abdominal pain. EKG-ordered to rule out myocardial infarction, rhythm disturbances, conduction disturbances, LVH, PREET, pericarditis, voltage abnormalities, or other pathology that might be causing the patient's symptoms. Chest x-ray-chest x-ray was ordered to rule out pneumonia, pneumothorax, congestive heart failure, cardiomegaly, chest masses, aortic aneurysm, hiatal hernia, rib fractures, or any other pathology that might be causing the patient's symptoms    CBC-CBC was ordered to rule out anemia, infection, abnormal platelet count, polycythemia, abnormal Red cell pathology, or any other pathology that might be causing the patient's symptoms    CMP-CMP was ordered to rule out electrolyte abnormalities, liver dysfunction, kidney dysfunction, electrolyte imbalance, abnormal transaminases, or any other pathology that might be causing the patient's symptoms.       Medications   nitroGLYCERIN (NITROSTAT) SL tablet 0.4 mg (0.4 mg SubLINGual Given 3/4/23 5364) aspirin chewable tablet 324 mg (324 mg Oral Given 3/4/23 3109)   acetaminophen (TYLENOL) tablet 1,000 mg (1,000 mg Oral Given 3/4/23 2301)   iopamidol (ISOVUE-370) 76 % injection 75 mL (75 mLs IntraVENous Given 3/4/23 4349)       New Prescriptions    No medications on file       The patient's blood pressure was not found to be elevated according to CMS/Medicare and the Affordable Care Act/ObamaCare criteria. IMPRESSION(S):  1.  Chest pain, unspecified type           Enoc Mixon DO  03/05/23 0003

## 2023-03-05 NOTE — PROGRESS NOTES
4 Eyes Skin Assessment     NAME:  Miles Vanegas  YOB: 1960  MEDICAL RECORD NUMBER:  [id number]    The patient is being assessed for  Admission    I agree that One RN has performed a thorough Head to Toe Skin Assessment on the patient. ALL assessment sites listed below have been assessed. Areas assessed by both nurses:    Head, Face, Ears, Shoulders, Back, Chest, Arms, Elbows, Hands, Sacrum. Buttock, Coccyx, Ischium, and Legs. Feet and Heels        Does the Patient have a Wound?  No noted wound(s)       Timi Prevention initiated by RN: NA   Wound Care Orders initiated by RN: NA    Pressure Injury (Stage 3,4, Unstageable, DTI, NWPT, and Complex wounds) if present, place referral order by RN under : NA    New and Established Ostomies, if present place, referral order under : NA      Nurse 1 eSignature: Electronically signed by Soha Orantes RN on 3/5/23 at 4:29 AM EST    **SHARE this note so that the co-signing nurse can place an eSignature**    Nurse 2 eSignature: Electronically signed by Abdulkadir Allred RN on 3/5/23 at 1:30 AM EST

## 2023-03-06 NOTE — DISCHARGE SUMMARY
Hospital Medicine Discharge Summary      Patient ID: Willam Barba , [id number]     Patient's PCP: Alexandrea Whitney MD    Admit Date: 3/4/2023     Discharge Date: 3/5/2023      Admitting Physician: Lu Kehr, MD    Discharge Physician: Radha Chamorro MD     Discharge Diagnoses: Active Hospital Problems    Diagnosis Date Noted    Chest pain [R07.9] 03/05/2023     Priority: Medium         The patient was seen and examined on the day of discharge and this discharge summary is in conjunction with any daily progress note from day of discharge. HOSPITAL COURSE       Patient demographics:  The patient  Willam Barba is a 63 y.o. male      Significant past medical history:       Patient Active Problem List   Diagnosis    Partial bowel obstruction (HCC)    Hiatal hernia    Acute respiratory failure with hypoxia and hypercapnia (HCC)    SBO (small bowel obstruction) (HCC)    Aspiration into airway    Acute respiratory failure with hypoxia (HCC)    Small bowel obstruction (HCC)    Diastolic heart failure (HCC)    History of atrial fibrillation    Supraventricular tachycardia (HCC)    Arterial hypotension    Aspiration pneumonia ([facility]    Morbid obesity with BMI of 50.0-59.9, adult ([facility]    Acute delirium    EARNESTINE (obstructive sleep apnea)    Dysphagia    Critical illness myopathy    PAF (paroxysmal atrial fibrillation) ([facility])    Left atrial flutter by electrocardiogram ([facility])    Chest pain            Presenting symptoms:  Chest pain     Diagnostic workup:        CONSULTS DURING ADMISSION :   IP CONSULT TO CARDIOLOGY        Patient was diagnosed with:   Chest pain   HTN   Atrial fibrillation s/p ablation     Treatment while inpatient:   pt presented to [facility] ED with left side chest pain. Labs:  For convenience and continuity at follow-up the following most recent labs are provided:      CBC:   Lab Results   Component Value Date/Time    WBC 8.2 03/04/2023 09:43 PM    HGB 10.1 03/04/2023 09:43 PM    HCT 33.0 03/04/2023 09:43 PM     03/04/2023 09:43 PM       RENAL:   Lab Results   Component Value Date/Time     03/04/2023 09:43 PM    K 3.8 03/04/2023 09:43 PM    CL 99 03/04/2023 09:43 PM    CO2 21 03/04/2023 09:43 PM    BUN 13 03/04/2023 09:43 PM    CREATININE 0.9 03/04/2023 09:43 PM           Discharge Medications:      Medication List        CONTINUE taking these medications      acetaminophen 500 MG tablet  Commonly known as: TYLENOL     apixaban 5 MG Tabs tablet  Commonly known as: ELIQUIS  Take 1 tablet by mouth 2 times daily     celecoxib 200 MG capsule  Commonly known as: CELEBREX     flecainide 50 MG tablet  Commonly known as: TAMBOCOR  Take 1 tablet by mouth 2 times daily     folic acid 1 MG tablet  Commonly known as: FOLVITE     levothyroxine 200 MCG tablet  Commonly known as: SYNTHROID     loratadine 10 MG tablet  Commonly known as: CLARITIN     methotrexate 2.5 MG chemo tablet  Commonly known as: RHEUMATREX     metoprolol tartrate 25 MG tablet  Commonly known as: LOPRESSOR  Take 0.5 tablets by mouth 2 times daily            STOP taking these medications      amLODIPine 5 MG tablet  Commonly known as: NORVASC     spironolactone 50 MG tablet  Commonly known as: ALDACTONE            ASK your doctor about these medications      gabapentin 800 MG tablet  Commonly known as: NEURONTIN                 Time Spent on discharge is more than 30 min in the examination, evaluation, counseling and review of medications and discharge plan. Signed:  Cady Winslow MD   3/5/2023      Thank you Rosy Mendoza MD for the opportunity to be involved in this patient's care. If you have any questions or concerns please feel free to contact me at [phone]. This note was transcribed using [id number] [place] acquired by [employer]. Please disregard any translational errors.

## 2023-04-06 ENCOUNTER — HOSPITAL ENCOUNTER (EMERGENCY)
Age: 63
Discharge: HOME | End: 2023-04-06
Payer: COMMERCIAL

## 2023-04-06 ENCOUNTER — APPOINTMENT (OUTPATIENT)
Dept: GENERAL RADIOLOGY | Age: 63
End: 2023-04-06
Payer: COMMERCIAL

## 2023-04-06 VITALS
SYSTOLIC BLOOD PRESSURE: 167 MMHG | DIASTOLIC BLOOD PRESSURE: 87 MMHG | TEMPERATURE: 98 F | OXYGEN SATURATION: 97 % | BODY MASS INDEX: 49.44 KG/M2 | HEART RATE: 57 BPM | RESPIRATION RATE: 20 BRPM | HEIGHT: 67 IN | WEIGHT: 315 LBS

## 2023-04-06 DIAGNOSIS — L03.114 CELLULITIS OF FOREARM, LEFT: ICD-10-CM

## 2023-04-06 DIAGNOSIS — Z18.9 RETAINED FOREIGN BODY FRAGMENTS, UNSPECIFIED MATERIAL: Primary | ICD-10-CM

## 2023-04-06 PROCEDURE — 73090 X-RAY EXAM OF FOREARM: CPT

## 2023-04-06 PROCEDURE — 93971 EXTREMITY STUDY: CPT

## 2023-04-06 RX ORDER — CEPHALEXIN 500 MG/1
500 CAPSULE ORAL 4 TIMES DAILY
Qty: 40 CAPSULE | Refills: 0 | Status: SHIPPED | OUTPATIENT
Start: 2023-04-06 | End: 2023-04-16

## 2023-04-06 RX ORDER — SULFAMETHOXAZOLE AND TRIMETHOPRIM 800; 160 MG/1; MG/1
1 TABLET ORAL 2 TIMES DAILY
Qty: 20 TABLET | Refills: 0 | Status: SHIPPED | OUTPATIENT
Start: 2023-04-06 | End: 2023-04-16

## 2023-04-06 ASSESSMENT — PAIN DESCRIPTION - ORIENTATION: ORIENTATION: LEFT

## 2023-04-06 ASSESSMENT — PAIN - FUNCTIONAL ASSESSMENT: PAIN_FUNCTIONAL_ASSESSMENT: 0-10

## 2023-04-06 ASSESSMENT — PAIN DESCRIPTION - LOCATION: LOCATION: HAND

## 2023-04-06 ASSESSMENT — PAIN SCALES - GENERAL: PAINLEVEL_OUTOF10: 3

## 2023-04-06 NOTE — DISCHARGE INSTRUCTIONS
Please return immediately for any new or worsening symptoms or for any spreading of erythema past the demarcated line on your forearm

## 2023-04-06 NOTE — ED PROVIDER NOTES
Considerations (Tests not ordered but considered, Shared Decision Making, Pt Expectation of Test or Tx.): I did consider blood work but he has no fevers, no chills, is very faint erythema, just started yesterday. Therefore this was deferred. I am the Primary Clinician of Record. FINAL IMPRESSION  1. Retained foreign body fragments, unspecified material    2. Cellulitis of forearm, left        PLAN  Discharge with close outpatient follow-up with PCP in the next 2 to 3 days.     (Please note that this note was completed with a voice recognition program.  Every attempt was made to edit the dictations, but inevitably there remain words that are mis-transcribed.)           Michelle Johnson, JARON - CNP  04/06/23 6255

## 2023-04-06 NOTE — ED NOTES
Reviewed discharge instructions, medication reconciliation, and patient education information with patient. Patient stated understanding, and answered questions to satisfaction. Patient verbalized satisfaction of care. PIV removed at this time. Patient ambulated safely to exit with a steady gait in no obvious acute distress. Patient verbalized understanding of returning to ER if symptoms worsen, and to follow up with primary health care provider.       Yuriy Gillespie RN  04/06/23 6904

## 2023-05-31 NOTE — PROGRESS NOTES
[address]   Electrophysiology      Date: 6/1/2023    Primary Cardiologist: Dona Wagner MD  PCP: Rangel Quezada MD     Chief Complaint:   Chief Complaint   Patient presents with    3 Month Follow-Up     Ablation done 03/01   Chest pain Aries Meza for 10-20 min   Shortness of breath      History of Present Illness:    I saw Mariusz Arechiga in the office for electrophysiology follow up today. He is a 63 y.o. male with a past medical history of PAF diagnosed in [facility] 2022 s/p successful DCCV 5/9/22 per records in [facility], SVT, EARNESTINE on CPAP, HTN and hypothyroidism. He underwent atrial fibrillation, left atrial flutter and AVNRT ablation on 3/1/2023 with Dr. Saravanan Kaye. He was started on flecainide 50 mg twice daily, Eliquis 5mg BID and metoprolol was decreased to 12.5 mg twice daily. He presents today for follow up. He was initially feeling well after his ablation but over the last couple weeks he has noticed chest discomfort and shortness of breath while lying in bed. This was similar to how he felt when he was in atrial fibrillation. He denies any \"fast heartbeats\" like he had when he had SVT. No syncope or near syncope. He does note some chest discomfort while taking a deep breath. No increased edema. No bleeding problems. Allergies: Allergies   Allergen Reactions    Codeine Nausea Only and Nausea And Vomiting     Stomach upset        Home Medications:  Prior to Visit Medications    Medication Sig Taking? Authorizing Provider   aspirin EC 81 MG EC tablet Take 1 tablet by mouth daily Yes Maria Dolores Tavarez APRN - CNP   methotrexate (RHEUMATREX) 2.5 MG chemo tablet Take by mouth once a week Take once a week Yes Historical Provider, MD   celecoxib (CELEBREX) 200 MG capsule Take 1 capsule by mouth 2 times daily Yes Historical Provider, MD   gabapentin (NEURONTIN) 800 MG tablet Take 1 tablet by mouth 4 times daily.  Yes Historical Provider, MD   folic acid (FOLVITE) 1 MG tablet Take 1 tablet by

## 2023-06-01 ENCOUNTER — OFFICE VISIT (OUTPATIENT)
Dept: CARDIOLOGY CLINIC | Age: 63
End: 2023-06-01
Payer: COMMERCIAL

## 2023-06-01 VITALS
WEIGHT: 315 LBS | HEIGHT: 67 IN | HEART RATE: 75 BPM | DIASTOLIC BLOOD PRESSURE: 80 MMHG | SYSTOLIC BLOOD PRESSURE: 138 MMHG | BODY MASS INDEX: 49.44 KG/M2

## 2023-06-01 DIAGNOSIS — I48.92 LEFT ATRIAL FLUTTER BY ELECTROCARDIOGRAM (HCC): ICD-10-CM

## 2023-06-01 DIAGNOSIS — I47.1 PSVT (PAROXYSMAL SUPRAVENTRICULAR TACHYCARDIA) (HCC): ICD-10-CM

## 2023-06-01 DIAGNOSIS — I10 PRIMARY HYPERTENSION: ICD-10-CM

## 2023-06-01 DIAGNOSIS — I48.0 PAF (PAROXYSMAL ATRIAL FIBRILLATION) (HCC): Primary | ICD-10-CM

## 2023-06-01 PROCEDURE — 3075F SYST BP GE 130 - 139MM HG: CPT | Performed by: NURSE PRACTITIONER

## 2023-06-01 PROCEDURE — 3079F DIAST BP 80-89 MM HG: CPT | Performed by: NURSE PRACTITIONER

## 2023-06-01 PROCEDURE — 93000 ELECTROCARDIOGRAM COMPLETE: CPT | Performed by: NURSE PRACTITIONER

## 2023-06-01 PROCEDURE — 99214 OFFICE O/P EST MOD 30 MIN: CPT | Performed by: NURSE PRACTITIONER

## 2023-06-01 RX ORDER — ASPIRIN 81 MG/1
81 TABLET ORAL DAILY
Qty: 30 TABLET | Refills: 0
Start: 2023-06-01

## 2023-06-01 ASSESSMENT — ENCOUNTER SYMPTOMS
ABDOMINAL PAIN: 0
SORE THROAT: 0
SINUS PRESSURE: 0
BACK PAIN: 0
WHEEZING: 0
BLOOD IN STOOL: 0
COUGH: 0
NAUSEA: 0
DIARRHEA: 0
VOMITING: 0
CONSTIPATION: 0
COLOR CHANGE: 0
SHORTNESS OF BREATH: 1
TROUBLE SWALLOWING: 0

## 2023-06-26 ENCOUNTER — OFFICE VISIT (OUTPATIENT)
Dept: SURGERY | Age: 63
End: 2023-06-26

## 2023-06-26 VITALS — DIASTOLIC BLOOD PRESSURE: 87 MMHG | HEART RATE: 76 BPM | SYSTOLIC BLOOD PRESSURE: 147 MMHG

## 2023-06-26 DIAGNOSIS — K44.9 HIATAL HERNIA: Primary | ICD-10-CM

## 2023-06-26 DIAGNOSIS — R13.10 DYSPHAGIA, UNSPECIFIED TYPE: ICD-10-CM

## 2023-06-27 RX ORDER — FLECAINIDE ACETATE 50 MG/1
TABLET ORAL
Qty: 60 TABLET | Refills: 3 | OUTPATIENT
Start: 2023-06-27

## 2023-06-27 RX ORDER — APIXABAN 5 MG/1
TABLET, FILM COATED ORAL
Qty: 60 TABLET | Refills: 3 | OUTPATIENT
Start: 2023-06-27

## 2023-07-07 PROCEDURE — 93228 REMOTE 30 DAY ECG REV/REPORT: CPT | Performed by: NURSE PRACTITIONER

## 2023-07-14 ENCOUNTER — TELEPHONE (OUTPATIENT)
Dept: CARDIOLOGY CLINIC | Age: 63
End: 2023-07-14

## 2023-07-14 DIAGNOSIS — I48.0 PAROXYSMAL ATRIAL FIBRILLATION (HCC): Primary | ICD-10-CM

## 2023-07-14 DIAGNOSIS — I48.92 LEFT ATRIAL FLUTTER BY ELECTROCARDIOGRAM (HCC): ICD-10-CM

## 2023-07-14 DIAGNOSIS — I48.0 PAF (PAROXYSMAL ATRIAL FIBRILLATION) (HCC): ICD-10-CM

## 2023-07-14 NOTE — TELEPHONE ENCOUNTER
Miles called in this morning, returning Tia's call. I informed him his monitor results were normal, he v/u, no other questions.

## 2023-07-25 NOTE — PROGRESS NOTES
[facility]   Electrophysiology      Date: 8/1/2023    Primary Cardiologist: Kelsy Veras MD  PCP: Margaret Spicer MD     Chief Complaint:   Chief Complaint   Patient presents with    Follow-up     SOB      History of Present Illness:    I saw Dorene Christianson in the office for electrophysiology follow up today. He is a 66 y.o. male with a past medical history of PAF diagnosed in Mat 2022 s/p successful DCCV 5/9/22 per records in [facility], SVT, EARNESTINE on CPAP, HTN and hypothyroidism. He underwent atrial fibrillation, left atrial flutter and AVNRT ablation on 3/1/2023 with Dr. Homa Emmanuel. Flecainide and metoprolol were stopped in June 2023 and a 30 day monitor was ordered. Monitor showed sinus with one brief NSVT. He presents today for monitor results. He has been doing fairly well since his last visit. He will have times where he has dyspnea on exertion and will sometimes take a little while for him to recover. He is usually when he is doing something physical out of the ordinary. He did have 1 episode of this while he was wearing the monitor. He denies any palpitations or chest pain. No edema. No syncope. Allergies: Allergies   Allergen Reactions    Codeine Nausea Only and Nausea And Vomiting     Stomach upset        Home Medications:  Prior to Visit Medications    Medication Sig Taking? Authorizing Provider   etanercept (ENBREL) 50 MG/ML injection Inject 1 mL into the skin once a week Yes Historical Provider, MD   aspirin EC 81 MG EC tablet Take 1 tablet by mouth daily Yes Lindsey Nugent, APRN - CNP   methotrexate (RHEUMATREX) 2.5 MG chemo tablet Take by mouth once a week Take once a week Yes Historical Provider, MD   celecoxib (CELEBREX) 200 MG capsule Take 1 capsule by mouth 2 times daily Yes Historical Provider, MD   gabapentin (NEURONTIN) 800 MG tablet Take 1 tablet by mouth 4 times daily.  Yes Historical Provider, MD   folic acid (FOLVITE) 1 MG tablet Take 1 tablet by mouth daily

## 2023-08-01 ENCOUNTER — OFFICE VISIT (OUTPATIENT)
Dept: CARDIOLOGY CLINIC | Age: 63
End: 2023-08-01
Payer: COMMERCIAL

## 2023-08-01 VITALS
BODY MASS INDEX: 49.44 KG/M2 | SYSTOLIC BLOOD PRESSURE: 132 MMHG | HEIGHT: 67 IN | HEART RATE: 71 BPM | WEIGHT: 315 LBS | OXYGEN SATURATION: 96 % | DIASTOLIC BLOOD PRESSURE: 78 MMHG

## 2023-08-01 DIAGNOSIS — I47.1 PSVT (PAROXYSMAL SUPRAVENTRICULAR TACHYCARDIA) (HCC): ICD-10-CM

## 2023-08-01 DIAGNOSIS — I48.0 PAF (PAROXYSMAL ATRIAL FIBRILLATION) (HCC): Primary | ICD-10-CM

## 2023-08-01 DIAGNOSIS — I48.92 LEFT ATRIAL FLUTTER BY ELECTROCARDIOGRAM (HCC): ICD-10-CM

## 2023-08-01 DIAGNOSIS — I10 PRIMARY HYPERTENSION: ICD-10-CM

## 2023-08-01 PROCEDURE — 93000 ELECTROCARDIOGRAM COMPLETE: CPT | Performed by: NURSE PRACTITIONER

## 2023-08-01 PROCEDURE — 99214 OFFICE O/P EST MOD 30 MIN: CPT | Performed by: NURSE PRACTITIONER

## 2023-08-01 PROCEDURE — 3075F SYST BP GE 130 - 139MM HG: CPT | Performed by: NURSE PRACTITIONER

## 2023-08-01 PROCEDURE — 3078F DIAST BP <80 MM HG: CPT | Performed by: NURSE PRACTITIONER

## 2023-08-01 ASSESSMENT — ENCOUNTER SYMPTOMS
COLOR CHANGE: 0
ABDOMINAL PAIN: 0
BACK PAIN: 0
SORE THROAT: 0
SHORTNESS OF BREATH: 1
CONSTIPATION: 0
COUGH: 0
VOMITING: 0
BLOOD IN STOOL: 0
SINUS PRESSURE: 0
DIARRHEA: 0
NAUSEA: 0
TROUBLE SWALLOWING: 0
WHEEZING: 0

## 2023-08-25 ENCOUNTER — APPOINTMENT (OUTPATIENT)
Dept: GENERAL RADIOLOGY | Age: 63
DRG: 812 | End: 2023-08-25
Payer: COMMERCIAL

## 2023-08-25 ENCOUNTER — HOSPITAL ENCOUNTER (INPATIENT)
Age: 63
LOS: 3 days | Discharge: HOME | DRG: 812 | End: 2023-08-28
Attending: EMERGENCY MEDICINE | Admitting: STUDENT IN AN ORGANIZED HEALTH CARE EDUCATION/TRAINING PROGRAM
Payer: COMMERCIAL

## 2023-08-25 DIAGNOSIS — D64.9 SYMPTOMATIC ANEMIA: ICD-10-CM

## 2023-08-25 DIAGNOSIS — D64.9 SEVERE ANEMIA: Primary | ICD-10-CM

## 2023-08-25 DIAGNOSIS — R06.09 DYSPNEA ON EXERTION: ICD-10-CM

## 2023-08-25 LAB
ABO + RH BLD: NORMAL
ALBUMIN SERPL-MCNC: 3.9 G/DL (ref 3.4–5)
ALBUMIN/GLOB SERPL: 1.1 {RATIO} (ref 1.1–2.2)
ALP SERPL-CCNC: 125 U/L (ref 40–129)
ALT SERPL-CCNC: 11 U/L (ref 10–40)
ANION GAP SERPL CALCULATED.3IONS-SCNC: 9 MMOL/L (ref 3–16)
ANISOCYTOSIS BLD QL SMEAR: ABNORMAL
AST SERPL-CCNC: 14 U/L (ref 15–37)
BASOPHILS # BLD: 0.1 K/UL (ref 0–0.2)
BASOPHILS NFR BLD: 1 %
BILIRUB SERPL-MCNC: <0.2 MG/DL (ref 0–1)
BLD GP AB SCN SERPL QL: NORMAL
BLOOD BANK DISPENSE STATUS: NORMAL
BLOOD BANK PRODUCT CODE: NORMAL
BPU ID: NORMAL
BUN SERPL-MCNC: 15 MG/DL (ref 7–20)
CALCIUM SERPL-MCNC: 8.5 MG/DL (ref 8.3–10.6)
CHLORIDE SERPL-SCNC: 106 MMOL/L (ref 99–110)
CO2 SERPL-SCNC: 25 MMOL/L (ref 21–32)
CREAT SERPL-MCNC: 0.9 MG/DL (ref 0.8–1.3)
DEPRECATED RDW RBC AUTO: 20.6 % (ref 12.4–15.4)
DESCRIPTION BLOOD BANK: NORMAL
EOSINOPHIL # BLD: 0.2 K/UL (ref 0–0.6)
EOSINOPHIL NFR BLD: 3.2 %
FERRITIN SERPL IA-MCNC: 3 NG/ML (ref 30–400)
FOLATE SERPL-MCNC: >20 NG/ML (ref 4.78–24.2)
GFR SERPLBLD CREATININE-BSD FMLA CKD-EPI: >60 ML/MIN/{1.73_M2}
GLUCOSE SERPL-MCNC: 116 MG/DL (ref 70–99)
HCT VFR BLD AUTO: 21.6 % (ref 40.5–52.5)
HCT VFR BLD AUTO: 23.5 % (ref 40.5–52.5)
HEMOCCULT STL QL: NORMAL
HGB BLD-MCNC: 6.3 G/DL (ref 13.5–17.5)
HGB BLD-MCNC: 7.1 G/DL (ref 13.5–17.5)
HYPOCHROMIA BLD QL SMEAR: ABNORMAL
IMMATURE RETIC FRACT: 0.56 (ref 0.21–0.37)
INR PPP: 1.04 (ref 0.84–1.16)
IRON SATN MFR SERPL: 5 % (ref 20–50)
IRON SERPL-MCNC: 23 UG/DL (ref 59–158)
LYMPHOCYTES # BLD: 1.6 K/UL (ref 1–5.1)
LYMPHOCYTES NFR BLD: 30 %
MCH RBC QN AUTO: 19.1 PG (ref 26–34)
MCHC RBC AUTO-ENTMCNC: 28.9 G/DL (ref 31–36)
MCV RBC AUTO: 65.9 FL (ref 80–100)
MICROCYTES BLD QL SMEAR: ABNORMAL
MONOCYTES # BLD: 0.7 K/UL (ref 0–1.3)
MONOCYTES NFR BLD: 12.6 %
NEUTROPHILS # BLD: 2.9 K/UL (ref 1.7–7.7)
NEUTROPHILS NFR BLD: 53.2 %
NT-PROBNP SERPL-MCNC: 81 PG/ML (ref 0–124)
PATH INTERP BLD-IMP: YES
PLATELET # BLD AUTO: 517 K/UL (ref 135–450)
PLATELET BLD QL SMEAR: ABNORMAL
PMV BLD AUTO: 7.4 FL (ref 5–10.5)
POTASSIUM SERPL-SCNC: 3.9 MMOL/L (ref 3.5–5.1)
PROT SERPL-MCNC: 7.4 G/DL (ref 6.4–8.2)
PROTHROMBIN TIME: 13.6 SEC (ref 11.5–14.8)
RBC # BLD AUTO: 3.28 M/UL (ref 4.2–5.9)
RETICS # AUTO: 0.09 M/UL
RETICS/RBC NFR AUTO: 2.6 % (ref 0.5–2.18)
SLIDE REVIEW: ABNORMAL
SODIUM SERPL-SCNC: 140 MMOL/L (ref 136–145)
TARGETS BLD QL SMEAR: ABNORMAL
TIBC SERPL-MCNC: 438 UG/DL (ref 260–445)
TROPONIN, HIGH SENSITIVITY: 11 NG/L (ref 0–22)
TROPONIN, HIGH SENSITIVITY: 12 NG/L (ref 0–22)
VIT B12 SERPL-MCNC: 1622 PG/ML (ref 211–911)
WBC # BLD AUTO: 5.4 K/UL (ref 4–11)

## 2023-08-25 PROCEDURE — 36430 TRANSFUSION BLD/BLD COMPNT: CPT

## 2023-08-25 PROCEDURE — 6370000000 HC RX 637 (ALT 250 FOR IP): Performed by: INTERNAL MEDICINE

## 2023-08-25 PROCEDURE — 71045 X-RAY EXAM CHEST 1 VIEW: CPT

## 2023-08-25 PROCEDURE — 2580000003 HC RX 258: Performed by: INTERNAL MEDICINE

## 2023-08-25 PROCEDURE — 86850 RBC ANTIBODY SCREEN: CPT

## 2023-08-25 PROCEDURE — 83880 ASSAY OF NATRIURETIC PEPTIDE: CPT

## 2023-08-25 PROCEDURE — C9113 INJ PANTOPRAZOLE SODIUM, VIA: HCPCS | Performed by: INTERNAL MEDICINE

## 2023-08-25 PROCEDURE — 82728 ASSAY OF FERRITIN: CPT

## 2023-08-25 PROCEDURE — 82607 VITAMIN B-12: CPT

## 2023-08-25 PROCEDURE — 85014 HEMATOCRIT: CPT

## 2023-08-25 PROCEDURE — 85018 HEMOGLOBIN: CPT

## 2023-08-25 PROCEDURE — 36415 COLL VENOUS BLD VENIPUNCTURE: CPT

## 2023-08-25 PROCEDURE — 82746 ASSAY OF FOLIC ACID SERUM: CPT

## 2023-08-25 PROCEDURE — 84484 ASSAY OF TROPONIN QUANT: CPT

## 2023-08-25 PROCEDURE — 86923 COMPATIBILITY TEST ELECTRIC: CPT

## 2023-08-25 PROCEDURE — 85045 AUTOMATED RETICULOCYTE COUNT: CPT

## 2023-08-25 PROCEDURE — 86901 BLOOD TYPING SEROLOGIC RH(D): CPT

## 2023-08-25 PROCEDURE — 85610 PROTHROMBIN TIME: CPT

## 2023-08-25 PROCEDURE — 85025 COMPLETE CBC W/AUTO DIFF WBC: CPT

## 2023-08-25 PROCEDURE — 83540 ASSAY OF IRON: CPT

## 2023-08-25 PROCEDURE — 93005 ELECTROCARDIOGRAM TRACING: CPT | Performed by: EMERGENCY MEDICINE

## 2023-08-25 PROCEDURE — 30233N1 TRANSFUSION OF NONAUTOLOGOUS RED BLOOD CELLS INTO PERIPHERAL VEIN, PERCUTANEOUS APPROACH: ICD-10-PCS | Performed by: INTERNAL MEDICINE

## 2023-08-25 PROCEDURE — 80053 COMPREHEN METABOLIC PANEL: CPT

## 2023-08-25 PROCEDURE — 82270 OCCULT BLOOD FECES: CPT

## 2023-08-25 PROCEDURE — 1200000000 HC SEMI PRIVATE

## 2023-08-25 PROCEDURE — 86900 BLOOD TYPING SEROLOGIC ABO: CPT

## 2023-08-25 PROCEDURE — P9016 RBC LEUKOCYTES REDUCED: HCPCS

## 2023-08-25 PROCEDURE — 83550 IRON BINDING TEST: CPT

## 2023-08-25 PROCEDURE — 6360000002 HC RX W HCPCS: Performed by: INTERNAL MEDICINE

## 2023-08-25 PROCEDURE — 99285 EMERGENCY DEPT VISIT HI MDM: CPT

## 2023-08-25 RX ORDER — SODIUM CHLORIDE 9 MG/ML
INJECTION, SOLUTION INTRAVENOUS PRN
Status: DISCONTINUED | OUTPATIENT
Start: 2023-08-25 | End: 2023-08-28 | Stop reason: HOSPADM

## 2023-08-25 RX ORDER — ATORVASTATIN CALCIUM 20 MG/1
20 TABLET, FILM COATED ORAL NIGHTLY
Status: DISCONTINUED | OUTPATIENT
Start: 2023-08-25 | End: 2023-08-28 | Stop reason: HOSPADM

## 2023-08-25 RX ORDER — SODIUM CHLORIDE 0.9 % (FLUSH) 0.9 %
10 SYRINGE (ML) INJECTION EVERY 12 HOURS SCHEDULED
Status: DISCONTINUED | OUTPATIENT
Start: 2023-08-25 | End: 2023-08-28 | Stop reason: HOSPADM

## 2023-08-25 RX ORDER — CETIRIZINE HYDROCHLORIDE 10 MG/1
10 TABLET ORAL DAILY
Status: DISCONTINUED | OUTPATIENT
Start: 2023-08-26 | End: 2023-08-28 | Stop reason: HOSPADM

## 2023-08-25 RX ORDER — ONDANSETRON 4 MG/1
4 TABLET, ORALLY DISINTEGRATING ORAL EVERY 8 HOURS PRN
Status: DISCONTINUED | OUTPATIENT
Start: 2023-08-25 | End: 2023-08-28 | Stop reason: HOSPADM

## 2023-08-25 RX ORDER — NITROGLYCERIN 0.4 MG/1
0.4 TABLET SUBLINGUAL EVERY 5 MIN PRN
Status: DISCONTINUED | OUTPATIENT
Start: 2023-08-25 | End: 2023-08-28 | Stop reason: HOSPADM

## 2023-08-25 RX ORDER — ACETAMINOPHEN 325 MG/1
650 TABLET ORAL EVERY 6 HOURS PRN
Status: DISCONTINUED | OUTPATIENT
Start: 2023-08-25 | End: 2023-08-28 | Stop reason: HOSPADM

## 2023-08-25 RX ORDER — AMLODIPINE BESYLATE 5 MG/1
5 TABLET ORAL DAILY
Status: DISCONTINUED | OUTPATIENT
Start: 2023-08-26 | End: 2023-08-28 | Stop reason: HOSPADM

## 2023-08-25 RX ORDER — SODIUM CHLORIDE 0.9 % (FLUSH) 0.9 %
10 SYRINGE (ML) INJECTION PRN
Status: DISCONTINUED | OUTPATIENT
Start: 2023-08-25 | End: 2023-08-28 | Stop reason: HOSPADM

## 2023-08-25 RX ORDER — GABAPENTIN 400 MG/1
800 CAPSULE ORAL 4 TIMES DAILY
Status: DISCONTINUED | OUTPATIENT
Start: 2023-08-25 | End: 2023-08-28 | Stop reason: HOSPADM

## 2023-08-25 RX ORDER — ACETAMINOPHEN 650 MG/1
650 SUPPOSITORY RECTAL EVERY 6 HOURS PRN
Status: DISCONTINUED | OUTPATIENT
Start: 2023-08-25 | End: 2023-08-28 | Stop reason: HOSPADM

## 2023-08-25 RX ORDER — FOLIC ACID 1 MG/1
1 TABLET ORAL DAILY
Status: DISCONTINUED | OUTPATIENT
Start: 2023-08-26 | End: 2023-08-28 | Stop reason: HOSPADM

## 2023-08-25 RX ORDER — LEVOTHYROXINE SODIUM 0.1 MG/1
200 TABLET ORAL EVERY MORNING
Status: DISCONTINUED | OUTPATIENT
Start: 2023-08-26 | End: 2023-08-28 | Stop reason: HOSPADM

## 2023-08-25 RX ORDER — ONDANSETRON 2 MG/ML
4 INJECTION INTRAMUSCULAR; INTRAVENOUS EVERY 6 HOURS PRN
Status: DISCONTINUED | OUTPATIENT
Start: 2023-08-25 | End: 2023-08-28 | Stop reason: HOSPADM

## 2023-08-25 RX ORDER — SODIUM CHLORIDE 9 MG/ML
INJECTION, SOLUTION INTRAVENOUS PRN
Status: DISCONTINUED | OUTPATIENT
Start: 2023-08-25 | End: 2023-08-25 | Stop reason: HOSPADM

## 2023-08-25 RX ORDER — POLYETHYLENE GLYCOL 3350 17 G/17G
17 POWDER, FOR SOLUTION ORAL DAILY PRN
Status: DISCONTINUED | OUTPATIENT
Start: 2023-08-25 | End: 2023-08-28 | Stop reason: HOSPADM

## 2023-08-25 RX ADMIN — Medication 10 ML: at 20:55

## 2023-08-25 RX ADMIN — GABAPENTIN 800 MG: 400 CAPSULE ORAL at 20:52

## 2023-08-25 RX ADMIN — PANTOPRAZOLE SODIUM 40 MG: 40 INJECTION, POWDER, FOR SOLUTION INTRAVENOUS at 20:53

## 2023-08-25 ASSESSMENT — PAIN - FUNCTIONAL ASSESSMENT: PAIN_FUNCTIONAL_ASSESSMENT: 0-10

## 2023-08-25 ASSESSMENT — PAIN SCALES - GENERAL: PAINLEVEL_OUTOF10: 3

## 2023-08-25 NOTE — ED NOTES
Blood transfusion 50% complete at this time, vitals documented, patient tolerating well, no reactions.       Diane Denver, RN  08/25/23 [id number]

## 2023-08-25 NOTE — ED PROVIDER NOTES
[address]      Pt Name: Debbie Grace  MRN: [id number]  [facility] 1960  Date of evaluation: 8/25/2023  Provider: Gloria Rossi       Chief Complaint   Patient presents with    Other     Pt hx of RA w/ anemia. Hmg at [facility]. today showed hmg 6.2         HISTORY OF PRESENT ILLNESS   (Location/Symptom, Timing/Onset, Context/Setting, Quality, Duration, Modifying Factors, Severity)  Note limiting factors. Debbie Grace is a 66 y.o. male who presents to the emergency department with complaint of anemia. The patient states that he went to see his rheumatologist today at [facility] and was found to have a hemoglobin of 6.2. He was advised to come to the emergency department for transfusion. He states that his hemoglobin has been trending downward over the last few months and was 7.0 on August 1. He was scheduled for an iron infusion on August 31. He denies any recent melena, hematochezia, or hematemesis. He denies any dysuria or hematuria. No epistaxis. He denies any recent fall trauma or injury. He denies any abdominal pain back pain flank pain. He does report some occasional chest tightness and shortness of breath with dyspnea on exertion. He reports that when he walks approximately 100 feet to his shop in the backyard he experiences significant shortness of breath and has to sit down and rest.  This is a new finding over the last 4 to 6 weeks. He denies any syncope or palpitations. He denies any current chest pain or shortness of breath at rest.  No associated diaphoresis. He does have a history of atrial fibrillation/flutter/SVT. He underwent cardiac ablation in March and has been off anticoagulants since that time. Medical history is significant for small bowel obstruction, diastolic heart failure, A-fib, SVT, aspiration pneumonia, obstructive sleep apnea, dysphagia, myopathy, atrial flutter,.   Patient does take aspirin 81

## 2023-08-25 NOTE — ED NOTES
Patient observed for 15 minutes with no reaction, tolerating well, vitals documented.       Tha Valerio RN  08/25/23 8184

## 2023-08-25 NOTE — PROGRESS NOTES
Medication Reconciliation    List of medications patient is currently taking is complete. Source of information: 1.  Conversation with patient at bedside                                      2. EPIC records        Lidia Crook, [address] [facility]   8/25/2023  4:47 PM

## 2023-08-26 LAB
ALBUMIN SERPL-MCNC: 3.6 G/DL (ref 3.4–5)
ALBUMIN/GLOB SERPL: 1 {RATIO} (ref 1.1–2.2)
ALP SERPL-CCNC: 123 U/L (ref 40–129)
ALT SERPL-CCNC: 10 U/L (ref 10–40)
ANION GAP SERPL CALCULATED.3IONS-SCNC: 8 MMOL/L (ref 3–16)
AST SERPL-CCNC: 51 U/L (ref 15–37)
BASOPHILS # BLD: 0 K/UL (ref 0–0.2)
BASOPHILS NFR BLD: 0.2 %
BILIRUB SERPL-MCNC: 0.3 MG/DL (ref 0–1)
BUN SERPL-MCNC: 14 MG/DL (ref 7–20)
CALCIUM SERPL-MCNC: 8.5 MG/DL (ref 8.3–10.6)
CHLORIDE SERPL-SCNC: 106 MMOL/L (ref 99–110)
CHOLEST SERPL-MCNC: 149 MG/DL (ref 0–199)
CO2 SERPL-SCNC: 23 MMOL/L (ref 21–32)
CREAT SERPL-MCNC: 0.7 MG/DL (ref 0.8–1.3)
DEPRECATED RDW RBC AUTO: 21.5 % (ref 12.4–15.4)
EKG ATRIAL RATE: 71 BPM
EKG DIAGNOSIS: NORMAL
EKG P AXIS: 16 DEGREES
EKG P-R INTERVAL: 160 MS
EKG Q-T INTERVAL: 412 MS
EKG QRS DURATION: 102 MS
EKG QTC CALCULATION (BAZETT): 447 MS
EKG R AXIS: 21 DEGREES
EKG T AXIS: 19 DEGREES
EKG VENTRICULAR RATE: 71 BPM
EOSINOPHIL # BLD: 0.2 K/UL (ref 0–0.6)
EOSINOPHIL NFR BLD: 4 %
GFR SERPLBLD CREATININE-BSD FMLA CKD-EPI: >60 ML/MIN/{1.73_M2}
GLUCOSE SERPL-MCNC: 87 MG/DL (ref 70–99)
HCT VFR BLD AUTO: 24.5 % (ref 40.5–52.5)
HCT VFR BLD AUTO: 25.2 % (ref 40.5–52.5)
HDLC SERPL-MCNC: 38 MG/DL (ref 40–60)
HGB BLD-MCNC: 7.2 G/DL (ref 13.5–17.5)
HGB BLD-MCNC: 7.3 G/DL (ref 13.5–17.5)
LDLC SERPL CALC-MCNC: 80 MG/DL
LYMPHOCYTES # BLD: 1.4 K/UL (ref 1–5.1)
LYMPHOCYTES NFR BLD: 31.1 %
MAGNESIUM SERPL-MCNC: 2.7 MG/DL (ref 1.8–2.4)
MCH RBC QN AUTO: 19.4 PG (ref 26–34)
MCHC RBC AUTO-ENTMCNC: 28.8 G/DL (ref 31–36)
MCV RBC AUTO: 67.4 FL (ref 80–100)
MONOCYTES # BLD: 0.5 K/UL (ref 0–1.3)
MONOCYTES NFR BLD: 10.6 %
NEUTROPHILS # BLD: 2.5 K/UL (ref 1.7–7.7)
NEUTROPHILS NFR BLD: 54.1 %
PATH INTERP BLD-IMP: NO
PHOSPHATE SERPL-MCNC: 2.9 MG/DL (ref 2.5–4.9)
PLATELET # BLD AUTO: 482 K/UL (ref 135–450)
PMV BLD AUTO: 7.4 FL (ref 5–10.5)
POTASSIUM SERPL-SCNC: 5.2 MMOL/L (ref 3.5–5.1)
PROT SERPL-MCNC: 7.2 G/DL (ref 6.4–8.2)
RBC # BLD AUTO: 3.73 M/UL (ref 4.2–5.9)
SODIUM SERPL-SCNC: 137 MMOL/L (ref 136–145)
TRIGL SERPL-MCNC: 153 MG/DL (ref 0–150)
TROPONIN, HIGH SENSITIVITY: 11 NG/L (ref 0–22)
TSH SERPL DL<=0.005 MIU/L-ACNC: 1.94 UIU/ML (ref 0.27–4.2)
VLDLC SERPL CALC-MCNC: 31 MG/DL
WBC # BLD AUTO: 4.6 K/UL (ref 4–11)

## 2023-08-26 PROCEDURE — 85025 COMPLETE CBC W/AUTO DIFF WBC: CPT

## 2023-08-26 PROCEDURE — C9113 INJ PANTOPRAZOLE SODIUM, VIA: HCPCS | Performed by: INTERNAL MEDICINE

## 2023-08-26 PROCEDURE — 6360000002 HC RX W HCPCS: Performed by: INTERNAL MEDICINE

## 2023-08-26 PROCEDURE — 83735 ASSAY OF MAGNESIUM: CPT

## 2023-08-26 PROCEDURE — 84443 ASSAY THYROID STIM HORMONE: CPT

## 2023-08-26 PROCEDURE — 93010 ELECTROCARDIOGRAM REPORT: CPT | Performed by: INTERNAL MEDICINE

## 2023-08-26 PROCEDURE — 84484 ASSAY OF TROPONIN QUANT: CPT

## 2023-08-26 PROCEDURE — 36415 COLL VENOUS BLD VENIPUNCTURE: CPT

## 2023-08-26 PROCEDURE — 6370000000 HC RX 637 (ALT 250 FOR IP): Performed by: INTERNAL MEDICINE

## 2023-08-26 PROCEDURE — 2580000003 HC RX 258: Performed by: INTERNAL MEDICINE

## 2023-08-26 PROCEDURE — 80053 COMPREHEN METABOLIC PANEL: CPT

## 2023-08-26 PROCEDURE — 94660 CPAP INITIATION&MGMT: CPT

## 2023-08-26 PROCEDURE — 80061 LIPID PANEL: CPT

## 2023-08-26 PROCEDURE — 1200000000 HC SEMI PRIVATE

## 2023-08-26 PROCEDURE — 85014 HEMATOCRIT: CPT

## 2023-08-26 PROCEDURE — 84100 ASSAY OF PHOSPHORUS: CPT

## 2023-08-26 PROCEDURE — A4216 STERILE WATER/SALINE, 10 ML: HCPCS | Performed by: INTERNAL MEDICINE

## 2023-08-26 PROCEDURE — 85018 HEMOGLOBIN: CPT

## 2023-08-26 RX ORDER — PANTOPRAZOLE SODIUM 40 MG/1
40 TABLET, DELAYED RELEASE ORAL
Status: DISCONTINUED | OUTPATIENT
Start: 2023-08-27 | End: 2023-08-28 | Stop reason: HOSPADM

## 2023-08-26 RX ADMIN — LEVOTHYROXINE SODIUM 200 MCG: 0.1 TABLET ORAL at 08:53

## 2023-08-26 RX ADMIN — Medication 10 ML: at 08:54

## 2023-08-26 RX ADMIN — Medication 10 ML: at 08:53

## 2023-08-26 RX ADMIN — GABAPENTIN 800 MG: 400 CAPSULE ORAL at 12:58

## 2023-08-26 RX ADMIN — AMLODIPINE BESYLATE 5 MG: 5 TABLET ORAL at 08:53

## 2023-08-26 RX ADMIN — CETIRIZINE HYDROCHLORIDE 10 MG: 10 TABLET, FILM COATED ORAL at 08:53

## 2023-08-26 RX ADMIN — IRON SUCROSE 200 MG: 20 INJECTION, SOLUTION INTRAVENOUS at 10:33

## 2023-08-26 RX ADMIN — FOLIC ACID 1 MG: 1 TABLET ORAL at 08:53

## 2023-08-26 RX ADMIN — GABAPENTIN 800 MG: 400 CAPSULE ORAL at 08:52

## 2023-08-26 RX ADMIN — PANTOPRAZOLE SODIUM 40 MG: 40 INJECTION, POWDER, FOR SOLUTION INTRAVENOUS at 08:53

## 2023-08-26 RX ADMIN — GABAPENTIN 800 MG: 400 CAPSULE ORAL at 20:30

## 2023-08-26 RX ADMIN — Medication 10 ML: at 20:32

## 2023-08-26 RX ADMIN — GABAPENTIN 800 MG: 400 CAPSULE ORAL at 18:05

## 2023-08-26 NOTE — H&P
08/15/2022 09:16 AM    AMORPHOUS 1+ 08/08/2022 04:21 PM     Urine Cultures: No results found for: LABURIN  Blood Cultures:   Lab Results   Component Value Date/Time    BC No Growth after 4 days of incubation. 08/15/2022 10:13 AM     Lab Results   Component Value Date/Time    BLOODCULT2 No Growth after 4 days of incubation. 08/15/2022 10:13 AM     Organism:   Lab Results   Component Value Date/Time    ORG Winnie glabrata 08/22/2022 02:18 PM         Imaging:     XR CHEST PORTABLE    Result Date: 8/25/2023  1. No acute cardiopulmonary findings. 2.  Mild cardiomegaly.  3.  Large hiatal hernia           Electronically signed: Ayanna Choudhary MD     8/25/2023  11:50 PM

## 2023-08-26 NOTE — PROGRESS NOTES
Attending ordered TSH with reflex. This RN spoke with Lab and they are able to add it on to this mornings labs.

## 2023-08-26 NOTE — FLOWSHEET NOTE
Patient post transfusion H&H was 7.1. He states he takes Celebrex 200 MG BID at home and is requesting it now. Secure message sent, awaiting response.

## 2023-08-26 NOTE — PROGRESS NOTES
Admitted patient to room 25-41-99-50 from the emergency department. Oriented to room, call light, tv, phone and dietary services. Respirations easy unlabored, denies pain. Bed in lowest position and locked. Exit alarms in place. Non slip socks on. ID bracelet on and correct per patient verbally reporting name and date of birth. Call light and needed items in reach.

## 2023-08-26 NOTE — PROGRESS NOTES
4 Eyes Skin Assessment     NAME:  Miles Vanegas  YOB: 1960  MEDICAL RECORD NUMBER:  [id number]    The patient is being assessed for  Admission    I agree that at least one RN has performed a thorough Head to Toe Skin Assessment on the patient. ALL assessment sites listed below have been assessed. Areas assessed by both nurses:    Head, Face, Ears, Shoulders, Back, Chest, Arms, Elbows, Hands, Sacrum. Buttock, Coccyx, Ischium, Legs. Feet and Heels, and Under Medical Devices         Does the Patient have a Wound?  No noted wound(s)       Timi Prevention initiated by RN: No  Wound Care Orders initiated by RN: No    Pressure Injury (Stage 3,4, Unstageable, DTI, NWPT, and Complex wounds) if present, place Wound referral order by RN under : No    New Ostomies, if present place, Ostomy referral order under : No     Nurse 1 eSignature: Electronically signed by Meliza Segovia RN on 8/26/23 at 6:20 AM EDT    **SHARE this note so that the co-signing nurse can place an eSignature**    Nurse 2 eSignature: {Esignature:[id number]}

## 2023-08-27 LAB
ALBUMIN SERPL-MCNC: 3.7 G/DL (ref 3.4–5)
ALBUMIN/GLOB SERPL: 1.1 {RATIO} (ref 1.1–2.2)
ALP SERPL-CCNC: 116 U/L (ref 40–129)
ALT SERPL-CCNC: 7 U/L (ref 10–40)
ANION GAP SERPL CALCULATED.3IONS-SCNC: 7 MMOL/L (ref 3–16)
AST SERPL-CCNC: 13 U/L (ref 15–37)
BASOPHILS # BLD: 0 K/UL (ref 0–0.2)
BASOPHILS NFR BLD: 0.9 %
BILIRUB SERPL-MCNC: <0.2 MG/DL (ref 0–1)
BUN SERPL-MCNC: 13 MG/DL (ref 7–20)
CALCIUM SERPL-MCNC: 8.3 MG/DL (ref 8.3–10.6)
CHLORIDE SERPL-SCNC: 106 MMOL/L (ref 99–110)
CO2 SERPL-SCNC: 27 MMOL/L (ref 21–32)
CREAT SERPL-MCNC: 0.8 MG/DL (ref 0.8–1.3)
DEPRECATED RDW RBC AUTO: 21.4 % (ref 12.4–15.4)
EOSINOPHIL # BLD: 0.2 K/UL (ref 0–0.6)
EOSINOPHIL NFR BLD: 3.4 %
GFR SERPLBLD CREATININE-BSD FMLA CKD-EPI: >60 ML/MIN/{1.73_M2}
GLUCOSE SERPL-MCNC: 88 MG/DL (ref 70–99)
HCT VFR BLD AUTO: 23.8 % (ref 40.5–52.5)
HGB BLD-MCNC: 7 G/DL (ref 13.5–17.5)
LYMPHOCYTES # BLD: 1.6 K/UL (ref 1–5.1)
LYMPHOCYTES NFR BLD: 32 %
MAGNESIUM SERPL-MCNC: 2.2 MG/DL (ref 1.8–2.4)
MCH RBC QN AUTO: 20.4 PG (ref 26–34)
MCHC RBC AUTO-ENTMCNC: 29.6 G/DL (ref 31–36)
MCV RBC AUTO: 68.8 FL (ref 80–100)
MONOCYTES # BLD: 0.5 K/UL (ref 0–1.3)
MONOCYTES NFR BLD: 10.5 %
NEUTROPHILS # BLD: 2.7 K/UL (ref 1.7–7.7)
NEUTROPHILS NFR BLD: 53.2 %
PATH INTERP BLD-IMP: NO
PLATELET # BLD AUTO: 444 K/UL (ref 135–450)
PMV BLD AUTO: 7.2 FL (ref 5–10.5)
POTASSIUM SERPL-SCNC: 4 MMOL/L (ref 3.5–5.1)
PROT SERPL-MCNC: 7 G/DL (ref 6.4–8.2)
RBC # BLD AUTO: 3.46 M/UL (ref 4.2–5.9)
SODIUM SERPL-SCNC: 140 MMOL/L (ref 136–145)
WBC # BLD AUTO: 5.1 K/UL (ref 4–11)

## 2023-08-27 PROCEDURE — 80053 COMPREHEN METABOLIC PANEL: CPT

## 2023-08-27 PROCEDURE — 6370000000 HC RX 637 (ALT 250 FOR IP): Performed by: INTERNAL MEDICINE

## 2023-08-27 PROCEDURE — 94660 CPAP INITIATION&MGMT: CPT

## 2023-08-27 PROCEDURE — 85025 COMPLETE CBC W/AUTO DIFF WBC: CPT

## 2023-08-27 PROCEDURE — 1200000000 HC SEMI PRIVATE

## 2023-08-27 PROCEDURE — 2580000003 HC RX 258: Performed by: INTERNAL MEDICINE

## 2023-08-27 PROCEDURE — 83735 ASSAY OF MAGNESIUM: CPT

## 2023-08-27 PROCEDURE — 6360000002 HC RX W HCPCS: Performed by: INTERNAL MEDICINE

## 2023-08-27 RX ADMIN — AMLODIPINE BESYLATE 5 MG: 5 TABLET ORAL at 09:54

## 2023-08-27 RX ADMIN — Medication 10 ML: at 09:55

## 2023-08-27 RX ADMIN — GABAPENTIN 800 MG: 400 CAPSULE ORAL at 13:04

## 2023-08-27 RX ADMIN — IRON SUCROSE 200 MG: 20 INJECTION, SOLUTION INTRAVENOUS at 09:55

## 2023-08-27 RX ADMIN — GABAPENTIN 800 MG: 400 CAPSULE ORAL at 17:02

## 2023-08-27 RX ADMIN — LEVOTHYROXINE SODIUM 200 MCG: 0.1 TABLET ORAL at 09:55

## 2023-08-27 RX ADMIN — CETIRIZINE HYDROCHLORIDE 10 MG: 10 TABLET, FILM COATED ORAL at 09:55

## 2023-08-27 RX ADMIN — Medication 10 ML: at 20:27

## 2023-08-27 RX ADMIN — GABAPENTIN 800 MG: 400 CAPSULE ORAL at 20:27

## 2023-08-27 RX ADMIN — FOLIC ACID 1 MG: 1 TABLET ORAL at 09:55

## 2023-08-27 RX ADMIN — GABAPENTIN 800 MG: 400 CAPSULE ORAL at 09:54

## 2023-08-27 RX ADMIN — PANTOPRAZOLE SODIUM 40 MG: 40 TABLET, DELAYED RELEASE ORAL at 05:41

## 2023-08-27 NOTE — PLAN OF CARE
Problem: Discharge Planning  Goal: Discharge to home or other facility with appropriate resources  Outcome: Progressing  Flowsheets (Taken 8/26/2023 0830 by Georgie Crockett RN)  Discharge to home or other facility with appropriate resources: Identify barriers to discharge with patient and caregiver     Problem: Pain  Goal: Verbalizes/displays adequate comfort level or baseline comfort level  Outcome: Progressing     Problem: Safety - Adult  Goal: Free from fall injury  Outcome: Progressing     Problem: Hematologic - Adult  Goal: Maintains hematologic stability  Outcome: Progressing  Flowsheets (Taken 8/26/2023 0830 by Georgie Crockett RN)  Maintains hematologic stability: Assess for signs and symptoms of bleeding or hemorrhage

## 2023-08-27 NOTE — PLAN OF CARE
Problem: Discharge Planning  Goal: Discharge to home or other facility with appropriate resources  8/27/2023 1009 by Aleena Waldrop RN  Outcome: Progressing     Problem: Pain  Goal: Verbalizes/displays adequate comfort level or baseline comfort level  8/27/2023 1009 by Aleena Waldrop RN  Outcome: Progressing     Problem: Safety - Adult  Goal: Free from fall injury  8/27/2023 1009 by Aleena Waldrop RN  Outcome: Progressing     Problem: Hematologic - Adult  Goal: Maintains hematologic stability  8/27/2023 1009 by Aleena Waldrop RN  Outcome: Progressing     Problem: Cardiovascular - Adult  Goal: Maintains optimal cardiac output and hemodynamic stability  Outcome: Progressing     Problem: Cardiovascular - Adult  Goal: Absence of cardiac dysrhythmias or at baseline  Outcome: Progressing

## 2023-08-27 NOTE — PROGRESS NOTES
Hospitalist Progress Note  8/27/2023 9:59 AM  Subjective:   Admit Date: 8/25/2023  PCP: Oumar Brown MD Status: Inpatient   Interval History: Hospital Day: 3,     History of present illness:  Admitted with symptomatic anemia (Hgb 6.3 / MCV 52.9), transfused 1 unit PRBC. Iron saturation 5%, initiated on IV Venofer iron replacement. Exertional dyspnea attributed to anemia. Large hiatal hernia on CXR. Empiric pantoprazole 40 mg IV q12h initiated pending GI evaluation. Medical co-morbidities include rheumatoid arthritis on methotrexate, celecoxib, and aspirin, paroxysmal atrial fibrillation (not on anticoagulation), primary hypertension, and class III obesity (BMI 52.6). Aspirin held due to possible GI bleed. Diet: regular  Right cephalic peripheral IV (0/57, day #3)  Medications:     iron sucrose  200 mg IntraVENous Daily (8/25, day #2 of 3)   pantoprazole  40 mg Oral QAM AC   amlodipine  5 mg Oral Daily   folic acid  1 mg Oral Daily   gabapentin  800 mg Oral 4x Daily   levothyroxine  200 mcg Oral QAM   cetirizine  10 mg Oral Daily   atorvastatin  20 mg Oral Nightly       08/25/23  1504 08/26/23  0051 08/26/23  0821 08/27/23  0548   WBC 5.4  --  4.6 5.1   HGB 6.3* 7.3* 7.2* 7.0*   *  --  482* 444   MCV 65.9*  --  67.4* 68.8*     Recent Labs     08/25/23  1504 08/26/23  0821 08/27/23  0548    137 140   K 3.9 5.2* 4.0    106 106   CO2 25 23 27   BUN 15 14 13   CREATININE 0.9 0.7* 0.8   GLUCOSE 116* 87 88     Recent Labs     08/25/23  1504 08/26/23  0821 08/27/23  0548   AST 14* 51* 13*   ALT 11 10 7*   BILITOT <0.2 0.3 <0.2   ALKPHOS 125 123 116     hsTnT (8/25) 11 / 12, (8/26) 11 ng/L  Magnesium (8/26) 2.70, (8/27) 2.20 mg/dL  Phosphorus (8/26) 2.9 mg/dL  INR (8/25) 1.04     LDL (8/26) 80 mg/dL  B12 (8/25) 1622  TSH (8/25) 1.94 uIU/mL     Fe / TIBC (8/25) 23 / 438 = 5% iron saturation    Portable CXR (8/25) No acute cardiopulmonary findings. Mild cardiomegaly.  Large hiatal

## 2023-08-28 VITALS
RESPIRATION RATE: 18 BRPM | SYSTOLIC BLOOD PRESSURE: 134 MMHG | DIASTOLIC BLOOD PRESSURE: 74 MMHG | BODY MASS INDEX: 49.44 KG/M2 | TEMPERATURE: 98.1 F | WEIGHT: 315 LBS | HEIGHT: 67 IN | HEART RATE: 67 BPM | OXYGEN SATURATION: 97 %

## 2023-08-28 LAB
ALBUMIN SERPL-MCNC: 3.6 G/DL (ref 3.4–5)
ALBUMIN/GLOB SERPL: 1.1 {RATIO} (ref 1.1–2.2)
ALP SERPL-CCNC: 119 U/L (ref 40–129)
ALT SERPL-CCNC: 9 U/L (ref 10–40)
ANION GAP SERPL CALCULATED.3IONS-SCNC: 9 MMOL/L (ref 3–16)
AST SERPL-CCNC: 12 U/L (ref 15–37)
BASOPHILS # BLD: 0.1 K/UL (ref 0–0.2)
BASOPHILS NFR BLD: 0.7 %
BILIRUB SERPL-MCNC: 0.3 MG/DL (ref 0–1)
BUN SERPL-MCNC: 15 MG/DL (ref 7–20)
CALCIUM SERPL-MCNC: 8.2 MG/DL (ref 8.3–10.6)
CHLORIDE SERPL-SCNC: 106 MMOL/L (ref 99–110)
CO2 SERPL-SCNC: 24 MMOL/L (ref 21–32)
CREAT SERPL-MCNC: 0.8 MG/DL (ref 0.8–1.3)
DEPRECATED RDW RBC AUTO: 21.5 % (ref 12.4–15.4)
EOSINOPHIL # BLD: 0.2 K/UL (ref 0–0.6)
EOSINOPHIL NFR BLD: 2.5 %
GFR SERPLBLD CREATININE-BSD FMLA CKD-EPI: >60 ML/MIN/{1.73_M2}
GLUCOSE SERPL-MCNC: 89 MG/DL (ref 70–99)
HCT VFR BLD AUTO: 25.1 % (ref 40.5–52.5)
HGB BLD-MCNC: 7.6 G/DL (ref 13.5–17.5)
LYMPHOCYTES # BLD: 1.8 K/UL (ref 1–5.1)
LYMPHOCYTES NFR BLD: 25.5 %
MAGNESIUM SERPL-MCNC: 2.4 MG/DL (ref 1.8–2.4)
MCH RBC QN AUTO: 20.6 PG (ref 26–34)
MCHC RBC AUTO-ENTMCNC: 30.3 G/DL (ref 31–36)
MCV RBC AUTO: 68.1 FL (ref 80–100)
MONOCYTES # BLD: 0.6 K/UL (ref 0–1.3)
MONOCYTES NFR BLD: 9.1 %
NEUTROPHILS # BLD: 4.4 K/UL (ref 1.7–7.7)
NEUTROPHILS NFR BLD: 62.2 %
PATH INTERP BLD-IMP: NO
PATH INTERP BLD-IMP: NORMAL
PLATELET # BLD AUTO: 430 K/UL (ref 135–450)
PMV BLD AUTO: 7.2 FL (ref 5–10.5)
POTASSIUM SERPL-SCNC: 4 MMOL/L (ref 3.5–5.1)
PROT SERPL-MCNC: 7 G/DL (ref 6.4–8.2)
RBC # BLD AUTO: 3.68 M/UL (ref 4.2–5.9)
SODIUM SERPL-SCNC: 139 MMOL/L (ref 136–145)
WBC # BLD AUTO: 7.1 K/UL (ref 4–11)

## 2023-08-28 PROCEDURE — 6370000000 HC RX 637 (ALT 250 FOR IP): Performed by: INTERNAL MEDICINE

## 2023-08-28 PROCEDURE — 85025 COMPLETE CBC W/AUTO DIFF WBC: CPT

## 2023-08-28 PROCEDURE — 36415 COLL VENOUS BLD VENIPUNCTURE: CPT

## 2023-08-28 PROCEDURE — 6360000002 HC RX W HCPCS: Performed by: INTERNAL MEDICINE

## 2023-08-28 PROCEDURE — 94760 N-INVAS EAR/PLS OXIMETRY 1: CPT

## 2023-08-28 PROCEDURE — 80053 COMPREHEN METABOLIC PANEL: CPT

## 2023-08-28 PROCEDURE — 83735 ASSAY OF MAGNESIUM: CPT

## 2023-08-28 PROCEDURE — 2580000003 HC RX 258: Performed by: INTERNAL MEDICINE

## 2023-08-28 RX ORDER — AMLODIPINE BESYLATE 5 MG/1
5 TABLET ORAL DAILY
Qty: 30 TABLET | Refills: 0 | Status: SHIPPED | OUTPATIENT
Start: 2023-08-29

## 2023-08-28 RX ORDER — PANTOPRAZOLE SODIUM 40 MG/1
40 TABLET, DELAYED RELEASE ORAL
Qty: 30 TABLET | Refills: 0 | Status: SHIPPED | OUTPATIENT
Start: 2023-08-29

## 2023-08-28 RX ADMIN — LEVOTHYROXINE SODIUM 200 MCG: 0.1 TABLET ORAL at 08:13

## 2023-08-28 RX ADMIN — CETIRIZINE HYDROCHLORIDE 10 MG: 10 TABLET, FILM COATED ORAL at 08:13

## 2023-08-28 RX ADMIN — GABAPENTIN 800 MG: 400 CAPSULE ORAL at 08:13

## 2023-08-28 RX ADMIN — PANTOPRAZOLE SODIUM 40 MG: 40 TABLET, DELAYED RELEASE ORAL at 06:08

## 2023-08-28 RX ADMIN — FOLIC ACID 1 MG: 1 TABLET ORAL at 08:13

## 2023-08-28 RX ADMIN — Medication 10 ML: at 08:14

## 2023-08-28 RX ADMIN — AMLODIPINE BESYLATE 5 MG: 5 TABLET ORAL at 08:13

## 2023-08-28 RX ADMIN — IRON SUCROSE 200 MG: 20 INJECTION, SOLUTION INTRAVENOUS at 08:13

## 2023-08-28 NOTE — PROGRESS NOTES
Pt educated on discharge instructions and follow-up appointments. Pt states no further questions at this time. Pt IV removed with no complications. Pt ambulatory to [facility], transported home by family member.     Electronically signed by Loly Guerrero RN on 8/28/23 at 3:47 PM EDT

## 2023-08-28 NOTE — PLAN OF CARE
Problem: Discharge Planning  Goal: Discharge to home or other facility with appropriate resources  Outcome: Completed     Problem: Pain  Goal: Verbalizes/displays adequate comfort level or baseline comfort level  Outcome: Completed     Problem: Safety - Adult  Goal: Free from fall injury  Outcome: Completed     Problem: Hematologic - Adult  Goal: Maintains hematologic stability  Outcome: Completed     Problem: Cardiovascular - Adult  Goal: Maintains optimal cardiac output and hemodynamic stability  Outcome: Completed  Goal: Absence of cardiac dysrhythmias or at baseline  Outcome: Completed

## 2023-08-28 NOTE — CARE COORDINATION
CASE MANAGEMENT DISCHARGE SUMMARY:    DISCHARGE DATE: 08/28/2023    DISCHARGED TO: Home with family support. TRANSPORTATION: Spouse Gabi to transport              TIME: TBD by pt and bedside RN       PREFERRED PHARMACY:    [facility], [address] [place] [address] [phone] - F [phone]                COMMENTS: pt to discharge home with family support. States wife Chuck Guerra will transport. Denies needs. Pt and bedside RN aware of discharge time line.      Electronically signed by Bryce Worrell RN on 8/28/2023 at 12:23 PM  Ph: [phone] Fax: [phone]
family members/significant others, and if so, who? No   Financial Resources None   [employer] None   CM/SW Referral Other (see comment)  (Dischrge planning)   Discharge Planning   Type of Residence House   Living Arrangements Spouse/Significant Other   Current Services Prior To Admission None   Potential Assistance Needed N/A   DME Ordered? No   Potential Assistance Purchasing Medications No   Type of Home Care Services None   Patient expects to be discharged to: House   Follow Up Appointment: Best Day/Time  Thursday AM   One/Two Story Residence One story   History of falls? 0   Services At/After Discharge   Transition of Care Consult (CM Consult) Discharge [facility] None    Resource Information Provided? No   Mode of Transport at Discharge [address] Time of Discharge 1800   Confirm Follow Up Transport Self   Condition of Participation: Discharge Planning   The Plan for Transition of Care is related to the following treatment goals:  Anemia     Electronically signed by Rosendo Patel RN on 8/28/2023 at 10:35 AM  Ph: [phone]  Fax: [phone]

## 2023-08-28 NOTE — ACP (ADVANCE CARE PLANNING)
Advance Care Planning     Advance Care Planning Activator (Inpatient)  Conversation Note      Date of ACP Conversation: 8/28/2023     Conversation Conducted with: Patient with Decision Making Capacity    ACP Activator: Armando Estrada RN      Health Care Decision Maker:     Current Designated Health Care Decision Maker:     Primary Decision Maker: kaela lanza - Spouse - [phone]      Ventilation: \"If you were in your present state of health and suddenly became very ill and were unable to breathe on your own, what would your preference be about the use of a ventilator (breathing machine) if it were available to you? \"      Would the patient desire the use of ventilator (breathing machine)?: yes    \"If your health worsens and it becomes clear that your chance of recovery is unlikely, what would your preference be about the use of a ventilator (breathing machine) if it were available to you? \"     Would the patient desire the use of ventilator (breathing machine)?: Yes      Resuscitation  \"CPR works best to restart the heart when there is a sudden event, like a heart attack, in someone who is otherwise healthy. Unfortunately, CPR does not typically restart the heart for people who have serious health conditions or who are very sick. \"    \"In the event your heart stopped as a result of an underlying serious health condition, would you want attempts to be made to restart your heart (answer \"yes\" for attempt to resuscitate) or would you prefer a natural death (answer \"no\" for do not attempt to resuscitate)? \" yes       [] Yes   [x] No   Educated Patient / Elray Tank regarding differences between Advance Directives and portable DNR orders.     Length of ACP Conversation in minutes:5      Conversation Outcomes:  ACP discussion completed    Follow-up plan:    [] Schedule follow-up conversation to continue planning  [] Referred individual to Provider for additional questions/concerns   [] Advised patient/agent/surrogate to

## 2023-08-28 NOTE — CONSULTS
Consult Note     Patient: Kyra Daniel MRN: [id number]   YOB: 1960 Age: 66 y.o. Sex: male   Unit: [address] SURG Room/Bed: A9M-7427/2884-87 Location: [address] [place] [address]    Admitting Physician: Breonna Jesus    Primary Care Physician: Horace Huang MD   Admission Date: 8/25/2023   Consult Date: 8/26/2023     Assessment/Plan:  1. New profound microcytic/iron deficiency anemia, heme-negative on admission  2. Chronic normocytic anemia, baseline hemoglobin 10  3. History of large hiatus hernia status post hernia repair and gastropexy with G-tube placement  4. Rheumatoid arthritis, on Celebrex, Enbrel and methotrexate  5. Atrial fibrillation status post ablation, on baby aspirin    Advance diet as tolerated  Protonix 40 mg p.o. daily  Hold Celebrex and aspirin  Outpatient EGD and colonoscopy      Subjective:    History of Present Illness: This is a 77-year-old man with rheumatoid arthritis on Celebrex and methotrexate and history of atrial fibrillation status post ablation on prophylactic baby aspirin who presents with new profound anemia. He recently had blood work with his rheumatologist and was found to be profoundly anemic and referred to the hospital.  He tells me that his primary care physician saw him earlier this month and also noted that he was anemic and would likely need iron therapy and work-up. He reports dyspnea on exertion. He did not report any other significant cardiopulmonary symptoms. He denied any other symptoms of anemia. He did not report any GI or abdominal symptoms. He denied any odynophagia dysphagia or typical symptoms of gastroesophageal reflux disease. He denied any abdominal pain. He states that his bowel movements have been regular. He denies any stools consistent with melena or hematochezia.   He denies any weight loss or constitutional symptoms    Past Medical History:   Diagnosis Date    Arthritis     Atrial fibrillation ([address])
tachycardia ([address])    Arterial hypotension    Aspiration pneumonia ([facility])    Morbid obesity with BMI of 50.0-59.9, adult ([facility])    Acute delirium    EARNESTINE (obstructive sleep apnea)    Dysphagia    Critical illness myopathy    PAF (paroxysmal atrial fibrillation) ([facility])    Left atrial flutter by electrocardiogram ([facility])    Chest pain    Symptomatic anemia       IMPRESSION/RECOMMENDATIONS:  1. Severe microcytic anemia. He is profoundly iron deficient. His B12 and folate are normal.  He did receive IV iron here in the hospital and will get more as an outpatient later this week. He definitely needs an EGD and colonoscopy. He may have be having intermittent GI bleeding from small intestine AVMs as well. GI has seen him as an inpatient and will schedule an EGD and colonoscopy in the next few weeks as an outpatient. I stressed the importance of compliance with this. He should also see me 1 to 2 weeks after discharge to confirm that his anemia resolves. If it does not completely resolve with IV iron, then I will do further testing to rule out other contributing causes. He is okay for discharge from my standpoint today. Thank you for asking me to see the patient.        Cassandra Seals MD  Please Contact Through [employer]

## 2023-08-28 NOTE — DISCHARGE INSTR - COC
ANE}  Med Delivery   [address] [place] [address] MED Delivery:[id number]}    Wound Care Documentation and Therapy:        Elimination:  Continence: Bowel: {YES / ZF:44755}  Bladder: {YES / PERDOMO:41866}  Urinary Catheter: {Urinary Catheter:178890069}   Colostomy/Ileostomy/Ileal Conduit: {YES / OB:92427}       Date of Last BM: ***    Intake/Output Summary (Last 24 hours) at 2023 1222  Last data filed at 2023 0602  Gross per 24 hour   Intake 600 ml   Output --   Net 600 ml     I/O last 3 completed shifts:   In: 1200 [P.O.:1200]  Out: -     Safety Concerns:     [address] [place] [address] Safety Concerns:387114353}    Impairments/Disabilities:      [address] [place] [address] Impairments/Disabilities:971713384}    Nutrition Therapy:  Current Nutrition Therapy:   [address] [place] [address] Diet List:696190689}    Routes of Feeding: {[facility] DME Other Feedings:858082242}  Liquids: {Slp liquid thickness:73490}  Daily Fluid Restriction: {[facility] DME Yes amt example:242743229}  Last Modified Barium Swallow with Video (Video Swallowing Test): {Done Not Done AJJL:554446136}    Treatments at the Time of Hospital Discharge:   Respiratory Treatments: ***  Oxygen Therapy:  {Therapy; copd oxygen:15434}  Ventilator:    {MH CC Vent TAJN:843140990}    Rehab Therapies: {THERAPEUTIC INTERVENTION:8895447868}  Weight Bearing Status/Restrictions: [address] [place] [address] Weight Bearin}  Other Medical Equipment (for information only, NOT a DME order):  {EQUIPMENT:880443741}  Other Treatments: ***    Patient's personal belongings (please select all that are sent with patient):  {[facility] DME Belongings:314724992}    RN SIGNATURE:  {Esignature:071945251}    CASE MANAGEMENT/SOCIAL WORK SECTION    Inpatient Status Date: 2023    Readmission Risk Assessment Score:  Readmission Risk              Risk of Unplanned Readmission:  16           / signature: Electronically signed by Bryce Worrell RN on 23 at 12:22 PM EDT    PHYSICIAN SECTION    Prognosis: {Prognosis:1464957277}    Condition at Quality 130: Documentation Of Current Medications In The Medical Record: Current Medications Documented Detail Level: Detailed

## 2023-08-28 NOTE — DISCHARGE INSTRUCTIONS
Return if symptoms worsen, chest pain, shortness of breath lightheadedness or dizziness occurs  Follow-up with Hematology and GI as outpatient for further work-up of anemia

## 2023-09-05 ENCOUNTER — ANESTHESIA EVENT (OUTPATIENT)
Dept: ENDOSCOPY | Age: 63
End: 2023-09-05
Payer: COMMERCIAL

## 2023-09-06 ENCOUNTER — HOSPITAL ENCOUNTER (OUTPATIENT)
Dept: ENDOSCOPY | Age: 63
Setting detail: OUTPATIENT SURGERY
Discharge: HOME | End: 2023-09-06
Attending: INTERNAL MEDICINE

## 2023-09-06 ENCOUNTER — HOSPITAL ENCOUNTER (OUTPATIENT)
Age: 63
Setting detail: OUTPATIENT SURGERY
Discharge: HOME | End: 2023-09-06
Attending: INTERNAL MEDICINE | Admitting: INTERNAL MEDICINE
Payer: COMMERCIAL

## 2023-09-06 ENCOUNTER — ANESTHESIA (OUTPATIENT)
Dept: ENDOSCOPY | Age: 63
End: 2023-09-06
Payer: COMMERCIAL

## 2023-09-06 VITALS
HEIGHT: 67 IN | SYSTOLIC BLOOD PRESSURE: 135 MMHG | HEART RATE: 70 BPM | WEIGHT: 315 LBS | BODY MASS INDEX: 49.44 KG/M2 | DIASTOLIC BLOOD PRESSURE: 76 MMHG | OXYGEN SATURATION: 95 % | RESPIRATION RATE: 16 BRPM | TEMPERATURE: 97 F

## 2023-09-06 DIAGNOSIS — D50.9 IRON DEFICIENCY ANEMIA, UNSPECIFIED IRON DEFICIENCY ANEMIA TYPE: ICD-10-CM

## 2023-09-06 PROCEDURE — 7100000011 HC PHASE II RECOVERY - ADDTL 15 MIN: Performed by: INTERNAL MEDICINE

## 2023-09-06 PROCEDURE — 3609027000 HC COLONOSCOPY: Performed by: INTERNAL MEDICINE

## 2023-09-06 PROCEDURE — 2580000003 HC RX 258: Performed by: ANESTHESIOLOGY

## 2023-09-06 PROCEDURE — 2500000003 HC RX 250 WO HCPCS: Performed by: NURSE ANESTHETIST, CERTIFIED REGISTERED

## 2023-09-06 PROCEDURE — 3700000001 HC ADD 15 MINUTES (ANESTHESIA): Performed by: INTERNAL MEDICINE

## 2023-09-06 PROCEDURE — 3700000000 HC ANESTHESIA ATTENDED CARE: Performed by: INTERNAL MEDICINE

## 2023-09-06 PROCEDURE — 2709999900 HC NON-CHARGEABLE SUPPLY: Performed by: INTERNAL MEDICINE

## 2023-09-06 PROCEDURE — 3609012400 HC EGD TRANSORAL BIOPSY SINGLE/MULTIPLE: Performed by: INTERNAL MEDICINE

## 2023-09-06 PROCEDURE — 88305 TISSUE EXAM BY PATHOLOGIST: CPT

## 2023-09-06 PROCEDURE — 7100000000 HC PACU RECOVERY - FIRST 15 MIN: Performed by: INTERNAL MEDICINE

## 2023-09-06 PROCEDURE — 7100000010 HC PHASE II RECOVERY - FIRST 15 MIN: Performed by: INTERNAL MEDICINE

## 2023-09-06 PROCEDURE — 6360000002 HC RX W HCPCS: Performed by: NURSE ANESTHETIST, CERTIFIED REGISTERED

## 2023-09-06 RX ORDER — SODIUM CHLORIDE 0.9 % (FLUSH) 0.9 %
5-40 SYRINGE (ML) INJECTION PRN
Status: DISCONTINUED | OUTPATIENT
Start: 2023-09-06 | End: 2023-09-06 | Stop reason: HOSPADM

## 2023-09-06 RX ORDER — ONDANSETRON 2 MG/ML
4 INJECTION INTRAMUSCULAR; INTRAVENOUS
Status: DISCONTINUED | OUTPATIENT
Start: 2023-09-06 | End: 2023-09-06 | Stop reason: HOSPADM

## 2023-09-06 RX ORDER — SODIUM CHLORIDE 9 MG/ML
INJECTION, SOLUTION INTRAVENOUS PRN
Status: DISCONTINUED | OUTPATIENT
Start: 2023-09-06 | End: 2023-09-06 | Stop reason: HOSPADM

## 2023-09-06 RX ORDER — LIDOCAINE HYDROCHLORIDE 20 MG/ML
INJECTION, SOLUTION EPIDURAL; INFILTRATION; INTRACAUDAL; PERINEURAL PRN
Status: DISCONTINUED | OUTPATIENT
Start: 2023-09-06 | End: 2023-09-06 | Stop reason: SDUPTHER

## 2023-09-06 RX ORDER — SODIUM CHLORIDE 0.9 % (FLUSH) 0.9 %
5-40 SYRINGE (ML) INJECTION EVERY 12 HOURS SCHEDULED
Status: DISCONTINUED | OUTPATIENT
Start: 2023-09-06 | End: 2023-09-06 | Stop reason: HOSPADM

## 2023-09-06 RX ORDER — DIPHENHYDRAMINE HYDROCHLORIDE 50 MG/ML
12.5 INJECTION INTRAMUSCULAR; INTRAVENOUS
Status: DISCONTINUED | OUTPATIENT
Start: 2023-09-06 | End: 2023-09-06 | Stop reason: HOSPADM

## 2023-09-06 RX ORDER — PROPOFOL 10 MG/ML
INJECTION, EMULSION INTRAVENOUS PRN
Status: DISCONTINUED | OUTPATIENT
Start: 2023-09-06 | End: 2023-09-06 | Stop reason: SDUPTHER

## 2023-09-06 RX ADMIN — LIDOCAINE HYDROCHLORIDE 100 MG: 20 INJECTION, SOLUTION EPIDURAL; INFILTRATION; INTRACAUDAL; PERINEURAL at 12:00

## 2023-09-06 RX ADMIN — PROPOFOL 200 MCG/KG/MIN: 10 INJECTION, EMULSION INTRAVENOUS at 12:02

## 2023-09-06 RX ADMIN — SODIUM CHLORIDE: 9 INJECTION, SOLUTION INTRAVENOUS at 10:57

## 2023-09-06 RX ADMIN — PROPOFOL 150 MG: 10 INJECTION, EMULSION INTRAVENOUS at 12:01

## 2023-09-06 ASSESSMENT — PAIN - FUNCTIONAL ASSESSMENT: PAIN_FUNCTIONAL_ASSESSMENT: NONE - DENIES PAIN

## 2023-09-06 NOTE — DISCHARGE INSTRUCTIONS
Endoscopy Discharge Instructions    Call  [unfilled] with any questions or concerns. You may be drowsy or lightheaded after receiving sedation. DO NOT operate  a vehicle (automobile, bicycle, motorcycle, machinery, or power tools), no  alcoholic beverages, and do not make any important decisions today. Plan on bed rest or quiet relaxation today. Resume normal activities in the morning. Resume normal activity tomorrow unless otherwise advised by your physician. Eat a light first meal, avoiding spicy and fatty foods, then resume normal diet unless you are told otherwise by your physician. If the intravenous medication site is painful, apply warm compresses on the site until the soreness is relieved and elevate the arm above the heart. Call your physician if no improvement  in 2-3 days. POSSIBLE SYMPTOMS TO WATCH:     1. fever (greater than 100) 5. increased abdominal bloating   2. severe pain   6. excessive bleeding   3. nausea and vomiting  7. chest pain   4. chills    8. shortness of breath       Notify Dr Niko Rosario if these problems occur     Expected as normal and remedies:  Sore throat: use over the counter throat lozenges or gargle with warm salt water. Redness or soreness at the IV site: apply warm compress  Gaseous discomfort: belching or passing flatus (gas). -Await pathology. Andrea Graves MD    With questions or concerns, call  [unfilled] at [phone].

## 2023-09-06 NOTE — H&P
Gastroenterology Outpatient History and Physical     Patient: Peter Winter MRN: [id number] Sex: male   YOB: 1960 Age: 66 y.o.  Location: [facility]    Date:9/6/2023  Primary Care Physician: Janell Alfaro MD         Patient: Peter Winter    Physician: Monty Castro MD    History of Present Illness: GILLIAN  Review of Systems:  Weight Loss: No  Dysphagia: No  Dyspepsia: No  History:  Past Medical History:   Diagnosis Date    Arthritis     Atrial fibrillation ([address])     Hypertension     EARNESTINE on CPAP       Past Surgical History:   Procedure Laterality Date    BRONCHOSCOPY  08/22/2022    BRONCHOSCOPY ALVEOLAR LAVAGE performed by Jose Cortez DO at [facility]  08/22/2022    BRONCHOSCOPY performed by Starr Hernandes MD at [facility] N/A 33/83/2652    LAPAROSCOPIC REDUCTION HIATEL HERNIA, performed by Justen Wilson MD at [address] [place] [address] N/A 08/31/2022    GASTROSTOMY TUBE PLACEMENT performed by Justen Wilson MD at [facility] [address] ENDOSCOPY  08/09/2022    EGD BIOPSY performed by Liz Hinds MD at [address] [place] [address]  08/09/2022    EGD ESOPHAGOGASTRODUODENOSCOPY TUBE INSERTION performed by Liz Hinds MD at [address] [place] [address] N/A 08/22/2022    EGD ESOPHAGOGASTRODUODENOSCOPY ORAL-GASTRIC TUBE INSERTION performed by Jose Cortez DO at [address] [place] [address] History     Socioeconomic History    Marital status:      Spouse name: None    Number of children: None    Years of education: None    Highest education level: None   Tobacco Use    Smoking status: Never    Smokeless tobacco: Never   Vaping Use    Vaping Use: Never used   Substance and Sexual Activity    Alcohol use: Not Currently    Drug use: Never    Sexual

## 2023-09-06 NOTE — OP NOTE
COLONOSCOPY     Patient: Justin Barthel MRN: [id number]   YOB: 1960 Age: 66 y.o. Sex: male       Admitting Physician: Eva Gustafson     Primary Care Physician: Gary Gale MD      DATE OF PROCEDURE: 9/6/2023  PROCEDURE: Colonoscopy    PREOPERATIVE DIAGNOSIS: Iron deficiency anemia, unspecified iron deficiency anemia type [D50.9]  HPI: This is a 66y.o. year old male who presents today for colon cancer screening and screening colonoscopy. The patient has a history of iron deficiency anemia. ENDOSCOPIST: Eva Gustafson MD    POSTOPERATIVE DIAGNOSIS:    1.  Negative colonoscopy  2. Fair colon preparation    PLAN:   1. Early screening colonoscopy in 5 years    INFORMED CONSENT:  Informed consent for colonoscopy was obtained. The benefits and risks including adverse medicine reaction and perforation have been explained. The patient's questions were answered and the patient agreed to proceed. ASA: ASA 2 - Patient with mild systemic disease with no functional limitations     SEDATION: MAC    The patient's vital signs, cardiac status, pulmonary status, abdominal status and mental status were stable for the procedure. The patient's vital signs and respiratory function as monitored by oxygen saturation remained stable. COLON PREPARATION:  The patient was given a split colon preparation and the preparation was adequate. There was liquid stool with particulate matter throughout the examination. To some extent I was able to clear this with flushing and suctioning but overall the quality of the prep was only fair. Procedure Details: An anal exam was performed and this was unremarkable. A digital rectal exam was performed and no masses palpated. The Olympus videocolonoscope  was inserted in the rectum and carefully advanced to the cecum as identified by IC valve, crow's foot appearance and appendix. The cecum was photodocumented.   The colonoscope was slowly withdrawn and

## 2023-09-06 NOTE — PROGRESS NOTES
Patient to Phase 2 from PACU. Pt awake and alert. VS stable (see flowsheet). Patient's breathing regular and unlabored, denies pain. Abd soft. Call light within reach. Snack provided.
Pt arrived to PACU from Endoscopy unit. Pt asleep on 2l/nc, awakens easily. Abd soft. Denies c/o pain.
Reviewed dc instructions with pt wife. Pt wife verbalized understanding. PIV removed. Dressing clean dry and intact. Pt dc to private residence. Wheelchair to transport pt. To vehicle. Pt dc with personal belongings.
Sedation provided per anesthesia. See anesthesia record for medication administration and vitals.
[facility] Pre-Admission Testing Electronic Communication Worksheet for OR/ENDO Procedures        Patient: Neda Berger    DOS: 9/6    Arrival Time: 1030    Surgery Time:1200    Meds to Bed:  [] YES    [x]  NO    Transportation Confirmed: [x] YES    []  NO    History and Physical:  [x] YES    []  NO  [] N/A  If yes, please list doctor or Urgent Care and date of H&P:     Additional Clearance(Cardiac, Pulmonary, etc):  [] YES    [x]  NO    Pre-Admission Testing Visit:  [] YES    [x]  NO If no, do labs/testing need to be done DOS?   [] YES    [x]  NO    Medication Reconciliation Complete:  [x] YES    []  NO        Additional Notes:                Interview Complete: [x] YES    []  NO          Hua Donovan RN  3:23 PM
surgery. C-Difficile admission screening and protocol:       * Admitted with diarrhea? [] YES    [x]  NO     *Prior history of C-Diff. In last 3 months? [] YES    [x]  NO     *Antibiotic use in the past 6-8 weeks? [x]  NO    []  YES                 If yes, which ANTIBIOTIC AND REASON______     *Prior hospitalization or nursing home in the last month? [x]  YES    []  NO        SAFETY FIRST. .call before you fall

## 2023-09-06 NOTE — OP NOTE
EGD PROCEDURE NOTE         Esophagogastroduodenoscopy Procedure Note     Patient: Donta Welch MRN: [id number]   YOB: 1960 Age: 66 y.o. Sex: male       Admitting Physician: Jaswant Lyles     Primary Care Physician: Elena Dumont MD      DATE OF PROCEDURE: 9/6/2023  PROCEDURE: Esophagogastroduodenoscopy  INDICATION: This is a 66y.o. year old male who presents today with iron deficiency anemia. ENDOSCOPIST: Jaswant Lyles MD    POSTOPERATIVE DIAGNOSIS:   1.  Large hiatus hernia with incipient Christiano lesions  2. Normal-appearing duodenum, duodenal biopsies done  to rule out malabsorption disorder and celiac disease  3. Scarred G-tube site along the body of the stomach    PLAN:   1. Follow-up biopsies; the patient to contact me in 1 week for results  2. Continue PPI daily    INFORMED CONSENT:  Informed consent for esophagogastoduodenoscopy was obtained. The benefits and risks including adverse medicine reaction have been explained. The patient's questions were answered and the patient agreed to proceed. ASA:  ASA 2 - Patient with mild systemic disease with no functional limitations    SEDATION: MAC     The patient's vital signs, cardiac status, pulmonary status, abdominal status and mental status were stable for the procedure. The patient's vitals signs and respiratory function as monitored by oxygen saturation were stable throughout    Procedure Details: The Olympus videoendoscope was inserted into the mouth and carefully passed into the esophagus, through the stomach and to the distal duodenum. Antegrade and retrograde examination of the upper gi tract was carefully performed. Findings: The esophagus is normal.  The z-line is distinct without lesions or irregularities. There is no evidence of Oviedo's esophagus. The scope easily passed into the stomach. There is a 5 cm hiatus hernia but a large amount of fundus contained within it.   Overall the hiatus

## 2024-02-22 ENCOUNTER — TELEPHONE (OUTPATIENT)
Dept: CARDIOLOGY CLINIC | Age: 64
End: 2024-02-22

## 2024-02-22 NOTE — TELEPHONE ENCOUNTER
Miles called in this morning, he states his AFIB has been acting up for the last couple of days, he has a headache,short of breath on exertion and tired more than normal.      He can be reached at [phone].

## 2024-02-23 ENCOUNTER — TELEPHONE (OUTPATIENT)
Dept: CARDIOLOGY CLINIC | Age: 64
End: 2024-02-23

## 2024-02-23 ENCOUNTER — OFFICE VISIT (OUTPATIENT)
Dept: CARDIOLOGY CLINIC | Age: 64
End: 2024-02-23
Payer: COMMERCIAL

## 2024-02-23 VITALS
HEIGHT: 67 IN | DIASTOLIC BLOOD PRESSURE: 70 MMHG | SYSTOLIC BLOOD PRESSURE: 126 MMHG | BODY MASS INDEX: 49.44 KG/M2 | WEIGHT: 315 LBS | OXYGEN SATURATION: 97 % | HEART RATE: 70 BPM

## 2024-02-23 DIAGNOSIS — G47.30 SLEEP APNEA, UNSPECIFIED TYPE: ICD-10-CM

## 2024-02-23 DIAGNOSIS — I51.89 GRADE II DIASTOLIC DYSFUNCTION: ICD-10-CM

## 2024-02-23 DIAGNOSIS — I48.0 PAF (PAROXYSMAL ATRIAL FIBRILLATION) (HCC): Primary | ICD-10-CM

## 2024-02-23 PROCEDURE — 99214 OFFICE O/P EST MOD 30 MIN: CPT | Performed by: NURSE PRACTITIONER

## 2024-02-23 PROCEDURE — 93000 ELECTROCARDIOGRAM COMPLETE: CPT | Performed by: NURSE PRACTITIONER

## 2024-02-23 NOTE — PROGRESS NOTES
[facility]   Cardiology Office Visit      Date: 3/4/2024  CC:    Chief Complaint   Patient presents with    Check-Up     More SOB on exertion.  More tired and headache.       HPI:   I had the privilege of visiting Miles Vanegas in the office.   Miles Vanegas is a 63 y.o. male  Presents today for follow up :for an acute visit for atrial fibrillation.      past medical history of PAF diagnosed in Mat 2022 s/p successful DCCV 5/9/22 per records in Care Everywhere, SVT, EARNESTINE on CPAP, HTN and hypothyroidism.  He underwent atrial fibrillation, left atrial flutter and AVNRT ablation on 3/1/2023 with Dr. Dye. Flecainide and metoprolol were stopped in June 2023 and a 30 day monitor was ordered. Monitor showed sinus with one brief NSVT.     Report shortness of breath with activity   Mild lightheadedness   No CP except one episode midsternal, no palpitations    Past Medical History:   Diagnosis Date    Arthritis     Atrial fibrillation (HCC)     Hypertension     EARNESTINE on CPAP         Past Surgical History:   Procedure Laterality Date    BRONCHOSCOPY  08/22/2022    BRONCHOSCOPY ALVEOLAR LAVAGE performed by Hank Zafar Jr., DO at [email or web address] ENDOSCOPY    BRONCHOSCOPY  08/22/2022    BRONCHOSCOPY performed by Jaswant Vela MD at [facility] [[email or web address] ENDOSCOPY    COLONOSCOPY N/A 9/6/2023    COLONOSCOPY performed by Star Swift MD at [facility] [[email or web address] ENDOSCOPY    GASTRIC FUNDOPLICATION N/A 08/31/2022    LAPAROSCOPIC REDUCTION HIATEL HERNIA, performed by Yrn Boone MD at [facility] [[email or web address] OR    GASTROSTOMY TUBE PLACEMENT N/A 08/31/2022    GASTROSTOMY TUBE PLACEMENT performed by Yrn Boone MD at [facility] [[email or web address] OR    THYROIDECTOMY, COMPLETION      TONSILLECTOMY      UPPER GASTROINTESTINAL ENDOSCOPY  08/09/2022    EGD BIOPSY performed by Thien Clay MD at [facility] [[email or web address] ENDOSCOPY    UPPER GASTROINTESTINAL ENDOSCOPY  08/09/2022    EGD ESOPHAGOGASTRODUODENOSCOPY TUBE INSERTION performed by Thien Clay MD at [facility] [[email or web address] ENDOSCOPY    UPPER

## 2024-02-23 NOTE — TELEPHONE ENCOUNTER
Monitor placed by : Charles Newby MA  Monitor company [employer]  Length of monitor 30 day  Monitor ordered by syed Miller  Serial number [id number]    Activation successful prior to pt leaving office? Yes      Please place on spreadsheet.

## 2024-03-04 PROBLEM — G47.30 SLEEP APNEA: Status: ACTIVE | Noted: 2022-08-28

## 2024-03-04 PROBLEM — I51.89 GRADE II DIASTOLIC DYSFUNCTION: Status: ACTIVE | Noted: 2022-08-14

## 2024-03-04 ASSESSMENT — ENCOUNTER SYMPTOMS
APNEA: 0
CHEST TIGHTNESS: 0
SHORTNESS OF BREATH: 0
WHEEZING: 0
COUGH: 0

## 2024-03-29 ENCOUNTER — HOSPITAL ENCOUNTER (OUTPATIENT)
Dept: CARDIOLOGY | Age: 64
Discharge: HOME | End: 2024-03-29
Payer: COMMERCIAL

## 2024-03-29 DIAGNOSIS — I48.0 PAF (PAROXYSMAL ATRIAL FIBRILLATION) (HCC): ICD-10-CM

## 2024-03-29 PROCEDURE — 93306 TTE W/DOPPLER COMPLETE: CPT

## 2024-04-01 PROCEDURE — 93228 REMOTE 30 DAY ECG REV/REPORT: CPT | Performed by: NURSE PRACTITIONER

## 2024-04-02 ENCOUNTER — TELEPHONE (OUTPATIENT)
Dept: CARDIOLOGY CLINIC | Age: 64
End: 2024-04-02

## 2024-04-02 NOTE — TELEPHONE ENCOUNTER
Cardiac event monitor was worn 2/23/2024-3/23/2024  Baseline sinus rhythm   Heart rate  with average 69  Occasional PVC/PAC <1%     Spoke with patient with above results outlined per Cindy Miller NP. Patient verbalized understanding.

## 2024-04-03 DIAGNOSIS — I48.0 PAF (PAROXYSMAL ATRIAL FIBRILLATION) (HCC): ICD-10-CM

## 2024-04-03 DIAGNOSIS — I48.0 PAROXYSMAL ATRIAL FIBRILLATION (HCC): Primary | ICD-10-CM

## 2024-04-12 NOTE — PROGRESS NOTES
[facility]   Cardiology Office Visit      Date: 4/15/2024  CC:    Chief Complaint   Patient presents with    Follow-up     CC-SOB       HPI:   I had the privilege of visiting Miles Vanegas in the office.   Miles Vanegas is a 63 y.o. male  Presents today for follow up :for an acute visit for atrial fibrillation.      past medical history of PAF diagnosed in Mat 2022 s/p successful DCCV 5/9/22 per records in Care Everywhere, SVT, EARNESTINE on CPAP, HTN and hypothyroidism.  He underwent atrial fibrillation, left atrial flutter and AVNRT ablation on 3/1/2023 with Dr. Dye. Flecainide and metoprolol were stopped in June 2023 and a 30 day monitor was ordered. Monitor showed sinus with one brief NSVT.     Reports he has been feeling better since last OV.  Feels better with last several days with warmer weather  Feels worse on cold rainy days     He is scheduled to see his rheumatologist next week and is wondering if some of his symptoms are related to his RA.    Denies shortness of breath or lightheadedness. No palpitations.       Past Medical History:   Diagnosis Date    Arthritis     Atrial fibrillation (HCC)     Hypertension     EARNESTINE on CPAP         Past Surgical History:   Procedure Laterality Date    BRONCHOSCOPY  08/22/2022    BRONCHOSCOPY ALVEOLAR LAVAGE performed by Hank Zafar Jr., DO at [facility] ENDOSCOPY    BRONCHOSCOPY  08/22/2022    BRONCHOSCOPY performed by Jaswant Vela MD at [facility] ENDOSCOPY    COLONOSCOPY N/A 9/6/2023    COLONOSCOPY performed by Star Swift MD at [facility] ENDOSCOPY    GASTRIC FUNDOPLICATION N/A 08/31/2022    LAPAROSCOPIC REDUCTION HIATEL HERNIA, performed by Yrn Boone MD at [facility] OR    GASTROSTOMY TUBE PLACEMENT N/A 08/31/2022    GASTROSTOMY TUBE PLACEMENT performed by Yrn Boone MD at [facility] OR    THYROIDECTOMY, COMPLETION      TONSILLECTOMY      UPPER GASTROINTESTINAL ENDOSCOPY  08/09/2022    EGD BIOPSY performed by Thien Clay MD at [facility]

## 2024-04-15 ENCOUNTER — OFFICE VISIT (OUTPATIENT)
Dept: CARDIOLOGY CLINIC | Age: 64
End: 2024-04-15
Payer: COMMERCIAL

## 2024-04-15 VITALS
DIASTOLIC BLOOD PRESSURE: 89 MMHG | WEIGHT: 315 LBS | BODY MASS INDEX: 49.44 KG/M2 | SYSTOLIC BLOOD PRESSURE: 138 MMHG | HEIGHT: 67 IN | OXYGEN SATURATION: 96 % | HEART RATE: 66 BPM

## 2024-04-15 DIAGNOSIS — I51.89 GRADE II DIASTOLIC DYSFUNCTION: ICD-10-CM

## 2024-04-15 DIAGNOSIS — G47.30 SLEEP APNEA, UNSPECIFIED TYPE: ICD-10-CM

## 2024-04-15 DIAGNOSIS — I48.0 PAF (PAROXYSMAL ATRIAL FIBRILLATION) (HCC): Primary | ICD-10-CM

## 2024-04-15 PROCEDURE — 99214 OFFICE O/P EST MOD 30 MIN: CPT | Performed by: NURSE PRACTITIONER

## 2024-04-15 RX ORDER — GUAIFENESIN 600 MG/1
1200 TABLET, EXTENDED RELEASE ORAL 2 TIMES DAILY
COMMUNITY

## 2024-10-17 NOTE — PROGRESS NOTES
Cardiac Electrophysiology Consultation   Date: 10/17/2024   Reason for Consultation: Atrial Fibrillation   Consult Requesting Physician: Martin Hill MD     Chief Complaint: No chief complaint on file.       HPI: Miles Vanegas is a 64 y.o. patient with a history of atrial fibrillation, hypertension, EARNESTINE, and obesity.     05/08/2022 Presented to [facility] ED with complaints of chest pain. Noted to be in atrial fibrillation Started on Heparin and Cardizem. Underwent successful BILLIE DCCV 5/10/24. Started on Lopressor 25 mg BID and Xarelto.     Seen in office for follow up 07/01/22 with Dr. Falk. ECG noted sinus rhythm. Xarelto stopped given [id number] Score of 1. Placed on ASA 81 mg QD.     08/05/22 Presented to [facility] with complaints of radiating chest pain, N/V/D, shortness of breath and lightheadedness. CT noted SBO. Underwent NG tube. Noted to be in SVT 8/13 where he was given 5mg IV Metoprolol which conversion to sinus. Noted to have recurrence of SVT, given Metoprolol and started on IV Amiodarone with conversion to sinus. Metoprolol then held d/t hypotension.     Seen in clinic with Dr. Dye 1/23/23. ECG noted sinus rhythm. Treatment options discussed and patient agreed to ablation of SVT and AF.     3/1/23 Underwent ablation of atrial fibrillation, atrial flutter, and AVNRT. Started on Flecainide 50 mg BID and Eliquis 5 mg BID. Metoprolol decreased to 12.5 mg.     3/4/23 Presented to ED with chest pain and tachycardia following ablation. Deemed to be musculoskeletal.     6/1/23 Seen in clinic for follow up with complaints of chest discomfort and SOB while lying flat. ECG noted sinus rhythm. Flecainide, Eliquis, and Lopressor stopped. Started on ASA 81 mg QD. 30 day monitor placed.     Monitor noted sinus with 1 episode of NSVT lasting 5 beats.     Seen in office for follow up 2/23/24 with complaints of shortness of breath on exertion and fatigue. 30 day monitor ordered to correlate symptoms.     Monitor noted

## 2024-10-22 NOTE — PROGRESS NOTES
Cardiac Electrophysiology Consultation   Date: 10/23/2024   Reason for Consultation: Atrial Fibrillation   Consult Requesting Physician: Martin Hill MD     Chief Complaint:   Chief Complaint   Patient presents with    Follow-up     PT reports occasional chest pains.      HPI: Miles Vanegas is a 64 y.o. patient with a history of atrial fibrillation, hypertension, EARNESTINE, and obesity.     05/08/2022 Presented to [facility] ED with complaints of chest pain. Noted to be in atrial fibrillation Started on Heparin and Cardizem. Underwent successful BILLIE DCCV 5/10/24. Started on Lopressor 25 mg BID and Xarelto.     Seen in office for follow up 07/01/22 with Dr. Falk. ECG noted sinus rhythm. Xarelto stopped given [id number] Score of 1. Placed on ASA 81 mg QD.     08/05/22 Presented to [facility] with complaints of radiating chest pain, N/V/D, shortness of breath and lightheadedness. CT noted SBO. Underwent NG tube. Noted to be in SVT 8/13 where he was given 5mg IV Metoprolol which conversion to sinus. Noted to have recurrence of SVT, given Metoprolol and started on IV Amiodarone with conversion to sinus. Metoprolol then held d/t hypotension.     Seen in clinic with Dr. Dye 1/23/23. ECG noted sinus rhythm. Treatment options discussed and patient agreed to ablation of SVT and AF.     3/1/23 Underwent ablation of atrial fibrillation, atrial flutter, and AVNRT. Started on Flecainide 50 mg BID and Eliquis 5 mg BID. Metoprolol decreased to 12.5 mg.     3/4/23 Presented to ED with chest pain and tachycardia following ablation. Deemed to be musculoskeletal.     6/1/23 Seen in clinic for follow up with complaints of chest discomfort and SOB while lying flat. ECG noted sinus rhythm. Flecainide, Eliquis, and Lopressor stopped. Started on ASA 81 mg QD. 30 day monitor placed.     Monitor noted sinus with 1 episode of NSVT lasting 5 beats.     Seen in office for follow up 2/23/24 with complaints of shortness of breath on exertion and fatigue.

## 2024-10-23 ENCOUNTER — OFFICE VISIT (OUTPATIENT)
Dept: CARDIOLOGY CLINIC | Age: 64
End: 2024-10-23
Payer: COMMERCIAL

## 2024-10-23 VITALS
HEART RATE: 67 BPM | DIASTOLIC BLOOD PRESSURE: 68 MMHG | HEIGHT: 67 IN | OXYGEN SATURATION: 95 % | BODY MASS INDEX: 49.44 KG/M2 | SYSTOLIC BLOOD PRESSURE: 114 MMHG | WEIGHT: 315 LBS

## 2024-10-23 DIAGNOSIS — I48.92 ATRIAL FLUTTER, UNSPECIFIED TYPE (HCC): ICD-10-CM

## 2024-10-23 DIAGNOSIS — I48.0 PAF (PAROXYSMAL ATRIAL FIBRILLATION) (HCC): Primary | ICD-10-CM

## 2024-10-23 PROCEDURE — 93000 ELECTROCARDIOGRAM COMPLETE: CPT | Performed by: INTERNAL MEDICINE

## 2024-10-23 PROCEDURE — 99214 OFFICE O/P EST MOD 30 MIN: CPT | Performed by: INTERNAL MEDICINE

## 2024-10-23 RX ORDER — METOPROLOL TARTRATE 25 MG/1
25 TABLET, FILM COATED ORAL
COMMUNITY
Start: 2024-09-14

## 2024-10-23 RX ORDER — PREDNISONE 10 MG/1
10 TABLET ORAL DAILY
COMMUNITY
Start: 2024-10-11

## 2024-10-23 RX ORDER — LISINOPRIL AND HYDROCHLOROTHIAZIDE 10; 12.5 MG/1; MG/1
TABLET ORAL
COMMUNITY
Start: 2024-06-28

## 2025-07-07 ENCOUNTER — TELEPHONE (OUTPATIENT)
Dept: CARDIOLOGY CLINIC | Age: 65
End: 2025-07-07

## 2025-07-07 NOTE — TELEPHONE ENCOUNTER
Miles called the office to relay that he has a heart monitor at home which showed him having occasional PVC's.     He stated he thought he just got overheated on Saturday however he stated he had the same symptoms today and that is what his heart monitor concluded.    Please advise.    Miles's callback:  [phone]

## 2025-07-07 NOTE — TELEPHONE ENCOUNTER
Called/spoke to PT. He has a ([employer]) portable ECG monitor. PT states that he gets lightheaded, dizzy, and rapid HR when the PVC's are noticed. PT has a history of afib. I asked PT if he can send the EKG tracings through Twitter. PT advised he will see what he can do. If not, I asked PT to come in for an EKG so we can further assess symptoms. PT V/U.

## 2025-07-08 ENCOUNTER — PATIENT MESSAGE (OUTPATIENT)
Dept: CARDIOLOGY CLINIC | Age: 65
End: 2025-07-08

## 2025-07-08 NOTE — TELEPHONE ENCOUNTER
Patient wants to make aware that his reports were received from My Chart.    Miles's callback: [phone]

## 2025-07-09 RX ORDER — FLECAINIDE ACETATE 50 MG/1
50 TABLET ORAL 2 TIMES DAILY
Qty: 60 TABLET | Refills: 3 | Status: SHIPPED | OUTPATIENT
Start: 2025-07-09

## 2025-07-09 NOTE — TELEPHONE ENCOUNTER
LOV : 10/23/2024 with MKW     EPS of AF, AFL, and AVNRT 3/1/23. Flecainide stopped following ablation.     He remains on Lopressor 12.5 mg BID.     No OAC d/t low [id number] Score of 1

## 2025-07-18 ENCOUNTER — TELEPHONE (OUTPATIENT)
Dept: CARDIOLOGY CLINIC | Age: 65
End: 2025-07-18

## 2025-07-18 NOTE — TELEPHONE ENCOUNTER
CHF-SOB-EDEMA-WEIGHT GAIN    What type of symptoms are you having?   SOB Abn reading on his watch  Says he has an ST depression  Do you have new or worsening edema (swelling)? no     EP/Afib//Palpitations/Fast Heart Rate:    What symptoms are you having?  General Fatigue?  Rapid or irregular heartbeat? Irregular  Fluttering or (thumping\" in the chest? no  Dizziness? no  Concerned you may pass out?no  Shortness of breath with normal activity?  Weakness? yes  Sweating? yes    When did your symptoms start and what were you doing at the time?      Started a couple weeks ago      Spoke to Darlyn KNIGHT  I had asked the patient to sent in the reading through my chart

## 2025-08-03 ENCOUNTER — APPOINTMENT (OUTPATIENT)
Dept: CT IMAGING | Age: 65
End: 2025-08-03
Payer: COMMERCIAL

## 2025-08-03 ENCOUNTER — HOSPITAL ENCOUNTER (EMERGENCY)
Age: 65
Discharge: HOME | End: 2025-08-04
Attending: EMERGENCY MEDICINE
Payer: COMMERCIAL

## 2025-08-03 ENCOUNTER — APPOINTMENT (OUTPATIENT)
Dept: GENERAL RADIOLOGY | Age: 65
End: 2025-08-03
Payer: COMMERCIAL

## 2025-08-03 VITALS
HEART RATE: 54 BPM | RESPIRATION RATE: 19 BRPM | SYSTOLIC BLOOD PRESSURE: 137 MMHG | DIASTOLIC BLOOD PRESSURE: 81 MMHG | OXYGEN SATURATION: 94 % | TEMPERATURE: 98.4 F | BODY MASS INDEX: 49.44 KG/M2 | WEIGHT: 315 LBS | HEIGHT: 67 IN

## 2025-08-03 DIAGNOSIS — R07.9 CHEST PAIN, UNSPECIFIED TYPE: Primary | ICD-10-CM

## 2025-08-03 DIAGNOSIS — R09.89 PULMONARY VASCULAR CONGESTION: ICD-10-CM

## 2025-08-03 DIAGNOSIS — K44.9 HIATAL HERNIA: ICD-10-CM

## 2025-08-03 DIAGNOSIS — J98.11 ATELECTASIS: ICD-10-CM

## 2025-08-03 LAB
ALBUMIN SERPL-MCNC: 3.7 G/DL (ref 3.4–5)
ALBUMIN/GLOB SERPL: 1.2 {RATIO} (ref 1.1–2.2)
ALP SERPL-CCNC: 85 U/L (ref 40–129)
ALT SERPL-CCNC: 54 U/L (ref 10–40)
ANION GAP SERPL CALCULATED.3IONS-SCNC: 12 MMOL/L (ref 3–16)
AST SERPL-CCNC: 45 U/L (ref 15–37)
BASOPHILS # BLD: 0.1 K/UL (ref 0–0.2)
BASOPHILS NFR BLD: 1.5 %
BILIRUB SERPL-MCNC: 0.4 MG/DL (ref 0–1)
BUN SERPL-MCNC: 19 MG/DL (ref 7–20)
CALCIUM SERPL-MCNC: 8.8 MG/DL (ref 8.3–10.6)
CHLORIDE SERPL-SCNC: 102 MMOL/L (ref 99–110)
CO2 SERPL-SCNC: 24 MMOL/L (ref 21–32)
CREAT SERPL-MCNC: 1.3 MG/DL (ref 0.8–1.3)
DEPRECATED RDW RBC AUTO: 17.6 % (ref 12.4–15.4)
EOSINOPHIL # BLD: 0.1 K/UL (ref 0–0.6)
EOSINOPHIL NFR BLD: 2.2 %
GFR SERPLBLD CREATININE-BSD FMLA CKD-EPI: 62 ML/MIN/{1.73_M2}
GLUCOSE SERPL-MCNC: 91 MG/DL (ref 70–99)
HCT VFR BLD AUTO: 41.9 % (ref 40.5–52.5)
HGB BLD-MCNC: 14 G/DL (ref 13.5–17.5)
LYMPHOCYTES # BLD: 2 K/UL (ref 1–5.1)
LYMPHOCYTES NFR BLD: 33 %
MCH RBC QN AUTO: 30.7 PG (ref 26–34)
MCHC RBC AUTO-ENTMCNC: 33.3 G/DL (ref 31–36)
MCV RBC AUTO: 92.3 FL (ref 80–100)
MONOCYTES # BLD: 1 K/UL (ref 0–1.3)
MONOCYTES NFR BLD: 17 %
NEUTROPHILS # BLD: 2.8 K/UL (ref 1.7–7.7)
NEUTROPHILS NFR BLD: 46.3 %
NT-PROBNP SERPL-MCNC: 45 PG/ML (ref 0–124)
PLATELET # BLD AUTO: 280 K/UL (ref 135–450)
PMV BLD AUTO: 7.8 FL (ref 5–10.5)
POTASSIUM SERPL-SCNC: 4 MMOL/L (ref 3.5–5.1)
PROT SERPL-MCNC: 6.8 G/DL (ref 6.4–8.2)
RBC # BLD AUTO: 4.54 M/UL (ref 4.2–5.9)
SODIUM SERPL-SCNC: 138 MMOL/L (ref 136–145)
TROPONIN, HIGH SENSITIVITY: 11 NG/L (ref 0–22)
TROPONIN, HIGH SENSITIVITY: 11 NG/L (ref 0–22)
TSH SERPL DL<=0.005 MIU/L-ACNC: 0.95 UIU/ML (ref 0.27–4.2)
WBC # BLD AUTO: 6 K/UL (ref 4–11)

## 2025-08-03 PROCEDURE — 84443 ASSAY THYROID STIM HORMONE: CPT

## 2025-08-03 PROCEDURE — 99285 EMERGENCY DEPT VISIT HI MDM: CPT

## 2025-08-03 PROCEDURE — 6370000000 HC RX 637 (ALT 250 FOR IP): Performed by: PHYSICIAN ASSISTANT

## 2025-08-03 PROCEDURE — 93005 ELECTROCARDIOGRAM TRACING: CPT | Performed by: EMERGENCY MEDICINE

## 2025-08-03 PROCEDURE — 36415 COLL VENOUS BLD VENIPUNCTURE: CPT

## 2025-08-03 PROCEDURE — 71260 CT THORAX DX C+: CPT

## 2025-08-03 PROCEDURE — 83880 ASSAY OF NATRIURETIC PEPTIDE: CPT

## 2025-08-03 PROCEDURE — 84484 ASSAY OF TROPONIN QUANT: CPT

## 2025-08-03 PROCEDURE — 6360000004 HC RX CONTRAST MEDICATION: Performed by: EMERGENCY MEDICINE

## 2025-08-03 PROCEDURE — 85025 COMPLETE CBC W/AUTO DIFF WBC: CPT

## 2025-08-03 PROCEDURE — 71046 X-RAY EXAM CHEST 2 VIEWS: CPT

## 2025-08-03 PROCEDURE — 80053 COMPREHEN METABOLIC PANEL: CPT

## 2025-08-03 RX ORDER — ASPIRIN 81 MG/1
244 TABLET, CHEWABLE ORAL ONCE
Status: DISCONTINUED | OUTPATIENT
Start: 2025-08-03 | End: 2025-08-03

## 2025-08-03 RX ORDER — IOPAMIDOL 755 MG/ML
75 INJECTION, SOLUTION INTRAVASCULAR
Status: COMPLETED | OUTPATIENT
Start: 2025-08-03 | End: 2025-08-03

## 2025-08-03 RX ORDER — ASPIRIN 81 MG/1
243 TABLET, CHEWABLE ORAL ONCE
Status: COMPLETED | OUTPATIENT
Start: 2025-08-03 | End: 2025-08-03

## 2025-08-03 RX ADMIN — IOPAMIDOL 75 ML: 755 INJECTION, SOLUTION INTRAVENOUS at 21:57

## 2025-08-03 RX ADMIN — ASPIRIN 243 MG: 81 TABLET, CHEWABLE ORAL at 20:55

## 2025-08-03 ASSESSMENT — PAIN - FUNCTIONAL ASSESSMENT: PAIN_FUNCTIONAL_ASSESSMENT: 0-10

## 2025-08-03 ASSESSMENT — PAIN DESCRIPTION - ORIENTATION: ORIENTATION: MID

## 2025-08-03 ASSESSMENT — PAIN SCALES - GENERAL: PAINLEVEL_OUTOF10: 4

## 2025-08-03 ASSESSMENT — PAIN DESCRIPTION - LOCATION: LOCATION: CHEST

## 2025-08-03 ASSESSMENT — PAIN DESCRIPTION - DESCRIPTORS: DESCRIPTORS: PRESSURE

## 2025-08-03 ASSESSMENT — HEART SCORE: ECG: NORMAL

## 2025-08-03 ASSESSMENT — PAIN DESCRIPTION - PAIN TYPE: TYPE: ACUTE PAIN

## 2025-08-04 LAB
EKG ATRIAL RATE: 60 BPM
EKG DIAGNOSIS: NORMAL
EKG P AXIS: 13 DEGREES
EKG P-R INTERVAL: 170 MS
EKG Q-T INTERVAL: 462 MS
EKG QRS DURATION: 136 MS
EKG QTC CALCULATION (BAZETT): 462 MS
EKG R AXIS: 44 DEGREES
EKG T AXIS: 26 DEGREES
EKG VENTRICULAR RATE: 60 BPM

## 2025-08-04 PROCEDURE — 93010 ELECTROCARDIOGRAM REPORT: CPT | Performed by: INTERNAL MEDICINE

## 2025-08-11 ENCOUNTER — INITIAL CONSULT (OUTPATIENT)
Dept: SURGERY | Age: 65
End: 2025-08-11
Payer: COMMERCIAL

## 2025-08-11 ENCOUNTER — TELEPHONE (OUTPATIENT)
Dept: CARDIOLOGY CLINIC | Age: 65
End: 2025-08-11

## 2025-08-11 VITALS
HEART RATE: 58 BPM | WEIGHT: 315 LBS | DIASTOLIC BLOOD PRESSURE: 93 MMHG | SYSTOLIC BLOOD PRESSURE: 151 MMHG | BODY MASS INDEX: 58.73 KG/M2

## 2025-08-11 DIAGNOSIS — R07.89 OTHER CHEST PAIN: Primary | ICD-10-CM

## 2025-08-11 DIAGNOSIS — K44.9 HIATAL HERNIA: ICD-10-CM

## 2025-08-11 PROCEDURE — 99214 OFFICE O/P EST MOD 30 MIN: CPT | Performed by: SURGERY

## (undated) DEVICE — TROCAR: Brand: KII SHIELDED BLADED ACCESS SYSTEM

## (undated) DEVICE — STAPLER SKIN H3.9MM WIRE DIA0.58MM CRWN 6.9MM 35 STPL FIX

## (undated) DEVICE — SUTURE VCRL + SZ 3-0 L27IN ABSRB WHT CT-1 1/2 CIR VCP258H

## (undated) DEVICE — INSUFFLATION NEEDLE TO ESTABLISH PNEUMOPERITONEUM.: Brand: INSUFFLATION NEEDLE

## (undated) DEVICE — ELECTRODE PT RET AD L9FT HI MOIST COND ADH HYDRGEL CORDED

## (undated) DEVICE — SET INSUF TUBE HEAT ISO CONN DISP

## (undated) DEVICE — ENDOSCOPY KIT: Brand: MEDLINE INDUSTRIES, INC.

## (undated) DEVICE — DUAL LUMEN STOMACH TUBE: Brand: SALEM SUMP

## (undated) DEVICE — MAJOR SET UP: Brand: MEDLINE INDUSTRIES, INC.

## (undated) DEVICE — SPONGE LAP W18XL18IN WHT COT 4 PLY FLD STRUNG RADPQ DISP ST

## (undated) DEVICE — GARMENT COMPR STD FOR 17IN CALF UNIF THER FLOTRN

## (undated) DEVICE — BITE BLOCK ENDOSCP AD 60 FR W/ ADJ STRP PLAS GRN BLOX

## (undated) DEVICE — FORMALIN CLEAR VIAL 20 ML 10%

## (undated) DEVICE — GOWN,AURORA,NONREINF,RAGLAN,XXL,STERILE: Brand: MEDLINE

## (undated) DEVICE — NEEDLE HYPO 25GA L1.5IN BVL ORIENTED ECLIPSE

## (undated) DEVICE — GLOVE ORANGE PI 7   MSG9070

## (undated) DEVICE — TUBE GASTROSTMY 24FR MED GRD SIL FEED GAM RECESS DST TIP

## (undated) DEVICE — GLOVE SURG SZ 8 L12IN FNGR THK79MIL GRN LTX FREE

## (undated) DEVICE — SUTURE PERMAHAND SZ 2-0 L18IN NONABSORBABLE BLK L26MM SH C012D

## (undated) DEVICE — SUTURE VCRL + SZ 3-0 L18IN ABSRB UD SH 1/2 CIR TAPERCUT NDL VCP864D

## (undated) DEVICE — SOLUTION IV IRRIG POUR BRL 0.9% SODIUM CHL 2F7124

## (undated) DEVICE — TROCAR: Brand: KII FIOS FIRST ENTRY

## (undated) DEVICE — FORCEPS BX 240CM 2.4MM L NDL RAD JAW 4 M00513334

## (undated) DEVICE — SUTURE VCRL SZ 0 L18IN ABSRB UD POLYGLACTIN 910 BRAID TIE J912G

## (undated) DEVICE — MERCY HEALTH WEST TURNOVER: Brand: MEDLINE INDUSTRIES, INC.

## (undated) DEVICE — SOLUTION ANTIFOG VIS SYS CLEARIFY LAPSCP

## (undated) DEVICE — SPONGE GZ W4XL4IN COT 12 PLY TYP VII WVN C FLD DSGN

## (undated) DEVICE — STAPLER INT L60MM DIA12MM STD TISS TI LNAR CUT LN 6 ROW

## (undated) DEVICE — CANISTER, RIGID, 1200CC: Brand: MEDLINE INDUSTRIES, INC.

## (undated) DEVICE — Device

## (undated) DEVICE — PMI DISPOSABLE PUNCTURE CLOSURE DEVICE / SUTURE GRASPER: Brand: PMI

## (undated) DEVICE — PENROSE DRAIN 18 X .5" SILICONE: Brand: MEDLINE

## (undated) DEVICE — TROCAR: Brand: KII SLEEVE

## (undated) DEVICE — DISSECTOR ULTRASONIC L39CM CRV JAW CRDLSS SONICISION

## (undated) DEVICE — THE DISPOSABLE RAPTOR GRASPING DEVICE IS USED TO GRASP TISSUE AND/OR RETRIEVE FOREIGN BODIES, EXCISED TISSUE AND STENTS DURING ENDOSCOPIC PROCEDURES.: Brand: RAPTOR

## (undated) DEVICE — 3M™ IOBAN™ 2 ANTIMICROBIAL INCISE DRAPE 6650EZ: Brand: IOBAN™ 2

## (undated) DEVICE — SUTURE PDS II SZ 0 L60IN ABSRB VLT L48MM CTX 1/2 CIR Z990G

## (undated) DEVICE — SYRINGE 20ML LL S/C 50

## (undated) DEVICE — DEVICE SUT SHFT L34CM DIA 10MM 2 JAW LD UNIT ENDOSTCH

## (undated) DEVICE — COVER LT HNDL BLU PLAS

## (undated) DEVICE — GENERAL LAPAROSCOPY: Brand: MEDLINE INDUSTRIES, INC.

## (undated) DEVICE — GLOVE ORANGE PI 7 1/2   MSG9075

## (undated) DEVICE — SOLUTION IV 1000ML 0.9% SOD CHL FOR IRRIG PLAS CONT

## (undated) DEVICE — HYPODERMIC SAFETY NEEDLE: Brand: MAGELLAN

## (undated) DEVICE — STRIP,CLOSURE,WOUND,MEDI-STRIP,1/2X4: Brand: MEDLINE

## (undated) DEVICE — APPLICATOR MEDICATED 26 CC SOLUTION HI LT ORNG CHLORAPREP

## (undated) DEVICE — SHEET, T, LAPAROTOMY, STERILE: Brand: MEDLINE

## (undated) DEVICE — SUTURE VCRL + SZ 4-0 L18IN ABSRB UD L19MM PS-2 3/8 CIR PRIM VCP496H

## (undated) DEVICE — ADHESIVE SKIN CLOSURE TOP 36 CC HI VISC DERMBND MINI

## (undated) DEVICE — CONTAINER SPEC 480ML CLR POLYSTYR 10% NEUT BUFF FRMLN ZN

## (undated) DEVICE — TOTAL TRAY, 16FR 10ML SIL FOLEY, URN: Brand: MEDLINE